# Patient Record
Sex: MALE | Race: BLACK OR AFRICAN AMERICAN | Employment: UNEMPLOYED | URBAN - METROPOLITAN AREA
[De-identification: names, ages, dates, MRNs, and addresses within clinical notes are randomized per-mention and may not be internally consistent; named-entity substitution may affect disease eponyms.]

---

## 2017-01-01 ENCOUNTER — HOSPITAL ENCOUNTER (OUTPATIENT)
Age: 67
Discharge: HOME HEALTH | End: 2017-12-23
Attending: PHYSICAL MEDICINE & REHABILITATION | Admitting: PHYSICAL MEDICINE & REHABILITATION

## 2017-01-01 ENCOUNTER — HOSPITAL ENCOUNTER (OUTPATIENT)
Dept: GENERAL RADIOLOGY | Age: 67
Discharge: HOME | End: 2017-10-23
Attending: PHYSICAL MEDICINE & REHABILITATION
Payer: MEDICARE

## 2017-01-01 ENCOUNTER — HOSPITAL ENCOUNTER (OUTPATIENT)
Dept: CT IMAGING | Age: 67
Discharge: HOME | End: 2017-10-24
Attending: PHYSICAL MEDICINE & REHABILITATION
Payer: MEDICARE

## 2017-01-01 ENCOUNTER — HOSPITAL ENCOUNTER (OUTPATIENT)
Dept: CT IMAGING | Age: 67
Discharge: HOME | End: 2017-10-16
Attending: PHYSICAL MEDICINE & REHABILITATION
Payer: MEDICARE

## 2017-01-01 ENCOUNTER — DOCUMENTATION ONLY (OUTPATIENT)
Dept: FAMILY MEDICINE CLINIC | Age: 67
End: 2017-01-01

## 2017-01-01 ENCOUNTER — HOSPITAL ENCOUNTER (OUTPATIENT)
Age: 67
Discharge: ANOTHER HOSPITAL | End: 2017-10-24
Attending: PHYSICAL MEDICINE & REHABILITATION | Admitting: PHYSICAL MEDICINE & REHABILITATION
Payer: MEDICARE

## 2017-01-01 VITALS
WEIGHT: 159 LBS | BODY MASS INDEX: 22.76 KG/M2 | SYSTOLIC BLOOD PRESSURE: 186 MMHG | HEIGHT: 70 IN | DIASTOLIC BLOOD PRESSURE: 78 MMHG | HEART RATE: 86 BPM

## 2017-01-01 VITALS
HEART RATE: 90 BPM | SYSTOLIC BLOOD PRESSURE: 145 MMHG | HEIGHT: 70 IN | BODY MASS INDEX: 25.62 KG/M2 | WEIGHT: 179 LBS | DIASTOLIC BLOOD PRESSURE: 73 MMHG

## 2017-01-01 LAB
ALBUMIN SERPL-MCNC: 1.7 G/DL (ref 3.5–5)
ALBUMIN SERPL-MCNC: 1.8 G/DL (ref 3.5–5)
ALBUMIN SERPL-MCNC: 1.9 G/DL (ref 3.5–5)
ALBUMIN SERPL-MCNC: 2 G/DL (ref 3.5–5)
ALBUMIN/GLOB SERPL: 0.4 {RATIO} (ref 1.1–2.2)
ALBUMIN/GLOB SERPL: 0.4 {RATIO} (ref 1.1–2.2)
ALP SERPL-CCNC: 104 U/L (ref 45–117)
ALP SERPL-CCNC: 114 U/L (ref 45–117)
ALT SERPL-CCNC: 14 U/L (ref 12–78)
ALT SERPL-CCNC: 16 U/L (ref 12–78)
ANION GAP SERPL CALC-SCNC: 4 MMOL/L (ref 5–15)
ANION GAP SERPL CALC-SCNC: 6 MMOL/L (ref 5–15)
ANION GAP SERPL CALC-SCNC: 7 MMOL/L (ref 5–15)
ANION GAP SERPL CALC-SCNC: 7 MMOL/L (ref 5–15)
ANION GAP SERPL CALC-SCNC: 8 MMOL/L (ref 5–15)
AST SERPL-CCNC: 12 U/L (ref 15–37)
AST SERPL-CCNC: 17 U/L (ref 15–37)
BASOPHILS # BLD: 0 K/UL
BASOPHILS # BLD: 0 K/UL (ref 0–0.1)
BASOPHILS NFR BLD: 0 %
BASOPHILS NFR BLD: 0 % (ref 0–1)
BILIRUB SERPL-MCNC: 0.2 MG/DL (ref 0.2–1)
BILIRUB SERPL-MCNC: 0.3 MG/DL (ref 0.2–1)
BLASTS NFR BLD MANUAL: 0 %
BUN SERPL-MCNC: 24 MG/DL (ref 6–20)
BUN SERPL-MCNC: 25 MG/DL (ref 6–20)
BUN SERPL-MCNC: 26 MG/DL (ref 6–20)
BUN SERPL-MCNC: 29 MG/DL (ref 6–20)
BUN SERPL-MCNC: 32 MG/DL (ref 6–20)
BUN SERPL-MCNC: 34 MG/DL (ref 6–20)
BUN SERPL-MCNC: 36 MG/DL (ref 6–20)
BUN/CREAT SERPL: 3 (ref 12–20)
BUN/CREAT SERPL: 3 (ref 12–20)
BUN/CREAT SERPL: 4 (ref 12–20)
BUN/CREAT SERPL: 5 (ref 12–20)
CALCIUM SERPL-MCNC: 8 MG/DL (ref 8.5–10.1)
CALCIUM SERPL-MCNC: 8.2 MG/DL (ref 8.5–10.1)
CALCIUM SERPL-MCNC: 8.2 MG/DL (ref 8.5–10.1)
CALCIUM SERPL-MCNC: 8.8 MG/DL (ref 8.5–10.1)
CALCIUM SERPL-MCNC: 8.9 MG/DL (ref 8.5–10.1)
CALCIUM SERPL-MCNC: 9.1 MG/DL (ref 8.5–10.1)
CALCIUM SERPL-MCNC: 9.4 MG/DL (ref 8.5–10.1)
CHLORIDE SERPL-SCNC: 100 MMOL/L (ref 97–108)
CHLORIDE SERPL-SCNC: 105 MMOL/L (ref 97–108)
CHLORIDE SERPL-SCNC: 92 MMOL/L (ref 97–108)
CHLORIDE SERPL-SCNC: 96 MMOL/L (ref 97–108)
CHLORIDE SERPL-SCNC: 97 MMOL/L (ref 97–108)
CHLORIDE SERPL-SCNC: 97 MMOL/L (ref 97–108)
CHLORIDE SERPL-SCNC: 98 MMOL/L (ref 97–108)
CHLORIDE SERPL-SCNC: 98 MMOL/L (ref 97–108)
CHLORIDE SERPL-SCNC: 99 MMOL/L (ref 97–108)
CO2 SERPL-SCNC: 27 MMOL/L (ref 21–32)
CO2 SERPL-SCNC: 28 MMOL/L (ref 21–32)
CO2 SERPL-SCNC: 28 MMOL/L (ref 21–32)
CO2 SERPL-SCNC: 29 MMOL/L (ref 21–32)
CO2 SERPL-SCNC: 30 MMOL/L (ref 21–32)
CO2 SERPL-SCNC: 31 MMOL/L (ref 21–32)
CO2 SERPL-SCNC: 32 MMOL/L (ref 21–32)
CREAT SERPL-MCNC: 5.87 MG/DL (ref 0.7–1.3)
CREAT SERPL-MCNC: 6.05 MG/DL (ref 0.7–1.3)
CREAT SERPL-MCNC: 6.45 MG/DL (ref 0.7–1.3)
CREAT SERPL-MCNC: 6.51 MG/DL (ref 0.7–1.3)
CREAT SERPL-MCNC: 7.19 MG/DL (ref 0.7–1.3)
CREAT SERPL-MCNC: 7.24 MG/DL (ref 0.7–1.3)
CREAT SERPL-MCNC: 8.04 MG/DL (ref 0.7–1.3)
CREAT SERPL-MCNC: 8.09 MG/DL (ref 0.7–1.3)
CREAT SERPL-MCNC: 8.18 MG/DL (ref 0.7–1.3)
DIFFERENTIAL METHOD BLD: ABNORMAL
EOSINOPHIL # BLD: 0.2 K/UL (ref 0–0.4)
EOSINOPHIL # BLD: 0.3 K/UL
EOSINOPHIL NFR BLD: 1 % (ref 0–7)
EOSINOPHIL NFR BLD: 2 %
ERYTHROCYTE [DISTWIDTH] IN BLOOD BY AUTOMATED COUNT: 15.4 % (ref 11.5–14.5)
ERYTHROCYTE [DISTWIDTH] IN BLOOD BY AUTOMATED COUNT: 15.5 % (ref 11.5–14.5)
ERYTHROCYTE [DISTWIDTH] IN BLOOD BY AUTOMATED COUNT: 15.6 % (ref 11.5–14.5)
ERYTHROCYTE [DISTWIDTH] IN BLOOD BY AUTOMATED COUNT: 15.8 % (ref 11.5–14.5)
ERYTHROCYTE [DISTWIDTH] IN BLOOD BY AUTOMATED COUNT: 18 % (ref 11.5–14.5)
ERYTHROCYTE [DISTWIDTH] IN BLOOD BY AUTOMATED COUNT: 19.1 % (ref 11.5–14.5)
ERYTHROCYTE [DISTWIDTH] IN BLOOD BY AUTOMATED COUNT: 19.1 % (ref 11.5–14.5)
ERYTHROCYTE [DISTWIDTH] IN BLOOD BY AUTOMATED COUNT: 19.6 % (ref 11.5–14.5)
GLOBULIN SER CALC-MCNC: 4.7 G/DL (ref 2–4)
GLOBULIN SER CALC-MCNC: 4.8 G/DL (ref 2–4)
GLUCOSE SERPL-MCNC: 116 MG/DL (ref 65–100)
GLUCOSE SERPL-MCNC: 117 MG/DL (ref 65–100)
GLUCOSE SERPL-MCNC: 131 MG/DL (ref 65–100)
GLUCOSE SERPL-MCNC: 144 MG/DL (ref 65–100)
GLUCOSE SERPL-MCNC: 147 MG/DL (ref 65–100)
GLUCOSE SERPL-MCNC: 165 MG/DL (ref 65–100)
GLUCOSE SERPL-MCNC: 233 MG/DL (ref 65–100)
GLUCOSE SERPL-MCNC: 60 MG/DL (ref 65–100)
GLUCOSE SERPL-MCNC: 88 MG/DL (ref 65–100)
HBV SURFACE AG SER QL: <0.1 INDEX
HBV SURFACE AG SER QL: NEGATIVE
HCT VFR BLD AUTO: 21.9 % (ref 36.6–50.3)
HCT VFR BLD AUTO: 22.8 % (ref 36.6–50.3)
HCT VFR BLD AUTO: 23.5 % (ref 36.6–50.3)
HCT VFR BLD AUTO: 25.3 % (ref 36.6–50.3)
HCT VFR BLD AUTO: 25.5 % (ref 36.6–50.3)
HCT VFR BLD AUTO: 27.3 % (ref 36.6–50.3)
HCT VFR BLD AUTO: 27.8 % (ref 36.6–50.3)
HCT VFR BLD AUTO: 28.3 % (ref 36.6–50.3)
HGB BLD-MCNC: 7.3 G/DL (ref 12.1–17)
HGB BLD-MCNC: 7.4 G/DL (ref 12.1–17)
HGB BLD-MCNC: 7.5 G/DL (ref 12.1–17)
HGB BLD-MCNC: 7.6 G/DL (ref 12.1–17)
HGB BLD-MCNC: 8.2 G/DL (ref 12.1–17)
HGB BLD-MCNC: 8.5 G/DL (ref 12.1–17)
HGB BLD-MCNC: 8.5 G/DL (ref 12.1–17)
HGB BLD-MCNC: 8.7 G/DL (ref 12.1–17)
LYMPHOCYTES # BLD: 1.4 K/UL
LYMPHOCYTES # BLD: 1.7 K/UL (ref 0.8–3.5)
LYMPHOCYTES NFR BLD: 10 % (ref 12–49)
LYMPHOCYTES NFR BLD: 8 %
MAGNESIUM SERPL-MCNC: 2.1 MG/DL (ref 1.6–2.4)
MAGNESIUM SERPL-MCNC: 2.1 MG/DL (ref 1.6–2.4)
MANUAL DIFFERENTIAL PERFORMED BLD QL: ABNORMAL
MCH RBC QN AUTO: 25.9 PG (ref 26–34)
MCH RBC QN AUTO: 26.1 PG (ref 26–34)
MCH RBC QN AUTO: 26.5 PG (ref 26–34)
MCH RBC QN AUTO: 27.4 PG (ref 26–34)
MCH RBC QN AUTO: 27.7 PG (ref 26–34)
MCH RBC QN AUTO: 28.4 PG (ref 26–34)
MCH RBC QN AUTO: 28.5 PG (ref 26–34)
MCH RBC QN AUTO: 28.8 PG (ref 26–34)
MCHC RBC AUTO-ENTMCNC: 29.8 G/DL (ref 30–36.5)
MCHC RBC AUTO-ENTMCNC: 30 G/DL (ref 30–36.5)
MCHC RBC AUTO-ENTMCNC: 31.1 G/DL (ref 30–36.5)
MCHC RBC AUTO-ENTMCNC: 31.3 G/DL (ref 30–36.5)
MCHC RBC AUTO-ENTMCNC: 31.5 G/DL (ref 30–36.5)
MCHC RBC AUTO-ENTMCNC: 32.4 G/DL (ref 30–36.5)
MCHC RBC AUTO-ENTMCNC: 32.9 G/DL (ref 30–36.5)
MCHC RBC AUTO-ENTMCNC: 33.3 G/DL (ref 30–36.5)
MCV RBC AUTO: 85 FL (ref 80–99)
MCV RBC AUTO: 85.2 FL (ref 80–99)
MCV RBC AUTO: 86.7 FL (ref 80–99)
MCV RBC AUTO: 86.8 FL (ref 80–99)
MCV RBC AUTO: 87 FL (ref 80–99)
MCV RBC AUTO: 87 FL (ref 80–99)
MCV RBC AUTO: 88.5 FL (ref 80–99)
MCV RBC AUTO: 88.8 FL (ref 80–99)
METAMYELOCYTES NFR BLD MANUAL: 0 %
MONOCYTES # BLD: 1 K/UL
MONOCYTES # BLD: 2 K/UL (ref 0–1)
MONOCYTES NFR BLD: 12 % (ref 5–13)
MONOCYTES NFR BLD: 6 %
MYELOCYTES NFR BLD MANUAL: 0 %
NEUTS BAND NFR BLD MANUAL: 0 %
NEUTS SEG # BLD: 12.6 K/UL (ref 1.8–8)
NEUTS SEG # BLD: 14.7 K/UL
NEUTS SEG NFR BLD: 77 % (ref 32–75)
NEUTS SEG NFR BLD: 84 %
NRBC # BLD: 0 K/UL (ref 0–0.01)
NRBC BLD-RTO: 0 PER 100 WBC
OTHER CELLS NFR BLD MANUAL: 0 %
PHOSPHATE SERPL-MCNC: 3 MG/DL (ref 2.6–4.7)
PHOSPHATE SERPL-MCNC: 3.6 MG/DL (ref 2.6–4.7)
PHOSPHATE SERPL-MCNC: 3.7 MG/DL (ref 2.6–4.7)
PHOSPHATE SERPL-MCNC: 3.8 MG/DL (ref 2.6–4.7)
PHOSPHATE SERPL-MCNC: 4 MG/DL (ref 2.6–4.7)
PHOSPHATE SERPL-MCNC: 4.1 MG/DL (ref 2.6–4.7)
PHOSPHATE SERPL-MCNC: 4.1 MG/DL (ref 2.6–4.7)
PLATELET # BLD AUTO: 159 K/UL (ref 150–400)
PLATELET # BLD AUTO: 172 K/UL (ref 150–400)
PLATELET # BLD AUTO: 203 K/UL (ref 150–400)
PLATELET # BLD AUTO: 218 K/UL (ref 150–400)
PLATELET # BLD AUTO: 283 K/UL (ref 150–400)
PLATELET # BLD AUTO: 377 K/UL (ref 150–400)
PLATELET # BLD AUTO: 385 K/UL (ref 150–400)
PLATELET # BLD AUTO: 386 K/UL (ref 150–400)
POTASSIUM SERPL-SCNC: 3.8 MMOL/L (ref 3.5–5.1)
POTASSIUM SERPL-SCNC: 4 MMOL/L (ref 3.5–5.1)
POTASSIUM SERPL-SCNC: 4.2 MMOL/L (ref 3.5–5.1)
POTASSIUM SERPL-SCNC: 4.6 MMOL/L (ref 3.5–5.1)
POTASSIUM SERPL-SCNC: 4.8 MMOL/L (ref 3.5–5.1)
POTASSIUM SERPL-SCNC: 4.9 MMOL/L (ref 3.5–5.1)
POTASSIUM SERPL-SCNC: 4.9 MMOL/L (ref 3.5–5.1)
PROMYELOCYTES NFR BLD MANUAL: 0 %
PROT SERPL-MCNC: 6.7 G/DL (ref 6.4–8.2)
PROT SERPL-MCNC: 6.7 G/DL (ref 6.4–8.2)
RBC # BLD AUTO: 2.57 M/UL (ref 4.1–5.7)
RBC # BLD AUTO: 2.63 M/UL (ref 4.1–5.7)
RBC # BLD AUTO: 2.7 M/UL (ref 4.1–5.7)
RBC # BLD AUTO: 2.85 M/UL (ref 4.1–5.7)
RBC # BLD AUTO: 2.93 M/UL (ref 4.1–5.7)
RBC # BLD AUTO: 3.14 M/UL (ref 4.1–5.7)
RBC # BLD AUTO: 3.21 M/UL (ref 4.1–5.7)
RBC # BLD AUTO: 3.26 M/UL (ref 4.1–5.7)
RBC MORPH BLD: ABNORMAL
SODIUM SERPL-SCNC: 129 MMOL/L (ref 136–145)
SODIUM SERPL-SCNC: 131 MMOL/L (ref 136–145)
SODIUM SERPL-SCNC: 132 MMOL/L (ref 136–145)
SODIUM SERPL-SCNC: 135 MMOL/L (ref 136–145)
SODIUM SERPL-SCNC: 136 MMOL/L (ref 136–145)
SODIUM SERPL-SCNC: 136 MMOL/L (ref 136–145)
SODIUM SERPL-SCNC: 140 MMOL/L (ref 136–145)
WBC # BLD AUTO: 10.5 K/UL (ref 4.1–11.1)
WBC # BLD AUTO: 12.3 K/UL (ref 4.1–11.1)
WBC # BLD AUTO: 16.5 K/UL (ref 4.1–11.1)
WBC # BLD AUTO: 17.4 K/UL (ref 4.1–11.1)
WBC # BLD AUTO: 6.2 K/UL (ref 4.1–11.1)
WBC # BLD AUTO: 6.4 K/UL (ref 4.1–11.1)
WBC # BLD AUTO: 6.6 K/UL (ref 4.1–11.1)
WBC # BLD AUTO: 7.7 K/UL (ref 4.1–11.1)

## 2017-01-01 PROCEDURE — 74011250637 HC RX REV CODE- 250/637: Performed by: PHYSICAL MEDICINE & REHABILITATION

## 2017-01-01 PROCEDURE — 83735 ASSAY OF MAGNESIUM: CPT | Performed by: PHYSICAL MEDICINE & REHABILITATION

## 2017-01-01 PROCEDURE — 74011250636 HC RX REV CODE- 250/636: Performed by: INTERNAL MEDICINE

## 2017-01-01 PROCEDURE — 74011636637 HC RX REV CODE- 636/637: Performed by: PHYSICAL MEDICINE & REHABILITATION

## 2017-01-01 PROCEDURE — 36415 COLL VENOUS BLD VENIPUNCTURE: CPT | Performed by: PHYSICAL MEDICINE & REHABILITATION

## 2017-01-01 PROCEDURE — 87340 HEPATITIS B SURFACE AG IA: CPT | Performed by: PHYSICAL MEDICINE & REHABILITATION

## 2017-01-01 PROCEDURE — 80048 BASIC METABOLIC PNL TOTAL CA: CPT | Performed by: PHYSICAL MEDICINE & REHABILITATION

## 2017-01-01 PROCEDURE — 85027 COMPLETE CBC AUTOMATED: CPT | Performed by: PHYSICAL MEDICINE & REHABILITATION

## 2017-01-01 PROCEDURE — 74011250636 HC RX REV CODE- 250/636: Performed by: PHYSICAL MEDICINE & REHABILITATION

## 2017-01-01 PROCEDURE — 85025 COMPLETE CBC W/AUTO DIFF WBC: CPT | Performed by: PHYSICAL MEDICINE & REHABILITATION

## 2017-01-01 PROCEDURE — 80069 RENAL FUNCTION PANEL: CPT | Performed by: PHYSICAL MEDICINE & REHABILITATION

## 2017-01-01 PROCEDURE — 90686 IIV4 VACC NO PRSV 0.5 ML IM: CPT | Performed by: PHYSICAL MEDICINE & REHABILITATION

## 2017-01-01 PROCEDURE — 74011000255 HC RX REV CODE- 255: Performed by: PHYSICAL MEDICINE & REHABILITATION

## 2017-01-01 PROCEDURE — 74177 CT ABD & PELVIS W/CONTRAST: CPT

## 2017-01-01 PROCEDURE — 84100 ASSAY OF PHOSPHORUS: CPT | Performed by: PHYSICAL MEDICINE & REHABILITATION

## 2017-01-01 PROCEDURE — 70450 CT HEAD/BRAIN W/O DYE: CPT

## 2017-01-01 PROCEDURE — 80053 COMPREHEN METABOLIC PANEL: CPT | Performed by: PHYSICAL MEDICINE & REHABILITATION

## 2017-01-01 PROCEDURE — 74011250637 HC RX REV CODE- 250/637

## 2017-01-01 PROCEDURE — 74011636320 HC RX REV CODE- 636/320: Performed by: PHYSICAL MEDICINE & REHABILITATION

## 2017-01-01 PROCEDURE — 71020 XR CHEST PA LAT: CPT

## 2017-01-01 PROCEDURE — 74011250636 HC RX REV CODE- 250/636

## 2017-01-01 RX ORDER — DIPHENHYDRAMINE HCL 25 MG
25 CAPSULE ORAL
Status: DISCONTINUED | OUTPATIENT
Start: 2017-01-01 | End: 2017-01-01 | Stop reason: HOSPADM

## 2017-01-01 RX ORDER — CHOLESTYRAMINE 4 G/4.8G
2 POWDER, FOR SUSPENSION ORAL 2 TIMES DAILY WITH MEALS
Status: DISCONTINUED | OUTPATIENT
Start: 2017-01-01 | End: 2017-01-01

## 2017-01-01 RX ORDER — HYDROCODONE BITARTRATE AND ACETAMINOPHEN 5; 325 MG/1; MG/1
1 TABLET ORAL
Status: DISCONTINUED | OUTPATIENT
Start: 2017-01-01 | End: 2017-01-01 | Stop reason: HOSPADM

## 2017-01-01 RX ORDER — SODIUM CHLORIDE 9 MG/ML
50 INJECTION, SOLUTION INTRAVENOUS
Status: COMPLETED | OUTPATIENT
Start: 2017-01-01 | End: 2017-01-01

## 2017-01-01 RX ORDER — ESCITALOPRAM OXALATE 10 MG/1
10 TABLET ORAL DAILY
Status: DISCONTINUED | OUTPATIENT
Start: 2017-01-01 | End: 2017-01-01 | Stop reason: HOSPADM

## 2017-01-01 RX ORDER — POLYETHYLENE GLYCOL 3350 17 G/17G
17 POWDER, FOR SOLUTION ORAL DAILY
Status: DISCONTINUED | OUTPATIENT
Start: 2017-01-01 | End: 2017-01-01

## 2017-01-01 RX ORDER — ACETAMINOPHEN 325 MG/1
650 TABLET ORAL
Status: DISCONTINUED | OUTPATIENT
Start: 2017-01-01 | End: 2017-01-01 | Stop reason: HOSPADM

## 2017-01-01 RX ORDER — CARVEDILOL 12.5 MG/1
25 TABLET ORAL 2 TIMES DAILY WITH MEALS
Status: DISCONTINUED | OUTPATIENT
Start: 2017-01-01 | End: 2017-01-01 | Stop reason: HOSPADM

## 2017-01-01 RX ORDER — CLONIDINE HYDROCHLORIDE 0.1 MG/1
0.1 TABLET ORAL 3 TIMES DAILY
Status: DISCONTINUED | OUTPATIENT
Start: 2017-01-01 | End: 2017-01-01 | Stop reason: HOSPADM

## 2017-01-01 RX ORDER — PRAVASTATIN SODIUM 10 MG/1
10 TABLET ORAL DAILY
Status: DISCONTINUED | OUTPATIENT
Start: 2017-01-01 | End: 2017-01-01 | Stop reason: HOSPADM

## 2017-01-01 RX ORDER — LISINOPRIL 20 MG/1
20 TABLET ORAL DAILY
Status: DISCONTINUED | OUTPATIENT
Start: 2017-01-01 | End: 2017-01-01

## 2017-01-01 RX ORDER — HYDRALAZINE HYDROCHLORIDE 25 MG/1
25 TABLET, FILM COATED ORAL
Status: DISCONTINUED | OUTPATIENT
Start: 2017-01-01 | End: 2017-01-01 | Stop reason: HOSPADM

## 2017-01-01 RX ORDER — HYDROCODONE BITARTRATE AND ACETAMINOPHEN 5; 325 MG/1; MG/1
2 TABLET ORAL
Status: DISCONTINUED | OUTPATIENT
Start: 2017-01-01 | End: 2017-01-01 | Stop reason: HOSPADM

## 2017-01-01 RX ORDER — ONDANSETRON 4 MG/1
4 TABLET, ORALLY DISINTEGRATING ORAL
Status: DISCONTINUED | OUTPATIENT
Start: 2017-01-01 | End: 2017-01-01 | Stop reason: HOSPADM

## 2017-01-01 RX ORDER — SODIUM CHLORIDE 0.9 % (FLUSH) 0.9 %
SYRINGE (ML) INJECTION
Status: COMPLETED
Start: 2017-01-01 | End: 2017-01-01

## 2017-01-01 RX ORDER — LISINOPRIL 20 MG/1
40 TABLET ORAL DAILY
Status: DISCONTINUED | OUTPATIENT
Start: 2017-01-01 | End: 2017-01-01 | Stop reason: HOSPADM

## 2017-01-01 RX ORDER — AMOXICILLIN 250 MG
1 CAPSULE ORAL
Status: DISCONTINUED | OUTPATIENT
Start: 2017-01-01 | End: 2017-01-01 | Stop reason: HOSPADM

## 2017-01-01 RX ORDER — INSULIN GLARGINE 100 [IU]/ML
5 INJECTION, SOLUTION SUBCUTANEOUS DAILY
Status: DISCONTINUED | OUTPATIENT
Start: 2017-01-01 | End: 2017-01-01

## 2017-01-01 RX ORDER — AMOXICILLIN 250 MG
1 CAPSULE ORAL 2 TIMES DAILY
Status: DISCONTINUED | OUTPATIENT
Start: 2017-01-01 | End: 2017-01-01

## 2017-01-01 RX ORDER — NITROGLYCERIN 0.4 MG/1
0.4 TABLET SUBLINGUAL
Status: DISCONTINUED | OUTPATIENT
Start: 2017-01-01 | End: 2017-01-01 | Stop reason: HOSPADM

## 2017-01-01 RX ORDER — DOXYLAMINE SUCCINATE 25 MG
TABLET ORAL 2 TIMES DAILY
Status: DISCONTINUED | OUTPATIENT
Start: 2017-01-01 | End: 2017-01-01 | Stop reason: HOSPADM

## 2017-01-01 RX ORDER — FACIAL-BODY WIPES
10 EACH TOPICAL DAILY PRN
Status: DISCONTINUED | OUTPATIENT
Start: 2017-01-01 | End: 2017-01-01 | Stop reason: HOSPADM

## 2017-01-01 RX ORDER — ASPIRIN 81 MG/1
81 TABLET ORAL DAILY
Status: DISCONTINUED | OUTPATIENT
Start: 2017-01-01 | End: 2017-01-01 | Stop reason: HOSPADM

## 2017-01-01 RX ORDER — MECLIZINE HCL 12.5 MG 12.5 MG/1
25 TABLET ORAL
Status: DISCONTINUED | OUTPATIENT
Start: 2017-01-01 | End: 2017-01-01 | Stop reason: HOSPADM

## 2017-01-01 RX ORDER — SODIUM CHLORIDE 0.9 % (FLUSH) 0.9 %
10 SYRINGE (ML) INJECTION
Status: COMPLETED | OUTPATIENT
Start: 2017-01-01 | End: 2017-01-01

## 2017-01-01 RX ORDER — INSULIN LISPRO 100 [IU]/ML
INJECTION, SOLUTION INTRAVENOUS; SUBCUTANEOUS
Status: DISCONTINUED | OUTPATIENT
Start: 2017-01-01 | End: 2017-01-01

## 2017-01-01 RX ORDER — DEXTROSE 50 % IN WATER (D50W) INTRAVENOUS SYRINGE
25 AS NEEDED
Status: DISCONTINUED | OUTPATIENT
Start: 2017-01-01 | End: 2017-01-01 | Stop reason: HOSPADM

## 2017-01-01 RX ORDER — CINACALCET 30 MG/1
60 TABLET, FILM COATED ORAL
Status: DISCONTINUED | OUTPATIENT
Start: 2017-01-01 | End: 2017-01-01 | Stop reason: HOSPADM

## 2017-01-01 RX ORDER — INSULIN LISPRO 100 [IU]/ML
INJECTION, SOLUTION INTRAVENOUS; SUBCUTANEOUS
Status: DISCONTINUED | OUTPATIENT
Start: 2017-01-01 | End: 2017-01-01 | Stop reason: HOSPADM

## 2017-01-01 RX ORDER — PRAVASTATIN SODIUM 10 MG/1
10 TABLET ORAL
Status: DISCONTINUED | OUTPATIENT
Start: 2017-01-01 | End: 2017-01-01 | Stop reason: HOSPADM

## 2017-01-01 RX ORDER — ALBUMIN HUMAN 50 G/1000ML
12.5 SOLUTION INTRAVENOUS AS NEEDED
Status: DISCONTINUED | OUTPATIENT
Start: 2017-01-01 | End: 2017-01-01 | Stop reason: HOSPADM

## 2017-01-01 RX ORDER — MAGNESIUM SULFATE 100 %
16 CRYSTALS MISCELLANEOUS AS NEEDED
Status: DISCONTINUED | OUTPATIENT
Start: 2017-01-01 | End: 2017-01-01 | Stop reason: HOSPADM

## 2017-01-01 RX ORDER — CARVEDILOL 12.5 MG/1
25 TABLET ORAL 2 TIMES DAILY WITH MEALS
Status: DISCONTINUED | OUTPATIENT
Start: 2017-01-01 | End: 2017-01-01

## 2017-01-01 RX ORDER — GUAIFENESIN 100 MG/5ML
81 LIQUID (ML) ORAL DAILY
Status: DISCONTINUED | OUTPATIENT
Start: 2017-01-01 | End: 2017-01-01 | Stop reason: HOSPADM

## 2017-01-01 RX ORDER — HEPARIN SODIUM 5000 [USP'U]/ML
5000 INJECTION, SOLUTION INTRAVENOUS; SUBCUTANEOUS EVERY 8 HOURS
Status: DISCONTINUED | OUTPATIENT
Start: 2017-01-01 | End: 2017-01-01 | Stop reason: HOSPADM

## 2017-01-01 RX ORDER — AMLODIPINE BESYLATE 5 MG/1
10 TABLET ORAL DAILY
Status: DISCONTINUED | OUTPATIENT
Start: 2017-01-01 | End: 2017-01-01 | Stop reason: HOSPADM

## 2017-01-01 RX ORDER — SORBITOL SOLUTION 70 %
30 SOLUTION, ORAL MISCELLANEOUS DAILY PRN
Status: DISCONTINUED | OUTPATIENT
Start: 2017-01-01 | End: 2017-01-01 | Stop reason: HOSPADM

## 2017-01-01 RX ORDER — BARIUM SULFATE 20 MG/ML
900 SUSPENSION ORAL
Status: COMPLETED | OUTPATIENT
Start: 2017-01-01 | End: 2017-01-01

## 2017-01-01 RX ORDER — CHOLESTYRAMINE 4 G/4.8G
1 POWDER, FOR SUSPENSION ORAL 2 TIMES DAILY WITH MEALS
Status: DISCONTINUED | OUTPATIENT
Start: 2017-01-01 | End: 2017-01-01

## 2017-01-01 RX ORDER — SEVELAMER HYDROCHLORIDE 800 MG/1
800 TABLET, FILM COATED ORAL
Status: DISCONTINUED | OUTPATIENT
Start: 2017-01-01 | End: 2017-01-01 | Stop reason: HOSPADM

## 2017-01-01 RX ORDER — PANTOPRAZOLE SODIUM 40 MG/1
40 TABLET, DELAYED RELEASE ORAL
Status: DISCONTINUED | OUTPATIENT
Start: 2017-01-01 | End: 2017-01-01 | Stop reason: HOSPADM

## 2017-01-01 RX ORDER — AMLODIPINE BESYLATE 5 MG/1
10 TABLET ORAL DAILY
Status: DISCONTINUED | OUTPATIENT
Start: 2017-01-01 | End: 2017-01-01

## 2017-01-01 RX ORDER — CARVEDILOL 12.5 MG/1
50 TABLET ORAL 2 TIMES DAILY WITH MEALS
Status: DISCONTINUED | OUTPATIENT
Start: 2017-01-01 | End: 2017-01-01 | Stop reason: HOSPADM

## 2017-01-01 RX ORDER — LANOLIN ALCOHOL/MO/W.PET/CERES
3 CREAM (GRAM) TOPICAL
Status: DISCONTINUED | OUTPATIENT
Start: 2017-01-01 | End: 2017-01-01 | Stop reason: HOSPADM

## 2017-01-01 RX ORDER — MAGNESIUM SULFATE 100 %
CRYSTALS MISCELLANEOUS
Status: COMPLETED
Start: 2017-01-01 | End: 2017-01-01

## 2017-01-01 RX ORDER — INSULIN GLARGINE 100 [IU]/ML
16 INJECTION, SOLUTION SUBCUTANEOUS
Status: DISCONTINUED | OUTPATIENT
Start: 2017-01-01 | End: 2017-01-01

## 2017-01-01 RX ADMIN — CARVEDILOL 25 MG: 12.5 TABLET, FILM COATED ORAL at 09:25

## 2017-01-01 RX ADMIN — HYDROCODONE BITARTRATE AND ACETAMINOPHEN 1 TABLET: 5; 325 TABLET ORAL at 05:33

## 2017-01-01 RX ADMIN — PANTOPRAZOLE SODIUM 40 MG: 40 TABLET, DELAYED RELEASE ORAL at 09:06

## 2017-01-01 RX ADMIN — INSULIN LISPRO 8 UNITS: 100 INJECTION, SOLUTION INTRAVENOUS; SUBCUTANEOUS at 17:56

## 2017-01-01 RX ADMIN — PRAVASTATIN SODIUM 10 MG: 10 TABLET ORAL at 08:42

## 2017-01-01 RX ADMIN — CINACALCET HYDROCHLORIDE 60 MG: 30 TABLET, COATED ORAL at 08:45

## 2017-01-01 RX ADMIN — CLONIDINE HYDROCHLORIDE 0.1 MG: 0.1 TABLET ORAL at 21:08

## 2017-01-01 RX ADMIN — CARVEDILOL 25 MG: 12.5 TABLET, FILM COATED ORAL at 17:47

## 2017-01-01 RX ADMIN — INSULIN LISPRO 4 UNITS: 100 INJECTION, SOLUTION INTRAVENOUS; SUBCUTANEOUS at 12:54

## 2017-01-01 RX ADMIN — CARVEDILOL 25 MG: 12.5 TABLET, FILM COATED ORAL at 19:32

## 2017-01-01 RX ADMIN — AMLODIPINE BESYLATE 10 MG: 5 TABLET ORAL at 08:45

## 2017-01-01 RX ADMIN — CLONIDINE HYDROCHLORIDE 0.1 MG: 0.1 TABLET ORAL at 05:15

## 2017-01-01 RX ADMIN — HYDROCODONE BITARTRATE AND ACETAMINOPHEN 1 TABLET: 5; 325 TABLET ORAL at 09:53

## 2017-01-01 RX ADMIN — RENAGEL 800 MG: 800 TABLET ORAL at 08:31

## 2017-01-01 RX ADMIN — RENAGEL 800 MG: 800 TABLET ORAL at 17:10

## 2017-01-01 RX ADMIN — RENAGEL 800 MG: 800 TABLET ORAL at 12:48

## 2017-01-01 RX ADMIN — NEPHROCAP 1 CAPSULE: 1 CAP ORAL at 08:49

## 2017-01-01 RX ADMIN — PRAVASTATIN SODIUM 10 MG: 10 TABLET ORAL at 21:30

## 2017-01-01 RX ADMIN — CARVEDILOL 25 MG: 12.5 TABLET, FILM COATED ORAL at 08:45

## 2017-01-01 RX ADMIN — CLONIDINE HYDROCHLORIDE 0.1 MG: 0.1 TABLET ORAL at 21:01

## 2017-01-01 RX ADMIN — AMLODIPINE BESYLATE 10 MG: 5 TABLET ORAL at 08:39

## 2017-01-01 RX ADMIN — PRAVASTATIN SODIUM 10 MG: 10 TABLET ORAL at 09:51

## 2017-01-01 RX ADMIN — HYDROCODONE BITARTRATE AND ACETAMINOPHEN 2 TABLET: 5; 325 TABLET ORAL at 12:55

## 2017-01-01 RX ADMIN — CARVEDILOL 25 MG: 12.5 TABLET, FILM COATED ORAL at 08:59

## 2017-01-01 RX ADMIN — CARVEDILOL 25 MG: 12.5 TABLET, FILM COATED ORAL at 09:11

## 2017-01-01 RX ADMIN — CLONIDINE HYDROCHLORIDE 0.1 MG: 0.1 TABLET ORAL at 13:26

## 2017-01-01 RX ADMIN — NEPHROCAP 1 CAPSULE: 1 CAP ORAL at 09:24

## 2017-01-01 RX ADMIN — CHOLESTYRAMINE 2 G: 4 POWDER, FOR SUSPENSION ORAL at 08:54

## 2017-01-01 RX ADMIN — HEPARIN SODIUM 5000 UNITS: 5000 INJECTION, SOLUTION INTRAVENOUS; SUBCUTANEOUS at 12:33

## 2017-01-01 RX ADMIN — HEPARIN SODIUM 5000 UNITS: 5000 INJECTION, SOLUTION INTRAVENOUS; SUBCUTANEOUS at 05:28

## 2017-01-01 RX ADMIN — CLONIDINE HYDROCHLORIDE 0.1 MG: 0.1 TABLET ORAL at 14:26

## 2017-01-01 RX ADMIN — LISINOPRIL 20 MG: 20 TABLET ORAL at 09:03

## 2017-01-01 RX ADMIN — CARVEDILOL 25 MG: 12.5 TABLET, FILM COATED ORAL at 16:36

## 2017-01-01 RX ADMIN — CLONIDINE HYDROCHLORIDE 0.1 MG: 0.1 TABLET ORAL at 06:48

## 2017-01-01 RX ADMIN — RENAGEL 800 MG: 800 TABLET ORAL at 13:08

## 2017-01-01 RX ADMIN — MICONAZOLE NITRATE: 20 CREAM TOPICAL at 17:26

## 2017-01-01 RX ADMIN — AMLODIPINE BESYLATE 10 MG: 5 TABLET ORAL at 09:04

## 2017-01-01 RX ADMIN — INSULIN LISPRO 4 UNITS: 100 INJECTION, SOLUTION INTRAVENOUS; SUBCUTANEOUS at 08:58

## 2017-01-01 RX ADMIN — CARVEDILOL 25 MG: 12.5 TABLET, FILM COATED ORAL at 17:33

## 2017-01-01 RX ADMIN — HYDROCODONE BITARTRATE AND ACETAMINOPHEN 2 TABLET: 5; 325 TABLET ORAL at 10:57

## 2017-01-01 RX ADMIN — ASPIRIN 81 MG: 81 TABLET, COATED ORAL at 08:39

## 2017-01-01 RX ADMIN — HEPARIN SODIUM 5000 UNITS: 5000 INJECTION, SOLUTION INTRAVENOUS; SUBCUTANEOUS at 14:05

## 2017-01-01 RX ADMIN — LISINOPRIL 20 MG: 20 TABLET ORAL at 08:12

## 2017-01-01 RX ADMIN — HYDROCODONE BITARTRATE AND ACETAMINOPHEN 1 TABLET: 5; 325 TABLET ORAL at 01:10

## 2017-01-01 RX ADMIN — CINACALCET HYDROCHLORIDE 60 MG: 30 TABLET, COATED ORAL at 09:04

## 2017-01-01 RX ADMIN — HEPARIN SODIUM 5000 UNITS: 5000 INJECTION, SOLUTION INTRAVENOUS; SUBCUTANEOUS at 05:14

## 2017-01-01 RX ADMIN — HEPARIN SODIUM 5000 UNITS: 5000 INJECTION, SOLUTION INTRAVENOUS; SUBCUTANEOUS at 13:11

## 2017-01-01 RX ADMIN — HYDROCODONE BITARTRATE AND ACETAMINOPHEN 1 TABLET: 5; 325 TABLET ORAL at 08:56

## 2017-01-01 RX ADMIN — ERYTHROPOIETIN 8000 UNITS: 4000 INJECTION, SOLUTION INTRAVENOUS; SUBCUTANEOUS at 19:01

## 2017-01-01 RX ADMIN — CARVEDILOL 25 MG: 12.5 TABLET, FILM COATED ORAL at 08:39

## 2017-01-01 RX ADMIN — PRAVASTATIN SODIUM 10 MG: 10 TABLET ORAL at 08:47

## 2017-01-01 RX ADMIN — SORBITOL SOLUTION (BULK) 30 ML: 70 SOLUTION at 12:48

## 2017-01-01 RX ADMIN — HYDROCODONE BITARTRATE AND ACETAMINOPHEN 2 TABLET: 5; 325 TABLET ORAL at 11:53

## 2017-01-01 RX ADMIN — CARVEDILOL 25 MG: 12.5 TABLET, FILM COATED ORAL at 16:44

## 2017-01-01 RX ADMIN — HYDROCODONE BITARTRATE AND ACETAMINOPHEN 2 TABLET: 5; 325 TABLET ORAL at 04:04

## 2017-01-01 RX ADMIN — PRAVASTATIN SODIUM 10 MG: 10 TABLET ORAL at 08:49

## 2017-01-01 RX ADMIN — INSULIN LISPRO 2 UNITS: 100 INJECTION, SOLUTION INTRAVENOUS; SUBCUTANEOUS at 09:04

## 2017-01-01 RX ADMIN — CLONIDINE HYDROCHLORIDE 0.1 MG: 0.1 TABLET ORAL at 15:09

## 2017-01-01 RX ADMIN — HYDROCODONE BITARTRATE AND ACETAMINOPHEN 2 TABLET: 5; 325 TABLET ORAL at 12:38

## 2017-01-01 RX ADMIN — HYDROCODONE BITARTRATE AND ACETAMINOPHEN 2 TABLET: 5; 325 TABLET ORAL at 16:45

## 2017-01-01 RX ADMIN — NEPHROCAP 1 CAPSULE: 1 CAP ORAL at 09:06

## 2017-01-01 RX ADMIN — CARVEDILOL 25 MG: 12.5 TABLET, FILM COATED ORAL at 08:47

## 2017-01-01 RX ADMIN — MICONAZOLE NITRATE: 20 CREAM TOPICAL at 05:25

## 2017-01-01 RX ADMIN — HYDROCODONE BITARTRATE AND ACETAMINOPHEN 2 TABLET: 5; 325 TABLET ORAL at 05:34

## 2017-01-01 RX ADMIN — RENAGEL 800 MG: 800 TABLET ORAL at 08:37

## 2017-01-01 RX ADMIN — NEPHROCAP 1 CAPSULE: 1 CAP ORAL at 08:42

## 2017-01-01 RX ADMIN — ACETAMINOPHEN 650 MG: 325 TABLET ORAL at 19:33

## 2017-01-01 RX ADMIN — CLONIDINE HYDROCHLORIDE 0.1 MG: 0.1 TABLET ORAL at 21:16

## 2017-01-01 RX ADMIN — NEPHROCAP 1 CAPSULE: 1 CAP ORAL at 08:47

## 2017-01-01 RX ADMIN — HYDROCODONE BITARTRATE AND ACETAMINOPHEN 1 TABLET: 5; 325 TABLET ORAL at 09:12

## 2017-01-01 RX ADMIN — CHOLESTYRAMINE 2 G: 4 POWDER, FOR SUSPENSION ORAL at 08:46

## 2017-01-01 RX ADMIN — HEPARIN SODIUM 5000 UNITS: 5000 INJECTION, SOLUTION INTRAVENOUS; SUBCUTANEOUS at 05:45

## 2017-01-01 RX ADMIN — INSULIN LISPRO 4 UNITS: 100 INJECTION, SOLUTION INTRAVENOUS; SUBCUTANEOUS at 16:45

## 2017-01-01 RX ADMIN — HEPARIN SODIUM 5000 UNITS: 5000 INJECTION, SOLUTION INTRAVENOUS; SUBCUTANEOUS at 22:36

## 2017-01-01 RX ADMIN — PRAVASTATIN SODIUM 10 MG: 10 TABLET ORAL at 22:19

## 2017-01-01 RX ADMIN — RENAGEL 800 MG: 800 TABLET ORAL at 08:45

## 2017-01-01 RX ADMIN — PANTOPRAZOLE SODIUM 40 MG: 40 TABLET, DELAYED RELEASE ORAL at 08:49

## 2017-01-01 RX ADMIN — PANTOPRAZOLE SODIUM 40 MG: 40 TABLET, DELAYED RELEASE ORAL at 08:58

## 2017-01-01 RX ADMIN — CLONIDINE HYDROCHLORIDE 0.1 MG: 0.1 TABLET ORAL at 13:01

## 2017-01-01 RX ADMIN — INFLUENZA VIRUS VACCINE 0.5 ML: 15; 15; 15; 15 SUSPENSION INTRAMUSCULAR at 21:56

## 2017-01-01 RX ADMIN — ESCITALOPRAM OXALATE 10 MG: 10 TABLET ORAL at 08:45

## 2017-01-01 RX ADMIN — CLONIDINE HYDROCHLORIDE 0.1 MG: 0.1 TABLET ORAL at 13:41

## 2017-01-01 RX ADMIN — INSULIN LISPRO 4 UNITS: 100 INJECTION, SOLUTION INTRAVENOUS; SUBCUTANEOUS at 12:02

## 2017-01-01 RX ADMIN — CLONIDINE HYDROCHLORIDE 0.1 MG: 0.1 TABLET ORAL at 06:08

## 2017-01-01 RX ADMIN — CLONIDINE HYDROCHLORIDE 0.1 MG: 0.1 TABLET ORAL at 13:59

## 2017-01-01 RX ADMIN — INSULIN LISPRO 4 UNITS: 100 INJECTION, SOLUTION INTRAVENOUS; SUBCUTANEOUS at 10:56

## 2017-01-01 RX ADMIN — CARVEDILOL 25 MG: 12.5 TABLET, FILM COATED ORAL at 19:13

## 2017-01-01 RX ADMIN — RENAGEL 800 MG: 800 TABLET ORAL at 13:02

## 2017-01-01 RX ADMIN — HEPARIN SODIUM 5000 UNITS: 5000 INJECTION, SOLUTION INTRAVENOUS; SUBCUTANEOUS at 05:34

## 2017-01-01 RX ADMIN — HYDROCODONE BITARTRATE AND ACETAMINOPHEN 1 TABLET: 5; 325 TABLET ORAL at 02:20

## 2017-01-01 RX ADMIN — CARVEDILOL 25 MG: 12.5 TABLET, FILM COATED ORAL at 17:02

## 2017-01-01 RX ADMIN — HEPARIN SODIUM 5000 UNITS: 5000 INJECTION, SOLUTION INTRAVENOUS; SUBCUTANEOUS at 05:26

## 2017-01-01 RX ADMIN — HEPARIN SODIUM 5000 UNITS: 5000 INJECTION, SOLUTION INTRAVENOUS; SUBCUTANEOUS at 22:59

## 2017-01-01 RX ADMIN — ERYTHROPOIETIN 8000 UNITS: 4000 INJECTION, SOLUTION INTRAVENOUS; SUBCUTANEOUS at 17:18

## 2017-01-01 RX ADMIN — INSULIN LISPRO 2 UNITS: 100 INJECTION, SOLUTION INTRAVENOUS; SUBCUTANEOUS at 17:16

## 2017-01-01 RX ADMIN — STANDARDIZED SENNA CONCENTRATE AND DOCUSATE SODIUM 1 TABLET: 8.6; 5 TABLET, FILM COATED ORAL at 08:49

## 2017-01-01 RX ADMIN — ERYTHROPOIETIN 8000 UNITS: 4000 INJECTION, SOLUTION INTRAVENOUS; SUBCUTANEOUS at 17:00

## 2017-01-01 RX ADMIN — POLYETHYLENE GLYCOL 3350 17 G: 17 POWDER, FOR SOLUTION ORAL at 08:49

## 2017-01-01 RX ADMIN — INSULIN LISPRO 2 UNITS: 100 INJECTION, SOLUTION INTRAVENOUS; SUBCUTANEOUS at 17:01

## 2017-01-01 RX ADMIN — CLONIDINE HYDROCHLORIDE 0.1 MG: 0.1 TABLET ORAL at 05:25

## 2017-01-01 RX ADMIN — AMLODIPINE BESYLATE 10 MG: 5 TABLET ORAL at 09:51

## 2017-01-01 RX ADMIN — HYDROCODONE BITARTRATE AND ACETAMINOPHEN 2 TABLET: 5; 325 TABLET ORAL at 14:04

## 2017-01-01 RX ADMIN — ASPIRIN 81 MG: 81 TABLET, COATED ORAL at 08:24

## 2017-01-01 RX ADMIN — CLONIDINE HYDROCHLORIDE 0.1 MG: 0.1 TABLET ORAL at 14:33

## 2017-01-01 RX ADMIN — ERYTHROPOIETIN 8000 UNITS: 4000 INJECTION, SOLUTION INTRAVENOUS; SUBCUTANEOUS at 19:18

## 2017-01-01 RX ADMIN — CLONIDINE HYDROCHLORIDE 0.1 MG: 0.1 TABLET ORAL at 05:37

## 2017-01-01 RX ADMIN — HYDROCODONE BITARTRATE AND ACETAMINOPHEN 2 TABLET: 5; 325 TABLET ORAL at 11:51

## 2017-01-01 RX ADMIN — CLONIDINE HYDROCHLORIDE 0.1 MG: 0.1 TABLET ORAL at 12:58

## 2017-01-01 RX ADMIN — CHOLESTYRAMINE 2 G: 4 POWDER, FOR SUSPENSION ORAL at 17:18

## 2017-01-01 RX ADMIN — HEPARIN SODIUM 5000 UNITS: 5000 INJECTION, SOLUTION INTRAVENOUS; SUBCUTANEOUS at 22:21

## 2017-01-01 RX ADMIN — HEPARIN SODIUM 5000 UNITS: 5000 INJECTION, SOLUTION INTRAVENOUS; SUBCUTANEOUS at 21:56

## 2017-01-01 RX ADMIN — HYDROCODONE BITARTRATE AND ACETAMINOPHEN 2 TABLET: 5; 325 TABLET ORAL at 14:15

## 2017-01-01 RX ADMIN — HEPARIN SODIUM 5000 UNITS: 5000 INJECTION, SOLUTION INTRAVENOUS; SUBCUTANEOUS at 05:41

## 2017-01-01 RX ADMIN — SODIUM CHLORIDE 50 ML/HR: 900 INJECTION, SOLUTION INTRAVENOUS at 16:07

## 2017-01-01 RX ADMIN — MICONAZOLE NITRATE: 20 CREAM TOPICAL at 18:00

## 2017-01-01 RX ADMIN — CLONIDINE HYDROCHLORIDE 0.1 MG: 0.1 TABLET ORAL at 22:05

## 2017-01-01 RX ADMIN — LISINOPRIL 20 MG: 20 TABLET ORAL at 08:02

## 2017-01-01 RX ADMIN — NEPHROCAP 1 CAPSULE: 1 CAP ORAL at 08:38

## 2017-01-01 RX ADMIN — CLONIDINE HYDROCHLORIDE 0.1 MG: 0.1 TABLET ORAL at 06:35

## 2017-01-01 RX ADMIN — ASPIRIN 81 MG 81 MG: 81 TABLET ORAL at 09:07

## 2017-01-01 RX ADMIN — CARVEDILOL 25 MG: 12.5 TABLET, FILM COATED ORAL at 17:27

## 2017-01-01 RX ADMIN — INSULIN GLARGINE 16 UNITS: 100 INJECTION, SOLUTION SUBCUTANEOUS at 21:15

## 2017-01-01 RX ADMIN — CLONIDINE HYDROCHLORIDE 0.1 MG: 0.1 TABLET ORAL at 14:09

## 2017-01-01 RX ADMIN — CINACALCET HYDROCHLORIDE 60 MG: 30 TABLET, COATED ORAL at 08:29

## 2017-01-01 RX ADMIN — CARVEDILOL 25 MG: 12.5 TABLET, FILM COATED ORAL at 08:31

## 2017-01-01 RX ADMIN — CARVEDILOL 25 MG: 12.5 TABLET, FILM COATED ORAL at 08:49

## 2017-01-01 RX ADMIN — CLONIDINE HYDROCHLORIDE 0.1 MG: 0.1 TABLET ORAL at 21:42

## 2017-01-01 RX ADMIN — LISINOPRIL 20 MG: 20 TABLET ORAL at 09:54

## 2017-01-01 RX ADMIN — CHOLESTYRAMINE 2 G: 4 POWDER, FOR SUSPENSION ORAL at 17:10

## 2017-01-01 RX ADMIN — HEPARIN SODIUM 5000 UNITS: 5000 INJECTION, SOLUTION INTRAVENOUS; SUBCUTANEOUS at 21:08

## 2017-01-01 RX ADMIN — PRAVASTATIN SODIUM 10 MG: 10 TABLET ORAL at 08:33

## 2017-01-01 RX ADMIN — HYDROCODONE BITARTRATE AND ACETAMINOPHEN 1 TABLET: 5; 325 TABLET ORAL at 08:11

## 2017-01-01 RX ADMIN — HEPARIN SODIUM 5000 UNITS: 5000 INJECTION, SOLUTION INTRAVENOUS; SUBCUTANEOUS at 06:34

## 2017-01-01 RX ADMIN — CLONIDINE HYDROCHLORIDE 0.1 MG: 0.1 TABLET ORAL at 06:16

## 2017-01-01 RX ADMIN — CARVEDILOL 25 MG: 12.5 TABLET, FILM COATED ORAL at 16:53

## 2017-01-01 RX ADMIN — HYDROCODONE BITARTRATE AND ACETAMINOPHEN 2 TABLET: 5; 325 TABLET ORAL at 09:32

## 2017-01-01 RX ADMIN — CARVEDILOL 25 MG: 12.5 TABLET, FILM COATED ORAL at 08:42

## 2017-01-01 RX ADMIN — HYDROCODONE BITARTRATE AND ACETAMINOPHEN 2 TABLET: 5; 325 TABLET ORAL at 16:52

## 2017-01-01 RX ADMIN — CLONIDINE HYDROCHLORIDE 0.1 MG: 0.1 TABLET ORAL at 22:19

## 2017-01-01 RX ADMIN — HYDROCODONE BITARTRATE AND ACETAMINOPHEN 2 TABLET: 5; 325 TABLET ORAL at 05:54

## 2017-01-01 RX ADMIN — RENAGEL 800 MG: 800 TABLET ORAL at 08:55

## 2017-01-01 RX ADMIN — INSULIN LISPRO 10 UNITS: 100 INJECTION, SOLUTION INTRAVENOUS; SUBCUTANEOUS at 16:53

## 2017-01-01 RX ADMIN — HEPARIN SODIUM 5000 UNITS: 5000 INJECTION, SOLUTION INTRAVENOUS; SUBCUTANEOUS at 20:48

## 2017-01-01 RX ADMIN — RENAGEL 800 MG: 800 TABLET ORAL at 08:11

## 2017-01-01 RX ADMIN — INSULIN LISPRO 4 UNITS: 100 INJECTION, SOLUTION INTRAVENOUS; SUBCUTANEOUS at 09:25

## 2017-01-01 RX ADMIN — CHOLESTYRAMINE 1 G: 4 POWDER, FOR SUSPENSION ORAL at 08:01

## 2017-01-01 RX ADMIN — AMLODIPINE BESYLATE 10 MG: 5 TABLET ORAL at 08:42

## 2017-01-01 RX ADMIN — STANDARDIZED SENNA CONCENTRATE AND DOCUSATE SODIUM 1 TABLET: 8.6; 5 TABLET, FILM COATED ORAL at 09:25

## 2017-01-01 RX ADMIN — GLUCAGON HYDROCHLORIDE: 1 INJECTION, POWDER, FOR SOLUTION INTRAMUSCULAR; INTRAVENOUS; SUBCUTANEOUS at 04:25

## 2017-01-01 RX ADMIN — HYDROCODONE BITARTRATE AND ACETAMINOPHEN 2 TABLET: 5; 325 TABLET ORAL at 08:49

## 2017-01-01 RX ADMIN — LISINOPRIL 20 MG: 20 TABLET ORAL at 10:50

## 2017-01-01 RX ADMIN — NEPHROCAP 1 CAPSULE: 1 CAP ORAL at 11:50

## 2017-01-01 RX ADMIN — MICONAZOLE NITRATE: 20 CREAM TOPICAL at 21:56

## 2017-01-01 RX ADMIN — NEPHROCAP 1 CAPSULE: 1 CAP ORAL at 09:07

## 2017-01-01 RX ADMIN — RENAGEL 800 MG: 800 TABLET ORAL at 20:46

## 2017-01-01 RX ADMIN — CLONIDINE HYDROCHLORIDE 0.1 MG: 0.1 TABLET ORAL at 05:44

## 2017-01-01 RX ADMIN — PRAVASTATIN SODIUM 10 MG: 10 TABLET ORAL at 22:35

## 2017-01-01 RX ADMIN — AMLODIPINE BESYLATE 10 MG: 5 TABLET ORAL at 08:11

## 2017-01-01 RX ADMIN — CLONIDINE HYDROCHLORIDE 0.1 MG: 0.1 TABLET ORAL at 05:34

## 2017-01-01 RX ADMIN — RENAGEL 800 MG: 800 TABLET ORAL at 13:34

## 2017-01-01 RX ADMIN — NEPHROCAP 1 CAPSULE: 1 CAP ORAL at 10:54

## 2017-01-01 RX ADMIN — CHOLESTYRAMINE 2 G: 4 POWDER, FOR SUSPENSION ORAL at 19:33

## 2017-01-01 RX ADMIN — Medication: at 04:15

## 2017-01-01 RX ADMIN — PANTOPRAZOLE SODIUM 40 MG: 40 TABLET, DELAYED RELEASE ORAL at 08:42

## 2017-01-01 RX ADMIN — ASPIRIN 81 MG 81 MG: 81 TABLET ORAL at 08:49

## 2017-01-01 RX ADMIN — NEPHROCAP 1 CAPSULE: 1 CAP ORAL at 08:44

## 2017-01-01 RX ADMIN — HYDROCODONE BITARTRATE AND ACETAMINOPHEN 1 TABLET: 5; 325 TABLET ORAL at 08:45

## 2017-01-01 RX ADMIN — AMLODIPINE BESYLATE 10 MG: 5 TABLET ORAL at 08:35

## 2017-01-01 RX ADMIN — HYDROCODONE BITARTRATE AND ACETAMINOPHEN 2 TABLET: 5; 325 TABLET ORAL at 13:14

## 2017-01-01 RX ADMIN — CINACALCET HYDROCHLORIDE 60 MG: 30 TABLET, COATED ORAL at 08:11

## 2017-01-01 RX ADMIN — CHOLESTYRAMINE 2 G: 4 POWDER, FOR SUSPENSION ORAL at 20:48

## 2017-01-01 RX ADMIN — CLONIDINE HYDROCHLORIDE 0.1 MG: 0.1 TABLET ORAL at 05:14

## 2017-01-01 RX ADMIN — MICONAZOLE NITRATE: 20 CREAM TOPICAL at 18:03

## 2017-01-01 RX ADMIN — RENAGEL 800 MG: 800 TABLET ORAL at 17:00

## 2017-01-01 RX ADMIN — CINACALCET HYDROCHLORIDE 60 MG: 30 TABLET, COATED ORAL at 09:03

## 2017-01-01 RX ADMIN — RENAGEL 800 MG: 800 TABLET ORAL at 12:00

## 2017-01-01 RX ADMIN — CLONIDINE HYDROCHLORIDE 0.1 MG: 0.1 TABLET ORAL at 06:54

## 2017-01-01 RX ADMIN — HYDROCODONE BITARTRATE AND ACETAMINOPHEN 1 TABLET: 5; 325 TABLET ORAL at 08:32

## 2017-01-01 RX ADMIN — CLONIDINE HYDROCHLORIDE 0.1 MG: 0.1 TABLET ORAL at 13:12

## 2017-01-01 RX ADMIN — STANDARDIZED SENNA CONCENTRATE AND DOCUSATE SODIUM 1 TABLET: 8.6; 5 TABLET, FILM COATED ORAL at 08:33

## 2017-01-01 RX ADMIN — PANTOPRAZOLE SODIUM 40 MG: 40 TABLET, DELAYED RELEASE ORAL at 09:24

## 2017-01-01 RX ADMIN — ESCITALOPRAM OXALATE 10 MG: 10 TABLET ORAL at 09:54

## 2017-01-01 RX ADMIN — HYDROCODONE BITARTRATE AND ACETAMINOPHEN 2 TABLET: 5; 325 TABLET ORAL at 15:53

## 2017-01-01 RX ADMIN — ESCITALOPRAM OXALATE 10 MG: 10 TABLET ORAL at 08:31

## 2017-01-01 RX ADMIN — CHOLESTYRAMINE 2 G: 4 POWDER, FOR SUSPENSION ORAL at 08:11

## 2017-01-01 RX ADMIN — INSULIN GLARGINE 5 UNITS: 100 INJECTION, SOLUTION SUBCUTANEOUS at 08:44

## 2017-01-01 RX ADMIN — PRAVASTATIN SODIUM 10 MG: 10 TABLET ORAL at 21:25

## 2017-01-01 RX ADMIN — ASPIRIN 81 MG: 81 TABLET, COATED ORAL at 08:35

## 2017-01-01 RX ADMIN — CHOLESTYRAMINE 2 G: 4 POWDER, FOR SUSPENSION ORAL at 17:28

## 2017-01-01 RX ADMIN — RENAGEL 800 MG: 800 TABLET ORAL at 17:21

## 2017-01-01 RX ADMIN — LISINOPRIL 20 MG: 20 TABLET ORAL at 08:24

## 2017-01-01 RX ADMIN — HYDROCODONE BITARTRATE AND ACETAMINOPHEN 2 TABLET: 5; 325 TABLET ORAL at 17:48

## 2017-01-01 RX ADMIN — CHOLESTYRAMINE 2 G: 4 POWDER, FOR SUSPENSION ORAL at 09:03

## 2017-01-01 RX ADMIN — HYDROCODONE BITARTRATE AND ACETAMINOPHEN 2 TABLET: 5; 325 TABLET ORAL at 19:13

## 2017-01-01 RX ADMIN — NEPHROCAP 1 CAPSULE: 1 CAP ORAL at 08:55

## 2017-01-01 RX ADMIN — PRAVASTATIN SODIUM 10 MG: 10 TABLET ORAL at 09:09

## 2017-01-01 RX ADMIN — HEPARIN SODIUM 5000 UNITS: 5000 INJECTION, SOLUTION INTRAVENOUS; SUBCUTANEOUS at 04:57

## 2017-01-01 RX ADMIN — CLONIDINE HYDROCHLORIDE 0.1 MG: 0.1 TABLET ORAL at 13:08

## 2017-01-01 RX ADMIN — MICONAZOLE NITRATE: 20 CREAM TOPICAL at 06:09

## 2017-01-01 RX ADMIN — MICONAZOLE NITRATE: 20 CREAM TOPICAL at 05:00

## 2017-01-01 RX ADMIN — LISINOPRIL 20 MG: 20 TABLET ORAL at 08:45

## 2017-01-01 RX ADMIN — CARVEDILOL 25 MG: 12.5 TABLET, FILM COATED ORAL at 10:27

## 2017-01-01 RX ADMIN — CLONIDINE HYDROCHLORIDE 0.1 MG: 0.1 TABLET ORAL at 21:19

## 2017-01-01 RX ADMIN — HEPARIN SODIUM 5000 UNITS: 5000 INJECTION, SOLUTION INTRAVENOUS; SUBCUTANEOUS at 06:16

## 2017-01-01 RX ADMIN — CARVEDILOL 25 MG: 12.5 TABLET, FILM COATED ORAL at 20:46

## 2017-01-01 RX ADMIN — ASPIRIN 81 MG: 81 TABLET, COATED ORAL at 08:29

## 2017-01-01 RX ADMIN — INSULIN LISPRO 4 UNITS: 100 INJECTION, SOLUTION INTRAVENOUS; SUBCUTANEOUS at 08:47

## 2017-01-01 RX ADMIN — STANDARDIZED SENNA CONCENTRATE AND DOCUSATE SODIUM 1 TABLET: 8.6; 5 TABLET, FILM COATED ORAL at 21:01

## 2017-01-01 RX ADMIN — HEPARIN SODIUM 5000 UNITS: 5000 INJECTION, SOLUTION INTRAVENOUS; SUBCUTANEOUS at 14:26

## 2017-01-01 RX ADMIN — STANDARDIZED SENNA CONCENTRATE AND DOCUSATE SODIUM 1 TABLET: 8.6; 5 TABLET, FILM COATED ORAL at 21:44

## 2017-01-01 RX ADMIN — Medication: at 17:00

## 2017-01-01 RX ADMIN — INSULIN LISPRO 4 UNITS: 100 INJECTION, SOLUTION INTRAVENOUS; SUBCUTANEOUS at 17:48

## 2017-01-01 RX ADMIN — ASPIRIN 81 MG: 81 TABLET, COATED ORAL at 09:54

## 2017-01-01 RX ADMIN — RENAGEL 800 MG: 800 TABLET ORAL at 08:42

## 2017-01-01 RX ADMIN — ASPIRIN 81 MG: 81 TABLET, COATED ORAL at 10:49

## 2017-01-01 RX ADMIN — AMLODIPINE BESYLATE 10 MG: 5 TABLET ORAL at 09:05

## 2017-01-01 RX ADMIN — HYDROCODONE BITARTRATE AND ACETAMINOPHEN 2 TABLET: 5; 325 TABLET ORAL at 21:19

## 2017-01-01 RX ADMIN — LISINOPRIL 20 MG: 20 TABLET ORAL at 08:55

## 2017-01-01 RX ADMIN — RENAGEL 800 MG: 800 TABLET ORAL at 09:12

## 2017-01-01 RX ADMIN — MICONAZOLE NITRATE: 20 CREAM TOPICAL at 09:00

## 2017-01-01 RX ADMIN — ACETAMINOPHEN 650 MG: 325 TABLET ORAL at 08:40

## 2017-01-01 RX ADMIN — ACETAMINOPHEN 650 MG: 325 TABLET ORAL at 04:34

## 2017-01-01 RX ADMIN — CINACALCET HYDROCHLORIDE 60 MG: 30 TABLET, COATED ORAL at 08:35

## 2017-01-01 RX ADMIN — INSULIN LISPRO 4 UNITS: 100 INJECTION, SOLUTION INTRAVENOUS; SUBCUTANEOUS at 12:39

## 2017-01-01 RX ADMIN — INSULIN LISPRO 4 UNITS: 100 INJECTION, SOLUTION INTRAVENOUS; SUBCUTANEOUS at 17:27

## 2017-01-01 RX ADMIN — LISINOPRIL 20 MG: 20 TABLET ORAL at 09:05

## 2017-01-01 RX ADMIN — ASPIRIN 81 MG 81 MG: 81 TABLET ORAL at 08:58

## 2017-01-01 RX ADMIN — CARVEDILOL 25 MG: 12.5 TABLET, FILM COATED ORAL at 10:55

## 2017-01-01 RX ADMIN — ASPIRIN 81 MG: 81 TABLET, COATED ORAL at 09:04

## 2017-01-01 RX ADMIN — LISINOPRIL 20 MG: 20 TABLET ORAL at 08:46

## 2017-01-01 RX ADMIN — INSULIN LISPRO 4 UNITS: 100 INJECTION, SOLUTION INTRAVENOUS; SUBCUTANEOUS at 09:11

## 2017-01-01 RX ADMIN — RENAGEL 800 MG: 800 TABLET ORAL at 13:05

## 2017-01-01 RX ADMIN — MICONAZOLE NITRATE: 20 CREAM TOPICAL at 05:16

## 2017-01-01 RX ADMIN — CINACALCET HYDROCHLORIDE 60 MG: 30 TABLET, COATED ORAL at 08:55

## 2017-01-01 RX ADMIN — MICONAZOLE NITRATE: 20 CREAM TOPICAL at 11:51

## 2017-01-01 RX ADMIN — ESCITALOPRAM OXALATE 10 MG: 10 TABLET ORAL at 08:55

## 2017-01-01 RX ADMIN — PRAVASTATIN SODIUM 10 MG: 10 TABLET ORAL at 21:01

## 2017-01-01 RX ADMIN — HEPARIN SODIUM 5000 UNITS: 5000 INJECTION, SOLUTION INTRAVENOUS; SUBCUTANEOUS at 14:06

## 2017-01-01 RX ADMIN — HYDROCODONE BITARTRATE AND ACETAMINOPHEN 1 TABLET: 5; 325 TABLET ORAL at 12:40

## 2017-01-01 RX ADMIN — HYDROCODONE BITARTRATE AND ACETAMINOPHEN 1 TABLET: 5; 325 TABLET ORAL at 09:13

## 2017-01-01 RX ADMIN — ESCITALOPRAM OXALATE 10 MG: 10 TABLET ORAL at 09:04

## 2017-01-01 RX ADMIN — CLONIDINE HYDROCHLORIDE 0.1 MG: 0.1 TABLET ORAL at 13:02

## 2017-01-01 RX ADMIN — ESCITALOPRAM OXALATE 10 MG: 10 TABLET ORAL at 09:11

## 2017-01-01 RX ADMIN — ESCITALOPRAM OXALATE 10 MG: 10 TABLET ORAL at 08:11

## 2017-01-01 RX ADMIN — CINACALCET HYDROCHLORIDE 60 MG: 30 TABLET, COATED ORAL at 08:31

## 2017-01-01 RX ADMIN — AMLODIPINE BESYLATE 10 MG: 5 TABLET ORAL at 05:39

## 2017-01-01 RX ADMIN — CLONIDINE HYDROCHLORIDE 0.1 MG: 0.1 TABLET ORAL at 06:43

## 2017-01-01 RX ADMIN — ASPIRIN 81 MG: 81 TABLET, COATED ORAL at 09:03

## 2017-01-01 RX ADMIN — HYDROCODONE BITARTRATE AND ACETAMINOPHEN 2 TABLET: 5; 325 TABLET ORAL at 16:22

## 2017-01-01 RX ADMIN — CLONIDINE HYDROCHLORIDE 0.1 MG: 0.1 TABLET ORAL at 21:44

## 2017-01-01 RX ADMIN — CLONIDINE HYDROCHLORIDE 0.1 MG: 0.1 TABLET ORAL at 21:52

## 2017-01-01 RX ADMIN — HEPARIN SODIUM 5000 UNITS: 5000 INJECTION, SOLUTION INTRAVENOUS; SUBCUTANEOUS at 23:03

## 2017-01-01 RX ADMIN — ERYTHROPOIETIN 8000 UNITS: 4000 INJECTION, SOLUTION INTRAVENOUS; SUBCUTANEOUS at 22:41

## 2017-01-01 RX ADMIN — CHOLESTYRAMINE 2 G: 4 POWDER, FOR SUSPENSION ORAL at 08:43

## 2017-01-01 RX ADMIN — ASPIRIN 81 MG: 81 TABLET, COATED ORAL at 08:32

## 2017-01-01 RX ADMIN — CLONIDINE HYDROCHLORIDE 0.1 MG: 0.1 TABLET ORAL at 05:46

## 2017-01-01 RX ADMIN — PRAVASTATIN SODIUM 10 MG: 10 TABLET ORAL at 21:42

## 2017-01-01 RX ADMIN — HEPARIN SODIUM 5000 UNITS: 5000 INJECTION, SOLUTION INTRAVENOUS; SUBCUTANEOUS at 22:19

## 2017-01-01 RX ADMIN — CARVEDILOL 25 MG: 12.5 TABLET, FILM COATED ORAL at 16:39

## 2017-01-01 RX ADMIN — CLONIDINE HYDROCHLORIDE 0.1 MG: 0.1 TABLET ORAL at 05:51

## 2017-01-01 RX ADMIN — CLONIDINE HYDROCHLORIDE 0.1 MG: 0.1 TABLET ORAL at 14:17

## 2017-01-01 RX ADMIN — RENAGEL 800 MG: 800 TABLET ORAL at 17:02

## 2017-01-01 RX ADMIN — CARVEDILOL 25 MG: 12.5 TABLET, FILM COATED ORAL at 09:06

## 2017-01-01 RX ADMIN — ASPIRIN 81 MG: 81 TABLET, COATED ORAL at 08:46

## 2017-01-01 RX ADMIN — AMLODIPINE BESYLATE 10 MG: 5 TABLET ORAL at 08:47

## 2017-01-01 RX ADMIN — CLONIDINE HYDROCHLORIDE 0.1 MG: 0.1 TABLET ORAL at 21:23

## 2017-01-01 RX ADMIN — STANDARDIZED SENNA CONCENTRATE AND DOCUSATE SODIUM 1 TABLET: 8.6; 5 TABLET, FILM COATED ORAL at 21:53

## 2017-01-01 RX ADMIN — PRAVASTATIN SODIUM 10 MG: 10 TABLET ORAL at 09:11

## 2017-01-01 RX ADMIN — ASPIRIN 81 MG 81 MG: 81 TABLET ORAL at 08:47

## 2017-01-01 RX ADMIN — NEPHROCAP 1 CAPSULE: 1 CAP ORAL at 08:11

## 2017-01-01 RX ADMIN — NEPHROCAP 1 CAPSULE: 1 CAP ORAL at 08:29

## 2017-01-01 RX ADMIN — AMLODIPINE BESYLATE 10 MG: 5 TABLET ORAL at 08:33

## 2017-01-01 RX ADMIN — RENAGEL 800 MG: 800 TABLET ORAL at 10:49

## 2017-01-01 RX ADMIN — MICONAZOLE NITRATE: 20 CREAM TOPICAL at 06:00

## 2017-01-01 RX ADMIN — PRAVASTATIN SODIUM 10 MG: 10 TABLET ORAL at 22:00

## 2017-01-01 RX ADMIN — HEPARIN SODIUM 5000 UNITS: 5000 INJECTION, SOLUTION INTRAVENOUS; SUBCUTANEOUS at 13:08

## 2017-01-01 RX ADMIN — ERYTHROPOIETIN 8000 UNITS: 2000 INJECTION, SOLUTION INTRAVENOUS; SUBCUTANEOUS at 19:20

## 2017-01-01 RX ADMIN — HYDROCODONE BITARTRATE AND ACETAMINOPHEN 2 TABLET: 5; 325 TABLET ORAL at 19:12

## 2017-01-01 RX ADMIN — ASPIRIN 81 MG 81 MG: 81 TABLET ORAL at 08:42

## 2017-01-01 RX ADMIN — CLONIDINE HYDROCHLORIDE 0.1 MG: 0.1 TABLET ORAL at 13:14

## 2017-01-01 RX ADMIN — CLONIDINE HYDROCHLORIDE 0.1 MG: 0.1 TABLET ORAL at 06:17

## 2017-01-01 RX ADMIN — HYDROCODONE BITARTRATE AND ACETAMINOPHEN 2 TABLET: 5; 325 TABLET ORAL at 16:58

## 2017-01-01 RX ADMIN — HEPARIN SODIUM 5000 UNITS: 5000 INJECTION, SOLUTION INTRAVENOUS; SUBCUTANEOUS at 13:00

## 2017-01-01 RX ADMIN — HEPARIN SODIUM 5000 UNITS: 5000 INJECTION, SOLUTION INTRAVENOUS; SUBCUTANEOUS at 13:01

## 2017-01-01 RX ADMIN — CLONIDINE HYDROCHLORIDE 0.1 MG: 0.1 TABLET ORAL at 13:15

## 2017-01-01 RX ADMIN — HEPARIN SODIUM 5000 UNITS: 5000 INJECTION, SOLUTION INTRAVENOUS; SUBCUTANEOUS at 21:52

## 2017-01-01 RX ADMIN — RENAGEL 800 MG: 800 TABLET ORAL at 16:35

## 2017-01-01 RX ADMIN — CARVEDILOL 25 MG: 12.5 TABLET, FILM COATED ORAL at 09:04

## 2017-01-01 RX ADMIN — CLONIDINE HYDROCHLORIDE 0.1 MG: 0.1 TABLET ORAL at 05:30

## 2017-01-01 RX ADMIN — RENAGEL 800 MG: 800 TABLET ORAL at 08:02

## 2017-01-01 RX ADMIN — MICONAZOLE NITRATE: 20 CREAM TOPICAL at 18:07

## 2017-01-01 RX ADMIN — CHOLESTYRAMINE 2 G: 4 POWDER, FOR SUSPENSION ORAL at 08:38

## 2017-01-01 RX ADMIN — CARVEDILOL 25 MG: 12.5 TABLET, FILM COATED ORAL at 19:01

## 2017-01-01 RX ADMIN — LISINOPRIL 40 MG: 20 TABLET ORAL at 08:36

## 2017-01-01 RX ADMIN — HYDROCODONE BITARTRATE AND ACETAMINOPHEN 1 TABLET: 5; 325 TABLET ORAL at 12:38

## 2017-01-01 RX ADMIN — NEPHROCAP 1 CAPSULE: 1 CAP ORAL at 08:58

## 2017-01-01 RX ADMIN — CLONIDINE HYDROCHLORIDE 0.1 MG: 0.1 TABLET ORAL at 22:14

## 2017-01-01 RX ADMIN — INSULIN LISPRO 6 UNITS: 100 INJECTION, SOLUTION INTRAVENOUS; SUBCUTANEOUS at 12:44

## 2017-01-01 RX ADMIN — HYDROCODONE BITARTRATE AND ACETAMINOPHEN 2 TABLET: 5; 325 TABLET ORAL at 04:13

## 2017-01-01 RX ADMIN — RENAGEL 800 MG: 800 TABLET ORAL at 18:30

## 2017-01-01 RX ADMIN — RENAGEL 800 MG: 800 TABLET ORAL at 08:38

## 2017-01-01 RX ADMIN — NEPHROCAP 1 CAPSULE: 1 CAP ORAL at 10:49

## 2017-01-01 RX ADMIN — PRAVASTATIN SODIUM 10 MG: 10 TABLET ORAL at 21:19

## 2017-01-01 RX ADMIN — HYDROCODONE BITARTRATE AND ACETAMINOPHEN 1 TABLET: 5; 325 TABLET ORAL at 09:11

## 2017-01-01 RX ADMIN — PANTOPRAZOLE SODIUM 40 MG: 40 TABLET, DELAYED RELEASE ORAL at 09:25

## 2017-01-01 RX ADMIN — AMLODIPINE BESYLATE 10 MG: 5 TABLET ORAL at 09:24

## 2017-01-01 RX ADMIN — CLONIDINE HYDROCHLORIDE 0.1 MG: 0.1 TABLET ORAL at 13:29

## 2017-01-01 RX ADMIN — CLONIDINE HYDROCHLORIDE 0.1 MG: 0.1 TABLET ORAL at 23:03

## 2017-01-01 RX ADMIN — MICONAZOLE NITRATE: 20 CREAM TOPICAL at 06:24

## 2017-01-01 RX ADMIN — CARVEDILOL 25 MG: 12.5 TABLET, FILM COATED ORAL at 09:07

## 2017-01-01 RX ADMIN — PRAVASTATIN SODIUM 10 MG: 10 TABLET ORAL at 21:35

## 2017-01-01 RX ADMIN — CLONIDINE HYDROCHLORIDE 0.1 MG: 0.1 TABLET ORAL at 04:13

## 2017-01-01 RX ADMIN — CLONIDINE HYDROCHLORIDE 0.1 MG: 0.1 TABLET ORAL at 13:11

## 2017-01-01 RX ADMIN — HEPARIN SODIUM 5000 UNITS: 5000 INJECTION, SOLUTION INTRAVENOUS; SUBCUTANEOUS at 21:19

## 2017-01-01 RX ADMIN — CHOLESTYRAMINE 2 G: 4 POWDER, FOR SUSPENSION ORAL at 17:02

## 2017-01-01 RX ADMIN — CLONIDINE HYDROCHLORIDE 0.1 MG: 0.1 TABLET ORAL at 05:45

## 2017-01-01 RX ADMIN — ERYTHROPOIETIN 8000 UNITS: 4000 INJECTION, SOLUTION INTRAVENOUS; SUBCUTANEOUS at 18:46

## 2017-01-01 RX ADMIN — ESCITALOPRAM OXALATE 10 MG: 10 TABLET ORAL at 08:01

## 2017-01-01 RX ADMIN — ESCITALOPRAM OXALATE 10 MG: 10 TABLET ORAL at 08:38

## 2017-01-01 RX ADMIN — POLYETHYLENE GLYCOL 3350 17 G: 17 POWDER, FOR SOLUTION ORAL at 08:34

## 2017-01-01 RX ADMIN — ASPIRIN 81 MG 81 MG: 81 TABLET ORAL at 09:11

## 2017-01-01 RX ADMIN — CHOLESTYRAMINE 2 G: 4 POWDER, FOR SUSPENSION ORAL at 18:30

## 2017-01-01 RX ADMIN — POLYETHYLENE GLYCOL 3350 17 G: 17 POWDER, FOR SOLUTION ORAL at 09:08

## 2017-01-01 RX ADMIN — CLONIDINE HYDROCHLORIDE 0.1 MG: 0.1 TABLET ORAL at 13:34

## 2017-01-01 RX ADMIN — MICONAZOLE NITRATE: 20 CREAM TOPICAL at 05:50

## 2017-01-01 RX ADMIN — HYDROCODONE BITARTRATE AND ACETAMINOPHEN 2 TABLET: 5; 325 TABLET ORAL at 21:13

## 2017-01-01 RX ADMIN — ACETAMINOPHEN 650 MG: 325 TABLET ORAL at 18:28

## 2017-01-01 RX ADMIN — RENAGEL 800 MG: 800 TABLET ORAL at 12:49

## 2017-01-01 RX ADMIN — HYDROCODONE BITARTRATE AND ACETAMINOPHEN 2 TABLET: 5; 325 TABLET ORAL at 05:43

## 2017-01-01 RX ADMIN — AMLODIPINE BESYLATE 10 MG: 5 TABLET ORAL at 08:36

## 2017-01-01 RX ADMIN — INSULIN LISPRO 6 UNITS: 100 INJECTION, SOLUTION INTRAVENOUS; SUBCUTANEOUS at 13:00

## 2017-01-01 RX ADMIN — ASPIRIN 81 MG: 81 TABLET, COATED ORAL at 08:45

## 2017-01-01 RX ADMIN — ASPIRIN 81 MG: 81 TABLET, COATED ORAL at 08:36

## 2017-01-01 RX ADMIN — HYDROCODONE BITARTRATE AND ACETAMINOPHEN 2 TABLET: 5; 325 TABLET ORAL at 09:06

## 2017-01-01 RX ADMIN — HEPARIN SODIUM 5000 UNITS: 5000 INJECTION, SOLUTION INTRAVENOUS; SUBCUTANEOUS at 21:42

## 2017-01-01 RX ADMIN — HEPARIN SODIUM 5000 UNITS: 5000 INJECTION, SOLUTION INTRAVENOUS; SUBCUTANEOUS at 13:15

## 2017-01-01 RX ADMIN — CLONIDINE HYDROCHLORIDE 0.1 MG: 0.1 TABLET ORAL at 21:06

## 2017-01-01 RX ADMIN — MICONAZOLE NITRATE: 20 CREAM TOPICAL at 22:13

## 2017-01-01 RX ADMIN — RENAGEL 800 MG: 800 TABLET ORAL at 09:11

## 2017-01-01 RX ADMIN — ASPIRIN 81 MG 81 MG: 81 TABLET ORAL at 09:51

## 2017-01-01 RX ADMIN — PANTOPRAZOLE SODIUM 40 MG: 40 TABLET, DELAYED RELEASE ORAL at 09:51

## 2017-01-01 RX ADMIN — HEPARIN SODIUM 5000 UNITS: 5000 INJECTION, SOLUTION INTRAVENOUS; SUBCUTANEOUS at 05:42

## 2017-01-01 RX ADMIN — NEPHROCAP 1 CAPSULE: 1 CAP ORAL at 08:37

## 2017-01-01 RX ADMIN — RENAGEL 800 MG: 800 TABLET ORAL at 17:27

## 2017-01-01 RX ADMIN — STANDARDIZED SENNA CONCENTRATE AND DOCUSATE SODIUM 1 TABLET: 8.6; 5 TABLET, FILM COATED ORAL at 09:05

## 2017-01-01 RX ADMIN — NEPHROCAP 1 CAPSULE: 1 CAP ORAL at 09:12

## 2017-01-01 RX ADMIN — RENAGEL 800 MG: 800 TABLET ORAL at 19:34

## 2017-01-01 RX ADMIN — MICONAZOLE NITRATE: 20 CREAM TOPICAL at 22:15

## 2017-01-01 RX ADMIN — CLONIDINE HYDROCHLORIDE 0.1 MG: 0.1 TABLET ORAL at 14:05

## 2017-01-01 RX ADMIN — AMLODIPINE BESYLATE 10 MG: 5 TABLET ORAL at 08:29

## 2017-01-01 RX ADMIN — RENAGEL 800 MG: 800 TABLET ORAL at 18:19

## 2017-01-01 RX ADMIN — ASPIRIN 81 MG: 81 TABLET, COATED ORAL at 10:27

## 2017-01-01 RX ADMIN — PRAVASTATIN SODIUM 10 MG: 10 TABLET ORAL at 09:24

## 2017-01-01 RX ADMIN — RENAGEL 800 MG: 800 TABLET ORAL at 09:54

## 2017-01-01 RX ADMIN — CARVEDILOL 25 MG: 12.5 TABLET, FILM COATED ORAL at 09:05

## 2017-01-01 RX ADMIN — PRAVASTATIN SODIUM 10 MG: 10 TABLET ORAL at 22:58

## 2017-01-01 RX ADMIN — CLONIDINE HYDROCHLORIDE 0.1 MG: 0.1 TABLET ORAL at 22:38

## 2017-01-01 RX ADMIN — CLONIDINE HYDROCHLORIDE 0.1 MG: 0.1 TABLET ORAL at 21:20

## 2017-01-01 RX ADMIN — HYDROCODONE BITARTRATE AND ACETAMINOPHEN 2 TABLET: 5; 325 TABLET ORAL at 17:45

## 2017-01-01 RX ADMIN — CARVEDILOL 25 MG: 12.5 TABLET, FILM COATED ORAL at 18:40

## 2017-01-01 RX ADMIN — RENAGEL 800 MG: 800 TABLET ORAL at 12:33

## 2017-01-01 RX ADMIN — CARVEDILOL 25 MG: 12.5 TABLET, FILM COATED ORAL at 08:37

## 2017-01-01 RX ADMIN — HYDROCODONE BITARTRATE AND ACETAMINOPHEN 2 TABLET: 5; 325 TABLET ORAL at 19:04

## 2017-01-01 RX ADMIN — Medication 10 ML: at 16:07

## 2017-01-01 RX ADMIN — CLONIDINE HYDROCHLORIDE 0.1 MG: 0.1 TABLET ORAL at 00:10

## 2017-01-01 RX ADMIN — RENAGEL 800 MG: 800 TABLET ORAL at 17:34

## 2017-01-01 RX ADMIN — NEPHROCAP 1 CAPSULE: 1 CAP ORAL at 08:01

## 2017-01-01 RX ADMIN — ESCITALOPRAM OXALATE 10 MG: 10 TABLET ORAL at 08:24

## 2017-01-01 RX ADMIN — INSULIN LISPRO 4 UNITS: 100 INJECTION, SOLUTION INTRAVENOUS; SUBCUTANEOUS at 09:10

## 2017-01-01 RX ADMIN — ASPIRIN 81 MG: 81 TABLET, COATED ORAL at 09:11

## 2017-01-01 RX ADMIN — NEPHROCAP 1 CAPSULE: 1 CAP ORAL at 10:32

## 2017-01-01 RX ADMIN — NEPHROCAP 1 CAPSULE: 1 CAP ORAL at 08:32

## 2017-01-01 RX ADMIN — ASPIRIN 81 MG 81 MG: 81 TABLET ORAL at 10:55

## 2017-01-01 RX ADMIN — INSULIN LISPRO 2 UNITS: 100 INJECTION, SOLUTION INTRAVENOUS; SUBCUTANEOUS at 19:46

## 2017-01-01 RX ADMIN — PANTOPRAZOLE SODIUM 40 MG: 40 TABLET, DELAYED RELEASE ORAL at 09:11

## 2017-01-01 RX ADMIN — CARVEDILOL 25 MG: 12.5 TABLET, FILM COATED ORAL at 08:36

## 2017-01-01 RX ADMIN — ESCITALOPRAM OXALATE 10 MG: 10 TABLET ORAL at 10:27

## 2017-01-01 RX ADMIN — NEPHROCAP 1 CAPSULE: 1 CAP ORAL at 09:09

## 2017-01-01 RX ADMIN — ONDANSETRON 4 MG: 4 TABLET, ORALLY DISINTEGRATING ORAL at 19:39

## 2017-01-01 RX ADMIN — HEPARIN SODIUM 5000 UNITS: 5000 INJECTION, SOLUTION INTRAVENOUS; SUBCUTANEOUS at 15:09

## 2017-01-01 RX ADMIN — HEPARIN SODIUM 5000 UNITS: 5000 INJECTION, SOLUTION INTRAVENOUS; SUBCUTANEOUS at 14:30

## 2017-01-01 RX ADMIN — MICONAZOLE NITRATE: 20 CREAM TOPICAL at 09:12

## 2017-01-01 RX ADMIN — IOPAMIDOL 100 ML: 755 INJECTION, SOLUTION INTRAVENOUS at 16:07

## 2017-01-01 RX ADMIN — CHOLESTYRAMINE 2 G: 4 POWDER, FOR SUSPENSION ORAL at 09:11

## 2017-01-01 RX ADMIN — LISINOPRIL 40 MG: 20 TABLET ORAL at 08:35

## 2017-01-01 RX ADMIN — LISINOPRIL 20 MG: 20 TABLET ORAL at 08:28

## 2017-01-01 RX ADMIN — PANTOPRAZOLE SODIUM 40 MG: 40 TABLET, DELAYED RELEASE ORAL at 09:09

## 2017-01-01 RX ADMIN — MICONAZOLE NITRATE: 20 CREAM TOPICAL at 05:37

## 2017-01-01 RX ADMIN — RENAGEL 800 MG: 800 TABLET ORAL at 09:04

## 2017-01-01 RX ADMIN — HYDROCODONE BITARTRATE AND ACETAMINOPHEN 1 TABLET: 5; 325 TABLET ORAL at 08:02

## 2017-01-01 RX ADMIN — HEPARIN SODIUM 5000 UNITS: 5000 INJECTION, SOLUTION INTRAVENOUS; SUBCUTANEOUS at 13:34

## 2017-01-01 RX ADMIN — CARVEDILOL 25 MG: 12.5 TABLET, FILM COATED ORAL at 09:51

## 2017-01-01 RX ADMIN — INSULIN LISPRO 4 UNITS: 100 INJECTION, SOLUTION INTRAVENOUS; SUBCUTANEOUS at 08:34

## 2017-01-01 RX ADMIN — LISINOPRIL 20 MG: 20 TABLET ORAL at 05:38

## 2017-01-01 RX ADMIN — BARIUM SULFATE 900 ML: 21 SUSPENSION ORAL at 16:07

## 2017-01-01 RX ADMIN — CLONIDINE HYDROCHLORIDE 0.1 MG: 0.1 TABLET ORAL at 20:47

## 2017-01-01 RX ADMIN — HEPARIN SODIUM 5000 UNITS: 5000 INJECTION, SOLUTION INTRAVENOUS; SUBCUTANEOUS at 06:55

## 2017-01-01 RX ADMIN — RENAGEL 800 MG: 800 TABLET ORAL at 12:46

## 2017-01-01 RX ADMIN — ONDANSETRON 4 MG: 4 TABLET, ORALLY DISINTEGRATING ORAL at 18:28

## 2017-01-01 RX ADMIN — PRAVASTATIN SODIUM 10 MG: 10 TABLET ORAL at 23:03

## 2017-01-01 RX ADMIN — CARVEDILOL 25 MG: 12.5 TABLET, FILM COATED ORAL at 00:10

## 2017-01-01 RX ADMIN — INSULIN LISPRO 2 UNITS: 100 INJECTION, SOLUTION INTRAVENOUS; SUBCUTANEOUS at 17:27

## 2017-01-01 RX ADMIN — CLONIDINE HYDROCHLORIDE 0.1 MG: 0.1 TABLET ORAL at 05:42

## 2017-01-01 RX ADMIN — CLONIDINE HYDROCHLORIDE 0.1 MG: 0.1 TABLET ORAL at 21:25

## 2017-01-01 RX ADMIN — HYDROCODONE BITARTRATE AND ACETAMINOPHEN 2 TABLET: 5; 325 TABLET ORAL at 09:11

## 2017-01-01 RX ADMIN — PRAVASTATIN SODIUM 10 MG: 10 TABLET ORAL at 09:25

## 2017-01-01 RX ADMIN — NEPHROCAP 1 CAPSULE: 1 CAP ORAL at 09:04

## 2017-01-01 RX ADMIN — HYDROCODONE BITARTRATE AND ACETAMINOPHEN 2 TABLET: 5; 325 TABLET ORAL at 21:44

## 2017-01-01 RX ADMIN — RENAGEL 800 MG: 800 TABLET ORAL at 17:18

## 2017-01-01 RX ADMIN — CARVEDILOL 25 MG: 12.5 TABLET, FILM COATED ORAL at 17:10

## 2017-01-01 RX ADMIN — ASPIRIN 81 MG: 81 TABLET, COATED ORAL at 08:02

## 2017-01-01 RX ADMIN — RENAGEL 800 MG: 800 TABLET ORAL at 09:03

## 2017-01-01 RX ADMIN — CLONIDINE HYDROCHLORIDE 0.1 MG: 0.1 TABLET ORAL at 05:22

## 2017-01-01 RX ADMIN — PRAVASTATIN SODIUM 10 MG: 10 TABLET ORAL at 09:07

## 2017-01-01 RX ADMIN — AMLODIPINE BESYLATE 10 MG: 5 TABLET ORAL at 09:11

## 2017-01-01 RX ADMIN — INSULIN LISPRO 6 UNITS: 100 INJECTION, SOLUTION INTRAVENOUS; SUBCUTANEOUS at 16:45

## 2017-01-01 RX ADMIN — INSULIN LISPRO 8 UNITS: 100 INJECTION, SOLUTION INTRAVENOUS; SUBCUTANEOUS at 13:25

## 2017-01-01 RX ADMIN — CLONIDINE HYDROCHLORIDE 0.1 MG: 0.1 TABLET ORAL at 21:30

## 2017-01-01 RX ADMIN — CHOLESTYRAMINE 2 G: 4 POWDER, FOR SUSPENSION ORAL at 17:27

## 2017-01-01 RX ADMIN — CARVEDILOL 25 MG: 12.5 TABLET, FILM COATED ORAL at 16:46

## 2017-01-01 RX ADMIN — HYDROCODONE BITARTRATE AND ACETAMINOPHEN 2 TABLET: 5; 325 TABLET ORAL at 13:26

## 2017-01-01 RX ADMIN — CARVEDILOL 25 MG: 12.5 TABLET, FILM COATED ORAL at 08:11

## 2017-01-01 RX ADMIN — CARVEDILOL 25 MG: 12.5 TABLET, FILM COATED ORAL at 09:20

## 2017-01-01 RX ADMIN — CHOLESTYRAMINE 2 G: 4 POWDER, FOR SUSPENSION ORAL at 17:15

## 2017-01-01 RX ADMIN — DIPHENHYDRAMINE HYDROCHLORIDE 25 MG: 25 CAPSULE ORAL at 16:04

## 2017-01-01 RX ADMIN — RENAGEL 800 MG: 800 TABLET ORAL at 11:52

## 2017-01-01 RX ADMIN — RENAGEL 800 MG: 800 TABLET ORAL at 15:08

## 2017-01-01 RX ADMIN — ACETAMINOPHEN 650 MG: 325 TABLET ORAL at 13:41

## 2017-01-01 RX ADMIN — INSULIN LISPRO 4 UNITS: 100 INJECTION, SOLUTION INTRAVENOUS; SUBCUTANEOUS at 17:47

## 2017-01-01 RX ADMIN — ASPIRIN 81 MG: 81 TABLET, COATED ORAL at 08:31

## 2017-01-01 RX ADMIN — CARVEDILOL 50 MG: 12.5 TABLET, FILM COATED ORAL at 08:35

## 2017-01-01 RX ADMIN — HYDROCODONE BITARTRATE AND ACETAMINOPHEN 2 TABLET: 5; 325 TABLET ORAL at 22:15

## 2017-01-01 RX ADMIN — HYDROCODONE BITARTRATE AND ACETAMINOPHEN 1 TABLET: 5; 325 TABLET ORAL at 08:55

## 2017-01-01 RX ADMIN — CARVEDILOL 25 MG: 12.5 TABLET, FILM COATED ORAL at 09:53

## 2017-01-01 RX ADMIN — RENAGEL 800 MG: 800 TABLET ORAL at 17:15

## 2017-01-01 RX ADMIN — HYDROCODONE BITARTRATE AND ACETAMINOPHEN 2 TABLET: 5; 325 TABLET ORAL at 17:30

## 2017-01-01 RX ADMIN — RENAGEL 800 MG: 800 TABLET ORAL at 08:28

## 2017-01-01 RX ADMIN — CARVEDILOL 25 MG: 12.5 TABLET, FILM COATED ORAL at 09:08

## 2017-01-01 RX ADMIN — HYDROCODONE BITARTRATE AND ACETAMINOPHEN 1 TABLET: 5; 325 TABLET ORAL at 13:01

## 2017-01-01 RX ADMIN — PANTOPRAZOLE SODIUM 40 MG: 40 TABLET, DELAYED RELEASE ORAL at 10:56

## 2017-01-01 RX ADMIN — HEPARIN SODIUM 5000 UNITS: 5000 INJECTION, SOLUTION INTRAVENOUS; SUBCUTANEOUS at 05:13

## 2017-01-01 RX ADMIN — ACETAMINOPHEN 650 MG: 325 TABLET ORAL at 21:56

## 2017-01-01 RX ADMIN — CINACALCET HYDROCHLORIDE 60 MG: 30 TABLET, COATED ORAL at 09:54

## 2017-01-01 RX ADMIN — PRAVASTATIN SODIUM 10 MG: 10 TABLET ORAL at 21:07

## 2017-01-01 RX ADMIN — CARVEDILOL 25 MG: 12.5 TABLET, FILM COATED ORAL at 16:22

## 2017-01-01 RX ADMIN — RENAGEL 800 MG: 800 TABLET ORAL at 08:24

## 2017-01-01 RX ADMIN — CLONIDINE HYDROCHLORIDE 0.1 MG: 0.1 TABLET ORAL at 21:35

## 2017-01-01 RX ADMIN — PRAVASTATIN SODIUM 10 MG: 10 TABLET ORAL at 21:08

## 2017-01-01 RX ADMIN — ASPIRIN 81 MG 81 MG: 81 TABLET ORAL at 09:09

## 2017-01-01 RX ADMIN — ERYTHROPOIETIN 8000 UNITS: 4000 INJECTION, SOLUTION INTRAVENOUS; SUBCUTANEOUS at 18:59

## 2017-01-01 RX ADMIN — HYDROCODONE BITARTRATE AND ACETAMINOPHEN 2 TABLET: 5; 325 TABLET ORAL at 16:41

## 2017-01-01 RX ADMIN — CLONIDINE HYDROCHLORIDE 0.1 MG: 0.1 TABLET ORAL at 21:46

## 2017-01-01 RX ADMIN — AMLODIPINE BESYLATE 10 MG: 5 TABLET ORAL at 08:49

## 2017-01-01 RX ADMIN — CARVEDILOL 25 MG: 12.5 TABLET, FILM COATED ORAL at 17:35

## 2017-01-01 RX ADMIN — INSULIN LISPRO 2 UNITS: 100 INJECTION, SOLUTION INTRAVENOUS; SUBCUTANEOUS at 17:03

## 2017-01-01 RX ADMIN — ESCITALOPRAM OXALATE 10 MG: 10 TABLET ORAL at 08:28

## 2017-01-01 RX ADMIN — CARVEDILOL 25 MG: 12.5 TABLET, FILM COATED ORAL at 08:02

## 2017-01-01 RX ADMIN — CARVEDILOL 25 MG: 12.5 TABLET, FILM COATED ORAL at 16:54

## 2017-01-01 RX ADMIN — ESCITALOPRAM OXALATE 10 MG: 10 TABLET ORAL at 08:32

## 2017-01-01 RX ADMIN — RENAGEL 800 MG: 800 TABLET ORAL at 16:54

## 2017-01-01 RX ADMIN — PANTOPRAZOLE SODIUM 40 MG: 40 TABLET, DELAYED RELEASE ORAL at 08:47

## 2017-01-01 RX ADMIN — LISINOPRIL 20 MG: 20 TABLET ORAL at 09:12

## 2017-01-01 RX ADMIN — HYDROCODONE BITARTRATE AND ACETAMINOPHEN 2 TABLET: 5; 325 TABLET ORAL at 12:42

## 2017-01-01 RX ADMIN — INSULIN LISPRO 6 UNITS: 100 INJECTION, SOLUTION INTRAVENOUS; SUBCUTANEOUS at 13:30

## 2017-01-01 RX ADMIN — MELATONIN 3 MG ORAL TABLET 3 MG: 3 TABLET ORAL at 21:00

## 2017-01-01 RX ADMIN — STANDARDIZED SENNA CONCENTRATE AND DOCUSATE SODIUM 1 TABLET: 8.6; 5 TABLET, FILM COATED ORAL at 21:20

## 2017-01-01 RX ADMIN — AMLODIPINE BESYLATE 10 MG: 5 TABLET ORAL at 08:01

## 2017-01-01 RX ADMIN — AMLODIPINE BESYLATE 10 MG: 5 TABLET ORAL at 09:06

## 2017-01-01 RX ADMIN — HYDROCODONE BITARTRATE AND ACETAMINOPHEN 2 TABLET: 5; 325 TABLET ORAL at 13:59

## 2017-01-01 RX ADMIN — ASPIRIN 81 MG: 81 TABLET, COATED ORAL at 08:11

## 2017-01-01 RX ADMIN — PRAVASTATIN SODIUM 10 MG: 10 TABLET ORAL at 21:52

## 2017-01-01 RX ADMIN — HEPARIN SODIUM 5000 UNITS: 5000 INJECTION, SOLUTION INTRAVENOUS; SUBCUTANEOUS at 21:32

## 2017-01-01 RX ADMIN — ACETAMINOPHEN 650 MG: 325 TABLET ORAL at 14:07

## 2017-01-01 RX ADMIN — MICONAZOLE NITRATE: 20 CREAM TOPICAL at 21:00

## 2017-01-01 RX ADMIN — INSULIN LISPRO 6 UNITS: 100 INJECTION, SOLUTION INTRAVENOUS; SUBCUTANEOUS at 09:07

## 2017-01-01 RX ADMIN — ERYTHROPOIETIN 8000 UNITS: 4000 INJECTION, SOLUTION INTRAVENOUS; SUBCUTANEOUS at 20:48

## 2017-01-01 RX ADMIN — LISINOPRIL 40 MG: 20 TABLET ORAL at 08:31

## 2017-01-01 RX ADMIN — CLONIDINE HYDROCHLORIDE 0.1 MG: 0.1 TABLET ORAL at 05:49

## 2017-01-01 RX ADMIN — HEPARIN SODIUM 5000 UNITS: 5000 INJECTION, SOLUTION INTRAVENOUS; SUBCUTANEOUS at 21:35

## 2017-01-01 RX ADMIN — CARVEDILOL 25 MG: 12.5 TABLET, FILM COATED ORAL at 17:15

## 2017-01-01 RX ADMIN — RENAGEL 800 MG: 800 TABLET ORAL at 19:01

## 2017-01-01 RX ADMIN — CINACALCET HYDROCHLORIDE 60 MG: 30 TABLET, COATED ORAL at 10:49

## 2017-01-01 RX ADMIN — CINACALCET HYDROCHLORIDE 60 MG: 30 TABLET, COATED ORAL at 08:44

## 2017-01-01 RX ADMIN — CINACALCET HYDROCHLORIDE 60 MG: 30 TABLET, COATED ORAL at 08:38

## 2017-01-01 RX ADMIN — CLONIDINE HYDROCHLORIDE 0.1 MG: 0.1 TABLET ORAL at 21:04

## 2017-01-01 RX ADMIN — HYDROCODONE BITARTRATE AND ACETAMINOPHEN 2 TABLET: 5; 325 TABLET ORAL at 13:29

## 2017-01-01 RX ADMIN — CARVEDILOL 12.5 MG: 12.5 TABLET, FILM COATED ORAL at 10:50

## 2017-01-01 RX ADMIN — CLONIDINE HYDROCHLORIDE 0.1 MG: 0.1 TABLET ORAL at 14:27

## 2017-01-01 RX ADMIN — PRAVASTATIN SODIUM 10 MG: 10 TABLET ORAL at 20:47

## 2017-01-01 RX ADMIN — HYDROCODONE BITARTRATE AND ACETAMINOPHEN 1 TABLET: 5; 325 TABLET ORAL at 21:00

## 2017-01-01 RX ADMIN — NEPHROCAP 1 CAPSULE: 1 CAP ORAL at 09:54

## 2017-01-01 RX ADMIN — HEPARIN SODIUM 5000 UNITS: 5000 INJECTION, SOLUTION INTRAVENOUS; SUBCUTANEOUS at 15:07

## 2017-01-01 RX ADMIN — HYDROCODONE BITARTRATE AND ACETAMINOPHEN 2 TABLET: 5; 325 TABLET ORAL at 18:59

## 2017-01-01 RX ADMIN — INSULIN GLARGINE 5 UNITS: 100 INJECTION, SOLUTION SUBCUTANEOUS at 09:00

## 2017-01-01 RX ADMIN — ONDANSETRON 4 MG: 4 TABLET, ORALLY DISINTEGRATING ORAL at 11:52

## 2017-01-01 RX ADMIN — HEPARIN SODIUM 5000 UNITS: 5000 INJECTION, SOLUTION INTRAVENOUS; SUBCUTANEOUS at 21:03

## 2017-01-01 RX ADMIN — INSULIN LISPRO 2 UNITS: 100 INJECTION, SOLUTION INTRAVENOUS; SUBCUTANEOUS at 16:30

## 2017-01-01 RX ADMIN — CARVEDILOL 25 MG: 12.5 TABLET, FILM COATED ORAL at 17:21

## 2017-01-01 RX ADMIN — INSULIN LISPRO 4 UNITS: 100 INJECTION, SOLUTION INTRAVENOUS; SUBCUTANEOUS at 08:49

## 2017-01-01 RX ADMIN — HYDRALAZINE HYDROCHLORIDE 25 MG: 25 TABLET, FILM COATED ORAL at 08:49

## 2017-01-01 RX ADMIN — ASPIRIN 81 MG 81 MG: 81 TABLET ORAL at 09:06

## 2017-01-01 RX ADMIN — AMLODIPINE BESYLATE 10 MG: 5 TABLET ORAL at 09:08

## 2017-01-01 RX ADMIN — CARVEDILOL 25 MG: 12.5 TABLET, FILM COATED ORAL at 08:33

## 2017-01-01 RX ADMIN — HYDROCODONE BITARTRATE AND ACETAMINOPHEN 1 TABLET: 5; 325 TABLET ORAL at 14:10

## 2017-01-01 RX ADMIN — CLONIDINE HYDROCHLORIDE 0.1 MG: 0.1 TABLET ORAL at 22:35

## 2017-01-01 RX ADMIN — ERYTHROPOIETIN 8000 UNITS: 4000 INJECTION, SOLUTION INTRAVENOUS; SUBCUTANEOUS at 19:49

## 2017-01-01 RX ADMIN — CARVEDILOL 25 MG: 12.5 TABLET, FILM COATED ORAL at 18:30

## 2017-01-01 RX ADMIN — HYDROCODONE BITARTRATE AND ACETAMINOPHEN 1 TABLET: 5; 325 TABLET ORAL at 13:13

## 2017-01-01 RX ADMIN — ESCITALOPRAM OXALATE 10 MG: 10 TABLET ORAL at 08:36

## 2017-01-01 RX ADMIN — CHOLESTYRAMINE 2 G: 4 POWDER, FOR SUSPENSION ORAL at 08:28

## 2017-01-01 RX ADMIN — CINACALCET HYDROCHLORIDE 60 MG: 30 TABLET, COATED ORAL at 08:02

## 2017-01-01 RX ADMIN — NEPHROCAP 1 CAPSULE: 1 CAP ORAL at 08:31

## 2017-01-01 RX ADMIN — ASPIRIN 81 MG 81 MG: 81 TABLET ORAL at 09:26

## 2017-01-01 RX ADMIN — HYDROCODONE BITARTRATE AND ACETAMINOPHEN 2 TABLET: 5; 325 TABLET ORAL at 08:33

## 2017-01-01 RX ADMIN — PANTOPRAZOLE SODIUM 40 MG: 40 TABLET, DELAYED RELEASE ORAL at 08:33

## 2017-01-01 RX ADMIN — AMLODIPINE BESYLATE 10 MG: 5 TABLET ORAL at 09:54

## 2017-01-01 RX ADMIN — NEPHROCAP 1 CAPSULE: 1 CAP ORAL at 08:35

## 2017-01-01 RX ADMIN — Medication 10 ML: at 22:21

## 2017-01-01 RX ADMIN — CHOLESTYRAMINE 2 G: 4 POWDER, FOR SUSPENSION ORAL at 09:04

## 2017-01-01 RX ADMIN — CARVEDILOL 50 MG: 12.5 TABLET, FILM COATED ORAL at 16:36

## 2017-01-01 RX ADMIN — MICONAZOLE NITRATE: 20 CREAM TOPICAL at 19:19

## 2017-01-01 RX ADMIN — CLONIDINE HYDROCHLORIDE 0.1 MG: 0.1 TABLET ORAL at 22:57

## 2017-01-01 RX ADMIN — CARVEDILOL 25 MG: 12.5 TABLET, FILM COATED ORAL at 08:57

## 2017-01-01 RX ADMIN — CLONIDINE HYDROCHLORIDE 0.1 MG: 0.1 TABLET ORAL at 14:13

## 2017-01-01 RX ADMIN — NEPHROCAP 1 CAPSULE: 1 CAP ORAL at 09:03

## 2017-01-01 RX ADMIN — HYDROCODONE BITARTRATE AND ACETAMINOPHEN 1 TABLET: 5; 325 TABLET ORAL at 06:24

## 2017-01-01 RX ADMIN — AMLODIPINE BESYLATE 10 MG: 5 TABLET ORAL at 08:55

## 2017-01-01 RX ADMIN — CINACALCET HYDROCHLORIDE 60 MG: 30 TABLET, COATED ORAL at 09:11

## 2017-01-01 RX ADMIN — HEPARIN SODIUM 5000 UNITS: 5000 INJECTION, SOLUTION INTRAVENOUS; SUBCUTANEOUS at 22:00

## 2017-01-01 RX ADMIN — CLONIDINE HYDROCHLORIDE 0.1 MG: 0.1 TABLET ORAL at 22:00

## 2017-01-01 RX ADMIN — RENAGEL 800 MG: 800 TABLET ORAL at 12:40

## 2017-01-01 RX ADMIN — RENAGEL 800 MG: 800 TABLET ORAL at 13:15

## 2017-01-01 RX ADMIN — HYDROCODONE BITARTRATE AND ACETAMINOPHEN 2 TABLET: 5; 325 TABLET ORAL at 21:53

## 2017-01-01 RX ADMIN — STANDARDIZED SENNA CONCENTRATE AND DOCUSATE SODIUM 1 TABLET: 8.6; 5 TABLET, FILM COATED ORAL at 21:48

## 2017-01-01 RX ADMIN — HYDROCODONE BITARTRATE AND ACETAMINOPHEN 2 TABLET: 5; 325 TABLET ORAL at 09:29

## 2017-01-01 RX ADMIN — CARVEDILOL 25 MG: 12.5 TABLET, FILM COATED ORAL at 08:29

## 2017-01-01 RX ADMIN — RENAGEL 800 MG: 800 TABLET ORAL at 17:19

## 2017-01-01 RX ADMIN — CHOLESTYRAMINE 2 G: 4 POWDER, FOR SUSPENSION ORAL at 08:24

## 2017-01-01 RX ADMIN — NEPHROCAP 1 CAPSULE: 1 CAP ORAL at 08:24

## 2017-01-01 RX ADMIN — INSULIN LISPRO 2 UNITS: 100 INJECTION, SOLUTION INTRAVENOUS; SUBCUTANEOUS at 17:18

## 2017-01-01 RX ADMIN — CLONIDINE HYDROCHLORIDE 0.1 MG: 0.1 TABLET ORAL at 05:28

## 2017-01-01 RX ADMIN — RENAGEL 800 MG: 800 TABLET ORAL at 17:36

## 2017-01-01 RX ADMIN — CHOLESTYRAMINE 2 G: 4 POWDER, FOR SUSPENSION ORAL at 17:19

## 2017-01-01 RX ADMIN — INSULIN LISPRO 2 UNITS: 100 INJECTION, SOLUTION INTRAVENOUS; SUBCUTANEOUS at 08:00

## 2017-01-01 RX ADMIN — LISINOPRIL 20 MG: 20 TABLET ORAL at 08:39

## 2017-01-01 RX ADMIN — ASPIRIN 81 MG 81 MG: 81 TABLET ORAL at 08:33

## 2017-01-01 RX ADMIN — MICONAZOLE NITRATE 1 DOSE: 20 CREAM TOPICAL at 14:19

## 2017-01-01 RX ADMIN — ESCITALOPRAM OXALATE 10 MG: 10 TABLET ORAL at 10:50

## 2017-01-01 RX ADMIN — ERYTHROPOIETIN 8000 UNITS: 4000 INJECTION, SOLUTION INTRAVENOUS; SUBCUTANEOUS at 21:56

## 2017-01-01 RX ADMIN — MICONAZOLE NITRATE: 20 CREAM TOPICAL at 05:26

## 2017-01-01 RX ADMIN — AMLODIPINE BESYLATE 10 MG: 5 TABLET ORAL at 08:57

## 2017-01-01 RX ADMIN — MICONAZOLE NITRATE: 20 CREAM TOPICAL at 19:20

## 2017-01-01 RX ADMIN — SORBITOL SOLUTION (BULK) 30 ML: 70 SOLUTION at 12:44

## 2017-01-01 RX ADMIN — HEPARIN SODIUM 5000 UNITS: 5000 INJECTION, SOLUTION INTRAVENOUS; SUBCUTANEOUS at 21:01

## 2017-01-01 RX ADMIN — CINACALCET HYDROCHLORIDE 60 MG: 30 TABLET, COATED ORAL at 08:37

## 2017-01-01 RX ADMIN — HEPARIN SODIUM 5000 UNITS: 5000 INJECTION, SOLUTION INTRAVENOUS; SUBCUTANEOUS at 05:35

## 2017-01-01 RX ADMIN — PRAVASTATIN SODIUM 10 MG: 10 TABLET ORAL at 10:55

## 2017-01-01 RX ADMIN — POLYETHYLENE GLYCOL 3350 17 G: 17 POWDER, FOR SOLUTION ORAL at 09:25

## 2017-01-01 RX ADMIN — HYDROCODONE BITARTRATE AND ACETAMINOPHEN 2 TABLET: 5; 325 TABLET ORAL at 14:28

## 2017-01-01 RX ADMIN — CLONIDINE HYDROCHLORIDE 0.1 MG: 0.1 TABLET ORAL at 05:35

## 2017-01-01 RX ADMIN — INSULIN LISPRO 2 UNITS: 100 INJECTION, SOLUTION INTRAVENOUS; SUBCUTANEOUS at 17:15

## 2017-01-01 RX ADMIN — HEPARIN SODIUM 5000 UNITS: 5000 INJECTION, SOLUTION INTRAVENOUS; SUBCUTANEOUS at 21:06

## 2017-01-01 RX ADMIN — CLONIDINE HYDROCHLORIDE 0.1 MG: 0.1 TABLET ORAL at 21:14

## 2017-01-01 RX ADMIN — LISINOPRIL 20 MG: 20 TABLET ORAL at 08:30

## 2017-01-01 RX ADMIN — HEPARIN SODIUM 5000 UNITS: 5000 INJECTION, SOLUTION INTRAVENOUS; SUBCUTANEOUS at 05:25

## 2017-01-01 RX ADMIN — MICONAZOLE NITRATE: 20 CREAM TOPICAL at 21:54

## 2017-01-01 RX ADMIN — CINACALCET HYDROCHLORIDE 60 MG: 30 TABLET, COATED ORAL at 08:24

## 2017-01-01 RX ADMIN — NEPHROCAP 1 CAPSULE: 1 CAP ORAL at 08:45

## 2017-01-01 RX ADMIN — STANDARDIZED SENNA CONCENTRATE AND DOCUSATE SODIUM 1 TABLET: 8.6; 5 TABLET, FILM COATED ORAL at 09:11

## 2017-01-01 RX ADMIN — AMLODIPINE BESYLATE 10 MG: 5 TABLET ORAL at 10:50

## 2017-01-01 RX ADMIN — ACETAMINOPHEN 650 MG: 325 TABLET ORAL at 10:27

## 2017-01-01 RX ADMIN — INSULIN LISPRO 2 UNITS: 100 INJECTION, SOLUTION INTRAVENOUS; SUBCUTANEOUS at 17:35

## 2017-01-01 RX ADMIN — HEPARIN SODIUM 5000 UNITS: 5000 INJECTION, SOLUTION INTRAVENOUS; SUBCUTANEOUS at 13:54

## 2017-01-01 RX ADMIN — ASPIRIN 81 MG: 81 TABLET, COATED ORAL at 08:54

## 2017-01-01 RX ADMIN — CLONIDINE HYDROCHLORIDE 0.1 MG: 0.1 TABLET ORAL at 14:07

## 2017-01-01 RX ADMIN — CLONIDINE HYDROCHLORIDE 0.1 MG: 0.1 TABLET ORAL at 13:53

## 2017-01-01 RX ADMIN — HYDROCODONE BITARTRATE AND ACETAMINOPHEN 1 TABLET: 5; 325 TABLET ORAL at 08:42

## 2017-01-01 RX ADMIN — CHOLESTYRAMINE 2 G: 4 POWDER, FOR SUSPENSION ORAL at 10:49

## 2017-01-01 RX ADMIN — CHOLESTYRAMINE 2 G: 4 POWDER, FOR SUSPENSION ORAL at 10:28

## 2017-01-01 RX ADMIN — CARVEDILOL 50 MG: 12.5 TABLET, FILM COATED ORAL at 17:20

## 2017-01-01 RX ADMIN — PRAVASTATIN SODIUM 10 MG: 10 TABLET ORAL at 08:57

## 2017-01-01 RX ADMIN — AMLODIPINE BESYLATE 10 MG: 5 TABLET ORAL at 08:31

## 2017-01-01 RX ADMIN — CHOLESTYRAMINE 1 G: 4 POWDER, FOR SUSPENSION ORAL at 16:35

## 2017-01-01 RX ADMIN — ERYTHROPOIETIN 8000 UNITS: 4000 INJECTION, SOLUTION INTRAVENOUS; SUBCUTANEOUS at 00:10

## 2017-01-01 RX ADMIN — CARVEDILOL 25 MG: 12.5 TABLET, FILM COATED ORAL at 17:19

## 2017-01-01 RX ADMIN — MICONAZOLE NITRATE: 20 CREAM TOPICAL at 00:10

## 2017-01-01 RX ADMIN — ERYTHROPOIETIN 8000 UNITS: 4000 INJECTION, SOLUTION INTRAVENOUS; SUBCUTANEOUS at 22:20

## 2017-01-01 RX ADMIN — HYDROCODONE BITARTRATE AND ACETAMINOPHEN 1 TABLET: 5; 325 TABLET ORAL at 08:36

## 2017-01-01 RX ADMIN — CHOLESTYRAMINE 2 G: 4 POWDER, FOR SUSPENSION ORAL at 17:34

## 2017-01-01 RX ADMIN — STANDARDIZED SENNA CONCENTRATE AND DOCUSATE SODIUM 1 TABLET: 8.6; 5 TABLET, FILM COATED ORAL at 22:15

## 2017-01-01 RX ADMIN — CLONIDINE HYDROCHLORIDE 0.1 MG: 0.1 TABLET ORAL at 05:12

## 2017-01-01 RX ADMIN — AMLODIPINE BESYLATE 10 MG: 5 TABLET ORAL at 08:32

## 2017-01-01 RX ADMIN — INSULIN LISPRO 6 UNITS: 100 INJECTION, SOLUTION INTRAVENOUS; SUBCUTANEOUS at 12:29

## 2017-01-01 RX ADMIN — HEPARIN SODIUM 5000 UNITS: 5000 INJECTION, SOLUTION INTRAVENOUS; SUBCUTANEOUS at 21:30

## 2017-01-01 RX ADMIN — RENAGEL 800 MG: 800 TABLET ORAL at 13:01

## 2017-01-01 RX ADMIN — PRAVASTATIN SODIUM 10 MG: 10 TABLET ORAL at 22:38

## 2017-01-01 RX ADMIN — CHOLESTYRAMINE 2 G: 4 POWDER, FOR SUSPENSION ORAL at 19:01

## 2017-01-01 RX ADMIN — PRAVASTATIN SODIUM 10 MG: 10 TABLET ORAL at 09:06

## 2017-01-01 RX ADMIN — NEPHROCAP 1 CAPSULE: 1 CAP ORAL at 09:51

## 2017-01-01 RX ADMIN — PRAVASTATIN SODIUM 10 MG: 10 TABLET ORAL at 21:04

## 2017-01-01 RX ADMIN — ASPIRIN 81 MG 81 MG: 81 TABLET ORAL at 09:24

## 2017-01-01 RX ADMIN — MECLIZINE 25 MG: 12.5 TABLET ORAL at 10:29

## 2017-01-01 RX ADMIN — HEPARIN SODIUM 5000 UNITS: 5000 INJECTION, SOLUTION INTRAVENOUS; SUBCUTANEOUS at 13:02

## 2017-01-01 RX ADMIN — ESCITALOPRAM OXALATE 10 MG: 10 TABLET ORAL at 08:35

## 2017-01-01 RX ADMIN — INSULIN LISPRO 6 UNITS: 100 INJECTION, SOLUTION INTRAVENOUS; SUBCUTANEOUS at 12:24

## 2017-01-01 RX ADMIN — CHOLESTYRAMINE 2 G: 4 POWDER, FOR SUSPENSION ORAL at 08:31

## 2017-01-01 RX ADMIN — POLYETHYLENE GLYCOL 3350 17 G: 17 POWDER, FOR SOLUTION ORAL at 09:11

## 2017-01-01 RX ADMIN — HYDROCODONE BITARTRATE AND ACETAMINOPHEN 1 TABLET: 5; 325 TABLET ORAL at 17:21

## 2017-01-01 RX ADMIN — CLONIDINE HYDROCHLORIDE 0.1 MG: 0.1 TABLET ORAL at 15:07

## 2017-01-01 RX ADMIN — CLONIDINE HYDROCHLORIDE 0.1 MG: 0.1 TABLET ORAL at 21:49

## 2017-01-01 RX ADMIN — CARVEDILOL 25 MG: 12.5 TABLET, FILM COATED ORAL at 17:18

## 2017-01-01 RX ADMIN — CARVEDILOL 25 MG: 12.5 TABLET, FILM COATED ORAL at 17:26

## 2017-01-01 RX ADMIN — MECLIZINE 25 MG: 12.5 TABLET ORAL at 08:33

## 2017-01-01 RX ADMIN — RENAGEL 800 MG: 800 TABLET ORAL at 12:38

## 2017-01-01 RX ADMIN — HEPARIN SODIUM 5000 UNITS: 5000 INJECTION, SOLUTION INTRAVENOUS; SUBCUTANEOUS at 06:08

## 2017-01-01 RX ADMIN — CLONIDINE HYDROCHLORIDE 0.1 MG: 0.1 TABLET ORAL at 13:00

## 2017-01-01 RX ADMIN — RENAGEL 800 MG: 800 TABLET ORAL at 13:41

## 2017-01-01 RX ADMIN — CARVEDILOL 25 MG: 12.5 TABLET, FILM COATED ORAL at 17:28

## 2017-01-01 RX ADMIN — CLONIDINE HYDROCHLORIDE 0.1 MG: 0.1 TABLET ORAL at 05:41

## 2017-01-01 RX ADMIN — HYDROCODONE BITARTRATE AND ACETAMINOPHEN 2 TABLET: 5; 325 TABLET ORAL at 09:50

## 2017-01-01 RX ADMIN — AMLODIPINE BESYLATE 10 MG: 5 TABLET ORAL at 10:54

## 2017-01-01 RX ADMIN — CINACALCET HYDROCHLORIDE 60 MG: 30 TABLET, COATED ORAL at 10:27

## 2017-01-01 RX ADMIN — AMLODIPINE BESYLATE 10 MG: 5 TABLET ORAL at 09:07

## 2017-01-01 RX ADMIN — HYDROCODONE BITARTRATE AND ACETAMINOPHEN 1 TABLET: 5; 325 TABLET ORAL at 17:06

## 2017-01-01 RX ADMIN — PRAVASTATIN SODIUM 10 MG: 10 TABLET ORAL at 05:34

## 2017-01-01 RX ADMIN — HYDROCODONE BITARTRATE AND ACETAMINOPHEN 2 TABLET: 5; 325 TABLET ORAL at 21:57

## 2017-03-29 ENCOUNTER — OFFICE VISIT (OUTPATIENT)
Dept: FAMILY MEDICINE CLINIC | Age: 67
End: 2017-03-29

## 2017-03-29 VITALS
RESPIRATION RATE: 18 BRPM | WEIGHT: 195.6 LBS | HEART RATE: 82 BPM | BODY MASS INDEX: 28 KG/M2 | OXYGEN SATURATION: 99 % | SYSTOLIC BLOOD PRESSURE: 145 MMHG | HEIGHT: 70 IN | DIASTOLIC BLOOD PRESSURE: 77 MMHG | TEMPERATURE: 97.3 F

## 2017-03-29 DIAGNOSIS — I73.9 PAD (PERIPHERAL ARTERY DISEASE) (HCC): Chronic | ICD-10-CM

## 2017-03-29 DIAGNOSIS — E78.2 MIXED HYPERLIPIDEMIA: ICD-10-CM

## 2017-03-29 DIAGNOSIS — I10 ESSENTIAL HYPERTENSION: Primary | ICD-10-CM

## 2017-03-29 DIAGNOSIS — N18.6 ESRD ON HEMODIALYSIS (HCC): ICD-10-CM

## 2017-03-29 DIAGNOSIS — Z95.5 S/P CORONARY ARTERY STENT PLACEMENT: ICD-10-CM

## 2017-03-29 DIAGNOSIS — Z99.2 ESRD ON HEMODIALYSIS (HCC): ICD-10-CM

## 2017-03-29 DIAGNOSIS — G35 MULTIPLE SCLEROSIS (HCC): ICD-10-CM

## 2017-03-29 DIAGNOSIS — I25.10 ASHD (ARTERIOSCLEROTIC HEART DISEASE): ICD-10-CM

## 2017-03-29 PROBLEM — Z98.890 HISTORY OF COLONOSCOPY: Status: ACTIVE | Noted: 2017-03-29

## 2017-03-29 RX ORDER — HYDROCODONE BITARTRATE AND ACETAMINOPHEN 5; 325 MG/1; MG/1
TABLET ORAL
Refills: 0 | COMMUNITY
Start: 2017-02-18 | End: 2017-04-19

## 2017-03-29 RX ORDER — CINACALCET HYDROCHLORIDE 30 MG/1
TABLET, COATED ORAL
COMMUNITY
Start: 2017-03-03 | End: 2017-04-19 | Stop reason: DRUGHIGH

## 2017-03-29 RX ORDER — CINACALCET HYDROCHLORIDE 60 MG/1
60 TABLET, COATED ORAL
Status: ON HOLD | COMMUNITY
Start: 2017-03-24 | End: 2018-01-01

## 2017-03-29 RX ORDER — INSULIN GLARGINE 100 [IU]/ML
18 INJECTION, SOLUTION SUBCUTANEOUS
COMMUNITY
Start: 2017-02-10 | End: 2018-01-01

## 2017-03-29 RX ORDER — INSULIN LISPRO 100 [IU]/ML
INJECTION, SOLUTION INTRAVENOUS; SUBCUTANEOUS
COMMUNITY
Start: 2017-02-27 | End: 2018-01-01

## 2017-03-29 NOTE — PROGRESS NOTES
HISTORY OF PRESENT ILLNESS  Jocelyn Fuentes is a 75 y.o. male here today for his 2nd visit with me. Mr. Tommy Solares has multiple medical problems but is not a very good historian. Currently he is undergoing transplant eval and just had colonoscopy and cardiac cath and a lsong list of labs and has more testing coming up. Some, but not all, of his medical problems include CAD S/P stent placement, PAD S/P amputation right foot, HD pt, MS followed by Neurology having a current flair and using rolling walker and had recent fall, diabetes uncontrolled with h/o neuropathy A1c 8.7% in Feb followed by Dr. Remy Sauceda, HTN, HLD, and anemia of chronic disease. Hypertension    The history is provided by the patient. This is a chronic problem. The current episode started more than 1 week ago. The problem has not changed since onset. Pertinent negatives include no chest pain, no malaise/fatigue, no dizziness and no shortness of breath. Diabetes   The history is provided by the patient. This is a chronic problem. The current episode started more than 1 week ago. Pertinent negatives include no chest pain, no abdominal pain and no shortness of breath. Review of Systems   Constitutional: Negative for malaise/fatigue. Respiratory: Negative for shortness of breath. Cardiovascular: Negative for chest pain. Gastrointestinal: Negative for abdominal pain. Neurological: Negative for dizziness. Physical Exam   Constitutional: He is oriented to person, place, and time. He appears well-developed and well-nourished. /77 (BP 1 Location: Right arm, BP Patient Position: Sitting)  Pulse 82  Temp 97.3 °F (36.3 °C) (Oral)   Resp 18  Ht 5' 10\" (1.778 m)  Wt 195 lb 9.6 oz (88.7 kg)  SpO2 99%  BMI 28.07 kg/m2  Walking w rolling walker   HENT:   Head: Normocephalic and atraumatic. Eyes: No scleral icterus. Neck: No thyromegaly present. Cardiovascular: Normal heart sounds.     Pulmonary/Chest: Breath sounds normal. Abdominal: Soft. There is no tenderness. Musculoskeletal: He exhibits no edema (prosthesis in place RLE). Lymphadenopathy:     He has no cervical adenopathy. Neurological: He is alert and oriented to person, place, and time. Psychiatric: He has a normal mood and affect. Nursing note and vitals reviewed.     Patient Active Problem List   Diagnosis Code    HTN (hypertension) I10    Pseudomembranous colitis A04.7    Anemia of other chronic disease D63.8    PAD (peripheral artery disease) (HCC) I73.9    S/P coronary artery stent placement Z95.5    ASHD (arteriosclerotic heart disease) I25.10    Mixed hyperlipidemia E78.2    ESRD on hemodialysis ([address] N18.6, Z99.2    Cervical stenosis of spinal canal M48.02    Ataxia R27.0    Multiple sclerosis ([address] G35    Ulnar neuropathy at elbow of left upper extremity G56.22    Type II or unspecified type diabetes mellitus with neurological manifestations, not stated as uncontrolled E11.49    Carpal tunnel syndrome on both sides G56.03    Ulnar neuropathy at elbow of right upper extremity G56.21    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, 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    Diabetic peripheral neuropathy associated with type 2 diabetes mellitus (HCC) E11.42    History of colonoscopy Z98.890     Past Medical History:   Diagnosis Date    Anemia associated with chronic renal failure     Autoimmune disease ([address]     hx ms    CAD (coronary artery disease)     Chronic kidney disease     catheter removed and no dialysis at this time    Chronic kidney disease     left arm fistula dialysis MWF    Diabetes ([address]     type II    GERD (gastroesophageal reflux disease)     Hypertension     Multiple sclerosis ([address]     diagnosed '01    Other ill-defined conditions(799.89)     anemia    Other ill-defined conditions(799.89)     elevated cholesterol    Other ill-defined conditions(799.89)     hx septic right foot    Peripheral vascular disease ([address]     Secondary hyperparathyroidism of renal origin [facility]     Thyroid disease      Social History     Social History    Marital status:      Spouse name: N/A    Number of children: N/A    Years of education: N/A     Occupational History    not working      Social History Main Topics    Smoking status: Former Smoker     Years: 1.00     Quit date: 4/12/2003    Smokeless tobacco: Never Used      Comment: quit 5 years ago    Alcohol use No      Comment: Stopped drinking 10 years ago    Drug use: No    Sexual activity: Yes     Partners: Female     Birth control/ protection: None     Other Topics Concern    None     Social History Narrative     Family History   Problem Relation Age of Onset    Diabetes Mother      Current Outpatient Prescriptions   Medication Sig    SENSIPAR 60 mg tab Take 60 mg by mouth daily.  LANTUS SOLOSTAR 100 unit/mL (3 mL) pen 16 Units by SubCUTAneous route daily (with dinner).  HUMALOG KWIKPEN 100 unit/mL kwikpen     sevelamer carbonate (RENVELA) 800 mg tab tab Take 800 mg by mouth three (3) times daily.  aspirin 81 mg chewable tablet Take 1 tablet by mouth daily.  amLODIPine (NORVASC) 10 mg tablet Take 10 mg by mouth daily.  lisinopril (PRINIVIL, ZESTRIL) 10 mg tablet Take 20 mg by mouth daily.  insulin aspart (NOVOLOG) 100 unit/mL injection by SubCUTAneous route Before breakfast, lunch, and dinner. Sliding scale    pravastatin (PRAVACHOL) 10 mg tablet Take 10 mg by mouth nightly.  HYDROcodone-acetaminophen (NORCO) 5-325 mg per tablet TK 1 T PO Q 4 TO 6 H PRN    SENSIPAR 30 mg tablet     cholestyramine light (CHOLESTYRAMINE LIGHT) 4 gram packet Take 0.5 Packets by mouth two (2) times a day. Indications: CHRONIC DIARRHEA    insulin glargine (LANTUS) 100 unit/mL injection 16 Units by SubCUTAneous route nightly. No Known Allergies    ASSESSMENT and PLAN  BP stable, pt to schedule follow w Dr. Jasper Ghotra who manages his diabetes, cont current care.  Care plan reviewed and pt understands. After visit summary printed and reviewed with patient. Des Abraham was seen today for hypertension and diabetes.     Diagnoses and all orders for this visit:    Essential hypertension    ESRD on hemodialysis [facility])    PAD (peripheral artery disease) [facility]    ASHD (arteriosclerotic heart disease)    S/P coronary artery stent placement    Mixed hyperlipidemia    Multiple sclerosis ([facility]

## 2017-03-29 NOTE — MR AVS SNAPSHOT
Visit Information Date & Time Provider Department Dept. Phone Encounter #  
 3/29/2017  4:45 PM Hortencia Parada MD [address] OFF[facility] [phone] [id number] Your Appointments 4/19/2017  4:10 PM  
Medicare Physical with Radha Lynn, [address] [facility]) Appt Note: [facility] & med review [address] [facility] [id number]  
[phone] [address] [facility] [address] Upcoming Health Maintenance Date Due  
 FOOT EXAM Q1 3/9/1959 MICROALBUMIN Q1 3/9/1959 EYE EXAM RETINAL OR DILATED Q1 3/9/1959 DTaP/Tdap/Td series (1 - Tdap) 3/9/1970 FOBT Q 1 YEAR AGE 50-75 3/9/1999 ZOSTER VACCINE AGE 60> 3/9/2009 GLAUCOMA SCREENING Q2Y 3/9/2014 MEDICARE YEARLY EXAM 3/9/2014 HEMOGLOBIN A1C Q6M 4/30/2017 LIPID PANEL Q1 2/10/2018 Allergies as of 3/29/2017  Review Complete On: 3/29/2017 By: Hailey Daniel LPN No Known Allergies Current Immunizations  Reviewed on 12/31/2014 Name Date Influenza Vaccine 10/31/2014 Influenza Vaccine Whole 10/9/2010 Pneumococcal Vaccine (Unspecified Type) 12/31/2014, 5/28/2010  9:03 PM  
  
 Not reviewed this visit Vitals BP Pulse Temp Resp Height(growth percentile) Weight(growth percentile) 145/77 (BP 1 Location: Right arm, BP Patient Position: Sitting) 82 97.3 °F (36.3 °C) (Oral) 18 5' 10\" (1.778 m) 195 lb 9.6 oz (88.7 kg) SpO2 BMI Smoking Status 99% 28.07 kg/m2 Former Smoker Vitals History BMI and BSA Data Body Mass Index Body Surface Area 28.07 kg/m 2 2.09 m 2 Preferred Pharmacy Pharmacy Name Phone [facility] [address] [phone] Your Updated Medication List  
  
   
This list is accurate as of: 3/29/17  5:08 PM.  Always use your most recent med list.  
  
  
  
  
 aspirin 81 mg chewable tablet Take 1 tablet by mouth daily. cholestyramine light 4 gram packet Commonly known as:  CHOLESTYRAMINE LIGHT Take 0.5 Packets by mouth two (2) times a day. Indications: CHRONIC DIARRHEA HumaLOG KwikPen 100 unit/mL kwikpen Generic drug:  insulin lispro HYDROcodone-acetaminophen 5-325 mg per tablet Commonly known as:  Fabian Alexis TK 1 T PO Q 4 TO 6 H PRN  
  
 * LANTUS 100 unit/mL injection Generic drug:  insulin glargine 16 Units by SubCUTAneous route nightly. * LANTUS SOLOSTAR 100 unit/mL (3 mL) pen Generic drug:  insulin glargine 16 Units by SubCUTAneous route daily (with dinner). lisinopril 10 mg tablet Commonly known as:  Ronalee Ruffing Take 20 mg by mouth daily. NORVASC 10 mg tablet Generic drug:  amLODIPine Take 10 mg by mouth daily. NovoLOG 100 unit/mL injection Generic drug:  insulin aspart  
by SubCUTAneous route Before breakfast, lunch, and dinner. Sliding scale  
  
 pravastatin 10 mg tablet Commonly known as:  PRAVACHOL Take 10 mg by mouth nightly. RENVELA 800 mg Tab tab Generic drug:  sevelamer carbonate Take 800 mg by mouth three (3) times daily. * SENSIPAR 30 mg tablet Generic drug:  cinacalcet * SENSIPAR 60 mg Tab Generic drug:  cinacalcet Take 60 mg by mouth daily. * Notice: This list has 4 medication(s) that are the same as other medications prescribed for you. Read the directions carefully, and ask your doctor or other care provider to review them with you. Introducing [facility]! Maddy Castillo introduces [employer] patient portal. Now you can access parts of your medical record, email your doctor's office, and request medication refills online. 1. In your internet browser, go to [email or web address] 2. Click on the First Time User? Click Here link in the Sign In box. You will see the New Member Sign Up page. 3. Enter your [employer] Access Code exactly as it appears below. You will not need to use this code after youve completed the sign-up process. If you do not sign up before the expiration date, you must request a new code. · [employer] Access Code: [email or web address] Expires: 6/27/2017  5:08 PM 
 
4. Enter the last four digits of your Social Security Number (xxxx) and Date of Birth (mm/dd/yyyy) as indicated and click Submit. You will be taken to the next sign-up page. 5. Create a APProtect ID. This will be your AP[employer]ct login ID and cannot be changed, so think of one that is secure and easy to remember. 6. Create a [employer] password. You can change your password at any time. 7. Enter your Password Reset Question and Answer. This can be used at a later time if you forget your password. 8. Enter your e-mail address. You will receive e-mail notification when new information is available in [address] 9. Click Sign Up. You can now view and download portions of your medical record. 10. Click the Download Summary menu link to download a portable copy of your medical information. If you have questions, please visit the Frequently Asked Questions section of the [employer] website. Remember, [employer] is NOT to be used for urgent needs. For medical emergencies, dial 911. Now available from your iPhone and Android! Please provide this summary of care documentation to your next provider. Your primary care clinician is listed as Augie Hoffmann. If you have any questions after today's visit, please call [phone].

## 2017-03-29 NOTE — PROGRESS NOTES
Chief Complaint   Patient presents with   [facility] Follow Up     Pt went to urgent care for fall. Xray was taken, Pt states nothing was broken just bruised. .     They are going to schedule eye exam.

## 2017-04-19 ENCOUNTER — OFFICE VISIT (OUTPATIENT)
Dept: FAMILY MEDICINE CLINIC | Age: 67
End: 2017-04-19

## 2017-04-19 VITALS — DIASTOLIC BLOOD PRESSURE: 68 MMHG | HEART RATE: 80 BPM | SYSTOLIC BLOOD PRESSURE: 128 MMHG

## 2017-04-19 DIAGNOSIS — Z00.00 ROUTINE GENERAL MEDICAL EXAMINATION AT A HEALTH CARE FACILITY: ICD-10-CM

## 2017-04-19 DIAGNOSIS — Z13.39 SCREENING FOR ALCOHOLISM: ICD-10-CM

## 2017-04-19 NOTE — PATIENT INSTRUCTIONS
When you feel \"funny\":  1. Check your blood sugar first  2. If it is low, correct with a quick source of sugar (candy, orange juice)  3. Wait 20-30 mins and recheck your blood sugar to ensure you have appropriately correct the low reading. 4. Eat a protein-containing meal to prevent another low blood sugar episode    Medicare Part B Preventive Services Limitations Recommendation Scheduled   Bone Mass Measurement  (age 72 & older, biennial) Requires diagnosis related to osteoporosis or estrogen deficiency. Biennial benefit unless patient has history of long-term glucocorticoid tx or baseline is needed because initial test was by other method Last:    Recommend every 2 years Next:    Recommend this screening once every 2 years   Cardiovascular Screening Blood Tests (every 5 years)  Total cholesterol, HDL, Triglycerides Order as a panel if possible Last:  Feb.2017   Every Year Next: To be completed with annual labs   Colorectal Cancer Screening  -Fecal occult blood test (annual)  -Flexible sigmoidoscopy (5y)  -Screening colonoscopy (10y)  -Barium Enema  Last: March 2016 Next:  March 2026   Counseling to Prevent Tobacco Use (up to 8 sessions per year)  - Counseling greater than 3 and up to 10 minutes  - Counseling greater than 10 minutes Patients must be asymptomatic of tobacco-related conditions to receive as preventive service Last: N/A N/A   Diabetes Screening Tests (at least every 3 years, Medicare covers annually or at 6-month intervals for prediabetic patients)    Fasting blood sugar (FBS) or glucose tolerance test (GTT) Patient must be diagnosed with one of the following:  -Hypertension, Dyslipidemia, obesity, previous impaired FBS or GTT  Or any two of the following: overweight, FH of diabetes, age ? 72, history of gestational diabetes, birth of baby weighing more than 9 pounds Last:  March 2017    Glucose: 184 mg/dL      To be assess with annual labs Next: To be evaluated with annual labs.    Diabetes Self-Management Training (DSMT) (no USPSTF recommendation) Requires referral by treating physician for patient with diabetes or renal disease. 10 hours of initial DSMT session of no less than 30 minutes each in a continuous 12-month period. 2 hours of follow-up DSMT in subsequent years. Last: N/A N/A   Glaucoma Screening (no USPSTF recommendation) Diabetes mellitus, family history, , age 48 or over,  American, age 72 or over Last:   Recommend every year Next:   Consider checking with your insurance to see if there is a provider who is within your coverage network. Recommend an eye exam every year. Human Immunodeficiency Virus (HIV) Screening (annually for increased risk patients)  HIV-1 and HIV-2 by EIA, TOMAS, rapid antibody test, or oral mucosa transudate Patient must be at increased risk for HIV infection per USPSTF guidelines or pregnant. Tests covered annually for patients at increased risk. Pregnant patients may receive up to 3 test during pregnancy. Last: N/A N/A   Medical Nutrition Therapy (MNT) (for diabetes or renal disease not recommended schedule) Requires referral by treating physician for patient with diabetes or renal disease. Can be provided in same year as diabetes self-management training (DSMT), and CMS recommends medical nutrition therapy take place after DSMT. Up to 3 hours for initial year and 2 hours in subsequent years.  N/A N/A   Prostate Cancer Screening (annually up to age 76)  - Digital rectal exam (LAURIE)  - Prostate specific antigen (PSA) Annually (age 48 or over), LAURIE not paid separately when covered E/M service is provided on same date Last:  2016  Next:  Currently being followed by urology    Seasonal Influenza Vaccination (annually)  Last:   Oct. 2016  Every Fall Please get a flu shot this fall   Shingles Vaccination A shingles vaccine is also recommended once in a lifetime after age 61  Discuss this one time vaccine with your neurologist to see if it is appropriate for you Discuss this one time vaccine with your neurologist to see if it is appropriate for you   Pneumococcal Vaccination (once after 65)  Pneumovax 23:  2010  Prenvar 13: 2014  Pneumovax 23: 2016  Completed series   Hepatitis B Vaccinations (if medium/high risk) Medium/high risk factors:  End-stage renal disease,  Hemophiliacs who received Factor VIII or IX concentrates, Clients of institutions for the mentally retarded, Persons who live in the same house as a HepB virus carrier, Homosexual men, Illicit injectable drug abusers. Recommend this 3-dose series for patients with diabetes and multiple cardiovascular comorbidities Next: Consider completing this 3 dose series    Screening Mammography (biennial age 54-69)? Annually (age 36 or over) Last: N/A N/A   Screening Pap Tests and Pelvic Examination (up to age 72 and after 72 if unknown history or abnormal study last 10 years) Every 25 months except high risk N/A N/A   Ultrasound Screening for Abdominal Aortic Aneurysm (AAA) (once) Patient must be referred through IPPE and not have had a screening for abdominal aortic aneurysm before under Medicare.   Limited to patients who meet one of the following criteria:  - Men who are 73-68 years old and have smoked more than 100 cigarettes in their lifetime.  -Anyone with a FH of AAA  -Anyone recommended for screening by USPSTF N/A N/A

## 2017-04-19 NOTE — PROGRESS NOTES
Dr. Angélica Copeland referred , Mr. Terrie Snyder, 75 y.o. male, to pharmacy for this visit. He was accompanied by his wife today. This is an Initial Medicare Annual Wellness Exam (AWV) (Performed 12 months after IPPE or effective date of Medicare Part B enrollment, Once in a lifetime)    I have reviewed the patient's medical history in detail and updated the computerized patient record. Medication Access/Adherence:  - Reports good compliance; denies any missed doses or problems affording medications   - Did not bring in home medication supply    Diabetes/CKD:  - HD (MWF)  - Reports to be doing well with his diabetes   - Lantus Solarstar insulin - dosage increase from 16 units to 18 units daily  - Prandial insulin is now Novolog due to insurance's preference - sliding scale regimen   - Test strips can be expensive at times for this patient ---> recently enrolled in a program that will assist w/ the cost of diabetic supply  - Checks SMBG twice daily - fasting and after lunch  - rely on glucometer's internal storage to record values  - Reports symptomatic hypoglycemia \"sometimes\" - none within the past month ---> states that frequency has reduced over time     Hx of Prostate Cancer:  - Optimistic about his status with potential transplant     Hx of Multiple Sclerosis:  - Denies any recent MS flares  - Currently being followed by neurology (Dr. Joel Lowe)     Mental Health:  Pt's wife expressed concern about Pt's mental health. She thinks that he is unmotivated to carry through with formal social activities/hobbies.    Pt states that he is \"fine\"     Preventative Screenings:  - Colonoscopy: March 2016 - states that it was normal and next will be due in 10 years  - Eye exam: none within the past several years - wants to find a new provider  - Pneumococcal: received another dose last year at his HD center   - Could not recall receiving: Hep B, TdaP  - No hx of DEXA scan     History     Past Medical History:   Diagnosis Date  Anemia associated with chronic renal failure     Autoimmune disease (HCC)     hx ms    CAD (coronary artery disease)     Chronic kidney disease     catheter removed and no dialysis at this time    Chronic kidney disease     left arm fistula dialysis MWF    Diabetes ([facility]     type II    GERD (gastroesophageal reflux disease)     Hypertension     Multiple sclerosis ([address]     diagnosed '01    Other ill-defined conditions(799.89)     anemia    Other ill-defined conditions(799.89)     elevated cholesterol    Other ill-defined conditions(799.89)     hx septic right foot    Peripheral vascular disease ([address]     Secondary hyperparathyroidism of renal origin ([address]     Thyroid disease       Past Surgical History:   Procedure Laterality Date    COLONOSCOPY N/A 12/6/2016    COLONOSCOPY performed by Larry Valentin [facility] at [facility] ENDOSCOPY    COLONOSCOPY,DIAGNOSTIC  12/6/2016         CORONARY STENT SINGLE W/PTCA  2/17/2011         HX APPENDECTOMY      HX CATARACT REMOVAL  10/18/10    left eye    HX CHOLECYSTECTOMY      HX GI      ileostomy due to infection    HX HEART CATHETERIZATION      HX HEENT      hx post photocoagulation eye 2009    HX ORTHOPAEDIC      HX OTHER SURGICAL      right partial foot amputation    HX OTHER SURGICAL      cardiac cath    AK COLONOSCOPY W/BIOPSY SINGLE/MULTIPLE  5/10/2010         VASCULAR SURGERY PROCEDURE UNLIST      katelyn. leg bypasses     Current Outpatient Prescriptions   Medication Sig Dispense Refill    HYDROcodone-acetaminophen (NORCO) 5-325 mg per tablet TK 1 T PO Q 4 TO 6 H PRN  0    SENSIPAR 60 mg tab Take 60 mg by mouth daily.  SENSIPAR 30 mg tablet       LANTUS SOLOSTAR 100 unit/mL (3 mL) pen 16 Units by SubCUTAneous route daily (with dinner).  HUMALOG KWIKPEN 100 unit/mL kwikpen       cholestyramine light (CHOLESTYRAMINE LIGHT) 4 gram packet Take 0.5 Packets by mouth two (2) times a day.  Indications: CHRONIC DIARRHEA 30 Packet 3    sevelamer carbonate (RENVELA) 800 mg tab tab Take 800 mg by mouth three (3) times daily.  aspirin 81 mg chewable tablet Take 1 tablet by mouth daily.  amLODIPine (NORVASC) 10 mg tablet Take 10 mg by mouth daily.  lisinopril (PRINIVIL, ZESTRIL) 10 mg tablet Take 20 mg by mouth daily.  insulin aspart (NOVOLOG) 100 unit/mL injection by SubCUTAneous route Before breakfast, lunch, and dinner. Sliding scale      pravastatin (PRAVACHOL) 10 mg tablet Take 10 mg by mouth nightly.  insulin glargine (LANTUS) 100 unit/mL injection 16 Units by SubCUTAneous route nightly.        No Known Allergies  Family History   Problem Relation Age of Onset    Diabetes Mother      Social History   Substance Use Topics    Smoking status: Former Smoker     Years: 1.00     Quit date: 4/12/2003    Smokeless tobacco: Never Used      Comment: quit 5 years ago    Alcohol use No      Comment: Stopped drinking 10 years ago     Patient Active Problem List   Diagnosis Code    HTN (hypertension) I10    Pseudomembranous colitis A04.7    Anemia of other chronic disease D63.8    PAD (peripheral artery disease) ([address] I73.9    S/P coronary artery stent placement Z95.5    ASHD (arteriosclerotic heart disease) I25.10    Mixed hyperlipidemia E78.2    ESRD on hemodialysis ([address] N18.6, Z99.2    Cervical stenosis of spinal canal M48.02    Ataxia R27.0    Multiple sclerosis ([address] G35    Ulnar neuropathy at elbow of left upper extremity G56.22    Type II or unspecified type diabetes mellitus with neurological manifestations, not stated as uncontrolled E11.49    Carpal tunnel syndrome on both sides G56.03    Ulnar neuropathy at elbow of right upper extremity G56.21    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 No    Do you average more than 14 drinks per week? No    Functional Ability and Level of Safety:     Hearing Loss   normal-to-mild    Activities of Daily Living   Partial assistance. Requires assistance with: ambulation - has a walker (s/p amputation)    ADL Assessment 4/19/2017   Feeding yourself No Help Needed   Getting from bed to chair Help Needed   Getting dressed Help Needed   Bathing or showering No Help Needed   Walk across the room (includes cane/walker) Help Needed   Using the telphone No Help Needed   Taking your medications No Help Needed   Preparing meals No Help Needed   Managing money (expenses/bills) No Help Needed   Moderately strenuous housework (laundry) Help Needed   Shopping for personal items (toiletries/medicines) Help Needed   Shopping for groceries Help Needed   Driving Help Needed   Climbing a flight of stairs Help Needed   Getting to places beyond walking distances Help Needed     Fall Risk     Fall Risk Assessment, last 12 mths 4/19/2017   Able to walk? Yes   Fall in past 12 months? Yes   Fall with injury? No   Number of falls in past 12 months 2   Fall Risk Score 2       Abuse Screen   Patient is not abused    Review of Systems   Not required    Physical Examination     No exam data present    Evaluation of Cognitive Function:  Mood/affect:  neutral  Appearance: age appropriate and within normal Limits  Family member/caregiver input: n/a    No exam performed today.     Patient Care Team:  Astrid Aranda MD as PCP - General (Internal Medicine)  Nyla Falcon MD as Neurology  Gris Guerrero MD as Gastroenterology  Gudelia Neff MD as [facility] [email or web address] [facility], MD as Urology    Advice/Referrals/Counseling   Education and counseling provided:  Influenza Vaccine  Hepatitis B Vaccine  Cardiovascular screening blood test  Bone mass measurement (DEXA)  Screening for glaucoma  Diabetes screening test      Assessment/Plan   cardiovascular risk and specific lipid/LDL goals reviewed  reviewed medications and side effects in detail. Medication reconciliation was completed today. Reviewed medication allergy. Medications Discontinued During This Encounter   Medication Reason    cholestyramine light (CHOLESTYRAMINE LIGHT) 4 gram packet Not A Current Medication    HYDROcodone-acetaminophen (NORCO) 5-325 mg per tablet Not A Current Medication    insulin aspart (NOVOLOG) 100 unit/mL injection Cost of Medication    insulin glargine (LANTUS) 100 unit/mL injection Formulary Change    SENSIPAR 30 mg tablet Dose Adjustment     Other Medication Changes:  - Lisinopril - takes 10 mg BID instead of 20 mg daily    Education:    Was provided for the following items:  - Management of hypoglycemia     Prevention:  Eye:  Recommend following up with insurance to find an in network provider  Colonoscopy: Next in 2026  Prostate/Mammogram:  Follow up with urology; last prostate exam was in 2016   DEXA /Bone Density:  Recommend  Psych:  Offer mental health resource/hot-line to patient    Immunization:  Based on the patient's immunization history:     Immunization Completed Will be due   Influenza Oct. 2016 Fall 2017   Pneumococcal (PPSV23) 2010 2016 Completed   Pneumococcal (PCV13) 2014 Completed   Tetanus Recommend Recommend   Shingles (Zostavax) Recommend discussing with neurology    Hepatitis B  Will follow up with HD's immunization records      Other:  - Request for patient to complete a release of information form for [facility]'s immunization records. Patient reports understanding to the above recommendations. AVS printed and provided to the patient at the end of this visit.      Thank you for the consult,  Keturah Leigh, PharmD

## 2017-04-19 NOTE — MR AVS SNAPSHOT
Visit Information Date & Time Provider Department Dept. Phone Encounter #  
 4/19/2017  4:10 PM Jacqualine Apley, [address] [facility] [phone] [id number] Upcoming Health Maintenance Date Due  
 FOOT EXAM Q1 3/9/1960 MICROALBUMIN Q1 3/9/1959 EYE EXAM RETINAL OR DILATED Q1 3/9/1959 DTaP/Tdap/Td series (1 - Tdap) 3/9/1970 FOBT Q 1 YEAR AGE 50-75 3/9/1999 ZOSTER VACCINE AGE 60> 3/9/2009 GLAUCOMA SCREENING Q2Y 3/9/2014 MEDICARE YEARLY EXAM 3/9/2014 HEMOGLOBIN A1C Q6M 4/30/2017 LIPID PANEL Q1 2/10/2018 Allergies as of 4/19/2017  Review Complete On: 4/19/2017 By: Juan Corbin No Known Allergies Current Immunizations  Reviewed on 12/31/2014 Name Date Influenza Vaccine 10/31/2014 Influenza Vaccine Whole 10/9/2010 Pneumococcal Vaccine (Unspecified Type) 12/31/2014, 5/28/2010  9:03 PM  
  
 Not reviewed this visit You Were Diagnosed With   
  
 Codes Comments Routine general medical examination at a health care facility     ICD-10-CM: Z00.00 ICD-9-CM: V70.0 Screening for alcoholism     ICD-10-CM: Z13.89 ICD-9-CM: V79.1 Vitals BP Pulse Smoking Status 128/68 (BP 1 Location: Right arm, BP Patient Position: Sitting) [de-identified] Former Smoker Preferred Pharmacy Pharmacy Name Phone [facility] [address] [phone] Your Updated Medication List  
  
   
This list is accurate as of: 4/19/17  5:04 PM.  Always use your most recent med list.  
  
  
  
  
 aspirin 81 mg chewable tablet Take 1 tablet by mouth daily. HumaLOG KwikPen 100 unit/mL kwikpen Generic drug:  insulin lispro LANTUS SOLOSTAR 100 unit/mL (3 mL) pen Generic drug:  insulin glargine 18 Units by SubCUTAneous route daily (with dinner). lisinopril 10 mg tablet Commonly known as:  Adne Banner Take 10 mg by mouth two (2) times a day. NORVASC 10 mg tablet Generic drug:  amLODIPine Take 10 mg by mouth daily. pravastatin 10 mg tablet Commonly known as:  PRAVACHOL Take 10 mg by mouth nightly. RENVELA 800 mg Tab tab Generic drug:  sevelamer carbonate Take 800 mg by mouth three (3) times daily. SENSIPAR 60 mg Tab Generic drug:  cinacalcet Take 60 mg by mouth daily. Patient Instructions When you feel \"funny\": 
1. Check your blood sugar first 
2. If it is low, correct with a quick source of sugar (candy, orange juice) 3. Wait 20-30 mins and recheck your blood sugar to ensure you have appropriately correct the low reading. 4. Eat a protein-containing meal to prevent another low blood sugar episode Medicare Part B Preventive Services Limitations Recommendation Scheduled Bone Mass Measurement 
(age 72 & older, biennial) Requires diagnosis related to osteoporosis or estrogen deficiency. Biennial benefit unless patient has history of long-term glucocorticoid tx or baseline is needed because initial test was by other method Last: 
 
Recommend every 2 years Next: 
 
Recommend this screening once every 2 years Cardiovascular Screening Blood Tests (every 5 years) Total cholesterol, HDL, Triglycerides Order as a panel if possible Last: 
Feb.2017 Every Year Next: To be completed with annual labs Colorectal Cancer Screening 
-Fecal occult blood test (annual) -Flexible sigmoidoscopy (5y) 
-Screening colonoscopy (10y) -Barium Enema  Last: March 2016 Next:  March 2026 Counseling to Prevent Tobacco Use (up to 8 sessions per year) - Counseling greater than 3 and up to 10 minutes - Counseling greater than 10 minutes Patients must be asymptomatic of tobacco-related conditions to receive as preventive service Last: N/A N/A Diabetes Screening Tests (at least every 3 years, Medicare covers annually or at 6-month intervals for prediabetic patients) Fasting blood sugar (FBS) or glucose tolerance test (GTT) Patient must be diagnosed with one of the following: 
-Hypertension, Dyslipidemia, obesity, previous impaired FBS or GTT 
Or any two of the following: overweight, FH of diabetes, age ? 72, history of gestational diabetes, birth of baby weighing more than 9 pounds Last:  March 2017 Glucose: 184 mg/dL To be assess with annual labs Next: To be evaluated with annual labs. Diabetes Self-Management Training (DSMT) (no USPSTF recommendation) Requires referral by treating physician for patient with diabetes or renal disease. 10 hours of initial DSMT session of no less than 30 minutes each in a continuous 12-month period. 2 hours of follow-up DSMT in subsequent years. Last: N/A N/A Glaucoma Screening (no USPSTF recommendation) Diabetes mellitus, family history, , age 48 or over,  American, age 72 or over Last:  
Recommend every year Next:  
Consider checking with your insurance to see if there is a provider who is within your coverage network. Recommend an eye exam every year. Human Immunodeficiency Virus (HIV) Screening (annually for increased risk patients) HIV-1 and HIV-2 by EIA, TOMAS, rapid antibody test, or oral mucosa transudate Patient must be at increased risk for HIV infection per USPSTF guidelines or pregnant. Tests covered annually for patients at increased risk. Pregnant patients may receive up to 3 test during pregnancy. Last: N/A N/A Medical Nutrition Therapy (MNT) (for diabetes or renal disease not recommended schedule) Requires referral by treating physician for patient with diabetes or renal disease. Can be provided in same year as diabetes self-management training (DSMT), and CMS recommends medical nutrition therapy take place after DSMT. Up to 3 hours for initial year and 2 hours in subsequent years. N/A N/A Prostate Cancer Screening (annually up to age 76) - Digital rectal exam (LAURIE) - Prostate specific antigen (PSA) Annually (age 48 or over), LAURIE not paid separately when covered E/M service is provided on same date Last: 
2016  Next:  Currently being followed by urology Seasonal Influenza Vaccination (annually)  Last:  
Oct. 2016 Every Fall Please get a flu shot this fall Shingles Vaccination A shingles vaccine is also recommended once in a lifetime after age 57  Discuss this one time vaccine with your neurologist to see if it is appropriate for you Discuss this one time vaccine with your neurologist to see if it is appropriate for you Pneumococcal Vaccination (once after 65)  Pneumovax 23:  2010 Prenvar 13: 2014 Pneumovax 23: 2016  Completed series Hepatitis B Vaccinations (if medium/high risk) Medium/high risk factors:  End-stage renal disease, Hemophiliacs who received Factor VIII or IX concentrates, Clients of institutions for the mentally retarded, Persons who live in the same house as a HepB virus carrier, Homosexual men, Illicit injectable drug abusers. Recommend this 3-dose series for patients with diabetes and multiple cardiovascular comorbidities Next: Consider completing this 3 dose series Screening Mammography (biennial age 54-69)? Annually (age 36 or over) Last: N/A N/A Screening Pap Tests and Pelvic Examination (up to age 72 and after 72 if unknown history or abnormal study last 10 years) Every 24 months except high risk N/A N/A Ultrasound Screening for Abdominal Aortic Aneurysm (AAA) (once) Patient must be referred through IPPE and not have had a screening for abdominal aortic aneurysm before under Medicare. Limited to patients who meet one of the following criteria: 
- Men who are 73-68 years old and have smoked more than 100 cigarettes in their lifetime. 
-Anyone with a FH of AAA 
-Anyone recommended for screening by USPSTF N/A N/A Introducing [facility] & HE[facility]!    
 [facility] introduces [employer] patient portal. Now you can access parts of your medical record, email your doctor's office, and request medication refills online. 1. In your internet browser, go to [email or web address] 2. Click on the First Time User? Click Here link in the Sign In box. You will see the New Member Sign Up page. 3. Enter your [employer] Access Code exactly as it appears below. You will not need to use this code after youve completed the sign-up process. If you do not sign up before the expiration date, you must request a new code. · [employer] Access Code: [email or web address] Expires: 6/27/2017  5:08 PM 
 
4. Enter the last four digits of your Social Security Number (xxxx) and Date of Birth (mm/dd/yyyy) as indicated and click Submit. You will be taken to the next sign-up page. 5. Create a [employer] ID. This will be your [employer] login ID and cannot be changed, so think of one that is secure and easy to remember. 6. Create a [employer] password. You can change your password at any time. 7. Enter your Password Reset Question and Answer. This can be used at a later time if you forget your password. 8. Enter your e-mail address. You will receive e-mail notification when new information is available in [address] 9. Click Sign Up. You can now view and download portions of your medical record. 10. Click the Download Summary menu link to download a portable copy of your medical information. If you have questions, please visit the Frequently Asked Questions section of the [employer] website. Remember, [employer] is NOT to be used for urgent needs. For medical emergencies, dial 911. Now available from your iPhone and Android! Please provide this summary of care documentation to your next provider. Your primary care clinician is listed as Jacey Bourne. If you have any questions after today's visit, please call [phone].

## 2017-04-20 NOTE — PROGRESS NOTES
Agree with documentation noted by Geraldine Spain, PharmD for Annual Wellness Visit, Initial.    [facility], [address] CDE

## 2017-12-11 NOTE — H&P
ATTENDING PHYSICIAN: Dr. Jennifer Nichols:  Ileana Rm  REFERRING PHYSICIAN:  LAINE  REFERRING LOCATION:    SPECIALISTS:    Dr. Ye Chang  Urology Dr. Tess Bailey  Vascular surgery Dr. Ross Gallagher  Neurology Dr. Malena Geralds Dr. Sherwin Kussmaul:       Multiple sclerosis with debility and sepsis from gram-negative rods    HISTORY OF PRESENT ILLNESS:   Patient is a 48-year-old male with end-stage renal disease due to diabetes and hypertension who underwent renal transplantation at the end of August.  He was readmitted for an acute bleed. He underwent a transplant nephrectomy on 928 secondary to nonfunction. He was then sent to [facility] but required readmission on the 24th of this month with fevers and a fluid collection at the cervical bed. He underwent acute medical care antibiotics and is transferred back to [facility] to complete his rehabilitation. ALLERGIES:     None    PAST MEDICAL HISTORY:  ESRD Monday Wednesday Friday dialysis  Type 2 diabetes with hypoglycemia: Hemoglobin A1c 8.7  PVD  Hypertension  Prostate cancer  MS  Anemia secondary to chronic kidney disease  CAD  Hypercholesterolemia  Septic foot  Stump callus  PVD  Hyperparathyroidism  Stage II deep tissue injury on the sacrum present on admission  Abdominal abscess      P AST SURGICAL HISTORY:  renal transplant  History of nephrectomy 928  Prostatectomy  Appendectomy  Right foot partial amputation  BKA      FAMILY HISTORY:  Diabetes     SOCIAL HISTORY:   Former tobacco, patient is  he is retired.       HOME MEDICATIONS:  Hydrocodone  Sensipar 60  Lantus 16 nightly  Cholestyramine 4 g one half pack twice daily  Renvela 800 3 times daily  Aspirin 81  Norvasc 10  Prinivil 20  Pravachol 10      MEDICATIONS AT TRANSFER:    amLODIPine (NORVASC) tablet 10 mg Given        10 mg, PO, DAILY    12/11 0905       aspirin delayed-release tablet 81 mg Given 81 mg, PO, DAILY    12/11 0904       B complex-vitaminC-folic acid (NEPHROCAP) cap Given       1 Cap, PO, DAILY    12/11 0904       carvedilol (COREG) tablet 25 mg Given       25 mg, PO, BID WITH MEALS    12/11 0905       cholestyramine-aspartame (QUESTRAN LIGHT) packet 2 g Given       2 g, PO, BID WITH MEALS    12/11 0904       cinacalcet (SENSIPAR) tablet 60 mg Given       60 mg, PO, DAILY WITH BREAKFAST    12/11 0904       cloNIDine HCl (CATAPRES) tablet 0.1 mg Given       0.1 mg, PO, TID    12/11 0512       epoetin niya (EPOGEN;PROCRIT) injection 8,000 Units Given       8,000 Units, SC, Q MON, WED & FRI 12/08 2156       escitalopram oxalate (LEXAPRO) tablet 10 mg Given       10 mg, PO, DAILY    12/11 0904       heparin (porcine) injection 5,000 Units Given       5,000 Units, SC, Q8H    12/11 0513       insulin lispro (HUMALOG) injection Given       2 Units, SC, ACB&D    12/10 1715       lisinopril (PRINIVIL, ZESTRIL) tablet 20 mg Given       20 mg, PO, DAILY    12/11 0905       pravastatin (PRAVACHOL) tablet 10 mg Given       10 mg, PO, QHS    12/10 2101       sevelamer (RENAGEL) tablet 800 mg Given       800 mg, PO, TID WITH MEALS    12/11 0904           REVIEW OF SYSTEMS:    CONSTITUTIONAL:    CARDIOVASCULAR: No chest pain no fever chills  RESPIRATORY: No shortness of breath.    MSK Patient complains of STUMP pain due to callous  GI: No nausea vomiting       PRE-MORBID FUNCTION:    Independent with prosthesis    CURRENT FUNCTION:   Mod to max assist ADLs and mobility    GENERAL MULTI-SYSTEM EXAMINATION:  VITAL SIGNS: O2 sat 100%, temperature 98, respiratory rate 18, heart rate 80, blood pressure 183/79  CONSTITUTIONAL: Thin male pleasant and cooperative  EYES: Pupils equal round reactive  EARS, NOSE, MOUTH, AND THROAT: Benign   RESPIRATORY: Clear  CARDIOVASCULAR: Regular  ABDOMEN: Positive bowel sounds  PSYCHIATRIC: Mood and affect normal  SKIN: Large callus that his right BK amputation site, the incision is well approximated   NEUROLOGIC: He has 4/5 strength in the upper and lower extremities  SENSORY: Decreased sensation in lower stocking distribution  DTRS; not tested  CN II-XII: Within normal limits  FINGER TO NOSE: No ataxia  SPEECH: Slight dysarthria     LABS AND DIAGNOSTICS:       Results for Cordell Pace (MRN [id number]) as of 12/11/2017 11:26   Ref. Range 12/6/2017 21:00   WBC Latest Ref Range: 4.1 - 11.1 K/uL 6.2   RBC Latest Ref Range: 4.10 - 5.70 M/uL 3.21 (L)   HGB Latest Ref Range: 12.1 - 17.0 g/dL 8.5 (L)   HCT Latest Ref Range: 36.6 - 50.3 % 27.3 (L)   MCV Latest Ref Range: 80.0 - 99.0 FL 85.0   MCH Latest Ref Range: 26.0 - 34.0 PG 26.5   MCHC Latest Ref Range: 30.0 - 36.5 g/dL 31.1   RDW Latest Ref Range: 11.5 - 14.5 % 18.0 (H)   PLATELET Latest Ref Range: 150 - 400 K/uL 159   Sodium Latest Ref Range: 136 - 145 mmol/L 135 (L)   Potassium Latest Ref Range: 3.5 - 5.1 mmol/L 3.8   Chloride Latest Ref Range: 97 - 108 mmol/L 97   CO2 Latest Ref Range: 21 - 32 mmol/L 32   Anion gap Latest Ref Range: 5 - 15 mmol/L 6   Glucose Latest Ref Range: 65 - 100 mg/dL 88   BUN Latest Ref Range: 6 - 20 MG/DL 25 (H)   Creatinine Latest Ref Range: 0.70 - 1.30 MG/DL 6.05 (H)   BUN/Creatinine ratio Latest Ref Range: 12 - 20   4 (L)   Calcium Latest Ref Range: 8.5 - 10.1 MG/DL 9.1   Phosphorus Latest Ref Range: 2.6 - 4.7 MG/DL 4.0   Magnesium Latest Ref Range: 1.6 - 2.4 mg/dL 2.1   GFR est non-AA Latest Ref Range: >60 ml/min/1.73m2 9 (L)   GFR est AA Latest Ref Range: >60 ml/min/1.73m2 11 (L)   Bilirubin, total Latest Ref Range: 0.2 - 1.0 MG/DL 0.3   Protein, total Latest Ref Range: 6.4 - 8.2 g/dL 6.7   Albumin Latest Ref Range: 3.5 - 5.0 g/dL 1.9 (L)   Globulin Latest Ref Range: 2.0 - 4.0 g/dL 4.8 (H)   A-G Ratio Latest Ref Range: 1.1 - 2.2   0.4 (L)   ALT (SGPT) Latest Ref Range: 12 - 78 U/L 16   AST Latest Ref Range: 15 - 37 U/L 17   Alk.  phosphatase Latest Ref Range: 45 - 117 U/L 114     ASSESSMENT AND PLAN: Impairment/Disability due to:  MS with generalized debility after prolonged hospital course  Associated Impairments:   See above  Functional Deficits Summary: See Current Function above. CO-MORBID CONDITIONS/PLAN:      Co-morbid conditions which require 24-hour rehab nursing and ongoing physician oversight and management:  Abdominal abscess patient has completed antibiotics  Type 2 diabetes with hyper and hypoglycemia: Lantus sliding scale insulin and monitor blood sugars  End-stage renal disease Monday Wednesday Friday hemodialysis will consult renal.  Hypertension 2 new clonidine Prinivil Coreg and Norvasc monitor with return to activity and exercise  MS patient is currently on no disease modifying medications  Anemia secondary to chronic kidney disease: Monitoring hemodialysis, Epogen per renal  CAD continue aspirin and statin  PUD monitor for anemia GI distress  Depression: Continue Lexapro  POST-ADMISSION PHYSICIAN EVALUATION:  The following plan is developed in consideration of current medical and rehabilitation status and review of the preadmission assessment. Compared to the preadmission screening, the patient's current function and medical condition is the same  EXPECTED LEVEL OF IMPROVEMENT:  His rehabilitation potential is [TEXT] to make improvements to overall functional goal of supervision for ADLs, Mobility, and Cognition/Speech. ESTIMATED LENGTH OF stay: 10-14  ANTICIPATED DISCHARGE destination: Discharge to home  PRECAUTIONS: None      RISK FOR CLINICAL complications: Minimal  POST DISCHARGE rehabilitation treatments: Home health  POST DISCHARGE MEDICAL FOLLOW-UP NEPHROLOGY, HEMODIALYSIS, TRANSPLANT SURGERY, INTERNAL MEDICINE, PHYSICAL MEDICINE AND REHABILITATION INDIVIDUALIZED REHABILITATION PLAN:   1. PT, 1 to 2 hours a day, 5 to 6 days a week for gait, strengthening, range of motion, balance and endurance.    2. OT, 1 to 2 hours a day, 5 to 6 days a week for basic ADLs, strengthening, endurance, coordination and adaptive equipment. 3. Nursing, 24-hour care by specialized nurse trained in rehabilitation for vital signs monitoring, medication administration and education, pain control, nutrition, bowel and bladder management  4. Case management, 1 hour a day, 3 days a week for discharge planning and team coordination. 5. Speech Language Pathology  6. Therapeutic Recreation  7. Psychology    SUMMARY STATEMENT FOR MEDICAL NECESSITY:   This patient has complex nursing, medical, and rehabilitation needs that require inpatient interdisciplinary rehabilitation. This patient requires multiple therapy disciplines, at least one of which is PT or OT, using an interdisciplinary approach. Interdisciplinary Team meetings will be held at least once a week. The patient requires intensive therapies 3 hours per day, 5 days per week, or in special instances, at least 15 hours within 7 consecutive days. This patient can actively participate in, and can be reasonably expected to benefit significantly from, the intensive rehabilitation program.  The patient requires close medical supervision by a rehabilitation physician. Signature:_________________________________ 12/5/2017 6 PM    Malika Francis M.D.

## 2018-01-01 ENCOUNTER — HOSPITAL ENCOUNTER (OUTPATIENT)
Dept: LAB | Age: 68
Discharge: HOME | End: 2018-03-01
Payer: MEDICARE

## 2018-01-01 ENCOUNTER — HOSPITAL ENCOUNTER (EMERGENCY)
Age: 68
Discharge: OTHER INSTITUTION | End: 2018-07-11
Attending: EMERGENCY MEDICINE
Payer: MEDICARE

## 2018-01-01 ENCOUNTER — APPOINTMENT (OUTPATIENT)
Dept: GENERAL RADIOLOGY | Age: 68
DRG: 853 | End: 2018-01-01
Attending: SURGERY
Payer: MEDICARE

## 2018-01-01 ENCOUNTER — ANESTHESIA (OUTPATIENT)
Dept: SURGERY | Age: 68
DRG: 853 | End: 2018-01-01
Payer: MEDICARE

## 2018-01-01 ENCOUNTER — APPOINTMENT (OUTPATIENT)
Dept: CT IMAGING | Age: 68
DRG: 853 | End: 2018-01-01
Attending: INTERNAL MEDICINE
Payer: MEDICARE

## 2018-01-01 ENCOUNTER — TELEPHONE (OUTPATIENT)
Dept: FAMILY MEDICINE CLINIC | Age: 68
End: 2018-01-01

## 2018-01-01 ENCOUNTER — PATIENT OUTREACH (OUTPATIENT)
Dept: FAMILY MEDICINE CLINIC | Age: 68
End: 2018-01-01

## 2018-01-01 ENCOUNTER — APPOINTMENT (OUTPATIENT)
Dept: GENERAL RADIOLOGY | Age: 68
DRG: 853 | End: 2018-01-01
Attending: INTERNAL MEDICINE
Payer: MEDICARE

## 2018-01-01 ENCOUNTER — ANESTHESIA EVENT (OUTPATIENT)
Dept: SURGERY | Age: 68
DRG: 853 | End: 2018-01-01
Payer: MEDICARE

## 2018-01-01 ENCOUNTER — OFFICE VISIT (OUTPATIENT)
Dept: FAMILY MEDICINE CLINIC | Age: 68
End: 2018-01-01

## 2018-01-01 ENCOUNTER — HOSPITAL ENCOUNTER (OUTPATIENT)
Age: 68
Setting detail: OUTPATIENT SURGERY
Discharge: HOME | End: 2018-06-13
Attending: INTERNAL MEDICINE | Admitting: INTERNAL MEDICINE
Payer: MEDICARE

## 2018-01-01 ENCOUNTER — HOSPITAL ENCOUNTER (OUTPATIENT)
Dept: LAB | Age: 68
Discharge: HOME | End: 2018-04-05
Payer: MEDICARE

## 2018-01-01 ENCOUNTER — HOSPITAL ENCOUNTER (OUTPATIENT)
Dept: LAB | Age: 68
Discharge: HOME | End: 2018-04-21
Payer: MEDICARE

## 2018-01-01 ENCOUNTER — HOSPITAL ENCOUNTER (INPATIENT)
Age: 68
LOS: 4 days | Discharge: NURSING, REHAB OR LONG-TERM CARE | DRG: 280 | End: 2018-02-06
Attending: EMERGENCY MEDICINE | Admitting: INTERNAL MEDICINE
Payer: MEDICARE

## 2018-01-01 ENCOUNTER — APPOINTMENT (OUTPATIENT)
Dept: CT IMAGING | Age: 68
End: 2018-01-01
Attending: EMERGENCY MEDICINE
Payer: MEDICARE

## 2018-01-01 ENCOUNTER — APPOINTMENT (OUTPATIENT)
Dept: CT IMAGING | Age: 68
DRG: 862 | End: 2018-01-01
Attending: INTERNAL MEDICINE
Payer: MEDICARE

## 2018-01-01 ENCOUNTER — ANESTHESIA EVENT (OUTPATIENT)
Dept: ENDOSCOPY | Age: 68
End: 2018-01-01
Payer: MEDICARE

## 2018-01-01 ENCOUNTER — APPOINTMENT (OUTPATIENT)
Dept: CT IMAGING | Age: 68
DRG: 853 | End: 2018-01-01
Attending: NURSE PRACTITIONER
Payer: MEDICARE

## 2018-01-01 ENCOUNTER — HOSPITAL ENCOUNTER (OUTPATIENT)
Age: 68
Discharge: HOME HEALTH | End: 2018-02-17
Attending: PHYSICAL MEDICINE & REHABILITATION | Admitting: PHYSICAL MEDICINE & REHABILITATION

## 2018-01-01 ENCOUNTER — APPOINTMENT (OUTPATIENT)
Dept: GENERAL RADIOLOGY | Age: 68
DRG: 862 | End: 2018-01-01
Attending: GENERAL ACUTE CARE HOSPITAL
Payer: MEDICARE

## 2018-01-01 ENCOUNTER — APPOINTMENT (OUTPATIENT)
Dept: CT IMAGING | Age: 68
DRG: 862 | End: 2018-01-01
Attending: SURGERY
Payer: MEDICARE

## 2018-01-01 ENCOUNTER — APPOINTMENT (OUTPATIENT)
Dept: GENERAL RADIOLOGY | Age: 68
DRG: 280 | End: 2018-01-01
Attending: EMERGENCY MEDICINE
Payer: MEDICARE

## 2018-01-01 ENCOUNTER — HOSPITAL ENCOUNTER (INPATIENT)
Age: 68
LOS: 11 days | Discharge: NURSING, REHAB OR LONG-TERM CARE | DRG: 853 | End: 2018-07-09
Attending: EMERGENCY MEDICINE | Admitting: SURGERY
Payer: MEDICARE

## 2018-01-01 ENCOUNTER — APPOINTMENT (OUTPATIENT)
Dept: GENERAL RADIOLOGY | Age: 68
DRG: 853 | End: 2018-01-01
Attending: NURSE PRACTITIONER
Payer: MEDICARE

## 2018-01-01 ENCOUNTER — APPOINTMENT (OUTPATIENT)
Dept: CT IMAGING | Age: 68
DRG: 280 | End: 2018-01-01
Attending: EMERGENCY MEDICINE
Payer: MEDICARE

## 2018-01-01 ENCOUNTER — OFFICE VISIT (OUTPATIENT)
Dept: CARDIOLOGY CLINIC | Age: 68
End: 2018-01-01

## 2018-01-01 ENCOUNTER — APPOINTMENT (OUTPATIENT)
Dept: GENERAL RADIOLOGY | Age: 68
DRG: 862 | End: 2018-01-01
Attending: INTERNAL MEDICINE
Payer: MEDICARE

## 2018-01-01 ENCOUNTER — APPOINTMENT (OUTPATIENT)
Dept: CT IMAGING | Age: 68
DRG: 853 | End: 2018-01-01
Attending: SURGERY
Payer: MEDICARE

## 2018-01-01 ENCOUNTER — APPOINTMENT (OUTPATIENT)
Dept: CT IMAGING | Age: 68
DRG: 853 | End: 2018-01-01
Attending: PSYCHIATRY & NEUROLOGY
Payer: MEDICARE

## 2018-01-01 ENCOUNTER — ANESTHESIA (OUTPATIENT)
Dept: ENDOSCOPY | Age: 68
End: 2018-01-01
Payer: MEDICARE

## 2018-01-01 ENCOUNTER — ANESTHESIA EVENT (OUTPATIENT)
Dept: ENDOSCOPY | Age: 68
DRG: 853 | End: 2018-01-01
Payer: MEDICARE

## 2018-01-01 ENCOUNTER — HOSPITAL ENCOUNTER (INPATIENT)
Age: 68
LOS: 16 days | DRG: 862 | End: 2018-08-02
Attending: EMERGENCY MEDICINE | Admitting: INTERNAL MEDICINE
Payer: MEDICARE

## 2018-01-01 ENCOUNTER — HOSPITAL ENCOUNTER (OUTPATIENT)
Dept: REHABILITATION | Age: 68
End: 2018-07-17
Attending: PHYSICAL MEDICINE & REHABILITATION | Admitting: PHYSICAL MEDICINE & REHABILITATION

## 2018-01-01 ENCOUNTER — HOSPITAL ENCOUNTER (OUTPATIENT)
Age: 68
Setting detail: OUTPATIENT SURGERY
Discharge: HOME | End: 2018-04-17
Attending: INTERNAL MEDICINE | Admitting: INTERNAL MEDICINE
Payer: MEDICARE

## 2018-01-01 ENCOUNTER — HOSPITAL ENCOUNTER (OUTPATIENT)
Dept: GENERAL RADIOLOGY | Age: 68
Discharge: HOME | End: 2018-04-05
Payer: MEDICARE

## 2018-01-01 ENCOUNTER — ANESTHESIA (OUTPATIENT)
Dept: ENDOSCOPY | Age: 68
DRG: 853 | End: 2018-01-01
Payer: MEDICARE

## 2018-01-01 ENCOUNTER — APPOINTMENT (OUTPATIENT)
Dept: GENERAL RADIOLOGY | Age: 68
DRG: 862 | End: 2018-01-01
Attending: EMERGENCY MEDICINE
Payer: MEDICARE

## 2018-01-01 VITALS
DIASTOLIC BLOOD PRESSURE: 78 MMHG | OXYGEN SATURATION: 100 % | HEIGHT: 70 IN | SYSTOLIC BLOOD PRESSURE: 130 MMHG | HEART RATE: 74 BPM | TEMPERATURE: 96.4 F | RESPIRATION RATE: 20 BRPM

## 2018-01-01 VITALS
WEIGHT: 155 LBS | BODY MASS INDEX: 22.19 KG/M2 | DIASTOLIC BLOOD PRESSURE: 66 MMHG | TEMPERATURE: 98.4 F | OXYGEN SATURATION: 100 % | SYSTOLIC BLOOD PRESSURE: 163 MMHG | HEART RATE: 80 BPM | HEIGHT: 70 IN | RESPIRATION RATE: 10 BRPM

## 2018-01-01 VITALS
TEMPERATURE: 98.4 F | OXYGEN SATURATION: 97 % | RESPIRATION RATE: 16 BRPM | SYSTOLIC BLOOD PRESSURE: 133 MMHG | BODY MASS INDEX: 23.51 KG/M2 | HEART RATE: 85 BPM | DIASTOLIC BLOOD PRESSURE: 68 MMHG | HEIGHT: 70 IN | WEIGHT: 164.24 LBS

## 2018-01-01 VITALS
SYSTOLIC BLOOD PRESSURE: 130 MMHG | BODY MASS INDEX: 22.3 KG/M2 | TEMPERATURE: 98.4 F | DIASTOLIC BLOOD PRESSURE: 57 MMHG | HEART RATE: 71 BPM | RESPIRATION RATE: 20 BRPM | HEIGHT: 70 IN | WEIGHT: 155.8 LBS | OXYGEN SATURATION: 96 %

## 2018-01-01 VITALS
WEIGHT: 153 LBS | HEART RATE: 82 BPM | RESPIRATION RATE: 20 BRPM | HEIGHT: 70 IN | SYSTOLIC BLOOD PRESSURE: 156 MMHG | BODY MASS INDEX: 21.9 KG/M2 | DIASTOLIC BLOOD PRESSURE: 70 MMHG | OXYGEN SATURATION: 98 % | TEMPERATURE: 99.2 F

## 2018-01-01 VITALS
HEART RATE: 73 BPM | DIASTOLIC BLOOD PRESSURE: 54 MMHG | RESPIRATION RATE: 16 BRPM | SYSTOLIC BLOOD PRESSURE: 136 MMHG | TEMPERATURE: 99 F | OXYGEN SATURATION: 97 %

## 2018-01-01 VITALS
DIASTOLIC BLOOD PRESSURE: 54 MMHG | HEART RATE: 70 BPM | OXYGEN SATURATION: 98 % | RESPIRATION RATE: 20 BRPM | SYSTOLIC BLOOD PRESSURE: 104 MMHG | BODY MASS INDEX: 23.77 KG/M2 | WEIGHT: 166 LBS | HEIGHT: 70 IN | TEMPERATURE: 96.3 F

## 2018-01-01 VITALS
WEIGHT: 153.5 LBS | BODY MASS INDEX: 21.98 KG/M2 | DIASTOLIC BLOOD PRESSURE: 76 MMHG | SYSTOLIC BLOOD PRESSURE: 163 MMHG | HEIGHT: 70 IN | HEART RATE: 68 BPM

## 2018-01-01 VITALS
OXYGEN SATURATION: 100 % | HEART RATE: 66 BPM | TEMPERATURE: 98.4 F | DIASTOLIC BLOOD PRESSURE: 61 MMHG | SYSTOLIC BLOOD PRESSURE: 130 MMHG | RESPIRATION RATE: 16 BRPM | WEIGHT: 166.9 LBS | BODY MASS INDEX: 23.89 KG/M2 | HEIGHT: 70 IN

## 2018-01-01 VITALS
RESPIRATION RATE: 13 BRPM | OXYGEN SATURATION: 100 % | HEIGHT: 70 IN | HEART RATE: 76 BPM | BODY MASS INDEX: 21.47 KG/M2 | WEIGHT: 150 LBS | DIASTOLIC BLOOD PRESSURE: 70 MMHG | TEMPERATURE: 98.1 F | SYSTOLIC BLOOD PRESSURE: 111 MMHG

## 2018-01-01 VITALS
HEART RATE: 70 BPM | RESPIRATION RATE: 16 BRPM | DIASTOLIC BLOOD PRESSURE: 50 MMHG | WEIGHT: 165.3 LBS | OXYGEN SATURATION: 99 % | SYSTOLIC BLOOD PRESSURE: 90 MMHG | HEIGHT: 70 IN | BODY MASS INDEX: 23.66 KG/M2

## 2018-01-01 DIAGNOSIS — K22.4 ESOPHAGEAL DYSKINESIA: ICD-10-CM

## 2018-01-01 DIAGNOSIS — I72.3 PSEUDOANEURYSM OF ILIAC ARTERY (HCC): Primary | ICD-10-CM

## 2018-01-01 DIAGNOSIS — D64.9 NORMOCYTIC ANEMIA: Primary | ICD-10-CM

## 2018-01-01 DIAGNOSIS — R65.10 SIRS (SYSTEMIC INFLAMMATORY RESPONSE SYNDROME) (HCC): Primary | ICD-10-CM

## 2018-01-01 DIAGNOSIS — Z89.511 S/P BKA (BELOW KNEE AMPUTATION) UNILATERAL, RIGHT (HCC): ICD-10-CM

## 2018-01-01 DIAGNOSIS — R53.81 DEBILITY: ICD-10-CM

## 2018-01-01 DIAGNOSIS — G35 MULTIPLE SCLEROSIS (HCC): Primary | ICD-10-CM

## 2018-01-01 DIAGNOSIS — I25.10 ASHD (ARTERIOSCLEROTIC HEART DISEASE): ICD-10-CM

## 2018-01-01 DIAGNOSIS — E55.9 HYPOVITAMINOSIS D: ICD-10-CM

## 2018-01-01 DIAGNOSIS — R63.4 WEIGHT LOSS: ICD-10-CM

## 2018-01-01 DIAGNOSIS — E11.42 DIABETIC PERIPHERAL NEUROPATHY ASSOCIATED WITH TYPE 2 DIABETES MELLITUS (HCC): Primary | ICD-10-CM

## 2018-01-01 DIAGNOSIS — I10 HYPERTENSION GOAL BP (BLOOD PRESSURE) < 130/80: ICD-10-CM

## 2018-01-01 DIAGNOSIS — G56.22 ULNAR NEUROPATHY AT ELBOW OF LEFT UPPER EXTREMITY: ICD-10-CM

## 2018-01-01 DIAGNOSIS — Z23 ENCOUNTER FOR IMMUNIZATION: ICD-10-CM

## 2018-01-01 DIAGNOSIS — Z93.1 STATUS POST INSERTION OF PERCUTANEOUS ENDOSCOPIC GASTROSTOMY (PEG) TUBE (HCC): ICD-10-CM

## 2018-01-01 DIAGNOSIS — I10 ESSENTIAL HYPERTENSION: ICD-10-CM

## 2018-01-01 DIAGNOSIS — Z79.4 CONTROLLED TYPE 2 DIABETES MELLITUS WITH DIABETIC NEPHROPATHY, WITH LONG-TERM CURRENT USE OF INSULIN (HCC): ICD-10-CM

## 2018-01-01 DIAGNOSIS — R13.12 OROPHARYNGEAL DYSPHAGIA: ICD-10-CM

## 2018-01-01 DIAGNOSIS — E78.2 MIXED HYPERLIPIDEMIA: ICD-10-CM

## 2018-01-01 DIAGNOSIS — R41.0 CONFUSION: ICD-10-CM

## 2018-01-01 DIAGNOSIS — Z99.2 ESRD ON HEMODIALYSIS (HCC): ICD-10-CM

## 2018-01-01 DIAGNOSIS — E11.21 CONTROLLED TYPE 2 DIABETES MELLITUS WITH DIABETIC NEPHROPATHY, WITH LONG-TERM CURRENT USE OF INSULIN (HCC): ICD-10-CM

## 2018-01-01 DIAGNOSIS — N18.6 END STAGE RENAL DISEASE (HCC): ICD-10-CM

## 2018-01-01 DIAGNOSIS — M25.561 PAIN AND SWELLING OF KNEE, RIGHT: ICD-10-CM

## 2018-01-01 DIAGNOSIS — D50.9 NORMOCYTIC HYPOCHROMIC ANEMIA: ICD-10-CM

## 2018-01-01 DIAGNOSIS — N18.6 ESRD ON HEMODIALYSIS (HCC): ICD-10-CM

## 2018-01-01 DIAGNOSIS — I44.1 MOBITZ TYPE I WENCKEBACH ATRIOVENTRICULAR BLOCK: ICD-10-CM

## 2018-01-01 DIAGNOSIS — M25.461 PAIN AND SWELLING OF KNEE, RIGHT: ICD-10-CM

## 2018-01-01 DIAGNOSIS — G35 MULTIPLE SCLEROSIS (HCC): ICD-10-CM

## 2018-01-01 DIAGNOSIS — E11.42 DIABETIC PERIPHERAL NEUROPATHY ASSOCIATED WITH TYPE 2 DIABETES MELLITUS (HCC): ICD-10-CM

## 2018-01-01 DIAGNOSIS — L98.422 SKIN ULCER OF SACRUM WITH FAT LAYER EXPOSED (HCC): ICD-10-CM

## 2018-01-01 DIAGNOSIS — R09.02 HYPOXIA: ICD-10-CM

## 2018-01-01 DIAGNOSIS — I21.4 NSTEMI (NON-ST ELEVATED MYOCARDIAL INFARCTION) (HCC): ICD-10-CM

## 2018-01-01 DIAGNOSIS — R06.03 RESPIRATORY DISTRESS: ICD-10-CM

## 2018-01-01 DIAGNOSIS — E11.21 TYPE 2 DIABETES WITH NEPHROPATHY (HCC): ICD-10-CM

## 2018-01-01 DIAGNOSIS — R10.31 ABDOMINAL PAIN, RIGHT LOWER QUADRANT: ICD-10-CM

## 2018-01-01 DIAGNOSIS — Z71.89 GOALS OF CARE, COUNSELING/DISCUSSION: ICD-10-CM

## 2018-01-01 DIAGNOSIS — I21.4 NSTEMI (NON-ST ELEVATED MYOCARDIAL INFARCTION) (HCC): Primary | ICD-10-CM

## 2018-01-01 DIAGNOSIS — E78.2 MIXED HYPERLIPIDEMIA: Primary | ICD-10-CM

## 2018-01-01 DIAGNOSIS — I73.9 PAD (PERIPHERAL ARTERY DISEASE) (HCC): Chronic | ICD-10-CM

## 2018-01-01 DIAGNOSIS — J69.0 ASPIRATION PNEUMONIA OF BOTH LOWER LOBES DUE TO REGURGITATED FOOD (HCC): ICD-10-CM

## 2018-01-01 DIAGNOSIS — N30.00 ACUTE CYSTITIS WITHOUT HEMATURIA: ICD-10-CM

## 2018-01-01 DIAGNOSIS — R53.1 WEAKNESS: ICD-10-CM

## 2018-01-01 DIAGNOSIS — Z13.29 SCREENING FOR THYROID DISORDER: ICD-10-CM

## 2018-01-01 DIAGNOSIS — Z00.00 HEALTHCARE MAINTENANCE: ICD-10-CM

## 2018-01-01 DIAGNOSIS — K92.0 DARK EMESIS: ICD-10-CM

## 2018-01-01 DIAGNOSIS — R53.1 WEAKNESS GENERALIZED: ICD-10-CM

## 2018-01-01 DIAGNOSIS — Z12.11 COLON CANCER SCREENING: ICD-10-CM

## 2018-01-01 DIAGNOSIS — K92.2 UPPER GI BLEEDING: ICD-10-CM

## 2018-01-01 DIAGNOSIS — G56.21 ULNAR NEUROPATHY AT ELBOW OF RIGHT UPPER EXTREMITY: ICD-10-CM

## 2018-01-01 DIAGNOSIS — Z71.89 COUNSELING REGARDING GOALS OF CARE: ICD-10-CM

## 2018-01-01 DIAGNOSIS — R19.7 DIARRHEA, UNSPECIFIED TYPE: Primary | ICD-10-CM

## 2018-01-01 LAB
25(OH)D3+25(OH)D2 SERPL-MCNC: 15.2 NG/ML (ref 30–100)
25(OH)D3+25(OH)D2 SERPL-MCNC: 16 NG/ML (ref 30–100)
ABO + RH BLD: NORMAL
ABO + RH BLD: NORMAL
ALBUMIN SERPL-MCNC: 1 G/DL (ref 3.5–5)
ALBUMIN SERPL-MCNC: 1.1 G/DL (ref 3.5–5)
ALBUMIN SERPL-MCNC: 1.2 G/DL (ref 3.5–5)
ALBUMIN SERPL-MCNC: 1.3 G/DL (ref 3.5–5)
ALBUMIN SERPL-MCNC: 1.3 G/DL (ref 3.5–5)
ALBUMIN SERPL-MCNC: 1.4 G/DL (ref 3.5–5)
ALBUMIN SERPL-MCNC: 1.5 G/DL (ref 3.5–5)
ALBUMIN SERPL-MCNC: 1.6 G/DL (ref 3.5–5)
ALBUMIN SERPL-MCNC: 1.7 G/DL (ref 3.5–5)
ALBUMIN SERPL-MCNC: 2.2 G/DL (ref 3.5–5)
ALBUMIN SERPL-MCNC: 2.3 G/DL (ref 3.5–5)
ALBUMIN SERPL-MCNC: 2.4 G/DL (ref 3.5–5)
ALBUMIN SERPL-MCNC: 2.6 G/DL (ref 3.5–5)
ALBUMIN SERPL-MCNC: 2.7 G/DL (ref 3.5–5)
ALBUMIN SERPL-MCNC: 2.7 G/DL (ref 3.6–4.8)
ALBUMIN SERPL-MCNC: 3.4 G/DL (ref 3.6–4.8)
ALBUMIN SERPL-MCNC: 3.7 G/DL (ref 3.6–4.8)
ALBUMIN/GLOB SERPL: 0.2 {RATIO} (ref 1.1–2.2)
ALBUMIN/GLOB SERPL: 0.3 {RATIO} (ref 1.1–2.2)
ALBUMIN/GLOB SERPL: 0.5 {RATIO} (ref 1.1–2.2)
ALBUMIN/GLOB SERPL: 0.5 {RATIO} (ref 1.1–2.2)
ALBUMIN/GLOB SERPL: 0.5 {RATIO} (ref 1.2–2.2)
ALBUMIN/GLOB SERPL: 0.9 {RATIO} (ref 1.2–2.2)
ALBUMIN/GLOB SERPL: 1.1 {RATIO} (ref 1.2–2.2)
ALP SERPL-CCNC: 151 U/L (ref 45–117)
ALP SERPL-CCNC: 161 IU/L (ref 39–117)
ALP SERPL-CCNC: 165 IU/L (ref 39–117)
ALP SERPL-CCNC: 177 U/L (ref 45–117)
ALP SERPL-CCNC: 185 U/L (ref 45–117)
ALP SERPL-CCNC: 201 U/L (ref 45–117)
ALP SERPL-CCNC: 213 U/L (ref 45–117)
ALP SERPL-CCNC: 215 U/L (ref 45–117)
ALP SERPL-CCNC: 217 U/L (ref 45–117)
ALP SERPL-CCNC: 219 U/L (ref 45–117)
ALP SERPL-CCNC: 220 U/L (ref 45–117)
ALP SERPL-CCNC: 220 U/L (ref 45–117)
ALP SERPL-CCNC: 225 U/L (ref 45–117)
ALP SERPL-CCNC: 267 U/L (ref 45–117)
ALP SERPL-CCNC: 274 U/L (ref 45–117)
ALP SERPL-CCNC: 275 U/L (ref 45–117)
ALP SERPL-CCNC: 323 U/L (ref 45–117)
ALP SERPL-CCNC: 374 U/L (ref 45–117)
ALP SERPL-CCNC: 419 U/L (ref 45–117)
ALP SERPL-CCNC: 95 IU/L (ref 39–117)
ALT SERPL-CCNC: 11 IU/L (ref 0–44)
ALT SERPL-CCNC: 12 U/L (ref 12–78)
ALT SERPL-CCNC: 15 U/L (ref 12–78)
ALT SERPL-CCNC: 15 U/L (ref 12–78)
ALT SERPL-CCNC: 16 U/L (ref 12–78)
ALT SERPL-CCNC: 20 IU/L (ref 0–44)
ALT SERPL-CCNC: 20 U/L (ref 12–78)
ALT SERPL-CCNC: 20 U/L (ref 12–78)
ALT SERPL-CCNC: 21 U/L (ref 12–78)
ALT SERPL-CCNC: 22 U/L (ref 12–78)
ALT SERPL-CCNC: 22 U/L (ref 12–78)
ALT SERPL-CCNC: 24 U/L (ref 12–78)
ALT SERPL-CCNC: 26 U/L (ref 12–78)
ALT SERPL-CCNC: 28 U/L (ref 12–78)
ALT SERPL-CCNC: 28 U/L (ref 12–78)
ALT SERPL-CCNC: 29 IU/L (ref 0–44)
ALT SERPL-CCNC: 35 U/L (ref 12–78)
ALT SERPL-CCNC: 46 U/L (ref 12–78)
ALT SERPL-CCNC: <6 U/L (ref 12–78)
ALT SERPL-CCNC: <6 U/L (ref 12–78)
AMMONIA PLAS-SCNC: <10 UMOL/L
AMYLASE SERPL-CCNC: 28 U/L (ref 25–115)
ANION GAP BLD CALC-SCNC: 12 MMOL/L (ref 10–20)
ANION GAP BLD CALC-SCNC: 15 MMOL/L (ref 10–20)
ANION GAP BLD CALC-SCNC: 16 MMOL/L (ref 10–20)
ANION GAP SERPL CALC-SCNC: 10 MMOL/L (ref 5–15)
ANION GAP SERPL CALC-SCNC: 11 MMOL/L (ref 5–15)
ANION GAP SERPL CALC-SCNC: 13 MMOL/L (ref 5–15)
ANION GAP SERPL CALC-SCNC: 13 MMOL/L (ref 5–15)
ANION GAP SERPL CALC-SCNC: 3 MMOL/L (ref 5–15)
ANION GAP SERPL CALC-SCNC: 4 MMOL/L (ref 5–15)
ANION GAP SERPL CALC-SCNC: 5 MMOL/L (ref 5–15)
ANION GAP SERPL CALC-SCNC: 6 MMOL/L (ref 5–15)
ANION GAP SERPL CALC-SCNC: 7 MMOL/L (ref 5–15)
ANION GAP SERPL CALC-SCNC: 8 MMOL/L (ref 5–15)
ANION GAP SERPL CALC-SCNC: 9 MMOL/L (ref 5–15)
APPEARANCE UR: ABNORMAL
ARTERIAL PATENCY WRIST A: ABNORMAL
ARTERIAL PATENCY WRIST A: YES
AST SERPL-CCNC: 13 IU/L (ref 0–40)
AST SERPL-CCNC: 16 U/L (ref 15–37)
AST SERPL-CCNC: 17 U/L (ref 15–37)
AST SERPL-CCNC: 18 IU/L (ref 0–40)
AST SERPL-CCNC: 18 U/L (ref 15–37)
AST SERPL-CCNC: 19 U/L (ref 15–37)
AST SERPL-CCNC: 24 U/L (ref 15–37)
AST SERPL-CCNC: 25 U/L (ref 15–37)
AST SERPL-CCNC: 26 U/L (ref 15–37)
AST SERPL-CCNC: 27 U/L (ref 15–37)
AST SERPL-CCNC: 28 U/L (ref 15–37)
AST SERPL-CCNC: 33 U/L (ref 15–37)
AST SERPL-CCNC: 35 IU/L (ref 0–40)
AST SERPL-CCNC: 38 U/L (ref 15–37)
AST SERPL-CCNC: 39 U/L (ref 15–37)
AST SERPL-CCNC: 40 U/L (ref 15–37)
AST SERPL-CCNC: 45 U/L (ref 15–37)
AST SERPL-CCNC: 48 U/L (ref 15–37)
ATRIAL RATE: 110 BPM
ATRIAL RATE: 59 BPM
ATRIAL RATE: 64 BPM
ATRIAL RATE: 73 BPM
ATRIAL RATE: 77 BPM
ATRIAL RATE: 88 BPM
ATRIAL RATE: 89 BPM
BACTERIA SPEC CULT: ABNORMAL
BACTERIA SPEC CULT: NORMAL
BACTERIA URNS QL MICRO: ABNORMAL /HPF
BASE DEFICIT BLDA-SCNC: 0.4 MMOL/L
BASE DEFICIT BLDA-SCNC: 1.7 MMOL/L
BASE EXCESS BLDA CALC-SCNC: 0.5 MMOL/L
BASE EXCESS BLDA CALC-SCNC: 10.6 MMOL/L
BASE EXCESS BLDA CALC-SCNC: 6.4 MMOL/L
BASE EXCESS BLDA CALC-SCNC: 8.1 MMOL/L
BASE EXCESS BLDA CALC-SCNC: 8.1 MMOL/L
BASE EXCESS BLDA CALC-SCNC: 9.7 MMOL/L
BASOPHILS # BLD AUTO: 0 X10E3/UL (ref 0–0.2)
BASOPHILS # BLD AUTO: 0 X10E3/UL (ref 0–0.2)
BASOPHILS # BLD: 0 K/UL (ref 0–0.1)
BASOPHILS # BLD: 0.1 K/UL (ref 0–0.1)
BASOPHILS # BLD: 0.2 K/UL (ref 0–0.1)
BASOPHILS NFR BLD AUTO: 0 %
BASOPHILS NFR BLD AUTO: 0 %
BASOPHILS NFR BLD: 0 % (ref 0–1)
BASOPHILS NFR BLD: 1 % (ref 0–1)
BASOPHILS NFR BLD: 1 % (ref 0–1)
BDY SITE: ABNORMAL
BILIRUB DIRECT SERPL-MCNC: 0.2 MG/DL (ref 0–0.2)
BILIRUB SERPL-MCNC: 0.2 MG/DL (ref 0–1.2)
BILIRUB SERPL-MCNC: 0.3 MG/DL (ref 0.2–1)
BILIRUB SERPL-MCNC: 0.3 MG/DL (ref 0.2–1)
BILIRUB SERPL-MCNC: 0.3 MG/DL (ref 0–1.2)
BILIRUB SERPL-MCNC: 0.4 MG/DL (ref 0.2–1)
BILIRUB SERPL-MCNC: 0.5 MG/DL (ref 0.2–1)
BILIRUB SERPL-MCNC: 0.6 MG/DL (ref 0.2–1)
BILIRUB SERPL-MCNC: 0.6 MG/DL (ref 0.2–1)
BILIRUB SERPL-MCNC: 0.7 MG/DL (ref 0.2–1)
BILIRUB SERPL-MCNC: 0.9 MG/DL (ref 0.2–1)
BILIRUB SERPL-MCNC: <0.2 MG/DL (ref 0–1.2)
BILIRUB UR QL CFM: NEGATIVE
BILIRUB UR QL: ABNORMAL
BLD PROD TYP BPU: NORMAL
BLOOD GROUP ANTIBODIES SERPL: NORMAL
BLOOD GROUP ANTIBODIES SERPL: NORMAL
BNP SERPL-MCNC: ABNORMAL PG/ML (ref 0–125)
BPU ID: NORMAL
BREATHS.SPONTANEOUS ON VENT: 12
BREATHS.SPONTANEOUS ON VENT: 21
BREATHS.SPONTANEOUS ON VENT: 33
BREATHS.SPONTANEOUS ON VENT: 39
BUN BLD-MCNC: 21 MG/DL (ref 9–20)
BUN BLD-MCNC: 25 MG/DL (ref 9–20)
BUN BLD-MCNC: 26 MG/DL (ref 9–20)
BUN SERPL-MCNC: 11 MG/DL (ref 6–20)
BUN SERPL-MCNC: 12 MG/DL (ref 6–20)
BUN SERPL-MCNC: 12 MG/DL (ref 6–20)
BUN SERPL-MCNC: 16 MG/DL (ref 6–20)
BUN SERPL-MCNC: 17 MG/DL (ref 6–20)
BUN SERPL-MCNC: 17 MG/DL (ref 8–27)
BUN SERPL-MCNC: 18 MG/DL (ref 6–20)
BUN SERPL-MCNC: 19 MG/DL (ref 6–20)
BUN SERPL-MCNC: 22 MG/DL (ref 8–27)
BUN SERPL-MCNC: 23 MG/DL (ref 6–20)
BUN SERPL-MCNC: 24 MG/DL (ref 6–20)
BUN SERPL-MCNC: 24 MG/DL (ref 6–20)
BUN SERPL-MCNC: 25 MG/DL (ref 6–20)
BUN SERPL-MCNC: 26 MG/DL (ref 6–20)
BUN SERPL-MCNC: 27 MG/DL (ref 6–20)
BUN SERPL-MCNC: 28 MG/DL (ref 6–20)
BUN SERPL-MCNC: 29 MG/DL (ref 6–20)
BUN SERPL-MCNC: 30 MG/DL (ref 6–20)
BUN SERPL-MCNC: 32 MG/DL (ref 6–20)
BUN SERPL-MCNC: 33 MG/DL (ref 6–20)
BUN SERPL-MCNC: 34 MG/DL (ref 6–20)
BUN SERPL-MCNC: 35 MG/DL (ref 6–20)
BUN SERPL-MCNC: 35 MG/DL (ref 6–20)
BUN SERPL-MCNC: 36 MG/DL (ref 6–20)
BUN SERPL-MCNC: 37 MG/DL (ref 6–20)
BUN SERPL-MCNC: 40 MG/DL (ref 6–20)
BUN SERPL-MCNC: 41 MG/DL (ref 6–20)
BUN SERPL-MCNC: 43 MG/DL (ref 6–20)
BUN SERPL-MCNC: 44 MG/DL (ref 6–20)
BUN SERPL-MCNC: 44 MG/DL (ref 6–20)
BUN SERPL-MCNC: 45 MG/DL (ref 6–20)
BUN SERPL-MCNC: 49 MG/DL (ref 8–27)
BUN/CREAT SERPL: 3 (ref 12–20)
BUN/CREAT SERPL: 4 (ref 10–24)
BUN/CREAT SERPL: 4 (ref 12–20)
BUN/CREAT SERPL: 5 (ref 10–24)
BUN/CREAT SERPL: 5 (ref 12–20)
BUN/CREAT SERPL: 6 (ref 10–24)
BUN/CREAT SERPL: 6 (ref 12–20)
BUN/CREAT SERPL: 6 (ref 12–20)
BUN/CREAT SERPL: 7 (ref 12–20)
BUN/CREAT SERPL: 8 (ref 12–20)
BUN/CREAT SERPL: 9 (ref 12–20)
CA-I BLD-MCNC: 1.12 MMOL/L (ref 1.12–1.32)
CA-I BLD-MCNC: 1.15 MMOL/L (ref 1.12–1.32)
CA-I BLD-MCNC: 1.22 MMOL/L (ref 1.12–1.32)
CALCIUM SERPL-MCNC: 10 MG/DL (ref 8.5–10.1)
CALCIUM SERPL-MCNC: 10 MG/DL (ref 8.6–10.2)
CALCIUM SERPL-MCNC: 7.3 MG/DL (ref 8.5–10.1)
CALCIUM SERPL-MCNC: 7.3 MG/DL (ref 8.5–10.1)
CALCIUM SERPL-MCNC: 7.8 MG/DL (ref 8.5–10.1)
CALCIUM SERPL-MCNC: 7.9 MG/DL (ref 8.5–10.1)
CALCIUM SERPL-MCNC: 7.9 MG/DL (ref 8.5–10.1)
CALCIUM SERPL-MCNC: 8 MG/DL (ref 8.5–10.1)
CALCIUM SERPL-MCNC: 8.1 MG/DL (ref 8.5–10.1)
CALCIUM SERPL-MCNC: 8.1 MG/DL (ref 8.5–10.1)
CALCIUM SERPL-MCNC: 8.2 MG/DL (ref 8.5–10.1)
CALCIUM SERPL-MCNC: 8.2 MG/DL (ref 8.5–10.1)
CALCIUM SERPL-MCNC: 8.4 MG/DL (ref 8.5–10.1)
CALCIUM SERPL-MCNC: 8.6 MG/DL (ref 8.5–10.1)
CALCIUM SERPL-MCNC: 8.6 MG/DL (ref 8.5–10.1)
CALCIUM SERPL-MCNC: 8.9 MG/DL (ref 8.5–10.1)
CALCIUM SERPL-MCNC: 8.9 MG/DL (ref 8.6–10.2)
CALCIUM SERPL-MCNC: 9 MG/DL (ref 8.5–10.1)
CALCIUM SERPL-MCNC: 9.2 MG/DL (ref 8.5–10.1)
CALCIUM SERPL-MCNC: 9.2 MG/DL (ref 8.5–10.1)
CALCIUM SERPL-MCNC: 9.3 MG/DL (ref 8.5–10.1)
CALCIUM SERPL-MCNC: 9.3 MG/DL (ref 8.5–10.1)
CALCIUM SERPL-MCNC: 9.3 MG/DL (ref 8.6–10.2)
CALCIUM SERPL-MCNC: 9.4 MG/DL (ref 8.5–10.1)
CALCIUM SERPL-MCNC: 9.5 MG/DL (ref 8.5–10.1)
CALCIUM SERPL-MCNC: 9.6 MG/DL (ref 8.5–10.1)
CALCIUM SERPL-MCNC: 9.7 MG/DL (ref 8.5–10.1)
CALCIUM SERPL-MCNC: 9.8 MG/DL (ref 8.5–10.1)
CALCIUM SERPL-MCNC: 9.8 MG/DL (ref 8.5–10.1)
CALCULATED P AXIS, ECG09: 67 DEGREES
CALCULATED P AXIS, ECG09: 68 DEGREES
CALCULATED P AXIS, ECG09: 68 DEGREES
CALCULATED P AXIS, ECG09: 71 DEGREES
CALCULATED P AXIS, ECG09: 72 DEGREES
CALCULATED P AXIS, ECG09: 74 DEGREES
CALCULATED P AXIS, ECG09: 77 DEGREES
CALCULATED R AXIS, ECG10: 66 DEGREES
CALCULATED R AXIS, ECG10: 68 DEGREES
CALCULATED R AXIS, ECG10: 69 DEGREES
CALCULATED R AXIS, ECG10: 71 DEGREES
CALCULATED R AXIS, ECG10: 79 DEGREES
CALCULATED T AXIS, ECG11: -144 DEGREES
CALCULATED T AXIS, ECG11: 131 DEGREES
CALCULATED T AXIS, ECG11: 134 DEGREES
CALCULATED T AXIS, ECG11: 151 DEGREES
CALCULATED T AXIS, ECG11: 172 DEGREES
CALCULATED T AXIS, ECG11: 173 DEGREES
CALCULATED T AXIS, ECG11: 174 DEGREES
CHLORIDE BLD-SCNC: 98 MMOL/L (ref 98–107)
CHLORIDE BLD-SCNC: 99 MMOL/L (ref 98–107)
CHLORIDE BLD-SCNC: 99 MMOL/L (ref 98–107)
CHLORIDE SERPL-SCNC: 100 MMOL/L (ref 96–106)
CHLORIDE SERPL-SCNC: 100 MMOL/L (ref 97–108)
CHLORIDE SERPL-SCNC: 101 MMOL/L (ref 97–108)
CHLORIDE SERPL-SCNC: 102 MMOL/L (ref 97–108)
CHLORIDE SERPL-SCNC: 90 MMOL/L (ref 97–108)
CHLORIDE SERPL-SCNC: 91 MMOL/L (ref 97–108)
CHLORIDE SERPL-SCNC: 92 MMOL/L (ref 97–108)
CHLORIDE SERPL-SCNC: 95 MMOL/L (ref 97–108)
CHLORIDE SERPL-SCNC: 95 MMOL/L (ref 97–108)
CHLORIDE SERPL-SCNC: 96 MMOL/L (ref 96–106)
CHLORIDE SERPL-SCNC: 96 MMOL/L (ref 97–108)
CHLORIDE SERPL-SCNC: 97 MMOL/L (ref 97–108)
CHLORIDE SERPL-SCNC: 98 MMOL/L (ref 96–106)
CHLORIDE SERPL-SCNC: 98 MMOL/L (ref 97–108)
CHLORIDE SERPL-SCNC: 99 MMOL/L (ref 97–108)
CHOLEST SERPL-MCNC: 112 MG/DL
CHOLEST SERPL-MCNC: 121 MG/DL (ref 100–199)
CK MB CFR SERPL CALC: 3.2 % (ref 0–2.5)
CK MB CFR SERPL CALC: 4.1 % (ref 0–2.5)
CK MB SERPL-MCNC: 2.8 NG/ML (ref 5–25)
CK MB SERPL-MCNC: 3.7 NG/ML (ref 5–25)
CK SERPL-CCNC: 88 U/L (ref 39–308)
CK SERPL-CCNC: 90 U/L (ref 39–308)
CO2 BLD-SCNC: 31 MMOL/L (ref 21–32)
CO2 BLD-SCNC: 32 MMOL/L (ref 21–32)
CO2 BLD-SCNC: 34 MMOL/L (ref 21–32)
CO2 SERPL-SCNC: 23 MMOL/L (ref 21–32)
CO2 SERPL-SCNC: 25 MMOL/L (ref 21–32)
CO2 SERPL-SCNC: 26 MMOL/L (ref 21–32)
CO2 SERPL-SCNC: 27 MMOL/L (ref 18–29)
CO2 SERPL-SCNC: 27 MMOL/L (ref 21–32)
CO2 SERPL-SCNC: 28 MMOL/L (ref 18–29)
CO2 SERPL-SCNC: 28 MMOL/L (ref 21–32)
CO2 SERPL-SCNC: 29 MMOL/L (ref 21–32)
CO2 SERPL-SCNC: 29 MMOL/L (ref 21–32)
CO2 SERPL-SCNC: 30 MMOL/L (ref 18–29)
CO2 SERPL-SCNC: 30 MMOL/L (ref 21–32)
CO2 SERPL-SCNC: 31 MMOL/L (ref 21–32)
CO2 SERPL-SCNC: 32 MMOL/L (ref 21–32)
CO2 SERPL-SCNC: 33 MMOL/L (ref 21–32)
CO2 SERPL-SCNC: 34 MMOL/L (ref 21–32)
CO2 SERPL-SCNC: 36 MMOL/L (ref 21–32)
CO2 SERPL-SCNC: 36 MMOL/L (ref 21–32)
COLOR UR: ABNORMAL
CREAT BLD-MCNC: 5.2 MG/DL (ref 0.6–1.3)
CREAT BLD-MCNC: 5.6 MG/DL (ref 0.6–1.3)
CREAT BLD-MCNC: 5.7 MG/DL (ref 0.6–1.3)
CREAT SERPL-MCNC: 2.73 MG/DL (ref 0.7–1.3)
CREAT SERPL-MCNC: 2.8 MG/DL (ref 0.7–1.3)
CREAT SERPL-MCNC: 2.92 MG/DL (ref 0.7–1.3)
CREAT SERPL-MCNC: 3.16 MG/DL (ref 0.7–1.3)
CREAT SERPL-MCNC: 3.47 MG/DL (ref 0.7–1.3)
CREAT SERPL-MCNC: 3.5 MG/DL (ref 0.7–1.3)
CREAT SERPL-MCNC: 3.64 MG/DL (ref 0.7–1.3)
CREAT SERPL-MCNC: 3.73 MG/DL (ref 0.7–1.3)
CREAT SERPL-MCNC: 3.77 MG/DL (ref 0.76–1.27)
CREAT SERPL-MCNC: 3.77 MG/DL (ref 0.7–1.3)
CREAT SERPL-MCNC: 3.84 MG/DL (ref 0.7–1.3)
CREAT SERPL-MCNC: 4.05 MG/DL (ref 0.7–1.3)
CREAT SERPL-MCNC: 4.08 MG/DL (ref 0.7–1.3)
CREAT SERPL-MCNC: 4.44 MG/DL (ref 0.7–1.3)
CREAT SERPL-MCNC: 4.54 MG/DL (ref 0.7–1.3)
CREAT SERPL-MCNC: 4.56 MG/DL (ref 0.7–1.3)
CREAT SERPL-MCNC: 4.68 MG/DL (ref 0.7–1.3)
CREAT SERPL-MCNC: 4.91 MG/DL (ref 0.7–1.3)
CREAT SERPL-MCNC: 4.93 MG/DL (ref 0.7–1.3)
CREAT SERPL-MCNC: 5.03 MG/DL (ref 0.7–1.3)
CREAT SERPL-MCNC: 5.05 MG/DL (ref 0.76–1.27)
CREAT SERPL-MCNC: 5.33 MG/DL (ref 0.7–1.3)
CREAT SERPL-MCNC: 5.47 MG/DL (ref 0.7–1.3)
CREAT SERPL-MCNC: 5.75 MG/DL (ref 0.7–1.3)
CREAT SERPL-MCNC: 5.79 MG/DL (ref 0.7–1.3)
CREAT SERPL-MCNC: 5.94 MG/DL (ref 0.7–1.3)
CREAT SERPL-MCNC: 6.36 MG/DL (ref 0.7–1.3)
CREAT SERPL-MCNC: 6.53 MG/DL (ref 0.7–1.3)
CREAT SERPL-MCNC: 6.79 MG/DL (ref 0.7–1.3)
CREAT SERPL-MCNC: 6.8 MG/DL (ref 0.7–1.3)
CREAT SERPL-MCNC: 6.84 MG/DL (ref 0.7–1.3)
CREAT SERPL-MCNC: 6.94 MG/DL (ref 0.7–1.3)
CREAT SERPL-MCNC: 7.41 MG/DL (ref 0.7–1.3)
CREAT SERPL-MCNC: 7.67 MG/DL (ref 0.7–1.3)
CREAT SERPL-MCNC: 7.82 MG/DL (ref 0.76–1.27)
CREAT SERPL-MCNC: 8.12 MG/DL (ref 0.7–1.3)
CREAT SERPL-MCNC: 8.16 MG/DL (ref 0.7–1.3)
CREAT SERPL-MCNC: 8.29 MG/DL (ref 0.7–1.3)
CREAT SERPL-MCNC: 8.68 MG/DL (ref 0.7–1.3)
CROSSMATCH RESULT,%XM: NORMAL
DATE LAST DOSE: ABNORMAL
DIAGNOSIS, 93000: NORMAL
DIFFERENTIAL METHOD BLD: ABNORMAL
EOSINOPHIL # BLD AUTO: 0.3 X10E3/UL (ref 0–0.4)
EOSINOPHIL # BLD AUTO: 0.5 X10E3/UL (ref 0–0.4)
EOSINOPHIL # BLD: 0 K/UL (ref 0–0.4)
EOSINOPHIL # BLD: 0.1 K/UL (ref 0–0.4)
EOSINOPHIL # BLD: 0.1 K/UL (ref 0–0.4)
EOSINOPHIL # BLD: 0.2 K/UL (ref 0–0.4)
EOSINOPHIL # BLD: 0.3 K/UL (ref 0–0.4)
EOSINOPHIL # BLD: 0.4 K/UL (ref 0–0.4)
EOSINOPHIL # BLD: 0.5 K/UL (ref 0–0.4)
EOSINOPHIL NFR BLD AUTO: 3 %
EOSINOPHIL NFR BLD AUTO: 8 %
EOSINOPHIL NFR BLD: 0 % (ref 0–7)
EOSINOPHIL NFR BLD: 1 % (ref 0–7)
EOSINOPHIL NFR BLD: 2 % (ref 0–7)
EOSINOPHIL NFR BLD: 3 % (ref 0–7)
EOSINOPHIL NFR BLD: 3 % (ref 0–7)
EOSINOPHIL NFR BLD: 4 % (ref 0–7)
EOSINOPHIL NFR BLD: 4 % (ref 0–7)
EPAP/CPAP/PEEP, PAPEEP: 10
EPAP/CPAP/PEEP, PAPEEP: 100
EPITH CASTS URNS QL MICRO: ABNORMAL /LPF
ERYTHROCYTE [DISTWIDTH] IN BLOOD BY AUTOMATED COUNT: 16.2 % (ref 11.5–14.5)
ERYTHROCYTE [DISTWIDTH] IN BLOOD BY AUTOMATED COUNT: 16.8 % (ref 11.5–14.5)
ERYTHROCYTE [DISTWIDTH] IN BLOOD BY AUTOMATED COUNT: 16.8 % (ref 11.5–14.5)
ERYTHROCYTE [DISTWIDTH] IN BLOOD BY AUTOMATED COUNT: 16.9 % (ref 11.5–14.5)
ERYTHROCYTE [DISTWIDTH] IN BLOOD BY AUTOMATED COUNT: 16.9 % (ref 11.5–14.5)
ERYTHROCYTE [DISTWIDTH] IN BLOOD BY AUTOMATED COUNT: 17.1 % (ref 11.5–14.5)
ERYTHROCYTE [DISTWIDTH] IN BLOOD BY AUTOMATED COUNT: 17.2 % (ref 11.5–14.5)
ERYTHROCYTE [DISTWIDTH] IN BLOOD BY AUTOMATED COUNT: 17.4 % (ref 11.5–14.5)
ERYTHROCYTE [DISTWIDTH] IN BLOOD BY AUTOMATED COUNT: 17.4 % (ref 11.5–14.5)
ERYTHROCYTE [DISTWIDTH] IN BLOOD BY AUTOMATED COUNT: 17.8 % (ref 11.5–14.5)
ERYTHROCYTE [DISTWIDTH] IN BLOOD BY AUTOMATED COUNT: 17.8 % (ref 12.3–15.4)
ERYTHROCYTE [DISTWIDTH] IN BLOOD BY AUTOMATED COUNT: 17.9 % (ref 11.5–14.5)
ERYTHROCYTE [DISTWIDTH] IN BLOOD BY AUTOMATED COUNT: 18 % (ref 11.5–14.5)
ERYTHROCYTE [DISTWIDTH] IN BLOOD BY AUTOMATED COUNT: 18.1 % (ref 11.5–14.5)
ERYTHROCYTE [DISTWIDTH] IN BLOOD BY AUTOMATED COUNT: 18.2 % (ref 11.5–14.5)
ERYTHROCYTE [DISTWIDTH] IN BLOOD BY AUTOMATED COUNT: 18.3 % (ref 11.5–14.5)
ERYTHROCYTE [DISTWIDTH] IN BLOOD BY AUTOMATED COUNT: 18.3 % (ref 11.5–14.5)
ERYTHROCYTE [DISTWIDTH] IN BLOOD BY AUTOMATED COUNT: 18.4 % (ref 11.5–14.5)
ERYTHROCYTE [DISTWIDTH] IN BLOOD BY AUTOMATED COUNT: 18.5 % (ref 11.5–14.5)
ERYTHROCYTE [DISTWIDTH] IN BLOOD BY AUTOMATED COUNT: 18.6 % (ref 11.5–14.5)
ERYTHROCYTE [DISTWIDTH] IN BLOOD BY AUTOMATED COUNT: 18.7 % (ref 11.5–14.5)
ERYTHROCYTE [DISTWIDTH] IN BLOOD BY AUTOMATED COUNT: 18.7 % (ref 11.5–14.5)
ERYTHROCYTE [DISTWIDTH] IN BLOOD BY AUTOMATED COUNT: 18.7 % (ref 12.3–15.4)
ERYTHROCYTE [DISTWIDTH] IN BLOOD BY AUTOMATED COUNT: 18.9 % (ref 11.5–14.5)
ERYTHROCYTE [DISTWIDTH] IN BLOOD BY AUTOMATED COUNT: 20.3 % (ref 11.5–14.5)
ERYTHROCYTE [DISTWIDTH] IN BLOOD BY AUTOMATED COUNT: 20.5 % (ref 11.5–14.5)
EST. AVERAGE GLUCOSE BLD GHB EST-MCNC: NORMAL MG/DL
FIO2 ON VENT: 100 %
FIO2 ON VENT: 50 %
FIO2 ON VENT: 50 %
FLUAV AG NPH QL IA: NEGATIVE
FLUBV AG NOSE QL IA: NEGATIVE
GAS FLOW.O2 O2 DELIVERY SYS: 12 L/MIN
GAS FLOW.O2 O2 DELIVERY SYS: 15 L/MIN
GAS FLOW.O2 O2 DELIVERY SYS: 4 L/MIN
GAS FLOW.O2 SETTING OXYMISER: 20 L/MIN
GAS FLOW.O2 SETTING OXYMISER: 20 L/MIN
GFR SERPLBLD CREATININE-BSD FMLA CKD-EPI: 11 ML/MIN/1.73
GFR SERPLBLD CREATININE-BSD FMLA CKD-EPI: 12 ML/MIN/1.73
GFR SERPLBLD CREATININE-BSD FMLA CKD-EPI: 15 ML/MIN/1.73
GFR SERPLBLD CREATININE-BSD FMLA CKD-EPI: 18 ML/MIN/1.73
GLOBULIN SER CALC-MCNC: 3.2 G/DL (ref 1.5–4.5)
GLOBULIN SER CALC-MCNC: 4.3 G/DL (ref 1.5–4.5)
GLOBULIN SER CALC-MCNC: 4.7 G/DL (ref 2–4)
GLOBULIN SER CALC-MCNC: 5 G/DL (ref 1.5–4.5)
GLOBULIN SER CALC-MCNC: 5.2 G/DL (ref 2–4)
GLOBULIN SER CALC-MCNC: 5.7 G/DL (ref 2–4)
GLOBULIN SER CALC-MCNC: 6.1 G/DL (ref 2–4)
GLOBULIN SER CALC-MCNC: 6.3 G/DL (ref 2–4)
GLOBULIN SER CALC-MCNC: 6.4 G/DL (ref 2–4)
GLOBULIN SER CALC-MCNC: 6.5 G/DL (ref 2–4)
GLOBULIN SER CALC-MCNC: 6.6 G/DL (ref 2–4)
GLOBULIN SER CALC-MCNC: 6.6 G/DL (ref 2–4)
GLOBULIN SER CALC-MCNC: 6.7 G/DL (ref 2–4)
GLOBULIN SER CALC-MCNC: 6.8 G/DL (ref 2–4)
GLOBULIN SER CALC-MCNC: 7 G/DL (ref 2–4)
GLOBULIN SER CALC-MCNC: 7.1 G/DL (ref 2–4)
GLOBULIN SER CALC-MCNC: 7.2 G/DL (ref 2–4)
GLOBULIN SER CALC-MCNC: 7.3 G/DL (ref 2–4)
GLOBULIN SER CALC-MCNC: 8.2 G/DL (ref 2–4)
GLOBULIN SER CALC-MCNC: 8.5 G/DL (ref 2–4)
GLUCOSE BLD STRIP.AUTO-MCNC: 100 MG/DL (ref 65–100)
GLUCOSE BLD STRIP.AUTO-MCNC: 100 MG/DL (ref 65–100)
GLUCOSE BLD STRIP.AUTO-MCNC: 101 MG/DL (ref 65–100)
GLUCOSE BLD STRIP.AUTO-MCNC: 101 MG/DL (ref 65–100)
GLUCOSE BLD STRIP.AUTO-MCNC: 102 MG/DL (ref 65–100)
GLUCOSE BLD STRIP.AUTO-MCNC: 102 MG/DL (ref 65–100)
GLUCOSE BLD STRIP.AUTO-MCNC: 103 MG/DL (ref 65–100)
GLUCOSE BLD STRIP.AUTO-MCNC: 104 MG/DL (ref 65–100)
GLUCOSE BLD STRIP.AUTO-MCNC: 104 MG/DL (ref 65–100)
GLUCOSE BLD STRIP.AUTO-MCNC: 105 MG/DL (ref 65–100)
GLUCOSE BLD STRIP.AUTO-MCNC: 106 MG/DL (ref 65–100)
GLUCOSE BLD STRIP.AUTO-MCNC: 106 MG/DL (ref 65–100)
GLUCOSE BLD STRIP.AUTO-MCNC: 107 MG/DL (ref 65–100)
GLUCOSE BLD STRIP.AUTO-MCNC: 108 MG/DL (ref 65–100)
GLUCOSE BLD STRIP.AUTO-MCNC: 108 MG/DL (ref 65–100)
GLUCOSE BLD STRIP.AUTO-MCNC: 109 MG/DL (ref 65–100)
GLUCOSE BLD STRIP.AUTO-MCNC: 110 MG/DL (ref 65–100)
GLUCOSE BLD STRIP.AUTO-MCNC: 111 MG/DL (ref 65–100)
GLUCOSE BLD STRIP.AUTO-MCNC: 112 MG/DL (ref 65–100)
GLUCOSE BLD STRIP.AUTO-MCNC: 113 MG/DL (ref 65–100)
GLUCOSE BLD STRIP.AUTO-MCNC: 113 MG/DL (ref 65–100)
GLUCOSE BLD STRIP.AUTO-MCNC: 116 MG/DL (ref 65–100)
GLUCOSE BLD STRIP.AUTO-MCNC: 117 MG/DL (ref 65–100)
GLUCOSE BLD STRIP.AUTO-MCNC: 117 MG/DL (ref 65–100)
GLUCOSE BLD STRIP.AUTO-MCNC: 120 MG/DL (ref 65–100)
GLUCOSE BLD STRIP.AUTO-MCNC: 121 MG/DL (ref 65–100)
GLUCOSE BLD STRIP.AUTO-MCNC: 122 MG/DL (ref 65–100)
GLUCOSE BLD STRIP.AUTO-MCNC: 123 MG/DL (ref 65–100)
GLUCOSE BLD STRIP.AUTO-MCNC: 124 MG/DL (ref 65–100)
GLUCOSE BLD STRIP.AUTO-MCNC: 125 MG/DL (ref 65–100)
GLUCOSE BLD STRIP.AUTO-MCNC: 126 MG/DL (ref 65–100)
GLUCOSE BLD STRIP.AUTO-MCNC: 127 MG/DL (ref 65–100)
GLUCOSE BLD STRIP.AUTO-MCNC: 128 MG/DL (ref 65–100)
GLUCOSE BLD STRIP.AUTO-MCNC: 128 MG/DL (ref 65–100)
GLUCOSE BLD STRIP.AUTO-MCNC: 129 MG/DL (ref 65–100)
GLUCOSE BLD STRIP.AUTO-MCNC: 130 MG/DL (ref 65–100)
GLUCOSE BLD STRIP.AUTO-MCNC: 131 MG/DL (ref 65–100)
GLUCOSE BLD STRIP.AUTO-MCNC: 132 MG/DL (ref 65–100)
GLUCOSE BLD STRIP.AUTO-MCNC: 133 MG/DL (ref 65–100)
GLUCOSE BLD STRIP.AUTO-MCNC: 133 MG/DL (ref 65–100)
GLUCOSE BLD STRIP.AUTO-MCNC: 134 MG/DL (ref 65–100)
GLUCOSE BLD STRIP.AUTO-MCNC: 134 MG/DL (ref 65–100)
GLUCOSE BLD STRIP.AUTO-MCNC: 135 MG/DL (ref 65–100)
GLUCOSE BLD STRIP.AUTO-MCNC: 136 MG/DL (ref 65–100)
GLUCOSE BLD STRIP.AUTO-MCNC: 137 MG/DL (ref 65–100)
GLUCOSE BLD STRIP.AUTO-MCNC: 138 MG/DL (ref 65–100)
GLUCOSE BLD STRIP.AUTO-MCNC: 141 MG/DL (ref 65–100)
GLUCOSE BLD STRIP.AUTO-MCNC: 142 MG/DL (ref 65–100)
GLUCOSE BLD STRIP.AUTO-MCNC: 145 MG/DL (ref 65–100)
GLUCOSE BLD STRIP.AUTO-MCNC: 145 MG/DL (ref 65–100)
GLUCOSE BLD STRIP.AUTO-MCNC: 146 MG/DL (ref 65–100)
GLUCOSE BLD STRIP.AUTO-MCNC: 146 MG/DL (ref 65–100)
GLUCOSE BLD STRIP.AUTO-MCNC: 149 MG/DL (ref 65–100)
GLUCOSE BLD STRIP.AUTO-MCNC: 149 MG/DL (ref 65–100)
GLUCOSE BLD STRIP.AUTO-MCNC: 153 MG/DL (ref 65–100)
GLUCOSE BLD STRIP.AUTO-MCNC: 154 MG/DL (ref 65–100)
GLUCOSE BLD STRIP.AUTO-MCNC: 154 MG/DL (ref 65–100)
GLUCOSE BLD STRIP.AUTO-MCNC: 157 MG/DL (ref 65–100)
GLUCOSE BLD STRIP.AUTO-MCNC: 159 MG/DL (ref 65–100)
GLUCOSE BLD STRIP.AUTO-MCNC: 162 MG/DL (ref 65–100)
GLUCOSE BLD STRIP.AUTO-MCNC: 162 MG/DL (ref 65–100)
GLUCOSE BLD STRIP.AUTO-MCNC: 176 MG/DL (ref 65–100)
GLUCOSE BLD STRIP.AUTO-MCNC: 186 MG/DL (ref 65–100)
GLUCOSE BLD STRIP.AUTO-MCNC: 188 MG/DL (ref 65–100)
GLUCOSE BLD STRIP.AUTO-MCNC: 188 MG/DL (ref 65–100)
GLUCOSE BLD STRIP.AUTO-MCNC: 190 MG/DL (ref 65–100)
GLUCOSE BLD STRIP.AUTO-MCNC: 199 MG/DL (ref 65–100)
GLUCOSE BLD STRIP.AUTO-MCNC: 201 MG/DL (ref 65–100)
GLUCOSE BLD STRIP.AUTO-MCNC: 209 MG/DL (ref 65–100)
GLUCOSE BLD STRIP.AUTO-MCNC: 211 MG/DL (ref 65–100)
GLUCOSE BLD STRIP.AUTO-MCNC: 226 MG/DL (ref 65–100)
GLUCOSE BLD STRIP.AUTO-MCNC: 239 MG/DL (ref 65–100)
GLUCOSE BLD STRIP.AUTO-MCNC: 260 MG/DL (ref 65–100)
GLUCOSE BLD STRIP.AUTO-MCNC: 40 MG/DL (ref 65–100)
GLUCOSE BLD STRIP.AUTO-MCNC: 41 MG/DL (ref 65–100)
GLUCOSE BLD STRIP.AUTO-MCNC: 67 MG/DL (ref 65–100)
GLUCOSE BLD STRIP.AUTO-MCNC: 68 MG/DL (ref 65–100)
GLUCOSE BLD STRIP.AUTO-MCNC: 68 MG/DL (ref 65–100)
GLUCOSE BLD STRIP.AUTO-MCNC: 69 MG/DL (ref 65–100)
GLUCOSE BLD STRIP.AUTO-MCNC: 70 MG/DL (ref 65–100)
GLUCOSE BLD STRIP.AUTO-MCNC: 71 MG/DL (ref 65–100)
GLUCOSE BLD STRIP.AUTO-MCNC: 72 MG/DL (ref 65–100)
GLUCOSE BLD STRIP.AUTO-MCNC: 74 MG/DL (ref 65–100)
GLUCOSE BLD STRIP.AUTO-MCNC: 75 MG/DL (ref 65–100)
GLUCOSE BLD STRIP.AUTO-MCNC: 78 MG/DL (ref 65–100)
GLUCOSE BLD STRIP.AUTO-MCNC: 79 MG/DL (ref 65–100)
GLUCOSE BLD STRIP.AUTO-MCNC: 79 MG/DL (ref 65–100)
GLUCOSE BLD STRIP.AUTO-MCNC: 80 MG/DL (ref 65–100)
GLUCOSE BLD STRIP.AUTO-MCNC: 80 MG/DL (ref 65–100)
GLUCOSE BLD STRIP.AUTO-MCNC: 82 MG/DL (ref 65–100)
GLUCOSE BLD STRIP.AUTO-MCNC: 83 MG/DL (ref 65–100)
GLUCOSE BLD STRIP.AUTO-MCNC: 83 MG/DL (ref 65–100)
GLUCOSE BLD STRIP.AUTO-MCNC: 84 MG/DL (ref 65–100)
GLUCOSE BLD STRIP.AUTO-MCNC: 86 MG/DL (ref 65–100)
GLUCOSE BLD STRIP.AUTO-MCNC: 86 MG/DL (ref 65–100)
GLUCOSE BLD STRIP.AUTO-MCNC: 87 MG/DL (ref 65–100)
GLUCOSE BLD STRIP.AUTO-MCNC: 89 MG/DL (ref 65–100)
GLUCOSE BLD STRIP.AUTO-MCNC: 89 MG/DL (ref 65–100)
GLUCOSE BLD STRIP.AUTO-MCNC: 90 MG/DL (ref 65–100)
GLUCOSE BLD STRIP.AUTO-MCNC: 90 MG/DL (ref 65–100)
GLUCOSE BLD STRIP.AUTO-MCNC: 91 MG/DL (ref 65–100)
GLUCOSE BLD STRIP.AUTO-MCNC: 91 MG/DL (ref 65–100)
GLUCOSE BLD STRIP.AUTO-MCNC: 93 MG/DL (ref 65–100)
GLUCOSE BLD STRIP.AUTO-MCNC: 94 MG/DL (ref 65–100)
GLUCOSE BLD STRIP.AUTO-MCNC: 94 MG/DL (ref 65–100)
GLUCOSE BLD STRIP.AUTO-MCNC: 96 MG/DL (ref 65–100)
GLUCOSE BLD STRIP.AUTO-MCNC: 96 MG/DL (ref 65–100)
GLUCOSE BLD STRIP.AUTO-MCNC: 98 MG/DL (ref 65–100)
GLUCOSE BLD STRIP.AUTO-MCNC: 99 MG/DL (ref 65–100)
GLUCOSE BLD STRIP.AUTO-MCNC: 99 MG/DL (ref 65–100)
GLUCOSE BLD-MCNC: 106 MG/DL (ref 65–100)
GLUCOSE BLD-MCNC: 80 MG/DL (ref 65–100)
GLUCOSE BLD-MCNC: 80 MG/DL (ref 65–100)
GLUCOSE POC: 124 MG/DL
GLUCOSE SERPL-MCNC: 100 MG/DL (ref 65–100)
GLUCOSE SERPL-MCNC: 104 MG/DL (ref 65–100)
GLUCOSE SERPL-MCNC: 104 MG/DL (ref 65–100)
GLUCOSE SERPL-MCNC: 116 MG/DL (ref 65–100)
GLUCOSE SERPL-MCNC: 117 MG/DL (ref 65–100)
GLUCOSE SERPL-MCNC: 117 MG/DL (ref 65–100)
GLUCOSE SERPL-MCNC: 118 MG/DL (ref 65–100)
GLUCOSE SERPL-MCNC: 119 MG/DL (ref 65–100)
GLUCOSE SERPL-MCNC: 120 MG/DL (ref 65–100)
GLUCOSE SERPL-MCNC: 127 MG/DL (ref 65–100)
GLUCOSE SERPL-MCNC: 127 MG/DL (ref 65–100)
GLUCOSE SERPL-MCNC: 129 MG/DL (ref 65–100)
GLUCOSE SERPL-MCNC: 137 MG/DL (ref 65–100)
GLUCOSE SERPL-MCNC: 138 MG/DL (ref 65–100)
GLUCOSE SERPL-MCNC: 138 MG/DL (ref 65–99)
GLUCOSE SERPL-MCNC: 157 MG/DL (ref 65–100)
GLUCOSE SERPL-MCNC: 164 MG/DL (ref 65–99)
GLUCOSE SERPL-MCNC: 177 MG/DL (ref 65–100)
GLUCOSE SERPL-MCNC: 179 MG/DL (ref 65–100)
GLUCOSE SERPL-MCNC: 179 MG/DL (ref 65–100)
GLUCOSE SERPL-MCNC: 190 MG/DL (ref 65–100)
GLUCOSE SERPL-MCNC: 190 MG/DL (ref 65–100)
GLUCOSE SERPL-MCNC: 211 MG/DL (ref 65–100)
GLUCOSE SERPL-MCNC: 216 MG/DL (ref 65–100)
GLUCOSE SERPL-MCNC: 227 MG/DL (ref 65–100)
GLUCOSE SERPL-MCNC: 54 MG/DL (ref 65–100)
GLUCOSE SERPL-MCNC: 57 MG/DL (ref 65–100)
GLUCOSE SERPL-MCNC: 74 MG/DL (ref 65–100)
GLUCOSE SERPL-MCNC: 74 MG/DL (ref 65–99)
GLUCOSE SERPL-MCNC: 75 MG/DL (ref 65–100)
GLUCOSE SERPL-MCNC: 78 MG/DL (ref 65–100)
GLUCOSE SERPL-MCNC: 80 MG/DL (ref 65–100)
GLUCOSE SERPL-MCNC: 81 MG/DL (ref 65–100)
GLUCOSE SERPL-MCNC: 85 MG/DL (ref 65–100)
GLUCOSE SERPL-MCNC: 85 MG/DL (ref 65–100)
GLUCOSE SERPL-MCNC: 89 MG/DL (ref 65–100)
GLUCOSE SERPL-MCNC: 90 MG/DL (ref 65–100)
GLUCOSE SERPL-MCNC: 93 MG/DL (ref 65–100)
GLUCOSE SERPL-MCNC: 94 MG/DL (ref 65–100)
GLUCOSE UR STRIP.AUTO-MCNC: 100 MG/DL
GRAM STN SPEC: ABNORMAL
GRAM STN SPEC: ABNORMAL
H PYLORI FROM TISSUE: NEGATIVE
HBA1C MFR BLD HPLC: 6.3 %
HBA1C MFR BLD: 4.9 % (ref 4.2–6.3)
HBV SURFACE AB SER QL: REACTIVE
HBV SURFACE AB SER-ACNC: 18.05 MIU/ML
HBV SURFACE AG SER QL: <0.1 INDEX
HBV SURFACE AG SER QL: <0.1 INDEX
HBV SURFACE AG SER QL: NEGATIVE
HBV SURFACE AG SER QL: NEGATIVE
HCO3 BLDA-SCNC: 23 MMOL/L (ref 22–26)
HCO3 BLDA-SCNC: 25 MMOL/L (ref 22–26)
HCO3 BLDA-SCNC: 26 MMOL/L (ref 22–26)
HCO3 BLDA-SCNC: 32 MMOL/L (ref 22–26)
HCO3 BLDA-SCNC: 33 MMOL/L (ref 22–26)
HCO3 BLDA-SCNC: 34 MMOL/L (ref 22–26)
HCO3 BLDA-SCNC: 35 MMOL/L (ref 22–26)
HCO3 BLDA-SCNC: 35 MMOL/L (ref 22–26)
HCT VFR BLD AUTO: 21.2 % (ref 36.6–50.3)
HCT VFR BLD AUTO: 22.2 % (ref 36.6–50.3)
HCT VFR BLD AUTO: 22.8 % (ref 36.6–50.3)
HCT VFR BLD AUTO: 23.1 % (ref 36.6–50.3)
HCT VFR BLD AUTO: 23.5 % (ref 36.6–50.3)
HCT VFR BLD AUTO: 24.1 % (ref 36.6–50.3)
HCT VFR BLD AUTO: 24.3 % (ref 36.6–50.3)
HCT VFR BLD AUTO: 24.6 % (ref 36.6–50.3)
HCT VFR BLD AUTO: 25.3 % (ref 36.6–50.3)
HCT VFR BLD AUTO: 25.5 % (ref 36.6–50.3)
HCT VFR BLD AUTO: 26 % (ref 36.6–50.3)
HCT VFR BLD AUTO: 26.3 % (ref 36.6–50.3)
HCT VFR BLD AUTO: 27 % (ref 36.6–50.3)
HCT VFR BLD AUTO: 27.1 % (ref 36.6–50.3)
HCT VFR BLD AUTO: 27.1 % (ref 36.6–50.3)
HCT VFR BLD AUTO: 27.2 % (ref 37.5–51)
HCT VFR BLD AUTO: 27.6 % (ref 36.6–50.3)
HCT VFR BLD AUTO: 27.9 % (ref 36.6–50.3)
HCT VFR BLD AUTO: 28.3 % (ref 36.6–50.3)
HCT VFR BLD AUTO: 29.4 % (ref 36.6–50.3)
HCT VFR BLD AUTO: 29.4 % (ref 36.6–50.3)
HCT VFR BLD AUTO: 29.7 % (ref 36.6–50.3)
HCT VFR BLD AUTO: 29.8 % (ref 36.6–50.3)
HCT VFR BLD AUTO: 30.4 % (ref 36.6–50.3)
HCT VFR BLD AUTO: 30.8 % (ref 36.6–50.3)
HCT VFR BLD AUTO: 31.3 % (ref 37.5–51)
HCT VFR BLD AUTO: 31.5 % (ref 36.6–50.3)
HCT VFR BLD AUTO: 31.7 % (ref 36.6–50.3)
HCT VFR BLD AUTO: 32 % (ref 36.6–50.3)
HCT VFR BLD AUTO: 32.5 % (ref 36.6–50.3)
HCT VFR BLD AUTO: 33.8 % (ref 36.6–50.3)
HCT VFR BLD AUTO: 33.8 % (ref 36.6–50.3)
HCT VFR BLD AUTO: 34.6 % (ref 36.6–50.3)
HCT VFR BLD AUTO: 37.6 % (ref 36.6–50.3)
HCT VFR BLD CALC: 34 % (ref 36.6–50.3)
HCT VFR BLD CALC: 34 % (ref 36.6–50.3)
HCT VFR BLD CALC: 36 % (ref 36.6–50.3)
HDLC SERPL-MCNC: 47 MG/DL
HDLC SERPL-MCNC: 65 MG/DL
HDLC SERPL: 2.4 {RATIO} (ref 0–5)
HEMOCCULT STL QL IA: NEGATIVE
HEMOCCULT STL QL: NEGATIVE
HGB BLD-MCNC: 10 G/DL (ref 12.1–17)
HGB BLD-MCNC: 10.1 G/DL (ref 12.1–17)
HGB BLD-MCNC: 10.2 G/DL (ref 12.1–17)
HGB BLD-MCNC: 10.9 G/DL (ref 12.1–17)
HGB BLD-MCNC: 11.4 G/DL (ref 12.1–17)
HGB BLD-MCNC: 6.4 G/DL (ref 12.1–17)
HGB BLD-MCNC: 6.7 G/DL (ref 12.1–17)
HGB BLD-MCNC: 7 G/DL (ref 12.1–17)
HGB BLD-MCNC: 7.1 G/DL (ref 12.1–17)
HGB BLD-MCNC: 7.2 G/DL (ref 12.1–17)
HGB BLD-MCNC: 7.2 G/DL (ref 12.1–17)
HGB BLD-MCNC: 7.5 G/DL (ref 12.1–17)
HGB BLD-MCNC: 7.5 G/DL (ref 12.1–17)
HGB BLD-MCNC: 7.8 G/DL (ref 12.1–17)
HGB BLD-MCNC: 7.9 G/DL (ref 12.1–17)
HGB BLD-MCNC: 8.2 G/DL (ref 12.1–17)
HGB BLD-MCNC: 8.3 G/DL (ref 12.1–17)
HGB BLD-MCNC: 8.4 G/DL (ref 12.1–17)
HGB BLD-MCNC: 8.4 G/DL (ref 13–17.7)
HGB BLD-MCNC: 8.5 G/DL (ref 12.1–17)
HGB BLD-MCNC: 8.7 G/DL (ref 12.1–17)
HGB BLD-MCNC: 8.8 G/DL (ref 12.1–17)
HGB BLD-MCNC: 9 G/DL (ref 12.1–17)
HGB BLD-MCNC: 9.1 G/DL (ref 12.1–17)
HGB BLD-MCNC: 9.2 G/DL (ref 12.1–17)
HGB BLD-MCNC: 9.2 G/DL (ref 12.1–17)
HGB BLD-MCNC: 9.3 G/DL (ref 12.1–17)
HGB BLD-MCNC: 9.4 G/DL (ref 12.1–17)
HGB BLD-MCNC: 9.5 G/DL (ref 12.1–17)
HGB BLD-MCNC: 9.6 G/DL (ref 12.1–17)
HGB BLD-MCNC: 9.7 G/DL (ref 13–17.7)
HGB BLD-MCNC: 9.9 G/DL (ref 12.1–17)
HGB UR QL STRIP: ABNORMAL
HYALINE CASTS URNS QL MICRO: ABNORMAL /LPF (ref 0–5)
IMM GRANULOCYTES # BLD: 0 K/UL
IMM GRANULOCYTES # BLD: 0 K/UL (ref 0–0.04)
IMM GRANULOCYTES # BLD: 0 X10E3/UL (ref 0–0.1)
IMM GRANULOCYTES # BLD: 0.1 K/UL (ref 0–0.04)
IMM GRANULOCYTES # BLD: 0.1 K/UL (ref 0–0.04)
IMM GRANULOCYTES # BLD: 0.1 X10E3/UL (ref 0–0.1)
IMM GRANULOCYTES # BLD: 0.2 K/UL (ref 0–0.04)
IMM GRANULOCYTES # BLD: 0.3 K/UL (ref 0–0.04)
IMM GRANULOCYTES # BLD: 0.3 K/UL (ref 0–0.04)
IMM GRANULOCYTES # BLD: 0.5 K/UL (ref 0–0.04)
IMM GRANULOCYTES # BLD: 0.5 K/UL (ref 0–0.04)
IMM GRANULOCYTES NFR BLD AUTO: 0 %
IMM GRANULOCYTES NFR BLD AUTO: 0 % (ref 0–0.5)
IMM GRANULOCYTES NFR BLD AUTO: 1 % (ref 0–0.5)
IMM GRANULOCYTES NFR BLD AUTO: 2 % (ref 0–0.5)
IMM GRANULOCYTES NFR BLD: 0 %
IMM GRANULOCYTES NFR BLD: 1 %
INR PPP: 1.2 (ref 0.9–1.1)
INTERPRETATION: NORMAL
IPAP/PIP, IPAPIP: 16
IPAP/PIP, IPAPIP: 16
KETONES UR QL STRIP.AUTO: NEGATIVE MG/DL
KIT LOT NO., HCLOLOT: NORMAL
LACTATE SERPL-SCNC: 1.3 MMOL/L (ref 0.4–2)
LACTATE SERPL-SCNC: 1.4 MMOL/L (ref 0.4–2)
LACTATE SERPL-SCNC: 1.6 MMOL/L (ref 0.4–2)
LDLC SERPL CALC-MCNC: 39 MG/DL (ref 0–100)
LDLC SERPL CALC-MCNC: 45 MG/DL (ref 0–99)
LEUKOCYTE ESTERASE UR QL STRIP.AUTO: ABNORMAL
LIPASE SERPL-CCNC: 31 U/L (ref 73–393)
LIPID PROFILE,FLP: NORMAL
LYMPHOCYTES # BLD AUTO: 1.6 X10E3/UL (ref 0.7–3.1)
LYMPHOCYTES # BLD AUTO: 2 X10E3/UL (ref 0.7–3.1)
LYMPHOCYTES # BLD: 0.5 K/UL (ref 0.8–3.5)
LYMPHOCYTES # BLD: 0.7 K/UL (ref 0.8–3.5)
LYMPHOCYTES # BLD: 0.8 K/UL (ref 0.8–3.5)
LYMPHOCYTES # BLD: 0.8 K/UL (ref 0.8–3.5)
LYMPHOCYTES # BLD: 0.9 K/UL (ref 0.8–3.5)
LYMPHOCYTES # BLD: 1 K/UL (ref 0.8–3.5)
LYMPHOCYTES # BLD: 1 K/UL (ref 0.8–3.5)
LYMPHOCYTES # BLD: 1.2 K/UL (ref 0.8–3.5)
LYMPHOCYTES # BLD: 1.3 K/UL (ref 0.8–3.5)
LYMPHOCYTES # BLD: 1.3 K/UL (ref 0.8–3.5)
LYMPHOCYTES # BLD: 1.4 K/UL (ref 0.8–3.5)
LYMPHOCYTES # BLD: 1.5 K/UL (ref 0.8–3.5)
LYMPHOCYTES # BLD: 1.5 K/UL (ref 0.8–3.5)
LYMPHOCYTES # BLD: 1.6 K/UL (ref 0.8–3.5)
LYMPHOCYTES # BLD: 1.7 K/UL (ref 0.8–3.5)
LYMPHOCYTES # BLD: 1.7 K/UL (ref 0.8–3.5)
LYMPHOCYTES NFR BLD AUTO: 17 %
LYMPHOCYTES NFR BLD AUTO: 29 %
LYMPHOCYTES NFR BLD: 10 % (ref 12–49)
LYMPHOCYTES NFR BLD: 11 % (ref 12–49)
LYMPHOCYTES NFR BLD: 11 % (ref 12–49)
LYMPHOCYTES NFR BLD: 12 % (ref 12–49)
LYMPHOCYTES NFR BLD: 12 % (ref 12–49)
LYMPHOCYTES NFR BLD: 19 % (ref 12–49)
LYMPHOCYTES NFR BLD: 3 % (ref 12–49)
LYMPHOCYTES NFR BLD: 4 % (ref 12–49)
LYMPHOCYTES NFR BLD: 6 % (ref 12–49)
LYMPHOCYTES NFR BLD: 6 % (ref 12–49)
LYMPHOCYTES NFR BLD: 7 % (ref 12–49)
LYMPHOCYTES NFR BLD: 8 % (ref 12–49)
LYMPHOCYTES NFR BLD: 8 % (ref 12–49)
LYMPHOCYTES NFR BLD: 9 % (ref 12–49)
Lab: NORMAL
MAGNESIUM SERPL-MCNC: 2.1 MG/DL (ref 1.6–2.4)
MAGNESIUM SERPL-MCNC: 2.2 MG/DL (ref 1.6–2.4)
MAGNESIUM SERPL-MCNC: 2.2 MG/DL (ref 1.6–2.4)
MAGNESIUM SERPL-MCNC: 2.3 MG/DL (ref 1.6–2.4)
MAGNESIUM SERPL-MCNC: 2.3 MG/DL (ref 1.6–2.4)
MAGNESIUM SERPL-MCNC: 2.4 MG/DL (ref 1.6–2.4)
MAGNESIUM SERPL-MCNC: 2.5 MG/DL (ref 1.6–2.4)
MCH RBC QN AUTO: 26.6 PG (ref 26.6–33)
MCH RBC QN AUTO: 26.7 PG (ref 26–34)
MCH RBC QN AUTO: 26.7 PG (ref 26–34)
MCH RBC QN AUTO: 26.8 PG (ref 26–34)
MCH RBC QN AUTO: 26.9 PG (ref 26.6–33)
MCH RBC QN AUTO: 26.9 PG (ref 26–34)
MCH RBC QN AUTO: 27 PG (ref 26–34)
MCH RBC QN AUTO: 27.1 PG (ref 26–34)
MCH RBC QN AUTO: 27.2 PG (ref 26–34)
MCH RBC QN AUTO: 27.4 PG (ref 26–34)
MCH RBC QN AUTO: 27.5 PG (ref 26–34)
MCH RBC QN AUTO: 27.6 PG (ref 26–34)
MCH RBC QN AUTO: 27.7 PG (ref 26–34)
MCH RBC QN AUTO: 27.9 PG (ref 26–34)
MCH RBC QN AUTO: 27.9 PG (ref 26–34)
MCH RBC QN AUTO: 28 PG (ref 26–34)
MCH RBC QN AUTO: 28.3 PG (ref 26–34)
MCHC RBC AUTO-ENTMCNC: 29.7 G/DL (ref 30–36.5)
MCHC RBC AUTO-ENTMCNC: 29.9 G/DL (ref 30–36.5)
MCHC RBC AUTO-ENTMCNC: 30 G/DL (ref 30–36.5)
MCHC RBC AUTO-ENTMCNC: 30 G/DL (ref 30–36.5)
MCHC RBC AUTO-ENTMCNC: 30.2 G/DL (ref 30–36.5)
MCHC RBC AUTO-ENTMCNC: 30.3 G/DL (ref 30–36.5)
MCHC RBC AUTO-ENTMCNC: 30.5 G/DL (ref 30–36.5)
MCHC RBC AUTO-ENTMCNC: 30.5 G/DL (ref 30–36.5)
MCHC RBC AUTO-ENTMCNC: 30.6 G/DL (ref 30–36.5)
MCHC RBC AUTO-ENTMCNC: 30.7 G/DL (ref 30–36.5)
MCHC RBC AUTO-ENTMCNC: 30.7 G/DL (ref 30–36.5)
MCHC RBC AUTO-ENTMCNC: 30.8 G/DL (ref 30–36.5)
MCHC RBC AUTO-ENTMCNC: 30.8 G/DL (ref 30–36.5)
MCHC RBC AUTO-ENTMCNC: 30.9 G/DL (ref 30–36.5)
MCHC RBC AUTO-ENTMCNC: 30.9 G/DL (ref 31.5–35.7)
MCHC RBC AUTO-ENTMCNC: 31 G/DL (ref 30–36.5)
MCHC RBC AUTO-ENTMCNC: 31 G/DL (ref 30–36.5)
MCHC RBC AUTO-ENTMCNC: 31 G/DL (ref 31.5–35.7)
MCHC RBC AUTO-ENTMCNC: 31.3 G/DL (ref 30–36.5)
MCHC RBC AUTO-ENTMCNC: 31.4 G/DL (ref 30–36.5)
MCHC RBC AUTO-ENTMCNC: 31.5 G/DL (ref 30–36.5)
MCHC RBC AUTO-ENTMCNC: 31.6 G/DL (ref 30–36.5)
MCHC RBC AUTO-ENTMCNC: 32.6 G/DL (ref 30–36.5)
MCV RBC AUTO: 84.3 FL (ref 80–99)
MCV RBC AUTO: 84.6 FL (ref 80–99)
MCV RBC AUTO: 85.4 FL (ref 80–99)
MCV RBC AUTO: 85.7 FL (ref 80–99)
MCV RBC AUTO: 85.9 FL (ref 80–99)
MCV RBC AUTO: 86 FL (ref 79–97)
MCV RBC AUTO: 86 FL (ref 80–99)
MCV RBC AUTO: 86.4 FL (ref 80–99)
MCV RBC AUTO: 86.6 FL (ref 80–99)
MCV RBC AUTO: 86.7 FL (ref 80–99)
MCV RBC AUTO: 87 FL (ref 79–97)
MCV RBC AUTO: 87.7 FL (ref 80–99)
MCV RBC AUTO: 87.7 FL (ref 80–99)
MCV RBC AUTO: 88 FL (ref 80–99)
MCV RBC AUTO: 88.6 FL (ref 80–99)
MCV RBC AUTO: 88.7 FL (ref 80–99)
MCV RBC AUTO: 88.8 FL (ref 80–99)
MCV RBC AUTO: 89 FL (ref 80–99)
MCV RBC AUTO: 89.2 FL (ref 80–99)
MCV RBC AUTO: 89.4 FL (ref 80–99)
MCV RBC AUTO: 89.5 FL (ref 80–99)
MCV RBC AUTO: 89.8 FL (ref 80–99)
MCV RBC AUTO: 89.9 FL (ref 80–99)
MCV RBC AUTO: 90.3 FL (ref 80–99)
MCV RBC AUTO: 90.6 FL (ref 80–99)
MCV RBC AUTO: 91.3 FL (ref 80–99)
MCV RBC AUTO: 91.4 FL (ref 80–99)
MCV RBC AUTO: 91.5 FL (ref 80–99)
MCV RBC AUTO: 91.9 FL (ref 80–99)
MCV RBC AUTO: 92.3 FL (ref 80–99)
MCV RBC AUTO: 92.8 FL (ref 80–99)
METAMYELOCYTES NFR BLD MANUAL: 1 %
METAMYELOCYTES NFR BLD MANUAL: 2 %
METAMYELOCYTES NFR BLD MANUAL: 2 %
MONOCYTES # BLD AUTO: 0.5 X10E3/UL (ref 0.1–0.9)
MONOCYTES # BLD AUTO: 0.7 X10E3/UL (ref 0.1–0.9)
MONOCYTES # BLD: 0.5 K/UL (ref 0–1)
MONOCYTES # BLD: 0.5 K/UL (ref 0–1)
MONOCYTES # BLD: 0.7 K/UL (ref 0–1)
MONOCYTES # BLD: 0.8 K/UL (ref 0–1)
MONOCYTES # BLD: 0.9 K/UL (ref 0–1)
MONOCYTES # BLD: 1.1 K/UL (ref 0–1)
MONOCYTES # BLD: 1.1 K/UL (ref 0–1)
MONOCYTES # BLD: 1.2 K/UL (ref 0–1)
MONOCYTES # BLD: 1.3 K/UL (ref 0–1)
MONOCYTES # BLD: 1.3 K/UL (ref 0–1)
MONOCYTES # BLD: 1.4 K/UL (ref 0–1)
MONOCYTES NFR BLD AUTO: 7 %
MONOCYTES NFR BLD AUTO: 8 %
MONOCYTES NFR BLD: 10 % (ref 5–13)
MONOCYTES NFR BLD: 17 % (ref 5–13)
MONOCYTES NFR BLD: 2 % (ref 5–13)
MONOCYTES NFR BLD: 3 % (ref 5–13)
MONOCYTES NFR BLD: 3 % (ref 5–13)
MONOCYTES NFR BLD: 5 % (ref 5–13)
MONOCYTES NFR BLD: 5 % (ref 5–13)
MONOCYTES NFR BLD: 6 % (ref 5–13)
MONOCYTES NFR BLD: 7 % (ref 5–13)
MONOCYTES NFR BLD: 8 % (ref 5–13)
MONOCYTES NFR BLD: 8 % (ref 5–13)
NEGATIVE CONTROL: NORMAL
NEUTROPHILS # BLD AUTO: 3.7 X10E3/UL (ref 1.4–7)
NEUTROPHILS # BLD AUTO: 6.7 X10E3/UL (ref 1.4–7)
NEUTROPHILS NFR BLD AUTO: 56 %
NEUTROPHILS NFR BLD AUTO: 71 %
NEUTS BAND NFR BLD MANUAL: 1 %
NEUTS BAND NFR BLD MANUAL: 2 %
NEUTS BAND NFR BLD MANUAL: 5 %
NEUTS SEG # BLD: 10.8 K/UL (ref 1.8–8)
NEUTS SEG # BLD: 10.8 K/UL (ref 1.8–8)
NEUTS SEG # BLD: 10.9 K/UL (ref 1.8–8)
NEUTS SEG # BLD: 11.3 K/UL (ref 1.8–8)
NEUTS SEG # BLD: 11.5 K/UL (ref 1.8–8)
NEUTS SEG # BLD: 11.6 K/UL (ref 1.8–8)
NEUTS SEG # BLD: 11.8 K/UL (ref 1.8–8)
NEUTS SEG # BLD: 12.3 K/UL (ref 1.8–8)
NEUTS SEG # BLD: 12.7 K/UL (ref 1.8–8)
NEUTS SEG # BLD: 13 K/UL (ref 1.8–8)
NEUTS SEG # BLD: 13.9 K/UL (ref 1.8–8)
NEUTS SEG # BLD: 14.3 K/UL (ref 1.8–8)
NEUTS SEG # BLD: 15 K/UL (ref 1.8–8)
NEUTS SEG # BLD: 15.3 K/UL (ref 1.8–8)
NEUTS SEG # BLD: 17 K/UL (ref 1.8–8)
NEUTS SEG # BLD: 19.1 K/UL (ref 1.8–8)
NEUTS SEG # BLD: 21.1 K/UL (ref 1.8–8)
NEUTS SEG # BLD: 24.1 K/UL (ref 1.8–8)
NEUTS SEG # BLD: 4.7 K/UL (ref 1.8–8)
NEUTS SEG # BLD: 8.7 K/UL (ref 1.8–8)
NEUTS SEG NFR BLD: 60 % (ref 32–75)
NEUTS SEG NFR BLD: 75 % (ref 32–75)
NEUTS SEG NFR BLD: 76 % (ref 32–75)
NEUTS SEG NFR BLD: 76 % (ref 32–75)
NEUTS SEG NFR BLD: 79 % (ref 32–75)
NEUTS SEG NFR BLD: 79 % (ref 32–75)
NEUTS SEG NFR BLD: 80 % (ref 32–75)
NEUTS SEG NFR BLD: 81 % (ref 32–75)
NEUTS SEG NFR BLD: 82 % (ref 32–75)
NEUTS SEG NFR BLD: 83 % (ref 32–75)
NEUTS SEG NFR BLD: 83 % (ref 32–75)
NEUTS SEG NFR BLD: 87 % (ref 32–75)
NEUTS SEG NFR BLD: 90 % (ref 32–75)
NEUTS SEG NFR BLD: 91 % (ref 32–75)
NEUTS SEG NFR BLD: 92 % (ref 32–75)
NITRITE UR QL STRIP.AUTO: NEGATIVE
NRBC # BLD: 0 K/UL (ref 0–0.01)
NRBC # BLD: 0.02 K/UL (ref 0–0.01)
NRBC # BLD: 0.03 K/UL (ref 0–0.01)
NRBC # BLD: 0.05 K/UL (ref 0–0.01)
NRBC # BLD: 0.11 K/UL (ref 0–0.01)
NRBC BLD-RTO: 0 PER 100 WBC
NRBC BLD-RTO: 0.1 PER 100 WBC
NRBC BLD-RTO: 0.2 PER 100 WBC
NRBC BLD-RTO: 0.3 PER 100 WBC
NRBC BLD-RTO: 0.9 PER 100 WBC
P-R INTERVAL, ECG05: 150 MS
P-R INTERVAL, ECG05: 150 MS
P-R INTERVAL, ECG05: 222 MS
P-R INTERVAL, ECG05: 230 MS
P-R INTERVAL, ECG05: 242 MS
P-R INTERVAL, ECG05: 280 MS
PCO2 BLDA: 40 MMHG (ref 35–45)
PCO2 BLDA: 42 MMHG (ref 35–45)
PCO2 BLDA: 44 MMHG (ref 35–45)
PCO2 BLDA: 46 MMHG (ref 35–45)
PCO2 BLDA: 46 MMHG (ref 35–45)
PCO2 BLDA: 47 MMHG (ref 35–45)
PCO2 BLDA: 48 MMHG (ref 35–45)
PCO2 BLDA: 50 MMHG (ref 35–45)
PH BLDA: 7.38 [PH] (ref 7.35–7.45)
PH BLDA: 7.39 [PH] (ref 7.35–7.45)
PH BLDA: 7.39 [PH] (ref 7.35–7.45)
PH BLDA: 7.45 [PH] (ref 7.35–7.45)
PH BLDA: 7.45 [PH] (ref 7.35–7.45)
PH BLDA: 7.48 [PH] (ref 7.35–7.45)
PH BLDA: 7.48 [PH] (ref 7.35–7.45)
PH BLDA: 7.51 [PH] (ref 7.35–7.45)
PH UR STRIP: 7 [PH] (ref 5–8)
PHOSPHATE SERPL-MCNC: 1.9 MG/DL (ref 2.6–4.7)
PHOSPHATE SERPL-MCNC: 2.4 MG/DL (ref 2.6–4.7)
PHOSPHATE SERPL-MCNC: 2.5 MG/DL (ref 2.6–4.7)
PHOSPHATE SERPL-MCNC: 3 MG/DL (ref 2.6–4.7)
PHOSPHATE SERPL-MCNC: 3 MG/DL (ref 2.6–4.7)
PHOSPHATE SERPL-MCNC: 3.4 MG/DL (ref 2.6–4.7)
PHOSPHATE SERPL-MCNC: 3.6 MG/DL (ref 2.6–4.7)
PHOSPHATE SERPL-MCNC: 3.6 MG/DL (ref 2.6–4.7)
PHOSPHATE SERPL-MCNC: 4 MG/DL (ref 2.6–4.7)
PHOSPHATE SERPL-MCNC: 4.3 MG/DL (ref 2.6–4.7)
PHOSPHATE SERPL-MCNC: 4.3 MG/DL (ref 2.6–4.7)
PHOSPHATE SERPL-MCNC: 4.5 MG/DL (ref 2.6–4.7)
PHOSPHATE SERPL-MCNC: 5.4 MG/DL (ref 2.6–4.7)
PHOSPHATE SERPL-MCNC: 5.4 MG/DL (ref 2.6–4.7)
PLATELET # BLD AUTO: 148 K/UL (ref 150–400)
PLATELET # BLD AUTO: 165 K/UL (ref 150–400)
PLATELET # BLD AUTO: 166 X10E3/UL (ref 150–379)
PLATELET # BLD AUTO: 181 K/UL (ref 150–400)
PLATELET # BLD AUTO: 199 K/UL (ref 150–400)
PLATELET # BLD AUTO: 209 K/UL (ref 150–400)
PLATELET # BLD AUTO: 216 K/UL (ref 150–400)
PLATELET # BLD AUTO: 220 K/UL (ref 150–400)
PLATELET # BLD AUTO: 227 K/UL (ref 150–400)
PLATELET # BLD AUTO: 239 K/UL (ref 150–400)
PLATELET # BLD AUTO: 239 K/UL (ref 150–400)
PLATELET # BLD AUTO: 246 K/UL (ref 150–400)
PLATELET # BLD AUTO: 250 K/UL (ref 150–400)
PLATELET # BLD AUTO: 250 K/UL (ref 150–400)
PLATELET # BLD AUTO: 255 K/UL (ref 150–400)
PLATELET # BLD AUTO: 261 K/UL (ref 150–400)
PLATELET # BLD AUTO: 270 K/UL (ref 150–400)
PLATELET # BLD AUTO: 276 K/UL (ref 150–400)
PLATELET # BLD AUTO: 280 K/UL (ref 150–400)
PLATELET # BLD AUTO: 284 X10E3/UL (ref 150–379)
PLATELET # BLD AUTO: 287 K/UL (ref 150–400)
PLATELET # BLD AUTO: 308 K/UL (ref 150–400)
PLATELET # BLD AUTO: 311 K/UL (ref 150–400)
PLATELET # BLD AUTO: 313 K/UL (ref 150–400)
PLATELET # BLD AUTO: 325 K/UL (ref 150–400)
PLATELET # BLD AUTO: 330 K/UL (ref 150–400)
PLATELET # BLD AUTO: 334 K/UL (ref 150–400)
PLATELET # BLD AUTO: 337 K/UL (ref 150–400)
PLATELET # BLD AUTO: 339 K/UL (ref 150–400)
PLATELET # BLD AUTO: 342 K/UL (ref 150–400)
PLATELET # BLD AUTO: 353 K/UL (ref 150–400)
PLATELET # BLD AUTO: 355 K/UL (ref 150–400)
PLATELET # BLD AUTO: 364 K/UL (ref 150–400)
PLATELET # BLD AUTO: 386 K/UL (ref 150–400)
PLEASE NOTE:, 188601: NORMAL
PMV BLD AUTO: 10 FL (ref 8.9–12.9)
PMV BLD AUTO: 10 FL (ref 8.9–12.9)
PMV BLD AUTO: 10.1 FL (ref 8.9–12.9)
PMV BLD AUTO: 10.1 FL (ref 8.9–12.9)
PMV BLD AUTO: 10.2 FL (ref 8.9–12.9)
PMV BLD AUTO: 10.2 FL (ref 8.9–12.9)
PMV BLD AUTO: 10.3 FL (ref 8.9–12.9)
PMV BLD AUTO: 10.4 FL (ref 8.9–12.9)
PMV BLD AUTO: 10.7 FL (ref 8.9–12.9)
PMV BLD AUTO: 10.7 FL (ref 8.9–12.9)
PMV BLD AUTO: 10.9 FL (ref 8.9–12.9)
PMV BLD AUTO: 11 FL (ref 8.9–12.9)
PMV BLD AUTO: 11.1 FL (ref 8.9–12.9)
PMV BLD AUTO: 11.2 FL (ref 8.9–12.9)
PMV BLD AUTO: 9.6 FL (ref 8.9–12.9)
PMV BLD AUTO: 9.7 FL (ref 8.9–12.9)
PMV BLD AUTO: 9.7 FL (ref 8.9–12.9)
PMV BLD AUTO: 9.8 FL (ref 8.9–12.9)
PMV BLD AUTO: 9.9 FL (ref 8.9–12.9)
PO2 BLDA: 43 MMHG (ref 80–100)
PO2 BLDA: 46 MMHG (ref 80–100)
PO2 BLDA: 54 MMHG (ref 80–100)
PO2 BLDA: 60 MMHG (ref 80–100)
PO2 BLDA: 62 MMHG (ref 80–100)
PO2 BLDA: 68 MMHG (ref 80–100)
PO2 BLDA: 87 MMHG (ref 80–100)
PO2 BLDA: 99 MMHG (ref 80–100)
POSITIVE CONTROL: NORMAL
POTASSIUM BLD-SCNC: 4 MMOL/L (ref 3.5–5.1)
POTASSIUM BLD-SCNC: 4.3 MMOL/L (ref 3.5–5.1)
POTASSIUM BLD-SCNC: 5.3 MMOL/L (ref 3.5–5.1)
POTASSIUM SERPL-SCNC: 3 MMOL/L (ref 3.5–5.1)
POTASSIUM SERPL-SCNC: 3.6 MMOL/L (ref 3.5–5.1)
POTASSIUM SERPL-SCNC: 3.6 MMOL/L (ref 3.5–5.1)
POTASSIUM SERPL-SCNC: 3.7 MMOL/L (ref 3.5–5.1)
POTASSIUM SERPL-SCNC: 3.8 MMOL/L (ref 3.5–5.1)
POTASSIUM SERPL-SCNC: 3.9 MMOL/L (ref 3.5–5.1)
POTASSIUM SERPL-SCNC: 4 MMOL/L (ref 3.5–5.1)
POTASSIUM SERPL-SCNC: 4.1 MMOL/L (ref 3.5–5.1)
POTASSIUM SERPL-SCNC: 4.2 MMOL/L (ref 3.5–5.1)
POTASSIUM SERPL-SCNC: 4.3 MMOL/L (ref 3.5–5.1)
POTASSIUM SERPL-SCNC: 4.3 MMOL/L (ref 3.5–5.2)
POTASSIUM SERPL-SCNC: 4.4 MMOL/L (ref 3.5–5.1)
POTASSIUM SERPL-SCNC: 4.4 MMOL/L (ref 3.5–5.2)
POTASSIUM SERPL-SCNC: 4.5 MMOL/L (ref 3.5–5.1)
POTASSIUM SERPL-SCNC: 4.5 MMOL/L (ref 3.5–5.1)
POTASSIUM SERPL-SCNC: 4.6 MMOL/L (ref 3.5–5.1)
POTASSIUM SERPL-SCNC: 4.7 MMOL/L (ref 3.5–5.1)
POTASSIUM SERPL-SCNC: 6.2 MMOL/L (ref 3.5–5.2)
PRESSURE SUPPORT SETTING VENT: 6 CM[H2O]
PROT SERPL-MCNC: 10 G/DL (ref 6.4–8.2)
PROT SERPL-MCNC: 6.6 G/DL (ref 6–8.5)
PROT SERPL-MCNC: 6.9 G/DL (ref 6.4–8.2)
PROT SERPL-MCNC: 7.3 G/DL (ref 6.4–8.2)
PROT SERPL-MCNC: 7.4 G/DL (ref 6.4–8.2)
PROT SERPL-MCNC: 7.6 G/DL (ref 6.4–8.2)
PROT SERPL-MCNC: 7.6 G/DL (ref 6.4–8.2)
PROT SERPL-MCNC: 7.7 G/DL (ref 6.4–8.2)
PROT SERPL-MCNC: 7.7 G/DL (ref 6–8.5)
PROT SERPL-MCNC: 7.8 G/DL (ref 6.4–8.2)
PROT SERPL-MCNC: 7.9 G/DL (ref 6.4–8.2)
PROT SERPL-MCNC: 8 G/DL (ref 6–8.5)
PROT SERPL-MCNC: 8.1 G/DL (ref 6.4–8.2)
PROT SERPL-MCNC: 8.1 G/DL (ref 6.4–8.2)
PROT SERPL-MCNC: 8.2 G/DL (ref 6.4–8.2)
PROT SERPL-MCNC: 8.3 G/DL (ref 6.4–8.2)
PROT SERPL-MCNC: 9.5 G/DL (ref 6.4–8.2)
PROT SERPL-MCNC: 9.6 G/DL (ref 6.4–8.2)
PROT UR STRIP-MCNC: 300 MG/DL
PROTHROMBIN TIME: 12.4 SEC (ref 9–11.1)
Q-T INTERVAL, ECG07: 374 MS
Q-T INTERVAL, ECG07: 378 MS
Q-T INTERVAL, ECG07: 390 MS
Q-T INTERVAL, ECG07: 396 MS
Q-T INTERVAL, ECG07: 418 MS
Q-T INTERVAL, ECG07: 464 MS
Q-T INTERVAL, ECG07: 496 MS
QRS DURATION, ECG06: 106 MS
QRS DURATION, ECG06: 84 MS
QRS DURATION, ECG06: 86 MS
QRS DURATION, ECG06: 86 MS
QRS DURATION, ECG06: 92 MS
QRS DURATION, ECG06: 94 MS
QRS DURATION, ECG06: 96 MS
QTC CALCULATION (BEZET), ECG08: 427 MS
QTC CALCULATION (BEZET), ECG08: 429 MS
QTC CALCULATION (BEZET), ECG08: 431 MS
QTC CALCULATION (BEZET), ECG08: 455 MS
QTC CALCULATION (BEZET), ECG08: 491 MS
QTC CALCULATION (BEZET), ECG08: 511 MS
QTC CALCULATION (BEZET), ECG08: 561 MS
RBC # BLD AUTO: 2.37 M/UL (ref 4.1–5.7)
RBC # BLD AUTO: 2.47 M/UL (ref 4.1–5.7)
RBC # BLD AUTO: 2.59 M/UL (ref 4.1–5.7)
RBC # BLD AUTO: 2.59 M/UL (ref 4.1–5.7)
RBC # BLD AUTO: 2.67 M/UL (ref 4.1–5.7)
RBC # BLD AUTO: 2.68 M/UL (ref 4.1–5.7)
RBC # BLD AUTO: 2.74 M/UL (ref 4.1–5.7)
RBC # BLD AUTO: 2.77 M/UL (ref 4.1–5.7)
RBC # BLD AUTO: 2.92 M/UL (ref 4.1–5.7)
RBC # BLD AUTO: 2.94 M/UL (ref 4.1–5.7)
RBC # BLD AUTO: 3.01 M/UL (ref 4.1–5.7)
RBC # BLD AUTO: 3.05 M/UL (ref 4.1–5.7)
RBC # BLD AUTO: 3.06 M/UL (ref 4.1–5.7)
RBC # BLD AUTO: 3.09 M/UL (ref 4.1–5.7)
RBC # BLD AUTO: 3.11 M/UL (ref 4.1–5.7)
RBC # BLD AUTO: 3.15 M/UL (ref 4.1–5.7)
RBC # BLD AUTO: 3.16 X10E6/UL (ref 4.14–5.8)
RBC # BLD AUTO: 3.2 M/UL (ref 4.1–5.7)
RBC # BLD AUTO: 3.21 M/UL (ref 4.1–5.7)
RBC # BLD AUTO: 3.23 M/UL (ref 4.1–5.7)
RBC # BLD AUTO: 3.25 M/UL (ref 4.1–5.7)
RBC # BLD AUTO: 3.29 M/UL (ref 4.1–5.7)
RBC # BLD AUTO: 3.31 M/UL (ref 4.1–5.7)
RBC # BLD AUTO: 3.33 M/UL (ref 4.1–5.7)
RBC # BLD AUTO: 3.43 M/UL (ref 4.1–5.7)
RBC # BLD AUTO: 3.47 M/UL (ref 4.1–5.7)
RBC # BLD AUTO: 3.5 M/UL (ref 4.1–5.7)
RBC # BLD AUTO: 3.61 X10E6/UL (ref 4.14–5.8)
RBC # BLD AUTO: 3.65 M/UL (ref 4.1–5.7)
RBC # BLD AUTO: 3.69 M/UL (ref 4.1–5.7)
RBC # BLD AUTO: 3.7 M/UL (ref 4.1–5.7)
RBC # BLD AUTO: 3.73 M/UL (ref 4.1–5.7)
RBC # BLD AUTO: 4.03 M/UL (ref 4.1–5.7)
RBC # BLD AUTO: 4.11 M/UL (ref 4.1–5.7)
RBC #/AREA URNS HPF: ABNORMAL /HPF (ref 0–5)
RBC MORPH BLD: ABNORMAL
REPORTED DOSE,DOSE: ABNORMAL UNITS
REPORTED DOSE/TIME,TMG: ABNORMAL
SAO2 % BLD: 78 % (ref 92–97)
SAO2 % BLD: 86 % (ref 92–97)
SAO2 % BLD: 90 % (ref 92–97)
SAO2 % BLD: 91 % (ref 92–97)
SAO2 % BLD: 92 % (ref 92–97)
SAO2 % BLD: 93 % (ref 92–97)
SAO2 % BLD: 97 % (ref 92–97)
SAO2 % BLD: 98 % (ref 92–97)
SAO2% DEVICE SAO2% SENSOR NAME: ABNORMAL
SERVICE CMNT-IMP: ABNORMAL
SERVICE CMNT-IMP: NORMAL
SODIUM BLD-SCNC: 140 MMOL/L (ref 136–145)
SODIUM SERPL-SCNC: 129 MMOL/L (ref 136–145)
SODIUM SERPL-SCNC: 129 MMOL/L (ref 136–145)
SODIUM SERPL-SCNC: 130 MMOL/L (ref 136–145)
SODIUM SERPL-SCNC: 132 MMOL/L (ref 136–145)
SODIUM SERPL-SCNC: 134 MMOL/L (ref 136–145)
SODIUM SERPL-SCNC: 135 MMOL/L (ref 136–145)
SODIUM SERPL-SCNC: 136 MMOL/L (ref 136–145)
SODIUM SERPL-SCNC: 137 MMOL/L (ref 134–144)
SODIUM SERPL-SCNC: 137 MMOL/L (ref 136–145)
SODIUM SERPL-SCNC: 138 MMOL/L (ref 136–145)
SODIUM SERPL-SCNC: 139 MMOL/L (ref 136–145)
SODIUM SERPL-SCNC: 142 MMOL/L (ref 134–144)
SODIUM SERPL-SCNC: 146 MMOL/L (ref 134–144)
SP GR UR REFRACTOMETRY: 1.02 (ref 1–1.03)
SPECIMEN EXP DATE BLD: NORMAL
SPECIMEN EXP DATE BLD: NORMAL
SPECIMEN SITE: ABNORMAL
STATUS OF UNIT,%ST: NORMAL
TRIGL SERPL-MCNC: 130 MG/DL (ref ?–150)
TRIGL SERPL-MCNC: 55 MG/DL (ref 0–149)
TROPONIN I SERPL-MCNC: 0.58 NG/ML
TROPONIN I SERPL-MCNC: 1.65 NG/ML
TROPONIN I SERPL-MCNC: 1.72 NG/ML
TROPONIN I SERPL-MCNC: 1.73 NG/ML
TROPONIN I SERPL-MCNC: 1.8 NG/ML
TROPONIN I SERPL-MCNC: 1.99 NG/ML
TROPONIN I SERPL-MCNC: <0.05 NG/ML
TSH SERPL DL<=0.005 MIU/L-ACNC: 0.26 UIU/ML (ref 0.45–4.5)
UNIT DIVISION, %UDIV: 0
UROBILINOGEN UR QL STRIP.AUTO: 0.2 EU/DL (ref 0.2–1)
VANCOMYCIN TROUGH SERPL-MCNC: 23.7 UG/ML (ref 5–10)
VENTILATION MODE VENT: ABNORMAL
VENTRICULAR RATE, ECG03: 110 BPM
VENTRICULAR RATE, ECG03: 59 BPM
VENTRICULAR RATE, ECG03: 64 BPM
VENTRICULAR RATE, ECG03: 70 BPM
VENTRICULAR RATE, ECG03: 73 BPM
VENTRICULAR RATE, ECG03: 88 BPM
VENTRICULAR RATE, ECG03: 89 BPM
VLDLC SERPL CALC-MCNC: 11 MG/DL (ref 5–40)
VLDLC SERPL CALC-MCNC: 26 MG/DL
VT SETTING VENT: 450 ML
VT SETTING VENT: 450 ML
WBC # BLD AUTO: 11.5 K/UL (ref 4.1–11.1)
WBC # BLD AUTO: 12 K/UL (ref 4.1–11.1)
WBC # BLD AUTO: 13.2 K/UL (ref 4.1–11.1)
WBC # BLD AUTO: 13.5 K/UL (ref 4.1–11.1)
WBC # BLD AUTO: 13.6 K/UL (ref 4.1–11.1)
WBC # BLD AUTO: 13.7 K/UL (ref 4.1–11.1)
WBC # BLD AUTO: 14.3 K/UL (ref 4.1–11.1)
WBC # BLD AUTO: 14.5 K/UL (ref 4.1–11.1)
WBC # BLD AUTO: 14.6 K/UL (ref 4.1–11.1)
WBC # BLD AUTO: 14.8 K/UL (ref 4.1–11.1)
WBC # BLD AUTO: 15.1 K/UL (ref 4.1–11.1)
WBC # BLD AUTO: 15.2 K/UL (ref 4.1–11.1)
WBC # BLD AUTO: 15.3 K/UL (ref 4.1–11.1)
WBC # BLD AUTO: 15.3 K/UL (ref 4.1–11.1)
WBC # BLD AUTO: 15.8 K/UL (ref 4.1–11.1)
WBC # BLD AUTO: 16.1 K/UL (ref 4.1–11.1)
WBC # BLD AUTO: 16.4 K/UL (ref 4.1–11.1)
WBC # BLD AUTO: 16.7 K/UL (ref 4.1–11.1)
WBC # BLD AUTO: 16.9 K/UL (ref 4.1–11.1)
WBC # BLD AUTO: 17 K/UL (ref 4.1–11.1)
WBC # BLD AUTO: 17.1 K/UL (ref 4.1–11.1)
WBC # BLD AUTO: 17.4 K/UL (ref 4.1–11.1)
WBC # BLD AUTO: 19.2 K/UL (ref 4.1–11.1)
WBC # BLD AUTO: 20.1 K/UL (ref 4.1–11.1)
WBC # BLD AUTO: 20.9 K/UL (ref 4.1–11.1)
WBC # BLD AUTO: 21.2 K/UL (ref 4.1–11.1)
WBC # BLD AUTO: 23.2 K/UL (ref 4.1–11.1)
WBC # BLD AUTO: 26.1 K/UL (ref 4.1–11.1)
WBC # BLD AUTO: 6.8 X10E3/UL (ref 3.4–10.8)
WBC # BLD AUTO: 7 K/UL (ref 4.1–11.1)
WBC # BLD AUTO: 7.4 K/UL (ref 4.1–11.1)
WBC # BLD AUTO: 7.9 K/UL (ref 4.1–11.1)
WBC # BLD AUTO: 9.2 K/UL (ref 4.1–11.1)
WBC # BLD AUTO: 9.3 X10E3/UL (ref 3.4–10.8)
WBC #/AREA STL HPF: NORMAL /HPF (ref 0–4)
WBC MORPH BLD: ABNORMAL
WBC URNS QL MICRO: >100 /HPF (ref 0–4)

## 2018-01-01 PROCEDURE — 74011000250 HC RX REV CODE- 250: Performed by: SURGERY

## 2018-01-01 PROCEDURE — 74011250636 HC RX REV CODE- 250/636: Performed by: INTERNAL MEDICINE

## 2018-01-01 PROCEDURE — 65660000000 HC RM CCU STEPDOWN

## 2018-01-01 PROCEDURE — 77030037878 HC DRSG MEPILEX >48IN BORD MOLN -B

## 2018-01-01 PROCEDURE — 74011250637 HC RX REV CODE- 250/637: Performed by: INTERNAL MEDICINE

## 2018-01-01 PROCEDURE — 82962 GLUCOSE BLOOD TEST: CPT

## 2018-01-01 PROCEDURE — 77030008771 HC TU NG SALEM SUMP -A

## 2018-01-01 PROCEDURE — 85025 COMPLETE CBC W/AUTO DIFF WBC: CPT | Performed by: INTERNAL MEDICINE

## 2018-01-01 PROCEDURE — 36415 COLL VENOUS BLD VENIPUNCTURE: CPT | Performed by: INTERNAL MEDICINE

## 2018-01-01 PROCEDURE — 82140 ASSAY OF AMMONIA: CPT | Performed by: INTERNAL MEDICINE

## 2018-01-01 PROCEDURE — 80048 BASIC METABOLIC PNL TOTAL CA: CPT | Performed by: PHYSICAL MEDICINE & REHABILITATION

## 2018-01-01 PROCEDURE — 77030019988 HC FCPS ENDOSC DISP BSC -B: Performed by: INTERNAL MEDICINE

## 2018-01-01 PROCEDURE — 82270 OCCULT BLOOD FECES: CPT

## 2018-01-01 PROCEDURE — 99218 HC RM OBSERVATION: CPT

## 2018-01-01 PROCEDURE — 77030005122 HC CATH GASTMY PEG BSC -B: Performed by: INTERNAL MEDICINE

## 2018-01-01 PROCEDURE — B2151ZZ FLUOROSCOPY OF LEFT HEART USING LOW OSMOLAR CONTRAST: ICD-10-PCS | Performed by: INTERNAL MEDICINE

## 2018-01-01 PROCEDURE — 80053 COMPREHEN METABOLIC PANEL: CPT | Performed by: INTERNAL MEDICINE

## 2018-01-01 PROCEDURE — 74011000250 HC RX REV CODE- 250: Performed by: NURSE PRACTITIONER

## 2018-01-01 PROCEDURE — 96367 TX/PROPH/DG ADDL SEQ IV INF: CPT

## 2018-01-01 PROCEDURE — 71045 X-RAY EXAM CHEST 1 VIEW: CPT

## 2018-01-01 PROCEDURE — 74011000258 HC RX REV CODE- 258: Performed by: EMERGENCY MEDICINE

## 2018-01-01 PROCEDURE — 74011000250 HC RX REV CODE- 250: Performed by: INTERNAL MEDICINE

## 2018-01-01 PROCEDURE — 97110 THERAPEUTIC EXERCISES: CPT

## 2018-01-01 PROCEDURE — 74011000258 HC RX REV CODE- 258: Performed by: INTERNAL MEDICINE

## 2018-01-01 PROCEDURE — 93306 TTE W/DOPPLER COMPLETE: CPT

## 2018-01-01 PROCEDURE — 74011636637 HC RX REV CODE- 636/637: Performed by: INTERNAL MEDICINE

## 2018-01-01 PROCEDURE — 74011250636 HC RX REV CODE- 250/636: Performed by: ANESTHESIOLOGY

## 2018-01-01 PROCEDURE — 74011250636 HC RX REV CODE- 250/636

## 2018-01-01 PROCEDURE — 84100 ASSAY OF PHOSPHORUS: CPT | Performed by: INTERNAL MEDICINE

## 2018-01-01 PROCEDURE — 74011250636 HC RX REV CODE- 250/636: Performed by: EMERGENCY MEDICINE

## 2018-01-01 PROCEDURE — 36556 INSERT NON-TUNNEL CV CATH: CPT

## 2018-01-01 PROCEDURE — 77030019607 HC DSG BURN S&N -A

## 2018-01-01 PROCEDURE — 77030008463 HC STPLR SKN PROX J&J -B: Performed by: SURGERY

## 2018-01-01 PROCEDURE — 86900 BLOOD TYPING SEROLOGIC ABO: CPT | Performed by: INTERNAL MEDICINE

## 2018-01-01 PROCEDURE — 74011636637 HC RX REV CODE- 636/637: Performed by: NURSE PRACTITIONER

## 2018-01-01 PROCEDURE — 83605 ASSAY OF LACTIC ACID: CPT | Performed by: EMERGENCY MEDICINE

## 2018-01-01 PROCEDURE — 74011636637 HC RX REV CODE- 636/637: Performed by: PHYSICAL MEDICINE & REHABILITATION

## 2018-01-01 PROCEDURE — 84484 ASSAY OF TROPONIN QUANT: CPT | Performed by: NURSE PRACTITIONER

## 2018-01-01 PROCEDURE — 74011250637 HC RX REV CODE- 250/637: Performed by: PHYSICAL MEDICINE & REHABILITATION

## 2018-01-01 PROCEDURE — 92526 ORAL FUNCTION THERAPY: CPT

## 2018-01-01 PROCEDURE — 74011636320 HC RX REV CODE- 636/320: Performed by: EMERGENCY MEDICINE

## 2018-01-01 PROCEDURE — C1894 INTRO/SHEATH, NON-LASER: HCPCS | Performed by: SURGERY

## 2018-01-01 PROCEDURE — 74011250636 HC RX REV CODE- 250/636: Performed by: PHYSICAL MEDICINE & REHABILITATION

## 2018-01-01 PROCEDURE — 77010033678 HC OXYGEN DAILY

## 2018-01-01 PROCEDURE — 74178 CT ABD&PLV WO CNTR FLWD CNTR: CPT

## 2018-01-01 PROCEDURE — 80048 BASIC METABOLIC PNL TOTAL CA: CPT | Performed by: INTERNAL MEDICINE

## 2018-01-01 PROCEDURE — 96365 THER/PROPH/DIAG IV INF INIT: CPT

## 2018-01-01 PROCEDURE — 85025 COMPLETE CBC W/AUTO DIFF WBC: CPT | Performed by: GENERAL ACUTE CARE HOSPITAL

## 2018-01-01 PROCEDURE — 90935 HEMODIALYSIS ONE EVALUATION: CPT

## 2018-01-01 PROCEDURE — 77030002933 HC SUT MCRYL J&J -A: Performed by: SURGERY

## 2018-01-01 PROCEDURE — 80048 BASIC METABOLIC PNL TOTAL CA: CPT | Performed by: GENERAL ACUTE CARE HOSPITAL

## 2018-01-01 PROCEDURE — 93005 ELECTROCARDIOGRAM TRACING: CPT

## 2018-01-01 PROCEDURE — 88305 TISSUE EXAM BY PATHOLOGIST: CPT | Performed by: INTERNAL MEDICINE

## 2018-01-01 PROCEDURE — 74011250636 HC RX REV CODE- 250/636: Performed by: SURGERY

## 2018-01-01 PROCEDURE — 83735 ASSAY OF MAGNESIUM: CPT | Performed by: INTERNAL MEDICINE

## 2018-01-01 PROCEDURE — 77030018798 HC PMP KT ENTRL FED COVD -A

## 2018-01-01 PROCEDURE — 94640 AIRWAY INHALATION TREATMENT: CPT

## 2018-01-01 PROCEDURE — 87340 HEPATITIS B SURFACE AG IA: CPT | Performed by: INTERNAL MEDICINE

## 2018-01-01 PROCEDURE — G8978 MOBILITY CURRENT STATUS: HCPCS

## 2018-01-01 PROCEDURE — 85025 COMPLETE CBC W/AUTO DIFF WBC: CPT | Performed by: NURSE PRACTITIONER

## 2018-01-01 PROCEDURE — 77030033269 HC SLV COMPR SCD KNE2 CARD -B

## 2018-01-01 PROCEDURE — P9016 RBC LEUKOCYTES REDUCED: HCPCS | Performed by: INTERNAL MEDICINE

## 2018-01-01 PROCEDURE — 94660 CPAP INITIATION&MGMT: CPT

## 2018-01-01 PROCEDURE — 80069 RENAL FUNCTION PANEL: CPT | Performed by: PHYSICAL MEDICINE & REHABILITATION

## 2018-01-01 PROCEDURE — 74011000250 HC RX REV CODE- 250: Performed by: HOSPITALIST

## 2018-01-01 PROCEDURE — 97530 THERAPEUTIC ACTIVITIES: CPT

## 2018-01-01 PROCEDURE — 36415 COLL VENOUS BLD VENIPUNCTURE: CPT | Performed by: GENERAL ACUTE CARE HOSPITAL

## 2018-01-01 PROCEDURE — 36600 WITHDRAWAL OF ARTERIAL BLOOD: CPT | Performed by: EMERGENCY MEDICINE

## 2018-01-01 PROCEDURE — C1769 GUIDE WIRE: HCPCS | Performed by: SURGERY

## 2018-01-01 PROCEDURE — C1729 CATH, DRAINAGE: HCPCS

## 2018-01-01 PROCEDURE — 76001 XR FLUOROSCOPY OVER 60 MINUTES: CPT

## 2018-01-01 PROCEDURE — 99285 EMERGENCY DEPT VISIT HI MDM: CPT

## 2018-01-01 PROCEDURE — 80047 BASIC METABLC PNL IONIZED CA: CPT

## 2018-01-01 PROCEDURE — 74011250637 HC RX REV CODE- 250/637: Performed by: UROLOGY

## 2018-01-01 PROCEDURE — 81003 URINALYSIS AUTO W/O SCOPE: CPT | Performed by: EMERGENCY MEDICINE

## 2018-01-01 PROCEDURE — 77030018846 HC SOL IRR STRL H20 ICUM -A: Performed by: SURGERY

## 2018-01-01 PROCEDURE — 5A1D70Z PERFORMANCE OF URINARY FILTRATION, INTERMITTENT, LESS THAN 6 HOURS PER DAY: ICD-10-PCS | Performed by: INTERNAL MEDICINE

## 2018-01-01 PROCEDURE — 87075 CULTR BACTERIA EXCEPT BLOOD: CPT | Performed by: INTERNAL MEDICINE

## 2018-01-01 PROCEDURE — 71046 X-RAY EXAM CHEST 2 VIEWS: CPT

## 2018-01-01 PROCEDURE — 80076 HEPATIC FUNCTION PANEL: CPT | Performed by: INTERNAL MEDICINE

## 2018-01-01 PROCEDURE — C9113 INJ PANTOPRAZOLE SODIUM, VIA: HCPCS | Performed by: INTERNAL MEDICINE

## 2018-01-01 PROCEDURE — 74011636320 HC RX REV CODE- 636/320: Performed by: INTERNAL MEDICINE

## 2018-01-01 PROCEDURE — 74230 X-RAY XM SWLNG FUNCJ C+: CPT

## 2018-01-01 PROCEDURE — 97530 THERAPEUTIC ACTIVITIES: CPT | Performed by: OCCUPATIONAL THERAPIST

## 2018-01-01 PROCEDURE — 5A09357 ASSISTANCE WITH RESPIRATORY VENTILATION, LESS THAN 24 CONSECUTIVE HOURS, CONTINUOUS POSITIVE AIRWAY PRESSURE: ICD-10-PCS | Performed by: INTERNAL MEDICINE

## 2018-01-01 PROCEDURE — 77030032490 HC SLV COMPR SCD KNE COVD -B

## 2018-01-01 PROCEDURE — 36415 COLL VENOUS BLD VENIPUNCTURE: CPT | Performed by: PHYSICAL MEDICINE & REHABILITATION

## 2018-01-01 PROCEDURE — 74011250636 HC RX REV CODE- 250/636: Performed by: NURSE PRACTITIONER

## 2018-01-01 PROCEDURE — C1769 GUIDE WIRE: HCPCS

## 2018-01-01 PROCEDURE — 74011636320 HC RX REV CODE- 636/320: Performed by: SURGERY

## 2018-01-01 PROCEDURE — 74174 CTA ABD&PLVS W/CONTRAST: CPT

## 2018-01-01 PROCEDURE — 04VH3DZ RESTRICTION OF RIGHT EXTERNAL ILIAC ARTERY WITH INTRALUMINAL DEVICE, PERCUTANEOUS APPROACH: ICD-10-PCS | Performed by: SURGERY

## 2018-01-01 PROCEDURE — 96361 HYDRATE IV INFUSION ADD-ON: CPT

## 2018-01-01 PROCEDURE — 77010033711 HC HIGH FLOW OXYGEN

## 2018-01-01 PROCEDURE — 77030029065 HC DRSG HEMO QCLOT ZMED -B

## 2018-01-01 PROCEDURE — 85025 COMPLETE CBC W/AUTO DIFF WBC: CPT

## 2018-01-01 PROCEDURE — C1751 CATH, INF, PER/CENT/MIDLINE: HCPCS

## 2018-01-01 PROCEDURE — C1874 STENT, COATED/COV W/DEL SYS: HCPCS | Performed by: SURGERY

## 2018-01-01 PROCEDURE — 36415 COLL VENOUS BLD VENIPUNCTURE: CPT | Performed by: NURSE PRACTITIONER

## 2018-01-01 PROCEDURE — 96360 HYDRATION IV INFUSION INIT: CPT

## 2018-01-01 PROCEDURE — 97530 THERAPEUTIC ACTIVITIES: CPT | Performed by: PHYSICAL THERAPIST

## 2018-01-01 PROCEDURE — 87040 BLOOD CULTURE FOR BACTERIA: CPT | Performed by: NURSE PRACTITIONER

## 2018-01-01 PROCEDURE — B2111ZZ FLUOROSCOPY OF MULTIPLE CORONARY ARTERIES USING LOW OSMOLAR CONTRAST: ICD-10-PCS | Performed by: INTERNAL MEDICINE

## 2018-01-01 PROCEDURE — 0BH17EZ INSERTION OF ENDOTRACHEAL AIRWAY INTO TRACHEA, VIA NATURAL OR ARTIFICIAL OPENING: ICD-10-PCS | Performed by: ANESTHESIOLOGY

## 2018-01-01 PROCEDURE — 74011250636 HC RX REV CODE- 250/636: Performed by: HOSPITALIST

## 2018-01-01 PROCEDURE — 74011250637 HC RX REV CODE- 250/637: Performed by: NURSE PRACTITIONER

## 2018-01-01 PROCEDURE — 85027 COMPLETE CBC AUTOMATED: CPT | Performed by: INTERNAL MEDICINE

## 2018-01-01 PROCEDURE — 87804 INFLUENZA ASSAY W/OPTIC: CPT | Performed by: INTERNAL MEDICINE

## 2018-01-01 PROCEDURE — 97163 PT EVAL HIGH COMPLEX 45 MIN: CPT

## 2018-01-01 PROCEDURE — C1894 INTRO/SHEATH, NON-LASER: HCPCS

## 2018-01-01 PROCEDURE — 82306 VITAMIN D 25 HYDROXY: CPT

## 2018-01-01 PROCEDURE — 85027 COMPLETE CBC AUTOMATED: CPT | Performed by: PHYSICAL MEDICINE & REHABILITATION

## 2018-01-01 PROCEDURE — 76040000019: Performed by: INTERNAL MEDICINE

## 2018-01-01 PROCEDURE — 77030018836 HC SOL IRR NACL ICUM -A

## 2018-01-01 PROCEDURE — 82803 BLOOD GASES ANY COMBINATION: CPT | Performed by: INTERNAL MEDICINE

## 2018-01-01 PROCEDURE — 99284 EMERGENCY DEPT VISIT MOD MDM: CPT

## 2018-01-01 PROCEDURE — 89055 LEUKOCYTE ASSESSMENT FECAL: CPT | Performed by: INTERNAL MEDICINE

## 2018-01-01 PROCEDURE — 83605 ASSAY OF LACTIC ACID: CPT | Performed by: INTERNAL MEDICINE

## 2018-01-01 PROCEDURE — C1760 CLOSURE DEV, VASC: HCPCS | Performed by: SURGERY

## 2018-01-01 PROCEDURE — 76450000000

## 2018-01-01 PROCEDURE — 73552 X-RAY EXAM OF FEMUR 2/>: CPT

## 2018-01-01 PROCEDURE — 83880 ASSAY OF NATRIURETIC PEPTIDE: CPT | Performed by: INTERNAL MEDICINE

## 2018-01-01 PROCEDURE — 80048 BASIC METABOLIC PNL TOTAL CA: CPT | Performed by: NURSE PRACTITIONER

## 2018-01-01 PROCEDURE — 77012 CT SCAN FOR NEEDLE BIOPSY: CPT

## 2018-01-01 PROCEDURE — 74011000250 HC RX REV CODE- 250: Performed by: EMERGENCY MEDICINE

## 2018-01-01 PROCEDURE — 77030027138 HC INCENT SPIROMETER -A

## 2018-01-01 PROCEDURE — 74011636320 HC RX REV CODE- 636/320

## 2018-01-01 PROCEDURE — B41G1ZZ FLUOROSCOPY OF LEFT LOWER EXTREMITY ARTERIES USING LOW OSMOLAR CONTRAST: ICD-10-PCS | Performed by: SURGERY

## 2018-01-01 PROCEDURE — 92611 MOTION FLUOROSCOPY/SWALLOW: CPT | Performed by: SPEECH-LANGUAGE PATHOLOGIST

## 2018-01-01 PROCEDURE — 97165 OT EVAL LOW COMPLEX 30 MIN: CPT | Performed by: OCCUPATIONAL THERAPIST

## 2018-01-01 PROCEDURE — 71250 CT THORAX DX C-: CPT

## 2018-01-01 PROCEDURE — 5A1935Z RESPIRATORY VENTILATION, LESS THAN 24 CONSECUTIVE HOURS: ICD-10-PCS | Performed by: ANESTHESIOLOGY

## 2018-01-01 PROCEDURE — 77030011992 HC AIRWY NASOPHGL TELE -A

## 2018-01-01 PROCEDURE — 77030013575 HC DRSG HYDROFERA HOLL -B

## 2018-01-01 PROCEDURE — 85610 PROTHROMBIN TIME: CPT | Performed by: INTERNAL MEDICINE

## 2018-01-01 PROCEDURE — 74011000250 HC RX REV CODE- 250

## 2018-01-01 PROCEDURE — G8987 SELF CARE CURRENT STATUS: HCPCS | Performed by: OCCUPATIONAL THERAPIST

## 2018-01-01 PROCEDURE — 88312 SPECIAL STAINS GROUP 1: CPT | Performed by: INTERNAL MEDICINE

## 2018-01-01 PROCEDURE — 87077 CULTURE AEROBIC IDENTIFY: CPT | Performed by: INTERNAL MEDICINE

## 2018-01-01 PROCEDURE — 80053 COMPREHEN METABOLIC PANEL: CPT

## 2018-01-01 PROCEDURE — 76010000153 HC OR TIME 1.5 TO 2 HR: Performed by: SURGERY

## 2018-01-01 PROCEDURE — 74011000258 HC RX REV CODE- 258: Performed by: NURSE PRACTITIONER

## 2018-01-01 PROCEDURE — 76060000031 HC ANESTHESIA FIRST 0.5 HR: Performed by: INTERNAL MEDICINE

## 2018-01-01 PROCEDURE — 77030010936 HC CLP LIG BSC -C: Performed by: INTERNAL MEDICINE

## 2018-01-01 PROCEDURE — 85025 COMPLETE CBC W/AUTO DIFF WBC: CPT | Performed by: EMERGENCY MEDICINE

## 2018-01-01 PROCEDURE — 87040 BLOOD CULTURE FOR BACTERIA: CPT | Performed by: INTERNAL MEDICINE

## 2018-01-01 PROCEDURE — 77030018846 HC SOL IRR STRL H20 ICUM -A

## 2018-01-01 PROCEDURE — 70450 CT HEAD/BRAIN W/O DYE: CPT

## 2018-01-01 PROCEDURE — C1887 CATHETER, GUIDING: HCPCS | Performed by: SURGERY

## 2018-01-01 PROCEDURE — 86706 HEP B SURFACE ANTIBODY: CPT | Performed by: INTERNAL MEDICINE

## 2018-01-01 PROCEDURE — 92610 EVALUATE SWALLOWING FUNCTION: CPT

## 2018-01-01 PROCEDURE — 77030029684 HC NEB SM VOL KT MONA -A

## 2018-01-01 PROCEDURE — 80053 COMPREHEN METABOLIC PANEL: CPT | Performed by: EMERGENCY MEDICINE

## 2018-01-01 PROCEDURE — 36600 WITHDRAWAL OF ARTERIAL BLOOD: CPT | Performed by: INTERNAL MEDICINE

## 2018-01-01 PROCEDURE — 71275 CT ANGIOGRAPHY CHEST: CPT

## 2018-01-01 PROCEDURE — 97535 SELF CARE MNGMENT TRAINING: CPT | Performed by: OCCUPATIONAL THERAPIST

## 2018-01-01 PROCEDURE — 83690 ASSAY OF LIPASE: CPT | Performed by: NURSE PRACTITIONER

## 2018-01-01 PROCEDURE — C1892 INTRO/SHEATH,FIXED,PEEL-AWAY: HCPCS | Performed by: SURGERY

## 2018-01-01 PROCEDURE — 51701 INSERT BLADDER CATHETER: CPT

## 2018-01-01 PROCEDURE — 77030021532 HC CATH ANGI DX IMPRS MRTM -B: Performed by: SURGERY

## 2018-01-01 PROCEDURE — 74011250637 HC RX REV CODE- 250/637: Performed by: EMERGENCY MEDICINE

## 2018-01-01 PROCEDURE — 31500 INSERT EMERGENCY AIRWAY: CPT

## 2018-01-01 PROCEDURE — 74018 RADEX ABDOMEN 1 VIEW: CPT

## 2018-01-01 PROCEDURE — 86923 COMPATIBILITY TEST ELECTRIC: CPT | Performed by: INTERNAL MEDICINE

## 2018-01-01 PROCEDURE — 65610000006 HC RM INTENSIVE CARE

## 2018-01-01 PROCEDURE — 76937 US GUIDE VASCULAR ACCESS: CPT

## 2018-01-01 PROCEDURE — 77030020186 HC BOOT HL PROTCT SAGE -B

## 2018-01-01 PROCEDURE — 77030005563 HC CATH URETH INT MMGH -A

## 2018-01-01 PROCEDURE — 92523 SPEECH SOUND LANG COMPREHEN: CPT

## 2018-01-01 PROCEDURE — 04VC3DZ RESTRICTION OF RIGHT COMMON ILIAC ARTERY WITH INTRALUMINAL DEVICE, PERCUTANEOUS APPROACH: ICD-10-PCS | Performed by: SURGERY

## 2018-01-01 PROCEDURE — 87205 SMEAR GRAM STAIN: CPT | Performed by: INTERNAL MEDICINE

## 2018-01-01 PROCEDURE — 84484 ASSAY OF TROPONIN QUANT: CPT | Performed by: EMERGENCY MEDICINE

## 2018-01-01 PROCEDURE — 84100 ASSAY OF PHOSPHORUS: CPT | Performed by: NURSE PRACTITIONER

## 2018-01-01 PROCEDURE — 82272 OCCULT BLD FECES 1-3 TESTS: CPT | Performed by: EMERGENCY MEDICINE

## 2018-01-01 PROCEDURE — 74011000258 HC RX REV CODE- 258

## 2018-01-01 PROCEDURE — C1725 CATH, TRANSLUMIN NON-LASER: HCPCS | Performed by: SURGERY

## 2018-01-01 PROCEDURE — 77030020268 HC MISC GENERAL SUPPLY: Performed by: SURGERY

## 2018-01-01 PROCEDURE — 77030020153 HC PRB DOPLR DISP MIZU -C: Performed by: SURGERY

## 2018-01-01 PROCEDURE — G8979 MOBILITY GOAL STATUS: HCPCS

## 2018-01-01 PROCEDURE — 82803 BLOOD GASES ANY COMBINATION: CPT | Performed by: SURGERY

## 2018-01-01 PROCEDURE — 87040 BLOOD CULTURE FOR BACTERIA: CPT | Performed by: EMERGENCY MEDICINE

## 2018-01-01 PROCEDURE — 77030018673: Performed by: SURGERY

## 2018-01-01 PROCEDURE — B41C1ZZ FLUOROSCOPY OF PELVIC ARTERIES USING LOW OSMOLAR CONTRAST: ICD-10-PCS | Performed by: SURGERY

## 2018-01-01 PROCEDURE — 97161 PT EVAL LOW COMPLEX 20 MIN: CPT | Performed by: PHYSICAL THERAPIST

## 2018-01-01 PROCEDURE — 82150 ASSAY OF AMYLASE: CPT | Performed by: NURSE PRACTITIONER

## 2018-01-01 PROCEDURE — 74177 CT ABD & PELVIS W/CONTRAST: CPT

## 2018-01-01 PROCEDURE — 87340 HEPATITIS B SURFACE AG IA: CPT | Performed by: SURGERY

## 2018-01-01 PROCEDURE — 76210000016 HC OR PH I REC 1 TO 1.5 HR: Performed by: SURGERY

## 2018-01-01 PROCEDURE — 0W9J3ZZ DRAINAGE OF PELVIC CAVITY, PERCUTANEOUS APPROACH: ICD-10-PCS | Performed by: RADIOLOGY

## 2018-01-01 PROCEDURE — 02HV33Z INSERTION OF INFUSION DEVICE INTO SUPERIOR VENA CAVA, PERCUTANEOUS APPROACH: ICD-10-PCS | Performed by: ANESTHESIOLOGY

## 2018-01-01 PROCEDURE — G8988 SELF CARE GOAL STATUS: HCPCS | Performed by: OCCUPATIONAL THERAPIST

## 2018-01-01 PROCEDURE — 36415 COLL VENOUS BLD VENIPUNCTURE: CPT | Performed by: EMERGENCY MEDICINE

## 2018-01-01 PROCEDURE — 77030011640 HC PAD GRND REM COVD -A: Performed by: SURGERY

## 2018-01-01 PROCEDURE — 36415 COLL VENOUS BLD VENIPUNCTURE: CPT

## 2018-01-01 PROCEDURE — C9113 INJ PANTOPRAZOLE SODIUM, VIA: HCPCS | Performed by: HOSPITALIST

## 2018-01-01 PROCEDURE — 83036 HEMOGLOBIN GLYCOSYLATED A1C: CPT | Performed by: NURSE PRACTITIONER

## 2018-01-01 PROCEDURE — 76060000034 HC ANESTHESIA 1.5 TO 2 HR: Performed by: SURGERY

## 2018-01-01 PROCEDURE — 74011250636 HC RX REV CODE- 250/636: Performed by: RADIOLOGY

## 2018-01-01 PROCEDURE — 80061 LIPID PANEL: CPT | Performed by: NURSE PRACTITIONER

## 2018-01-01 PROCEDURE — 77030019563 HC DEV ATTCH FEED HOLL -A

## 2018-01-01 PROCEDURE — 77030020256 HC SOL INJ NACL 0.9%  500ML: Performed by: SURGERY

## 2018-01-01 PROCEDURE — 77030003630 HC NDL PERC VASC COOK -B

## 2018-01-01 PROCEDURE — 30243N1 TRANSFUSION OF NONAUTOLOGOUS RED BLOOD CELLS INTO CENTRAL VEIN, PERCUTANEOUS APPROACH: ICD-10-PCS | Performed by: INTERNAL MEDICINE

## 2018-01-01 PROCEDURE — 77030018781

## 2018-01-01 PROCEDURE — 80202 ASSAY OF VANCOMYCIN: CPT | Performed by: INTERNAL MEDICINE

## 2018-01-01 PROCEDURE — 36600 WITHDRAWAL OF ARTERIAL BLOOD: CPT | Performed by: SURGERY

## 2018-01-01 PROCEDURE — 77030020269 HC MISC IMPL: Performed by: SURGERY

## 2018-01-01 PROCEDURE — 73560 X-RAY EXAM OF KNEE 1 OR 2: CPT

## 2018-01-01 PROCEDURE — 82550 ASSAY OF CK (CPK): CPT | Performed by: EMERGENCY MEDICINE

## 2018-01-01 PROCEDURE — 36430 TRANSFUSION BLD/BLD COMPNT: CPT

## 2018-01-01 PROCEDURE — 96375 TX/PRO/DX INJ NEW DRUG ADDON: CPT

## 2018-01-01 PROCEDURE — 84100 ASSAY OF PHOSPHORUS: CPT | Performed by: PHYSICAL MEDICINE & REHABILITATION

## 2018-01-01 PROCEDURE — 74220 X-RAY XM ESOPHAGUS 1CNTRST: CPT

## 2018-01-01 PROCEDURE — 4A023N7 MEASUREMENT OF CARDIAC SAMPLING AND PRESSURE, LEFT HEART, PERCUTANEOUS APPROACH: ICD-10-PCS | Performed by: INTERNAL MEDICINE

## 2018-01-01 PROCEDURE — 96374 THER/PROPH/DIAG INJ IV PUSH: CPT

## 2018-01-01 PROCEDURE — 83735 ASSAY OF MAGNESIUM: CPT | Performed by: NURSE PRACTITIONER

## 2018-01-01 PROCEDURE — 87186 SC STD MICRODIL/AGAR DIL: CPT | Performed by: INTERNAL MEDICINE

## 2018-01-01 PROCEDURE — 80053 COMPREHEN METABOLIC PANEL: CPT | Performed by: NURSE PRACTITIONER

## 2018-01-01 PROCEDURE — 77030013992 HC SNR POLYP ENDOSC BSC -B: Performed by: INTERNAL MEDICINE

## 2018-01-01 PROCEDURE — 77030011256 HC DRSG MEPILEX <16IN NO BORD MOLN -A

## 2018-01-01 PROCEDURE — 85025 COMPLETE CBC W/AUTO DIFF WBC: CPT | Performed by: PHYSICAL MEDICINE & REHABILITATION

## 2018-01-01 DEVICE — VIABAHN SX ENDO HEPARIN 35 9MMX7.5CM 9FR120CMCATH
Type: IMPLANTABLE DEVICE | Site: GROIN | Status: FUNCTIONAL
Brand: GORE VIABAHN ENDOPROSTHESIS WITH HEPARIN

## 2018-01-01 RX ORDER — FENTANYL CITRATE 50 UG/ML
25-50 INJECTION, SOLUTION INTRAMUSCULAR; INTRAVENOUS
Status: DISCONTINUED | OUTPATIENT
Start: 2018-01-01 | End: 2018-01-01

## 2018-01-01 RX ORDER — CLONIDINE HYDROCHLORIDE 0.1 MG/1
0.1 TABLET ORAL 3 TIMES DAILY
Status: DISCONTINUED | OUTPATIENT
Start: 2018-01-01 | End: 2018-01-01 | Stop reason: HOSPADM

## 2018-01-01 RX ORDER — MIDAZOLAM HYDROCHLORIDE 1 MG/ML
INJECTION, SOLUTION INTRAMUSCULAR; INTRAVENOUS
Status: COMPLETED
Start: 2018-01-01 | End: 2018-01-01

## 2018-01-01 RX ORDER — ACETAMINOPHEN 325 MG/1
650 TABLET ORAL
Status: DISCONTINUED | OUTPATIENT
Start: 2018-01-01 | End: 2018-01-01 | Stop reason: HOSPADM

## 2018-01-01 RX ORDER — SODIUM CHLORIDE 0.9 % (FLUSH) 0.9 %
SYRINGE (ML) INJECTION
Status: DISPENSED
Start: 2018-01-01 | End: 2018-01-01

## 2018-01-01 RX ORDER — ASPIRIN 325 MG
325 TABLET ORAL ONCE
Status: COMPLETED | OUTPATIENT
Start: 2018-01-01 | End: 2018-01-01

## 2018-01-01 RX ORDER — INSULIN LISPRO 100 [IU]/ML
INJECTION, SOLUTION INTRAVENOUS; SUBCUTANEOUS
Status: DISCONTINUED | OUTPATIENT
Start: 2018-01-01 | End: 2018-01-01 | Stop reason: HOSPADM

## 2018-01-01 RX ORDER — CLONIDINE HYDROCHLORIDE 0.1 MG/1
0.1 TABLET ORAL 3 TIMES DAILY
Status: ON HOLD | COMMUNITY
End: 2018-01-01

## 2018-01-01 RX ORDER — LISINOPRIL 20 MG/1
40 TABLET ORAL DAILY
Status: DISCONTINUED | OUTPATIENT
Start: 2018-01-01 | End: 2018-01-01

## 2018-01-01 RX ORDER — LIDOCAINE 50 MG/G
2 PATCH TOPICAL DAILY
COMMUNITY

## 2018-01-01 RX ORDER — SODIUM CHLORIDE 0.9 % (FLUSH) 0.9 %
5-10 SYRINGE (ML) INJECTION AS NEEDED
Status: DISCONTINUED | OUTPATIENT
Start: 2018-01-01 | End: 2018-01-01 | Stop reason: HOSPADM

## 2018-01-01 RX ORDER — SODIUM CHLORIDE 9 MG/ML
50 INJECTION, SOLUTION INTRAVENOUS
Status: COMPLETED | OUTPATIENT
Start: 2018-01-01 | End: 2018-01-01

## 2018-01-01 RX ORDER — CHOLESTYRAMINE LIGHT 4 G/5.7G
POWDER, FOR SUSPENSION ORAL
COMMUNITY
Start: 2017-01-01 | End: 2018-01-01

## 2018-01-01 RX ORDER — LEVOFLOXACIN 5 MG/ML
750 INJECTION, SOLUTION INTRAVENOUS ONCE
Status: COMPLETED | OUTPATIENT
Start: 2018-01-01 | End: 2018-01-01

## 2018-01-01 RX ORDER — FENTANYL CITRATE 50 UG/ML
50 INJECTION, SOLUTION INTRAMUSCULAR; INTRAVENOUS
Status: DISCONTINUED | OUTPATIENT
Start: 2018-01-01 | End: 2018-01-01 | Stop reason: HOSPADM

## 2018-01-01 RX ORDER — ONDANSETRON 2 MG/ML
4 INJECTION INTRAMUSCULAR; INTRAVENOUS
Status: DISCONTINUED | OUTPATIENT
Start: 2018-01-01 | End: 2018-08-03 | Stop reason: HOSPADM

## 2018-01-01 RX ORDER — IPRATROPIUM BROMIDE AND ALBUTEROL SULFATE 2.5; .5 MG/3ML; MG/3ML
3 SOLUTION RESPIRATORY (INHALATION)
Status: DISCONTINUED | OUTPATIENT
Start: 2018-01-01 | End: 2018-01-01

## 2018-01-01 RX ORDER — NYSTATIN 100000 [USP'U]/ML
500000 SUSPENSION ORAL 4 TIMES DAILY
Status: DISCONTINUED | OUTPATIENT
Start: 2018-01-01 | End: 2018-08-03 | Stop reason: HOSPADM

## 2018-01-01 RX ORDER — CARVEDILOL 25 MG/1
50 TABLET ORAL 2 TIMES DAILY WITH MEALS
Status: ON HOLD | COMMUNITY
End: 2018-01-01

## 2018-01-01 RX ORDER — ONDANSETRON 2 MG/ML
4 INJECTION INTRAMUSCULAR; INTRAVENOUS
Status: DISCONTINUED | OUTPATIENT
Start: 2018-01-01 | End: 2018-01-01 | Stop reason: HOSPADM

## 2018-01-01 RX ORDER — ONDANSETRON 4 MG/1
4 TABLET, ORALLY DISINTEGRATING ORAL
Status: DISCONTINUED | OUTPATIENT
Start: 2018-01-01 | End: 2018-01-01 | Stop reason: HOSPADM

## 2018-01-01 RX ORDER — PRAVASTATIN SODIUM 10 MG/1
10 TABLET ORAL
Status: DISCONTINUED | OUTPATIENT
Start: 2018-01-01 | End: 2018-08-03 | Stop reason: HOSPADM

## 2018-01-01 RX ORDER — CARVEDILOL 3.12 MG/1
12.5 TABLET ORAL 2 TIMES DAILY WITH MEALS
Status: DISCONTINUED | OUTPATIENT
Start: 2018-01-01 | End: 2018-08-03 | Stop reason: HOSPADM

## 2018-01-01 RX ORDER — SODIUM CHLORIDE 0.9 % (FLUSH) 0.9 %
10 SYRINGE (ML) INJECTION
Status: COMPLETED | OUTPATIENT
Start: 2018-01-01 | End: 2018-01-01

## 2018-01-01 RX ORDER — PROPOFOL 10 MG/ML
VIAL (ML) INTRAVENOUS
Status: DISCONTINUED | OUTPATIENT
Start: 2018-01-01 | End: 2018-01-01 | Stop reason: HOSPADM

## 2018-01-01 RX ORDER — ESCITALOPRAM OXALATE 10 MG/1
10 TABLET ORAL DAILY
Qty: 30 TAB | Refills: 0 | Status: SHIPPED
Start: 2018-01-01 | End: 2018-01-01

## 2018-01-01 RX ORDER — CARVEDILOL 25 MG/1
TABLET ORAL
Refills: 3 | COMMUNITY
Start: 2017-01-01 | End: 2018-01-01 | Stop reason: DRUGHIGH

## 2018-01-01 RX ORDER — CARVEDILOL 12.5 MG/1
12.5 TABLET ORAL 2 TIMES DAILY WITH MEALS
Status: DISCONTINUED | OUTPATIENT
Start: 2018-01-01 | End: 2018-01-01

## 2018-01-01 RX ORDER — SODIUM CHLORIDE 0.9 % (FLUSH) 0.9 %
5-10 SYRINGE (ML) INJECTION AS NEEDED
Status: ACTIVE | OUTPATIENT
Start: 2018-01-01 | End: 2018-01-01

## 2018-01-01 RX ORDER — IPRATROPIUM BROMIDE AND ALBUTEROL SULFATE 2.5; .5 MG/3ML; MG/3ML
3 SOLUTION RESPIRATORY (INHALATION)
Status: DISCONTINUED | OUTPATIENT
Start: 2018-01-01 | End: 2018-01-01 | Stop reason: HOSPADM

## 2018-01-01 RX ORDER — SODIUM CHLORIDE 0.9 % (FLUSH) 0.9 %
10 SYRINGE (ML) INJECTION
Status: ACTIVE | OUTPATIENT
Start: 2018-01-01 | End: 2018-01-01

## 2018-01-01 RX ORDER — MIDAZOLAM HYDROCHLORIDE 1 MG/ML
.25-5 INJECTION, SOLUTION INTRAMUSCULAR; INTRAVENOUS
Status: DISCONTINUED | OUTPATIENT
Start: 2018-01-01 | End: 2018-01-01 | Stop reason: HOSPADM

## 2018-01-01 RX ORDER — SODIUM CHLORIDE 9 MG/ML
250 INJECTION, SOLUTION INTRAVENOUS AS NEEDED
Status: DISCONTINUED | OUTPATIENT
Start: 2018-01-01 | End: 2018-01-01 | Stop reason: ALTCHOICE

## 2018-01-01 RX ORDER — ATROPINE SULFATE 0.1 MG/ML
0.5 INJECTION INTRAVENOUS
Status: DISCONTINUED | OUTPATIENT
Start: 2018-01-01 | End: 2018-01-01 | Stop reason: HOSPADM

## 2018-01-01 RX ORDER — LISINOPRIL 20 MG/1
20 TABLET ORAL DAILY
COMMUNITY

## 2018-01-01 RX ORDER — ACETYLCYSTEINE 200 MG/ML
400 SOLUTION ORAL; RESPIRATORY (INHALATION) 3 TIMES DAILY
Status: DISCONTINUED | OUTPATIENT
Start: 2018-01-01 | End: 2018-01-01

## 2018-01-01 RX ORDER — NALOXONE HYDROCHLORIDE 0.4 MG/ML
0.4 INJECTION, SOLUTION INTRAMUSCULAR; INTRAVENOUS; SUBCUTANEOUS
Status: DISCONTINUED | OUTPATIENT
Start: 2018-01-01 | End: 2018-01-01 | Stop reason: HOSPADM

## 2018-01-01 RX ORDER — ESCITALOPRAM OXALATE 10 MG/1
10 TABLET ORAL DAILY
Status: ON HOLD | COMMUNITY
End: 2018-01-01

## 2018-01-01 RX ORDER — CARVEDILOL 12.5 MG/1
25 TABLET ORAL 2 TIMES DAILY WITH MEALS
Status: DISCONTINUED | OUTPATIENT
Start: 2018-01-01 | End: 2018-01-01

## 2018-01-01 RX ORDER — MORPHINE SULFATE ORAL SOLUTION 10 MG/5ML
5 SOLUTION ORAL
Status: DISCONTINUED | OUTPATIENT
Start: 2018-01-01 | End: 2018-08-03 | Stop reason: HOSPADM

## 2018-01-01 RX ORDER — SODIUM CHLORIDE 9 MG/ML
100 INJECTION, SOLUTION INTRAVENOUS CONTINUOUS
Status: DISPENSED | OUTPATIENT
Start: 2018-01-01 | End: 2018-01-01

## 2018-01-01 RX ORDER — VANCOMYCIN 1.75 GRAM/500 ML IN 0.9 % SODIUM CHLORIDE INTRAVENOUS
1750 ONCE
Status: COMPLETED | OUTPATIENT
Start: 2018-01-01 | End: 2018-01-01

## 2018-01-01 RX ORDER — ONDANSETRON 4 MG/1
4 TABLET, ORALLY DISINTEGRATING ORAL
COMMUNITY

## 2018-01-01 RX ORDER — NITROGLYCERIN 0.4 MG/1
0.4 TABLET SUBLINGUAL
Status: DISCONTINUED | OUTPATIENT
Start: 2018-01-01 | End: 2018-01-01 | Stop reason: HOSPADM

## 2018-01-01 RX ORDER — CEFAZOLIN SODIUM/WATER 2 G/20 ML
2 SYRINGE (ML) INTRAVENOUS ONCE
Status: COMPLETED | OUTPATIENT
Start: 2018-01-01 | End: 2018-01-01

## 2018-01-01 RX ORDER — DEXTROSE 50 % IN WATER (D50W) INTRAVENOUS SYRINGE
12.5-25 AS NEEDED
Status: DISCONTINUED | OUTPATIENT
Start: 2018-01-01 | End: 2018-08-03 | Stop reason: HOSPADM

## 2018-01-01 RX ORDER — ACETAMINOPHEN AND CODEINE PHOSPHATE 300; 30 MG/1; MG/1
1 TABLET ORAL
Qty: 30 TAB | Refills: 0 | Status: SHIPPED | OUTPATIENT
Start: 2018-01-01 | End: 2018-01-01 | Stop reason: ALTCHOICE

## 2018-01-01 RX ORDER — SEVELAMER CARBONATE 800 MG/1
800 TABLET, FILM COATED ORAL
Status: DISCONTINUED | OUTPATIENT
Start: 2018-01-01 | End: 2018-01-01 | Stop reason: HOSPADM

## 2018-01-01 RX ORDER — SODIUM BICARBONATE 1 MEQ/ML
SYRINGE (ML) INTRAVENOUS
Status: COMPLETED | OUTPATIENT
Start: 2018-01-01 | End: 2018-01-01

## 2018-01-01 RX ORDER — HEPARIN SODIUM 5000 [USP'U]/ML
5000 INJECTION, SOLUTION INTRAVENOUS; SUBCUTANEOUS EVERY 8 HOURS
Status: DISCONTINUED | OUTPATIENT
Start: 2018-01-01 | End: 2018-01-01

## 2018-01-01 RX ORDER — PRAVASTATIN SODIUM 10 MG/1
10 TABLET ORAL
Status: DISCONTINUED | OUTPATIENT
Start: 2018-01-01 | End: 2018-01-01 | Stop reason: HOSPADM

## 2018-01-01 RX ORDER — LEVOFLOXACIN 5 MG/ML
500 INJECTION, SOLUTION INTRAVENOUS
Status: DISCONTINUED | OUTPATIENT
Start: 2018-01-01 | End: 2018-01-01

## 2018-01-01 RX ORDER — SODIUM CHLORIDE 0.9 % (FLUSH) 0.9 %
5-10 SYRINGE (ML) INJECTION EVERY 8 HOURS
Status: DISCONTINUED | OUTPATIENT
Start: 2018-01-01 | End: 2018-01-01 | Stop reason: HOSPADM

## 2018-01-01 RX ORDER — METRONIDAZOLE 500 MG/100ML
500 INJECTION, SOLUTION INTRAVENOUS EVERY 8 HOURS
Status: DISCONTINUED | OUTPATIENT
Start: 2018-01-01 | End: 2018-01-01

## 2018-01-01 RX ORDER — DEXTROSE 50 % IN WATER (D50W) INTRAVENOUS SYRINGE
12.5-25 AS NEEDED
Status: DISCONTINUED | OUTPATIENT
Start: 2018-01-01 | End: 2018-01-01 | Stop reason: HOSPADM

## 2018-01-01 RX ORDER — DEXTROMETHORPHAN/PSEUDOEPHED 2.5-7.5/.8
1.2 DROPS ORAL
Status: DISCONTINUED | OUTPATIENT
Start: 2018-01-01 | End: 2018-01-01 | Stop reason: HOSPADM

## 2018-01-01 RX ORDER — ESCITALOPRAM OXALATE 5 MG/5ML
10 SOLUTION ORAL DAILY
COMMUNITY

## 2018-01-01 RX ORDER — OXYCODONE AND ACETAMINOPHEN 10; 325 MG/1; MG/1
1 TABLET ORAL
Status: DISCONTINUED | OUTPATIENT
Start: 2018-01-01 | End: 2018-01-01 | Stop reason: HOSPADM

## 2018-01-01 RX ORDER — FAMOTIDINE 20 MG/1
20 TABLET, FILM COATED ORAL
Status: DISCONTINUED | OUTPATIENT
Start: 2018-01-01 | End: 2018-01-01

## 2018-01-01 RX ORDER — INSULIN GLARGINE 100 [IU]/ML
5 INJECTION, SOLUTION SUBCUTANEOUS
Status: DISCONTINUED | OUTPATIENT
Start: 2018-01-01 | End: 2018-01-01

## 2018-01-01 RX ORDER — CARVEDILOL 12.5 MG/1
25 TABLET ORAL 2 TIMES DAILY WITH MEALS
Status: DISCONTINUED | OUTPATIENT
Start: 2018-01-01 | End: 2018-01-01 | Stop reason: HOSPADM

## 2018-01-01 RX ORDER — GUAIFENESIN 100 MG/5ML
600 SOLUTION ORAL EVERY 12 HOURS
Status: DISCONTINUED | OUTPATIENT
Start: 2018-01-01 | End: 2018-08-03 | Stop reason: HOSPADM

## 2018-01-01 RX ORDER — EPINEPHRINE 0.1 MG/ML
INJECTION INTRACARDIAC; INTRAVENOUS
Status: COMPLETED | OUTPATIENT
Start: 2018-01-01 | End: 2018-01-01

## 2018-01-01 RX ORDER — GUAIFENESIN 100 MG/5ML
81 LIQUID (ML) ORAL DAILY
Status: DISCONTINUED | OUTPATIENT
Start: 2018-01-01 | End: 2018-01-01 | Stop reason: HOSPADM

## 2018-01-01 RX ORDER — CEFAZOLIN SODIUM/WATER 2 G/20 ML
2 SYRINGE (ML) INTRAVENOUS
Status: DISCONTINUED | OUTPATIENT
Start: 2018-01-01 | End: 2018-01-01

## 2018-01-01 RX ORDER — INSULIN LISPRO 100 [IU]/ML
INJECTION, SOLUTION INTRAVENOUS; SUBCUTANEOUS
Status: DISCONTINUED | OUTPATIENT
Start: 2018-01-01 | End: 2018-01-01

## 2018-01-01 RX ORDER — INSULIN GLARGINE 100 [IU]/ML
INJECTION, SOLUTION SUBCUTANEOUS
Qty: 15 PEN | Refills: 0 | Status: SHIPPED | OUTPATIENT
Start: 2018-01-01 | End: 2018-01-01

## 2018-01-01 RX ORDER — VECURONIUM BROMIDE FOR INJECTION 1 MG/ML
INJECTION, POWDER, LYOPHILIZED, FOR SOLUTION INTRAVENOUS
Status: DISCONTINUED
Start: 2018-01-01 | End: 2018-08-03 | Stop reason: HOSPADM

## 2018-01-01 RX ORDER — SODIUM CHLORIDE 0.9 % (FLUSH) 0.9 %
SYRINGE (ML) INJECTION
Status: COMPLETED
Start: 2018-01-01 | End: 2018-01-01

## 2018-01-01 RX ORDER — ESCITALOPRAM OXALATE 10 MG/1
10 TABLET ORAL DAILY
Status: DISCONTINUED | OUTPATIENT
Start: 2018-01-01 | End: 2018-01-01 | Stop reason: HOSPADM

## 2018-01-01 RX ORDER — INSULIN GLARGINE 100 [IU]/ML
INJECTION, SOLUTION SUBCUTANEOUS
Qty: 5 PEN | Refills: 3 | Status: SHIPPED | OUTPATIENT
Start: 2018-01-01 | End: 2018-01-01 | Stop reason: SDUPTHER

## 2018-01-01 RX ORDER — SODIUM CHLORIDE 9 MG/ML
INJECTION, SOLUTION INTRAVENOUS
Status: DISCONTINUED
Start: 2018-01-01 | End: 2018-01-01

## 2018-01-01 RX ORDER — LIDOCAINE HYDROCHLORIDE 20 MG/ML
INJECTION, SOLUTION EPIDURAL; INFILTRATION; INTRACAUDAL; PERINEURAL AS NEEDED
Status: DISCONTINUED | OUTPATIENT
Start: 2018-01-01 | End: 2018-01-01 | Stop reason: HOSPADM

## 2018-01-01 RX ORDER — CINACALCET 30 MG/1
60 TABLET, FILM COATED ORAL DAILY
Status: DISCONTINUED | OUTPATIENT
Start: 2018-01-01 | End: 2018-01-01 | Stop reason: HOSPADM

## 2018-01-01 RX ORDER — HEPARIN SODIUM 5000 [USP'U]/ML
5000 INJECTION, SOLUTION INTRAVENOUS; SUBCUTANEOUS EVERY 12 HOURS
Status: DISCONTINUED | OUTPATIENT
Start: 2018-01-01 | End: 2018-01-01

## 2018-01-01 RX ORDER — HEPARIN SODIUM 200 [USP'U]/100ML
500 INJECTION, SOLUTION INTRAVENOUS ONCE
Status: COMPLETED | OUTPATIENT
Start: 2018-01-01 | End: 2018-01-01

## 2018-01-01 RX ORDER — INSULIN GLARGINE 100 [IU]/ML
18 INJECTION, SOLUTION SUBCUTANEOUS
Status: DISCONTINUED | OUTPATIENT
Start: 2018-01-01 | End: 2018-01-01

## 2018-01-01 RX ORDER — INSULIN GLARGINE 100 [IU]/ML
INJECTION, SOLUTION SUBCUTANEOUS
Qty: 3 PEN | Refills: 3 | Status: SHIPPED | OUTPATIENT
Start: 2018-01-01 | End: 2018-01-01 | Stop reason: SDUPTHER

## 2018-01-01 RX ORDER — METRONIDAZOLE 500 MG/100ML
500 INJECTION, SOLUTION INTRAVENOUS EVERY 12 HOURS
Status: DISCONTINUED | OUTPATIENT
Start: 2018-01-01 | End: 2018-01-01 | Stop reason: ALTCHOICE

## 2018-01-01 RX ORDER — NEPHROCAP 1 MG
CAP ORAL
Refills: 3 | COMMUNITY
Start: 2017-01-01 | End: 2018-01-01 | Stop reason: SDUPTHER

## 2018-01-01 RX ORDER — AMOXICILLIN 250 MG
2 CAPSULE ORAL
Status: DISCONTINUED | OUTPATIENT
Start: 2018-01-01 | End: 2018-01-01 | Stop reason: HOSPADM

## 2018-01-01 RX ORDER — LANOLIN ALCOHOL/MO/W.PET/CERES
3 CREAM (GRAM) TOPICAL
Status: DISCONTINUED | OUTPATIENT
Start: 2018-01-01 | End: 2018-01-01 | Stop reason: HOSPADM

## 2018-01-01 RX ORDER — SODIUM CHLORIDE 9 MG/ML
50 INJECTION, SOLUTION INTRAVENOUS
Status: ACTIVE | OUTPATIENT
Start: 2018-01-01 | End: 2018-01-01

## 2018-01-01 RX ORDER — INSULIN LISPRO 100 [IU]/ML
INJECTION, SOLUTION INTRAVENOUS; SUBCUTANEOUS EVERY 6 HOURS
Status: DISCONTINUED | OUTPATIENT
Start: 2018-01-01 | End: 2018-08-03 | Stop reason: HOSPADM

## 2018-01-01 RX ORDER — GUAIFENESIN 100 MG/5ML
400 SOLUTION ORAL 2 TIMES DAILY
Status: DISCONTINUED | OUTPATIENT
Start: 2018-01-01 | End: 2018-01-01 | Stop reason: HOSPADM

## 2018-01-01 RX ORDER — MORPHINE SULFATE ORAL SOLUTION 10 MG/5ML
5 SOLUTION ORAL
COMMUNITY

## 2018-01-01 RX ORDER — ESCITALOPRAM OXALATE 10 MG/1
TABLET ORAL
Refills: 3 | COMMUNITY
Start: 2017-01-01 | End: 2018-01-01 | Stop reason: DRUGHIGH

## 2018-01-01 RX ORDER — SODIUM CHLORIDE 0.9 % (FLUSH) 0.9 %
10 SYRINGE (ML) INJECTION
Status: DISPENSED | OUTPATIENT
Start: 2018-01-01 | End: 2018-01-01

## 2018-01-01 RX ORDER — PHENYLEPHRINE HYDROCHLORIDE 10 MG/ML
INJECTION INTRAVENOUS
Status: DISCONTINUED
Start: 2018-01-01 | End: 2018-08-03 | Stop reason: HOSPADM

## 2018-01-01 RX ORDER — HEPARIN SODIUM 5000 [USP'U]/ML
5000 INJECTION, SOLUTION INTRAVENOUS; SUBCUTANEOUS EVERY 12 HOURS
Status: DISCONTINUED | OUTPATIENT
Start: 2018-01-01 | End: 2018-01-01 | Stop reason: HOSPADM

## 2018-01-01 RX ORDER — PROPOFOL 10 MG/ML
0-50 VIAL (ML) INTRAVENOUS
Status: DISCONTINUED | OUTPATIENT
Start: 2018-01-01 | End: 2018-08-03 | Stop reason: HOSPADM

## 2018-01-01 RX ORDER — WATER FOR INJECTION,STERILE
VIAL (ML) INJECTION
Status: DISCONTINUED
Start: 2018-01-01 | End: 2018-08-03 | Stop reason: HOSPADM

## 2018-01-01 RX ORDER — NALOXONE HYDROCHLORIDE 0.4 MG/ML
0.4 INJECTION, SOLUTION INTRAMUSCULAR; INTRAVENOUS; SUBCUTANEOUS AS NEEDED
Status: DISCONTINUED | OUTPATIENT
Start: 2018-01-01 | End: 2018-08-03 | Stop reason: HOSPADM

## 2018-01-01 RX ORDER — HEPARIN SODIUM 5000 [USP'U]/ML
5000 INJECTION, SOLUTION INTRAVENOUS; SUBCUTANEOUS EVERY 12 HOURS
Status: DISCONTINUED | OUTPATIENT
Start: 2018-01-01 | End: 2018-08-03 | Stop reason: HOSPADM

## 2018-01-01 RX ORDER — HYDROMORPHONE HYDROCHLORIDE 1 MG/ML
0.2 INJECTION, SOLUTION INTRAMUSCULAR; INTRAVENOUS; SUBCUTANEOUS
Status: DISCONTINUED | OUTPATIENT
Start: 2018-01-01 | End: 2018-01-01 | Stop reason: HOSPADM

## 2018-01-01 RX ORDER — OXYCODONE AND ACETAMINOPHEN 5; 325 MG/1; MG/1
1 TABLET ORAL
Status: DISCONTINUED | OUTPATIENT
Start: 2018-01-01 | End: 2018-08-03 | Stop reason: HOSPADM

## 2018-01-01 RX ORDER — FLUMAZENIL 0.1 MG/ML
0.2 INJECTION INTRAVENOUS
Status: DISCONTINUED | OUTPATIENT
Start: 2018-01-01 | End: 2018-01-01 | Stop reason: HOSPADM

## 2018-01-01 RX ORDER — ASPIRIN 81 MG/1
81 TABLET ORAL DAILY
Status: DISCONTINUED | OUTPATIENT
Start: 2018-01-01 | End: 2018-01-01

## 2018-01-01 RX ORDER — DEXTROSE 50 % IN WATER (D50W) INTRAVENOUS SYRINGE
25 AS NEEDED
Status: DISCONTINUED | OUTPATIENT
Start: 2018-01-01 | End: 2018-01-01 | Stop reason: HOSPADM

## 2018-01-01 RX ORDER — CINACALCET 30 MG/1
60 TABLET, FILM COATED ORAL
Status: DISCONTINUED | OUTPATIENT
Start: 2018-01-01 | End: 2018-01-01

## 2018-01-01 RX ORDER — EPINEPHRINE 0.1 MG/ML
1 INJECTION INTRACARDIAC; INTRAVENOUS
Status: DISCONTINUED | OUTPATIENT
Start: 2018-01-01 | End: 2018-01-01 | Stop reason: HOSPADM

## 2018-01-01 RX ORDER — LISINOPRIL 40 MG/1
TABLET ORAL
Refills: 3 | COMMUNITY
Start: 2018-01-01 | End: 2018-01-01 | Stop reason: SDUPTHER

## 2018-01-01 RX ORDER — GUAIFENESIN 100 MG/5ML
400 SOLUTION ORAL 2 TIMES DAILY
Qty: 1 BOTTLE | Refills: 0 | Status: SHIPPED
Start: 2018-01-01 | End: 2018-01-01

## 2018-01-01 RX ORDER — LISINOPRIL 20 MG/1
40 TABLET ORAL DAILY
Status: DISCONTINUED | OUTPATIENT
Start: 2018-01-01 | End: 2018-01-01 | Stop reason: HOSPADM

## 2018-01-01 RX ORDER — CLONIDINE HYDROCHLORIDE 0.1 MG/1
TABLET ORAL
Refills: 3 | COMMUNITY
Start: 2017-01-01 | End: 2018-01-01 | Stop reason: DRUGHIGH

## 2018-01-01 RX ORDER — CLONIDINE HYDROCHLORIDE 0.1 MG/1
0.1 TABLET ORAL 3 TIMES DAILY
Status: DISCONTINUED | OUTPATIENT
Start: 2018-01-01 | End: 2018-01-01

## 2018-01-01 RX ORDER — LIDOCAINE HYDROCHLORIDE 10 MG/ML
INJECTION INFILTRATION; PERINEURAL AS NEEDED
Status: DISCONTINUED | OUTPATIENT
Start: 2018-01-01 | End: 2018-01-01 | Stop reason: HOSPADM

## 2018-01-01 RX ORDER — MUPIROCIN 20 MG/G
OINTMENT TOPICAL
COMMUNITY

## 2018-01-01 RX ORDER — PROPOFOL 10 MG/ML
INJECTION, EMULSION INTRAVENOUS
Status: DISCONTINUED
Start: 2018-01-01 | End: 2018-08-03 | Stop reason: HOSPADM

## 2018-01-01 RX ORDER — SODIUM CHLORIDE, SODIUM LACTATE, POTASSIUM CHLORIDE, CALCIUM CHLORIDE 600; 310; 30; 20 MG/100ML; MG/100ML; MG/100ML; MG/100ML
25 INJECTION, SOLUTION INTRAVENOUS CONTINUOUS
Status: DISCONTINUED | OUTPATIENT
Start: 2018-01-01 | End: 2018-01-01 | Stop reason: HOSPADM

## 2018-01-01 RX ORDER — ONDANSETRON 4 MG/1
8 TABLET, ORALLY DISINTEGRATING ORAL
Status: COMPLETED | OUTPATIENT
Start: 2018-01-01 | End: 2018-01-01

## 2018-01-01 RX ORDER — SODIUM CHLORIDE 0.9 % (FLUSH) 0.9 %
5-10 SYRINGE (ML) INJECTION EVERY 8 HOURS
Status: DISCONTINUED | OUTPATIENT
Start: 2018-01-01 | End: 2018-08-03 | Stop reason: HOSPADM

## 2018-01-01 RX ORDER — AMLODIPINE BESYLATE 5 MG/1
10 TABLET ORAL DAILY
Status: DISCONTINUED | OUTPATIENT
Start: 2018-01-01 | End: 2018-01-01

## 2018-01-01 RX ORDER — CEPHALEXIN 500 MG/1
500 CAPSULE ORAL 2 TIMES DAILY
Qty: 10 CAP | Refills: 0 | Status: SHIPPED | OUTPATIENT
Start: 2018-01-01 | End: 2018-01-01

## 2018-01-01 RX ORDER — CARVEDILOL 25 MG/1
50 TABLET ORAL 2 TIMES DAILY WITH MEALS
Qty: 120 TAB | Refills: 0 | Status: SHIPPED
Start: 2018-01-01

## 2018-01-01 RX ORDER — DEXTROSE MONOHYDRATE AND SODIUM CHLORIDE 5; .45 G/100ML; G/100ML
50 INJECTION, SOLUTION INTRAVENOUS CONTINUOUS
Status: DISCONTINUED | OUTPATIENT
Start: 2018-01-01 | End: 2018-01-01

## 2018-01-01 RX ORDER — OSELTAMIVIR PHOSPHATE 75 MG/1
75 CAPSULE ORAL 2 TIMES DAILY
Status: DISCONTINUED | OUTPATIENT
Start: 2018-01-01 | End: 2018-01-01 | Stop reason: SDUPTHER

## 2018-01-01 RX ORDER — MIDAZOLAM HYDROCHLORIDE 1 MG/ML
1-3 INJECTION, SOLUTION INTRAMUSCULAR; INTRAVENOUS
Status: DISCONTINUED | OUTPATIENT
Start: 2018-01-01 | End: 2018-01-01 | Stop reason: HOSPADM

## 2018-01-01 RX ORDER — MAGNESIUM SULFATE 100 %
4 CRYSTALS MISCELLANEOUS AS NEEDED
Status: DISCONTINUED | OUTPATIENT
Start: 2018-01-01 | End: 2018-01-01 | Stop reason: HOSPADM

## 2018-01-01 RX ORDER — HEPARIN SODIUM 5000 [USP'U]/ML
INJECTION, SOLUTION INTRAVENOUS; SUBCUTANEOUS AS NEEDED
Status: DISCONTINUED | OUTPATIENT
Start: 2018-01-01 | End: 2018-01-01 | Stop reason: HOSPADM

## 2018-01-01 RX ORDER — IPRATROPIUM BROMIDE 0.5 MG/2.5ML
0.5 SOLUTION RESPIRATORY (INHALATION)
Status: DISCONTINUED | OUTPATIENT
Start: 2018-01-01 | End: 2018-01-01

## 2018-01-01 RX ORDER — ACETYLCYSTEINE 200 MG/ML
400 SOLUTION ORAL; RESPIRATORY (INHALATION)
Status: DISCONTINUED | OUTPATIENT
Start: 2018-01-01 | End: 2018-01-01

## 2018-01-01 RX ORDER — METOPROLOL TARTRATE 5 MG/5ML
2 INJECTION INTRAVENOUS
Status: DISCONTINUED | OUTPATIENT
Start: 2018-01-01 | End: 2018-01-01 | Stop reason: HOSPADM

## 2018-01-01 RX ORDER — LIDOCAINE HYDROCHLORIDE 10 MG/ML
INJECTION INFILTRATION; PERINEURAL
Status: COMPLETED
Start: 2018-01-01 | End: 2018-01-01

## 2018-01-01 RX ORDER — SODIUM CHLORIDE 9 MG/ML
75 INJECTION, SOLUTION INTRAVENOUS CONTINUOUS
Status: DISPENSED | OUTPATIENT
Start: 2018-01-01 | End: 2018-01-01

## 2018-01-01 RX ORDER — AMLODIPINE BESYLATE 5 MG/1
10 TABLET ORAL DAILY
Status: DISCONTINUED | OUTPATIENT
Start: 2018-01-01 | End: 2018-01-01 | Stop reason: HOSPADM

## 2018-01-01 RX ORDER — SODIUM CHLORIDE 9 MG/ML
100 INJECTION, SOLUTION INTRAVENOUS CONTINUOUS
Status: DISCONTINUED | OUTPATIENT
Start: 2018-01-01 | End: 2018-01-01 | Stop reason: HOSPADM

## 2018-01-01 RX ORDER — METOPROLOL TARTRATE 5 MG/5ML
1.25 INJECTION INTRAVENOUS EVERY 6 HOURS
Status: DISCONTINUED | OUTPATIENT
Start: 2018-01-01 | End: 2018-01-01 | Stop reason: HOSPADM

## 2018-01-01 RX ORDER — SODIUM CHLORIDE 9 MG/ML
INJECTION, SOLUTION INTRAVENOUS
Status: DISCONTINUED | OUTPATIENT
Start: 2018-01-01 | End: 2018-01-01 | Stop reason: HOSPADM

## 2018-01-01 RX ORDER — GUAIFENESIN 100 MG/5ML
81 LIQUID (ML) ORAL DAILY
Status: DISCONTINUED | OUTPATIENT
Start: 2018-01-01 | End: 2018-08-03 | Stop reason: HOSPADM

## 2018-01-01 RX ORDER — LISINOPRIL 40 MG/1
20 TABLET ORAL DAILY
Qty: 90 TAB | Refills: 1 | Status: SHIPPED
Start: 2018-01-01 | End: 2018-01-01 | Stop reason: DRUGHIGH

## 2018-01-01 RX ORDER — ACETAMINOPHEN 160 MG/5ML
650 LIQUID ORAL
COMMUNITY

## 2018-01-01 RX ORDER — LIDOCAINE HYDROCHLORIDE 10 MG/ML
1-20 INJECTION INFILTRATION; PERINEURAL
Status: DISCONTINUED | OUTPATIENT
Start: 2018-01-01 | End: 2018-01-01

## 2018-01-01 RX ORDER — FAMOTIDINE 20 MG/1
20 TABLET, FILM COATED ORAL
Status: DISCONTINUED | OUTPATIENT
Start: 2018-01-01 | End: 2018-01-01 | Stop reason: HOSPADM

## 2018-01-01 RX ORDER — GUAIFENESIN 600 MG/1
1200 TABLET, EXTENDED RELEASE ORAL EVERY 12 HOURS
Status: DISCONTINUED | OUTPATIENT
Start: 2018-01-01 | End: 2018-01-01

## 2018-01-01 RX ORDER — FENTANYL CITRATE 50 UG/ML
INJECTION, SOLUTION INTRAMUSCULAR; INTRAVENOUS
Status: COMPLETED
Start: 2018-01-01 | End: 2018-01-01

## 2018-01-01 RX ORDER — PROPOFOL 10 MG/ML
INJECTION, EMULSION INTRAVENOUS AS NEEDED
Status: DISCONTINUED | OUTPATIENT
Start: 2018-01-01 | End: 2018-01-01 | Stop reason: HOSPADM

## 2018-01-01 RX ORDER — LISINOPRIL 20 MG/1
40 TABLET ORAL 2 TIMES DAILY
Status: DISCONTINUED | OUTPATIENT
Start: 2018-01-01 | End: 2018-01-01 | Stop reason: HOSPADM

## 2018-01-01 RX ORDER — BUMETANIDE 0.25 MG/ML
2 INJECTION INTRAMUSCULAR; INTRAVENOUS ONCE
Status: COMPLETED | OUTPATIENT
Start: 2018-01-01 | End: 2018-01-01

## 2018-01-01 RX ORDER — ACETAMINOPHEN 500 MG
1000 TABLET ORAL
COMMUNITY
End: 2018-01-01

## 2018-01-01 RX ORDER — OXYCODONE HYDROCHLORIDE 5 MG/1
5 TABLET ORAL
Status: DISCONTINUED | OUTPATIENT
Start: 2018-01-01 | End: 2018-01-01 | Stop reason: HOSPADM

## 2018-01-01 RX ORDER — SEVELAMER CARBONATE FOR ORAL SUSPENSION 2400 MG/1
0.8 POWDER, FOR SUSPENSION ORAL
Status: DISCONTINUED | OUTPATIENT
Start: 2018-01-01 | End: 2018-08-03 | Stop reason: HOSPADM

## 2018-01-01 RX ORDER — HALOPERIDOL 5 MG/ML
2 INJECTION INTRAMUSCULAR ONCE
Status: COMPLETED | OUTPATIENT
Start: 2018-01-01 | End: 2018-01-01

## 2018-01-01 RX ORDER — CODEINE PHOSPHATE AND GUAIFENESIN 10; 100 MG/5ML; MG/5ML
2.5 SOLUTION ORAL
COMMUNITY

## 2018-01-01 RX ORDER — OXYCODONE AND ACETAMINOPHEN 5; 325 MG/1; MG/1
1-2 TABLET ORAL
Status: DISCONTINUED | OUTPATIENT
Start: 2018-01-01 | End: 2018-01-01

## 2018-01-01 RX ORDER — SODIUM CHLORIDE 0.9 % (FLUSH) 0.9 %
5-10 SYRINGE (ML) INJECTION AS NEEDED
Status: DISCONTINUED | OUTPATIENT
Start: 2018-01-01 | End: 2018-08-03 | Stop reason: HOSPADM

## 2018-01-01 RX ORDER — NEPHROCAP 1 MG
CAP ORAL
Qty: 30 CAP | Refills: 0 | Status: SHIPPED | OUTPATIENT
Start: 2018-01-01 | End: 2018-01-01

## 2018-01-01 RX ORDER — FENTANYL CITRATE 50 UG/ML
100 INJECTION, SOLUTION INTRAMUSCULAR; INTRAVENOUS
Status: DISCONTINUED | OUTPATIENT
Start: 2018-01-01 | End: 2018-01-01

## 2018-01-01 RX ORDER — FENTANYL CITRATE 50 UG/ML
25 INJECTION, SOLUTION INTRAMUSCULAR; INTRAVENOUS
Status: DISCONTINUED | OUTPATIENT
Start: 2018-01-01 | End: 2018-01-01 | Stop reason: HOSPADM

## 2018-01-01 RX ORDER — OSELTAMIVIR PHOSPHATE 30 MG/1
30 CAPSULE ORAL
Status: DISCONTINUED | OUTPATIENT
Start: 2018-01-01 | End: 2018-01-01 | Stop reason: HOSPADM

## 2018-01-01 RX ORDER — BIVALIRUDIN 250 MG/5ML
INJECTION, POWDER, LYOPHILIZED, FOR SOLUTION INTRAVENOUS
Status: DISCONTINUED
Start: 2018-01-01 | End: 2018-01-01

## 2018-01-01 RX ORDER — MIDAZOLAM HYDROCHLORIDE 1 MG/ML
.5-2 INJECTION, SOLUTION INTRAMUSCULAR; INTRAVENOUS
Status: DISCONTINUED | OUTPATIENT
Start: 2018-01-01 | End: 2018-01-01

## 2018-01-01 RX ORDER — CARVEDILOL 12.5 MG/1
50 TABLET ORAL 2 TIMES DAILY WITH MEALS
Status: DISCONTINUED | OUTPATIENT
Start: 2018-01-01 | End: 2018-01-01

## 2018-01-01 RX ORDER — OXYCODONE AND ACETAMINOPHEN 5; 325 MG/1; MG/1
1 TABLET ORAL
COMMUNITY

## 2018-01-01 RX ORDER — NALOXONE HYDROCHLORIDE 0.4 MG/ML
INJECTION, SOLUTION INTRAMUSCULAR; INTRAVENOUS; SUBCUTANEOUS
Status: DISPENSED
Start: 2018-01-01 | End: 2018-01-01

## 2018-01-01 RX ORDER — OSELTAMIVIR PHOSPHATE 30 MG/1
30 CAPSULE ORAL
Qty: 2 CAP | Refills: 0 | Status: SHIPPED | OUTPATIENT
Start: 2018-01-01 | End: 2018-01-01 | Stop reason: ALTCHOICE

## 2018-01-01 RX ORDER — LISINOPRIL 20 MG/1
40 TABLET ORAL 2 TIMES DAILY
Status: DISCONTINUED | OUTPATIENT
Start: 2018-01-01 | End: 2018-01-01

## 2018-01-01 RX ORDER — DEXTROSE 50 % IN WATER (D50W) INTRAVENOUS SYRINGE
Status: COMPLETED
Start: 2018-01-01 | End: 2018-01-01

## 2018-01-01 RX ORDER — ACETAMINOPHEN 10 MG/ML
INJECTION, SOLUTION INTRAVENOUS
Status: COMPLETED
Start: 2018-01-01 | End: 2018-01-01

## 2018-01-01 RX ORDER — SEVELAMER CARBONATE 800 MG/1
800 TABLET, FILM COATED ORAL 3 TIMES DAILY
Status: DISCONTINUED | OUTPATIENT
Start: 2018-01-01 | End: 2018-01-01 | Stop reason: HOSPADM

## 2018-01-01 RX ORDER — CLONIDINE HYDROCHLORIDE 0.1 MG/1
0.1 TABLET ORAL 3 TIMES DAILY
Qty: 90 TAB | Refills: 0 | Status: SHIPPED | OUTPATIENT
Start: 2018-01-01

## 2018-01-01 RX ORDER — METOPROLOL TARTRATE 5 MG/5ML
INJECTION INTRAVENOUS AS NEEDED
Status: DISCONTINUED | OUTPATIENT
Start: 2018-01-01 | End: 2018-01-01 | Stop reason: HOSPADM

## 2018-01-01 RX ORDER — FACIAL-BODY WIPES
10 EACH TOPICAL
COMMUNITY

## 2018-01-01 RX ORDER — SODIUM CHLORIDE 0.9 % (FLUSH) 0.9 %
5-10 SYRINGE (ML) INJECTION EVERY 8 HOURS
Status: ACTIVE | OUTPATIENT
Start: 2018-01-01 | End: 2018-01-01

## 2018-01-01 RX ORDER — ESCITALOPRAM OXALATE 5 MG/5ML
10 SOLUTION ORAL DAILY
Status: DISCONTINUED | OUTPATIENT
Start: 2018-01-01 | End: 2018-08-03 | Stop reason: HOSPADM

## 2018-01-01 RX ORDER — DIPHENHYDRAMINE HYDROCHLORIDE 50 MG/ML
12.5 INJECTION, SOLUTION INTRAMUSCULAR; INTRAVENOUS AS NEEDED
Status: DISCONTINUED | OUTPATIENT
Start: 2018-01-01 | End: 2018-01-01 | Stop reason: HOSPADM

## 2018-01-01 RX ORDER — POTASSIUM CHLORIDE 750 MG/1
10 TABLET, FILM COATED, EXTENDED RELEASE ORAL DAILY
Status: DISCONTINUED | OUTPATIENT
Start: 2018-01-01 | End: 2018-01-01

## 2018-01-01 RX ORDER — ONDANSETRON 2 MG/ML
4 INJECTION INTRAMUSCULAR; INTRAVENOUS ONCE
Status: COMPLETED | OUTPATIENT
Start: 2018-01-01 | End: 2018-01-01

## 2018-01-01 RX ORDER — INSULIN LISPRO 100 [IU]/ML
INJECTION, SOLUTION INTRAVENOUS; SUBCUTANEOUS EVERY 6 HOURS
Status: DISCONTINUED | OUTPATIENT
Start: 2018-01-01 | End: 2018-01-01 | Stop reason: HOSPADM

## 2018-01-01 RX ORDER — MIDAZOLAM HYDROCHLORIDE 1 MG/ML
1-2 INJECTION, SOLUTION INTRAMUSCULAR; INTRAVENOUS
Status: DISCONTINUED | OUTPATIENT
Start: 2018-01-01 | End: 2018-01-01 | Stop reason: HOSPADM

## 2018-01-01 RX ORDER — MAGNESIUM SULFATE 100 %
4 CRYSTALS MISCELLANEOUS AS NEEDED
Status: DISCONTINUED | OUTPATIENT
Start: 2018-01-01 | End: 2018-08-03 | Stop reason: HOSPADM

## 2018-01-01 RX ORDER — HYDROMORPHONE HYDROCHLORIDE 1 MG/ML
1 INJECTION, SOLUTION INTRAMUSCULAR; INTRAVENOUS; SUBCUTANEOUS
Status: COMPLETED | OUTPATIENT
Start: 2018-01-01 | End: 2018-01-01

## 2018-01-01 RX ORDER — HEPARIN SODIUM 1000 [USP'U]/ML
INJECTION, SOLUTION INTRAVENOUS; SUBCUTANEOUS
Status: DISCONTINUED
Start: 2018-01-01 | End: 2018-01-01

## 2018-01-01 RX ORDER — ALBUMIN HUMAN 250 G/1000ML
12.5 SOLUTION INTRAVENOUS
Status: DISCONTINUED | OUTPATIENT
Start: 2018-01-01 | End: 2018-01-01 | Stop reason: HOSPADM

## 2018-01-01 RX ORDER — SODIUM CHLORIDE 9 MG/ML
100 INJECTION, SOLUTION INTRAVENOUS CONTINUOUS
Status: DISCONTINUED | OUTPATIENT
Start: 2018-01-01 | End: 2018-08-03 | Stop reason: HOSPADM

## 2018-01-01 RX ORDER — MAGNESIUM SULFATE 100 %
16 CRYSTALS MISCELLANEOUS AS NEEDED
Status: DISCONTINUED | OUTPATIENT
Start: 2018-01-01 | End: 2018-01-01 | Stop reason: HOSPADM

## 2018-01-01 RX ORDER — PROTAMINE SULFATE 10 MG/ML
INJECTION, SOLUTION INTRAVENOUS AS NEEDED
Status: DISCONTINUED | OUTPATIENT
Start: 2018-01-01 | End: 2018-01-01 | Stop reason: HOSPADM

## 2018-01-01 RX ORDER — LIDOCAINE HYDROCHLORIDE 10 MG/ML
0.1 INJECTION, SOLUTION EPIDURAL; INFILTRATION; INTRACAUDAL; PERINEURAL AS NEEDED
Status: DISCONTINUED | OUTPATIENT
Start: 2018-01-01 | End: 2018-01-01 | Stop reason: HOSPADM

## 2018-01-01 RX ORDER — FENTANYL CITRATE 50 UG/ML
50 INJECTION, SOLUTION INTRAMUSCULAR; INTRAVENOUS
Status: COMPLETED | OUTPATIENT
Start: 2018-01-01 | End: 2018-01-01

## 2018-01-01 RX ORDER — AMLODIPINE BESYLATE 10 MG/1
10 TABLET ORAL DAILY
Qty: 30 TAB | Refills: 0 | Status: SHIPPED
Start: 2018-01-01

## 2018-01-01 RX ORDER — ACETAMINOPHEN 10 MG/ML
1000 INJECTION, SOLUTION INTRAVENOUS ONCE
Status: COMPLETED | OUTPATIENT
Start: 2018-01-01 | End: 2018-01-01

## 2018-01-01 RX ORDER — CARVEDILOL 3.12 MG/1
3.12 TABLET ORAL 2 TIMES DAILY
Status: DISCONTINUED | OUTPATIENT
Start: 2018-01-01 | End: 2018-01-01

## 2018-01-01 RX ORDER — LIDOCAINE HYDROCHLORIDE 10 MG/ML
10 INJECTION, SOLUTION EPIDURAL; INFILTRATION; INTRACAUDAL; PERINEURAL
Status: COMPLETED | OUTPATIENT
Start: 2018-01-01 | End: 2018-01-01

## 2018-01-01 RX ORDER — MIDAZOLAM HYDROCHLORIDE 1 MG/ML
INJECTION, SOLUTION INTRAMUSCULAR; INTRAVENOUS AS NEEDED
Status: DISCONTINUED | OUTPATIENT
Start: 2018-01-01 | End: 2018-01-01 | Stop reason: HOSPADM

## 2018-01-01 RX ORDER — CINACALCET 60 MG/1
60 TABLET, FILM COATED ORAL DAILY
COMMUNITY
End: 2018-01-01

## 2018-01-01 RX ORDER — IPRATROPIUM BROMIDE AND ALBUTEROL SULFATE 2.5; .5 MG/3ML; MG/3ML
3 SOLUTION RESPIRATORY (INHALATION)
Status: DISCONTINUED | OUTPATIENT
Start: 2018-01-01 | End: 2018-08-03 | Stop reason: HOSPADM

## 2018-01-01 RX ORDER — INSULIN HUMAN 100 [IU]/ML
INJECTION, SUSPENSION SUBCUTANEOUS
Qty: 30 ML | Refills: 3 | Status: SHIPPED | OUTPATIENT
Start: 2018-01-01 | End: 2018-01-01 | Stop reason: DRUGHIGH

## 2018-01-01 RX ORDER — INSULIN GLARGINE 100 [IU]/ML
5 INJECTION, SOLUTION SUBCUTANEOUS DAILY
Status: DISCONTINUED | OUTPATIENT
Start: 2018-01-01 | End: 2018-08-03 | Stop reason: HOSPADM

## 2018-01-01 RX ORDER — ONDANSETRON 2 MG/ML
INJECTION INTRAMUSCULAR; INTRAVENOUS AS NEEDED
Status: DISCONTINUED | OUTPATIENT
Start: 2018-01-01 | End: 2018-01-01 | Stop reason: HOSPADM

## 2018-01-01 RX ORDER — IPRATROPIUM BROMIDE AND ALBUTEROL SULFATE 2.5; .5 MG/3ML; MG/3ML
3 SOLUTION RESPIRATORY (INHALATION) 3 TIMES DAILY
Status: DISCONTINUED | OUTPATIENT
Start: 2018-01-01 | End: 2018-01-01

## 2018-01-01 RX ORDER — HEPARIN SODIUM 200 [USP'U]/100ML
INJECTION, SOLUTION INTRAVENOUS
Status: COMPLETED
Start: 2018-01-01 | End: 2018-01-01

## 2018-01-01 RX ORDER — IPRATROPIUM BROMIDE AND ALBUTEROL SULFATE 2.5; .5 MG/3ML; MG/3ML
SOLUTION RESPIRATORY (INHALATION)
Status: DISPENSED
Start: 2018-01-01 | End: 2018-01-01

## 2018-01-01 RX ORDER — OSELTAMIVIR PHOSPHATE 30 MG/1
30 CAPSULE ORAL ONCE
Status: COMPLETED | OUTPATIENT
Start: 2018-01-01 | End: 2018-01-01

## 2018-01-01 RX ORDER — NALOXONE HYDROCHLORIDE 0.4 MG/ML
0.4 INJECTION, SOLUTION INTRAMUSCULAR; INTRAVENOUS; SUBCUTANEOUS AS NEEDED
Status: DISCONTINUED | OUTPATIENT
Start: 2018-01-01 | End: 2018-01-01 | Stop reason: HOSPADM

## 2018-01-01 RX ORDER — CALCIUM CHLORIDE INJECTION 100 MG/ML
INJECTION, SOLUTION INTRAVENOUS
Status: COMPLETED | OUTPATIENT
Start: 2018-01-01 | End: 2018-01-01

## 2018-01-01 RX ORDER — SODIUM CHLORIDE 0.9 % (FLUSH) 0.9 %
5-10 SYRINGE (ML) INJECTION AS NEEDED
Status: DISCONTINUED | OUTPATIENT
Start: 2018-01-01 | End: 2018-01-01 | Stop reason: SDUPTHER

## 2018-01-01 RX ORDER — OXYCODONE AND ACETAMINOPHEN 10; 325 MG/1; MG/1
1 TABLET ORAL
Qty: 10 TAB | Refills: 0 | Status: SHIPPED
Start: 2018-01-01 | End: 2018-01-01

## 2018-01-01 RX ORDER — PRAVASTATIN SODIUM 10 MG/1
10 TABLET ORAL
Qty: 30 TAB | Refills: 0 | Status: SHIPPED
Start: 2018-01-01

## 2018-01-01 RX ORDER — GUAIFENESIN 100 MG/5ML
81 LIQUID (ML) ORAL DAILY
Qty: 30 TAB | Refills: 0 | Status: SHIPPED
Start: 2018-01-01

## 2018-01-01 RX ORDER — SEVELAMER CARBONATE FOR ORAL SUSPENSION 2400 MG/1
2.4 POWDER, FOR SUSPENSION ORAL
COMMUNITY

## 2018-01-01 RX ORDER — LISINOPRIL 40 MG/1
TABLET ORAL
Qty: 90 TAB | Refills: 1 | Status: ON HOLD | OUTPATIENT
Start: 2018-01-01 | End: 2018-01-01

## 2018-01-01 RX ORDER — SORBITOL SOLUTION 70 %
30 SOLUTION, ORAL MISCELLANEOUS DAILY PRN
Status: DISCONTINUED | OUTPATIENT
Start: 2018-01-01 | End: 2018-01-01 | Stop reason: HOSPADM

## 2018-01-01 RX ORDER — MORPHINE SULFATE 10 MG/ML
2 INJECTION, SOLUTION INTRAMUSCULAR; INTRAVENOUS
Status: DISCONTINUED | OUTPATIENT
Start: 2018-01-01 | End: 2018-01-01

## 2018-01-01 RX ORDER — SEVELAMER CARBONATE FOR ORAL SUSPENSION 2400 MG/1
2.4 POWDER, FOR SUSPENSION ORAL
Status: DISCONTINUED | OUTPATIENT
Start: 2018-01-01 | End: 2018-01-01

## 2018-01-01 RX ORDER — INSULIN LISPRO 100 [IU]/ML
2-4 INJECTION, SOLUTION INTRAVENOUS; SUBCUTANEOUS
COMMUNITY

## 2018-01-01 RX ORDER — VANCOMYCIN 2 GRAM/500 ML IN 0.9 % SODIUM CHLORIDE INTRAVENOUS
2000 ONCE
Status: COMPLETED | OUTPATIENT
Start: 2018-01-01 | End: 2018-01-01

## 2018-01-01 RX ORDER — MORPHINE SULFATE 4 MG/ML
2 INJECTION INTRAVENOUS
Status: DISCONTINUED | OUTPATIENT
Start: 2018-01-01 | End: 2018-01-01 | Stop reason: HOSPADM

## 2018-01-01 RX ADMIN — HEPARIN SODIUM 5000 UNITS: 5000 INJECTION INTRAVENOUS; SUBCUTANEOUS at 19:50

## 2018-01-01 RX ADMIN — ASPIRIN 81 MG 81 MG: 81 TABLET ORAL at 09:44

## 2018-01-01 RX ADMIN — SEVELAMER CARBONATE 800 MG: 800 TABLET, FILM COATED ORAL at 09:29

## 2018-01-01 RX ADMIN — Medication 10 ML: at 05:44

## 2018-01-01 RX ADMIN — INSULIN GLARGINE 18 UNITS: 100 INJECTION, SOLUTION SUBCUTANEOUS at 22:15

## 2018-01-01 RX ADMIN — AMLODIPINE BESYLATE 10 MG: 5 TABLET ORAL at 09:29

## 2018-01-01 RX ADMIN — NYSTATIN 500000 UNITS: 100000 SUSPENSION ORAL at 13:48

## 2018-01-01 RX ADMIN — ESCITALOPRAM OXALATE 10 MG: 10 TABLET ORAL at 08:46

## 2018-01-01 RX ADMIN — SODIUM CHLORIDE 1000 ML: 900 INJECTION, SOLUTION INTRAVENOUS at 12:00

## 2018-01-01 RX ADMIN — IPRATROPIUM BROMIDE AND ALBUTEROL SULFATE 3 ML: .5; 3 SOLUTION RESPIRATORY (INHALATION) at 01:47

## 2018-01-01 RX ADMIN — Medication 10 ML: at 23:15

## 2018-01-01 RX ADMIN — SEVELAMER CARBONATE 800 MG: 800 TABLET, FILM COATED ORAL at 08:01

## 2018-01-01 RX ADMIN — MORPHINE SULFATE 2 MG: 4 INJECTION INTRAVENOUS at 20:26

## 2018-01-01 RX ADMIN — ONDANSETRON 4 MG: 2 INJECTION INTRAMUSCULAR; INTRAVENOUS at 20:12

## 2018-01-01 RX ADMIN — HEPARIN SODIUM 5000 UNITS: 5000 INJECTION, SOLUTION INTRAVENOUS; SUBCUTANEOUS at 14:33

## 2018-01-01 RX ADMIN — DEXTROSE MONOHYDRATE 25 G: 500 INJECTION PARENTERAL at 08:08

## 2018-01-01 RX ADMIN — HEPARIN SODIUM 5000 UNITS: 5000 INJECTION, SOLUTION INTRAVENOUS; SUBCUTANEOUS at 11:55

## 2018-01-01 RX ADMIN — AMLODIPINE BESYLATE 10 MG: 5 TABLET ORAL at 09:10

## 2018-01-01 RX ADMIN — SEVELAMER CARBONATE 800 MG: 800 TABLET, FILM COATED ORAL at 09:32

## 2018-01-01 RX ADMIN — PRAVASTATIN SODIUM 10 MG: 10 TABLET ORAL at 21:36

## 2018-01-01 RX ADMIN — CLONIDINE HYDROCHLORIDE 0.1 MG: 0.1 TABLET ORAL at 14:10

## 2018-01-01 RX ADMIN — SEVELAMER CARBONATE 2400 MG: 2400 POWDER, FOR SUSPENSION ORAL at 10:23

## 2018-01-01 RX ADMIN — PROPOFOL 100 MG: 10 INJECTION, EMULSION INTRAVENOUS at 12:58

## 2018-01-01 RX ADMIN — PRAVASTATIN SODIUM 10 MG: 10 TABLET ORAL at 22:52

## 2018-01-01 RX ADMIN — CLONIDINE HYDROCHLORIDE 0.1 MG: 0.1 TABLET ORAL at 08:52

## 2018-01-01 RX ADMIN — MORPHINE SULFATE 2 MG: 4 INJECTION INTRAVENOUS at 15:42

## 2018-01-01 RX ADMIN — ASPIRIN 81 MG 81 MG: 81 TABLET ORAL at 08:51

## 2018-01-01 RX ADMIN — NYSTATIN 500000 UNITS: 100000 SUSPENSION ORAL at 13:33

## 2018-01-01 RX ADMIN — SEVELAMER CARBONATE 2400 MG: 2400 POWDER, FOR SUSPENSION ORAL at 13:01

## 2018-01-01 RX ADMIN — ASPIRIN 81 MG 81 MG: 81 TABLET ORAL at 18:53

## 2018-01-01 RX ADMIN — HEPARIN SODIUM 5000 UNITS: 5000 INJECTION, SOLUTION INTRAVENOUS; SUBCUTANEOUS at 23:17

## 2018-01-01 RX ADMIN — CLONIDINE HYDROCHLORIDE 0.1 MG: 0.1 TABLET ORAL at 21:36

## 2018-01-01 RX ADMIN — ONDANSETRON 4 MG: 2 INJECTION INTRAMUSCULAR; INTRAVENOUS at 17:38

## 2018-01-01 RX ADMIN — SEVELAMER CARBONATE 800 MG: 800 TABLET, FILM COATED ORAL at 09:44

## 2018-01-01 RX ADMIN — Medication 10 ML: at 13:36

## 2018-01-01 RX ADMIN — DEXTROSE MONOHYDRATE 25 G: 500 INJECTION PARENTERAL at 22:55

## 2018-01-01 RX ADMIN — SODIUM CHLORIDE 3.38 G: 900 INJECTION INTRAVENOUS at 19:20

## 2018-01-01 RX ADMIN — CARVEDILOL 25 MG: 12.5 TABLET, FILM COATED ORAL at 09:30

## 2018-01-01 RX ADMIN — COLLAGENASE SANTYL: 250 OINTMENT TOPICAL at 15:37

## 2018-01-01 RX ADMIN — CLONIDINE HYDROCHLORIDE 0.1 MG: 0.1 TABLET ORAL at 21:42

## 2018-01-01 RX ADMIN — IPRATROPIUM BROMIDE AND ALBUTEROL SULFATE 3 ML: .5; 3 SOLUTION RESPIRATORY (INHALATION) at 20:30

## 2018-01-01 RX ADMIN — HEPARIN SODIUM 5000 UNITS: 5000 INJECTION, SOLUTION INTRAVENOUS; SUBCUTANEOUS at 21:15

## 2018-01-01 RX ADMIN — SODIUM HYPOCHLORITE: 1.25 SOLUTION TOPICAL at 09:00

## 2018-01-01 RX ADMIN — Medication 10 ML: at 12:19

## 2018-01-01 RX ADMIN — HEPARIN SODIUM 5000 UNITS: 5000 INJECTION INTRAVENOUS; SUBCUTANEOUS at 06:20

## 2018-01-01 RX ADMIN — CARVEDILOL 25 MG: 12.5 TABLET, FILM COATED ORAL at 11:59

## 2018-01-01 RX ADMIN — CINACALCET HYDROCHLORIDE 60 MG: 30 TABLET, COATED ORAL at 08:35

## 2018-01-01 RX ADMIN — NYSTATIN 500000 UNITS: 100000 SUSPENSION ORAL at 22:27

## 2018-01-01 RX ADMIN — SODIUM CHLORIDE 3.38 G: 900 INJECTION INTRAVENOUS at 05:55

## 2018-01-01 RX ADMIN — PIPERACILLIN SODIUM,TAZOBACTAM SODIUM 3.38 G: 3; .375 INJECTION, POWDER, FOR SOLUTION INTRAVENOUS at 03:39

## 2018-01-01 RX ADMIN — CARVEDILOL 25 MG: 12.5 TABLET, FILM COATED ORAL at 16:24

## 2018-01-01 RX ADMIN — HEPARIN SODIUM 5000 UNITS: 5000 INJECTION, SOLUTION INTRAVENOUS; SUBCUTANEOUS at 11:59

## 2018-01-01 RX ADMIN — ACETAMINOPHEN 1000 MG: 10 INJECTION, SOLUTION INTRAVENOUS at 21:15

## 2018-01-01 RX ADMIN — ESCITALOPRAM 10 MG: 5 SOLUTION ORAL at 09:17

## 2018-01-01 RX ADMIN — SEVELAMER CARBONATE 800 MG: 800 TABLET, FILM COATED ORAL at 18:53

## 2018-01-01 RX ADMIN — COLLAGENASE SANTYL: 250 OINTMENT TOPICAL at 08:56

## 2018-01-01 RX ADMIN — LEVOFLOXACIN 750 MG: 5 INJECTION, SOLUTION INTRAVENOUS at 14:18

## 2018-01-01 RX ADMIN — MIDAZOLAM HYDROCHLORIDE 1 MG: 1 INJECTION, SOLUTION INTRAMUSCULAR; INTRAVENOUS at 19:04

## 2018-01-01 RX ADMIN — LISINOPRIL 40 MG: 20 TABLET ORAL at 08:50

## 2018-01-01 RX ADMIN — SODIUM CHLORIDE 40 MG: 9 INJECTION INTRAMUSCULAR; INTRAVENOUS; SUBCUTANEOUS at 09:35

## 2018-01-01 RX ADMIN — Medication 10 ML: at 05:46

## 2018-01-01 RX ADMIN — HEPARIN SODIUM 5000 UNITS: 5000 INJECTION, SOLUTION INTRAVENOUS; SUBCUTANEOUS at 00:59

## 2018-01-01 RX ADMIN — LEVOFLOXACIN 500 MG: 5 INJECTION, SOLUTION INTRAVENOUS at 18:00

## 2018-01-01 RX ADMIN — ASPIRIN 81 MG CHEWABLE TABLET 81 MG: 81 TABLET CHEWABLE at 09:33

## 2018-01-01 RX ADMIN — METRONIDAZOLE 500 MG: 500 INJECTION, SOLUTION INTRAVENOUS at 09:34

## 2018-01-01 RX ADMIN — SODIUM BICARBONATE 50 MEQ: 84 INJECTION, SOLUTION INTRAVENOUS at 20:07

## 2018-01-01 RX ADMIN — SEVELAMER CARBONATE 800 MG: 800 TABLET, FILM COATED ORAL at 12:09

## 2018-01-01 RX ADMIN — NEPHROCAP 1 CAPSULE: 1 CAP ORAL at 08:22

## 2018-01-01 RX ADMIN — INSULIN HUMAN 10 UNITS: 100 INJECTION, SUSPENSION SUBCUTANEOUS at 08:01

## 2018-01-01 RX ADMIN — SEVELAMER CARBONATE 800 MG: 800 TABLET, FILM COATED ORAL at 06:43

## 2018-01-01 RX ADMIN — ASPIRIN 325 MG ORAL TABLET 325 MG: 325 PILL ORAL at 12:30

## 2018-01-01 RX ADMIN — SEVELAMER CARBONATE 2400 MG: 2400 POWDER, FOR SUSPENSION ORAL at 15:11

## 2018-01-01 RX ADMIN — ESCITALOPRAM 10 MG: 5 SOLUTION ORAL at 09:01

## 2018-01-01 RX ADMIN — MORPHINE SULFATE 2 MG: 4 INJECTION INTRAVENOUS at 08:32

## 2018-01-01 RX ADMIN — HEPARIN SODIUM 5000 UNITS: 5000 INJECTION INTRAVENOUS; SUBCUTANEOUS at 06:55

## 2018-01-01 RX ADMIN — CARVEDILOL 25 MG: 12.5 TABLET, FILM COATED ORAL at 08:59

## 2018-01-01 RX ADMIN — PIPERACILLIN SODIUM,TAZOBACTAM SODIUM 3.38 G: 3; .375 INJECTION, POWDER, FOR SOLUTION INTRAVENOUS at 05:30

## 2018-01-01 RX ADMIN — AMLODIPINE BESYLATE 10 MG: 5 TABLET ORAL at 08:50

## 2018-01-01 RX ADMIN — CASTOR OIL AND BALSAM, PERU: 788; 87 OINTMENT TOPICAL at 07:59

## 2018-01-01 RX ADMIN — MIDAZOLAM HYDROCHLORIDE 1 MG: 1 INJECTION, SOLUTION INTRAMUSCULAR; INTRAVENOUS at 16:09

## 2018-01-01 RX ADMIN — METRONIDAZOLE 500 MG: 500 INJECTION, SOLUTION INTRAVENOUS at 22:19

## 2018-01-01 RX ADMIN — ASPIRIN 81 MG 81 MG: 81 TABLET ORAL at 08:56

## 2018-01-01 RX ADMIN — METOPROLOL TARTRATE 1.25 MG: 5 INJECTION, SOLUTION INTRAVENOUS at 15:46

## 2018-01-01 RX ADMIN — ESCITALOPRAM OXALATE 10 MG: 10 TABLET ORAL at 08:16

## 2018-01-01 RX ADMIN — HEPARIN SODIUM 5000 UNITS: 5000 INJECTION, SOLUTION INTRAVENOUS; SUBCUTANEOUS at 21:03

## 2018-01-01 RX ADMIN — SODIUM CHLORIDE 500 MG: 900 INJECTION, SOLUTION INTRAVENOUS at 05:21

## 2018-01-01 RX ADMIN — CLONIDINE HYDROCHLORIDE 0.1 MG: 0.1 TABLET ORAL at 06:43

## 2018-01-01 RX ADMIN — LIDOCAINE HYDROCHLORIDE 2 ML: 10 INJECTION, SOLUTION INFILTRATION; PERINEURAL at 15:56

## 2018-01-01 RX ADMIN — HEPARIN SODIUM 5000 UNITS: 5000 INJECTION, SOLUTION INTRAVENOUS; SUBCUTANEOUS at 16:14

## 2018-01-01 RX ADMIN — LIDOCAINE HYDROCHLORIDE 60 MG: 20 INJECTION, SOLUTION EPIDURAL; INFILTRATION; INTRACAUDAL; PERINEURAL at 12:58

## 2018-01-01 RX ADMIN — Medication 10 ML: at 12:20

## 2018-01-01 RX ADMIN — SODIUM CHLORIDE 50 ML/HR: 900 INJECTION, SOLUTION INTRAVENOUS at 01:00

## 2018-01-01 RX ADMIN — FENTANYL CITRATE 25 MCG: 50 INJECTION, SOLUTION INTRAMUSCULAR; INTRAVENOUS at 15:49

## 2018-01-01 RX ADMIN — ESCITALOPRAM 10 MG: 5 SOLUTION ORAL at 12:20

## 2018-01-01 RX ADMIN — Medication 10 ML: at 06:10

## 2018-01-01 RX ADMIN — PROPOFOL 20 MG: 10 INJECTION, EMULSION INTRAVENOUS at 13:03

## 2018-01-01 RX ADMIN — SODIUM CHLORIDE 40 MG: 9 INJECTION INTRAMUSCULAR; INTRAVENOUS; SUBCUTANEOUS at 22:03

## 2018-01-01 RX ADMIN — CASTOR OIL AND BALSAM, PERU: 788; 87 OINTMENT TOPICAL at 09:17

## 2018-01-01 RX ADMIN — NYSTATIN 500000 UNITS: 100000 SUSPENSION ORAL at 21:42

## 2018-01-01 RX ADMIN — PROPOFOL 50 MG: 10 INJECTION, EMULSION INTRAVENOUS at 13:00

## 2018-01-01 RX ADMIN — SEVELAMER CARBONATE 2400 MG: 2400 POWDER, FOR SUSPENSION ORAL at 17:49

## 2018-01-01 RX ADMIN — SODIUM CHLORIDE 1000 ML: 900 INJECTION, SOLUTION INTRAVENOUS at 12:01

## 2018-01-01 RX ADMIN — IPRATROPIUM BROMIDE AND ALBUTEROL SULFATE 3 ML: .5; 3 SOLUTION RESPIRATORY (INHALATION) at 20:28

## 2018-01-01 RX ADMIN — ASPIRIN 81 MG CHEWABLE TABLET 81 MG: 81 TABLET CHEWABLE at 09:27

## 2018-01-01 RX ADMIN — Medication 5 ML: at 16:31

## 2018-01-01 RX ADMIN — NYSTATIN 500000 UNITS: 100000 SUSPENSION ORAL at 13:37

## 2018-01-01 RX ADMIN — AMLODIPINE BESYLATE 10 MG: 5 TABLET ORAL at 08:25

## 2018-01-01 RX ADMIN — LISINOPRIL 40 MG: 20 TABLET ORAL at 08:16

## 2018-01-01 RX ADMIN — METOPROLOL TARTRATE 1 MG: 5 INJECTION INTRAVENOUS at 19:25

## 2018-01-01 RX ADMIN — SEVELAMER CARBONATE 800 MG: 800 TABLET, FILM COATED ORAL at 17:34

## 2018-01-01 RX ADMIN — COLLAGENASE SANTYL: 250 OINTMENT TOPICAL at 12:21

## 2018-01-01 RX ADMIN — Medication 10 ML: at 06:31

## 2018-01-01 RX ADMIN — PRAVASTATIN SODIUM 10 MG: 10 TABLET ORAL at 21:41

## 2018-01-01 RX ADMIN — PRAVASTATIN SODIUM 10 MG: 10 TABLET ORAL at 21:02

## 2018-01-01 RX ADMIN — NYSTATIN 500000 UNITS: 100000 SUSPENSION ORAL at 09:32

## 2018-01-01 RX ADMIN — FAMOTIDINE 20 MG: 20 TABLET, FILM COATED ORAL at 06:28

## 2018-01-01 RX ADMIN — ASPIRIN 81 MG: 81 TABLET, COATED ORAL at 14:01

## 2018-01-01 RX ADMIN — ONDANSETRON 4 MG: 2 INJECTION INTRAMUSCULAR; INTRAVENOUS at 18:22

## 2018-01-01 RX ADMIN — SODIUM CHLORIDE 40 MG: 9 INJECTION INTRAMUSCULAR; INTRAVENOUS; SUBCUTANEOUS at 09:40

## 2018-01-01 RX ADMIN — OXYCODONE HYDROCHLORIDE 5 MG: 5 TABLET ORAL at 12:05

## 2018-01-01 RX ADMIN — IPRATROPIUM BROMIDE AND ALBUTEROL SULFATE 3 ML: .5; 3 SOLUTION RESPIRATORY (INHALATION) at 17:32

## 2018-01-01 RX ADMIN — HEPARIN SODIUM 5000 UNITS: 5000 INJECTION INTRAVENOUS; SUBCUTANEOUS at 06:11

## 2018-01-01 RX ADMIN — CARVEDILOL 25 MG: 12.5 TABLET, FILM COATED ORAL at 08:35

## 2018-01-01 RX ADMIN — MORPHINE SULFATE 2 MG: 4 INJECTION INTRAVENOUS at 10:09

## 2018-01-01 RX ADMIN — METOPROLOL TARTRATE 1.25 MG: 5 INJECTION, SOLUTION INTRAVENOUS at 09:42

## 2018-01-01 RX ADMIN — COLLAGENASE SANTYL: 250 OINTMENT TOPICAL at 14:09

## 2018-01-01 RX ADMIN — ASPIRIN 81 MG CHEWABLE TABLET 81 MG: 81 TABLET CHEWABLE at 09:00

## 2018-01-01 RX ADMIN — HEPARIN SODIUM 5000 UNITS: 5000 INJECTION, SOLUTION INTRAVENOUS; SUBCUTANEOUS at 05:12

## 2018-01-01 RX ADMIN — MORPHINE SULFATE 2 MG: 4 INJECTION INTRAVENOUS at 09:31

## 2018-01-01 RX ADMIN — LISINOPRIL 40 MG: 20 TABLET ORAL at 18:45

## 2018-01-01 RX ADMIN — INSULIN HUMAN 10 UNITS: 100 INJECTION, SUSPENSION SUBCUTANEOUS at 08:16

## 2018-01-01 RX ADMIN — SODIUM HYPOCHLORITE: 1.25 SOLUTION TOPICAL at 14:28

## 2018-01-01 RX ADMIN — SEVELAMER CARBONATE 800 MG: 800 TABLET, FILM COATED ORAL at 21:49

## 2018-01-01 RX ADMIN — SODIUM CHLORIDE 75 ML/HR: 900 INJECTION, SOLUTION INTRAVENOUS at 19:32

## 2018-01-01 RX ADMIN — IPRATROPIUM BROMIDE AND ALBUTEROL SULFATE 3 ML: .5; 3 SOLUTION RESPIRATORY (INHALATION) at 19:07

## 2018-01-01 RX ADMIN — METOPROLOL TARTRATE 1 MG: 5 INJECTION INTRAVENOUS at 19:20

## 2018-01-01 RX ADMIN — CEFEPIME 1 G: 1 INJECTION, POWDER, FOR SOLUTION INTRAMUSCULAR; INTRAVENOUS at 21:18

## 2018-01-01 RX ADMIN — DEXTROSE MONOHYDRATE AND SODIUM CHLORIDE 50 ML/HR: 5; .45 INJECTION, SOLUTION INTRAVENOUS at 16:51

## 2018-01-01 RX ADMIN — SEVELAMER CARBONATE 2400 MG: 2400 POWDER, FOR SUSPENSION ORAL at 09:34

## 2018-01-01 RX ADMIN — COLLAGENASE SANTYL: 250 OINTMENT TOPICAL at 12:41

## 2018-01-01 RX ADMIN — HEPARIN SODIUM 5000 UNITS: 5000 INJECTION, SOLUTION INTRAVENOUS; SUBCUTANEOUS at 08:16

## 2018-01-01 RX ADMIN — CLONIDINE HYDROCHLORIDE 0.1 MG: 0.1 TABLET ORAL at 18:38

## 2018-01-01 RX ADMIN — METRONIDAZOLE 500 MG: 500 INJECTION, SOLUTION INTRAVENOUS at 14:00

## 2018-01-01 RX ADMIN — MORPHINE SULFATE 10 MG: 10 SOLUTION ORAL at 10:23

## 2018-01-01 RX ADMIN — SEVELAMER CARBONATE 800 MG: 800 TABLET, FILM COATED ORAL at 20:07

## 2018-01-01 RX ADMIN — PROPOFOL 30 MG: 10 INJECTION, EMULSION INTRAVENOUS at 18:51

## 2018-01-01 RX ADMIN — PIPERACILLIN SODIUM,TAZOBACTAM SODIUM 3.38 G: 3; .375 INJECTION, POWDER, FOR SOLUTION INTRAVENOUS at 05:15

## 2018-01-01 RX ADMIN — IPRATROPIUM BROMIDE AND ALBUTEROL SULFATE 3 ML: .5; 3 SOLUTION RESPIRATORY (INHALATION) at 07:51

## 2018-01-01 RX ADMIN — POTASSIUM CHLORIDE 10 MEQ: 750 TABLET, EXTENDED RELEASE ORAL at 12:54

## 2018-01-01 RX ADMIN — LISINOPRIL 40 MG: 20 TABLET ORAL at 21:15

## 2018-01-01 RX ADMIN — NYSTATIN 500000 UNITS: 100000 SUSPENSION ORAL at 09:17

## 2018-01-01 RX ADMIN — SEVELAMER CARBONATE 2400 MG: 2400 POWDER, FOR SUSPENSION ORAL at 14:02

## 2018-01-01 RX ADMIN — HEPARIN SODIUM 5000 UNITS: 5000 INJECTION, SOLUTION INTRAVENOUS; SUBCUTANEOUS at 05:22

## 2018-01-01 RX ADMIN — LISINOPRIL 40 MG: 20 TABLET ORAL at 21:05

## 2018-01-01 RX ADMIN — FAMOTIDINE 20 MG: 20 TABLET, FILM COATED ORAL at 06:24

## 2018-01-01 RX ADMIN — CASTOR OIL AND BALSAM, PERU: 788; 87 OINTMENT TOPICAL at 09:27

## 2018-01-01 RX ADMIN — IOPAMIDOL 100 ML: 755 INJECTION, SOLUTION INTRAVENOUS at 17:14

## 2018-01-01 RX ADMIN — ONDANSETRON 4 MG: 2 INJECTION INTRAMUSCULAR; INTRAVENOUS at 07:41

## 2018-01-01 RX ADMIN — MORPHINE SULFATE 10 MG: 10 SOLUTION ORAL at 16:31

## 2018-01-01 RX ADMIN — CASTOR OIL AND BALSAM, PERU: 788; 87 OINTMENT TOPICAL at 09:59

## 2018-01-01 RX ADMIN — Medication 10 ML: at 20:39

## 2018-01-01 RX ADMIN — NYSTATIN 500000 UNITS: 100000 SUSPENSION ORAL at 13:54

## 2018-01-01 RX ADMIN — POTASSIUM CHLORIDE 10 MEQ: 750 TABLET, EXTENDED RELEASE ORAL at 08:25

## 2018-01-01 RX ADMIN — PIPERACILLIN SODIUM,TAZOBACTAM SODIUM 3.38 G: 3; .375 INJECTION, POWDER, FOR SOLUTION INTRAVENOUS at 18:37

## 2018-01-01 RX ADMIN — NYSTATIN 500000 UNITS: 100000 SUSPENSION ORAL at 09:00

## 2018-01-01 RX ADMIN — PRAVASTATIN SODIUM 10 MG: 10 TABLET ORAL at 21:24

## 2018-01-01 RX ADMIN — NYSTATIN 500000 UNITS: 100000 SUSPENSION ORAL at 17:24

## 2018-01-01 RX ADMIN — VANCOMYCIN HYDROCHLORIDE 750 MG: 750 INJECTION, POWDER, LYOPHILIZED, FOR SOLUTION INTRAVENOUS at 11:51

## 2018-01-01 RX ADMIN — CLONIDINE HYDROCHLORIDE 0.1 MG: 0.1 TABLET ORAL at 06:16

## 2018-01-01 RX ADMIN — ERYTHROPOIETIN 10000 UNITS: 10000 INJECTION, SOLUTION INTRAVENOUS; SUBCUTANEOUS at 17:12

## 2018-01-01 RX ADMIN — LISINOPRIL 40 MG: 20 TABLET ORAL at 09:31

## 2018-01-01 RX ADMIN — CINACALCET HYDROCHLORIDE 60 MG: 30 TABLET, COATED ORAL at 08:51

## 2018-01-01 RX ADMIN — CLONIDINE HYDROCHLORIDE 0.1 MG: 0.1 TABLET ORAL at 06:21

## 2018-01-01 RX ADMIN — PRAVASTATIN SODIUM 10 MG: 10 TABLET ORAL at 00:13

## 2018-01-01 RX ADMIN — ONDANSETRON 4 MG: 2 INJECTION INTRAMUSCULAR; INTRAVENOUS at 18:58

## 2018-01-01 RX ADMIN — CARVEDILOL 25 MG: 12.5 TABLET, FILM COATED ORAL at 22:00

## 2018-01-01 RX ADMIN — ASPIRIN 81 MG 81 MG: 81 TABLET ORAL at 08:02

## 2018-01-01 RX ADMIN — SODIUM CHLORIDE 40 MG: 9 INJECTION INTRAMUSCULAR; INTRAVENOUS; SUBCUTANEOUS at 20:24

## 2018-01-01 RX ADMIN — LIDOCAINE HYDROCHLORIDE 2 ML: 10 INJECTION INFILTRATION; PERINEURAL at 15:56

## 2018-01-01 RX ADMIN — MIDAZOLAM HYDROCHLORIDE 1 MG: 1 INJECTION, SOLUTION INTRAMUSCULAR; INTRAVENOUS at 18:54

## 2018-01-01 RX ADMIN — MIDAZOLAM HYDROCHLORIDE 1 MG: 1 INJECTION, SOLUTION INTRAMUSCULAR; INTRAVENOUS at 15:32

## 2018-01-01 RX ADMIN — MELATONIN 3 MG ORAL TABLET 3 MG: 3 TABLET ORAL at 21:43

## 2018-01-01 RX ADMIN — CASTOR OIL AND BALSAM, PERU: 788; 87 OINTMENT TOPICAL at 22:00

## 2018-01-01 RX ADMIN — Medication 10 ML: at 06:46

## 2018-01-01 RX ADMIN — PIPERACILLIN SODIUM,TAZOBACTAM SODIUM 3.38 G: 3; .375 INJECTION, POWDER, FOR SOLUTION INTRAVENOUS at 17:45

## 2018-01-01 RX ADMIN — INSULIN LISPRO 1 UNITS: 100 INJECTION, SOLUTION INTRAVENOUS; SUBCUTANEOUS at 00:00

## 2018-01-01 RX ADMIN — HEPARIN SODIUM 5000 UNITS: 5000 INJECTION, SOLUTION INTRAVENOUS; SUBCUTANEOUS at 06:40

## 2018-01-01 RX ADMIN — INSULIN LISPRO 2 UNITS: 100 INJECTION, SOLUTION INTRAVENOUS; SUBCUTANEOUS at 06:31

## 2018-01-01 RX ADMIN — SEVELAMER CARBONATE 800 MG: 800 TABLET, FILM COATED ORAL at 09:31

## 2018-01-01 RX ADMIN — Medication 10 ML: at 22:00

## 2018-01-01 RX ADMIN — NEPHROCAP 1 CAPSULE: 1 CAP ORAL at 08:16

## 2018-01-01 RX ADMIN — SEVELAMER CARBONATE 800 MG: 800 TABLET, FILM COATED ORAL at 16:59

## 2018-01-01 RX ADMIN — GUAIFENESIN 400 MG: 200 SOLUTION ORAL at 09:40

## 2018-01-01 RX ADMIN — METOPROLOL TARTRATE 1.25 MG: 5 INJECTION, SOLUTION INTRAVENOUS at 10:38

## 2018-01-01 RX ADMIN — METRONIDAZOLE 500 MG: 500 INJECTION, SOLUTION INTRAVENOUS at 21:44

## 2018-01-01 RX ADMIN — OXYCODONE HYDROCHLORIDE AND ACETAMINOPHEN 1 TABLET: 5; 325 TABLET ORAL at 01:10

## 2018-01-01 RX ADMIN — SEVELAMER CARBONATE 800 MG: 800 TABLET, FILM COATED ORAL at 16:19

## 2018-01-01 RX ADMIN — SORBITOL SOLUTION (BULK) 30 ML: 70 SOLUTION at 17:15

## 2018-01-01 RX ADMIN — AMLODIPINE BESYLATE 10 MG: 5 TABLET ORAL at 18:53

## 2018-01-01 RX ADMIN — OXYCODONE HYDROCHLORIDE AND ACETAMINOPHEN 1 TABLET: 10; 325 TABLET ORAL at 21:52

## 2018-01-01 RX ADMIN — SODIUM CHLORIDE 40 MG: 9 INJECTION INTRAMUSCULAR; INTRAVENOUS; SUBCUTANEOUS at 20:08

## 2018-01-01 RX ADMIN — CLONIDINE HYDROCHLORIDE 0.1 MG: 0.1 TABLET ORAL at 21:15

## 2018-01-01 RX ADMIN — CLONIDINE HYDROCHLORIDE 0.1 MG: 0.1 TABLET ORAL at 21:30

## 2018-01-01 RX ADMIN — CASTOR OIL AND BALSAM, PERU: 788; 87 OINTMENT TOPICAL at 23:44

## 2018-01-01 RX ADMIN — CEFEPIME 1 G: 1 INJECTION, POWDER, FOR SOLUTION INTRAMUSCULAR; INTRAVENOUS at 12:39

## 2018-01-01 RX ADMIN — Medication 10 ML: at 07:05

## 2018-01-01 RX ADMIN — Medication 10 ML: at 06:07

## 2018-01-01 RX ADMIN — SODIUM CHLORIDE 40 MG: 9 INJECTION INTRAMUSCULAR; INTRAVENOUS; SUBCUTANEOUS at 09:34

## 2018-01-01 RX ADMIN — CLONIDINE HYDROCHLORIDE 0.1 MG: 0.1 TABLET ORAL at 00:17

## 2018-01-01 RX ADMIN — IPRATROPIUM BROMIDE AND ALBUTEROL SULFATE 3 ML: .5; 3 SOLUTION RESPIRATORY (INHALATION) at 13:40

## 2018-01-01 RX ADMIN — DEXTROSE MONOHYDRATE 12.5 G: 500 INJECTION PARENTERAL at 22:14

## 2018-01-01 RX ADMIN — Medication 10 ML: at 10:10

## 2018-01-01 RX ADMIN — Medication 1 CAPSULE: at 08:30

## 2018-01-01 RX ADMIN — BUMETANIDE 2 MG: 0.25 INJECTION INTRAMUSCULAR; INTRAVENOUS at 01:50

## 2018-01-01 RX ADMIN — ESCITALOPRAM 10 MG: 5 SOLUTION ORAL at 09:05

## 2018-01-01 RX ADMIN — CEFEPIME 1 G: 1 INJECTION, POWDER, FOR SOLUTION INTRAMUSCULAR; INTRAVENOUS at 20:57

## 2018-01-01 RX ADMIN — HEPARIN SODIUM 5000 UNITS: 5000 INJECTION, SOLUTION INTRAVENOUS; SUBCUTANEOUS at 06:44

## 2018-01-01 RX ADMIN — CASTOR OIL AND BALSAM, PERU: 788; 87 OINTMENT TOPICAL at 09:00

## 2018-01-01 RX ADMIN — OXYCODONE HYDROCHLORIDE AND ACETAMINOPHEN 1 TABLET: 5; 325 TABLET ORAL at 17:09

## 2018-01-01 RX ADMIN — HEPARIN SODIUM 5000 UNITS: 5000 INJECTION, SOLUTION INTRAVENOUS; SUBCUTANEOUS at 05:53

## 2018-01-01 RX ADMIN — CARVEDILOL 25 MG: 12.5 TABLET, FILM COATED ORAL at 09:31

## 2018-01-01 RX ADMIN — HEPARIN SODIUM 5000 UNITS: 5000 INJECTION INTRAVENOUS; SUBCUTANEOUS at 07:27

## 2018-01-01 RX ADMIN — SEVELAMER CARBONATE 800 MG: 800 TABLET, FILM COATED ORAL at 16:56

## 2018-01-01 RX ADMIN — ESCITALOPRAM OXALATE 10 MG: 10 TABLET ORAL at 09:19

## 2018-01-01 RX ADMIN — SEVELAMER CARBONATE 800 MG: 800 TABLET, FILM COATED ORAL at 12:49

## 2018-01-01 RX ADMIN — ASPIRIN 81 MG CHEWABLE TABLET 81 MG: 81 TABLET CHEWABLE at 09:18

## 2018-01-01 RX ADMIN — HEPARIN SODIUM 5000 UNITS: 5000 INJECTION INTRAVENOUS; SUBCUTANEOUS at 06:06

## 2018-01-01 RX ADMIN — Medication 10 ML: at 23:34

## 2018-01-01 RX ADMIN — SEVELAMER CARBONATE 2400 MG: 2400 POWDER, FOR SUSPENSION ORAL at 08:54

## 2018-01-01 RX ADMIN — INSULIN LISPRO 4 UNITS: 100 INJECTION, SOLUTION INTRAVENOUS; SUBCUTANEOUS at 17:32

## 2018-01-01 RX ADMIN — CEFEPIME 1 G: 1 INJECTION, POWDER, FOR SOLUTION INTRAMUSCULAR; INTRAVENOUS at 21:32

## 2018-01-01 RX ADMIN — Medication 10 ML: at 21:38

## 2018-01-01 RX ADMIN — Medication 1 CAPSULE: at 08:31

## 2018-01-01 RX ADMIN — INSULIN LISPRO 2 UNITS: 100 INJECTION, SOLUTION INTRAVENOUS; SUBCUTANEOUS at 12:50

## 2018-01-01 RX ADMIN — CARVEDILOL 25 MG: 12.5 TABLET, FILM COATED ORAL at 16:33

## 2018-01-01 RX ADMIN — PRAVASTATIN SODIUM 10 MG: 10 TABLET ORAL at 21:14

## 2018-01-01 RX ADMIN — CARVEDILOL 25 MG: 12.5 TABLET, FILM COATED ORAL at 00:14

## 2018-01-01 RX ADMIN — VANCOMYCIN HYDROCHLORIDE 1750 MG: 10 INJECTION, POWDER, LYOPHILIZED, FOR SOLUTION INTRAVENOUS at 20:39

## 2018-01-01 RX ADMIN — LISINOPRIL 40 MG: 20 TABLET ORAL at 09:44

## 2018-01-01 RX ADMIN — PIPERACILLIN SODIUM,TAZOBACTAM SODIUM 3.38 G: 3; .375 INJECTION, POWDER, FOR SOLUTION INTRAVENOUS at 05:37

## 2018-01-01 RX ADMIN — ERYTHROPOIETIN 10000 UNITS: 10000 INJECTION, SOLUTION INTRAVENOUS; SUBCUTANEOUS at 23:14

## 2018-01-01 RX ADMIN — ACETAMINOPHEN 650 MG: 325 SOLUTION ORAL at 01:43

## 2018-01-01 RX ADMIN — HEPARIN SODIUM 5000 UNITS: 5000 INJECTION, SOLUTION INTRAVENOUS; SUBCUTANEOUS at 08:50

## 2018-01-01 RX ADMIN — PIPERACILLIN SODIUM,TAZOBACTAM SODIUM 3.38 G: 3; .375 INJECTION, POWDER, FOR SOLUTION INTRAVENOUS at 06:30

## 2018-01-01 RX ADMIN — SEVELAMER CARBONATE 2400 MG: 2400 POWDER, FOR SUSPENSION ORAL at 13:34

## 2018-01-01 RX ADMIN — METOPROLOL TARTRATE 1.25 MG: 5 INJECTION, SOLUTION INTRAVENOUS at 18:13

## 2018-01-01 RX ADMIN — METRONIDAZOLE 500 MG: 500 INJECTION, SOLUTION INTRAVENOUS at 09:11

## 2018-01-01 RX ADMIN — HEPARIN SODIUM 5000 UNITS: 5000 INJECTION INTRAVENOUS; SUBCUTANEOUS at 18:01

## 2018-01-01 RX ADMIN — SEVELAMER CARBONATE 800 MG: 800 TABLET, FILM COATED ORAL at 23:05

## 2018-01-01 RX ADMIN — SEVELAMER CARBONATE 800 MG: 800 TABLET, FILM COATED ORAL at 08:37

## 2018-01-01 RX ADMIN — CASTOR OIL AND BALSAM, PERU: 788; 87 OINTMENT TOPICAL at 10:53

## 2018-01-01 RX ADMIN — INSULIN LISPRO 2 UNITS: 100 INJECTION, SOLUTION INTRAVENOUS; SUBCUTANEOUS at 17:56

## 2018-01-01 RX ADMIN — ACETAMINOPHEN 650 MG: 325 SOLUTION ORAL at 19:59

## 2018-01-01 RX ADMIN — CINACALCET HYDROCHLORIDE 60 MG: 30 TABLET, COATED ORAL at 08:16

## 2018-01-01 RX ADMIN — CINACALCET HYDROCHLORIDE 60 MG: 30 TABLET, COATED ORAL at 08:52

## 2018-01-01 RX ADMIN — CASTOR OIL AND BALSAM, PERU: 788; 87 OINTMENT TOPICAL at 18:18

## 2018-01-01 RX ADMIN — ACETYLCYSTEINE 400 MG: 200 SOLUTION ORAL; RESPIRATORY (INHALATION) at 07:55

## 2018-01-01 RX ADMIN — MORPHINE SULFATE 2 MG: 4 INJECTION INTRAVENOUS at 13:11

## 2018-01-01 RX ADMIN — CARVEDILOL 25 MG: 12.5 TABLET, FILM COATED ORAL at 17:13

## 2018-01-01 RX ADMIN — CASTOR OIL AND BALSAM, PERU: 788; 87 OINTMENT TOPICAL at 18:39

## 2018-01-01 RX ADMIN — OSELTAMIVIR PHOSPHATE 30 MG: 30 CAPSULE ORAL at 20:37

## 2018-01-01 RX ADMIN — MORPHINE SULFATE 2 MG: 4 INJECTION INTRAVENOUS at 14:01

## 2018-01-01 RX ADMIN — Medication 10 ML: at 22:10

## 2018-01-01 RX ADMIN — PIPERACILLIN SODIUM,TAZOBACTAM SODIUM 3.38 G: 3; .375 INJECTION, POWDER, FOR SOLUTION INTRAVENOUS at 17:49

## 2018-01-01 RX ADMIN — LISINOPRIL 40 MG: 20 TABLET ORAL at 08:56

## 2018-01-01 RX ADMIN — CALCIUM CHLORIDE 1 G: 100 INJECTION, SOLUTION INTRAVENOUS at 20:06

## 2018-01-01 RX ADMIN — GUAIFENESIN 1200 MG: 600 TABLET, EXTENDED RELEASE ORAL at 21:44

## 2018-01-01 RX ADMIN — INSULIN LISPRO 2 UNITS: 100 INJECTION, SOLUTION INTRAVENOUS; SUBCUTANEOUS at 18:00

## 2018-01-01 RX ADMIN — SODIUM CHLORIDE 50 ML/HR: 900 INJECTION, SOLUTION INTRAVENOUS at 10:10

## 2018-01-01 RX ADMIN — HEPARIN SODIUM 5000 UNITS: 5000 INJECTION INTRAVENOUS; SUBCUTANEOUS at 17:27

## 2018-01-01 RX ADMIN — SODIUM CHLORIDE 40 MG: 9 INJECTION INTRAMUSCULAR; INTRAVENOUS; SUBCUTANEOUS at 20:18

## 2018-01-01 RX ADMIN — HYDROMORPHONE HYDROCHLORIDE 1 MG: 1 INJECTION, SOLUTION INTRAMUSCULAR; INTRAVENOUS; SUBCUTANEOUS at 11:56

## 2018-01-01 RX ADMIN — IPRATROPIUM BROMIDE AND ALBUTEROL SULFATE 3 ML: .5; 3 SOLUTION RESPIRATORY (INHALATION) at 07:29

## 2018-01-01 RX ADMIN — Medication 10 ML: at 19:52

## 2018-01-01 RX ADMIN — METRONIDAZOLE 500 MG: 500 INJECTION, SOLUTION INTRAVENOUS at 23:30

## 2018-01-01 RX ADMIN — CARVEDILOL 25 MG: 12.5 TABLET, FILM COATED ORAL at 17:15

## 2018-01-01 RX ADMIN — METRONIDAZOLE 500 MG: 500 INJECTION, SOLUTION INTRAVENOUS at 22:04

## 2018-01-01 RX ADMIN — CASTOR OIL AND BALSAM, PERU: 788; 87 OINTMENT TOPICAL at 17:58

## 2018-01-01 RX ADMIN — IPRATROPIUM BROMIDE AND ALBUTEROL SULFATE 3 ML: .5; 3 SOLUTION RESPIRATORY (INHALATION) at 19:30

## 2018-01-01 RX ADMIN — CASTOR OIL AND BALSAM, PERU: 788; 87 OINTMENT TOPICAL at 18:49

## 2018-01-01 RX ADMIN — PRAVASTATIN SODIUM 10 MG: 10 TABLET ORAL at 23:34

## 2018-01-01 RX ADMIN — CASTOR OIL AND BALSAM, PERU: 788; 87 OINTMENT TOPICAL at 09:18

## 2018-01-01 RX ADMIN — PRAVASTATIN SODIUM 10 MG: 10 TABLET ORAL at 21:15

## 2018-01-01 RX ADMIN — MELATONIN 3 MG ORAL TABLET 3 MG: 3 TABLET ORAL at 21:24

## 2018-01-01 RX ADMIN — VANCOMYCIN HYDROCHLORIDE 750 MG: 750 INJECTION, POWDER, LYOPHILIZED, FOR SOLUTION INTRAVENOUS at 14:34

## 2018-01-01 RX ADMIN — ACETYLCYSTEINE 400 MG: 200 SOLUTION ORAL; RESPIRATORY (INHALATION) at 19:31

## 2018-01-01 RX ADMIN — ACETYLCYSTEINE 400 MG: 200 SOLUTION ORAL; RESPIRATORY (INHALATION) at 02:45

## 2018-01-01 RX ADMIN — COLLAGENASE SANTYL: 250 OINTMENT TOPICAL at 09:17

## 2018-01-01 RX ADMIN — Medication 10 ML: at 21:50

## 2018-01-01 RX ADMIN — PRAVASTATIN SODIUM 10 MG: 10 TABLET ORAL at 21:43

## 2018-01-01 RX ADMIN — LISINOPRIL 40 MG: 20 TABLET ORAL at 21:03

## 2018-01-01 RX ADMIN — ESCITALOPRAM OXALATE 10 MG: 10 TABLET ORAL at 14:01

## 2018-01-01 RX ADMIN — HEPARIN SODIUM 5000 UNITS: 5000 INJECTION INTRAVENOUS; SUBCUTANEOUS at 18:40

## 2018-01-01 RX ADMIN — ACETAMINOPHEN 650 MG: 325 TABLET, FILM COATED ORAL at 23:35

## 2018-01-01 RX ADMIN — HEPARIN SODIUM 5000 UNITS: 5000 INJECTION, SOLUTION INTRAVENOUS; SUBCUTANEOUS at 00:14

## 2018-01-01 RX ADMIN — DEXTROSE MONOHYDRATE 25 G: 500 INJECTION PARENTERAL at 16:45

## 2018-01-01 RX ADMIN — ESCITALOPRAM 10 MG: 5 SOLUTION ORAL at 08:54

## 2018-01-01 RX ADMIN — CEFEPIME 1 G: 1 INJECTION, POWDER, FOR SOLUTION INTRAMUSCULAR; INTRAVENOUS at 22:00

## 2018-01-01 RX ADMIN — ASPIRIN 81 MG CHEWABLE TABLET 81 MG: 81 TABLET CHEWABLE at 12:20

## 2018-01-01 RX ADMIN — Medication 10 ML: at 21:32

## 2018-01-01 RX ADMIN — CLONIDINE HYDROCHLORIDE 0.1 MG: 0.1 TABLET ORAL at 16:23

## 2018-01-01 RX ADMIN — SEVELAMER CARBONATE 800 MG: 800 TABLET, FILM COATED ORAL at 18:12

## 2018-01-01 RX ADMIN — CINACALCET HYDROCHLORIDE 60 MG: 30 TABLET, COATED ORAL at 09:31

## 2018-01-01 RX ADMIN — INSULIN LISPRO 4 UNITS: 100 INJECTION, SOLUTION INTRAVENOUS; SUBCUTANEOUS at 16:55

## 2018-01-01 RX ADMIN — Medication 10 ML: at 13:37

## 2018-01-01 RX ADMIN — ONDANSETRON 4 MG: 2 INJECTION INTRAMUSCULAR; INTRAVENOUS at 12:36

## 2018-01-01 RX ADMIN — Medication 2 G: at 11:34

## 2018-01-01 RX ADMIN — CASTOR OIL AND BALSAM, PERU: 788; 87 OINTMENT TOPICAL at 12:56

## 2018-01-01 RX ADMIN — PIPERACILLIN SODIUM,TAZOBACTAM SODIUM 3.38 G: 3; .375 INJECTION, POWDER, FOR SOLUTION INTRAVENOUS at 04:06

## 2018-01-01 RX ADMIN — PRAVASTATIN SODIUM 10 MG: 10 TABLET ORAL at 23:05

## 2018-01-01 RX ADMIN — IOPAMIDOL 100 ML: 755 INJECTION, SOLUTION INTRAVENOUS at 12:20

## 2018-01-01 RX ADMIN — MORPHINE SULFATE 10 MG: 10 SOLUTION ORAL at 04:30

## 2018-01-01 RX ADMIN — NYSTATIN 500000 UNITS: 100000 SUSPENSION ORAL at 21:31

## 2018-01-01 RX ADMIN — LISINOPRIL 40 MG: 20 TABLET ORAL at 18:38

## 2018-01-01 RX ADMIN — SODIUM CHLORIDE 40 MG: 9 INJECTION INTRAMUSCULAR; INTRAVENOUS; SUBCUTANEOUS at 21:44

## 2018-01-01 RX ADMIN — Medication 10 ML: at 22:27

## 2018-01-01 RX ADMIN — ACETYLCYSTEINE 400 MG: 200 SOLUTION ORAL; RESPIRATORY (INHALATION) at 09:11

## 2018-01-01 RX ADMIN — PRAVASTATIN SODIUM 10 MG: 10 TABLET ORAL at 22:15

## 2018-01-01 RX ADMIN — CLONIDINE HYDROCHLORIDE 0.1 MG: 0.1 TABLET ORAL at 21:04

## 2018-01-01 RX ADMIN — ACETYLCYSTEINE 400 MG: 200 SOLUTION ORAL; RESPIRATORY (INHALATION) at 19:29

## 2018-01-01 RX ADMIN — SEVELAMER CARBONATE 2400 MG: 2400 POWDER, FOR SUSPENSION ORAL at 17:33

## 2018-01-01 RX ADMIN — SEVELAMER CARBONATE 800 MG: 800 TABLET, FILM COATED ORAL at 22:02

## 2018-01-01 RX ADMIN — PROTAMINE SULFATE 20 MG: 10 INJECTION, SOLUTION INTRAVENOUS at 20:08

## 2018-01-01 RX ADMIN — CARVEDILOL 25 MG: 12.5 TABLET, FILM COATED ORAL at 13:37

## 2018-01-01 RX ADMIN — GUAIFENESIN 600 MG: 200 SOLUTION ORAL at 08:58

## 2018-01-01 RX ADMIN — CARVEDILOL 3.12 MG: 3.12 TABLET, FILM COATED ORAL at 09:34

## 2018-01-01 RX ADMIN — ESCITALOPRAM 10 MG: 5 SOLUTION ORAL at 14:21

## 2018-01-01 RX ADMIN — HEPARIN SODIUM 5000 UNITS: 5000 INJECTION, SOLUTION INTRAVENOUS; SUBCUTANEOUS at 14:18

## 2018-01-01 RX ADMIN — DEXTROSE MONOHYDRATE 12.5 G: 500 INJECTION PARENTERAL at 23:58

## 2018-01-01 RX ADMIN — SEVELAMER CARBONATE 800 MG: 800 TABLET, FILM COATED ORAL at 22:17

## 2018-01-01 RX ADMIN — ASPIRIN 81 MG CHEWABLE TABLET 81 MG: 81 TABLET CHEWABLE at 10:23

## 2018-01-01 RX ADMIN — METOPROLOL TARTRATE 1.25 MG: 5 INJECTION, SOLUTION INTRAVENOUS at 22:06

## 2018-01-01 RX ADMIN — METOPROLOL TARTRATE 1.25 MG: 5 INJECTION, SOLUTION INTRAVENOUS at 22:00

## 2018-01-01 RX ADMIN — ASPIRIN 81 MG CHEWABLE TABLET 81 MG: 81 TABLET CHEWABLE at 08:52

## 2018-01-01 RX ADMIN — Medication 10 ML: at 22:17

## 2018-01-01 RX ADMIN — PRAVASTATIN SODIUM 10 MG: 10 TABLET ORAL at 22:27

## 2018-01-01 RX ADMIN — HEPARIN SODIUM 5000 UNITS: 5000 INJECTION, SOLUTION INTRAVENOUS; SUBCUTANEOUS at 23:14

## 2018-01-01 RX ADMIN — SODIUM CHLORIDE 40 MG: 9 INJECTION INTRAMUSCULAR; INTRAVENOUS; SUBCUTANEOUS at 09:29

## 2018-01-01 RX ADMIN — CLONIDINE HYDROCHLORIDE 0.1 MG: 0.1 TABLET ORAL at 14:04

## 2018-01-01 RX ADMIN — IPRATROPIUM BROMIDE 1 DOSE: 0.5 SOLUTION RESPIRATORY (INHALATION) at 14:57

## 2018-01-01 RX ADMIN — Medication 1 CAPSULE: at 10:38

## 2018-01-01 RX ADMIN — CARVEDILOL 25 MG: 12.5 TABLET, FILM COATED ORAL at 16:57

## 2018-01-01 RX ADMIN — HEPARIN SODIUM 5000 UNITS: 5000 INJECTION, SOLUTION INTRAVENOUS; SUBCUTANEOUS at 06:47

## 2018-01-01 RX ADMIN — ASPIRIN 81 MG: 81 TABLET, COATED ORAL at 09:32

## 2018-01-01 RX ADMIN — CLONIDINE HYDROCHLORIDE 0.1 MG: 0.1 TABLET ORAL at 12:55

## 2018-01-01 RX ADMIN — HEPARIN SODIUM 1000 UNITS: 200 INJECTION, SOLUTION INTRAVENOUS at 15:42

## 2018-01-01 RX ADMIN — CARVEDILOL 25 MG: 12.5 TABLET, FILM COATED ORAL at 08:56

## 2018-01-01 RX ADMIN — SODIUM CHLORIDE 100 ML/HR: 900 INJECTION, SOLUTION INTRAVENOUS at 12:36

## 2018-01-01 RX ADMIN — METOPROLOL TARTRATE 1.25 MG: 5 INJECTION, SOLUTION INTRAVENOUS at 15:53

## 2018-01-01 RX ADMIN — SODIUM CHLORIDE 40 MG: 9 INJECTION INTRAMUSCULAR; INTRAVENOUS; SUBCUTANEOUS at 21:55

## 2018-01-01 RX ADMIN — PIPERACILLIN SODIUM,TAZOBACTAM SODIUM 3.38 G: 3; .375 INJECTION, POWDER, FOR SOLUTION INTRAVENOUS at 17:22

## 2018-01-01 RX ADMIN — CARVEDILOL 12.5 MG: 12.5 TABLET, FILM COATED ORAL at 09:04

## 2018-01-01 RX ADMIN — Medication 10 ML: at 04:56

## 2018-01-01 RX ADMIN — IOPAMIDOL 80 ML: 755 INJECTION, SOLUTION INTRAVENOUS at 01:22

## 2018-01-01 RX ADMIN — CLONIDINE HYDROCHLORIDE 0.1 MG: 0.1 TABLET ORAL at 06:27

## 2018-01-01 RX ADMIN — HEPARIN SODIUM 5000 UNITS: 5000 INJECTION, SOLUTION INTRAVENOUS; SUBCUTANEOUS at 08:21

## 2018-01-01 RX ADMIN — ACETAMINOPHEN 650 MG: 325 TABLET, FILM COATED ORAL at 19:54

## 2018-01-01 RX ADMIN — HEPARIN SODIUM 5000 UNITS: 5000 INJECTION INTRAVENOUS; SUBCUTANEOUS at 18:38

## 2018-01-01 RX ADMIN — Medication 10 ML: at 14:28

## 2018-01-01 RX ADMIN — HEPARIN SODIUM 5000 UNITS: 5000 INJECTION, SOLUTION INTRAVENOUS; SUBCUTANEOUS at 20:07

## 2018-01-01 RX ADMIN — PIPERACILLIN SODIUM,TAZOBACTAM SODIUM 3.38 G: 3; .375 INJECTION, POWDER, FOR SOLUTION INTRAVENOUS at 05:53

## 2018-01-01 RX ADMIN — SODIUM CHLORIDE 235 ML: 900 INJECTION, SOLUTION INTRAVENOUS at 14:18

## 2018-01-01 RX ADMIN — METOPROLOL TARTRATE 1.25 MG: 5 INJECTION, SOLUTION INTRAVENOUS at 04:09

## 2018-01-01 RX ADMIN — SODIUM CHLORIDE 40 MG: 9 INJECTION INTRAMUSCULAR; INTRAVENOUS; SUBCUTANEOUS at 10:43

## 2018-01-01 RX ADMIN — ASPIRIN 81 MG CHEWABLE TABLET 81 MG: 81 TABLET CHEWABLE at 09:05

## 2018-01-01 RX ADMIN — SEVELAMER CARBONATE 800 MG: 800 TABLET, FILM COATED ORAL at 17:13

## 2018-01-01 RX ADMIN — CASTOR OIL AND BALSAM, PERU: 788; 87 OINTMENT TOPICAL at 15:36

## 2018-01-01 RX ADMIN — STANDARDIZED SENNA CONCENTRATE AND DOCUSATE SODIUM 2 TABLET: 8.6; 5 TABLET, FILM COATED ORAL at 23:43

## 2018-01-01 RX ADMIN — PRAVASTATIN SODIUM 10 MG: 10 TABLET ORAL at 22:08

## 2018-01-01 RX ADMIN — CLONIDINE HYDROCHLORIDE 0.1 MG: 0.1 TABLET ORAL at 06:49

## 2018-01-01 RX ADMIN — ESCITALOPRAM OXALATE 10 MG: 10 TABLET ORAL at 09:32

## 2018-01-01 RX ADMIN — INSULIN LISPRO 6 UNITS: 100 INJECTION, SOLUTION INTRAVENOUS; SUBCUTANEOUS at 17:15

## 2018-01-01 RX ADMIN — METRONIDAZOLE 500 MG: 500 INJECTION, SOLUTION INTRAVENOUS at 05:44

## 2018-01-01 RX ADMIN — SODIUM CHLORIDE 50 ML/HR: 900 INJECTION, SOLUTION INTRAVENOUS at 12:20

## 2018-01-01 RX ADMIN — Medication 10 ML: at 13:49

## 2018-01-01 RX ADMIN — Medication 10 ML: at 21:23

## 2018-01-01 RX ADMIN — MORPHINE SULFATE 10 MG: 10 SOLUTION ORAL at 20:44

## 2018-01-01 RX ADMIN — Medication 10 ML: at 20:38

## 2018-01-01 RX ADMIN — CASTOR OIL AND BALSAM, PERU: 788; 87 OINTMENT TOPICAL at 18:02

## 2018-01-01 RX ADMIN — LIDOCAINE HYDROCHLORIDE 80 MG: 20 INJECTION, SOLUTION EPIDURAL; INFILTRATION; INTRACAUDAL; PERINEURAL at 08:57

## 2018-01-01 RX ADMIN — SODIUM CHLORIDE 40 MG: 9 INJECTION INTRAMUSCULAR; INTRAVENOUS; SUBCUTANEOUS at 08:15

## 2018-01-01 RX ADMIN — METOPROLOL TARTRATE 1.25 MG: 5 INJECTION, SOLUTION INTRAVENOUS at 04:25

## 2018-01-01 RX ADMIN — METOPROLOL TARTRATE 1 MG: 5 INJECTION INTRAVENOUS at 19:15

## 2018-01-01 RX ADMIN — Medication 10 ML: at 06:55

## 2018-01-01 RX ADMIN — CLONIDINE HYDROCHLORIDE 0.1 MG: 0.1 TABLET ORAL at 14:27

## 2018-01-01 RX ADMIN — Medication 10 ML: at 21:03

## 2018-01-01 RX ADMIN — HEPARIN SODIUM 5000 UNITS: 5000 INJECTION INTRAVENOUS; SUBCUTANEOUS at 06:24

## 2018-01-01 RX ADMIN — MIDAZOLAM 1 MG: 1 INJECTION INTRAMUSCULAR; INTRAVENOUS at 15:32

## 2018-01-01 RX ADMIN — INSULIN GLARGINE 5 UNITS: 100 INJECTION, SOLUTION SUBCUTANEOUS at 22:16

## 2018-01-01 RX ADMIN — COLLAGENASE SANTYL: 250 OINTMENT TOPICAL at 09:01

## 2018-01-01 RX ADMIN — SEVELAMER CARBONATE 2400 MG: 2400 POWDER, FOR SUSPENSION ORAL at 21:02

## 2018-01-01 RX ADMIN — COLLAGENASE SANTYL: 250 OINTMENT TOPICAL at 10:26

## 2018-01-01 RX ADMIN — AMLODIPINE BESYLATE 10 MG: 5 TABLET ORAL at 08:56

## 2018-01-01 RX ADMIN — ASPIRIN 81 MG 81 MG: 81 TABLET ORAL at 09:09

## 2018-01-01 RX ADMIN — CARVEDILOL 25 MG: 12.5 TABLET, FILM COATED ORAL at 21:31

## 2018-01-01 RX ADMIN — CARVEDILOL 25 MG: 12.5 TABLET, FILM COATED ORAL at 08:16

## 2018-01-01 RX ADMIN — SEVELAMER CARBONATE 800 MG: 800 TABLET, FILM COATED ORAL at 12:04

## 2018-01-01 RX ADMIN — CASTOR OIL AND BALSAM, PERU: 788; 87 OINTMENT TOPICAL at 18:12

## 2018-01-01 RX ADMIN — NYSTATIN 500000 UNITS: 100000 SUSPENSION ORAL at 21:16

## 2018-01-01 RX ADMIN — ESCITALOPRAM 10 MG: 5 SOLUTION ORAL at 09:03

## 2018-01-01 RX ADMIN — SEVELAMER CARBONATE 800 MG: 800 TABLET, FILM COATED ORAL at 08:23

## 2018-01-01 RX ADMIN — HEPARIN SODIUM 5000 UNITS: 5000 INJECTION, SOLUTION INTRAVENOUS; SUBCUTANEOUS at 17:29

## 2018-01-01 RX ADMIN — LISINOPRIL 40 MG: 20 TABLET ORAL at 09:30

## 2018-01-01 RX ADMIN — PRAVASTATIN SODIUM 10 MG: 10 TABLET ORAL at 21:20

## 2018-01-01 RX ADMIN — CLONIDINE HYDROCHLORIDE 0.1 MG: 0.1 TABLET ORAL at 09:29

## 2018-01-01 RX ADMIN — INSULIN GLARGINE 5 UNITS: 100 INJECTION, SOLUTION SUBCUTANEOUS at 09:25

## 2018-01-01 RX ADMIN — MORPHINE SULFATE 10 MG: 10 SOLUTION ORAL at 12:18

## 2018-01-01 RX ADMIN — ESCITALOPRAM OXALATE 10 MG: 10 TABLET ORAL at 09:00

## 2018-01-01 RX ADMIN — ASPIRIN 81 MG 81 MG: 81 TABLET ORAL at 08:35

## 2018-01-01 RX ADMIN — PRAVASTATIN SODIUM 10 MG: 10 TABLET ORAL at 21:38

## 2018-01-01 RX ADMIN — ESCITALOPRAM OXALATE 10 MG: 10 TABLET ORAL at 08:30

## 2018-01-01 RX ADMIN — Medication 10 ML: at 21:11

## 2018-01-01 RX ADMIN — SEVELAMER CARBONATE 800 MG: 800 TABLET, FILM COATED ORAL at 17:15

## 2018-01-01 RX ADMIN — NEPHROCAP 1 CAPSULE: 1 CAP ORAL at 08:46

## 2018-01-01 RX ADMIN — SEVELAMER CARBONATE 800 MG: 800 TABLET, FILM COATED ORAL at 17:22

## 2018-01-01 RX ADMIN — SEVELAMER CARBONATE 800 MG: 800 TABLET, FILM COATED ORAL at 16:33

## 2018-01-01 RX ADMIN — CARVEDILOL 25 MG: 12.5 TABLET, FILM COATED ORAL at 18:45

## 2018-01-01 RX ADMIN — Medication 10 ML: at 06:00

## 2018-01-01 RX ADMIN — HEPARIN SODIUM 5000 UNITS: 5000 INJECTION, SOLUTION INTRAVENOUS; SUBCUTANEOUS at 18:51

## 2018-01-01 RX ADMIN — METOPROLOL TARTRATE 1.25 MG: 5 INJECTION, SOLUTION INTRAVENOUS at 10:09

## 2018-01-01 RX ADMIN — COLLAGENASE SANTYL: 250 OINTMENT TOPICAL at 14:28

## 2018-01-01 RX ADMIN — Medication 30 MCG/KG/MIN: at 18:53

## 2018-01-01 RX ADMIN — METOPROLOL TARTRATE 1.25 MG: 5 INJECTION, SOLUTION INTRAVENOUS at 22:41

## 2018-01-01 RX ADMIN — CARVEDILOL 25 MG: 12.5 TABLET, FILM COATED ORAL at 21:41

## 2018-01-01 RX ADMIN — FENTANYL CITRATE 25 MCG: 50 INJECTION, SOLUTION INTRAMUSCULAR; INTRAVENOUS at 15:33

## 2018-01-01 RX ADMIN — CLONIDINE HYDROCHLORIDE 0.1 MG: 0.1 TABLET ORAL at 08:35

## 2018-01-01 RX ADMIN — ONDANSETRON 4 MG: 2 INJECTION INTRAMUSCULAR; INTRAVENOUS at 16:27

## 2018-01-01 RX ADMIN — SEVELAMER CARBONATE 800 MG: 800 TABLET, FILM COATED ORAL at 12:35

## 2018-01-01 RX ADMIN — SODIUM CHLORIDE 50 ML/HR: 900 INJECTION, SOLUTION INTRAVENOUS at 13:36

## 2018-01-01 RX ADMIN — CASTOR OIL AND BALSAM, PERU: 788; 87 OINTMENT TOPICAL at 18:00

## 2018-01-01 RX ADMIN — CARVEDILOL 25 MG: 12.5 TABLET, FILM COATED ORAL at 08:53

## 2018-01-01 RX ADMIN — INSULIN GLARGINE 18 UNITS: 100 INJECTION, SOLUTION SUBCUTANEOUS at 21:37

## 2018-01-01 RX ADMIN — MORPHINE SULFATE 2 MG: 4 INJECTION INTRAVENOUS at 22:37

## 2018-01-01 RX ADMIN — CASTOR OIL AND BALSAM, PERU: 788; 87 OINTMENT TOPICAL at 08:56

## 2018-01-01 RX ADMIN — FENTANYL CITRATE 50 MCG: 50 INJECTION, SOLUTION INTRAMUSCULAR; INTRAVENOUS at 14:15

## 2018-01-01 RX ADMIN — METOPROLOL TARTRATE 1.25 MG: 5 INJECTION, SOLUTION INTRAVENOUS at 18:23

## 2018-01-01 RX ADMIN — ASPIRIN 81 MG CHEWABLE TABLET 81 MG: 81 TABLET CHEWABLE at 10:25

## 2018-01-01 RX ADMIN — ONDANSETRON 4 MG: 2 INJECTION INTRAMUSCULAR; INTRAVENOUS at 11:08

## 2018-01-01 RX ADMIN — IPRATROPIUM BROMIDE AND ALBUTEROL SULFATE 3 ML: .5; 3 SOLUTION RESPIRATORY (INHALATION) at 02:45

## 2018-01-01 RX ADMIN — Medication 10 ML: at 21:15

## 2018-01-01 RX ADMIN — HEPARIN SODIUM 5000 UNITS: 5000 INJECTION, SOLUTION INTRAVENOUS; SUBCUTANEOUS at 09:43

## 2018-01-01 RX ADMIN — SODIUM CHLORIDE 1000 ML: 900 INJECTION, SOLUTION INTRAVENOUS at 12:03

## 2018-01-01 RX ADMIN — LISINOPRIL 40 MG: 20 TABLET ORAL at 08:02

## 2018-01-01 RX ADMIN — CARVEDILOL 25 MG: 12.5 TABLET, FILM COATED ORAL at 17:23

## 2018-01-01 RX ADMIN — AMLODIPINE BESYLATE 10 MG: 5 TABLET ORAL at 09:44

## 2018-01-01 RX ADMIN — CLONIDINE HYDROCHLORIDE 0.1 MG: 0.1 TABLET ORAL at 12:00

## 2018-01-01 RX ADMIN — METRONIDAZOLE 500 MG: 500 INJECTION, SOLUTION INTRAVENOUS at 21:41

## 2018-01-01 RX ADMIN — SEVELAMER CARBONATE 2400 MG: 2400 POWDER, FOR SUSPENSION ORAL at 17:27

## 2018-01-01 RX ADMIN — OXYCODONE HYDROCHLORIDE AND ACETAMINOPHEN 1 TABLET: 5; 325 TABLET ORAL at 01:12

## 2018-01-01 RX ADMIN — HEPARIN SODIUM 5000 UNITS: 5000 INJECTION, SOLUTION INTRAVENOUS; SUBCUTANEOUS at 23:35

## 2018-01-01 RX ADMIN — CARVEDILOL 25 MG: 12.5 TABLET, FILM COATED ORAL at 08:51

## 2018-01-01 RX ADMIN — PRAVASTATIN SODIUM 10 MG: 10 TABLET ORAL at 21:23

## 2018-01-01 RX ADMIN — ASPIRIN 81 MG CHEWABLE TABLET 81 MG: 81 TABLET CHEWABLE at 08:54

## 2018-01-01 RX ADMIN — Medication 1 CAPSULE: at 09:32

## 2018-01-01 RX ADMIN — IPRATROPIUM BROMIDE AND ALBUTEROL SULFATE 3 ML: .5; 3 SOLUTION RESPIRATORY (INHALATION) at 02:21

## 2018-01-01 RX ADMIN — SEVELAMER CARBONATE 2400 MG: 2400 POWDER, FOR SUSPENSION ORAL at 16:45

## 2018-01-01 RX ADMIN — HEPARIN SODIUM 5000 UNITS: 5000 INJECTION, SOLUTION INTRAVENOUS; SUBCUTANEOUS at 08:55

## 2018-01-01 RX ADMIN — DEXTROSE MONOHYDRATE AND SODIUM CHLORIDE 50 ML/HR: 5; .45 INJECTION, SOLUTION INTRAVENOUS at 16:26

## 2018-01-01 RX ADMIN — Medication 1000 UNITS: at 15:42

## 2018-01-01 RX ADMIN — Medication 10 ML: at 14:00

## 2018-01-01 RX ADMIN — HEPARIN SODIUM 5000 UNITS: 5000 INJECTION, SOLUTION INTRAVENOUS; SUBCUTANEOUS at 23:44

## 2018-01-01 RX ADMIN — ACETAMINOPHEN 650 MG: 325 SOLUTION ORAL at 20:39

## 2018-01-01 RX ADMIN — DEXTROSE MONOHYDRATE 25 G: 500 INJECTION PARENTERAL at 13:54

## 2018-01-01 RX ADMIN — ONDANSETRON 4 MG: 2 INJECTION INTRAMUSCULAR; INTRAVENOUS at 06:41

## 2018-01-01 RX ADMIN — CASTOR OIL AND BALSAM, PERU: 788; 87 OINTMENT TOPICAL at 09:35

## 2018-01-01 RX ADMIN — VANCOMYCIN HYDROCHLORIDE 750 MG: 750 INJECTION, POWDER, LYOPHILIZED, FOR SOLUTION INTRAVENOUS at 14:03

## 2018-01-01 RX ADMIN — SEVELAMER CARBONATE 800 MG: 800 TABLET, FILM COATED ORAL at 08:46

## 2018-01-01 RX ADMIN — ONDANSETRON 4 MG: 2 INJECTION INTRAMUSCULAR; INTRAVENOUS at 05:29

## 2018-01-01 RX ADMIN — HEPARIN SODIUM 5000 UNITS: 5000 INJECTION, SOLUTION INTRAVENOUS; SUBCUTANEOUS at 23:58

## 2018-01-01 RX ADMIN — CASTOR OIL AND BALSAM, PERU: 788; 87 OINTMENT TOPICAL at 17:25

## 2018-01-01 RX ADMIN — HEPARIN SODIUM 5000 UNITS: 5000 INJECTION, SOLUTION INTRAVENOUS; SUBCUTANEOUS at 05:31

## 2018-01-01 RX ADMIN — LIDOCAINE HYDROCHLORIDE 10 ML: 10 INJECTION, SOLUTION EPIDURAL; INFILTRATION; INTRACAUDAL; PERINEURAL at 13:00

## 2018-01-01 RX ADMIN — METRONIDAZOLE 500 MG: 500 INJECTION, SOLUTION INTRAVENOUS at 22:20

## 2018-01-01 RX ADMIN — CLONIDINE HYDROCHLORIDE 0.1 MG: 0.1 TABLET ORAL at 06:48

## 2018-01-01 RX ADMIN — CEFEPIME 1 G: 1 INJECTION, POWDER, FOR SOLUTION INTRAMUSCULAR; INTRAVENOUS at 00:41

## 2018-01-01 RX ADMIN — ACETYLCYSTEINE 400 MG: 200 SOLUTION ORAL; RESPIRATORY (INHALATION) at 20:19

## 2018-01-01 RX ADMIN — ASPIRIN 81 MG 81 MG: 81 TABLET ORAL at 09:30

## 2018-01-01 RX ADMIN — SEVELAMER CARBONATE 2400 MG: 2400 POWDER, FOR SUSPENSION ORAL at 12:18

## 2018-01-01 RX ADMIN — CARVEDILOL 3.12 MG: 3.12 TABLET, FILM COATED ORAL at 14:01

## 2018-01-01 RX ADMIN — SEVELAMER CARBONATE 800 MG: 800 TABLET, FILM COATED ORAL at 17:32

## 2018-01-01 RX ADMIN — OXYCODONE HYDROCHLORIDE AND ACETAMINOPHEN 1 TABLET: 10; 325 TABLET ORAL at 04:51

## 2018-01-01 RX ADMIN — LISINOPRIL 40 MG: 20 TABLET ORAL at 08:53

## 2018-01-01 RX ADMIN — SEVELAMER CARBONATE 800 MG: 800 TABLET, FILM COATED ORAL at 08:30

## 2018-01-01 RX ADMIN — CLONIDINE HYDROCHLORIDE 0.1 MG: 0.1 TABLET ORAL at 22:09

## 2018-01-01 RX ADMIN — SEVELAMER CARBONATE 2400 MG: 2400 POWDER, FOR SUSPENSION ORAL at 17:38

## 2018-01-01 RX ADMIN — Medication 10 ML: at 17:33

## 2018-01-01 RX ADMIN — SEVELAMER CARBONATE 800 MG: 800 TABLET, FILM COATED ORAL at 22:45

## 2018-01-01 RX ADMIN — Medication 10 ML: at 06:20

## 2018-01-01 RX ADMIN — FAMOTIDINE 20 MG: 20 TABLET, FILM COATED ORAL at 16:00

## 2018-01-01 RX ADMIN — ASPIRIN 81 MG 81 MG: 81 TABLET ORAL at 08:34

## 2018-01-01 RX ADMIN — PRAVASTATIN SODIUM 10 MG: 10 TABLET ORAL at 22:45

## 2018-01-01 RX ADMIN — ESCITALOPRAM OXALATE 10 MG: 10 TABLET ORAL at 08:34

## 2018-01-01 RX ADMIN — Medication 10 ML: at 21:21

## 2018-01-01 RX ADMIN — ERYTHROPOIETIN 10000 UNITS: 10000 INJECTION, SOLUTION INTRAVENOUS; SUBCUTANEOUS at 17:25

## 2018-01-01 RX ADMIN — CINACALCET HYDROCHLORIDE 60 MG: 30 TABLET, COATED ORAL at 08:22

## 2018-01-01 RX ADMIN — DEXTROSE MONOHYDRATE 12.5 G: 500 INJECTION PARENTERAL at 23:57

## 2018-01-01 RX ADMIN — CASTOR OIL AND BALSAM, PERU: 788; 87 OINTMENT TOPICAL at 08:32

## 2018-01-01 RX ADMIN — CARVEDILOL 25 MG: 12.5 TABLET, FILM COATED ORAL at 21:28

## 2018-01-01 RX ADMIN — GUAIFENESIN 1200 MG: 600 TABLET, EXTENDED RELEASE ORAL at 20:08

## 2018-01-01 RX ADMIN — PIPERACILLIN SODIUM,TAZOBACTAM SODIUM 3.38 G: 3; .375 INJECTION, POWDER, FOR SOLUTION INTRAVENOUS at 14:04

## 2018-01-01 RX ADMIN — HEPARIN SODIUM 5000 UNITS: 5000 INJECTION, SOLUTION INTRAVENOUS; SUBCUTANEOUS at 07:04

## 2018-01-01 RX ADMIN — PRAVASTATIN SODIUM 10 MG: 10 TABLET ORAL at 21:05

## 2018-01-01 RX ADMIN — NEPHROCAP 1 CAPSULE: 1 CAP ORAL at 08:35

## 2018-01-01 RX ADMIN — Medication 16 G: at 07:00

## 2018-01-01 RX ADMIN — SEVELAMER CARBONATE 800 MG: 800 TABLET, FILM COATED ORAL at 16:25

## 2018-01-01 RX ADMIN — HEPARIN SODIUM 5000 UNITS: 5000 INJECTION, SOLUTION INTRAVENOUS; SUBCUTANEOUS at 20:08

## 2018-01-01 RX ADMIN — CARVEDILOL 25 MG: 12.5 TABLET, FILM COATED ORAL at 08:48

## 2018-01-01 RX ADMIN — INSULIN GLARGINE 5 UNITS: 100 INJECTION, SOLUTION SUBCUTANEOUS at 09:39

## 2018-01-01 RX ADMIN — HEPARIN SODIUM 5000 UNITS: 5000 INJECTION, SOLUTION INTRAVENOUS; SUBCUTANEOUS at 21:02

## 2018-01-01 RX ADMIN — METOPROLOL TARTRATE 1.25 MG: 5 INJECTION, SOLUTION INTRAVENOUS at 09:34

## 2018-01-01 RX ADMIN — LISINOPRIL 40 MG: 20 TABLET ORAL at 08:35

## 2018-01-01 RX ADMIN — ESCITALOPRAM OXALATE 10 MG: 10 TABLET ORAL at 09:10

## 2018-01-01 RX ADMIN — SEVELAMER CARBONATE 800 MG: 800 TABLET, FILM COATED ORAL at 17:30

## 2018-01-01 RX ADMIN — PRAVASTATIN SODIUM 10 MG: 10 TABLET ORAL at 21:49

## 2018-01-01 RX ADMIN — METOPROLOL TARTRATE 1.25 MG: 5 INJECTION, SOLUTION INTRAVENOUS at 03:28

## 2018-01-01 RX ADMIN — SEVELAMER CARBONATE 2400 MG: 2400 POWDER, FOR SUSPENSION ORAL at 14:09

## 2018-01-01 RX ADMIN — HEPARIN SODIUM 5000 UNITS: 5000 INJECTION, SOLUTION INTRAVENOUS; SUBCUTANEOUS at 05:36

## 2018-01-01 RX ADMIN — MORPHINE SULFATE 2 MG: 4 INJECTION INTRAVENOUS at 09:40

## 2018-01-01 RX ADMIN — ESCITALOPRAM OXALATE 10 MG: 10 TABLET ORAL at 08:17

## 2018-01-01 RX ADMIN — OXYCODONE HYDROCHLORIDE AND ACETAMINOPHEN 1 TABLET: 10; 325 TABLET ORAL at 17:29

## 2018-01-01 RX ADMIN — OSELTAMIVIR PHOSPHATE 30 MG: 30 CAPSULE ORAL at 18:53

## 2018-01-01 RX ADMIN — HEPARIN SODIUM 5000 UNITS: 5000 INJECTION, SOLUTION INTRAVENOUS; SUBCUTANEOUS at 14:12

## 2018-01-01 RX ADMIN — ACETYLCYSTEINE 400 MG: 200 SOLUTION ORAL; RESPIRATORY (INHALATION) at 20:30

## 2018-01-01 RX ADMIN — SODIUM CHLORIDE 40 MG: 9 INJECTION INTRAMUSCULAR; INTRAVENOUS; SUBCUTANEOUS at 08:30

## 2018-01-01 RX ADMIN — CARVEDILOL 25 MG: 12.5 TABLET, FILM COATED ORAL at 17:00

## 2018-01-01 RX ADMIN — HEPARIN SODIUM 5000 UNITS: 5000 INJECTION, SOLUTION INTRAVENOUS; SUBCUTANEOUS at 18:07

## 2018-01-01 RX ADMIN — NEPHROCAP 1 CAPSULE: 1 CAP ORAL at 08:56

## 2018-01-01 RX ADMIN — HEPARIN SODIUM 5000 UNITS: 5000 INJECTION INTRAVENOUS; SUBCUTANEOUS at 06:01

## 2018-01-01 RX ADMIN — Medication 10 ML: at 14:27

## 2018-01-01 RX ADMIN — HEPARIN SODIUM 5000 UNITS: 5000 INJECTION INTRAVENOUS; SUBCUTANEOUS at 06:18

## 2018-01-01 RX ADMIN — METOPROLOL TARTRATE 1.25 MG: 5 INJECTION, SOLUTION INTRAVENOUS at 09:40

## 2018-01-01 RX ADMIN — INSULIN GLARGINE 5 UNITS: 100 INJECTION, SOLUTION SUBCUTANEOUS at 11:00

## 2018-01-01 RX ADMIN — AMLODIPINE BESYLATE 10 MG: 5 TABLET ORAL at 09:30

## 2018-01-01 RX ADMIN — CASTOR OIL AND BALSAM, PERU: 788; 87 OINTMENT TOPICAL at 18:52

## 2018-01-01 RX ADMIN — CASTOR OIL AND BALSAM, PERU: 788; 87 OINTMENT TOPICAL at 10:26

## 2018-01-01 RX ADMIN — HALOPERIDOL LACTATE 2 MG: 5 INJECTION INTRAMUSCULAR at 05:18

## 2018-01-01 RX ADMIN — ESCITALOPRAM 10 MG: 5 SOLUTION ORAL at 10:25

## 2018-01-01 RX ADMIN — NYSTATIN 500000 UNITS: 100000 SUSPENSION ORAL at 09:04

## 2018-01-01 RX ADMIN — INSULIN LISPRO 4 UNITS: 100 INJECTION, SOLUTION INTRAVENOUS; SUBCUTANEOUS at 17:15

## 2018-01-01 RX ADMIN — ERYTHROPOIETIN 10000 UNITS: 10000 INJECTION, SOLUTION INTRAVENOUS; SUBCUTANEOUS at 21:03

## 2018-01-01 RX ADMIN — CARVEDILOL 25 MG: 12.5 TABLET, FILM COATED ORAL at 08:22

## 2018-01-01 RX ADMIN — DEXTROSE MONOHYDRATE 25 G: 25 INJECTION, SOLUTION INTRAVENOUS at 07:55

## 2018-01-01 RX ADMIN — CASTOR OIL AND BALSAM, PERU: 788; 87 OINTMENT TOPICAL at 17:36

## 2018-01-01 RX ADMIN — HEPARIN SODIUM 5000 UNITS: 5000 INJECTION, SOLUTION INTRAVENOUS; SUBCUTANEOUS at 05:00

## 2018-01-01 RX ADMIN — GUAIFENESIN 1200 MG: 600 TABLET, EXTENDED RELEASE ORAL at 09:32

## 2018-01-01 RX ADMIN — ACETAMINOPHEN 650 MG: 325 SOLUTION ORAL at 20:38

## 2018-01-01 RX ADMIN — COLLAGENASE SANTYL: 250 OINTMENT TOPICAL at 10:55

## 2018-01-01 RX ADMIN — ESCITALOPRAM OXALATE 10 MG: 10 TABLET ORAL at 08:35

## 2018-01-01 RX ADMIN — LISINOPRIL 40 MG: 20 TABLET ORAL at 12:00

## 2018-01-01 RX ADMIN — NYSTATIN 500000 UNITS: 100000 SUSPENSION ORAL at 17:38

## 2018-01-01 RX ADMIN — MORPHINE SULFATE 2 MG: 4 INJECTION INTRAVENOUS at 12:36

## 2018-01-01 RX ADMIN — ASPIRIN 81 MG 81 MG: 81 TABLET ORAL at 08:16

## 2018-01-01 RX ADMIN — ASPIRIN 81 MG CHEWABLE TABLET 81 MG: 81 TABLET CHEWABLE at 08:51

## 2018-01-01 RX ADMIN — CASTOR OIL AND BALSAM, PERU: 788; 87 OINTMENT TOPICAL at 12:14

## 2018-01-01 RX ADMIN — CASTOR OIL AND BALSAM, PERU: 788; 87 OINTMENT TOPICAL at 18:15

## 2018-01-01 RX ADMIN — PRAVASTATIN SODIUM 10 MG: 10 TABLET ORAL at 22:26

## 2018-01-01 RX ADMIN — SEVELAMER CARBONATE 2400 MG: 2400 POWDER, FOR SUSPENSION ORAL at 09:39

## 2018-01-01 RX ADMIN — IOPAMIDOL 50 ML: 755 INJECTION, SOLUTION INTRAVENOUS at 15:00

## 2018-01-01 RX ADMIN — PROPOFOL 100 MG: 10 INJECTION, EMULSION INTRAVENOUS at 09:08

## 2018-01-01 RX ADMIN — SEVELAMER CARBONATE 2400 MG: 2400 POWDER, FOR SUSPENSION ORAL at 19:50

## 2018-01-01 RX ADMIN — OXYCODONE HYDROCHLORIDE AND ACETAMINOPHEN 1 TABLET: 5; 325 TABLET ORAL at 09:33

## 2018-01-01 RX ADMIN — PIPERACILLIN SODIUM,TAZOBACTAM SODIUM 3.38 G: 3; .375 INJECTION, POWDER, FOR SOLUTION INTRAVENOUS at 18:24

## 2018-01-01 RX ADMIN — HEPARIN SODIUM 5000 UNITS: 5000 INJECTION INTRAVENOUS; SUBCUTANEOUS at 18:11

## 2018-01-01 RX ADMIN — MORPHINE SULFATE 10 MG: 10 SOLUTION ORAL at 16:52

## 2018-01-01 RX ADMIN — ERYTHROPOIETIN 10000 UNITS: 10000 INJECTION, SOLUTION INTRAVENOUS; SUBCUTANEOUS at 21:00

## 2018-01-01 RX ADMIN — SODIUM CHLORIDE 50 ML/HR: 900 INJECTION, SOLUTION INTRAVENOUS at 09:02

## 2018-01-01 RX ADMIN — PRAVASTATIN SODIUM 10 MG: 10 TABLET ORAL at 21:28

## 2018-01-01 RX ADMIN — NEPHROCAP 1 CAPSULE: 1 CAP ORAL at 08:02

## 2018-01-01 RX ADMIN — NYSTATIN 500000 UNITS: 100000 SUSPENSION ORAL at 17:57

## 2018-01-01 RX ADMIN — CASTOR OIL AND BALSAM, PERU: 788; 87 OINTMENT TOPICAL at 10:58

## 2018-01-01 RX ADMIN — SODIUM HYPOCHLORITE: 1.25 SOLUTION TOPICAL at 12:14

## 2018-01-01 RX ADMIN — Medication 10 ML: at 23:35

## 2018-01-01 RX ADMIN — CLONIDINE HYDROCHLORIDE 0.1 MG: 0.1 TABLET ORAL at 22:01

## 2018-01-01 RX ADMIN — HEPARIN SODIUM 5000 UNITS: 5000 INJECTION, SOLUTION INTRAVENOUS; SUBCUTANEOUS at 21:44

## 2018-01-01 RX ADMIN — CLONIDINE HYDROCHLORIDE 0.1 MG: 0.1 TABLET ORAL at 21:38

## 2018-01-01 RX ADMIN — LIDOCAINE HYDROCHLORIDE 80 MG: 20 INJECTION, SOLUTION EPIDURAL; INFILTRATION; INTRACAUDAL; PERINEURAL at 18:51

## 2018-01-01 RX ADMIN — INSULIN LISPRO 2 UNITS: 100 INJECTION, SOLUTION INTRAVENOUS; SUBCUTANEOUS at 19:12

## 2018-01-01 RX ADMIN — AMLODIPINE BESYLATE 10 MG: 5 TABLET ORAL at 08:34

## 2018-01-01 RX ADMIN — Medication 10 ML: at 17:15

## 2018-01-01 RX ADMIN — CASTOR OIL AND BALSAM, PERU: 788; 87 OINTMENT TOPICAL at 18:17

## 2018-01-01 RX ADMIN — CEFEPIME 1 G: 1 INJECTION, POWDER, FOR SOLUTION INTRAMUSCULAR; INTRAVENOUS at 21:00

## 2018-01-01 RX ADMIN — IPRATROPIUM BROMIDE AND ALBUTEROL SULFATE 3 ML: .5; 3 SOLUTION RESPIRATORY (INHALATION) at 08:33

## 2018-01-01 RX ADMIN — CEFEPIME 1 G: 1 INJECTION, POWDER, FOR SOLUTION INTRAMUSCULAR; INTRAVENOUS at 21:10

## 2018-01-01 RX ADMIN — MORPHINE SULFATE 2 MG: 4 INJECTION INTRAVENOUS at 04:14

## 2018-01-01 RX ADMIN — CLONIDINE HYDROCHLORIDE 0.1 MG: 0.1 TABLET ORAL at 13:19

## 2018-01-01 RX ADMIN — PROPOFOL 50 MG: 10 INJECTION, EMULSION INTRAVENOUS at 09:24

## 2018-01-01 RX ADMIN — Medication 10 ML: at 05:31

## 2018-01-01 RX ADMIN — IPRATROPIUM BROMIDE AND ALBUTEROL SULFATE 3 ML: .5; 3 SOLUTION RESPIRATORY (INHALATION) at 13:42

## 2018-01-01 RX ADMIN — MORPHINE SULFATE 10 MG: 10 SOLUTION ORAL at 22:09

## 2018-01-01 RX ADMIN — INSULIN LISPRO 2 UNITS: 100 INJECTION, SOLUTION INTRAVENOUS; SUBCUTANEOUS at 06:20

## 2018-01-01 RX ADMIN — OXYCODONE HYDROCHLORIDE AND ACETAMINOPHEN 1 TABLET: 5; 325 TABLET ORAL at 08:54

## 2018-01-01 RX ADMIN — CEFEPIME 1 G: 1 INJECTION, POWDER, FOR SOLUTION INTRAMUSCULAR; INTRAVENOUS at 20:08

## 2018-01-01 RX ADMIN — PRAVASTATIN SODIUM 10 MG: 10 TABLET ORAL at 23:15

## 2018-01-01 RX ADMIN — MORPHINE SULFATE 2 MG: 4 INJECTION INTRAVENOUS at 18:52

## 2018-01-01 RX ADMIN — CEFEPIME 1 G: 1 INJECTION, POWDER, FOR SOLUTION INTRAMUSCULAR; INTRAVENOUS at 19:35

## 2018-01-01 RX ADMIN — CLONIDINE HYDROCHLORIDE 0.1 MG: 0.1 TABLET ORAL at 18:52

## 2018-01-01 RX ADMIN — MORPHINE SULFATE 2 MG: 4 INJECTION INTRAVENOUS at 16:46

## 2018-01-01 RX ADMIN — Medication 10 ML: at 21:16

## 2018-01-01 RX ADMIN — SEVELAMER CARBONATE 2400 MG: 2400 POWDER, FOR SUSPENSION ORAL at 09:06

## 2018-01-01 RX ADMIN — MORPHINE SULFATE 10 MG: 10 SOLUTION ORAL at 21:02

## 2018-01-01 RX ADMIN — CLONIDINE HYDROCHLORIDE 0.1 MG: 0.1 TABLET ORAL at 23:04

## 2018-01-01 RX ADMIN — CLONIDINE HYDROCHLORIDE 0.1 MG: 0.1 TABLET ORAL at 00:58

## 2018-01-01 RX ADMIN — IPRATROPIUM BROMIDE AND ALBUTEROL SULFATE 3 ML: .5; 3 SOLUTION RESPIRATORY (INHALATION) at 20:19

## 2018-01-01 RX ADMIN — SEVELAMER CARBONATE 2400 MG: 2400 POWDER, FOR SUSPENSION ORAL at 08:59

## 2018-01-01 RX ADMIN — IOPAMIDOL 100 ML: 755 INJECTION, SOLUTION INTRAVENOUS at 10:10

## 2018-01-01 RX ADMIN — ERYTHROPOIETIN 10000 UNITS: 10000 INJECTION, SOLUTION INTRAVENOUS; SUBCUTANEOUS at 19:50

## 2018-01-01 RX ADMIN — METOPROLOL TARTRATE 1.25 MG: 5 INJECTION, SOLUTION INTRAVENOUS at 21:42

## 2018-01-01 RX ADMIN — ONDANSETRON 4 MG: 2 INJECTION INTRAMUSCULAR; INTRAVENOUS at 13:11

## 2018-01-01 RX ADMIN — HEPARIN SODIUM 5000 UNITS: 5000 INJECTION, SOLUTION INTRAVENOUS; SUBCUTANEOUS at 17:45

## 2018-01-01 RX ADMIN — IPRATROPIUM BROMIDE AND ALBUTEROL SULFATE 3 ML: .5; 3 SOLUTION RESPIRATORY (INHALATION) at 07:55

## 2018-01-01 RX ADMIN — CINACALCET HYDROCHLORIDE 60 MG: 30 TABLET, COATED ORAL at 09:30

## 2018-01-01 RX ADMIN — METOPROLOL TARTRATE 1.25 MG: 5 INJECTION, SOLUTION INTRAVENOUS at 09:13

## 2018-01-01 RX ADMIN — CASTOR OIL AND BALSAM, PERU: 788; 87 OINTMENT TOPICAL at 21:46

## 2018-01-01 RX ADMIN — CLONIDINE HYDROCHLORIDE 0.1 MG: 0.1 TABLET ORAL at 05:19

## 2018-01-01 RX ADMIN — IPRATROPIUM BROMIDE AND ALBUTEROL SULFATE 3 ML: .5; 3 SOLUTION RESPIRATORY (INHALATION) at 13:53

## 2018-01-01 RX ADMIN — SEVELAMER CARBONATE 800 MG: 800 TABLET, FILM COATED ORAL at 12:01

## 2018-01-01 RX ADMIN — ESCITALOPRAM OXALATE 10 MG: 10 TABLET ORAL at 09:41

## 2018-01-01 RX ADMIN — MIDAZOLAM HYDROCHLORIDE 1 MG: 1 INJECTION, SOLUTION INTRAMUSCULAR; INTRAVENOUS at 15:41

## 2018-01-01 RX ADMIN — FAMOTIDINE 20 MG: 20 TABLET, FILM COATED ORAL at 18:52

## 2018-01-01 RX ADMIN — CARVEDILOL 25 MG: 12.5 TABLET, FILM COATED ORAL at 09:17

## 2018-01-01 RX ADMIN — SEVELAMER CARBONATE 800 MG: 800 TABLET, FILM COATED ORAL at 13:11

## 2018-01-01 RX ADMIN — Medication 10 ML: at 05:23

## 2018-01-01 RX ADMIN — METOPROLOL TARTRATE 1 MG: 5 INJECTION INTRAVENOUS at 19:05

## 2018-01-01 RX ADMIN — ONDANSETRON 4 MG: 2 INJECTION INTRAMUSCULAR; INTRAVENOUS at 03:53

## 2018-01-01 RX ADMIN — SODIUM HYPOCHLORITE: 1.25 SOLUTION TOPICAL at 10:26

## 2018-01-01 RX ADMIN — NYSTATIN 500000 UNITS: 100000 SUSPENSION ORAL at 12:18

## 2018-01-01 RX ADMIN — CINACALCET HYDROCHLORIDE 60 MG: 30 TABLET, COATED ORAL at 08:02

## 2018-01-01 RX ADMIN — SODIUM HYPOCHLORITE: 1.25 SOLUTION TOPICAL at 10:53

## 2018-01-01 RX ADMIN — PIPERACILLIN SODIUM,TAZOBACTAM SODIUM 3.38 G: 3; .375 INJECTION, POWDER, FOR SOLUTION INTRAVENOUS at 15:49

## 2018-01-01 RX ADMIN — METRONIDAZOLE 500 MG: 500 INJECTION, SOLUTION INTRAVENOUS at 01:11

## 2018-01-01 RX ADMIN — INSULIN HUMAN 10 UNITS: 100 INJECTION, SUSPENSION SUBCUTANEOUS at 08:25

## 2018-01-01 RX ADMIN — ESCITALOPRAM 10 MG: 5 SOLUTION ORAL at 11:40

## 2018-01-01 RX ADMIN — CARVEDILOL 25 MG: 12.5 TABLET, FILM COATED ORAL at 22:26

## 2018-01-01 RX ADMIN — NEPHROCAP 1 CAPSULE: 1 CAP ORAL at 08:25

## 2018-01-01 RX ADMIN — CLONIDINE HYDROCHLORIDE 0.1 MG: 0.1 TABLET ORAL at 17:31

## 2018-01-01 RX ADMIN — Medication 10 ML: at 16:25

## 2018-01-01 RX ADMIN — Medication 10 ML: at 06:26

## 2018-01-01 RX ADMIN — PIPERACILLIN SODIUM,TAZOBACTAM SODIUM 3.38 G: 3; .375 INJECTION, POWDER, FOR SOLUTION INTRAVENOUS at 05:25

## 2018-01-01 RX ADMIN — Medication 1 CAPSULE: at 09:41

## 2018-01-01 RX ADMIN — IPRATROPIUM BROMIDE AND ALBUTEROL SULFATE 3 ML: .5; 3 SOLUTION RESPIRATORY (INHALATION) at 22:42

## 2018-01-01 RX ADMIN — ACETYLCYSTEINE 400 MG: 200 SOLUTION ORAL; RESPIRATORY (INHALATION) at 13:42

## 2018-01-01 RX ADMIN — MORPHINE SULFATE 2 MG: 4 INJECTION INTRAVENOUS at 00:27

## 2018-01-01 RX ADMIN — LISINOPRIL 40 MG: 20 TABLET ORAL at 21:49

## 2018-01-01 RX ADMIN — IPRATROPIUM BROMIDE AND ALBUTEROL SULFATE 3 ML: .5; 3 SOLUTION RESPIRATORY (INHALATION) at 15:08

## 2018-01-01 RX ADMIN — SEVELAMER CARBONATE 2400 MG: 2400 POWDER, FOR SUSPENSION ORAL at 13:49

## 2018-01-01 RX ADMIN — SODIUM CHLORIDE 100 ML/HR: 900 INJECTION, SOLUTION INTRAVENOUS at 08:45

## 2018-01-01 RX ADMIN — ERYTHROPOIETIN 10000 UNITS: 10000 INJECTION, SOLUTION INTRAVENOUS; SUBCUTANEOUS at 19:06

## 2018-01-01 RX ADMIN — HEPARIN SODIUM 5000 UNITS: 5000 INJECTION, SOLUTION INTRAVENOUS; SUBCUTANEOUS at 08:34

## 2018-01-01 RX ADMIN — SEVELAMER CARBONATE 800 MG: 800 TABLET, FILM COATED ORAL at 12:15

## 2018-01-01 RX ADMIN — HEPARIN SODIUM 5000 UNITS: 5000 INJECTION, SOLUTION INTRAVENOUS; SUBCUTANEOUS at 17:49

## 2018-01-01 RX ADMIN — SEVELAMER CARBONATE 800 MG: 800 TABLET, FILM COATED ORAL at 11:45

## 2018-01-01 RX ADMIN — HUMAN INSULIN 5 UNITS: 100 INJECTION, SUSPENSION SUBCUTANEOUS at 13:10

## 2018-01-01 RX ADMIN — HEPARIN SODIUM 5000 UNITS: 5000 INJECTION, SOLUTION INTRAVENOUS; SUBCUTANEOUS at 22:41

## 2018-01-01 RX ADMIN — COLLAGENASE SANTYL: 250 OINTMENT TOPICAL at 09:00

## 2018-01-01 RX ADMIN — HEPARIN SODIUM 5000 UNITS: 5000 INJECTION INTRAVENOUS; SUBCUTANEOUS at 17:36

## 2018-01-01 RX ADMIN — LISINOPRIL 40 MG: 20 TABLET ORAL at 17:30

## 2018-01-01 RX ADMIN — PRAVASTATIN SODIUM 10 MG: 10 TABLET ORAL at 21:57

## 2018-01-01 RX ADMIN — MORPHINE SULFATE 2 MG: 4 INJECTION INTRAVENOUS at 08:16

## 2018-01-01 RX ADMIN — ASPIRIN 81 MG 81 MG: 81 TABLET ORAL at 08:46

## 2018-01-01 RX ADMIN — Medication 10 ML: at 01:22

## 2018-01-01 RX ADMIN — PIPERACILLIN SODIUM,TAZOBACTAM SODIUM 3.38 G: 3; .375 INJECTION, POWDER, FOR SOLUTION INTRAVENOUS at 18:51

## 2018-01-01 RX ADMIN — CARVEDILOL 25 MG: 12.5 TABLET, FILM COATED ORAL at 18:38

## 2018-01-01 RX ADMIN — FAMOTIDINE 20 MG: 20 TABLET, FILM COATED ORAL at 11:57

## 2018-01-01 RX ADMIN — CASTOR OIL AND BALSAM, PERU: 788; 87 OINTMENT TOPICAL at 09:14

## 2018-01-01 RX ADMIN — Medication 10 ML: at 21:42

## 2018-01-01 RX ADMIN — ASPIRIN 81 MG 81 MG: 81 TABLET ORAL at 08:25

## 2018-01-01 RX ADMIN — ESCITALOPRAM OXALATE 10 MG: 10 TABLET ORAL at 18:52

## 2018-01-01 RX ADMIN — PIPERACILLIN SODIUM,TAZOBACTAM SODIUM 3.38 G: 3; .375 INJECTION, POWDER, FOR SOLUTION INTRAVENOUS at 06:48

## 2018-01-01 RX ADMIN — SEVELAMER CARBONATE 2400 MG: 2400 POWDER, FOR SUSPENSION ORAL at 17:57

## 2018-01-01 RX ADMIN — SEVELAMER CARBONATE 800 MG: 800 TABLET, FILM COATED ORAL at 08:31

## 2018-01-01 RX ADMIN — HEPARIN SODIUM 5000 UNITS: 5000 INJECTION INTRAVENOUS; SUBCUTANEOUS at 17:49

## 2018-01-01 RX ADMIN — HEPARIN SODIUM 5000 UNITS: 5000 INJECTION, SOLUTION INTRAVENOUS; SUBCUTANEOUS at 21:22

## 2018-01-01 RX ADMIN — AMLODIPINE BESYLATE 10 MG: 5 TABLET ORAL at 08:35

## 2018-01-01 RX ADMIN — LISINOPRIL 40 MG: 20 TABLET ORAL at 20:06

## 2018-01-01 RX ADMIN — Medication 10 ML: at 04:01

## 2018-01-01 RX ADMIN — ERYTHROPOIETIN 10000 UNITS: 10000 INJECTION, SOLUTION INTRAVENOUS; SUBCUTANEOUS at 21:15

## 2018-01-01 RX ADMIN — CARVEDILOL 25 MG: 12.5 TABLET, FILM COATED ORAL at 09:44

## 2018-01-01 RX ADMIN — ACETAMINOPHEN 650 MG: 325 TABLET, FILM COATED ORAL at 19:53

## 2018-01-01 RX ADMIN — SEVELAMER CARBONATE 800 MG: 800 TABLET, FILM COATED ORAL at 19:46

## 2018-01-01 RX ADMIN — FAMOTIDINE 20 MG: 20 TABLET, FILM COATED ORAL at 06:49

## 2018-01-01 RX ADMIN — CARVEDILOL 12.5 MG: 12.5 TABLET, FILM COATED ORAL at 10:30

## 2018-01-01 RX ADMIN — MORPHINE SULFATE 10 MG: 10 SOLUTION ORAL at 14:24

## 2018-01-01 RX ADMIN — ESCITALOPRAM OXALATE 10 MG: 10 TABLET ORAL at 10:39

## 2018-01-01 RX ADMIN — ERYTHROPOIETIN 10000 UNITS: 10000 INJECTION, SOLUTION INTRAVENOUS; SUBCUTANEOUS at 20:07

## 2018-01-01 RX ADMIN — METOPROLOL TARTRATE 1.25 MG: 5 INJECTION, SOLUTION INTRAVENOUS at 16:49

## 2018-01-01 RX ADMIN — CINACALCET HYDROCHLORIDE 60 MG: 30 TABLET, COATED ORAL at 08:46

## 2018-01-01 RX ADMIN — VANCOMYCIN HYDROCHLORIDE 750 MG: 750 INJECTION, POWDER, LYOPHILIZED, FOR SOLUTION INTRAVENOUS at 16:19

## 2018-01-01 RX ADMIN — IPRATROPIUM BROMIDE AND ALBUTEROL SULFATE 3 ML: .5; 3 SOLUTION RESPIRATORY (INHALATION) at 09:29

## 2018-01-01 RX ADMIN — PIPERACILLIN SODIUM,TAZOBACTAM SODIUM 3.38 G: 3; .375 INJECTION, POWDER, FOR SOLUTION INTRAVENOUS at 18:06

## 2018-01-01 RX ADMIN — LISINOPRIL 40 MG: 20 TABLET ORAL at 00:16

## 2018-01-01 RX ADMIN — EPINEPHRINE 1 MG: 0.1 INJECTION, SOLUTION ENDOTRACHEAL; INTRACARDIAC; INTRAVENOUS at 19:40

## 2018-01-01 RX ADMIN — CARVEDILOL 3.12 MG: 3.12 TABLET, FILM COATED ORAL at 17:49

## 2018-01-01 RX ADMIN — ONDANSETRON 4 MG: 2 INJECTION INTRAMUSCULAR; INTRAVENOUS at 19:53

## 2018-01-01 RX ADMIN — PRAVASTATIN SODIUM 10 MG: 10 TABLET ORAL at 21:03

## 2018-01-01 RX ADMIN — LISINOPRIL 40 MG: 20 TABLET ORAL at 18:07

## 2018-01-01 RX ADMIN — HEPARIN SODIUM 5000 UNITS: 5000 INJECTION INTRAVENOUS; SUBCUTANEOUS at 05:37

## 2018-01-01 RX ADMIN — SODIUM CHLORIDE 40 MG: 9 INJECTION INTRAMUSCULAR; INTRAVENOUS; SUBCUTANEOUS at 22:20

## 2018-01-01 RX ADMIN — SODIUM CHLORIDE 50 ML/HR: 900 INJECTION, SOLUTION INTRAVENOUS at 17:15

## 2018-01-01 RX ADMIN — SODIUM CHLORIDE 100 ML/HR: 900 INJECTION, SOLUTION INTRAVENOUS at 09:10

## 2018-01-01 RX ADMIN — PRAVASTATIN SODIUM 10 MG: 10 TABLET ORAL at 22:00

## 2018-01-01 RX ADMIN — INSULIN LISPRO 2 UNITS: 100 INJECTION, SOLUTION INTRAVENOUS; SUBCUTANEOUS at 17:35

## 2018-01-01 RX ADMIN — METRONIDAZOLE 500 MG: 500 INJECTION, SOLUTION INTRAVENOUS at 09:45

## 2018-01-01 RX ADMIN — NEPHROCAP 1 CAPSULE: 1 CAP ORAL at 09:10

## 2018-01-01 RX ADMIN — HEPARIN SODIUM 5000 UNITS: 5000 INJECTION, SOLUTION INTRAVENOUS; SUBCUTANEOUS at 21:14

## 2018-01-01 RX ADMIN — CLONIDINE HYDROCHLORIDE 0.1 MG: 0.1 TABLET ORAL at 09:31

## 2018-01-01 RX ADMIN — Medication 10 ML: at 13:46

## 2018-01-01 RX ADMIN — HEPARIN SODIUM 5000 UNITS: 5000 INJECTION, SOLUTION INTRAVENOUS; SUBCUTANEOUS at 21:24

## 2018-01-01 RX ADMIN — COLLAGENASE SANTYL: 250 OINTMENT TOPICAL at 09:27

## 2018-01-01 RX ADMIN — SEVELAMER CARBONATE 800 MG: 800 TABLET, FILM COATED ORAL at 09:10

## 2018-01-01 RX ADMIN — MORPHINE SULFATE 2 MG: 4 INJECTION INTRAVENOUS at 03:27

## 2018-01-01 RX ADMIN — LISINOPRIL 40 MG: 20 TABLET ORAL at 21:23

## 2018-01-01 RX ADMIN — ESCITALOPRAM OXALATE 10 MG: 10 TABLET ORAL at 08:25

## 2018-01-01 RX ADMIN — SODIUM HYPOCHLORITE: 1.25 SOLUTION TOPICAL at 15:37

## 2018-01-01 RX ADMIN — HEPARIN SODIUM 5000 UNITS: 5000 INJECTION, SOLUTION INTRAVENOUS; SUBCUTANEOUS at 05:29

## 2018-01-01 RX ADMIN — CARVEDILOL 25 MG: 12.5 TABLET, FILM COATED ORAL at 08:25

## 2018-01-01 RX ADMIN — EPINEPHRINE 1 MG: 0.1 INJECTION, SOLUTION ENDOTRACHEAL; INTRACARDIAC; INTRAVENOUS at 20:06

## 2018-01-01 RX ADMIN — ASPIRIN 81 MG CHEWABLE TABLET 81 MG: 81 TABLET CHEWABLE at 10:53

## 2018-01-01 RX ADMIN — GUAIFENESIN 400 MG: 200 SOLUTION ORAL at 18:12

## 2018-01-01 RX ADMIN — CINACALCET HYDROCHLORIDE 60 MG: 30 TABLET, COATED ORAL at 09:44

## 2018-01-01 RX ADMIN — PRAVASTATIN SODIUM 10 MG: 10 TABLET ORAL at 22:04

## 2018-01-01 RX ADMIN — NITROGLYCERIN 1 INCH: 20 OINTMENT TOPICAL at 05:45

## 2018-01-01 RX ADMIN — OXYCODONE HYDROCHLORIDE AND ACETAMINOPHEN 1 TABLET: 10; 325 TABLET ORAL at 09:41

## 2018-01-01 RX ADMIN — SEVELAMER CARBONATE 800 MG: 800 TABLET, FILM COATED ORAL at 08:56

## 2018-01-01 RX ADMIN — PIPERACILLIN SODIUM,TAZOBACTAM SODIUM 3.38 G: 3; .375 INJECTION, POWDER, FOR SOLUTION INTRAVENOUS at 17:31

## 2018-01-01 RX ADMIN — NITROGLYCERIN 1 INCH: 20 OINTMENT TOPICAL at 04:51

## 2018-01-01 RX ADMIN — CLONIDINE HYDROCHLORIDE 0.1 MG: 0.1 TABLET ORAL at 21:49

## 2018-01-01 RX ADMIN — CEFEPIME 1 G: 1 INJECTION, POWDER, FOR SOLUTION INTRAMUSCULAR; INTRAVENOUS at 22:19

## 2018-01-01 RX ADMIN — SODIUM CHLORIDE: 9 INJECTION, SOLUTION INTRAVENOUS at 18:30

## 2018-01-01 RX ADMIN — MORPHINE SULFATE 2 MG: 4 INJECTION INTRAVENOUS at 10:29

## 2018-01-01 RX ADMIN — DEXTROSE MONOHYDRATE AND SODIUM CHLORIDE 50 ML/HR: 5; .45 INJECTION, SOLUTION INTRAVENOUS at 20:45

## 2018-01-01 RX ADMIN — CASTOR OIL AND BALSAM, PERU: 788; 87 OINTMENT TOPICAL at 11:40

## 2018-01-01 RX ADMIN — HUMAN INSULIN 5 UNITS: 100 INJECTION, SUSPENSION SUBCUTANEOUS at 08:38

## 2018-01-01 RX ADMIN — VANCOMYCIN HYDROCHLORIDE 2000 MG: 10 INJECTION, POWDER, LYOPHILIZED, FOR SOLUTION INTRAVENOUS at 21:33

## 2018-01-01 RX ADMIN — CARVEDILOL 25 MG: 12.5 TABLET, FILM COATED ORAL at 19:03

## 2018-01-01 RX ADMIN — SODIUM HYPOCHLORITE: 1.25 SOLUTION TOPICAL at 14:09

## 2018-01-01 RX ADMIN — EPINEPHRINE 1 MG: 0.1 INJECTION, SOLUTION ENDOTRACHEAL; INTRACARDIAC; INTRAVENOUS at 20:04

## 2018-01-01 RX ADMIN — METOPROLOL TARTRATE 1.25 MG: 5 INJECTION, SOLUTION INTRAVENOUS at 03:24

## 2018-01-01 RX ADMIN — Medication 10 ML: at 18:54

## 2018-01-01 RX ADMIN — SEVELAMER CARBONATE 2400 MG: 2400 POWDER, FOR SUSPENSION ORAL at 18:18

## 2018-01-01 RX ADMIN — SODIUM HYPOCHLORITE: 1.25 SOLUTION TOPICAL at 12:22

## 2018-01-01 RX ADMIN — CEFEPIME 1 G: 1 INJECTION, POWDER, FOR SOLUTION INTRAMUSCULAR; INTRAVENOUS at 13:47

## 2018-01-01 RX ADMIN — ESCITALOPRAM OXALATE 10 MG: 10 TABLET ORAL at 08:21

## 2018-01-01 RX ADMIN — SEVELAMER CARBONATE 800 MG: 800 TABLET, FILM COATED ORAL at 12:54

## 2018-01-01 RX ADMIN — DEXTROSE MONOHYDRATE 25 G: 500 INJECTION PARENTERAL at 23:48

## 2018-01-01 RX ADMIN — METOPROLOL TARTRATE 1.25 MG: 5 INJECTION, SOLUTION INTRAVENOUS at 18:18

## 2018-01-01 RX ADMIN — PIPERACILLIN SODIUM,TAZOBACTAM SODIUM 3.38 G: 3; .375 INJECTION, POWDER, FOR SOLUTION INTRAVENOUS at 20:08

## 2018-01-01 RX ADMIN — Medication 10 ML: at 21:28

## 2018-01-01 RX ADMIN — SEVELAMER CARBONATE 800 MG: 800 TABLET, FILM COATED ORAL at 08:16

## 2018-01-01 RX ADMIN — ONDANSETRON 4 MG: 2 INJECTION, SOLUTION INTRAMUSCULAR; INTRAVENOUS at 11:35

## 2018-01-01 RX ADMIN — SODIUM CHLORIDE 3.38 G: 900 INJECTION INTRAVENOUS at 17:51

## 2018-01-01 RX ADMIN — LISINOPRIL 40 MG: 20 TABLET ORAL at 08:49

## 2018-01-01 RX ADMIN — ESCITALOPRAM OXALATE 10 MG: 10 TABLET ORAL at 08:53

## 2018-01-01 RX ADMIN — ACETAMINOPHEN 650 MG: 325 TABLET, FILM COATED ORAL at 22:09

## 2018-01-01 RX ADMIN — NEPHROCAP 1 CAPSULE: 1 CAP ORAL at 12:35

## 2018-01-01 RX ADMIN — HEPARIN SODIUM 5000 UNITS: 5000 INJECTION, SOLUTION INTRAVENOUS; SUBCUTANEOUS at 18:47

## 2018-01-01 RX ADMIN — OSELTAMIVIR PHOSPHATE 30 MG: 30 CAPSULE ORAL at 16:49

## 2018-01-01 RX ADMIN — CARVEDILOL 25 MG: 12.5 TABLET, FILM COATED ORAL at 08:02

## 2018-01-01 RX ADMIN — HEPARIN SODIUM 5000 UNITS: 5000 INJECTION, SOLUTION INTRAVENOUS; SUBCUTANEOUS at 09:10

## 2018-01-01 RX ADMIN — Medication 10 ML: at 14:44

## 2018-01-01 RX ADMIN — CLONIDINE HYDROCHLORIDE 0.1 MG: 0.1 TABLET ORAL at 06:31

## 2018-01-01 RX ADMIN — DEXTROSE MONOHYDRATE 12.5 G: 500 INJECTION PARENTERAL at 22:10

## 2018-01-01 RX ADMIN — POTASSIUM CHLORIDE 10 MEQ: 750 TABLET, EXTENDED RELEASE ORAL at 08:16

## 2018-01-01 RX ADMIN — CARVEDILOL 25 MG: 12.5 TABLET, FILM COATED ORAL at 17:31

## 2018-01-01 RX ADMIN — ONDANSETRON 8 MG: 4 TABLET, ORALLY DISINTEGRATING ORAL at 11:56

## 2018-01-01 RX ADMIN — INSULIN HUMAN 10 UNITS: 100 INJECTION, SUSPENSION SUBCUTANEOUS at 08:17

## 2018-01-01 RX ADMIN — HEPARIN SODIUM 5000 UNITS: 5000 INJECTION, SOLUTION INTRAVENOUS; SUBCUTANEOUS at 06:21

## 2018-01-01 RX ADMIN — CINACALCET HYDROCHLORIDE 60 MG: 30 TABLET, COATED ORAL at 09:16

## 2018-01-01 RX ADMIN — ACETYLCYSTEINE 800 MG: 200 SOLUTION ORAL; RESPIRATORY (INHALATION) at 19:10

## 2018-01-01 RX ADMIN — CLONIDINE HYDROCHLORIDE 0.1 MG: 0.1 TABLET ORAL at 05:36

## 2018-01-01 RX ADMIN — SEVELAMER CARBONATE 2400 MG: 2400 POWDER, FOR SUSPENSION ORAL at 08:00

## 2018-01-01 RX ADMIN — PRAVASTATIN SODIUM 10 MG: 10 TABLET ORAL at 21:11

## 2018-01-01 RX ADMIN — SEVELAMER CARBONATE 800 MG: 800 TABLET, FILM COATED ORAL at 08:34

## 2018-01-01 RX ADMIN — FENTANYL CITRATE 50 MCG: 50 INJECTION, SOLUTION INTRAMUSCULAR; INTRAVENOUS at 14:44

## 2018-01-01 RX ADMIN — ESCITALOPRAM OXALATE 10 MG: 10 TABLET ORAL at 08:01

## 2018-01-01 RX ADMIN — ESCITALOPRAM 10 MG: 5 SOLUTION ORAL at 09:27

## 2018-01-01 RX ADMIN — HEPARIN SODIUM 5000 UNITS: 5000 INJECTION, SOLUTION INTRAVENOUS; SUBCUTANEOUS at 06:31

## 2018-01-01 RX ADMIN — INSULIN LISPRO 1 UNITS: 100 INJECTION, SOLUTION INTRAVENOUS; SUBCUTANEOUS at 06:00

## 2018-01-01 RX ADMIN — ESCITALOPRAM OXALATE 10 MG: 10 TABLET ORAL at 08:31

## 2018-01-01 RX ADMIN — LEVOFLOXACIN 500 MG: 5 INJECTION, SOLUTION INTRAVENOUS at 18:15

## 2018-01-01 RX ADMIN — MORPHINE SULFATE 2 MG: 4 INJECTION INTRAVENOUS at 04:25

## 2018-01-01 RX ADMIN — FAMOTIDINE 20 MG: 20 TABLET, FILM COATED ORAL at 17:09

## 2018-01-01 RX ADMIN — GUAIFENESIN 400 MG: 200 SOLUTION ORAL at 18:04

## 2018-01-01 RX ADMIN — OXYCODONE HYDROCHLORIDE AND ACETAMINOPHEN 1 TABLET: 10; 325 TABLET ORAL at 14:44

## 2018-01-01 RX ADMIN — CARVEDILOL 25 MG: 12.5 TABLET, FILM COATED ORAL at 21:15

## 2018-01-01 RX ADMIN — PRAVASTATIN SODIUM 10 MG: 10 TABLET ORAL at 22:09

## 2018-01-01 RX ADMIN — CARVEDILOL 3.12 MG: 3.12 TABLET, FILM COATED ORAL at 17:17

## 2018-01-01 RX ADMIN — ERYTHROPOIETIN 10000 UNITS: 10000 INJECTION, SOLUTION INTRAVENOUS; SUBCUTANEOUS at 18:05

## 2018-01-01 RX ADMIN — ESCITALOPRAM 10 MG: 5 SOLUTION ORAL at 10:23

## 2018-01-01 RX ADMIN — Medication 10 ML: at 05:20

## 2018-01-01 RX ADMIN — HEPARIN SODIUM 5000 UNITS: 5000 INJECTION, SOLUTION INTRAVENOUS; SUBCUTANEOUS at 08:49

## 2018-01-01 RX ADMIN — IOPAMIDOL 100 ML: 755 INJECTION, SOLUTION INTRAVENOUS at 13:36

## 2018-01-01 RX ADMIN — PRAVASTATIN SODIUM 10 MG: 10 TABLET ORAL at 22:02

## 2018-01-01 RX ADMIN — OXYCODONE HYDROCHLORIDE AND ACETAMINOPHEN 1 TABLET: 5; 325 TABLET ORAL at 14:09

## 2018-01-01 RX ADMIN — AMLODIPINE BESYLATE 10 MG: 5 TABLET ORAL at 08:02

## 2018-01-01 RX ADMIN — IPRATROPIUM BROMIDE AND ALBUTEROL SULFATE 3 ML: .5; 3 SOLUTION RESPIRATORY (INHALATION) at 19:31

## 2018-01-01 RX ADMIN — NYSTATIN 500000 UNITS: 100000 SUSPENSION ORAL at 21:27

## 2018-01-01 RX ADMIN — SEVELAMER CARBONATE 2400 MG: 2400 POWDER, FOR SUSPENSION ORAL at 20:40

## 2018-01-01 RX ADMIN — METOPROLOL TARTRATE 1.25 MG: 5 INJECTION, SOLUTION INTRAVENOUS at 04:00

## 2018-01-01 RX ADMIN — ESCITALOPRAM 10 MG: 5 SOLUTION ORAL at 08:51

## 2018-01-01 RX ADMIN — ESCITALOPRAM OXALATE 10 MG: 10 TABLET ORAL at 09:44

## 2018-01-01 RX ADMIN — PRAVASTATIN SODIUM 10 MG: 10 TABLET ORAL at 21:44

## 2018-01-01 RX ADMIN — CLONIDINE HYDROCHLORIDE 0.1 MG: 0.1 TABLET ORAL at 22:45

## 2018-01-01 RX ADMIN — PRAVASTATIN SODIUM 10 MG: 10 TABLET ORAL at 22:51

## 2018-01-01 RX ADMIN — OXYCODONE HYDROCHLORIDE AND ACETAMINOPHEN 1 TABLET: 10; 325 TABLET ORAL at 11:45

## 2018-01-01 RX ADMIN — ACETYLCYSTEINE 400 MG: 200 SOLUTION ORAL; RESPIRATORY (INHALATION) at 20:29

## 2018-01-01 RX ADMIN — SODIUM CHLORIDE 1000 ML: 900 INJECTION, SOLUTION INTRAVENOUS at 11:52

## 2018-01-01 RX ADMIN — Medication 10 ML: at 19:31

## 2018-01-01 RX ADMIN — CINACALCET HYDROCHLORIDE 60 MG: 30 TABLET, COATED ORAL at 08:25

## 2018-01-01 RX ADMIN — ACETAMINOPHEN 650 MG: 325 SOLUTION ORAL at 08:06

## 2018-01-01 RX ADMIN — ESCITALOPRAM OXALATE 10 MG: 10 TABLET ORAL at 09:31

## 2018-01-01 RX ADMIN — SEVELAMER CARBONATE 800 MG: 800 TABLET, FILM COATED ORAL at 12:25

## 2018-01-01 RX ADMIN — NYSTATIN 500000 UNITS: 100000 SUSPENSION ORAL at 18:37

## 2018-01-01 RX ADMIN — Medication 10 ML: at 15:11

## 2018-01-01 RX ADMIN — SEVELAMER CARBONATE 2400 MG: 2400 POWDER, FOR SUSPENSION ORAL at 18:01

## 2018-01-01 RX ADMIN — CARVEDILOL 25 MG: 12.5 TABLET, FILM COATED ORAL at 09:10

## 2018-01-01 RX ADMIN — CLONIDINE HYDROCHLORIDE 0.1 MG: 0.1 TABLET ORAL at 13:36

## 2018-01-01 RX ADMIN — Medication 10 ML: at 06:03

## 2018-01-01 RX ADMIN — ASPIRIN 81 MG CHEWABLE TABLET 81 MG: 81 TABLET CHEWABLE at 09:39

## 2018-01-01 RX ADMIN — COLLAGENASE SANTYL: 250 OINTMENT TOPICAL at 12:58

## 2018-01-01 RX ADMIN — SODIUM CHLORIDE 40 MG: 9 INJECTION, SOLUTION INTRAMUSCULAR; INTRAVENOUS; SUBCUTANEOUS at 23:53

## 2018-01-01 RX ADMIN — CINACALCET HYDROCHLORIDE 60 MG: 30 TABLET, COATED ORAL at 18:53

## 2018-01-01 RX ADMIN — CARVEDILOL 25 MG: 12.5 TABLET, FILM COATED ORAL at 17:32

## 2018-01-01 RX ADMIN — INSULIN LISPRO 3 UNITS: 100 INJECTION, SOLUTION INTRAVENOUS; SUBCUTANEOUS at 12:17

## 2018-01-01 RX ADMIN — IPRATROPIUM BROMIDE AND ALBUTEROL SULFATE 3 ML: .5; 3 SOLUTION RESPIRATORY (INHALATION) at 19:44

## 2018-01-01 RX ADMIN — AMLODIPINE BESYLATE 10 MG: 5 TABLET ORAL at 08:53

## 2018-01-01 RX ADMIN — HEPARIN SODIUM 5000 UNITS: 5000 INJECTION INTRAVENOUS; SUBCUTANEOUS at 17:55

## 2018-01-01 RX ADMIN — SEVELAMER CARBONATE 2400 MG: 2400 POWDER, FOR SUSPENSION ORAL at 09:35

## 2018-01-01 RX ADMIN — ERYTHROPOIETIN 10000 UNITS: 10000 INJECTION, SOLUTION INTRAVENOUS; SUBCUTANEOUS at 17:01

## 2018-01-01 RX ADMIN — CINACALCET HYDROCHLORIDE 60 MG: 30 TABLET, COATED ORAL at 09:10

## 2018-01-01 RX ADMIN — METOPROLOL TARTRATE 1 MG: 5 INJECTION INTRAVENOUS at 19:00

## 2018-01-01 RX ADMIN — IPRATROPIUM BROMIDE AND ALBUTEROL SULFATE 3 ML: .5; 3 SOLUTION RESPIRATORY (INHALATION) at 09:14

## 2018-01-01 RX ADMIN — Medication 2 G: at 17:14

## 2018-01-01 RX ADMIN — ASPIRIN 81 MG 81 MG: 81 TABLET ORAL at 08:21

## 2018-01-01 RX ADMIN — SEVELAMER CARBONATE 2400 MG: 2400 POWDER, FOR SUSPENSION ORAL at 13:25

## 2018-01-01 RX ADMIN — ASPIRIN 81 MG 81 MG: 81 TABLET ORAL at 08:52

## 2018-01-01 RX ADMIN — COLLAGENASE SANTYL: 250 OINTMENT TOPICAL at 12:15

## 2018-01-01 RX ADMIN — MORPHINE SULFATE 2 MG: 4 INJECTION INTRAVENOUS at 20:24

## 2018-01-01 RX ADMIN — ERYTHROPOIETIN 10000 UNITS: 10000 INJECTION, SOLUTION INTRAVENOUS; SUBCUTANEOUS at 00:59

## 2018-01-01 RX ADMIN — CLONIDINE HYDROCHLORIDE 0.1 MG: 0.1 TABLET ORAL at 14:00

## 2018-01-01 RX ADMIN — SEVELAMER CARBONATE 800 MG: 800 TABLET, FILM COATED ORAL at 08:50

## 2018-01-01 RX ADMIN — HEPARIN SODIUM 5000 UNITS: 5000 INJECTION, SOLUTION INTRAVENOUS; SUBCUTANEOUS at 08:25

## 2018-01-01 RX ADMIN — GUAIFENESIN 400 MG: 200 SOLUTION ORAL at 08:30

## 2018-01-01 RX ADMIN — HEPARIN SODIUM 5000 UNITS: 5000 INJECTION, SOLUTION INTRAVENOUS; SUBCUTANEOUS at 12:00

## 2018-01-01 RX ADMIN — MORPHINE SULFATE 2 MG: 4 INJECTION INTRAVENOUS at 10:00

## 2018-01-01 RX ADMIN — HEPARIN SODIUM 5000 UNITS: 5000 INJECTION, SOLUTION INTRAVENOUS; SUBCUTANEOUS at 05:46

## 2018-01-01 RX ADMIN — LISINOPRIL 40 MG: 20 TABLET ORAL at 08:34

## 2018-01-01 RX ADMIN — GUAIFENESIN 1200 MG: 600 TABLET, EXTENDED RELEASE ORAL at 22:04

## 2018-01-01 RX ADMIN — LISINOPRIL 40 MG: 20 TABLET ORAL at 08:21

## 2018-01-01 RX ADMIN — CASTOR OIL AND BALSAM, PERU: 788; 87 OINTMENT TOPICAL at 19:13

## 2018-01-01 RX ADMIN — SEVELAMER CARBONATE 2400 MG: 2400 POWDER, FOR SUSPENSION ORAL at 18:40

## 2018-01-01 RX ADMIN — SEVELAMER CARBONATE 800 MG: 800 TABLET, FILM COATED ORAL at 11:56

## 2018-01-01 RX ADMIN — IPRATROPIUM BROMIDE AND ALBUTEROL SULFATE 3 ML: .5; 3 SOLUTION RESPIRATORY (INHALATION) at 08:17

## 2018-01-01 RX ADMIN — HEPARIN SODIUM 5000 UNITS: 5000 INJECTION INTRAVENOUS; SUBCUTANEOUS at 05:44

## 2018-01-01 RX ADMIN — HEPARIN SODIUM 5000 UNITS: 5000 INJECTION, SOLUTION INTRAVENOUS; SUBCUTANEOUS at 21:49

## 2018-01-01 RX ADMIN — CASTOR OIL AND BALSAM, PERU: 788; 87 OINTMENT TOPICAL at 12:21

## 2018-01-01 RX ADMIN — Medication 10 ML: at 04:05

## 2018-01-01 RX ADMIN — LISINOPRIL 40 MG: 20 TABLET ORAL at 00:58

## 2018-01-01 RX ADMIN — METOPROLOL TARTRATE 1.25 MG: 5 INJECTION, SOLUTION INTRAVENOUS at 05:14

## 2018-01-01 RX ADMIN — MORPHINE SULFATE 10 MG: 10 SOLUTION ORAL at 23:13

## 2018-01-01 RX ADMIN — ASPIRIN 81 MG CHEWABLE TABLET 81 MG: 81 TABLET CHEWABLE at 14:20

## 2018-01-01 RX ADMIN — IPRATROPIUM BROMIDE AND ALBUTEROL SULFATE 3 ML: .5; 3 SOLUTION RESPIRATORY (INHALATION) at 22:14

## 2018-01-01 RX ADMIN — ESCITALOPRAM OXALATE 10 MG: 10 TABLET ORAL at 08:56

## 2018-01-01 RX ADMIN — CASTOR OIL AND BALSAM, PERU: 788; 87 OINTMENT TOPICAL at 17:55

## 2018-01-01 RX ADMIN — HEPARIN SODIUM 5000 UNITS: 5000 INJECTION, SOLUTION INTRAVENOUS; SUBCUTANEOUS at 22:51

## 2018-01-01 RX ADMIN — CLONIDINE HYDROCHLORIDE 0.1 MG: 0.1 TABLET ORAL at 22:15

## 2018-01-01 RX ADMIN — ERYTHROPOIETIN 10000 UNITS: 10000 INJECTION, SOLUTION INTRAVENOUS; SUBCUTANEOUS at 18:43

## 2018-01-01 RX ADMIN — OSELTAMIVIR PHOSPHATE 30 MG: 30 CAPSULE ORAL at 11:55

## 2018-01-01 RX ADMIN — SEVELAMER CARBONATE 2400 MG: 2400 POWDER, FOR SUSPENSION ORAL at 13:39

## 2018-01-01 RX ADMIN — MORPHINE SULFATE 2 MG: 4 INJECTION INTRAVENOUS at 13:40

## 2018-01-01 RX ADMIN — GUAIFENESIN 1200 MG: 600 TABLET, EXTENDED RELEASE ORAL at 16:30

## 2018-01-01 RX ADMIN — OXYCODONE HYDROCHLORIDE AND ACETAMINOPHEN 1 TABLET: 5; 325 TABLET ORAL at 04:06

## 2018-01-01 RX ADMIN — CINACALCET HYDROCHLORIDE 60 MG: 30 TABLET, COATED ORAL at 08:56

## 2018-01-01 RX ADMIN — CLONIDINE HYDROCHLORIDE 0.1 MG: 0.1 TABLET ORAL at 18:45

## 2018-01-01 RX ADMIN — Medication 10 ML: at 13:58

## 2018-01-01 RX ADMIN — METRONIDAZOLE 500 MG: 500 INJECTION, SOLUTION INTRAVENOUS at 10:44

## 2018-01-01 RX ADMIN — FAMOTIDINE 20 MG: 20 TABLET, FILM COATED ORAL at 12:00

## 2018-01-01 RX ADMIN — SEVELAMER CARBONATE 2400 MG: 2400 POWDER, FOR SUSPENSION ORAL at 09:17

## 2018-01-01 RX ADMIN — IPRATROPIUM BROMIDE 0.5 MG: 0.5 SOLUTION RESPIRATORY (INHALATION) at 08:55

## 2018-01-01 RX ADMIN — STANDARDIZED SENNA CONCENTRATE AND DOCUSATE SODIUM 2 TABLET: 8.6; 5 TABLET, FILM COATED ORAL at 22:07

## 2018-01-01 RX ADMIN — HEPARIN SODIUM 5000 UNITS: 5000 INJECTION, SOLUTION INTRAVENOUS; SUBCUTANEOUS at 18:04

## 2018-01-01 RX ADMIN — ERYTHROPOIETIN 10000 UNITS: 10000 INJECTION, SOLUTION INTRAVENOUS; SUBCUTANEOUS at 18:44

## 2018-01-01 RX ADMIN — HEPARIN SODIUM 5000 UNITS: 5000 INJECTION, SOLUTION INTRAVENOUS; SUBCUTANEOUS at 13:23

## 2018-01-01 RX ADMIN — CASTOR OIL AND BALSAM, PERU: 788; 87 OINTMENT TOPICAL at 17:33

## 2018-01-01 RX ADMIN — CLONIDINE HYDROCHLORIDE 0.1 MG: 0.1 TABLET ORAL at 06:36

## 2018-01-01 RX ADMIN — AMLODIPINE BESYLATE 10 MG: 5 TABLET ORAL at 08:49

## 2018-01-01 RX ADMIN — METOPROLOL TARTRATE 1.25 MG: 5 INJECTION, SOLUTION INTRAVENOUS at 03:59

## 2018-01-01 RX ADMIN — SODIUM HYPOCHLORITE: 1.25 SOLUTION TOPICAL at 13:45

## 2018-01-01 RX ADMIN — AMLODIPINE BESYLATE 10 MG: 5 TABLET ORAL at 08:16

## 2018-01-01 RX ADMIN — SODIUM HYPOCHLORITE: 1.25 SOLUTION TOPICAL at 09:27

## 2018-01-01 RX ADMIN — OXYCODONE HYDROCHLORIDE 5 MG: 5 TABLET ORAL at 16:12

## 2018-01-01 RX ADMIN — CLONIDINE HYDROCHLORIDE 0.1 MG: 0.1 TABLET ORAL at 13:09

## 2018-01-01 RX ADMIN — HEPARIN SODIUM 5000 UNITS: 5000 INJECTION, SOLUTION INTRAVENOUS; SUBCUTANEOUS at 22:01

## 2018-01-01 RX ADMIN — Medication 10 ML: at 13:02

## 2018-01-01 RX ADMIN — METRONIDAZOLE 500 MG: 500 INJECTION, SOLUTION INTRAVENOUS at 10:45

## 2018-01-01 RX ADMIN — MORPHINE SULFATE 10 MG: 10 SOLUTION ORAL at 18:28

## 2018-01-01 RX ADMIN — HEPARIN SODIUM 5000 UNITS: 5000 INJECTION INTRAVENOUS; SUBCUTANEOUS at 17:22

## 2018-01-01 RX ADMIN — ASPIRIN 81 MG 81 MG: 81 TABLET ORAL at 09:31

## 2018-01-01 RX ADMIN — PIPERACILLIN SODIUM,TAZOBACTAM SODIUM 3.38 G: 3; .375 INJECTION, POWDER, FOR SOLUTION INTRAVENOUS at 17:16

## 2018-01-01 RX ADMIN — ESCITALOPRAM 10 MG: 5 SOLUTION ORAL at 09:33

## 2018-01-01 RX ADMIN — CASTOR OIL AND BALSAM, PERU: 788; 87 OINTMENT TOPICAL at 09:01

## 2018-01-01 RX ADMIN — FAMOTIDINE 20 MG: 20 TABLET, FILM COATED ORAL at 06:36

## 2018-01-01 RX ADMIN — SEVELAMER CARBONATE 2400 MG: 2400 POWDER, FOR SUSPENSION ORAL at 14:19

## 2018-01-01 RX ADMIN — HEPARIN SODIUM 5000 UNITS: 5000 INJECTION, SOLUTION INTRAVENOUS; SUBCUTANEOUS at 06:30

## 2018-01-01 RX ADMIN — PRAVASTATIN SODIUM 10 MG: 10 TABLET ORAL at 00:59

## 2018-01-01 RX ADMIN — LISINOPRIL 40 MG: 20 TABLET ORAL at 18:52

## 2018-01-01 RX ADMIN — SODIUM CHLORIDE 40 MG: 9 INJECTION INTRAMUSCULAR; INTRAVENOUS; SUBCUTANEOUS at 22:07

## 2018-01-01 RX ADMIN — HEPARIN SODIUM 5000 UNITS: 5000 INJECTION, SOLUTION INTRAVENOUS; SUBCUTANEOUS at 18:15

## 2018-01-01 RX ADMIN — SODIUM CHLORIDE 40 MG: 9 INJECTION INTRAMUSCULAR; INTRAVENOUS; SUBCUTANEOUS at 08:05

## 2018-01-01 RX ADMIN — SEVELAMER CARBONATE 800 MG: 800 TABLET, FILM COATED ORAL at 08:25

## 2018-01-01 RX ADMIN — ACETAMINOPHEN 650 MG: 325 TABLET ORAL at 22:10

## 2018-01-01 RX ADMIN — MORPHINE SULFATE 2 MG: 4 INJECTION INTRAVENOUS at 20:18

## 2018-01-01 RX ADMIN — CLONIDINE HYDROCHLORIDE 0.1 MG: 0.1 TABLET ORAL at 21:24

## 2018-01-01 RX ADMIN — HEPARIN SODIUM 5000 UNITS: 5000 INJECTION, SOLUTION INTRAVENOUS; SUBCUTANEOUS at 16:19

## 2018-01-01 RX ADMIN — HEPARIN SODIUM 5000 UNITS: 5000 INJECTION, SOLUTION INTRAVENOUS; SUBCUTANEOUS at 22:27

## 2018-01-01 RX ADMIN — FAMOTIDINE 20 MG: 20 TABLET, FILM COATED ORAL at 05:34

## 2018-01-01 RX ADMIN — SEVELAMER CARBONATE 800 MG: 800 TABLET, FILM COATED ORAL at 16:49

## 2018-01-01 RX ADMIN — SEVELAMER CARBONATE 800 MG: 800 TABLET, FILM COATED ORAL at 08:53

## 2018-01-01 RX ADMIN — SEVELAMER CARBONATE 800 MG: 800 TABLET, FILM COATED ORAL at 17:21

## 2018-01-01 RX ADMIN — DEXTROSE MONOHYDRATE 12.5 G: 500 INJECTION PARENTERAL at 21:11

## 2018-01-01 RX ADMIN — CASTOR OIL AND BALSAM, PERU: 788; 87 OINTMENT TOPICAL at 05:22

## 2018-01-01 RX ADMIN — CLONIDINE HYDROCHLORIDE 0.1 MG: 0.1 TABLET ORAL at 06:17

## 2018-01-01 RX ADMIN — Medication 10 ML: at 05:22

## 2018-01-01 RX ADMIN — Medication 10 ML: at 05:02

## 2018-01-01 RX ADMIN — LISINOPRIL 40 MG: 20 TABLET ORAL at 22:00

## 2018-01-01 RX ADMIN — AMLODIPINE BESYLATE 10 MG: 5 TABLET ORAL at 08:21

## 2018-01-01 RX ADMIN — PRAVASTATIN SODIUM 10 MG: 10 TABLET ORAL at 21:29

## 2018-01-01 RX ADMIN — LISINOPRIL 40 MG: 20 TABLET ORAL at 09:09

## 2018-01-01 RX ADMIN — GUAIFENESIN 400 MG: 200 SOLUTION ORAL at 17:29

## 2018-01-01 RX ADMIN — HEPARIN SODIUM 5000 UNITS: 5000 INJECTION, SOLUTION INTRAVENOUS; SUBCUTANEOUS at 19:29

## 2018-01-01 RX ADMIN — Medication 5 ML: at 14:00

## 2018-01-01 RX ADMIN — SEVELAMER CARBONATE 2400 MG: 2400 POWDER, FOR SUSPENSION ORAL at 17:01

## 2018-01-01 RX ADMIN — SODIUM CHLORIDE 40 MG: 9 INJECTION INTRAMUSCULAR; INTRAVENOUS; SUBCUTANEOUS at 22:04

## 2018-01-01 RX ADMIN — ESCITALOPRAM OXALATE 10 MG: 10 TABLET ORAL at 08:51

## 2018-01-01 RX ADMIN — LIDOCAINE HYDROCHLORIDE 100 MG: 20 INJECTION, SOLUTION EPIDURAL; INFILTRATION; INTRACAUDAL; PERINEURAL at 09:24

## 2018-01-01 RX ADMIN — PRAVASTATIN SODIUM 10 MG: 10 TABLET ORAL at 21:31

## 2018-01-01 RX ADMIN — SEVELAMER CARBONATE 800 MG: 800 TABLET, FILM COATED ORAL at 22:09

## 2018-01-01 RX ADMIN — METRONIDAZOLE 500 MG: 500 INJECTION, SOLUTION INTRAVENOUS at 13:33

## 2018-01-01 RX ADMIN — HEPARIN SODIUM 5000 UNITS: 5000 INJECTION INTRAVENOUS; SUBCUTANEOUS at 17:25

## 2018-01-01 RX ADMIN — Medication 1 CAPSULE: at 14:12

## 2018-01-01 RX ADMIN — INSULIN LISPRO 4 UNITS: 100 INJECTION, SOLUTION INTRAVENOUS; SUBCUTANEOUS at 17:00

## 2018-01-01 RX ADMIN — CARVEDILOL 25 MG: 12.5 TABLET, FILM COATED ORAL at 18:53

## 2018-01-01 RX ADMIN — IOPAMIDOL 100 ML: 755 INJECTION, SOLUTION INTRAVENOUS at 09:02

## 2018-01-02 NOTE — TELEPHONE ENCOUNTER
----- Message from Chery Box sent at 1/2/2018  1:34 PM EST -----  Regarding: Dr. Coco Gallardo , PT with [facility] is asking for a verbal consent to have PT extended for the pt twice a week for 6 weeks pt was d/c from the hospital on 12/23/17. Best Contact [phone].

## 2018-01-02 NOTE — TELEPHONE ENCOUNTER
Marlene with [facility] notified that patient has never seen Dr. Cassie Pérez and has an appointment on 1/4/18 for establish pcp. Will address order for physical therapy at that time. Andria Munoz will call back.

## 2018-01-03 NOTE — TELEPHONE ENCOUNTER
Message left on machine notifying Ms. Trevino that patient has never been seen by Dr. Sindy Feldman and has an upcoming appointment.

## 2018-01-03 NOTE — TELEPHONE ENCOUNTER
----- Message from Fanta Lee sent at 1/3/2018  9:45 AM EST -----  Regarding: / Telephone  Nicholas Trevino,OT with [facility] [place] is in need of a verbal order to assist the pt 2 times a week for 4 weeks. Best Contact [phone].

## 2018-01-04 PROBLEM — E11.21 TYPE 2 DIABETES MELLITUS WITH NEPHROPATHY (HCC): Status: RESOLVED | Noted: 2018-01-01 | Resolved: 2018-01-01

## 2018-01-04 PROBLEM — E11.21 TYPE 2 DIABETES MELLITUS WITH NEPHROPATHY (HCC): Status: ACTIVE | Noted: 2018-01-01

## 2018-01-04 NOTE — PATIENT INSTRUCTIONS
Tdap (Tetanus, Diphtheria, Pertussis) Vaccine: What You Need to Know  Why get vaccinated? Tetanus, diphtheria, and pertussis are very serious diseases. Tdap vaccine can protect us from these diseases. And Tdap vaccine given to pregnant women can protect  babies against pertussis. Tetanus (lockjaw) is rare in the [facility] today. It causes painful muscle tightening and stiffness, usually all over the body. · It can lead to tightening of muscles in the head and neck so you can't open your mouth, swallow, or sometimes even breathe. Tetanus kills about 1 out of 10 people who are infected even after receiving the best medical care. Diphtheria is also rare in the [place] today. It can cause a thick coating to form in the back of the throat. · It can lead to breathing problems, heart failure, paralysis, and death. Pertussis (whooping cough) causes severe coughing spells, which can cause difficulty breathing, vomiting, and disturbed sleep. · It can also lead to weight loss, incontinence, and rib fractures. Up to 2 in 100 adolescents and 5 in 100 adults with pertussis are hospitalized or have complications, which could include pneumonia or death. These diseases are caused by bacteria. Diphtheria and pertussis are spread from person to person through secretions from coughing or sneezing. Tetanus enters the body through cuts, scratches, or wounds. Before vaccines, as many as 200,000 cases of diphtheria, 200,000 cases of pertussis, and hundreds of cases of tetanus were reported in the [place] each year. Since vaccination began, reports of cases for tetanus and diphtheria have dropped by about 99% and for pertussis by about 80%. Tdap vaccine  The Tdap vaccine can protect adolescents and adults from tetanus, diphtheria, and pertussis. One dose of Tdap is routinely given at age 6 or 15. People who did not get Tdap at that age should get it as soon as possible.   Tdap is especially important for health care professionals and anyone having close contact with a baby younger than 12 months. Pregnant women should get a dose of Tdap during every pregnancy, to protect the  from pertussis. Infants are most at risk for severe, life-threatening complications from pertussis. Another vaccine, called Td, protects against tetanus and diphtheria, but not pertussis. A Td booster should be given every 10 years. Tdap may be given as one of these boosters if you have never gotten Tdap before. Tdap may also be given after a severe cut or burn to prevent tetanus infection. Your doctor or the person giving you the vaccine can give you more information. Tdap may safely be given at the same time as other vaccines. Some people should not get this vaccine  · A person who has ever had a life-threatening allergic reaction after a previous dose of any diphtheria-, tetanus-, or pertussis-containing vaccine, OR has a severe allergy to any part of this vaccine, should not get Tdap vaccine. Tell the person giving the vaccine about any severe allergies. · Anyone who had coma or long repeated seizures within 7 days after a childhood dose of DTP or DTaP, or a previous dose of Tdap, should not get Tdap, unless a cause other than the vaccine was found. They can still get Td. · Talk to your doctor if you:  ¨ Have seizures or another nervous system problem. ¨ Had severe pain or swelling after any vaccine containing diphtheria, tetanus, or pertussis. ¨ Ever had a condition called Guillain-Barré Syndrome (GBS). ¨ Aren't feeling well on the day the shot is scheduled. Risks  With any medicine, including vaccines, there is a chance of side effects. These are usually mild and go away on their own. Serious reactions are also possible but are rare. Most people who get Tdap vaccine do not have any problems with it.   Mild problems following Tdap  (Did not interfere with activities)  · Pain where the shot was given (about 3 in 4 adolescents or 2 in 3 adults)  · Redness or swelling where the shot was given (about 1 person in 5)  · Mild fever of at least 100.4°F (up to about 1 in 25 adolescents or 1 in 100 adults)  · Headache (about 3 or 4 people in 10)  · Tiredness (about 1 person in 3 or 4)  · Nausea, vomiting, diarrhea, stomachache (up to 1 in 4 adolescents or 1 in 10 adults)  · Chills, sore joints (about 1 person in 10)  · Body aches (about 1 person in 3 or 4)  · Rash, swollen glands (uncommon)  Moderate problems following Tdap  (Interfered with activities, but did not require medical attention)  · Pain where the shot was given (up to 1 in 5 or 6)  · Redness or swelling where the shot was given (up to about 1 in 16 adolescents or 1 in 12 adults)  · Fever over 102°F (about 1 in 100 adolescents or 1 in 250 adults)  · Headache (about 1 in 7 adolescents or 1 in 10 adults)  · Nausea, vomiting, diarrhea, stomachache (up to 1 to 3 people in 100)  · Swelling of the entire arm where the shot was given (up to about 1 in 500)  Severe problems following Tdap  (Unable to perform usual activities; required medical attention)  · Swelling, severe pain, bleeding and redness in the arm where the shot was given (rare)  Problems that could happen after any vaccine:  · People sometimes faint after a medical procedure, including vaccination. Sitting or lying down for about 15 minutes can help prevent fainting, and injuries caused by a fall. Tell your doctor if you feel dizzy or have vision changes or ringing in the ears. · Some people get severe pain in the shoulder and have difficulty moving the arm where a shot was given. This happens very rarely. · Any medication can cause a severe allergic reaction. Such reactions from a vaccine are very rare, estimated at fewer than 1 in a million doses, and would happen within a few minutes to a few hours after the vaccination.   As with any medicine, there is a very remote chance of a vaccine causing a serious injury or death. The safety of vaccines is always being monitored. For more information, visit: www.cdc.gov/vaccinesafety. What if there is a serious problem? What should I look for? · Look for anything that concerns you, such as signs of a severe allergic reaction, very high fever, or unusual behavior. Signs of a severe allergic reaction can include hives, swelling of the face and throat, difficulty breathing, a fast heartbeat, dizziness, and weakness. These would usually start a few minutes to a few hours after the vaccination. What should I do? · If you think it is a severe allergic reaction or other emergency that can't wait, call 9-1-1 or get the person to the nearest hospital. Otherwise, call your doctor. · Afterward, the reaction should be reported to the Vaccine Adverse Event Reporting System (VAERS). Your doctor might file this report, or you can do it yourself through the VAERS web site at www.vaers. [facility].gov, or by calling 0-228.103.5932. VAERS does not give medical advice. The National Vaccine Injury Compensation Program  The National Vaccine Injury Compensation Program (VICP) is a federal program that was created to compensate people who may have been injured by certain vaccines. Persons who believe they may have been injured by a vaccine can learn about the program and about filing a claim by calling 1-578.189.4545 or visiting the [employer] website at www.[facility].gov/vaccinecompensation. There is a time limit to file a claim for compensation. How can I learn more? · Ask your doctor. He or she can give you the vaccine package insert or suggest other sources of information. · Call your local or state health department. · Contact the Centers for Disease Control and Prevention (CDC):  ¨ Call 5-311.435.9007 (1-800-CDC-INFO) or  ¨ Visit CDC's website at www.cdc.gov/vaccines  Vaccine Information Statement (Interim)  Tdap Vaccine  (2/24/15)  42 SONIA [address] [place] 497WD-14  [facility] for Disease Control and Prevention  Many Vaccine Information Statements are available in [place] and other languages. See www.immunize.org/vis. Muchas hojas de información sobre vacunas están disponibles en español y en otros idiomas. Visite www.immunize.org/vis. Care instructions adapted under license by [employer] (which disclaims liability or warranty for this information). If you have questions about a medical condition or this instruction, always ask your healthcare professional. [address] any warranty or liability for your use of this information.

## 2018-01-04 NOTE — PROGRESS NOTES
Chief Complaint   Patient presents with    [facility]     HPI:  Bao Grigsby is a 75 y.o. male former pt of Dr. Kendra Solis presents to establish care.   Significant medical history include ESRD, s/p kidney transplant 8/28/17 followed by rejection X 2, back on dialysis(MWF)   Hypertension, managed on Amlodipne, lisinopril, clonidine, carvedilog  DM, will controlled on Lantus 18 units, humalog on sliding scale, follows endocrinologist,  S/P right BKA, asking for order for a new prosthesis  Depression and anxiety managed on Lexapro  Hypercholesterolemia, on pravastatin 10 mg  Today, he has no new complaints    Review of Systems  As per hpi    Past Medical History:   Diagnosis Date    Anemia associated with chronic renal failure     Autoimmune disease ([address]     hx ms    CAD (coronary artery disease)     Chronic kidney disease     catheter removed and no dialysis at this time    Chronic kidney disease     left arm fistula dialysis MWF    Diabetes ([address]     type II    GERD (gastroesophageal reflux disease)     Hypertension     Multiple sclerosis ([address]     diagnosed '01    Other ill-defined conditions(799.89)     anemia    Other ill-defined conditions(799.89)     elevated cholesterol    Other ill-defined conditions(799.89)     hx septic right foot    Peripheral vascular disease ([address]     Secondary hyperparathyroidism of renal origin ([address]     Thyroid disease      Past Surgical History:   Procedure Laterality Date    COLONOSCOPY N/A 12/6/2016    COLON[facility] Sosa [facility]ero [facility] ENDOSCOPY    COLONOSCOPY,DIAGNOSTIC  12/6/2016         CORONARY STENT SINGLE W/PTCA  2/17/2011         HX APPENDECTOMY      HX CATARACT REMOVAL  10/18/10    left eye    HX CHOLECYSTECTOMY      HX GI      ileostomy due to infection    HX HEART CATHETERIZATION      HX HEENT      hx post photocoagulation eye 2009    HX ORTHOPAEDIC      HX OTHER SURGICAL      right partial foot amputation    HX OTHER SURGICAL      cardiac cath    IN COLONOSCOPY W/BIOPSY SINGLE/MULTIPLE  5/10/2010         VASCULAR SURGERY PROCEDURE UNLIST      katelyn. leg bypasses     Social History     Social History    Marital status:      Spouse name: N/A    Number of children: N/A    Years of education: N/A     Occupational History    not working      Social History Main Topics    Smoking status: Former Smoker     Years: 1.00     Quit date: 4/12/2003    Smokeless tobacco: Never Used      Comment: quit 5 years ago    Alcohol use No      Comment: Stopped drinking 10 years ago    Drug use: No    Sexual activity: Yes     Partners: Female     Birth control/ protection: None     Other Topics Concern    None     Social History Narrative     Family History   Problem Relation Age of Onset    Diabetes Mother      Current Outpatient Prescriptions   Medication Sig Dispense Refill    VIRT-CAPS 1 mg capsule TK ONE C PO  DAILY  3    carvedilol (COREG) 25 mg tablet TAKE 2 TABLETS PO BID  3    CHOLESTYRAMINE LIGHT 4 gram packet       cloNIDine HCl (CATAPRES) 0.1 mg tablet TK 1 T PO  TID  3    escitalopram oxalate (LEXAPRO) 10 mg tablet TK 1 T PO QD  3    SENSIPAR 60 mg tab Take 60 mg by mouth daily.  LANTUS SOLOSTAR 100 unit/mL (3 mL) pen 18 Units by SubCUTAneous route daily (with dinner).  HUMALOG KWIKPEN 100 unit/mL kwikpen       sevelamer carbonate (RENVELA) 800 mg tab tab Take 800 mg by mouth three (3) times daily.  aspirin 81 mg chewable tablet Take 1 tablet by mouth daily.  amLODIPine (NORVASC) 10 mg tablet Take 10 mg by mouth daily.  lisinopril (PRINIVIL, ZESTRIL) 10 mg tablet Take 40 mg by mouth two (2) times a day.  pravastatin (PRAVACHOL) 10 mg tablet Take 10 mg by mouth nightly.        No Known Allergies    Objective:  Visit Vitals    /78    Pulse 74    Temp 96.4 °F (35.8 °C) (Oral)    Resp 20    Ht 5' 10\" (1.778 m)    SpO2 100%     Physical Exam:   General appearance - alert, oriented X 3,well appearing in no distress  EYE-PERRL, EOMI  Neck - supple, no significant adenopathy   Chest - clear to auscultation, no wheezes, rales or rhonchi  Heart - normal rate, regular rhythm, normal blood pressure  Abdomen - soft, nontender, nondistended, no organomegaly  Ext-peripheral pulses normal, no pedal edema  Neuro -alert, oriented, normal speech, no focal findings  Back-full range of motion, no tenderness, palpable spasm or pain on motion   Knee exam:  Right knee: Right AKA with prosthesis  Left knee: No erythema, edema, or bruising. Results for orders placed or performed during the hospital encounter of 12/05/17   CBC W/O DIFF   Result Value Ref Range    WBC 6.2 4.1 - 11.1 K/uL    RBC 3.21 (L) 4.10 - 5.70 M/uL    HGB 8.5 (L) 12.1 - 17.0 g/dL    HCT 27.3 (L) 36.6 - 50.3 %    MCV 85.0 80.0 - 99.0 FL    MCH 26.5 26.0 - 34.0 PG    MCHC 31.1 30.0 - 36.5 g/dL    RDW 18.0 (H) 11.5 - 14.5 %    PLATELET 115 214 - 060 K/uL   MAGNESIUM   Result Value Ref Range    Magnesium 2.1 1.6 - 2.4 mg/dL   METABOLIC PANEL, COMPREHENSIVE   Result Value Ref Range    Sodium 135 (L) 136 - 145 mmol/L    Potassium 3.8 3.5 - 5.1 mmol/L    Chloride 97 97 - 108 mmol/L    CO2 32 21 - 32 mmol/L    Anion gap 6 5 - 15 mmol/L    Glucose 88 65 - 100 mg/dL    BUN 25 (H) 6 - 20 MG/DL    Creatinine 6.05 (H) 0.70 - 1.30 MG/DL    BUN/Creatinine ratio 4 (L) 12 - 20      GFR est AA 11 (L) >60 ml/min/1.73m2    GFR est non-AA 9 (L) >60 ml/min/1.73m2    Calcium 9.1 8.5 - 10.1 MG/DL    Bilirubin, total 0.3 0.2 - 1.0 MG/DL    ALT (SGPT) 16 12 - 78 U/L    AST (SGOT) 17 15 - 37 U/L    Alk.  phosphatase 114 45 - 117 U/L    Protein, total 6.7 6.4 - 8.2 g/dL    Albumin 1.9 (L) 3.5 - 5.0 g/dL    Globulin 4.8 (H) 2.0 - 4.0 g/dL    A-G Ratio 0.4 (L) 1.1 - 2.2     PHOSPHORUS   Result Value Ref Range    Phosphorus 4.0 2.6 - 4.7 MG/DL   METABOLIC PANEL, COMPREHENSIVE   Result Value Ref Range    Sodium 136 136 - 145 mmol/L    Potassium 4.9 3.5 - 5.1 mmol/L    Chloride 100 97 - 108 mmol/L    CO2 28 21 - 32 mmol/L    Anion gap 8 5 - 15 mmol/L    Glucose 116 (H) 65 - 100 mg/dL    BUN 26 (H) 6 - 20 MG/DL    Creatinine 6.45 (H) 0.70 - 1.30 MG/DL    BUN/Creatinine ratio 4 (L) 12 - 20      GFR est AA 10 (L) >60 ml/min/1.73m2    GFR est non-AA 9 (L) >60 ml/min/1.73m2    Calcium 8.2 (L) 8.5 - 10.1 MG/DL    Bilirubin, total 0.2 0.2 - 1.0 MG/DL    ALT (SGPT) 14 12 - 78 U/L    AST (SGOT) 12 (L) 15 - 37 U/L    Alk. phosphatase 104 45 - 117 U/L    Protein, total 6.7 6.4 - 8.2 g/dL    Albumin 2.0 (L) 3.5 - 5.0 g/dL    Globulin 4.7 (H) 2.0 - 4.0 g/dL    A-G Ratio 0.4 (L) 1.1 - 2.2     CBC W/O DIFF   Result Value Ref Range    WBC 7.7 4.1 - 11.1 K/uL    RBC 2.93 (L) 4.10 - 5.70 M/uL    HGB 7.6 (L) 12.1 - 17.0 g/dL    HCT 25.5 (L) 36.6 - 50.3 %    MCV 87.0 80.0 - 99.0 FL    MCH 25.9 (L) 26.0 - 34.0 PG    MCHC 29.8 (L) 30.0 - 36.5 g/dL    RDW 19.1 (H) 11.5 - 14.5 %    PLATELET 723 673 - 803 K/uL   MAGNESIUM   Result Value Ref Range    Magnesium 2.1 1.6 - 2.4 mg/dL   PHOSPHORUS   Result Value Ref Range    Phosphorus 3.0 2.6 - 4.7 MG/DL   HEP B SURFACE AG   Result Value Ref Range    Hepatitis B surface Ag <0.10 Index    Hep B surface Ag Interp.  NEGATIVE  NEG     CBC W/O DIFF   Result Value Ref Range    WBC 6.4 4.1 - 11.1 K/uL    RBC 3.26 (L) 4.10 - 5.70 M/uL    HGB 8.5 (L) 12.1 - 17.0 g/dL    HCT 28.3 (L) 36.6 - 50.3 %    MCV 86.8 80.0 - 99.0 FL    MCH 26.1 26.0 - 34.0 PG    MCHC 30.0 30.0 - 36.5 g/dL    RDW 19.1 (H) 11.5 - 14.5 %    PLATELET 162 070 - 478 K/uL   METABOLIC PANEL, BASIC   Result Value Ref Range    Sodium 135 (L) 136 - 145 mmol/L    Potassium 4.9 3.5 - 5.1 mmol/L    Chloride 98 97 - 108 mmol/L    CO2 29 21 - 32 mmol/L    Anion gap 8 5 - 15 mmol/L    Glucose 117 (H) 65 - 100 mg/dL    BUN 25 (H) 6 - 20 MG/DL    Creatinine 5.87 (H) 0.70 - 1.30 MG/DL    BUN/Creatinine ratio 4 (L) 12 - 20      GFR est AA 12 (L) >60 ml/min/1.73m2    GFR est non-AA 10 (L) >60 ml/min/1.73m2 Calcium 8.0 (L) 8.5 - 10.1 MG/DL   CBC W/O DIFF   Result Value Ref Range    WBC 6.6 4.1 - 11.1 K/uL    RBC 3.14 (L) 4.10 - 5.70 M/uL    HGB 8.7 (L) 12.1 - 17.0 g/dL    HCT 27.8 (L) 36.6 - 50.3 %    MCV 88.5 80.0 - 99.0 FL    MCH 27.7 26.0 - 34.0 PG    MCHC 31.3 30.0 - 36.5 g/dL    RDW 19.6 (H) 11.5 - 14.5 %    PLATELET 608 561 - 347 K/uL   METABOLIC PANEL, BASIC   Result Value Ref Range    Sodium 140 136 - 145 mmol/L    Potassium 4.0 3.5 - 5.1 mmol/L    Chloride 105 97 - 108 mmol/L    CO2 28 21 - 32 mmol/L    Anion gap 7 5 - 15 mmol/L    Glucose 131 (H) 65 - 100 mg/dL    BUN 34 (H) 6 - 20 MG/DL    Creatinine 6.51 (H) 0.70 - 1.30 MG/DL    BUN/Creatinine ratio 5 (L) 12 - 20      GFR est AA 10 (L) >60 ml/min/1.73m2    GFR est non-AA 9 (L) >60 ml/min/1.73m2    Calcium 8.2 (L) 8.5 - 10.1 MG/DL     Assessment/Plan:    ICD-10-CM ICD-9-CM    1. Multiple sclerosis ([facility]) C20 176 METABOLIC PANEL, COMPREHENSIVE   2. ESRD on hemodialysis ([facility]) P85.0 588.5 METABOLIC PANEL, COMPREHENSIVE    Z99.2 V45.11    3. Diabetic peripheral neuropathy associated with type 2 diabetes mellitus ([facility]) C17.32 184.74 METABOLIC PANEL, COMPREHENSIVE     357.2    4. Mixed hyperlipidemia E78.2 272.2 LIPID PANEL   5. S/P BKA (below knee amputation) unilateral, right ([facility]) Z89.511 V49.75 REFERRAL TO PHYSICAL THERAPY      AMB SUPPLY ORDER   6. Encounter for immunization Z23 V03.89 TETANUS, DIPHTHERIA TOXOIDS AND ACELLULAR PERTUSSIS VACCINE (TDAP), IN INDIVIDS. >=7, IM      [place] IMMUNIZ ADMIN,1 SINGLE/COMB VAC/TOXOID   7. Hypovitaminosis D E55.9 268.9 VITAMIN D, 25 HYDROXY   8. Screening for thyroid disorder Z13.29 V77.0 TSH 3RD GENERATION   9. Normocytic hypochromic anemia D50.9 280.9 CBC WITH AUTOMATED DIFF     Patient Instructions        Tdap (Tetanus, Diphtheria, Pertussis) Vaccine: What You Need to Know  Why get vaccinated? Tetanus, diphtheria, and pertussis are very serious diseases. Tdap vaccine can protect us from these diseases.  And Tdap vaccine given to pregnant women can protect  babies against pertussis. Tetanus (lockjaw) is rare in the [facility] today. It causes painful muscle tightening and stiffness, usually all over the body. · It can lead to tightening of muscles in the head and neck so you can't open your mouth, swallow, or sometimes even breathe. Tetanus kills about 1 out of 10 people who are infected even after receiving the best medical care. Diphtheria is also rare in the [place] today. It can cause a thick coating to form in the back of the throat. · It can lead to breathing problems, heart failure, paralysis, and death. Pertussis (whooping cough) causes severe coughing spells, which can cause difficulty breathing, vomiting, and disturbed sleep. · It can also lead to weight loss, incontinence, and rib fractures. Up to 2 in 100 adolescents and 5 in 100 adults with pertussis are hospitalized or have complications, which could include pneumonia or death. These diseases are caused by bacteria. Diphtheria and pertussis are spread from person to person through secretions from coughing or sneezing. Tetanus enters the body through cuts, scratches, or wounds. Before vaccines, as many as 200,000 cases of diphtheria, 200,000 cases of pertussis, and hundreds of cases of tetanus were reported in the [place] each year. Since vaccination began, reports of cases for tetanus and diphtheria have dropped by about 99% and for pertussis by about 80%. Tdap vaccine  The Tdap vaccine can protect adolescents and adults from tetanus, diphtheria, and pertussis. One dose of Tdap is routinely given at age 6 or 15. People who did not get Tdap at that age should get it as soon as possible. Tdap is especially important for health care professionals and anyone having close contact with a baby younger than 12 months. Pregnant women should get a dose of Tdap during every pregnancy, to protect the  from pertussis.  Infants are most at risk for severe, life-threatening complications from pertussis. Another vaccine, called Td, protects against tetanus and diphtheria, but not pertussis. A Td booster should be given every 10 years. Tdap may be given as one of these boosters if you have never gotten Tdap before. Tdap may also be given after a severe cut or burn to prevent tetanus infection. Your doctor or the person giving you the vaccine can give you more information. Tdap may safely be given at the same time as other vaccines. Some people should not get this vaccine  · A person who has ever had a life-threatening allergic reaction after a previous dose of any diphtheria-, tetanus-, or pertussis-containing vaccine, OR has a severe allergy to any part of this vaccine, should not get Tdap vaccine. Tell the person giving the vaccine about any severe allergies. · Anyone who had coma or long repeated seizures within 7 days after a childhood dose of DTP or DTaP, or a previous dose of Tdap, should not get Tdap, unless a cause other than the vaccine was found. They can still get Td. · Talk to your doctor if you:  ¨ Have seizures or another nervous system problem. ¨ Had severe pain or swelling after any vaccine containing diphtheria, tetanus, or pertussis. ¨ Ever had a condition called Guillain-Barré Syndrome (GBS). ¨ Aren't feeling well on the day the shot is scheduled. Risks  With any medicine, including vaccines, there is a chance of side effects. These are usually mild and go away on their own. Serious reactions are also possible but are rare. Most people who get Tdap vaccine do not have any problems with it.   Mild problems following Tdap  (Did not interfere with activities)  · Pain where the shot was given (about 3 in 4 adolescents or 2 in 3 adults)  · Redness or swelling where the shot was given (about 1 person in 5)  · Mild fever of at least 100.4°F (up to about 1 in 25 adolescents or 1 in 100 adults)  · Headache (about 3 or 4 people in 10)  · Tiredness (about 1 person in 3 or 4)  · Nausea, vomiting, diarrhea, stomachache (up to 1 in 4 adolescents or 1 in 10 adults)  · Chills, sore joints (about 1 person in 10)  · Body aches (about 1 person in 3 or 4)  · Rash, swollen glands (uncommon)  Moderate problems following Tdap  (Interfered with activities, but did not require medical attention)  · Pain where the shot was given (up to 1 in 5 or 6)  · Redness or swelling where the shot was given (up to about 1 in 16 adolescents or 1 in 12 adults)  · Fever over 102°F (about 1 in 100 adolescents or 1 in 250 adults)  · Headache (about 1 in 7 adolescents or 1 in 10 adults)  · Nausea, vomiting, diarrhea, stomachache (up to 1 to 3 people in 100)  · Swelling of the entire arm where the shot was given (up to about 1 in 500)  Severe problems following Tdap  (Unable to perform usual activities; required medical attention)  · Swelling, severe pain, bleeding and redness in the arm where the shot was given (rare)  Problems that could happen after any vaccine:  · People sometimes faint after a medical procedure, including vaccination. Sitting or lying down for about 15 minutes can help prevent fainting, and injuries caused by a fall. Tell your doctor if you feel dizzy or have vision changes or ringing in the ears. · Some people get severe pain in the shoulder and have difficulty moving the arm where a shot was given. This happens very rarely. · Any medication can cause a severe allergic reaction. Such reactions from a vaccine are very rare, estimated at fewer than 1 in a million doses, and would happen within a few minutes to a few hours after the vaccination. As with any medicine, there is a very remote chance of a vaccine causing a serious injury or death. The safety of vaccines is always being monitored. For more information, visit: www.cdc.gov/vaccinesafety. What if there is a serious problem? What should I look for?   · Look for anything that concerns you, such as signs of a severe allergic reaction, very high fever, or unusual behavior. Signs of a severe allergic reaction can include hives, swelling of the face and throat, difficulty breathing, a fast heartbeat, dizziness, and weakness. These would usually start a few minutes to a few hours after the vaccination. What should I do? · If you think it is a severe allergic reaction or other emergency that can't wait, call 9-1-1 or get the person to the nearest hospital. Otherwise, call your doctor. · Afterward, the reaction should be reported to the Vaccine Adverse Event Reporting System (VAERS). Your doctor might file this report, or you can do it yourself through the VAERS web site at www.vaers. [facility].gov, or by calling 0-608.985.3219. VAERS does not give medical advice. The National Vaccine Injury Compensation Program  The National Vaccine Injury Compensation Program (VICP) is a federal program that was created to compensate people who may have been injured by certain vaccines. Persons who believe they may have been injured by a vaccine can learn about the program and about filing a claim by calling 6-277.584.5170 or visiting the [facility] website at www.[facility].gov/vaccinecompensation. There is a time limit to file a claim for compensation. How can I learn more? · Ask your doctor. He or she can give you the vaccine package insert or suggest other sources of information. · Call your local or state health department. · Contact the Centers for Disease Control and Prevention (CDC):  ¨ Call 3-282.719.6044 (1-800-CDC-INFO) or  ¨ Visit CDC's website at www.cdc.gov/vaccines  Vaccine Information Statement (Interim)  Tdap Vaccine  (2/24/15)  42 [address] 712PL-80  Department of Health and Human Services  Centers for Disease Control and Prevention  Many Vaccine Information Statements are available in [place] and other languages. See www.immunize.org/vis.   Muchas hojas de información sobre vacunas están disponibles en español y en otros idiomas. Visite www.immunize.org/vis. Care instructions adapted under license by [employer] (which disclaims liability or warranty for this information). If you have questions about a medical condition or this instruction, always ask your healthcare professional. [address] any warranty or liability for your use of this information. Follow-up Disposition:  Return in about 3 months (around 4/4/2018), or if symptoms worsen or fail to improve, for routine follow up.

## 2018-01-04 NOTE — PROGRESS NOTES
Chief Complaint   Patient presents with   Milton Abernathy Establish Care     Patient here today to establish care. Was in [facility] 08/28/2017 and stay for 2 weeks under the care of Anusha Perdomo. Patient had a kidney transplant. Went to [facility] stayed for 2 weeks. Return to [facility] EMERGENCY [facility] ER for emergency Surgery due to artery bust x 2.

## 2018-01-04 NOTE — MR AVS SNAPSHOT
Visit Information Date & Time Provider Department Dept. Phone Encounter #  
 1/4/2018 11:15 AM Collette Hanson MD [facility] at [address] [id number] Follow-up Instructions Return in about 3 months (around 4/4/2018), or if symptoms worsen or fail to improve, for routine follow up. Upcoming Health Maintenance Date Due  
 FOOT EXAM Q1 3/9/1959 EYE EXAM RETINAL OR DILATED Q1 3/9/1959 DTaP/Tdap/Td series (1 - Tdap) 3/9/1970 FOBT Q 1 YEAR AGE 50-75 3/9/1999 ZOSTER VACCINE AGE 60> 1/9/2009 GLAUCOMA SCREENING Q2Y 3/9/2014 HEMOGLOBIN A1C Q6M 2/10/2018 LIPID PANEL Q1 2/10/2018 MEDICARE YEARLY EXAM 4/20/2018 MICROALBUMIN Q1 1/4/2019 Allergies as of 1/4/2018  Review Complete On: 1/4/2018 By: Collette Hanson MD  
 No Known Allergies Current Immunizations  Reviewed on 5/10/2017 Name Date Hep B Vaccine 1/13/2017, 12/19/2016 Influenza Vaccine 10/19/2016, 10/31/2014 Influenza Vaccine (Quad) PF 10/10/2017  9:56 PM  
 Influenza Vaccine Whole 10/9/2010 Pneumococcal Polysaccharide (PPSV-23) 9/28/2016 Pneumococcal Vaccine (Unspecified Type) 12/31/2014 ZZZ-RETIRED (DO NOT USE) Pneumococcal Vaccine (Unspecified Type) 5/28/2010  9:03 PM  
  
 Not reviewed this visit You Were Diagnosed With   
  
 Codes Comments Multiple sclerosis ([facility]    -  Primary ICD-10-CM: G35 
ICD-9-CM: 639 ESRD on hemodialysis ([facility]     ICD-10-CM: N18.6, Z99.2 ICD-9-CM: 585.6, V45.11 Diabetic peripheral neuropathy associated with type 2 diabetes mellitus (HCC)     ICD-10-CM: E11.42 
ICD-9-CM: 250.60, 357.2 Mixed hyperlipidemia     ICD-10-CM: E78.2 ICD-9-CM: 272.2 S/P BKA (below knee amputation) unilateral, right ([facility]     ICD-10-CM: Y36.000 ICD-9-CM: V49.75 Encounter for immunization     ICD-10-CM: Q68 ICD-9-CM: V03.89 Hypovitaminosis D     ICD-10-CM: E55.9 ICD-9-CM: 268.9  Screening for thyroid disorder     ICD-10-CM: Z13.29 
 ICD-9-CM: V77.0 Normocytic hypochromic anemia     ICD-10-CM: D50.9 ICD-9-CM: 280.9 Vitals BP Pulse Temp Resp Height(growth percentile) SpO2  
 130/78 74 96.4 °F (35.8 °C) (Oral) 20 5' 10\" (1.778 m) 100% Smoking Status Former Smoker Vitals History Preferred Pharmacy Pharmacy Name Phone [facility] [address] [place] [address] Dr Ledesma, [address] [phone] Your Updated Medication List  
  
   
This list is accurate as of: 1/4/18 12:16 PM.  Always use your most recent med list.  
  
  
  
  
 aspirin 81 mg chewable tablet Take 1 tablet by mouth daily. carvedilol 25 mg tablet Commonly known as:  COREG  
TAKE 2 TABLETS PO BID  
  
 CHOLESTYRAMINE LIGHT 4 gram packet Generic drug:  cholestyramine-aspartame  
  
 cloNIDine HCl 0.1 mg tablet Commonly known as:  CATAPRES TK 1 T PO  TID  
  
 escitalopram oxalate 10 mg tablet Commonly known as:  Clevester Clap TK 1 T PO QD HumaLOG KwikPen 100 unit/mL kwikpen Generic drug:  insulin lispro LANTUS SOLOSTAR 100 unit/mL (3 mL) Inpn Generic drug:  insulin glargine 18 Units by SubCUTAneous route daily (with dinner). lisinopril 10 mg tablet Commonly known as:  Reyes Armin Take 40 mg by mouth two (2) times a day. NORVASC 10 mg tablet Generic drug:  amLODIPine Take 10 mg by mouth daily. pravastatin 10 mg tablet Commonly known as:  PRAVACHOL Take 10 mg by mouth nightly. RENVELA 800 mg Tab tab Generic drug:  sevelamer carbonate Take 800 mg by mouth three (3) times daily. SENSIPAR 60 mg Tab Generic drug:  cinacalcet Take 60 mg by mouth daily. VIRT-CAPS 1 mg capsule Generic drug:  b complex-vitamin c-folic acid TK ONE C PO  DAILY We Performed the Following AMB SUPPLY ORDER [[id number] Custom] Comments:  
 Prosthetic leg CBC WITH AUTOMATED DIFF [48915 CPT(R)] LIPID PANEL [13844 CPT(R)] METABOLIC PANEL, COMPREHENSIVE [65412 CPT(R)] REFERRAL TO PHYSICAL THERAPY [DUH24 Custom] Comments:  
 Please evaluate for PT,OT, home health. with [facility] 3RD GENERATION [69401 CPT(R)] VITAMIN D, 25 HYDROXY [id number] CPT(R)] Follow-up Instructions Return in about 3 months (around 2018), or if symptoms worsen or fail to improve, for routine follow up. Referral Information Referral ID Referred By Referred To  
  
 [id number] [facility].Dear D Not Available Visits Status Start Date End Date 1 New Request 18 If your referral has a status of pending review or denied, additional information will be sent to support the outcome of this decision. Patient Instructions Tdap (Tetanus, Diphtheria, Pertussis) Vaccine: What You Need to Know Why get vaccinated? Tetanus, diphtheria, and pertussis are very serious diseases. Tdap vaccine can protect us from these diseases. And Tdap vaccine given to pregnant women can protect  babies against pertussis. Tetanus (lockjaw) is rare in the [facility] today. It causes painful muscle tightening and stiffness, usually all over the body. · It can lead to tightening of muscles in the head and neck so you can't open your mouth, swallow, or sometimes even breathe. Tetanus kills about 1 out of 10 people who are infected even after receiving the best medical care. Diphtheria is also rare in the [place] today. It can cause a thick coating to form in the back of the throat. · It can lead to breathing problems, heart failure, paralysis, and death. Pertussis (whooping cough) causes severe coughing spells, which can cause difficulty breathing, vomiting, and disturbed sleep. · It can also lead to weight loss, incontinence, and rib fractures. Up to 2 in 100 adolescents and 5 in 100 adults with pertussis are hospitalized or have complications, which could include pneumonia or death. These diseases are caused by bacteria. Diphtheria and pertussis are spread from person to person through secretions from coughing or sneezing. Tetanus enters the body through cuts, scratches, or wounds. Before vaccines, as many as 200,000 cases of diphtheria, 200,000 cases of pertussis, and hundreds of cases of tetanus were reported in the [place] each year. Since vaccination began, reports of cases for tetanus and diphtheria have dropped by about 99% and for pertussis by about 80%. Tdap vaccine The Tdap vaccine can protect adolescents and adults from tetanus, diphtheria, and pertussis. One dose of Tdap is routinely given at age 6 or 15. People who did not get Tdap at that age should get it as soon as possible. Tdap is especially important for health care professionals and anyone having close contact with a baby younger than 12 months. Pregnant women should get a dose of Tdap during every pregnancy, to protect the  from pertussis. Infants are most at risk for severe, life-threatening complications from pertussis. Another vaccine, called Td, protects against tetanus and diphtheria, but not pertussis. A Td booster should be given every 10 years. Tdap may be given as one of these boosters if you have never gotten Tdap before. Tdap may also be given after a severe cut or burn to prevent tetanus infection. Your doctor or the person giving you the vaccine can give you more information. Tdap may safely be given at the same time as other vaccines. Some people should not get this vaccine · A person who has ever had a life-threatening allergic reaction after a previous dose of any diphtheria-, tetanus-, or pertussis-containing vaccine, OR has a severe allergy to any part of this vaccine, should not get Tdap vaccine. Tell the person giving the vaccine about any severe allergies.  
· Anyone who had coma or long repeated seizures within 7 days after a childhood dose of DTP or DTaP, or a previous dose of Tdap, should not get Tdap, unless a cause other than the vaccine was found. They can still get Td. · Talk to your doctor if you: 
¨ Have seizures or another nervous system problem. ¨ Had severe pain or swelling after any vaccine containing diphtheria, tetanus, or pertussis. ¨ Ever had a condition called Guillain-Barré Syndrome (GBS). ¨ Aren't feeling well on the day the shot is scheduled. Risks With any medicine, including vaccines, there is a chance of side effects. These are usually mild and go away on their own. Serious reactions are also possible but are rare. Most people who get Tdap vaccine do not have any problems with it. Mild problems following Tdap 
(Did not interfere with activities) · Pain where the shot was given (about 3 in 4 adolescents or 2 in 3 adults) · Redness or swelling where the shot was given (about 1 person in 5) · Mild fever of at least 100.4°F (up to about 1 in 25 adolescents or 1 in 100 adults) · Headache (about 3 or 4 people in 10) · Tiredness (about 1 person in 3 or 4) · Nausea, vomiting, diarrhea, stomachache (up to 1 in 4 adolescents or 1 in 10 adults) · Chills, sore joints (about 1 person in 10) · Body aches (about 1 person in 3 or 4) · Rash, swollen glands (uncommon) Moderate problems following Tdap (Interfered with activities, but did not require medical attention) · Pain where the shot was given (up to 1 in 5 or 6) · Redness or swelling where the shot was given (up to about 1 in 16 adolescents or 1 in 12 adults) · Fever over 102°F (about 1 in 100 adolescents or 1 in 250 adults) · Headache (about 1 in 7 adolescents or 1 in 10 adults) · Nausea, vomiting, diarrhea, stomachache (up to 1 to 3 people in 100) · Swelling of the entire arm where the shot was given (up to about 1 in 500) Severe problems following Tdap 
(Unable to perform usual activities; required medical attention) · Swelling, severe pain, bleeding and redness in the arm where the shot was given (rare) Problems that could happen after any vaccine: · People sometimes faint after a medical procedure, including vaccination. Sitting or lying down for about 15 minutes can help prevent fainting, and injuries caused by a fall. Tell your doctor if you feel dizzy or have vision changes or ringing in the ears. · Some people get severe pain in the shoulder and have difficulty moving the arm where a shot was given. This happens very rarely. · Any medication can cause a severe allergic reaction. Such reactions from a vaccine are very rare, estimated at fewer than 1 in a million doses, and would happen within a few minutes to a few hours after the vaccination. As with any medicine, there is a very remote chance of a vaccine causing a serious injury or death. The safety of vaccines is always being monitored. For more information, visit: www.cdc.gov/vaccinesafety. What if there is a serious problem? What should I look for? · Look for anything that concerns you, such as signs of a severe allergic reaction, very high fever, or unusual behavior. Signs of a severe allergic reaction can include hives, swelling of the face and throat, difficulty breathing, a fast heartbeat, dizziness, and weakness. These would usually start a few minutes to a few hours after the vaccination. What should I do? · If you think it is a severe allergic reaction or other emergency that can't wait, call 9-1-1 or get the person to the nearest hospital. Otherwise, call your doctor. · Afterward, the reaction should be reported to the Vaccine Adverse Event Reporting System (VAERS). Your doctor might file this report, or you can do it yourself through the VAERS web site at www.vaers. hhs.gov, or by calling 5-347.117.1971. VAERS does not give medical advice.  
The [employer] Vaccine [employer] [place] [place] 
 The Sociall Injury Compensation Program (VICP) is a federal program that was created to compensate people who may have been injured by certain vaccines. Persons who believe they may have been injured by a vaccine can learn about the program and about filing a claim by calling 7-890.134.3124 or visiting the [employer] website at www.[facility].gov/vaccinecompensation. There is a time limit to file a claim for compensation. How can I learn more? · Ask your doctor. He or she can give you the vaccine package insert or suggest other sources of information. · Call your local or state health department. · Contact the Centers for Disease Control and Prevention (CDC): 
¨ Call 9-972.257.2507 (1-800-CDC-INFO) or ¨ Visit CDC's website at www.cdc.gov/vaccines Vaccine Information Statement (Interim) Tdap Vaccine 
(2/24/15) 42 SONIA Alonso 993LJ-22 Department of Health and [employer] 24 Centers for Disease Control and Prevention Many Vaccine Information Statements are available in [place] and other languages. See www.immunize.org/vis. Muchas hojas de información sobre vacunas están disponibles en español y en otros idiomas. Visite www.immunize.org/vis. Care instructions adapted under license by [employer] (which disclaims liability or warranty for this information). If you have questions about a medical condition or this instruction, always ask your healthcare professional. [place] [address] any warranty or liability for your use of this information. Introducing [facility] & HE[facility]! [facility] introduces iovation patient portal. Now you can access parts of your medical record, email your doctor's office, and request medication refills online. 1. In your internet browser, go to [email or web address] 2. Click on the First Time User? Click Here link in the Sign In box. You will see the New Member Sign Up page. 3. Enter your [employer] Access Code exactly as it appears below. You will not need to use this code after youve completed the sign-up process. If you do not sign up before the expiration date, you must request a new code. · [employer] Access Code: [email or web address] Expires: 1/8/2018  6:56 AM 
 
4. Enter the last four digits of your Social Security Number (xxxx) and Date of Birth (mm/dd/yyyy) as indicated and click Submit. You will be taken to the next sign-up page. 5. Create a [email or web address] ID. This will be your [employer] login ID and cannot be changed, so think of one that is secure and easy to remember. 6. Create a [employer] password. You can change your password at any time. 7. Enter your Password Reset Question and Answer. This can be used at a later time if you forget your password. 8. Enter your e-mail address. You will receive e-mail notification when new information is available in [address] 9. Click Sign Up. You can now view and download portions of your medical record. 10. Click the Download Summary menu link to download a portable copy of your medical information. If you have questions, please visit the Frequently Asked Questions section of the [employer] website. Remember, [employer] is NOT to be used for urgent needs. For medical emergencies, dial 911. Now available from your iPhone and Android! Please provide this summary of care documentation to your next provider. Your primary care clinician is listed as Columba Guillermo. If you have any questions after today's visit, please call [phone].

## 2018-02-01 PROBLEM — I21.4 NSTEMI (NON-ST ELEVATED MYOCARDIAL INFARCTION) (HCC): Status: ACTIVE | Noted: 2018-01-01

## 2018-02-01 NOTE — ED PROVIDER NOTES
EMERGENCY DEPARTMENT HISTORY AND PHYSICAL EXAM      Date: 2/1/2018  Patient Name: Rosita Briones    History of Presenting Illness     Chief Complaint   Patient presents with    Abdominal Pain     Via w/c w/ daughter w/ c/o dizziness, abdominal pain, nausea, back pain x2. Pt fell 2 days ago & head related to RLE prostetic not fitting well.  Nausea    Back Pain    Dizziness       History Provided By: Patient    HPI: Rosita Briones, 75 y.o. male with PMHx significant for HTN, MS, PVD, CAD, T2 DM, and CKD (on dialysis M/W/F), presents ambulatory to the ED with cc of constant nausea with intermittent vomiting which began after experiencing a GLF resulting in a head injury 2 days ago. Per daughter, pt recently received a prosthetic of the RLE several months ago, however, daughter states it's too big for which he was recently fitted for another. However, she states that he attempted to ambulate 2 days ago despite being instructed not to do so without assistance, at which point he experienced the fall. Pt states that he only hit his head and back and denies any LOC. He reports additional symptoms of mild right lower back pain and SOB. He denies any current use of anticoagulants. He denies any significant cardiac or pulmonary hx. He is on dialysis (M/W/F) and attended his session yesterday. He denies any cough, CP, fever, diarrhea, or further symptoms and complaints. PCP: Amari Quiles MD    There are no other complaints, changes, or physical findings at this time.     Current Facility-Administered Medications   Medication Dose Route Frequency Provider Last Rate Last Dose    midazolam (VERSED) 1 mg/mL injection             bivalirudin (ANGIOMAX) 250 mg injection             0.9% sodium chloride infusion             fentaNYL citrate (PF) 50 mcg/mL injection             heparin (porcine) 1,000 unit/mL injection             lidocaine (XYLOCAINE) 10 mg/mL (1 %) injection             Dennys Abernathy iopamidol (ISOVUE-370) 76 % injection             iopamidol (ISOVUE-370) 76 % injection             nitroglycerin 1 mg/10mL injection             heparinized saline 2 units/mL 1,000 unit/500 mL infusion                Past History     Past Medical History:  Past Medical History:   Diagnosis Date    Anemia associated with chronic renal failure     Autoimmune disease (HCC)     hx ms    CAD (coronary artery disease)     Chronic kidney disease     catheter removed and no dialysis at this time    Chronic kidney disease     left arm fistula dialysis MWF    Diabetes ([address]     type II    GERD (gastroesophageal reflux disease)     Hypertension     Multiple sclerosis ([address]     diagnosed '01    Other ill-defined conditions(799.89)     anemia    Other ill-defined conditions(799.89)     elevated cholesterol    Other ill-defined conditions(799.89)     hx septic right foot    Peripheral vascular disease ([address]     Secondary hyperparathyroidism of renal origin [facility]     Thyroid disease        Past Surgical History:  Past Surgical History:   Procedure Laterality Date    COLONOSCOPY N/A 12/6/2016    COLONOSCOPY performed by [facility]ie Jordan [facility] at [facility] ENDOSCOPY    COLONOSCOPY,DIAGNOSTIC  12/6/2016         CORONARY STENT SINGLE W/PTCA  2/17/2011         HX APPENDECTOMY      HX CATARACT REMOVAL  10/18/10    left eye    HX CHOLECYSTECTOMY      HX GI      ileostomy due to infection    HX HEART CATHETERIZATION      HX HEENT      hx post photocoagulation eye 2009    HX ORTHOPAEDIC      HX OTHER SURGICAL      right partial foot amputation    HX OTHER SURGICAL      cardiac cath    [place] COLONOSCOPY W/BIOPSY SINGLE/MULTIPLE  5/10/2010         VASCULAR SURGERY PROCEDURE UNLIST      katelyn. leg bypasses       Family History:  Family History   Problem Relation Age of Onset    Diabetes Mother        Social History:  Social History   Substance Use Topics    Smoking status: Former Smoker     Years: 1.00 Quit date: 4/12/2003    Smokeless tobacco: Never Used      Comment: quit 5 years ago    Alcohol use No      Comment: Stopped drinking 10 years ago       Allergies:  No Known Allergies      Review of Systems   Review of Systems   Constitutional: Negative for chills, fatigue and fever. HENT: Negative for congestion and rhinorrhea. Eyes: Negative for visual disturbance. Respiratory: Positive for shortness of breath. Negative for cough and wheezing. Cardiovascular: Negative for chest pain and palpitations. Gastrointestinal: Positive for nausea and vomiting. Negative for abdominal distention, abdominal pain, constipation and diarrhea. Endocrine: Negative. Genitourinary: Negative for difficulty urinating and dysuria. Musculoskeletal: Positive for back pain (R lower). Skin: Negative for rash. Neurological: Negative for dizziness, weakness and light-headedness. Psychiatric/Behavioral: Negative for suicidal ideas. Physical Exam   Physical Exam   Constitutional: He is oriented to person, place, and time. He appears well-developed and well-nourished. No distress. HENT:   Head: Normocephalic and atraumatic. Mouth/Throat: Oropharynx is clear and moist.   Eyes: Conjunctivae and EOM are normal.   Neck: Normal range of motion. Neck supple. No JVD present. No tracheal deviation present. No C-spine TTP. No step-offs. Cardiovascular: Normal rate and intact distal pulses. An irregular rhythm present. Exam reveals no gallop and no friction rub. No murmur heard. Fistula to LUE with palpable thrill. Pulmonary/Chest: Effort normal and breath sounds normal. No stridor. No respiratory distress. He has no wheezes. Abdominal: Soft. Bowel sounds are normal. He exhibits no distension and no mass. There is no tenderness. There is no guarding. Musculoskeletal: Normal range of motion. He exhibits no edema or tenderness. No deformity; No T or L spine TTP. No step-offs. No paraspinal TTP.  Normal ROM of the back. Pt ambulatory with prosthetic to the RLE. Neurological: He is alert and oriented to person, place, and time. He has normal strength. No focal deficits   Skin: Skin is warm, dry and intact. No rash noted. Psychiatric: He has a normal mood and affect. His behavior is normal. Judgment and thought content normal.   Nursing note and vitals reviewed. Diagnostic Study Results     Labs -  Recent Results (from the past 12 hour(s))   METABOLIC PANEL, COMPREHENSIVE    Collection Time: 02/01/18 10:51 AM   Result Value Ref Range    Sodium 137 136 - 145 mmol/L    Potassium 4.6 3.5 - 5.1 mmol/L    Chloride 100 97 - 108 mmol/L    CO2 28 21 - 32 mmol/L    Anion gap 9 5 - 15 mmol/L    Glucose 85 65 - 100 mg/dL    BUN 28 (H) 6 - 20 MG/DL    Creatinine 6.80 (H) 0.70 - 1.30 MG/DL    BUN/Creatinine ratio 4 (L) 12 - 20      GFR est AA 10 (L) >60 ml/min/1.73m2    GFR est non-AA 8 (L) >60 ml/min/1.73m2    Calcium 9.6 8.5 - 10.1 MG/DL    Bilirubin, total 0.4 0.2 - 1.0 MG/DL    ALT (SGPT) 46 12 - 78 U/L    AST (SGOT) 33 15 - 37 U/L    Alk.  phosphatase 267 (H) 45 - 117 U/L    Protein, total 7.8 6.4 - 8.2 g/dL    Albumin 2.6 (L) 3.5 - 5.0 g/dL    Globulin 5.2 (H) 2.0 - 4.0 g/dL    A-G Ratio 0.5 (L) 1.1 - 2.2     CK W/ CKMB & INDEX    Collection Time: 02/01/18 10:51 AM   Result Value Ref Range    CK 88 39 - 308 U/L    CK - MB 2.8 <3.6 NG/ML    CK-MB Index 3.2 (H) 0 - 2.5     CBC WITH AUTOMATED DIFF    Collection Time: 02/01/18 10:51 AM   Result Value Ref Range    WBC 13.7 (H) 4.1 - 11.1 K/uL    RBC 3.69 (L) 4.10 - 5.70 M/uL    HGB 9.9 (L) 12.1 - 17.0 g/dL    HCT 31.5 (L) 36.6 - 50.3 %    MCV 85.4 80.0 - 99.0 FL    MCH 26.8 26.0 - 34.0 PG    MCHC 31.4 30.0 - 36.5 g/dL    RDW 16.8 (H) 11.5 - 14.5 %    PLATELET 171 (L) 250 - 400 K/uL    MPV 10.4 8.9 - 12.9 FL    NRBC 0.0 0  WBC    ABSOLUTE NRBC 0.00 0.00 - 0.01 K/uL    NEUTROPHILS 82 (H) 32 - 75 %    LYMPHOCYTES 9 (L) 12 - 49 %    MONOCYTES 7 5 - 13 % EOSINOPHILS 1 0 - 7 %    BASOPHILS 0 0 - 1 %    IMMATURE GRANULOCYTES 1 (H) 0.0 - 0.5 %    ABS. NEUTROPHILS 11.3 (H) 1.8 - 8.0 K/UL    ABS. LYMPHOCYTES 1.2 0.8 - 3.5 K/UL    ABS. MONOCYTES 0.9 0.0 - 1.0 K/UL    ABS. EOSINOPHILS 0.2 0.0 - 0.4 K/UL    ABS. BASOPHILS 0.0 0.0 - 0.1 K/UL    ABS. IMM. GRANS. 0.1 (H) 0.00 - 0.04 K/UL    DF AUTOMATED     TROPONIN I    Collection Time: 02/01/18 10:51 AM   Result Value Ref Range    Troponin-I, Qt. 0.58 (H) <0.05 ng/mL   EKG, 12 LEAD, INITIAL    Collection Time: 02/01/18  1:47 PM   Result Value Ref Range    Ventricular Rate 89 BPM    Atrial Rate 89 BPM    P-R Interval 280 ms    QRS Duration 86 ms    Q-T Interval 374 ms    QTC Calculation (Bezet) 455 ms    Calculated P Axis 71 degrees    Calculated R Axis 71 degrees    Calculated T Axis 151 degrees    Diagnosis       Sinus rhythm with 1st degree AV block  Left ventricular hypertrophy with repolarization abnormality  When compared with ECG of 21-MAR-2016 12:32,  ST now depressed in Lateral leads     CK W/ CKMB & INDEX    Collection Time: 02/01/18  1:50 PM   Result Value Ref Range    CK 90 39 - 308 U/L    CK - MB 3.7 (H) <3.6 NG/ML    CK-MB Index 4.1 (H) 0 - 2.5     TROPONIN I    Collection Time: 02/01/18  1:50 PM   Result Value Ref Range    Troponin-I, Qt. 1.72 (H) <0.05 ng/mL       Radiologic Studies -  CT Results  (Last 48 hours)               02/01/18 1101  CT HEAD WO CONT Final result    Impression:  IMPRESSION: No acute process or change compared to the prior exam.               Narrative:  EXAM:  CT HEAD WO CONT       INDICATION:   Dizziness for 2 days following fall       COMPARISON: 10/16/2017. CONTRAST:  None. TECHNIQUE: Unenhanced CT of the head was performed using 5 mm images. Brain and   bone windows were generated. CT dose reduction was achieved through use of a   standardized protocol tailored for this examination and automatic exposure   control for dose modulation.          FINDINGS:   Generalized atrophy is again noted. Small vessel ischemic changes are stable   compared to the prior exam. There is no intracranial hemorrhage, extra-axial   collection, mass, mass effect or midline shift. The basilar cisterns are open. No acute infarct is identified. The bone windows demonstrate no abnormalities. The visualized portions of the paranasal sinuses and mastoid air cells are   clear. Extensive vascular calcification is noted. CXR Results  (Last 48 hours)               02/01/18 1125  XR CHEST PORT Final result    Impression:  IMPRESSION: No acute abnormality. Narrative:  EXAM:  TEMPORARY. INDICATION: Chest pain. COMPARISON: 10/23/2017. FINDINGS:    A portable AP radiograph of the chest was obtained at 1123 hours. The   positioning is lordotic. Lines and tubes: The patient is on a cardiac monitor. Lungs: The lungs are clear. Pleura: There is no pneumothorax or pleural effusion. Mediastinum: The cardiac and mediastinal contours and pulmonary vascularity are   normal.   Bones and soft tissues: The bones and soft tissues are grossly within normal   limits. There is a stent inferior to the left clavicle. Medical Decision Making   I am the first provider for this patient. I reviewed the vital signs, available nursing notes, past medical history, past surgical history, family history and social history. Vital Signs-Reviewed the patient's vital signs. Patient Vitals for the past 12 hrs:   Temp Pulse Resp BP SpO2   02/01/18 1215 - 82 18 148/71 95 %   02/01/18 1029 (!) 100.8 °F (38.2 °C) 89 22 148/74 97 %       Records Reviewed: Nursing Notes and Old Medical Records    Provider Notes (Medical Decision Making):   Pt presenting with dizziness, n/v, headache s/p fall 2 days ago. Pt also having some SOB, but denies any CP. DDx includes ICH, fracture, post-concussive syndrome, dehydration, orthostatic hypotension, acs, pneumonia, pulmonary edema.  Will check labs, cardiac enzymes, CXR, head CT. Pt does not make a lot of urine, will check for UTI if patient makes any here. ED Course:   Initial assessment performed. The patients presenting problems have been discussed, and they are in agreement with the care plan formulated and outlined with them. I have encouraged them to ask questions as they arise throughout their visit. 12:51 PM  I have just reevaluated the patient. I have reviewed his vital signs and determined there is currently no worsening in their condition or physical exam. Results have been reviewed with them and their questions have been answered. We will continue to review further results as they come available. CONSULT NOTE:  1:31 PM  Kodak Monterroso DO spoke with Renetta Jenkins NP,  Specialty: Cardiology  Discussed pt's hx, disposition, and available diagnostic and imaging results. Reviewed care plans. Consultant will see and evaluate pt. CONSULT NOTE:  1:47 PM  Kodak Monterroso DO spoke with Dr. Snehal Dasilva,  Specialty: Cardiology  Discussed pt's hx, disposition, and available diagnostic and imaging results. Reviewed care plans. Consultant recommends repeat cardiac enzymes and EKG. He asks we call him back with the results. 2:49 PM  Dr. Snehal Dasilva in the ED. He has been updated on the change in pt's troponin from 0.58 to 1.72. He will admit the pt to the cath lab. CRITICAL CARE NOTE:  IMPENDING DETERIORATION -Cardiovascular  ASSOCIATED RISK FACTORS - Hypotension and Dysrhythmia  MANAGEMENT- Bedside Assessment and Supervision of Care  INTERPRETATION -  Xrays, CT Scan, ECG and Blood Pressure  INTERVENTIONS - hemodynamic mngmt and vascular control  CASE REVIEW - Medical Sub-Specialist, Nursing and Family  TREATMENT RESPONSE -Unchanged   PERFORMED BY - Self  NOTES:  I have spent 45 minutes of critical care time involved in lab review, consultations with specialist, family decision- making, bedside attention and documentation.  During this entire length of time I was immediately available to the patient. Amy Davis DO. Disposition:  Admit Note:  2:49 PM  Patient is being admitted to the hospital by Dr. Pepper Medina. The results of their tests and reasons for their admission have been discussed with them and/or available family. They convey agreement and understanding for the need to be admitted and for their admission diagnosis. Consultation has been made with the inpatient physician specialist for hospitalization. PLAN:  1. Admit to Hospitalist    Diagnosis     Clinical Impression:   1. NSTEMI (non-ST elevated myocardial infarction) [facility])        Attestations: This note is prepared by Prince Barney, acting as Scribe for Amy Davis DO. Amy Davis DO: The scribe's documentation has been prepared under my direction and personally reviewed by me in its entirety. I confirm that the note above accurately reflects all work, treatment, procedures, and medical decision making performed by me.

## 2018-02-01 NOTE — H&P
[address] [place] [address] [facility]  [phone]          CARDIOLOGY HISTORY AND PHYSICAL    Date of  Admission: 2/1/2018 10:31 AM     Admission type:Emergency     Subjective:      Blaine Liang is a 75 y.o. male admitted for NSTEMI (non-ST elevated myocardial infarction) ([facility] Admitted with c/o constant nausea with intermittent vomiting which began after experiencing a GLF resulting in a head injury 2 days ago. No c/o CP, SOB. Reason for fall is he has new RLE prosthesis and is unsteady. CT negative for bleed or acute process. ECG with subtle ST depressions ant/laterally, troponin 0.58-1.72. Significant hx of PCI/CRISTI to RCA 2011, ESRD (MWF HD), PVD, HTN and MS. Previous treatment/evaluation includes Percutaneous Coronary Intervention, echocardiogram and stress thallium . Cardiac risk factors: family history, sedentary life style, male gender, hypertension.     Patient Active Problem List    Diagnosis Date Noted    NSTEMI (non-ST elevated myocardial infarction) ([facility] [facility] 02/01/2018    History of colonoscopy 03/29/2017    Diabetic oculomotor palsy ([address] 03/25/2016    Diabetic peripheral neuropathy associated with type 2 diabetes mellitus ([address] 03/25/2016    Carpal tunnel syndrome on both sides 07/30/2015    Ulnar neuropathy at elbow of right upper extremity 07/30/2015    Ulnar neuropathy at elbow of left upper extremity 04/21/2015    Cervical stenosis of spinal canal 01/04/2015    Ataxia 01/04/2015    Multiple sclerosis ([facility] 01/04/2015    ESRD on hemodialysis ([address] 12/30/2014    ASHD (arteriosclerotic heart disease) 04/11/2013    Mixed hyperlipidemia 04/11/2013    S/P coronary artery stent placement 02/18/2011    PAD (peripheral artery disease) ([address] 12/09/2010      Karla Randall MD  Past Medical History:   Diagnosis Date    Anemia associated with chronic renal failure     Autoimmune disease ([address]     hx ms    CAD (coronary artery disease)     Chronic kidney disease catheter removed and no dialysis at this time    Chronic kidney disease     left arm fistula dialysis MWF    Diabetes ([facility]     type II    GERD (gastroesophageal reflux disease)     Hypertension     Multiple sclerosis ([facility] [facility]     diagnosed '01    Other ill-defined conditions(799.89)     anemia    Other ill-defined conditions(799.89)     elevated cholesterol    Other ill-defined conditions(799.89)     hx septic right foot    Peripheral vascular disease (HCC)     Secondary hyperparathyroidism of renal origin ([facility] [address] [facility]     Thyroid disease       Social History     Social History    Marital status:      Spouse name: N/A    Number of children: N/A    Years of education: N/A     Occupational History    not working      Social History Main Topics    Smoking status: Former Smoker     Years: 1.00     Quit date: 4/12/2003    Smokeless tobacco: Never Used      Comment: quit 5 years ago    Alcohol use No      Comment: Stopped drinking 10 years ago    Drug use: No    Sexual activity: Yes     Partners: Female     Birth control/ protection: None     Other Topics Concern    Not on file     Social History Narrative     No Known Allergies   Family History   Problem Relation Age of Onset    Diabetes Mother       Current Facility-Administered Medications   Medication Dose Route Frequency    fentaNYL citrate (PF) injection 25-50 mcg  25-50 mcg IntraVENous Multiple    iopamidol (ISOVUE-370) 76 % injection 10-50 mL  10-50 mL IntraarTERial Multiple    lidocaine (XYLOCAINE) 10 mg/mL (1 %) injection 1-20 mL  1-20 mL IntraDERMal Multiple    iopamidol (ISOVUE-370) 76 % injection 0-150 mL  0-150 mL IntraarTERial Multiple    midazolam (VERSED) injection 0.5-2 mg  0.5-2 mg IntraVENous Multiple    bivalirudin (ANGIOMAX) 250 mg injection        0.9% sodium chloride infusion        heparin (porcine) 1,000 unit/mL injection        ADDaptor        iopamidol (ISOVUE-370) 76 % injection        iopamidol (ISOVUE-370) 76 % injection        nitroglycerin 1 mg/10mL injection             Review of Symptoms:  Constitutional: negative  Eyes: negative  Ears, nose, mouth, throat, and face: negative  Respiratory: negative  Cardiovascular: negative  Gastrointestinal: n/v since fall  Genitourinary:negative  Musculoskeletal:negative  Neurological: dizziness since fall, bump on head  Behvioral/Psych: negative  Endocrine: negative     Objective:   Physical Exam:  General Appearance:  WNWD AAM in no acute distress  Chest:   Clear  Cardiovascular:  Regular rate and rhythm, no murmur.   Abdomen:   Soft, non-tender, bowel sounds are active.   Extremities: no LLE  Skin:  Warm and dry.     Visit Vitals    /71 (BP 1 Location: Right arm, BP Patient Position: At rest)    Pulse 82    Temp (!) 100.8 °F (38.2 °C)    Resp 18    Ht 5' 10\" (1.778 m)    Wt 77.1 kg (170 lb)    SpO2 95%    BMI 24.39 kg/m2       Cardiographics    Telemetry: SR  ECG: SR with slight ST depression and/lat   Echocardiogram: 2010 normal EF      Labs:  Recent Labs      02/01/18   1051   WBC  13.7*   HGB  9.9*   HCT  31.5*   PLT  148*     Recent Labs      02/01/18   1051   NA  137   K  4.6   CL  100   CO2  28   GLU  85   BUN  28*   CREA  6.80*   CA  9.6   ALB  2.6*   TBILI  0.4   SGOT  33   ALT  46       Recent Labs      02/01/18   1350  02/01/18   1051   TROIQ  1.72*  0.58*   CPK  90  88   CKMB  3.7*  2.8          Assessment:       Active Problems:    NSTEMI (non-ST elevated myocardial infarction) ([facility] (2/1/2018)         Plan:     NSTEMI:  Cardiac cath today with +troponin and ECG changes  Dr. Velia Marcum discussed the risks/benefits/alternatives of cardiac catheterization +/- PCI with the patient. Risks include (but are not limited to) bleeding, infection, cva/mi/tamponade/death. The patient understands and wishes to proceed.   Continue on ASA, BB, statin    Nausea/vomiting:  Resolved, but will keep zofran if needed

## 2018-02-01 NOTE — ED NOTES
Pt arrives from triage with c/o dizziness, abdominal pain, nausea, and back pain for about 2 days. Pt fell 2 days ago & head related to RLE prostetic not fitting well.

## 2018-02-01 NOTE — PROGRESS NOTES
TRANSFER - OUT REPORT:    Verbal report given to ERNESTO Browning(name) on Mihai Flynn  being transferred to IVCU(unit) for routine post - op       Report consisted of patients Situation, Background, Assessment and   Recommendations(SBAR). Information from the following report(s) SBAR, Procedure Summary, MAR and Recent Results was reviewed with the receiving nurse. Lines:   Peripheral IV 02/01/18 Right Hand (Active)   Site Assessment Clean, dry, & intact 2/1/2018 11:15 AM   Phlebitis Assessment 0 2/1/2018 11:15 AM   Infiltration Assessment 0 2/1/2018 11:15 AM   Dressing Status Clean, dry, & intact 2/1/2018 11:15 AM   Dressing Type Transparent;Tape 2/1/2018 11:15 AM        Opportunity for questions and clarification was provided.       Patient transported with:   Registered Nurse  Tech

## 2018-02-01 NOTE — IP AVS SNAPSHOT
[address] [facility] 
[phone] Patient: Florence New MRN: [id number] XRR:[id number]/1/[id number] About your hospitalization You were admitted on:  February 1, 2018 You last received care in the:  [facility] 2 INTRVNTNL CARDIO You were discharged on:  February 6, 2018 Why you were hospitalized Your primary diagnosis was:  Not on File Your diagnoses also included:  Nstemi (Non-St Elevated Myocardial Infarction) (Hcc), Esrd On Hemodialysis (Hcc), Mixed Hyperlipidemia, Hypertension, Influenza Follow-up Information Follow up With Details Comments Contact Info Tayla Garrison MD Schedule an appointment as soon as possible for a visit in 1 month  \[facility] [place] [facility]\"" [address] [facility] 
[phone] Discharge Orders None A check albert indicates which time of day the medication should be taken. My Medications START taking these medications Instructions Each Dose to Equal  
 Morning Noon Evening Bedtime  
 oseltamivir 30 mg capsule Commonly known as:  TAMIFLU Start taking on:  2/10/2018 Your last dose was: Your next dose is: Take 1 Cap by mouth DIALYSIS PRN (Give after HD session only  ( 4 doses since 2/2). 30 mg CONTINUE taking these medications Instructions Each Dose to Equal  
 Morning Noon Evening Bedtime  
 aspirin 81 mg chewable tablet Your last dose was: Your next dose is: Take 1 tablet by mouth daily. 81 mg  
    
   
   
   
  
 carvedilol 25 mg tablet Commonly known as:  Renée Whiting Your last dose was: Your next dose is: TAKE 2 TABLETS PO BID  
     
   
   
   
  
 CHOLESTYRAMINE LIGHT 4 gram packet Generic drug:  cholestyramine-aspartame Your last dose was: Your next dose is:    
   
   
      
   
   
   
  
 cloNIDine HCl 0.1 mg tablet Commonly known as:  CATAPRES Your last dose was: Your next dose is:    
   
   
 TK 1 T PO  TID  
     
   
   
   
  
 escitalopram oxalate 10 mg tablet Commonly known as:  Ale Moon Your last dose was: Your next dose is:    
   
   
 TK 1 T PO QD  
     
   
   
   
  
 lisinopril 10 mg tablet Commonly known as:  Jie Rigomusa Your last dose was: Your next dose is: Take 40 mg by mouth two (2) times a day. 40 mg  
    
   
   
   
  
 NORVASC 10 mg tablet Generic drug:  amLODIPine Your last dose was: Your next dose is: Take 10 mg by mouth daily. 10 mg  
    
   
   
   
  
 pravastatin 10 mg tablet Commonly known as:  PRAVACHOL Your last dose was: Your next dose is: Take 10 mg by mouth nightly. 10 mg  
    
   
   
   
  
 RENVELA 800 mg Tab tab Generic drug:  sevelamer carbonate Your last dose was: Your next dose is: Take 800 mg by mouth three (3) times daily. 800 mg SENSIPAR 60 mg Tab Generic drug:  cinacalcet Your last dose was: Your next dose is: Take 60 mg by mouth daily. 60 mg VIRT-CAPS 1 mg capsule Generic drug:  b complex-vitamin c-folic acid Your last dose was: Your next dose is:    
   
   
 TK ONE C PO  DAILY  
     
   
   
   
  
  
STOP taking these medications HumaLOG KwikPen 100 unit/mL kwikpen Generic drug:  insulin lispro LANTUS SOLOSTAR 100 unit/mL (3 mL) Inpn Generic drug:  insulin glargine Where to Get Your Medications These medications were sent to [address] [place] [address] Phone:  [phone]  
  oseltamivir 30 mg capsule Discharge Instructions HOSPITALIST DISCHARGE INSTRUCTIONS 
 
NAME: Keegan Kang :  1950 MRN:  [id number] Date/Time:  2018 1:25 PM 
 
ADMIT DATE: 2018 DISCHARGE DATE: 2018 Attending Physician: Sandeep Sandra MD 
 
DISCHARGE DIAGNOSIS: 
*** Medications: Per above medication reconciliation. Pain Management: per above medications Recommended diet: Cardiac Diet Recommended activity: PT/OT Eval and Treat Wound care: None Indwelling devices:  None Supplemental Oxygen: None Required Lab work: Per SNF routine Glucose management:  None Code status: Full Outside physician follow up: Follow-up Information Follow up With Details Comments Contact Info Magdalene Dempsey MD Schedule an appointment as soon as possible for a visit in 1 month  [address] [facility] [address] [place] [address] 83. 
[phone] Skilled nursing facility/ SNF MD responsible for above on discharge. Information obtained by : 
I understand that if any problems occur once I am at home I am to contact my physician. I understand and acknowledge receipt of the instructions indicated above. Physician's or R.N.'s Signature                                                                  Date/Time Patient or Repres ACO Transitions of Care Introducing [employer] [address] offers a voluntary care coordination program to provide high quality service and care to [facility] fee-for-service beneficiaries. Clint Lopez was designed to help you enhance your health and well-being through the following services: ? Transitions of Care  support for individuals who are transitioning from one care setting to another (example: Hospital to home). ? Chronic and Complex Care Coordination  support for individuals and caregivers of those with serious or chronic illnesses or with more than one chronic (ongoing) condition and those who take a number of different medications. If you meet specific medical criteria, a Counts include 234 beds at the [facility] [address] may call you directly to coordinate your care with your primary care physician and your other care providers. For questions about the [facility] programs, please, contact your physicians office. For general questions or additional information about Accountable Care Organizations: 
Please visit www.medicare.gov/acos. html or call 1-800-MEDICARE (6-434.123.5091) TTY users should call 2-578.646.5394. Introducing [facility]! [address] [facility] introduces [employer] patient portal. Now you can access parts of your medical record, email your doctor's office, and request medication refills online. 1. In your internet browser, go to [email or web address]. [email or web address] [place] 2. Click on the First Time User? Click Here link in the Sign In box. You will see the New Member Sign Up page. 3. Enter your Arts[employer] Access Code exactly as it appears below. You will not need to use this code after youve completed the sign-up process. If you do not sign up before the expiration date, you must request a new code. · [employer] Access Code: [email or web address] Expires: 4/8/2018 11:12 AM 
 
4. Enter the last four digits of your Social Security Number (xxxx) and Date of Birth (mm/dd/yyyy) as indicated and click Submit. You will be taken to the next sign-up page. 5. Create a Artsicle ID. This will be your Arts[employer] login ID and cannot be changed, so think of one that is secure and easy to remember. 6. Create a [employer] password. You can change your password at any time. 7. Enter your Password Reset Question and Answer.  This can be used at a later time if you forget your password. 8. Enter your e-mail address. You will receive e-mail notification when new information is available in [address] 9. Click Sign Up. You can now view and download portions of your medical record. 10. Click the Download Summary menu link to download a portable copy of your medical information. If you have questions, please visit the Frequently Asked Questions section of the [employer] website. Remember, [employer] is NOT to be used for urgent needs. For medical emergencies, dial 911. Now available from your iPhone and Android! Unresulted Labs-Please follow up with your PCP about these lab tests Order Current Status CULTURE, BLOOD, PERIPHERAL Preliminary result Providers Seen During Your Hospitalization Provider Specialty Primary office phone Maryann Parsons DO Emergency Medicine [phone] Benjamin Osei MD Cardiology [phone] Sanam Farias MD Internal Medicine [phone] Nora Simpson MD Internal Medicine [phone] Your Primary Care Physician (PCP) Primary Care Physician Office Phone Office Fax [facility], [address] [place] [address] [phone] You are allergic to the following No active allergies Recent Documentation Height Weight BMI Smoking Status 1.778 m 70.7 kg 22.35 kg/m2 Former Smoker Emergency Contacts Name Discharge Info Relation Home Work Mobile BennieNiecy DISCHARGE CAREGIVER [3] Spouse [3] [phone] Delphine Whittington DISCHARGE CAREGIVER [3] Neighbor [4] [phone] Patient Belongings The following personal items are in your possession at time of discharge: 
  Dental Appliances: None  Visual Aid: Glasses      Home Medications: None   Jewelry: None  Clothing: At bedside    Other Valuables: None Please provide this summary of care documentation to your next provider. Signatures-by signing, you are acknowledging that this After Visit Summary has been reviewed with you and you have received a copy. Patient Signature:  ____________________________________________________________ Date:  ____________________________________________________________  
  
Danilo Sekou Provider Signature:  ____________________________________________________________ Date:  ____________________________________________________________

## 2018-02-01 NOTE — IP AVS SNAPSHOT
[address] [facility] 
[phone] Patient: Bao Grigsby MRN: [id number] DHQ:[id number] A check albert indicates which time of day the medication should be taken. My Medications START taking these medications Instructions Each Dose to Equal  
 Morning Noon Evening Bedtime  
 oseltamivir 30 mg capsule Commonly known as:  TAMIFLU Start taking on:  2/10/2018 Your last dose was: Your next dose is: Take 1 Cap by mouth DIALYSIS PRN (Give after HD session only  ( 4 doses since 2/2). 30 mg CONTINUE taking these medications Instructions Each Dose to Equal  
 Morning Noon Evening Bedtime  
 aspirin 81 mg chewable tablet Your last dose was: Your next dose is: Take 1 tablet by mouth daily. 81 mg  
    
   
   
   
  
 carvedilol 25 mg tablet Commonly known as:  Patrick Sloop Your last dose was: Your next dose is: TAKE 2 TABLETS PO BID  
     
   
   
   
  
 CHOLESTYRAMINE LIGHT 4 gram packet Generic drug:  cholestyramine-aspartame Your last dose was: Your next dose is:    
   
   
      
   
   
   
  
 cloNIDine HCl 0.1 mg tablet Commonly known as:  CATAPRES Your last dose was: Your next dose is:    
   
   
 TK 1 T PO  TID  
     
   
   
   
  
 escitalopram oxalate 10 mg tablet Commonly known as:  Diego Sabianist Your last dose was: Your next dose is:    
   
   
 TK 1 T PO QD  
     
   
   
   
  
 lisinopril 10 mg tablet Commonly known as:  Rodriguez Weiss Your last dose was: Your next dose is: Take 40 mg by mouth two (2) times a day. 40 mg  
    
   
   
   
  
 NORVASC 10 mg tablet Generic drug:  amLODIPine Your last dose was: Your next dose is: Take 10 mg by mouth daily.   
 10 mg  
    
   
   
   
  
 pravastatin 10 mg tablet Commonly known as:  PRAVACHOL Your last dose was: Your next dose is: Take 10 mg by mouth nightly. 10 mg  
    
   
   
   
  
 RENVELA 800 mg Tab tab Generic drug:  sevelamer carbonate Your last dose was: Your next dose is: Take 800 mg by mouth three (3) times daily. 800 mg SENSIPAR 60 mg Tab Generic drug:  cinacalcet Your last dose was: Your next dose is: Take 60 mg by mouth daily. 60 mg VIRT-CAPS 1 mg capsule Generic drug:  b complex-vitamin c-folic acid Your last dose was: Your next dose is:    
   
   
 TK ONE C PO  DAILY  
     
   
   
   
  
  
STOP taking these medications HumaLOG KwikPen 100 unit/mL kwikpen Generic drug:  insulin lispro LANTUS SOLOSTAR 100 unit/mL (3 mL) Inpn Generic drug:  insulin glargine Where to Get Your Medications These medications were sent to [address] [place] [address] Phone:  [phone]  
  oseltamivir 30 mg capsule

## 2018-02-02 PROBLEM — J11.1 INFLUENZA: Status: ACTIVE | Noted: 2018-01-01

## 2018-02-02 NOTE — CONSULTS
Hospitalist Consultation Note    NAME:  Maddie Barney   :   1950   MRN:   [id number]     ATTENDING: Chantel Mccartney MD  PCP:  Evonnie Koyanagi, MD    Date/Time:  2018 10:02 AM      Recommendations/Plan:       Viral like syndrome (fever, myalgias, nausea, dizziness and weakness)  -CXR clear. Influenza negative, s/p cholecystectomy. LFTs wnl.    -his wife recently had flu like symptoms and his symptoms are consistent. The rapid flu screen is not 100% sensitive so I recommend to treat with tamiflu since his symptoms just started 2-3 days ago and he relatively immunocompromised. Weakness, s/p fall  Hx MS  -CT head negative  -monitor of MS exacerbation  -PT OT eval and treat. May need HH PT or rehab     ESRD  -HD per renal    PVD, hx leg bypass    DM  -continue lantus with SSI prn    CAD, hx CRISTI to RCA   NSTEMI  -cath results reviewed. Minor luminal irregularities seen  -continue cardiac meds    Dr Fidelina Chandra will be following     Code Status:  Full  DVT Prophylaxis:  Will start Heparin SQ          Subjective:   REQUESTING PHYSICIAN:  Dr Haris Degroot:  Assist with evaluation of fever  Renukandell  is a 75 y.o.  male with a history of CAD, HTN, PVD, MS DM and ESRD who has not been feeling well x 2 days. He describes weakness, some myalgias, nausea, decrease appetite and dizziness. He fell 2 days ago and presented to the ER yesterday. His troponin trended up to 1.72 and he was taken to the cath lab for NSTEMI. Today he developed fevers 100.8 but CXR negative and influenza test negative. Wife recently has flu like symptoms also.      Past Medical History:   Diagnosis Date    Anemia associated with chronic renal failure     Autoimmune disease (HCC)     hx ms    CAD (coronary artery disease)     Chronic kidney disease     catheter removed and no dialysis at this time    Chronic kidney disease     left arm fistula dialysis MWF    Diabetes ([facility] [address]     type II    GERD (gastroesophageal reflux disease)     Hypertension     Multiple sclerosis ([facility] [address]     diagnosed '01    Other ill-defined conditions(799.89)     anemia    Other ill-defined conditions(799.89)     elevated cholesterol    Other ill-defined conditions(799.89)     hx septic right foot    Peripheral vascular disease (HCC)     Secondary hyperparathyroidism of renal origin ([facility] [address]     Thyroid disease       Past Surgical History:   Procedure Laterality Date    COLONOSCOPY N/A 12/6/2016    COLONOSCOPY performed by Reyna Bentley [facility] at [facility]    COLONOSCOPY,DIAGNOSTIC  12/6/2016         CORONARY STENT SINGLE W/PTCA  2/17/2011         HX APPENDECTOMY      HX CATARACT REMOVAL  10/18/10    left eye    HX CHOLECYSTECTOMY      HX GI      ileostomy due to infection    HX HEART CATHETERIZATION      HX HEENT      hx post photocoagulation eye 2009    HX ORTHOPAEDIC      HX OTHER SURGICAL      right partial foot amputation    HX OTHER SURGICAL      cardiac cath    [place] COLONOSCOPY W/BIOPSY SINGLE/MULTIPLE  5/10/2010         VASCULAR SURGERY PROCEDURE UNLIST      katelyn. leg bypasses     Social History   Substance Use Topics    Smoking status: Former Smoker     Years: 1.00     Quit date: 4/12/2003    Smokeless tobacco: Never Used      Comment: quit 5 years ago    Alcohol use No      Comment: Stopped drinking 10 years ago      Family History   Problem Relation Age of Onset    Diabetes Mother       No Known Allergies   Prior to Admission medications    Medication Sig Start Date End Date Taking?  Authorizing Provider   VIRT-CAPS 1 mg capsule TK ONE C PO  DAILY 12/26/17   Historical Provider   carvedilol (COREG) 25 mg tablet TAKE 2 TABLETS PO BID 12/25/17   Historical Provider   CHOLESTYRAMINE LIGHT 4 gram packet  12/1/17   Historical Provider   cloNIDine HCl (CATAPRES) 0.1 mg tablet TK 1 T PO  TID 12/23/17   Historical Provider   escitalopram oxalate (LEXAPRO) 10 mg tablet TK 1 T PO QD 12/26/17 Historical Provider   SENSIPAR 60 mg tab Take 60 mg by mouth daily. 3/24/17   Historical Provider   LANTUS SOLOSTAR 100 unit/mL (3 mL) pen 18 Units by SubCUTAneous route daily (with dinner). 2/10/17   Historical Provider   HUMALOG KWIKPEN 100 unit/mL kwikpen  2/27/17   Historical Provider   sevelamer carbonate (RENVELA) 800 mg tab tab Take 800 mg by mouth three (3) times daily. Historical Provider   aspirin 81 mg chewable tablet Take 1 tablet by mouth daily. 1/5/15   Julisa Monet MD   amLODIPine (NORVASC) 10 mg tablet Take 10 mg by mouth daily. Historical Provider   lisinopril (PRINIVIL, ZESTRIL) 10 mg tablet Take 40 mg by mouth two (2) times a day. Historical Provider   pravastatin (PRAVACHOL) 10 mg tablet Take 10 mg by mouth nightly.     Historical Provider       REVIEW OF SYSTEMS:     Total of 12 systems reviewed as follows:        POSITIVE= underlined text  Negative = text not underlined  General:  fever, chills, sweats, generalized weakness, weight loss/gain,      loss of appetite   Eyes:    blurred vision, eye pain, loss of vision, double vision  ENT:    rhinorrhea, pharyngitis   Respiratory:   cough, sputum production, SOB, wheezing, BANKS, pleuritic pain   Cardiology:   chest pain, palpitations, orthopnea, PND, edema, syncope   Gastrointestinal:  abdominal pain , N/V, dysphagia, diarrhea, constipation, bleeding   Genitourinary:  frequency, urgency, dysuria, hematuria, incontinence   Muskuloskeletal :  arthralgia, myalgia   Hematology:  easy bruising, nose or gum bleeding, lymphadenopathy   Dermatological: rash, ulceration, pruritis   Endocrine:   hot flashes or polydipsia   Neurological:  headache, dizziness, confusion, focal weakness, paresthesia,     Speech difficulties, memory loss, gait disturbance  Psychological: Feelings of anxiety, depression, agitation    Objective:   VITALS:    Visit Vitals    /53    Pulse 68    Temp 98.7 °F (37.1 °C)    Resp 16    Ht 5' 10\" (1.778 m)    Wt 77.1 kg (170 lb)    SpO2 97%    BMI 24.39 kg/m2     Temp (24hrs), Av.4 °F (37.4 °C), Min:98.3 °F (36.8 °C), Max:100.8 °F (38.2 °C)      PHYSICAL EXAM:   General:    Alert, cooperative, no distress, appears stated age. HEENT: Atraumatic, anicteric sclerae, pink conjunctivae     No oral ulcers, mucosa moist, throat clear  Neck:  Supple, symmetrical,  thyroid: non tender  Lungs:   Clear to auscultation bilaterally. No Wheezing or Rhonchi. No rales. Chest wall:  No tenderness  No Accessory muscle use. Heart:   Regular  rhythm,  No edema  Abdomen:   Soft, mild RUQ tenderness . Not distended. Bowel sounds normal  Extremities: No cyanosis. No clubbing   Skin:     Not pale. Not Jaundiced  No rashes   (+) RLE syme type amputation   Psych:  Good insight. Not depressed. Not anxious or agitated. Neurologic: EOMs intact. No facial asymmetry. No aphasia or slurred speech. Weak but equal strength, Alert and oriented X 4.     _______________________________________________________________________  Care Plan discussed with:    Comments   Patient x    Family      RN     Care Manager                    Consultant:  burak Sellers NP   ____________________________________________________________________  TOTAL TIME: 45   mins    Comments    x Reviewed previous records   >50% of visit spent in counseling and coordination of care  Discussion with patient and/or family and questions answered       Critical Care Provided     Minutes non procedure based  ________________________________________________________________________  Signed: Keri Burnett, MD      Procedures: see electronic medical records for all procedures/Xrays and details which were not copied into this note but were reviewed prior to creation of Plan.     LAB DATA REVIEWED:    Recent Results (from the past 24 hour(s))   METABOLIC PANEL, COMPREHENSIVE    Collection Time: 18 10:51 AM   Result Value Ref Range    Sodium 137 136 - 145 mmol/L    Potassium 4.6 3.5 - 5.1 mmol/L    Chloride 100 97 - 108 mmol/L    CO2 28 21 - 32 mmol/L    Anion gap 9 5 - 15 mmol/L    Glucose 85 65 - 100 mg/dL    BUN 28 (H) 6 - 20 MG/DL    Creatinine 6.80 (H) 0.70 - 1.30 MG/DL    BUN/Creatinine ratio 4 (L) 12 - 20      GFR est AA 10 (L) >60 ml/min/1.73m2    GFR est non-AA 8 (L) >60 ml/min/1.73m2    Calcium 9.6 8.5 - 10.1 MG/DL    Bilirubin, total 0.4 0.2 - 1.0 MG/DL    ALT (SGPT) 46 12 - 78 U/L    AST (SGOT) 33 15 - 37 U/L    Alk. phosphatase 267 (H) 45 - 117 U/L    Protein, total 7.8 6.4 - 8.2 g/dL    Albumin 2.6 (L) 3.5 - 5.0 g/dL    Globulin 5.2 (H) 2.0 - 4.0 g/dL    A-G Ratio 0.5 (L) 1.1 - 2.2     CK W/ CKMB & INDEX    Collection Time: 02/01/18 10:51 AM   Result Value Ref Range    CK 88 39 - 308 U/L    CK - MB 2.8 <3.6 NG/ML    CK-MB Index 3.2 (H) 0 - 2.5     CBC WITH AUTOMATED DIFF    Collection Time: 02/01/18 10:51 AM   Result Value Ref Range    WBC 13.7 (H) 4.1 - 11.1 K/uL    RBC 3.69 (L) 4.10 - 5.70 M/uL    HGB 9.9 (L) 12.1 - 17.0 g/dL    HCT 31.5 (L) 36.6 - 50.3 %    MCV 85.4 80.0 - 99.0 FL    MCH 26.8 26.0 - 34.0 PG    MCHC 31.4 30.0 - 36.5 g/dL    RDW 16.8 (H) 11.5 - 14.5 %    PLATELET 350 (L) 137 - 400 K/uL    MPV 10.4 8.9 - 12.9 FL    NRBC 0.0 0  WBC    ABSOLUTE NRBC 0.00 0.00 - 0.01 K/uL    NEUTROPHILS 82 (H) 32 - 75 %    LYMPHOCYTES 9 (L) 12 - 49 %    MONOCYTES 7 5 - 13 %    EOSINOPHILS 1 0 - 7 %    BASOPHILS 0 0 - 1 %    IMMATURE GRANULOCYTES 1 (H) 0.0 - 0.5 %    ABS. NEUTROPHILS 11.3 (H) 1.8 - 8.0 K/UL    ABS. LYMPHOCYTES 1.2 0.8 - 3.5 K/UL    ABS. MONOCYTES 0.9 0.0 - 1.0 K/UL    ABS. EOSINOPHILS 0.2 0.0 - 0.4 K/UL    ABS. BASOPHILS 0.0 0.0 - 0.1 K/UL    ABS. IMM.  GRANS. 0.1 (H) 0.00 - 0.04 K/UL    DF AUTOMATED     TROPONIN I    Collection Time: 02/01/18 10:51 AM   Result Value Ref Range    Troponin-I, Qt. 0.58 (H) <0.05 ng/mL   EKG, 12 LEAD, INITIAL    Collection Time: 02/01/18  1:47 PM   Result Value Ref Range    Ventricular Rate 89 BPM    Atrial Rate 89 BPM    P-R Interval 280 ms    QRS Duration 86 ms    Q-T Interval 374 ms    QTC Calculation (Bezet) 455 ms    Calculated P Axis 71 degrees    Calculated R Axis 71 degrees    Calculated T Axis 151 degrees    Diagnosis       Sinus rhythm with 1st degree AV block  Left ventricular hypertrophy with repolarization abnormality  When compared with ECG of 21-MAR-2016 12:32,  ST now depressed in Lateral leads  Confirmed by Vasyl Briceño ([id number]) on 2/1/2018 6:34:56 PM     CK W/ CKMB & INDEX    Collection Time: 02/01/18  1:50 PM   Result Value Ref Range    CK 90 39 - 308 U/L    CK - MB 3.7 (H) <3.6 NG/ML    CK-MB Index 4.1 (H) 0 - 2.5     TROPONIN I    Collection Time: 02/01/18  1:50 PM   Result Value Ref Range    Troponin-I, Qt. 1.72 (H) <0.05 ng/mL   GLUCOSE, POC    Collection Time: 02/01/18  4:33 PM   Result Value Ref Range    Glucose (POC) 117 (H) 65 - 100 mg/dL    Performed by ANTOINE LARA    GLUCOSE, POC    Collection Time: 02/01/18  5:14 PM   Result Value Ref Range    Glucose (POC) 109 (H) 65 - 100 mg/dL    Performed by Fred Kendrick (BSV)*    INFLUENZA A & B AG (RAPID TEST)    Collection Time: 02/01/18  6:25 PM   Result Value Ref Range    Influenza A Antigen NEGATIVE  NEG      Influenza B Antigen NEGATIVE  NEG     GLUCOSE, POC    Collection Time: 02/01/18 10:14 PM   Result Value Ref Range    Glucose (POC) 100 65 - 100 mg/dL    Performed by Theron Lion, BASIC    Collection Time: 02/02/18  5:32 AM   Result Value Ref Range    Sodium 135 (L) 136 - 145 mmol/L    Potassium 4.5 3.5 - 5.1 mmol/L    Chloride 99 97 - 108 mmol/L    CO2 30 21 - 32 mmol/L    Anion gap 6 5 - 15 mmol/L    Glucose 57 (L) 65 - 100 mg/dL    BUN 34 (H) 6 - 20 MG/DL    Creatinine 8.16 (H) 0.70 - 1.30 MG/DL    BUN/Creatinine ratio 4 (L) 12 - 20      GFR est AA 8 (L) >60 ml/min/1.73m2    GFR est non-AA 7 (L) >60 ml/min/1.73m2    Calcium 9.6 8.5 - 10.1 MG/DL   LIPID PANEL    Collection Time: 02/02/18  5:32 AM   Result Value Ref Range    LIPID PROFILE Cholesterol, total 112 <200 MG/DL    Triglyceride 130 <150 MG/DL    HDL Cholesterol 47 MG/DL    LDL, calculated 39 0 - 100 MG/DL    VLDL, calculated 26 MG/DL    CHOL/HDL Ratio 2.4 0 - 5.0     EKG, 12 LEAD, INITIAL    Collection Time: 02/02/18  6:32 AM   Result Value Ref Range    Ventricular Rate 64 BPM    Atrial Rate 64 BPM    P-R Interval 230 ms    QRS Duration 96 ms    Q-T Interval 418 ms    QTC Calculation (Bezet) 431 ms    Calculated P Axis 72 degrees    Calculated R Axis 66 degrees    Calculated T Axis 131 degrees    Diagnosis       Sinus rhythm with 1st degree AV block  Possible Left atrial enlargement  Left ventricular hypertrophy  Nonspecific T wave abnormality  Confirmed by Maren Jackman ([id number]) on 2/2/2018 8:29:03 AM         _____________________________  Hospitalist: Cirilo Hyde MD

## 2018-02-02 NOTE — CARDIO/PULMONARY
Cardiopulmonary Rehab Nursing Entry:    Chart reviewed and pt visited. Pt 76 yo admitted with NSTEMI, PMHx of PCI/CRISTI, ESRD, does HDx3/week, MS, HTN, right foot amputation, non-smoker. EF 50% per cath report. Cardiac Rehab: CAD education folder to bedside of Alexandru Sands. Educated using teach back method. Reviewed purpose of cath and potential CAD diagnosis for understanding. Discussed risk factors for CAD to include: family history,  hypercholesterolemia, hypertension, diabetes, stress, and smoking. Smoking Cessation Program information added to AVS. Encouraged consideration of lifestyle changes, regardless of cath outcome. Offered Cardiac Rehab Program for support in making lifestyle changes, if indicated. Alexandru Sands verbalized understanding with questions answered. Pt reports that after his most recent exacerbation he has relapsed and is non-ambulatory without assistive devices and is receiving HH PT x2 days/week in addition to receiving HD x3days/week, therefore pt is not a good candidate for the out-patient program.     Pt verbalized understanding.

## 2018-02-02 NOTE — PROGRESS NOTES
Occupational Therapy Goals  Initiated 2/2/2018  1. Patient will perform grooming standing at sink with supervision/set-up within 7 day(s). 2.  Patient will perform upper body dressing with supervision/set-up within 7 day(s). 3.  Patient will perform lower body dressing with supervision/set-up within 7 day(s). 4.  Patient will perform toilet transfers with supervision/set-up within 7 day(s). 5.  Patient will perform all aspects of toileting with supervision/set-up within 7 day(s). 6.  Patient will perform sponge bathing  with supervision/set-up within 7 day(s). Occupational Therapy EVALUATION  Patient: Umair Wade (74 y.o. male)  Date: 2/2/2018  Primary Diagnosis: NSTEMI (non-ST elevated myocardial infarction) [facility])  NSTEMI (non-ST elevated myocardial infarction) [facility])        Precautions: falls       ASSESSMENT :  Based on the objective data described below, the patient presents with post concussion symptoms of a HA, dizziness, nausea, and blurred vision, after having sustained a fall 2 days ago. He also presents with GW, decreased activity tolerance, decreased safety awareness, an il fitting RLE prosthesis s/p symes amputation,  and decreased balance which is impairing his functional independence. He reports being able to ambulate with a RW and wearing his RLE prosthesis, and is able to perform all his ADLs using bathroom DME. He further reported that he was receiving HHOT and PT services prior to admit. Patient is now functioning below his mod I baseline, now performing ADLs at an independent to mod A level, and is supervision to min A for functional mobility. Patient will benefit from skilled intervention to address the above impairments, and he will need inpatient rehab after discharge.      Patients rehabilitation potential is considered to be Good  Factors which may influence rehabilitation potential include:   []             None noted  []             Mental ability/status  [x] Medical condition  [x]             Home/family situation and support systems  []             Safety awareness  []             Pain tolerance/management  []             Other:      PLAN :  Recommendations and Planned Interventions:  [x]               Self Care Training                  []        Therapeutic Activities  [x]               Functional Mobility Training    []        Cognitive Retraining  []               Therapeutic Exercises           [x]        Endurance Activities  [x]               Balance Training                   []        Neuromuscular Re-Education  []               Visual/Perceptual Training     [x]   Home Safety Training  [x]               Patient Education                 [x]        Family Training/Education  []               Other (comment):    Frequency/Duration: Patient will be followed by occupational therapy 4 times a week to address goals.   Discharge Recommendations: Inpatient Rehab  Further Equipment Recommendations for Discharge: TBD       OBJECTIVE DATA SUMMARY:     Past Medical History:   Diagnosis Date    Anemia associated with chronic renal failure     Autoimmune disease ([facility]     hx ms    CAD (coronary artery disease)     Chronic kidney disease     catheter removed and no dialysis at this time    Chronic kidney disease     left arm fistula dialysis MWF    Diabetes ([facility]     type II    GERD (gastroesophageal reflux disease)     Hypertension     Multiple sclerosis ([facility]     diagnosed '01    Other ill-defined conditions(799.89)     anemia    Other ill-defined conditions(799.89)     elevated cholesterol    Other ill-defined conditions(799.89)     hx septic right foot    Peripheral vascular disease ([address]     Secondary hyperparathyroidism of renal origin ([address]     Thyroid disease      Past Surgical History:   Procedure Laterality Date    COLONOSCOPY N/A 12/6/2016    COL[facility] 12/6/2016         CORONARY STENT SINGLE W/PTCA  2/17/2011         HX APPENDECTOMY      HX CATARACT REMOVAL  10/18/10    left eye    HX CHOLECYSTECTOMY      HX GI      ileostomy due to infection    HX HEART CATHETERIZATION      HX HEENT      hx post photocoagulation eye 2009    HX ORTHOPAEDIC      HX OTHER SURGICAL      right partial foot amputation    HX OTHER SURGICAL      cardiac cath    [place] COLONOSCOPY W/BIOPSY SINGLE/MULTIPLE  5/10/2010         VASCULAR SURGERY PROCEDURE UNLIST      katelyn. leg bypasses     Prior Level of Function/Home Situation: Patient reports he had recently been inpatient rehab after a long hospital stay at [facility] for kidney issues. Upon completion of Inpatient rehab patient returned home and has been receiving [address] [place] [address] and PT services. Patient describes his baseline prior to his hospitalization at [facility] as being grossly mod I for ADLs and functional mobility, using bathroom DME and ambulating with RW and wearing RLE prosthesis. Prosthesis fitting loosely due to 40 pound weight loss during his previous hospital stay. Expanded or extensive additional review of patient history:     Home Situation  Home Environment: Private residence  One/Two Story Residence: One story  Living Alone: No  Support Systems: Spouse/Significant Other/Partner  Patient Expects to be Discharged to[de-identified] Private residence  Current DME Used/Available at Home: Commode, bedside, Grab bars, Shower chair, Walker, rolling, Wheelchair, Wheelchair, power  Tub or Shower Type: Tub/Shower combination  [x]  Right hand dominant   []  Left hand dominant  Cognitive/Behavioral Status:  Neurologic State: Alert  Orientation Level: Oriented X4  Cognition: Appropriate for age attention/concentration; Follows commands        Safety/Judgement: Decreased awareness of need for safety;Decreased insight into deficits    Vision/Perceptual:    Acuity: Within Defined Limits       Range of Motion:  AROM: Generally decreased, functional     Strength:  Strength: Generally decreased, functional  Coordination:  Coordination: Generally decreased, functional          Tone & Sensation:  Tone: Normal  Balance:  Sitting: Intact  Standing: Impaired  Standing - Static: Good  Standing - Dynamic : Fair  Functional Mobility and Transfers for ADLs:  Bed Mobility:        Sit to Supine: Supervision  Scooting: Supervision  Transfers:  Sit to Stand: Minimum assistance                               ADL Assessment:  Feeding: Independent    Oral Facial Hygiene/Grooming: Contact guard assistance    Bathing: Moderate assistance    Upper Body Dressing: Minimum assistance    Lower Body Dressing: Moderate assistance    Toileting: Moderate assistance                Functional Measure:  Barthel Index:    Bathin  Bladder: 10  Bowels: 10  Groomin  Dressin  Feeding: 10  Mobility: 0  Stairs: 0  Toilet Use: 0  Transfer (Bed to Chair and Back): 10  Total: 40       Barthel and G-code impairment scale:  Percentage of impairment CH  0% CI  1-19% CJ  20-39% CK  40-59% CL  60-79% CM  80-99% CN  100%   Barthel Score 0-100 100 99-80 79-60 59-40 20-39 1-19   0   Barthel Score 0-20 20 17-19 13-16 9-12 5-8 1-4 0      The Barthel ADL Index: Guidelines  1. The index should be used as a record of what a patient does, not as a record of what a patient could do. 2. The main aim is to establish degree of independence from any help, physical or verbal, however minor and for whatever reason. 3. The need for supervision renders the patient not independent. 4. A patient's performance should be established using the best available evidence. Asking the patient, friends/relatives and nurses are the usual sources, but direct observation and common sense are also important. However direct testing is not needed. 5. Usually the patient's performance over the preceding 24-48 hours is important, but occasionally longer periods will be relevant.   6. Middle categories imply that the patient supplies over 50 per cent of the effort. 7. Use of aids to be independent is allowed. Nathaly Cadet., Barthel, DTARAH. (2859). Functional evaluation: the Barthel Index. [facility] (14)2. RYLAN Boyce, Raquel Ovalle., Shruthi Jeansusanna.[facility], [facility] (1999). Measuring the change indisability after inpatient rehabilitation; comparison of the responsiveness of the Barthel Index and Functional Perkins Measure. Journal of Neurology, Neurosurgery, and Psychiatry, 66(4), 895-406. DEWAYNE Hatfield, JARRELL Lord, & Leonardo Lassiter M.A. (2004.) Assessment of post-stroke quality of life in cost-effectiveness studies: The usefulness of the Barthel Index and the EuroQoL-5D. Quality of Life Research, 13, 427-43       In compliance with CMSs Claims Based Outcome Reporting, the following G-code set was chosen for this patient based on their primary functional limitation being treated: The outcome measure chosen to determine the severity of the functional limitation was the Barthel Index with a score of 40/100 which was correlated with the impairment scale. ? Self Care:     - CURRENT STATUS: CK - 40%-59% impaired, limited or restricted    - GOAL STATUS:  CJ - 20%-39% impaired, limited or restricted    - D/C STATUS:  ---------------To be determined---------------         ADL Intervention and task modifications:  Initiated LB dressing, RLE prosthesis management, bed mobility and transfer retraining. Pain:  Pain Scale 1: Visual  Pain Intensity 1: 0              Activity Tolerance:   VSS, but patient with c/o head ache and dizziness.  Nursing is aware    After treatment:   [] Patient left in no apparent distress sitting up in chair  [x] Patient left in no apparent distress in bed  [x] Call bell left within reach  [x] Nursing notified  [] Caregiver present  [] Bed alarm activated    COMMUNICATION/EDUCATION:   The patients plan of care was discussed with: Physical Therapist, Registered Nurse and Cardiology NP. [x] Home safety education was provided and the patient/caregiver indicated understanding. [x] Patient/family have participated as able in goal setting and plan of care. [x] Patient/family agree to work toward stated goals and plan of care. [] Patient understands intent and goals of therapy, but is neutral about his/her participation. [] Patient is unable to participate in goal setting and plan of care. This patients plan of care is appropriate for delegation to [facility].     Thank you for this referral.  Adolph Corral, OTR/L  Time Calculation: 25 mins

## 2018-02-02 NOTE — PROGRESS NOTES
Bedside report received from  Ray Leonard RN                Assessment, Background, Procedure summary, Intake/Output, MAR, and recent results discussed. Care assumed. 2325 Verbal report given to oncoming nurse Julianna Vergara RN    Report consisted of patients Situation, Background, Assessment, and   Recommendations    Information was reviewed with the receiving nurse. Opportunity for questions and clarification was provided.       Julia Grimes RN

## 2018-02-02 NOTE — CONSULTS
Consultation Note    NAME: Rosita Briones   :  1950   MRN:  [id number]     Date/Time:  2018 11:03 AM    I have been asked to see this patient by Dr. Argelia Cueto  for advice/opinion re: esrd. Assessment :    Plan:  ESRD  NSTEMI  N/V  GLF  HTN  SHPT  Anemia  DM MWF HD at [facility]; HD today (orders placed and Jocelyne Lund is aware)    Gets Aranesp 10 mcg iv on ; hgb 9.9    No calcitriol; on Sensipar 60 and renvela    Will see again on Monday, but please call if questions or changes in the meantime. Subjective:   CHIEF COMPLAINT:  esrd    HISTORY OF PRESENT ILLNESS:     Laurel Pitts is a 75 y.o.   male who has a history of esrd. Mr. Ramonita Romero fell yesterday (his prosthesis twisted). Hit his head. He has had poor appetite. Not feeling well. He is s/p LHC. No leo. No sob. The patient was seen on dialysis at 3:17 PM .  BP is stable. Access is working well.    Past Medical History:   Diagnosis Date    Anemia associated with chronic renal failure     Autoimmune disease (HCC)     hx ms    CAD (coronary artery disease)     Chronic kidney disease     catheter removed and no dialysis at this time    Chronic kidney disease     left arm fistula dialysis MWF    Diabetes ([address]     type II    GERD (gastroesophageal reflux disease)     Hypertension     Multiple sclerosis ([address]     diagnosed '01    Other ill-defined conditions(799.89)     anemia    Other ill-defined conditions(799.89)     elevated cholesterol    Other ill-defined conditions(799.89)     hx septic right foot    Peripheral vascular disease ([address]     Secondary hyperparathyroidism of renal origin ([address]     Thyroid disease       Past Surgical History:   Procedure Laterality Date    COLONOSCOPY N/A 2016    COLONOSCOPY performed by Kylee Valero MD at [facility] ENDOSCOPY    COLONOSCOPY,DIAGNOSTIC  2016         CORONARY STENT SINGLE W/PTCA  2011         HX APPENDECTOMY      HX CATARACT REMOVAL  10/18/10 left eye    HX CHOLECYSTECTOMY      HX GI      ileostomy due to infection    HX HEART CATHETERIZATION      HX HEENT      hx post photocoagulation eye 2009    HX ORTHOPAEDIC      HX OTHER SURGICAL      right partial foot amputation    HX OTHER SURGICAL      cardiac cath    [place] COLONOSCOPY W/BIOPSY SINGLE/MULTIPLE  5/10/2010         VASCULAR SURGERY PROCEDURE UNLIST      katelyn. leg bypasses     Social History   Substance Use Topics    Smoking status: Former Smoker     Years: 1.00     Quit date: 4/12/2003    Smokeless tobacco: Never Used      Comment: quit 5 years ago    Alcohol use No      Comment: Stopped drinking 10 years ago      Family History   Problem Relation Age of Onset    Diabetes Mother       No Known Allergies   Prior to Admission medications    Medication Sig Start Date End Date Taking? Authorizing Provider   VIRT-CAPS 1 mg capsule TK ONE C PO  DAILY 12/26/17   Historical Provider   carvedilol (COREG) 25 mg tablet TAKE 2 TABLETS PO BID 12/25/17   Historical Provider   CHOLESTYRAMINE LIGHT 4 gram packet  12/1/17   Historical Provider   cloNIDine HCl (CATAPRES) 0.1 mg tablet TK 1 T PO  TID 12/23/17   Historical Provider   escitalopram oxalate (LEXAPRO) 10 mg tablet TK 1 T PO QD 12/26/17   Historical Provider   SENSIPAR 60 mg tab Take 60 mg by mouth daily. 3/24/17   Historical Provider   LANTUS SOLOSTAR 100 unit/mL (3 mL) pen 18 Units by SubCUTAneous route daily (with dinner). 2/10/17   Historical Provider   HUMALOG KWIKPEN 100 unit/mL kwikpen  2/27/17   Historical Provider   sevelamer carbonate (RENVELA) 800 mg tab tab Take 800 mg by mouth three (3) times daily. Historical Provider   aspirin 81 mg chewable tablet Take 1 tablet by mouth daily. 1/5/15   Zach Solorzano MD   amLODIPine (NORVASC) 10 mg tablet Take 10 mg by mouth daily. Historical Provider   lisinopril (PRINIVIL, ZESTRIL) 10 mg tablet Take 40 mg by mouth two (2) times a day.     Historical Provider   pravastatin (PRAVACHOL) 10 mg tablet Take 10 mg by mouth nightly.     Historical Provider     REVIEW OF SYSTEMS:     []  Unable to obtain reliable ROS due to  [] mental status  [] sedated   [] intubated   [x] Total of 12 systems reviewed as follows:  Constitutional: negative fever, negative chills, negative weight loss  Eyes:   negative visual changes  ENT:   negative sore throat, tongue or lip swelling  Respiratory:  negative cough, negative dyspnea  Cards:  negative for chest pain, palpitations, lower extremity edema  GI:   negative for nausea, vomiting, diarrhea, and abdominal pain  :  negative for frequency, dysuria  Integument:  negative for rash and pruritus  Heme:  negative for easy bruising and gum/nose bleeding  Musculoskel: negative for myalgias,  back pain and muscle weakness  Neuro:  negative for headaches, dizziness, vertigo  Psych:  negative for feelings of anxiety, depression   Travel?: none    Objective:   VITALS:    Visit Vitals    /53    Pulse 68    Temp 98.7 °F (37.1 °C)    Resp 16    Ht 5' 10\" (1.778 m)    Wt 77.1 kg (170 lb)    SpO2 97%    BMI 24.39 kg/m2     PHYSICAL EXAM:  Gen:  [x]  WD [x]  WN  [] cachectic []  thin []  obese []  disheveled             []  ill apearing  []   Critical  []   Chronic    [x]  No acute distress    HEENT:   [x] NC/AT/PERRLA/EOMI    [] pink conjunctivae      [] pale conjunctivae                  PERRL  [] yes  [] no      [] moist mucosa    [] dry mucosa    hearing intact to voice [x] yes  [] No                 NECK:   supple [] yes  [x] no        masses [] yes  [] No               thyroid  []  non tender  []  tender    RESP:   [x] CTA bilaterally/no wheezing/rhonchi/rales/crackles    [] rhonchi bilaterally - no dullness  [] wheezing   [] rhonchi   [] crackles     use of accessory muscles [] yes [x] no    CARD:   [x]  regular rate and rhythm/No murmurs/rubs/gallops    murmur  [] yes ()  [] no      Rubs  [] yes  [] no       Gallops [] yes  [] no    Rate []  regular  []  irregular carotid bruits  [] Right  []  Left                 LE edema [] yes  [x] no           JVP  []  yes   []  No    AV access LUE - positive thrill and bruit, no sign of inflammation    ABD:    [x] soft/non distended/non tender/+bowel sounds/no HSM    []  Rigid    tenderness [] yes [] no   Liver enlargement  []  yes []  no                Spleen enlargement  []  yes []  no     distended []  yes [] no     bowel sound  [] hypoactive   [] hyperactive    LYMPH:    Neck []  yes [x]  no       Axillae []  yes []  no    SKIN:   Rashes []  yes   [x]  no    Ulcers []  yes   []  no               [] tight to palpitation    skin turgor []  good  [] poor  [] decreased               Cyanosis/clubbing []  yes []  no    NEUR:   [x] cranial nerves II-XII grossly intact       [] Cranial nerves deficit                 []  facial droop    []  slurred speech   [] aphasic     [] Strength normal     []  weakness  []  LUE  []   RUE/ []  LLE  []   RLE    follows commands  [x]  yes []  no           PSYCH:   insight [] poor [x] good   Alert and Oriented to  [x] person  [x] place  [x]  time                    [] depressed [] anxious [] agitated  [] lethargic [] stuporous  [] sedated     LAB DATA REVIEWED:    Recent Labs      02/01/18   1051   WBC  13.7*   HGB  9.9*   HCT  31.5*   PLT  148*     Recent Labs      02/02/18   0532  02/01/18   1051   NA  135*  137   K  4.5  4.6   CL  99  100   CO2  30  28   BUN  34*  28*   CREA  8.16*  6.80*   GLU  57*  85   CA  9.6  9.6     Recent Labs      02/01/18   1051   SGOT  33   ALT  46   AP  267*   TBILI  0.4   ALB  2.6*   GLOB  5.2*     No results for input(s): INR, PTP, APTT in the last 72 hours. No lab exists for component: INREXT   No results for input(s): FE, TIBC, PSAT, FERR in the last 72 hours. No results for input(s): PH, PCO2, PO2 in the last 72 hours.   Recent Labs      02/01/18   1350  02/01/18   1051   CPK  90  88   CKMB  3.7*  2.8     Lab Results   Component Value Date/Time    Glucose (POC) 100 02/01/2018 10:14 PM    Glucose (POC) 109 02/01/2018 05:14 PM    Glucose (POC) 117 02/01/2018 04:33 PM    Glucose (POC) 220 12/06/2016 03:36 PM    Glucose (POC) 348 12/06/2016 02:48 PM    Glucose (POC) 107 03/21/2016 09:11 PM       Procedures: see electronic medical records for all procedures/Xrays and details which were not copied into this note but were reviewed prior to creation of Plan.    ________________________________________________________________________       ___________________________________________________  Consulting Physician:  Jose R Garcia MD

## 2018-02-02 NOTE — PROGRESS NOTES
[address] [address]  [phone]      Cardiology Progress Note      2/2/2018 9:00AM    Admit Date: 2/1/2018    Admit Diagnosis:   NSTEMI (non-ST elevated myocardial infarction) [facility])  NSTEMI (non-ST elevated myocardial infarction) ([facility]    Subjective:     Trish Meigs had negative cardiac cath yesterday. Overnight developed fever, aches, diaphoretic. Influenza A-B negative, but states his wife has the flu. Continues with slight fever, and feels \"bad\".   Had planned to discharge today, but due to fever and possible flu, consulting hospitalist    Visit Vitals    /53    Pulse 68    Temp 98.7 °F (37.1 °C)    Resp 16    Ht 5' 10\" (1.778 m)    Wt 77.1 kg (170 lb)    SpO2 97%    BMI 24.39 kg/m2       Current Facility-Administered Medications   Medication Dose Route Frequency    oseltamivir (TAMIFLU) capsule 75 mg  75 mg Oral BID    famotidine (PEPCID) tablet 20 mg  20 mg Oral BID    heparin (porcine) injection 5,000 Units  5,000 Units SubCUTAneous Q12H    amLODIPine (NORVASC) tablet 10 mg  10 mg Oral DAILY    aspirin chewable tablet 81 mg  81 mg Oral DAILY    carvedilol (COREG) tablet 25 mg  25 mg Oral BID WITH MEALS    cloNIDine HCl (CATAPRES) tablet 0.1 mg  0.1 mg Oral TID    escitalopram oxalate (LEXAPRO) tablet 10 mg  10 mg Oral DAILY    lisinopril (PRINIVIL, ZESTRIL) tablet 40 mg  40 mg Oral BID    pravastatin (PRAVACHOL) tablet 10 mg  10 mg Oral QHS    cinacalcet (SENSIPAR) tablet 60 mg  60 mg Oral DAILY    sevelamer carbonate (RENVELA) tab 800 mg  800 mg Oral TID    sodium chloride (NS) flush 5-10 mL  5-10 mL IntraVENous Q8H    sodium chloride (NS) flush 5-10 mL  5-10 mL IntraVENous PRN    acetaminophen (TYLENOL) tablet 650 mg  650 mg Oral Q4H PRN    naloxone (NARCAN) injection 0.4 mg  0.4 mg IntraVENous PRN    ondansetron (ZOFRAN) injection 4 mg  4 mg IntraVENous Q6H PRN    insulin glargine (LANTUS) injection 18 Units  18 Units SubCUTAneous QHS       Objective:      Physical Exam:  General Appearance:  AAM in no acute distress  Chest:   Clear  Cardiovascular:  Regular rate and rhythm, no murmur.   Abdomen:   Soft, non-tender, bowel sounds are active.   Extremities: right BKA  Skin:  Warm and dry.     Data Review:   Recent Labs      02/01/18   1051   WBC  13.7*   HGB  9.9*   HCT  31.5*   PLT  148*     Recent Labs      02/02/18   0532  02/01/18   1051   NA  135*  137   K  4.5  4.6   CL  99  100   CO2  30  28   GLU  57*  85   BUN  34*  28*   CREA  8.16*  6.80*   CA  9.6  9.6   ALB   --   2.6*   TBILI   --   0.4   SGOT   --   33   ALT   --   46       Recent Labs      02/01/18   1350  02/01/18   1051   TROIQ  1.72*  0.58*   CPK  90  88   CKMB  3.7*  2.8         Intake/Output Summary (Last 24 hours) at 02/02/18 1103  Last data filed at 02/01/18 1930   Gross per 24 hour   Intake              130 ml   Output                0 ml   Net              130 ml        Telemetry: SR    Assessment:     Active Problems:    Hypertension (4/12/2010)      Mixed hyperlipidemia (4/11/2013)      ESRD on hemodialysis ([facility] [address] (12/30/2014)      NSTEMI (non-ST elevated myocardial infarction) ([facility] (2/1/2018)        Plan:     NSTEMI:  Negative cardiac cath  Continue on ASA, BB, statin    HTN:  Stable, continue with BB, ACEI, Norvasc, clonidine    Possible Flu:  Hospitalist consulted, appreciate assistance.   Agree with starting on Tamiflu with hx   No discharge today, possibly tomorrow    ESRD:  Consulted Nephrology as patient is due for dialysis today        Candace Hayden ACNP  Cardiology

## 2018-02-02 NOTE — PROGRESS NOTES
Renal Dosing/Monitoring  Medication: Famotidine   Current regimen:  20 mg every 12 hr  Recent Labs      02/02/18   0532  02/01/18   1051   CREA  8.16*  6.80*   BUN  34*  28*     Estimated CrCl:  HD  Plan: Change to famotidine 20 mg every 48 hours for HD dosing per renal dosing protocol.          Thank you,  Nima Severino, PHARMD

## 2018-02-02 NOTE — DIALYSIS
[facility] Ac[facility]  [phone]    Vitals   Pre   Post   Assessment   Pre   Post     Temp  Temp: 98.1 °F (36.7 °C) (02/02/18 1445)  98.2 LOC  AXOx3 AXOx3   HR   Pulse (Heart Rate): 70 (02/02/18 1445) 78 Lungs   CTA CTA   B/P   1232/57 144/51 Cardiac   Monitored  Monitored   Resp   Resp Rate: 18 (02/02/18 1445) 18 Skin   Warm and dry  WDI   Pain level  Pain Intensity 1: 0 (02/02/18 1108) 0 Edema  Generalized   Generalized   Orders:    Duration:   Start:    4690 End:    1800 Total:   3.15 hrs   Dialyzer:   Dialyzer/Set Up Inspection: Revaclear (02/02/18 1445)   K Bath:   Dialysate K (mEq/L): 2 (02/02/18 1445)   Ca Bath:   Dialysate CA (mEq/L): 2.5 (02/02/18 1445)   Na/Bicarb:   Dialysate NA (mEq/L): 140 (02/02/18 1445)   Target Fluid Removal:   Goal/Amount of Fluid to Remove (mL): 1000 mL (02/02/18 1445)   Access     Type & Location:   (L) AVF +_ Thrill and bruit. No s/s of infection observed to site. Cannulated with 15 G needles x 2 sites and secured with paper tape. Labs     Obtained/Reviewed   Critical Results Called   Date when labs were drawn-  Hgb-    HGB   Date Value Ref Range Status   02/01/2018 9.9 (L) 12.1 - 17.0 g/dL Final     K-    Potassium   Date Value Ref Range Status   02/02/2018 4.5 3.5 - 5.1 mmol/L Final     Ca-   Calcium   Date Value Ref Range Status   02/02/2018 9.6 8.5 - 10.1 MG/DL Final     Bun-   BUN   Date Value Ref Range Status   02/02/2018 34 (H) 6 - 20 MG/DL Final     Creat-   Creatinine   Date Value Ref Range Status   02/02/2018 8.16 (H) 0.70 - 1.30 MG/DL Final        Medications/ Blood Products Given     Name   Dose   Route and Time     N/A                Blood Volume Processed (BVP):    71.6 Net Fluid   Removed:  1500 mL ( Prime and rinse)   Comments   Time Out Done: Yes  Primary Nurse Rpt Pre:  Elvira Haq RN  Primary Nurse Rpt Post: Elvira Haq RN  Pt Education: Procedural  Care Plan: Continue HD as presscribed.   Tx Summary: Tolerated treatment well for 3.15 hrs. Bp remained stable throughout treatment. All possible blood returned back without any problems. 15 G needles pulled x 2 sites. Hemostasis achieved through handheld pressure x 5 minutes per site. Left patient in bed in stable condition and reported off to primary nurse. Maria Luisa Hicks lot#- [id number]  Tubing lot#- [id number]  Admiting Diagnosis:  Pt's previous clinic-[facility]  Consent signed - Informed Consent Verified: Yes (02/02/18 1445)  Avril Consent - Yes, Obtained prior to start of treatment. Hepatitis Status- Negative-  Machine #- Machine Number: [id number] (02/02/18 1445)  Telemetry status-Monitored  Pre-dialysis wt. - Pre-Dialysis Weight: 77.1 kg (169 lb 15.6 oz) (02/02/18 1445)

## 2018-02-02 NOTE — PROGRESS NOTES
Problem: Mobility Impaired (Adult and Pediatric)  Goal: *Acute Goals and Plan of Care (Insert Text)  Physical Therapy Goals  Initiated 2/2/2018  1. Patient will move from supine to sit and sit to supine  in bed with modified independence within 7 day(s). 2.  Patient will transfer from bed to chair and chair to bed with supervision/set-up using the least restrictive device within 7 day(s). 3.  Patient will perform sit to stand with supervision/set-up within 7 day(s). 4.  Patient will ambulate with minimal assistance/contact guard assist for 40 feet with the least restrictive device within 7 day(s). physical Therapy EVALUATION  Patient: Jose Urbano (38 y.o. male)  Date: 2/2/2018  Primary Diagnosis: NSTEMI (non-ST elevated myocardial infarction) [facility])  NSTEMI (non-ST elevated myocardial infarction) ([facility] [address]  NSTEMI (non-ST elevated myocardial infarction) ([facility] [address]  Influenza        Precautions: fall       ASSESSMENT :  Based on the objective data described below, the patient presents with generalized weakness, impaired balance, decreased activity tolerance, all contributing to decreased functional mobility skills. Patient complaining of dizziness, occasional blurred vision throughout treatment. BP stable. Patient sitting EOB upon arrival.  Attempted to shakira prosthesis but strap twisted. Attempted untwist but strap came off pin. OT assisted with prosthesis. Sit to stand with min assist x 1. Patient unsteady upon standing and complains of weakness, dizziness and requested to return to bed. Assisted back to bed with min assist.  Difficult to determine exactly what patient was able to do prior to admission. Patient was receiving HHPT and was ambulating with walker. Patient lives with wife who is also sick. Patient will likely need to return to inpatient rehab as patient currently unsafe to go home and patient has limited support.     Patient will benefit from skilled intervention to address the above impairments. Patients rehabilitation potential is considered to be Good  Factors which may influence rehabilitation potential include:   []         None noted  []         Mental ability/status  [x]         Medical condition  [x]         Home/family situation and support systems  []         Safety awareness  []         Pain tolerance/management  []         Other:      PLAN :  Recommendations and Planned Interventions:  [x]           Bed Mobility Training             []    Neuromuscular Re-Education  [x]           Transfer Training                   []    Orthotic/Prosthetic Training  [x]           Gait Training                         []    Modalities  [x]           Therapeutic Exercises           []    Edema Management/Control  [x]           Therapeutic Activities            []    Patient and Family Training/Education  []           Other (comment):    Frequency/Duration: Patient will be followed by physical therapy  4 times a week to address goals. Discharge Recommendations: Inpatient Rehab  Further Equipment Recommendations for Discharge: none     SUBJECTIVE:   Patient stated I just feel dizzy.     OBJECTIVE DATA SUMMARY:   HISTORY:    Past Medical History:   Diagnosis Date    Anemia associated with chronic renal failure     Autoimmune disease (HCC)     hx ms    CAD (coronary artery disease)     Chronic kidney disease     catheter removed and no dialysis at this time    Chronic kidney disease     left arm fistula dialysis MWF    Diabetes ([address]     type II    GERD (gastroesophageal reflux disease)     Hypertension     Multiple sclerosis ([address]     diagnosed '01    Other ill-defined conditions(799.89)     anemia    Other ill-defined conditions(799.89)     elevated cholesterol    Other ill-defined conditions(799.89)     hx septic right foot    Peripheral vascular disease ([address]     Secondary hyperparathyroidism of renal origin ([address]     Thyroid disease      Past Surgical History:   Procedure Laterality Date    COLONOSCOPY N/A 12/6/2016    COLONOSCOPY performed by Shawnee [facility]uca [facility] at [facility] ENDOSCOPY    COLONOSCOPY,DIAGNOSTIC  12/6/2016         CORONARY STENT SINGLE W/PTCA  2/17/2011         HX APPENDECTOMY      HX CATARACT REMOVAL  10/18/10    left eye    HX CHOLECYSTECTOMY      HX GI      ileostomy due to infection    HX HEART CATHETERIZATION      HX HEENT      hx post photocoagulation eye 2009    HX ORTHOPAEDIC      HX OTHER SURGICAL      right partial foot amputation    HX OTHER SURGICAL      cardiac cath    MI COLONOSCOPY W/BIOPSY SINGLE/MULTIPLE  5/10/2010         VASCULAR SURGERY PROCEDURE UNLIST      katelyn. leg bypasses     Prior Level of Function/Home Situation: Patient lives at home with wife, who is ill. Patient was receiving HHPT. Ramp into home; one story home. Personal factors and/or comorbidities impacting plan of care: Dizziness, blurred vision, fatigue    Home Situation  Home Environment: Private residence  Wheelchair Ramp: Yes  One/Two Story Residence: One story  Living Alone: No  Support Systems: Spouse/Significant Other/Partner  Patient Expects to be Discharged to[de-identified] Private residence  Current DME Used/Available at Home: Commode, bedside, Grab bars, Shower chair, Walker, rolling, Wheelchair, Wheelchair, power  Tub or Shower Type: Tub/Shower combination    EXAMINATION/PRESENTATION/DECISION MAKING:   Critical Behavior:  Neurologic State: Alert  Orientation Level: Oriented X4  Cognition: Appropriate for age attention/concentration, Follows commands  Safety/Judgement: Decreased awareness of need for safety, Decreased insight into deficits  Hearing:   Auditory  Auditory Impairment: None  Skin:  intact  Edema: none noted  Range Of Motion:  AROM: Generally decreased, functional                       Strength:    Strength: Generally decreased, functional                    Tone & Sensation:   Tone: Normal              Sensation: Intact Coordination:  Coordination: Generally decreased, functional  Vision:   Acuity: Within Defined Limits  Functional Mobility:  Bed Mobility:        Sit to Supine: Supervision  Scooting: Supervision  Transfers:  Sit to Stand: Minimum assistance  Stand to Sit: Minimum assistance                       Balance:   Sitting: Intact  Standing: Impaired  Standing - Static: Good  Standing - Dynamic : Fair  Ambulation/Gait Training:                                                                    Functional Measure:  Barthel Index:    Bathin  Bladder: 10  Bowels: 10  Groomin  Dressin  Feeding: 10  Mobility: 0  Stairs: 0  Toilet Use: 0  Transfer (Bed to Chair and Back): 10  Total: 40       Barthel and G-code impairment scale:  Percentage of impairment CH  0% CI  1-19% CJ  20-39% CK  40-59% CL  60-79% CM  80-99% CN  100%   Barthel Score 0-100 100 99-80 79-60 59-40 20-39 1-19   0   Barthel Score 0-20 20 17-19 13-16 9-12 5-8 1-4 0      The Barthel ADL Index: Guidelines  1. The index should be used as a record of what a patient does, not as a record of what a patient could do. 2. The main aim is to establish degree of independence from any help, physical or verbal, however minor and for whatever reason. 3. The need for supervision renders the patient not independent. 4. A patient's performance should be established using the best available evidence. Asking the patient, friends/relatives and nurses are the usual sources, but direct observation and common sense are also important. However direct testing is not needed. 5. Usually the patient's performance over the preceding 24-48 hours is important, but occasionally longer periods will be relevant. 6. Middle categories imply that the patient supplies over 50 per cent of the effort. 7. Use of aids to be independent is allowed. Aida Robins., Barthel, D.W. (7142). Functional evaluation: the Barthel Index. [address] [facility] (14)2.   RYLAN Saldana, Arin, CORA Davalos (1999). Measuring the change indisability after inpatient rehabilitation; comparison of the responsiveness of the Barthel Index and Functional Beltrami Measure. Journal of Neurology, Neurosurgery, and Psychiatry, 66(4), 474-296. Egyptian DEWAYNE Kumar, JARRELL Lord, & Matthew Harman M.A. (2004.) Assessment of post-stroke quality of life in cost-effectiveness studies: The usefulness of the Barthel Index and the EuroQoL-5D. Quality of Life Research, 13, 201-59         G codes: In compliance with CMSs Claims Based Outcome Reporting, the following G-code set was chosen for this patient based on their primary functional limitation being treated: The outcome measure chosen to determine the severity of the functional limitation was the Barthel Index with a score of 40/100 which was correlated with the impairment scale. ? Mobility - Walking and Moving Around:     - CURRENT STATUS: CK - 40%-59% impaired, limited or restricted    - GOAL STATUS: CJ - 20%-39% impaired, limited or restricted    - D/C STATUS:  ---------------To be determined---------------      Physical Therapy Evaluation Charge Determination   History Examination Presentation Decision-Making   MEDIUM  Complexity : 1-2 comorbidities / personal factors will impact the outcome/ POC  LOW Complexity : 1-2 Standardized tests and measures addressing body structure, function, activity limitation and / or participation in recreation  MEDIUM Complexity : Evolving with changing characteristics  LOW Complexity : FOTO score of       Based on the above components, the patient evaluation is determined to be of the following complexity level: LOW     Pain:  Pain Scale 1: Visual  Pain Intensity 1: 0              Activity Tolerance:   poor  Please refer to the flowsheet for vital signs taken during this treatment.   After treatment:   []         Patient left in no apparent distress sitting up in chair  [x] Patient left in no apparent distress in bed  [x]         Call bell left within reach  [x]         Nursing notified  []         Caregiver present  []         Bed alarm activated    COMMUNICATION/EDUCATION:   The patients plan of care was discussed with: Occupational Therapist and Registered Nurse. [x]         Fall prevention education was provided and the patient/caregiver indicated understanding. []         Patient/family have participated as able in goal setting and plan of care. [x]         Patient/family agree to work toward stated goals and plan of care. []         Patient understands intent and goals of therapy, but is neutral about his/her participation. []         Patient is unable to participate in goal setting and plan of care.     Thank you for this referral.  Ashwin Corona, PT   Time Calculation: 29 mins

## 2018-02-02 NOTE — PROGRESS NOTES
Pharmacy Automatic Renal Dosing Protocol - Antimicrobials    Indication for Antimicrobials: Possible influenza in immunocompromised patient     Current Regimen of Each Antimicrobial:  Oseltamivir 75 mg PO twice daily (Start Date ; Day # )    Previous Antimicrobial Therapy:  None    Significant Cultures:    Influenza: negative- final    Paralysis, amputations, malnutrition: None documented    Labs:  Recent Labs      18   0532  18   1051   CREA  8.16*  6.80*   BUN  34*  28*   WBC   --   13.7*     Temp (24hrs), Av °F (37.2 °C), Min:98 °F (36.7 °C), Max:100.6 °F (38.1 °C)      Creatinine Clearance (mL/min) or Dialysis: HD dosing  No results found for: PCT    Impression/Plan: Will change oseltamivir to 30 mg now then 30 mg after HD sessions for HD dosing per renal dosing protocol. Pharmacy will follow daily and adjust medications as appropriate for renal function and/or serum levels. Thank you,  Ollie Verma, PHARMD          Recommended duration of therapy  http://[facility]/[place]/[facility]/Pharmacy/Clinical%20Companion/Duration%20of%20ABX%20therapy. docx    Renal Dosing  http://[facility]/[place]/[facility]/Pharmacy/Clinical%20Companion/Renal%[email or web address]. pdf

## 2018-02-03 NOTE — PROGRESS NOTES
Hospitalist Progress Note    NAME: Rica Costa   :  1950   MRN:  [id number]       Assessment / Plan:  Viral like syndrome   -cont' to have fever Tm 101.3  -CXR clear. Influenza negative, s/p cholecystectomy. LFTs wnl.    -his wife recently had flu like symptoms and his symptoms are consistent.    -on empiric tamiflu   -will repeat CBC in the AM.  Discussed with nursing staff to obtain bcx and ucx for fever >100.4 given recent procedure. Symptomatic hypoglycemia with BG 40  -due to poor appetite with lantus 18 units given last night  -will decrease lantus to 5 units qhs as he is tolerating more po intake  -start sliding scale, close monitor glucose     Weakness, s/p fall  Hx MS  -CT head negative  -monitor of MS exacerbation  -PT OT eval and treat. May need HH PT or rehab      ESRD  -HD per renal     PVD, hx leg bypass      CAD, hx CRISTI to RCA   NSTEMI  -cath results reviewed. Minor luminal irregularities seen  -continue cardiac meds    Pt was transferred to medicine service as per request by Dr Toñito Pacheco on 18     Code Status:  Full  DVT Prophylaxis:  Will start Heparin SQ     Subjective:     Chief Complaint / Reason for Physician Visit  Pt seen and examined at bedside. Remains weak, but eating more this morning. He was hypoglycemic early this morning with BG 40. Pt reportedly was lethargic per nursing staffs. He was given dextrose with improvement of BG. Discussed with RN events overnight. Review of Systems:  Symptom Y/N Comments  Symptom Y/N Comments   Fever/Chills y   Chest Pain n    Poor Appetite y   Edema     Cough    Abdominal Pain     Sputum    Joint Pain     SOB/BANKS n   Pruritis/Rash     Nausea/vomit    Tolerating PT/OT     Diarrhea    Tolerating Diet y    Constipation    Other       Could NOT obtain due to:      Objective:     VITALS:   Last 24hrs VS reviewed since prior progress note.  Most recent are:  Patient Vitals for the past 24 hrs:   Temp Pulse Resp BP SpO2 02/03/18 0748 98.7 °F (37.1 °C) 62 17 125/54 97 %   02/03/18 0404 99 °F (37.2 °C) 75 16 120/49 96 %   02/02/18 2319 (!) 101.3 °F (38.5 °C) 80 18 140/53 100 %   02/02/18 2008 99.7 °F (37.6 °C) 80 16 138/49 100 %   02/02/18 1800 - 77 - 141/47 -   02/02/18 1745 - 70 - 138/48 -   02/02/18 1715 - 70 - 133/47 -   02/02/18 1645 - 70 - 142/47 -   02/02/18 1615 - 70 - 140/51 -   02/02/18 1545 - 70 - 141/51 -   02/02/18 1515 - 70 - 137/54 -   02/02/18 1445 98.1 °F (36.7 °C) 70 18 135/59 -   02/02/18 1108 98 °F (36.7 °C) 71 17 130/60 98 %       Intake/Output Summary (Last 24 hours) at 02/03/18 1001  Last data filed at 02/03/18 0404   Gross per 24 hour   Intake              250 ml   Output             1000 ml   Net             -750 ml        PHYSICAL EXAM:  General: WD, WN. Alert, cooperative, no acute distress    EENT:  EOMI. Anicteric sclerae. MMM  Resp:  CTA bilaterally, no wheezing or rales. No accessory muscle use  CV:  Regular  rhythm,  No edema  GI:  Soft, Non distended, Non tender.  +BS  Neurologic:  Alert and oriented X 3, normal speech  Psych:   Good insight. Not anxious nor agitated  Skin:  No rashes. No jaundice    Reviewed most current lab test results and cultures  YES  Reviewed most current radiology test results   YES  Review and summation of old records today    NO  Reviewed patient's current orders and MAR    YES  PMH/SH reviewed - no change compared to H&P  ________________________________________________________________________  Care Plan discussed with:    Comments   Patient x    Family      RN x    Care Manager     Consultant                        Multidiciplinary team rounds were held today with , nursing, pharmacist and clinical coordinator. Patient's plan of care was discussed; medications were reviewed and discharge planning was addressed.      ________________________________________________________________________  Total NON critical care TIME:  35   Minutes    Total CRITICAL CARE TIME Spent:   Minutes non procedure based      Comments   >50% of visit spent in counseling and coordination of care     ________________________________________________________________________  Celestino Castillo MD     Procedures: see electronic medical records for all procedures/Xrays and details which were not copied into this note but were reviewed prior to creation of Plan. LABS:  I reviewed today's most current labs and imaging studies.   Pertinent labs include:  Recent Labs      02/01/18   1051   WBC  13.7*   HGB  9.9*   HCT  31.5*   PLT  148*     Recent Labs      02/03/18   0405  02/02/18   0532  02/01/18   1051   NA  138  135*  137   K  3.6  4.5  4.6   CL  100  99  100   CO2  32  30  28   GLU  54*  57*  85   BUN  16  34*  28*   CREA  4.93*  8.16*  6.80*   CA  8.9  9.6  9.6   ALB  2.2*   --   2.6*   TBILI  0.4   --   0.4   SGOT  28   --   33   ALT  35   --   46       Signed: Celestino Castillo MD

## 2018-02-03 NOTE — PROGRESS NOTES
2/3/2018 8:38 AM    Admit Date: 2/1/2018    Admit Diagnosis: NSTEMI (non-ST elevated myocardial infarction) (HCC);NSTEMI*    Subjective:     Day Canas   denies chest pain, chest pressure/discomfort, dyspnea, palpitations, irregular heart beats, near-syncope, syncope, fatigue. Fever spike overnight. Now afebrile. Visit Vitals    /49 (BP 1 Location: Right arm, BP Patient Position: At rest)    Pulse 75    Temp 99 °F (37.2 °C)    Resp 16    Ht 5' 10\" (1.778 m)    Wt 156 lb 14.4 oz (71.2 kg)    SpO2 96%    BMI 22.51 kg/m2     Current Facility-Administered Medications   Medication Dose Route Frequency    famotidine (PEPCID) tablet 20 mg  20 mg Oral Q48H    heparin (porcine) injection 5,000 Units  5,000 Units SubCUTAneous Q12H    oseltamivir (TAMIFLU) capsule 30 mg  30 mg Oral DIALYSIS PRN    Tamiflu HD Reminder- Please give dose after HD session  1 Each Other DAILY    amLODIPine (NORVASC) tablet 10 mg  10 mg Oral DAILY    aspirin chewable tablet 81 mg  81 mg Oral DAILY    carvedilol (COREG) tablet 25 mg  25 mg Oral BID WITH MEALS    cloNIDine HCl (CATAPRES) tablet 0.1 mg  0.1 mg Oral TID    escitalopram oxalate (LEXAPRO) tablet 10 mg  10 mg Oral DAILY    lisinopril (PRINIVIL, ZESTRIL) tablet 40 mg  40 mg Oral BID    pravastatin (PRAVACHOL) tablet 10 mg  10 mg Oral QHS    cinacalcet (SENSIPAR) tablet 60 mg  60 mg Oral DAILY    sevelamer carbonate (RENVELA) tab 800 mg  800 mg Oral TID    sodium chloride (NS) flush 5-10 mL  5-10 mL IntraVENous Q8H    sodium chloride (NS) flush 5-10 mL  5-10 mL IntraVENous PRN    acetaminophen (TYLENOL) tablet 650 mg  650 mg Oral Q4H PRN    naloxone (NARCAN) injection 0.4 mg  0.4 mg IntraVENous PRN    ondansetron (ZOFRAN) injection 4 mg  4 mg IntraVENous Q6H PRN    insulin glargine (LANTUS) injection 18 Units  18 Units SubCUTAneous QHS         Objective:      Visit Vitals    /49 (BP 1 Location: Right arm, BP Patient Position:  At rest)    Pulse 75    Temp 99 °F (37.2 °C)    Resp 16    Ht 5' 10\" (1.778 m)    Wt 156 lb 14.4 oz (71.2 kg)    SpO2 96%    BMI 22.51 kg/m2       Physical Exam:  Abdomen: soft, non-tender. Bowel sounds normal.   Extremities: no cyanosis or edema  Heart: regular rate and rhythm, S1, S2 normal, no murmur, click, rub or gallop  Lungs: clear to auscultation bilaterally  Neurologic: Grossly normal    Data Review:   Labs:    Recent Results (from the past 24 hour(s))   GLUCOSE, POC    Collection Time: 02/02/18  9:37 PM   Result Value Ref Range    Glucose (POC) 87 65 - 100 mg/dL    Performed by Lynda Pouch    METABOLIC PANEL, COMPREHENSIVE    Collection Time: 02/03/18  4:05 AM   Result Value Ref Range    Sodium 138 136 - 145 mmol/L    Potassium 3.6 3.5 - 5.1 mmol/L    Chloride 100 97 - 108 mmol/L    CO2 32 21 - 32 mmol/L    Anion gap 6 5 - 15 mmol/L    Glucose 54 (L) 65 - 100 mg/dL    BUN 16 6 - 20 MG/DL    Creatinine 4.93 (H) 0.70 - 1.30 MG/DL    BUN/Creatinine ratio 3 (L) 12 - 20      GFR est AA 14 (L) >60 ml/min/1.73m2    GFR est non-AA 12 (L) >60 ml/min/1.73m2    Calcium 8.9 8.5 - 10.1 MG/DL    Bilirubin, total 0.4 0.2 - 1.0 MG/DL    ALT (SGPT) 35 12 - 78 U/L    AST (SGOT) 28 15 - 37 U/L    Alk.  phosphatase 217 (H) 45 - 117 U/L    Protein, total 6.9 6.4 - 8.2 g/dL    Albumin 2.2 (L) 3.5 - 5.0 g/dL    Globulin 4.7 (H) 2.0 - 4.0 g/dL    A-G Ratio 0.5 (L) 1.1 - 2.2     EKG, 12 LEAD, INITIAL    Collection Time: 02/03/18  5:20 AM   Result Value Ref Range    Ventricular Rate 73 BPM    Atrial Rate 73 BPM    P-R Interval 242 ms    QRS Duration 92 ms    Q-T Interval 390 ms    QTC Calculation (Bezet) 429 ms    Calculated P Axis 77 degrees    Calculated R Axis 69 degrees    Calculated T Axis 174 degrees    Diagnosis       Sinus rhythm with 1st degree AV block  Possible Left atrial enlargement  Left ventricular hypertrophy with repolarization abnormality  When compared with ECG of 02-FEB-2018 06:32,  T wave inversion now evident in Lateral leads  Confirmed by Mark Francois ([id number]) on 2/3/2018 7:39:42 AM     GLUCOSE, POC    Collection Time: 02/03/18  7:49 AM   Result Value Ref Range    Glucose (POC) 41 (LL) 65 - 100 mg/dL    Performed by Cristobal Michel    GLUCOSE, POC    Collection Time: 02/03/18  7:52 AM   Result Value Ref Range    Glucose (POC) 40 (LL) 65 - 100 mg/dL    Performed by Kelly Thomas, POC    Collection Time: 02/03/18  8:01 AM   Result Value Ref Range    Glucose (POC) 137 (H) 65 - 100 mg/dL    Performed by Cristobal Michel        Telemetry: normal sinus rhythm      Assessment:     Active Problems:    Hypertension (4/12/2010)      Mixed hyperlipidemia (4/11/2013)      ESRD on hemodialysis ([facility] [address] (12/30/2014)      NSTEMI (non-ST elevated myocardial infarction) ([facility] [address] (2/1/2018)      Influenza (2/2/2018)        Plan:     Stable from cardiac standpoint. Being treated for flu. Dc if ok with other service lines.

## 2018-02-03 NOTE — PROGRESS NOTES
Bedside report received from Hayes Chandler RN                   Assessment, Background, Procedure summary, Intake/Output, MAR, and recent results discussed. Care assumed. 2330 Patient's temperature increased to 101.3. Patient feel hot to touch and appears drowsy. 650 mg PO acetaminophen administered. Will continue to monitor.

## 2018-02-04 NOTE — PROGRESS NOTES
2/4/2018 7:57 AM    Admit Date: 2/1/2018    Admit Diagnosis: NSTEMI (non-ST elevated myocardial infarction) (HCC);NSTEMI*    Subjective:     Emerita Manner denies chest pain, chest pressure/discomfort, dyspnea, palpitations, irregular heart beats, near-syncope, syncope. No further fever. Cultures -ve.      Visit Vitals    /61    Pulse 64    Temp 98.3 °F (36.8 °C)    Resp 14    Ht 5' 10\" (1.778 m)    Wt 159 lb 1.6 oz (72.2 kg)    SpO2 97%    BMI 22.83 kg/m2     Current Facility-Administered Medications   Medication Dose Route Frequency    insulin lispro (HUMALOG) injection   SubCUTAneous AC&HS    glucose chewable tablet 16 g  4 Tab Oral PRN    dextrose (D50W) injection syrg 12.5-25 g  12.5-25 g IntraVENous PRN    glucagon (GLUCAGEN) injection 1 mg  1 mg IntraMUSCular PRN    insulin glargine (LANTUS) injection 5 Units  5 Units SubCUTAneous QHS    famotidine (PEPCID) tablet 20 mg  20 mg Oral Q48H    heparin (porcine) injection 5,000 Units  5,000 Units SubCUTAneous Q12H    oseltamivir (TAMIFLU) capsule 30 mg  30 mg Oral DIALYSIS PRN    Tamiflu HD Reminder- Please give dose after HD session  1 Each Other DAILY    amLODIPine (NORVASC) tablet 10 mg  10 mg Oral DAILY    aspirin chewable tablet 81 mg  81 mg Oral DAILY    carvedilol (COREG) tablet 25 mg  25 mg Oral BID WITH MEALS    cloNIDine HCl (CATAPRES) tablet 0.1 mg  0.1 mg Oral TID    escitalopram oxalate (LEXAPRO) tablet 10 mg  10 mg Oral DAILY    lisinopril (PRINIVIL, ZESTRIL) tablet 40 mg  40 mg Oral BID    pravastatin (PRAVACHOL) tablet 10 mg  10 mg Oral QHS    cinacalcet (SENSIPAR) tablet 60 mg  60 mg Oral DAILY    sevelamer carbonate (RENVELA) tab 800 mg  800 mg Oral TID    sodium chloride (NS) flush 5-10 mL  5-10 mL IntraVENous Q8H    sodium chloride (NS) flush 5-10 mL  5-10 mL IntraVENous PRN    acetaminophen (TYLENOL) tablet 650 mg  650 mg Oral Q4H PRN    naloxone (NARCAN) injection 0.4 mg  0.4 mg IntraVENous PRN    ondansetron (ZOFRAN) injection 4 mg  4 mg IntraVENous Q6H PRN         Objective:      Visit Vitals    /61    Pulse 64    Temp 98.3 °F (36.8 °C)    Resp 14    Ht 5' 10\" (1.778 m)    Wt 159 lb 1.6 oz (72.2 kg)    SpO2 97%    BMI 22.83 kg/m2       Physical Exam:  Abdomen: soft, non-tender. Bowel sounds normal.   Extremities: no cyanosis, rt BKA  Heart: regular rate and rhythm, S1, S2 normal, no murmur, click, rub or gallop  Lungs: clear to auscultation bilaterally  Neurologic: Grossly normal    Data Review:   Labs:    Recent Results (from the past 24 hour(s))   GLUCOSE, POC    Collection Time: 02/03/18  8:01 AM   Result Value Ref Range    Glucose (POC) 137 (H) 65 - 100 mg/dL    Performed by [employer] [address] [employer] [address], POC    Collection Time: 02/03/18 11:25 AM   Result Value Ref Range    Glucose (POC) 162 (H) 65 - 100 mg/dL    Performed by Refugio Gang    GLUCOSE, POC    Collection Time: 02/03/18  4:20 PM   Result Value Ref Range    Glucose (POC) 123 (H) 65 - 100 mg/dL    Performed by Refugio Gang    GLUCOSE, POC    Collection Time: 02/03/18 10:16 PM   Result Value Ref Range    Glucose (POC) 111 (H) 65 - 100 mg/dL    Performed by Paul Badillo    CBC WITH AUTOMATED DIFF    Collection Time: 02/04/18  2:16 AM   Result Value Ref Range    WBC 7.9 4.1 - 11.1 K/uL    RBC 3.21 (L) 4.10 - 5.70 M/uL    HGB 8.7 (L) 12.1 - 17.0 g/dL    HCT 27.6 (L) 36.6 - 50.3 %    MCV 86.0 80.0 - 99.0 FL    MCH 27.1 26.0 - 34.0 PG    MCHC 31.5 30.0 - 36.5 g/dL    RDW 16.8 (H) 11.5 - 14.5 %    PLATELET 211 248 - 411 K/uL    MPV 10.4 8.9 - 12.9 FL    NRBC 0.0 0  WBC    ABSOLUTE NRBC 0.00 0.00 - 0.01 K/uL    NEUTROPHILS 60 32 - 75 %    LYMPHOCYTES 19 12 - 49 %    MONOCYTES 17 (H) 5 - 13 %    EOSINOPHILS 4 0 - 7 %    BASOPHILS 0 0 - 1 %    IMMATURE GRANULOCYTES 1 (H) 0.0 - 0.5 %    ABS. NEUTROPHILS 4.7 1.8 - 8.0 K/UL    ABS. LYMPHOCYTES 1.5 0.8 - 3.5 K/UL    ABS. MONOCYTES 1.3 (H) 0.0 - 1.0 K/UL    ABS.  EOSINOPHILS 0.3 0.0 - 0.4 K/UL    ABS. BASOPHILS 0.0 0.0 - 0.1 K/UL    ABS. IMM.  GRANS. 0.1 (H) 0.00 - 0.04 K/UL    DF AUTOMATED     METABOLIC PANEL, BASIC    Collection Time: 02/04/18  2:16 AM   Result Value Ref Range    Sodium 138 136 - 145 mmol/L    Potassium 4.6 3.5 - 5.1 mmol/L    Chloride 101 97 - 108 mmol/L    CO2 32 21 - 32 mmol/L    Anion gap 5 5 - 15 mmol/L    Glucose 75 65 - 100 mg/dL    BUN 24 (H) 6 - 20 MG/DL    Creatinine 6.79 (H) 0.70 - 1.30 MG/DL    BUN/Creatinine ratio 4 (L) 12 - 20      GFR est AA 10 (L) >60 ml/min/1.73m2    GFR est non-AA 8 (L) >60 ml/min/1.73m2    Calcium 8.2 (L) 8.5 - 10.1 MG/DL   EKG, 12 LEAD, INITIAL    Collection Time: 02/04/18  3:08 AM   Result Value Ref Range    Ventricular Rate 59 BPM    Atrial Rate 59 BPM    P-R Interval 222 ms    QRS Duration 106 ms    Q-T Interval 496 ms    QTC Calculation (Bezet) 491 ms    Calculated P Axis 68 degrees    Calculated R Axis 68 degrees    Calculated T Axis 172 degrees    Diagnosis       ** Poor data quality, interpretation may be adversely affected  Sinus bradycardia with 1st degree AV block  Voltage criteria for left ventricular hypertrophy  Nonspecific T wave abnormality  Prolonged QT  When compared with ECG of 03-FEB-2018 05:20,  Nonspecific T wave abnormality has replaced inverted T waves in Lateral leads  QT has lengthened     GLUCOSE, POC    Collection Time: 02/04/18  7:00 AM   Result Value Ref Range    Glucose (POC) 67 65 - 100 mg/dL    Performed by Juarez Echeverria    GLUCOSE, POC    Collection Time: 02/04/18  7:18 AM   Result Value Ref Range    Glucose (POC) 69 65 - 100 mg/dL    Performed by [address], POC    Collection Time: 02/04/18  7:34 AM   Result Value Ref Range    Glucose (POC) 90 65 - 100 mg/dL    Performed by Rabia Azevedo        Telemetry: normal sinus rhythm      Assessment:     Active Problems:    Hypertension (4/12/2010)      Mixed hyperlipidemia (4/11/2013)      ESRD on hemodialysis ([facility] [address] (12/30/2014)      NSTEMI (non-ST elevated myocardial infarction) ([facility] [address] (2/1/2018)      Influenza (2/2/2018)        Plan:     Stable from cardiac standpoint. Being treated for flu. Dc if ok with other service lines.

## 2018-02-04 NOTE — PROGRESS NOTES
Hospitalist Progress Note    NAME: Umair Wade   :  1950   MRN:  [id number]       Assessment / Plan:  Viral like syndrome , improving  -afebrile for 24 hrs  -CXR clear. Influenza negative, s/p cholecystectomy. LFTs wnl.    -his wife recently had flu like symptoms and his symptoms are consistent.    -on empiric tamiflu   -plan for discharge , awaiting bed from [facility]    Symptomatic hypoglycemia with BG 40  -due to poor po intake. Fasting BG remains ~70-90  -stop lantus for now, resume once pt can take PO  -SSI    Weakness, s/p fall  Hx MS  -CT head negative  -monitor of MS exacerbation  -[facility], waiting for bed availability      ESRD  -HD per renal     PVD, hx leg bypass      CAD, hx CRISTI to RCA   NSTEMI  -cath results reviewed. Minor luminal irregularities seen  -continue cardiac meds     Pt was transferred to medicine service as per request by Dr Molina Bustillo on 18     Code Status:  Full  DVT Prophylaxis:  Will start Heparin SQ     Subjective:     Chief Complaint / Reason for Physician Visit  Pt seen and examined at bedside. No acute complaints. Appetite remains poor. Discussed with RN events overnight. Review of Systems:  Symptom Y/N Comments  Symptom Y/N Comments   Fever/Chills n   Chest Pain n    Poor Appetite y   Edema     Cough    Abdominal Pain     Sputum    Joint Pain     SOB/BANKS n   Pruritis/Rash     Nausea/vomit    Tolerating PT/OT     Diarrhea    Tolerating Diet y    Constipation    Other       Could NOT obtain due to:      Objective:     VITALS:   Last 24hrs VS reviewed since prior progress note.  Most recent are:  Patient Vitals for the past 24 hrs:   Temp Pulse Resp BP SpO2   18 0655 98.3 °F (36.8 °C) 64 14 121/61 97 %   18 0215 97.8 °F (36.6 °C) (!) 59 18 122/56 97 %   18 2302 98.2 °F (36.8 °C) 68 16 123/58 94 %   18 -  18 - 96 %   18 1808 98.3 °F (36.8 °C) 69 17 120/56 97 %   18 1600 98.4 °F (36.9 °C) 67 17 115/54 98 %   18 1128 97.4 °F (36.3 °C) 63 16 119/55 98 %       Intake/Output Summary (Last 24 hours) at 02/04/18 1002  Last data filed at 02/03/18 2302   Gross per 24 hour   Intake              600 ml   Output                0 ml   Net              600 ml        PHYSICAL EXAM:  General: WD, WN. Alert, cooperative, no acute distress    EENT:  EOMI. Anicteric sclerae. MMM  Resp:  CTA bilaterally, no wheezing or rales. No accessory muscle use  CV:  Regular  rhythm,  No edema  GI:  Soft, Non distended, Non tender.  +BS  Neurologic:  Alert and oriented X 3, normal speech  Psych:   Good insight. Not anxious nor agitated  Skin:  No rashes. No jaundice    Reviewed most current lab test results and cultures  YES  Reviewed most current radiology test results   YES  Review and summation of old records today    NO  Reviewed patient's current orders and MAR    YES  PMH/ reviewed - no change compared to H&P  ________________________________________________________________________  Care Plan discussed with:    Comments   Patient x    Family      RN x    Care Manager     Consultant                        Multidiciplinary team rounds were held today with , nursing, pharmacist and clinical coordinator. Patient's plan of care was discussed; medications were reviewed and discharge planning was addressed. ________________________________________________________________________  Total NON critical care TIME:  35   Minutes    Total CRITICAL CARE TIME Spent:   Minutes non procedure based      Comments   >50% of visit spent in counseling and coordination of care     ________________________________________________________________________  Gladys Leigh MD     Procedures: see electronic medical records for all procedures/Xrays and details which were not copied into this note but were reviewed prior to creation of Plan. LABS:  I reviewed today's most current labs and imaging studies.   Pertinent labs include:  Recent Labs      02/04/18   0216 02/01/18   1051   WBC  7.9  13.7*   HGB  8.7*  9.9*   HCT  27.6*  31.5*   PLT  165  148*     Recent Labs      02/04/18   0216  02/03/18   0405  02/02/18   0532  02/01/18   1051   NA  138  138  135*  137   K  4.6  3.6  4.5  4.6   CL  101  100  99  100   CO2  32  32  30  28   GLU  75  54*  57*  85   BUN  24*  16  34*  28*   CREA  6.79*  4.93*  8.16*  6.80*   CA  8.2*  8.9  9.6  9.6   ALB   --   2.2*   --   2.6*   TBILI   --   0.4   --   0.4   SGOT   --   28   --   33   ALT   --   35   --   46       Signed: Tootie Castellanos MD

## 2018-02-04 NOTE — ROUTINE PROCESS
HAYLIE received phone call from Wilian at [facility] ([phone]) to discuss discharge plans. Wilian expressed that patient was at [facility] for rehab after an amputation and she wanted to ensure that freedom of choice was given to patient as continuity of care is important. HAYLIE spoke to pt and discussed rehab options. FOC given and patient accepted [facility] as his first choice and second choice is [facility]. HAYLIE spoke with Wilian and informed of pt's choice. She has agreed to work on authorization. Reminder set to [address] for follow up. Received phone call from Wilian expressing that she has spoken to pt's family and pt actually was at [facility] 8 years ago and dc with [facility] HH as that time. Wilian will review pt information with [facility] physician and has agreed to notify CM about whether pt can be accepted.      Ardine Landau, Weekend HAYLIE  [phone]

## 2018-02-05 NOTE — CARDIO/PULMONARY
Cardiopulmonary Rehab Nursing Entry:     Chart reviewed and pt visited. Pt 74 yo admitted with NSTEMI, PMHx of PCI/CRISTI, ESRD, does HDx3/week, MS, HTN, right foot amputation, non-smoker. EF 50% per cath report.      Patient having HD, to be discharged to [facility]. CP Rehab will continue to follow.

## 2018-02-05 NOTE — PROGRESS NOTES
Attempted to see patient for OT treatment, but he is presently receiving HD. Will defer OT and follow back tomorrow.

## 2018-02-05 NOTE — TELEPHONE ENCOUNTER
----- Message from [facility] sent at 2/3/2018 10:23 AM EST -----  Regarding: Dr. Graf Masters  Pt stated Jimenez Mcduffie [phone] needs a list of pt's medication before they will send it through the mail. Pt would like a call back to discuss this with the nurse Pt best contact [phone].

## 2018-02-05 NOTE — PROGRESS NOTES
Hospitalist Progress Note    NAME: Emerita Melo   :  1950   MRN:  [id number]       Assessment / Plan:  Viral like syndrome , improving  -afebrile for>48 hrs  -CXR clear. Influenza negative, s/p cholecystectomy. LFTs wnl.    -his wife recently had flu like symptoms and his symptoms are consistent.    -on empiric tamiflu  -stable for discharge, CM's note reviewed, pending [facility]'s eval    Symptomatic hypoglycemia with BG 40  -due to poor po intake  -lantus on hold  -cont' SSI    Weakness, s/p fall  Hx MS  -CT head negative  -monitor of MS exacerbation  -pending [facility]'s eval.      ESRD  -HD per renal     PVD, hx leg bypass    CAD, hx CRISTI to RCA   NSTEMI  -cath results reviewed. Minor luminal irregularities seen  -continue cardiac meds     Pt was transferred to medicine service as per request of Dr Vesta Byers on 18     Code Status:  Full  DVT Prophylaxis:  Heparin SQ     Subjective:     Chief Complaint / Reason for Physician Visit  Pt seen and examined at bedside. More awake today. No acute complaints. Eating better today. Discussed with RN events overnight. Review of Systems:  Symptom Y/N Comments  Symptom Y/N Comments   Fever/Chills n   Chest Pain n    Poor Appetite n   Edema     Cough    Abdominal Pain     Sputum    Joint Pain     SOB/BANKS n   Pruritis/Rash     Nausea/vomit    Tolerating PT/OT     Diarrhea    Tolerating Diet y    Constipation    Other       Could NOT obtain due to:      Objective:     VITALS:   Last 24hrs VS reviewed since prior progress note.  Most recent are:  Patient Vitals for the past 24 hrs:   Temp Pulse Resp BP SpO2   18 0610 97.9 °F (36.6 °C) 69 18 148/68 96 %   18 0405 97.5 °F (36.4 °C) 68 16 130/61 94 %   18 2343 98.4 °F (36.9 °C) 69 18 127/58 96 %   18 1948 98.8 °F (37.1 °C) 70 18 121/56 97 %   18 1528 98.2 °F (36.8 °C) 69 17 122/56 96 %   18 1134 98.2 °F (36.8 °C) 67 16 127/59 98 %       Intake/Output Summary (Last 24 hours) at 02/05/18 1130  Last data filed at 02/04/18 1948   Gross per 24 hour   Intake              480 ml   Output              102 ml   Net              378 ml        PHYSICAL EXAM:  General: WD, WN. Alert, cooperative, no acute distress    EENT:  EOMI. Anicteric sclerae. MMM  Resp:  CTA bilaterally, no wheezing or rales. No accessory muscle use  CV:  Regular  rhythm,  No edema  GI:  Soft, Non distended, Non tender.  +BS  Neurologic:  Alert and oriented X 3, normal speech  Psych:   Good insight. Not anxious nor agitated  Skin:  No rashes. No jaundice    Reviewed most current lab test results and cultures  YES  Reviewed most current radiology test results   YES  Review and summation of old records today    NO  Reviewed patient's current orders and MAR    YES  PMH/SH reviewed - no change compared to H&P  ________________________________________________________________________  Care Plan discussed with:    Comments   Patient x    Family      RN x    Care Manager     Consultant  x [place]                     Multidiciplinary team rounds were held today with , nursing, pharmacist and clinical coordinator. Patient's plan of care was discussed; medications were reviewed and discharge planning was addressed. ________________________________________________________________________  Total NON critical care TIME:  35   Minutes    Total CRITICAL CARE TIME Spent:   Minutes non procedure based      Comments   >50% of visit spent in counseling and coordination of care     ________________________________________________________________________  Tootie Castellanos MD     Procedures: see electronic medical records for all procedures/Xrays and details which were not copied into this note but were reviewed prior to creation of Plan. LABS:  I reviewed today's most current labs and imaging studies.   Pertinent labs include:  Recent Labs      02/05/18   0421  02/04/18   0216   WBC  7.0  7.9   HGB  8.5*  8.7*   HCT  27.0*  27.6*   PLT 181  165     Recent Labs      02/05/18   0421  02/04/18   0216  02/03/18   0405   NA  136  138  138   K  4.4  4.6  3.6   CL  99  101  100   CO2  31  32  32   GLU  117*  75  54*   BUN  33*  24*  16   CREA  8.29*  6.79*  4.93*   CA  7.8*  8.2*  8.9   MG  2.5*   --    --    PHOS  3.0   --    --    ALB   --    --   2.2*   TBILI   --    --   0.4   SGOT   --    --   28   ALT   --    --   35       Signed: Parvin Maharaj MD

## 2018-02-05 NOTE — PROGRESS NOTES
[facility] has accepted patient but is now waiting on a bed.  Have placed consult to [facility] rehab.    [phone]

## 2018-02-05 NOTE — DIALYSIS
[facility]  [phone]    Vitals   Pre   Post   Assessment   Pre   Post     Temp  98 97.9 LOC  A/OX3 A/OX3   HR   66 68 Lungs   Diminished   diminished    B/P   128/66 143/69 Cardiac   SR  SR   Resp   18 18 Skin   Warm and dry  warm and dry   Pain level  0/10 0/10 Edema  None      None    Orders:    Duration:   Start:    1220 End:    1535 Total:   3.15 hrs   Dialyzer:   Revaclea   K Bath:   2   Ca Bath:   2.5   Na/Bicarb:   140/35   Target Fluid Removal:   1000 ml   Access     Type & Location:   DENI AVF, + B/T, cannulated with 15g needles   Labs     Obtained/Reviewed   Critical Results Called   Date when labs were drawn-  Hgb-    HGB   Date Value Ref Range Status   02/05/2018 8.5 (L) 12.1 - 17.0 g/dL Final     K-    Potassium   Date Value Ref Range Status   02/05/2018 4.4 3.5 - 5.1 mmol/L Final     Ca-   Calcium   Date Value Ref Range Status   02/05/2018 7.8 (L) 8.5 - 10.1 MG/DL Final     Bun-   BUN   Date Value Ref Range Status   02/05/2018 33 (H) 6 - 20 MG/DL Final     Creat-   Creatinine   Date Value Ref Range Status   02/05/2018 8.29 (H) 0.70 - 1.30 MG/DL Final        Medications/ Blood Products Given     Name   Dose   Route and Time     None                 Blood Volume Processed (BVP):    72.3 L Net Fluid   Removed:  1000 ml   Comments   Time Out Done: 7001  Primary Nurse Rpt Pre:Alejo Moctezuma RN  Primary Nurse Rpt Post: Ankit Salcedo RN  Pt Education:access care   Care Plan:continue HD tx per ordered  Tx Summary:tolerated tx well, VS stable, no distress, no SOB, all possible blood returned.   Admiting Diagnosis:  Pt's previous clinic-  Consent signed - verified   [facility] Consent - yes  Hepatitis Status- neg 2/1/8  Machine #- [id number]/ [id number]  Telemetry status-active   Pre-dialysis wt.- 72.3kg

## 2018-02-05 NOTE — PROGRESS NOTES
Physical therapy note:   Patient currently receiving dialysis. Will continue to follow. Catalina Jose, PT, DPT

## 2018-02-05 NOTE — PROGRESS NOTES
Renal-Seen on HD, bp stable. FOR [facility] tomorrow. WILl alert Manpreet Lopez.   Zachary Mahoney MD

## 2018-02-06 NOTE — PROGRESS NOTES
Spiritual Care Assessment/Progress Notes    Elsy Che [id number]  [id number]    1950  75 y.o.  male    Patient Telephone Number: [phone] (home)   Yarsanism Affiliation: [facility]   Language: English   Extended Emergency Contact Information  Primary Emergency Contact: Denson Scheuermann, Ash Simona Castellanos Phone: [phone]  Relation: Spouse  Secondary Emergency Contact: Nathan Girard Phone: [phone]  Relation: Neighbor   Patient Active Problem List    Diagnosis Date Noted    Influenza 02/02/2018    NSTEMI (non-ST elevated myocardial infarction) ([facility] 02/01/2018    History of colonoscopy 03/29/2017    Diabetic oculomotor palsy ([facility] 03/25/2016    Diabetic peripheral neuropathy associated with type 2 diabetes mellitus ([facility] 03/25/2016    Carpal tunnel syndrome on both sides 07/30/2015    Ulnar neuropathy at elbow of right upper extremity 07/30/2015    Ulnar neuropathy at elbow of left upper extremity 04/21/2015    Cervical stenosis of spinal canal 01/04/2015    Ataxia 01/04/2015    Multiple sclerosis ([facility] 01/04/2015    ESRD on hemodialysis ([facility] 12/30/2014    ASHD (arteriosclerotic heart disease) 04/11/2013    Mixed hyperlipidemia 04/11/2013    S/P coronary artery stent placement 02/18/2011    PAD (peripheral artery disease) ([facility] 12/09/2010    Hypertension 04/12/2010        Date: 2/6/2018       Level of Yarsanism/Spiritual Activity:  [x]         Involved in lovely tradition/spiritual practice    []         Not involved in lovely tradition/spiritual practice  [x]         Spiritually oriented    []         Claims no spiritual orientation    []         seeking spiritual identity  []         Feels alienated from Latter-day practice/tradition  []         Feels angry about Latter-day practice/tradition  [x]         Spirituality/Latter-day tradition is a resource for coping at this time.   []         Not able to assess due to medical condition    Services Provided Today:  []         crisis intervention    []         reading Scriptures  [x]         spiritual assessment    []         prayer  [x]         empathic listening/emotional support  []         rites and rituals (cite in comments)  []         life review     []         Uatsdin support  []         theological development   []         advocacy  []         ethical dialog     []         blessing  []         bereavement support    []         support to family  []         anticipatory grief support   []         help with AMD  []         spiritual guidance    []         meditation      Spiritual Care Needs  []         Emotional Support  []         Spiritual/Catholic Care  []         Loss/Adjustment  []         Advocacy/Referral                /Ethics  [x]         No needs expressed at               this time  []         Other: (note in               comments)  Spiritual Care Plan  []         Follow up visits with               pt/family  []         Provide materials  []         Schedule sacraments  []         Contact Community               Clergy  [x]         Follow up as needed  []         Other: (note in               comments)     Comments:  initiated visit due to pt's length of stay.  provided supportive listening and presence as pt shared about his recently previous hospital stay that lasted 4 months. He shared about the emotions and pains he had to endure and that those 4 months had been the lowest point of his life. However, he also expressed the joys of getting through it with the help of his family and friends. He mentioned that his family had been with him through it all and they were his support towards healing. He talked about being a member of [address] [place] [address] in [place], [place] and how the  as well as the [employer] members had been supportive through this time. He added that he feels like God is with him through this journey.  He shared that this time around is nothing like the past and that he finds himself positive and optimistic for the near future. He added that he will be leaving for rehab sometime soon and was looking forward to getting stronger. Pt is in a good place and in good spirits at the moment.  acknowledged pt's emotions and affirmed his supportive and coping resources. Advised of availability and assured him of continued support as needed/ desired. Sonal Andre M.S.   Spiritual Care Department  If needs rise please call [phone])

## 2018-02-06 NOTE — ROUTINE PROCESS
Spoke with pharmacist and was informed that patient gets Tamiflu after each dialysis session, and does not need it on non dialysis days. Will continue to monitor patient. 1255: Spoke with CM who said they called [facility] and that patient has a bed. We do not have a specific bed at this time. MD notified. 1342: Per Dr. Rivero admitting orders need to be signed before patient leaves. MD paged.

## 2018-02-06 NOTE — PROGRESS NOTES
Noted discharge orders on patient's chart, checked in with nursing to see if patient would need PT services before heading to [facility]. Nursing states that patient is due to leave \"any time now\", will defer PT treatment here today, as PT Evaluation is likely today at [facility].

## 2018-02-06 NOTE — DISCHARGE INSTRUCTIONS
HOSPITALIST DISCHARGE INSTRUCTIONS    NAME: Arlin Sims   :  1950   MRN:  [id number]     Date/Time:  2018 1:25 PM    ADMIT DATE: 2018   DISCHARGE DATE: 2018     Attending Physician: Nataly Quinteros MD    DISCHARGE DIAGNOSIS:  ***      Medications: Per above medication reconciliation. Pain Management: per above medications    Recommended diet: Cardiac Diet    Recommended activity: PT/OT Eval and Treat    Wound care: None    Indwelling devices:  None    Supplemental Oxygen: None    Required Lab work: Per SNF routine    Glucose management:  None    Code status: Full        Outside physician follow up: Follow-up Information     Follow up With Details Comments Contact Info    Kenney Washburn MD Schedule an appointment as soon as possible for a visit in 1 month  [address] [place] [phone]                 Skilled nursing facility/ SNF MD responsible for above on discharge. Information obtained by :  I understand that if any problems occur once I am at home I am to contact my physician. I understand and acknowledge receipt of the instructions indicated above.                                                                                                                                            Physician's or R.N.'s Signature                                                                  Date/Time                                                                                                                                              Patient or Repres

## 2018-02-06 NOTE — ROUTINE PROCESS
Called report to Carmina Baumgarten RN at [address] [place] [address], Telephone number given in case nurse had any questions. Patient going to room 112.

## 2018-02-06 NOTE — H&P
ATTENDING PHYSICIAN: Dr. Marjan William:  [facility]:  IM  REFERRING LOCATION:  [facility]  SPECIALISTS:  NE      REASON FOR INPATIENT REHABILITATION AND PRIMARY DIAGNOSES:     MS exacerbation      HISTORY OF PRESENT ILLNESS:   75 y.o.  male with a history of CAD, HTN, PVD, MS DM and ESRD who has not been feeling well x 2 days. He describes weakness, some myalgias, nausea, decrease appetite and dizziness. He fell 2 days ago and presented to the ER yesterday. His troponin trended up to 1.72 and he was taken to the cath lab for NSTEMI. Today he developed fevers 100.8 but CXR negative and influenza test negative. Wife recently has flu like symptoms also.            Past Medical History:   Diagnosis Date    Anemia associated with chronic renal failure      Autoimmune disease (HCC)       hx ms    CAD (coronary artery disease)      Chronic kidney disease       catheter removed and no dialysis at this time    Chronic kidney disease       left arm fistula dialysis MWF    Diabetes ([address]       type II    GERD (gastroesophageal reflux disease)      Hypertension      Multiple sclerosis ([address]       diagnosed '01    Other ill-defined conditions(799.89)       anemia    Other ill-defined conditions(799.89)       elevated cholesterol    Other ill-defined conditions(799.89)       hx septic right foot    Peripheral vascular disease ([address]      Secondary hyperparathyroidism of renal origin ([address]      Thyroid disease              Past Surgical History:   Procedure Laterality Date    COLONOSCOPY N/A 12/6/2016     COLONOSCOPY performed by Claudia Alejandro MD at [facility] ENDOSCOPY    COLONOSCOPY,DIAGNOSTIC   12/6/2016           CORONARY STENT SINGLE W/PTCA   2/17/2011           HX APPENDECTOMY        HX CATARACT REMOVAL   10/18/10     left eye    HX CHOLECYSTECTOMY        HX GI         ileostomy due to infection    HX HEART CATHETERIZATION        HX HEENT hx post photocoagulation eye 2009    HX ORTHOPAEDIC        HX OTHER SURGICAL         right partial foot amputation    HX OTHER SURGICAL         cardiac cath    MI COLONOSCOPY W/BIOPSY SINGLE/MULTIPLE   5/10/2010           VASCULAR SURGERY PROCEDURE UNLIST         katelyn. leg bypasses             Social History   Substance Use Topics    Smoking status: Former Smoker       Years: 1.00       Quit date: 4/12/2003    Smokeless tobacco: Never Used         Comment: quit 5 years ago    Alcohol use No         Comment: Stopped drinking 10 years ago            Family History   Problem Relation Age of Onset    Diabetes Mother        No Known Allergies           Prior to Admission medications    Medication Sig Start Date End Date Taking? Authorizing Provider   VIRT-CAPS 1 mg capsule TK ONE C PO  DAILY 12/26/17     Historical Provider   carvedilol (COREG) 25 mg tablet TAKE 2 TABLETS PO BID 12/25/17     Historical Provider   CHOLESTYRAMINE LIGHT 4 gram packet   12/1/17     Historical Provider   cloNIDine HCl (CATAPRES) 0.1 mg tablet TK 1 T PO  TID 12/23/17     Historical Provider   escitalopram oxalate (LEXAPRO) 10 mg tablet TK 1 T PO QD 12/26/17     Historical Provider   SENSIPAR 60 mg tab Take 60 mg by mouth daily. 3/24/17     Historical Provider   LANTUS SOLOSTAR 100 unit/mL (3 mL) pen 18 Units by SubCUTAneous route daily (with dinner). 2/10/17     Historical Provider   HUMALOG KWIKPEN 100 unit/mL kwikpen   2/27/17     Historical Provider   sevelamer carbonate (RENVELA) 800 mg tab tab Take 800 mg by mouth three (3) times daily. Historical Provider   aspirin 81 mg chewable tablet Take 1 tablet by mouth daily. 1/5/15     Darian Giraldo MD   amLODIPine (NORVASC) 10 mg tablet Take 10 mg by mouth daily. Historical Provider   lisinopril (PRINIVIL, ZESTRIL) 10 mg tablet Take 40 mg by mouth two (2) times a day. Historical Provider   pravastatin (PRAVACHOL) 10 mg tablet Take 10 mg by mouth nightly. Historical Provider         REVIEW OF SYSTEMS    PHYSICAL EXAM:   Vital signs  General:                    Alert, cooperative, no distress, appears stated age. HEENT:                     Atraumatic, anicteric sclerae, pink conjunctivae                                              No oral ulcers, mucosa moist, throat clear  Neck:                                   Supple, symmetrical,  thyroid: non tender  Lungs:                       Clear to auscultation bilaterally. No Wheezing or Rhonchi. No rales. Chest wall:                No tenderness  No Accessory muscle use. Heart:                                  Regular  rhythm,  No edema  Abdomen:                  Soft, mild RUQ tenderness . Not distended. Bowel sounds normal  Extremities:               No cyanosis. No clubbing   Skin:                                    Not pale. Not Jaundiced  No rashes   (+) RLE syme type amputation   Psych:                                 Good insight. Not depressed. Not anxious or agitated. Neurologic:                EOMs intact. No facial asymmetry. No aphasia or slurred speech. Weak but equal strength, Alert and oriented X 4.            MEDICATIONS AT TRANSFER:    amLODIPine (NORVASC) tablet 10 mg Given        10 mg, PO, DAILY    02/06 0835       aspirin chewable tablet 81 mg Given       81 mg, PO, DAILY    02/06 0835       carvedilol (COREG) tablet 25 mg Given       25 mg, PO, BID WITH MEALS    02/06 0835       cinacalcet (SENSIPAR) tablet 60 mg Given       60 mg, PO, DAILY    02/06 0835       cloNIDine HCl (CATAPRES) tablet 0.1 mg Given       0.1 mg, PO, TID    02/06 0835       escitalopram oxalate (LEXAPRO) tablet 10 mg Given       10 mg, PO, DAILY    02/06 0835       famotidine (PEPCID) tablet 20 mg Given       20 mg, PO, Q48H    02/04 1200       heparin (porcine) injection 5,000 Units Given       5,000 Units, SC, Q12H    02/05 2358       insulin lispro (HUMALOG) injection Ordered       No Dose/Rate, SC, AC&HS lisinopril (PRINIVIL, ZESTRIL) tablet 40 mg Given       40 mg, PO, BID    02/06 0835       pravastatin (PRAVACHOL) tablet 10 mg Given       10 mg, PO, QHS    02/05 2305       sevelamer carbonate (RENVELA) tab 800 mg Given       800 mg, PO, TID    02/06 0932       sodium chloride (NS) flush 5-10 mL Given       10 mL, IV, Q8H    02/06 0401       Tamiflu HD Reminder- Please give dose after HD session Given       1 Each, OTHER, DAILY    02/04 0900                    PRE-MORBID FUNCTION:    Independent    CURRENT FUNCTION:     Balance:  Sitting: Intact  Standing: Impaired  Standing - Static: Good  Standing - Dynamic : Fair  Functional Mobility and Transfers for ADLs:  Bed Mobility:  Sit to Supine: Supervision  Scooting: Supervision  Transfers:  Sit to Stand: Minimum assistance                                                                                                                                    ADL Assessment:  Feeding: Independent     Oral Facial Hygiene/Grooming: Contact guard assistance     Bathing: Moderate assistance     Upper Body Dressing: Minimum assistance     Lower Body Dressing: Moderate assistance     Toileting: Moderate assistance     GENERAL MULTI-SYSTEM EXAMINATION:  VITAL SIGNS:   CONSTITUTIONAL:    EYES:   EARS, NOSE, MOUTH, AND THROAT:   NECK:   RESPIRATORY:   CARDIOVASCULAR:   ABDOMEN:   PSYCHIATRIC:   SKIN:   LYMPHATICS:   MUSCULOSKELETAL:   INSPECTION:   ROM:   STABILITY:   TONE:    MOTOR:   NEUROLOGIC:   SENSORY:   DTRS;   CN II-XII:   FINGER TO NOSE:   SPEECH:      LABS AND DIAGNOSTICS:       Results for Jewell Trejo (MRN [id number]) as of 2/6/2018 11:05   Ref.  Range 2/5/2018 04:21   WBC Latest Ref Range: 4.1 - 11.1 K/uL 7.0   NRBC Latest Ref Range: 0  WBC 0.0   RBC Latest Ref Range: 4.10 - 5.70 M/uL 3.15 (L)   HGB Latest Ref Range: 12.1 - 17.0 g/dL 8.5 (L)   HCT Latest Ref Range: 36.6 - 50.3 % 27.0 (L)   MCV Latest Ref Range: 80.0 - 99.0 FL 85.7   MCH Latest Ref Range: 26.0 - 34.0 PG 27.0   MCHC Latest Ref Range: 30.0 - 36.5 g/dL 31.5   RDW Latest Ref Range: 11.5 - 14.5 % 16.9 (H)   PLATELET Latest Ref Range: 150 - 400 K/uL 181   MPV Latest Ref Range: 8.9 - 12.9 FL 11.0   ABSOLUTE NRBC Latest Ref Range: 0.00 - 0.01 K/uL 0.00   Sodium Latest Ref Range: 136 - 145 mmol/L 136   Potassium Latest Ref Range: 3.5 - 5.1 mmol/L 4.4   Chloride Latest Ref Range: 97 - 108 mmol/L 99   CO2 Latest Ref Range: 21 - 32 mmol/L 31   Anion gap Latest Ref Range: 5 - 15 mmol/L 6   Glucose Latest Ref Range: 65 - 100 mg/dL 117 (H)   BUN Latest Ref Range: 6 - 20 MG/DL 33 (H)   Creatinine Latest Ref Range: 0.70 - 1.30 MG/DL 8.29 (H)   BUN/Creatinine ratio Latest Ref Range: 12 - 20   4 (L)   Calcium Latest Ref Range: 8.5 - 10.1 MG/DL 7.8 (L)   Phosphorus Latest Ref Range: 2.6 - 4.7 MG/DL 3.0   Magnesium Latest Ref Range: 1.6 - 2.4 mg/dL 2.5 (H)   GFR est non-AA Latest Ref Range: >60 ml/min/1.73m2 6 (L)   GFR est AA Latest Ref Range: >60 ml/min/1.73m2 8 (L)     ASSESSMENT AND PLAN: Impairment/Disability due to:  MS exacerbation  Associated Impairments: See above    Functional Deficits Summary: See Current Function above. CO-MORBID CONDITIONS/PLAN:      Co-morbid conditions which require 24-hour rehab nursing and ongoing physician oversight and management:  Viral like syndrome , improving  -afebrile for>48 hrs  -CXR clear. Influenza negative, s/p cholecystectomy. LFTs wnl.    -his wife recently had flu like symptoms and his symptoms are consistent.    -on empiric tamiflu  -stable for discharge, CM's note reviewed, pending [facility]'s eval     Symptomatic hypoglycemia with BG 40  -due to poor po intake  -lantus on hold  -cont' SSI     Weakness, s/p fall  Hx MS  -CT head negative  -monitor of MS exacerbation  -pending [facility]'s eval.      ESRD  -HD per renal      PVD, hx leg bypass    CAD, hx CRISTI to RCA 2011  NSTEMI  -cath results reviewed.   Minor luminal irregularities seen  -continue cardiac meds    POST-ADMISSION PHYSICIAN EVALUATION:  The following plan is developed in consideration of current medical and rehabilitation status and review of the preadmission assessment. Compared to the preadmission screening, the patient's current function and medical condition is the same  EXPECTED LEVEL OF IMPROVEMENT:  His rehabilitation potential is good to make improvements to overall functional goal of supervision to contact-guard assist ADLs, Mobility, and Cognition/Speech. ESTIMATED LENGTH OF stay: 10 days  ANTICIPATED discharge destination: Discharge to home  PRECAUTIONS: [address]: Moderate  POST DISCHARGE REHABILITATION treatments: Home health physical therapy occupational therapy  POST DISCHARGE MEDICAL FOLLOW-UP: NEPHROLOGY, CARDIOLOGY, PHYSICAL MEDICINE REHABILITATION INDIVIDUALIZED REHABILITATION PLAN:   1. PT, 1 to 2 hours a day, 5 to 6 days a week for gait, strengthening, range of motion, balance and endurance. 2. OT, 1 to 2 hours a day, 5 to 6 days a week for basic ADLs, strengthening, endurance, coordination and adaptive equipment. 3. Nursing, 24-hour care by specialized nurse trained in rehabilitation for vital signs monitoring, medication administration and education, pain control, nutrition, bowel and bladder management   4. Case management, 1 hour a day, 3 days a week for discharge planning and team coordination. SUMMARY STATEMENT FOR MEDICAL NECESSITY:   This patient has complex nursing, medical, and rehabilitation needs that require inpatient interdisciplinary rehabilitation. This patient requires multiple therapy disciplines, at least one of which is PT or OT, using an interdisciplinary approach. Interdisciplinary Team meetings will be held at least once a week. The patient requires intensive therapies 3 hours per day, 5 days per week, or in special instances, at least 15 hours within 7 consecutive days.   This patient can actively participate in, and can be reasonably expected to benefit significantly from, the intensive rehabilitation program.  The patient requires close medical supervision by a rehabilitation physician. Signature:_________________________________ 2/6/2018 11 AM    Malika Powers M.D.

## 2018-02-06 NOTE — ROUTINE PROCESS
/Discharge instructions reviewed withPatient. Patient verbalized understanding of instructions. Opportunities for questions were provided and explained. Patient discharged Rehab. Discharge medications reviewed with patient and appropriate educational materials and side effects teaching were provided.

## 2018-02-07 NOTE — PROGRESS NOTES
Patient listed on discharge  report on 18.  Patient discharged from [facility]  for NSTEMI.  [address] @ [phone]  to perform post hospital discharge assessment. Verified name and  with Francoise Newotn, , as identifiers. Provided introduction to self, and explanation of the NN role. Kavita was unable to provide an estimated length of stay . Unable to perform  medication reconciliation with patient or medical staff. This writer contact information provided for updates on patient. Will attempt to contact patient after discharge from skilled nursing facility.

## 2018-02-09 NOTE — TELEPHONE ENCOUNTER
----- Message from Alice Ornelas sent at 2/9/2018 12:30 PM EST -----  Regarding: Action Required: [facility] RX Error  Good afternoon,   Patient Maryse Gomes had a script sent to [facility] on 2/7/18. [employer] has sent us a message stating the patient does not use that pharmacy and it needs to be updated. The RX from 2/7/18 needs to be discontinued and a new script to the patient's correct pharmacy needs to be entered. Please let me know if additional assistance is needed. The message from [employer] is listed below. [facility] (Retail in [place] [address]) (Retail in [place] [address]) (NCPDP ID: [id number]) has reported receipt of a [address] from healthcare professional Chelly Patino ([address] [place] [address],[address]: [phone]) for a patient that does not belong to the pharmacy. This pharmacy is located in [place] [address], while the patient and provider are in [place]. Please reach out the provider to confirm where the provider intended to send the [address] message. Please note this pharmacy does not accept scripts from out of state - it is a retail pharmacy located in [facility] and should not be mistaken for [facility] ([facility] [id number]).      Thanks Teodora Swanson

## 2018-02-09 NOTE — DISCHARGE SUMMARY
Hospitalist Discharge Summary     Patient ID:  Aki Ledesma  [id number]  75 y.o.  1950    PCP on record: Vin Kaur MD    Admit date: 2/1/2018  Discharge date and time: 2/8/2018      DISCHARGE DIAGNOSIS:    Viral like syndrome , improved  Symptomatic hypoglycemia with BG 40  Generalized Weakness, s/p fall, PT recommends Rehab  Hx MS  ESRD  PVD, hx leg bypass    CAD, hx CRISTI to RCA 2011  NSTEMI  -cath results reviewed.  Minor luminal irregularities seen      CONSULTATIONS:  IP CONSULT TO HOSPITALIST  IP CONSULT TO NEPHROLOGY    Excerpted HPI from H&P of Benjamin Osei MD:    Aki Ledesma is a 75 y.o. male admitted for NSTEMI (non-ST elevated myocardial infarction) ([facility] [address] Admitted with c/o constant nausea with intermittent vomiting which began after experiencing a GLF resulting in a head injury 2 days ago. No c/o CP, SOB. Reason for fall is he has new RLE prosthesis and is unsteady. CT negative for bleed or acute process. ECG with subtle ST depressions ant/laterally, troponin 0.58-1. 72.       Significant hx of PCI/CRISTI to RCA 2011, ESRD (MWF HD), PVD, HTN and MS.       Previous treatment/evaluation includes Percutaneous Coronary Intervention, echocardiogram and stress thallium . Cardiac risk factors: family history, sedentary life style, male gender, hypertension. ______________________________________________________________________  DISCHARGE SUMMARY/HOSPITAL COURSE:  for full details see H&P, daily progress notes, labs, consult notes. Viral like syndrome , improving  -afebrile for>48 hrs  -CXR clear.  Influenza negative, s/p cholecystectomy.  LFTs wnl.    -his wife recently had flu like symptoms and his symptoms are consistent.    -on empiric tamiflu  -stable for discharge, CM's note reviewed,  Patient was admitted to the Cardiology Service for Cath which ended up Negative.  No interventions needed  Due to Viral Syndrome, Cardiology team requested Hospitalist to take over the care of the patient     Symptomatic hypoglycemia with BG 40  -due to poor po intake  -lantus on hold  -cont' SSI     Generalized Weakness, s/p fall   Multiple Sclerosis  -CT head negative  -monitor of MS exacerbation  PT is recommending SA[facility]      ESRD  -HD per renal      PVD, hx leg bypass    CAD, hx CRISTI to RCA 2011  -cath results reviewed.  Minor luminal irregularities seen, no intervetion DOne  -continue cardiac meds      Pt was transferred to medicine service as per request of Dr Pepper Medina on 2/2/18      Code Status:  Full  DVT Prophylaxis:  Heparin SQ        _______________________________________________________________________  Patient seen and examined by me on discharge day. Pertinent Findings:  Gen:    Not in distress  Chest: Clear lungs  CVS:   Regular rhythm. No edema  Abd:  Soft, not distended, not tender  Neuro:  Alert, Ox3  _______________________________________________________________________  DISCHARGE MEDICATIONS:   Discharge Medication List as of 2/6/2018  3:07 PM      START taking these medications    Details   oseltamivir (TAMIFLU) 30 mg capsule Take 1 Cap by mouth DIALYSIS PRN (Give after HD session only  ( 4 doses since 2/2). , Normal, Disp-2 Cap, R-0         CONTINUE these medications which have NOT CHANGED    Details   VIRT-CAPS 1 mg capsule TK ONE C PO  DAILY, Historical Med, R-3, TAYLER      carvedilol (COREG) 25 mg tablet TAKE 2 TABLETS PO BID, Historical Med, R-3      CHOLESTYRAMINE LIGHT 4 gram packet Historical Med, TAYLER      cloNIDine HCl (CATAPRES) 0.1 mg tablet TK 1 T PO  TID, Historical Med, R-3      escitalopram oxalate (LEXAPRO) 10 mg tablet TK 1 T PO QD, Historical Med, R-3      SENSIPAR 60 mg tab Take 60 mg by mouth daily. , Historical Med, TAYLER      sevelamer carbonate (RENVELA) 800 mg tab tab Take 800 mg by mouth three (3) times daily. , Historical Med      aspirin 81 mg chewable tablet Take 1 tablet by mouth daily. , OTC      amLODIPine (NORVASC) 10 mg tablet Take 10 mg by mouth daily. , Historical Med      lisinopril (PRINIVIL, ZESTRIL) 10 mg tablet Take 40 mg by mouth two (2) times a day., Historical Med      pravastatin (PRAVACHOL) 10 mg tablet Take 10 mg by mouth nightly., Historical Med         STOP taking these medications       LANTUS SOLOSTAR 100 unit/mL (3 mL) pen Comments:   Reason for Stopping:         HUMALOG KWIKPEN 100 unit/mL kwikpen Comments:   Reason for Stopping:               My Recommended Diet, Activity, Wound Care, and follow-up labs are listed in the patient's Discharge Insturctions which I have personally completed and reviewed.     ______________________________________________________________________    Risk of deterioration: Low    Condition at Discharge:  Stable  ______________________________________________________________________    Disposition  IP Rehab  ______________________________________________________________________    Care Plan discussed with:   Patient, Consultant    ______________________________________________________________________    Code Status: Full Code  ______________________________________________________________________      Follow up with:   PCP : Hipolito Fuentes MD  Follow-up Information     Follow up With Details Comments Contact Info    Hipolito Fuentes MD Schedule an appointment as soon as possible for a visit in 1 month  [address] [place] [address] [phone]                Total time in minutes spent coordinating this discharge (includes going over instructions, follow-up, prescriptions, and preparing report for sign off to her PCP) :  30 minutes    Signed:  Ousmane Reece MD

## 2018-02-27 NOTE — PROGRESS NOTES
Spoke with  Zoie Quinteros after verifying name, HIPPA, and . Zoie Quinteros reports patient is scheduled to see Dr. Jacob Huff on 18 and Dr. Daniel Connelly on 3/1/18. Zoie Quinteros questions if provider has signed orders and faxed back for prosthesis      Spoke with Vishnu Saunders LPN and she has faxed prosthetic information to company as requested. Spoke with Zoie Quinteros and advised ordered has been signed and faxed back to company on 18.

## 2018-02-28 NOTE — PROGRESS NOTES
1. Have you been to the ER, urgent care clinic since your last visit? Hospitalized since your last visit? Yes Where: 2/18 [facility]    2. Have you seen or consulted any other health care providers outside of the [address] [place] [address] since your last visit? Include any pap smears or colon screening. Yes When: 2/18 [facility]     Patient C/O dizziness .

## 2018-02-28 NOTE — PROGRESS NOTES
[address] [address]  [phone]     Subjective:      Vitaliy Saavedra is a 70 y.o. male with hx HTN, ESRD, HLD, CAD hx PCI/CRISTI to RCA 2011 is here for f/u after recent hospitalization 2/1/18 where he was admitted after GLF / Flu + / NSTEMI. Cardiac catheterization was performed and showed minor luminal irregularities which did not require intervention. He has no cardiac complaints, and specifically denies chest pain/ shortness of breath, orthopnea, PND, LE edema, palpitations, syncope, or presyncope. Reports gas pain / hemorrhoid. Utilizes R leg prosthesis, walker to aid with ambulation.     Patient Active Problem List    Diagnosis Date Noted    NSTEMI (non-ST elevated myocardial infarction) ([address] 02/01/2018     Priority: 1 - One    Type 2 diabetes with nephropathy ([address] 03/01/2018    History of colonoscopy 03/29/2017    Diabetic oculomotor palsy ([address] 03/25/2016    Diabetic peripheral neuropathy associated with type 2 diabetes mellitus ([address] 03/25/2016    Carpal tunnel syndrome on both sides 07/30/2015    Ulnar neuropathy at elbow of right upper extremity 07/30/2015    Ulnar neuropathy at elbow of left upper extremity 04/21/2015    Cervical stenosis of spinal canal 01/04/2015    Ataxia 01/04/2015    Multiple sclerosis ([address] 01/04/2015    ESRD on hemodialysis ([address] 12/30/2014    ASHD (arteriosclerotic heart disease) 04/11/2013    Mixed hyperlipidemia 04/11/2013    S/P coronary artery stent placement 02/18/2011    PAD (peripheral artery disease) ([address] 12/09/2010    Hypertension 04/12/2010      Jayashree Francisco MD  Past Medical History:   Diagnosis Date    Anemia associated with chronic renal failure     Autoimmune disease ([address]     hx ms    CAD (coronary artery disease)     Chronic kidney disease     catheter removed and no dialysis at this time    Chronic kidney disease     left arm fistula dialysis MWF    Diabetes ([address]     type II    GERD (gastroesophageal reflux disease)     Hypertension     Ill-defined condition     prostate cancer    Multiple sclerosis (HCC)     diagnosed '01    Other ill-defined conditions(799.89)     anemia    Other ill-defined conditions(799.89)     elevated cholesterol    Other ill-defined conditions(799.89)     hx septic right foot    Peripheral vascular disease (HCC)     Secondary hyperparathyroidism of renal origin ([facility] [address]     Thyroid disease       Past Surgical History:   Procedure Laterality Date    COLONOSCOPY N/A 12/6/2016    COLONOSCOPY performed [facility]ot[facility] at [facility]OSCOPY    COLONOSCOPY,DIAGNOSTIC  12/6/2016         CORONARY STENT SINGLE W/PTCA  2/17/2011         HX APPENDECTOMY      HX CATARACT REMOVAL  10/18/10    left eye    HX CHOLECYSTECTOMY      HX GI      ileostomy due to infection    HX HEART CATHETERIZATION      HX HEENT      hx post photocoagulation eye 2009    HX ORTHOPAEDIC      HX OTHER SURGICAL      right partial foot amputation    HX OTHER SURGICAL      cardiac cath    HX OTHER SURGICAL      prostate removed    RI COLONOSCOPY W/BIOPSY SINGLE/MULTIPLE  5/10/2010         UPPER GI ENDOSCOPY,BIOPSY  4/17/2018         VASCULAR SURGERY PROCEDURE UNLIST      katelyn. leg bypasses     No Known Allergies   Family History   Problem Relation Age of Onset    Diabetes Mother       Social History     Social History    Marital status:      Spouse name: N/A    Number of children: N/A    Years of education: N/A     Occupational History    not working      Social History Main Topics    Smoking status: Former Smoker     Years: 1.00     Quit date: 4/12/2003    Smokeless tobacco: Never Used      Comment: quit 5 years ago    Alcohol use No      Comment: Stopped drinking 10 years ago    Drug use: No    Sexual activity: Yes     Partners: Female     Birth control/ protection: None     Other Topics Concern    Not on file     Social History Narrative      Current Outpatient Prescriptions   Medication Sig    glucose blood VI test strips (TRUE METRIX GLUCOSE TEST STRIP) strip Patient test blood sugar 3 times daily.  VIRT-CAPS 1 mg capsule TK ONE C PO  DAILY    carvedilol (COREG) 25 mg tablet TAKE 2 TABLETS PO BID    cloNIDine HCl (CATAPRES) 0.1 mg tablet TK 1 T PO  TID    escitalopram oxalate (LEXAPRO) 10 mg tablet TK 1 T PO QD    sevelamer carbonate (RENVELA) 800 mg tab tab Take 800 mg by mouth three (3) times daily.  aspirin 81 mg chewable tablet Take 1 tablet by mouth daily.  amLODIPine (NORVASC) 10 mg tablet Take 10 mg by mouth daily.  pravastatin (PRAVACHOL) 10 mg tablet Take 10 mg by mouth nightly.  insulin NPH (HUMULIN N NPH INSULIN KWIKPEN) 100 unit/mL (3 mL) inpn 18 Units by SubCUTAneous route daily.  insulin NPH (NOVOLIN N, HUMULIN N) 100 unit/mL injection 18 units every AM    lisinopril (PRINIVIL, ZESTRIL) 40 mg tablet TAKE 1 T PO  DAILY    acetaminophen-codeine (TYLENOL #3) 300-30 mg per tablet Take 1 Tab by mouth every four (4) hours as needed for Pain. Max Daily Amount: 6 Tabs.  insulin glargine (LANTUS,BASAGLAR) 100 unit/mL (3 mL) inpn 10 units at bedtime    SENSIPAR 60 mg tab Take 60 mg by mouth daily. No current facility-administered medications for this visit. Review of Symptoms:  11 systems reviewed, negative other than as stated in the HPI    Physical ExamPhysical Exam:    Vitals:    02/28/18 1135   BP: 90/50   Pulse: 70   Resp: 16   SpO2: 99%   Weight: 165 lb 4.8 oz (75 kg)   Height: 5' 10\" (1.778 m)     Body mass index is 23.72 kg/(m^2). General PE   Gen:  NAD  Mental Status - Alert. General Appearance - Not in acute distress. Chest and Lung Exam   Inspection: Accessory muscles - No use of accessory muscles in breathing.    Auscultation:   Breath sounds: - Normal.   Cardiovascular   Inspection: Jugular vein - Bilateral - Inspection Normal.   Palpation/Percussion:   Apical Impulse: - Normal.   Auscultation: Rhythm - Regular. Heart Sounds - S1 WNL and S2 WNL. No S3 or S4. Murmurs & Other Heart Sounds: Auscultation of the heart reveals - No Murmurs. Peripheral Vascular   Upper Extremity: Inspection - Bilateral - No Cyanotic nailbeds or Digital clubbing. Lower Extremity:   Palpation: Edema - Bilateral - trace  Abdomen:   Soft, non-tender, bowel sounds are active. Neuro: A&O times 3, CN and motor grossly WNL    Labs:   Lab Results   Component Value Date/Time    Cholesterol, total 112 02/02/2018 05:32 AM    Cholesterol, total 121 01/04/2018 12:29 PM    Cholesterol, total 153 12/10/2010 05:00 AM    HDL Cholesterol 47 02/02/2018 05:32 AM    HDL Cholesterol 65 01/04/2018 12:29 PM    HDL Cholesterol 62 12/10/2010 05:00 AM    LDL, calculated 39 02/02/2018 05:32 AM    LDL, calculated 45 01/04/2018 12:29 PM    LDL, calculated 73.8 12/10/2010 05:00 AM    Triglyceride 130 02/02/2018 05:32 AM    Triglyceride 55 01/04/2018 12:29 PM    Triglyceride 86 12/10/2010 05:00 AM    CHOL/HDL Ratio 2.4 02/02/2018 05:32 AM    CHOL/HDL Ratio 2.5 12/10/2010 05:00 AM     Lab Results   Component Value Date/Time    CK 90 02/01/2018 01:50 PM     Lab Results   Component Value Date/Time    Sodium 137 04/05/2018 04:15 PM    Potassium 4.4 04/05/2018 04:15 PM    Chloride 96 04/05/2018 04:15 PM    CO2 30 (H) 04/05/2018 04:15 PM    Anion gap 7 02/14/2018 01:36 PM    Glucose 74 04/05/2018 04:15 PM    BUN 22 04/05/2018 04:15 PM    Creatinine 5.05 (H) 04/05/2018 04:15 PM    BUN/Creatinine ratio 4 (L) 04/05/2018 04:15 PM    GFR est AA 12 (L) 04/05/2018 04:15 PM    GFR est non-AA 11 (L) 04/05/2018 04:15 PM    Calcium 8.9 04/05/2018 04:15 PM    Bilirubin, total 0.3 04/05/2018 04:15 PM    AST (SGOT) 18 04/05/2018 04:15 PM    Alk.  phosphatase 165 (H) 04/05/2018 04:15 PM    Protein, total 7.7 04/05/2018 04:15 PM    Albumin 2.7 (L) 04/05/2018 04:15 PM    Globulin 4.7 (H) 02/03/2018 04:05 AM    A-G Ratio 0.5 (L) 04/05/2018 04:15 PM    ALT (SGPT) 20 04/05/2018 04:15 PM       EKG:  SR with 1 AVB, ventricular rate 69     Assessment:     Assessment:      1. Mixed hyperlipidemia    2. ESRD on hemodialysis ([facility] [address]    3. Essential hypertension    4. NSTEMI (non-ST elevated myocardial infarction) ([facility] [address]    5. ASHD (arteriosclerotic heart disease)        Orders Placed This Encounter    AMB POC GLUCOSE BLOOD, BY GLUCOSE MONITORING DEVICE    AMB POC EKG ROUTINE W/ 12 LEADS, INTER & REP     Order Specific Question:   Reason for Exam:     Answer:   routine    DISCONTD: lisinopril (PRINIVIL, ZESTRIL) 40 mg tablet     Sig: TK 1 T PO  DAILY     Refill:  3        Plan:     NSTEMI  CAD, PCI/CRISTI to RCA 2011  HLD  Negative cardiac cath 02/18  Continue on ASA, BB, statin     HTN in the setting of ESRD  Stable. Continue with BB, ACEI, Norvasc, clonidine  Dialysis pt. Follows with Dr Chas Hooker    Doing well with no cardiac symptoms. Lipids and labs followed by PCP. Continue current care and f/u in 6 months.     Tu Almendarez MD

## 2018-03-01 PROBLEM — E11.21 TYPE 2 DIABETES WITH NEPHROPATHY (HCC): Status: ACTIVE | Noted: 2018-01-01

## 2018-03-01 PROBLEM — J11.1 INFLUENZA: Status: RESOLVED | Noted: 2018-01-01 | Resolved: 2018-01-01

## 2018-03-01 NOTE — PROGRESS NOTES
Chief Complaint   Patient presents with   [facility] Follow Up     HPI:  Heide Hernandez is a 75 y.o. AA male with ESRD, s/p kidney transplant 8/28/17 followed by rejection X 2, back on dialysis(MWF),Hypertension managed on Amlodipne, lisinopril, clonidine, carvedilog  DM, will controlled on Lantus 18 units, humalog on sliding scale, follows endocrinologist, S/P right BKA, Depression and anxiety, hypercholesterolemia, on pravastatin 10 mg. Patient presents to transition care. Patient was admitted 2/01/18-2/08/18Today, he has no new complaints Viral with l like syndrome, Symptomatic hypoglycemia, Generalized Weakness  Patient was discharged to rehab.  He was just discharged from rehab and is doing good.      Review of Systems  As per hpi  Past Medical History:   Diagnosis Date    Anemia associated with chronic renal failure     Autoimmune disease (HCC)     hx ms    CAD (coronary artery disease)     Chronic kidney disease     catheter removed and no dialysis at this time    Chronic kidney disease     left arm fistula dialysis MWF    Diabetes ([address]     type II    GERD (gastroesophageal reflux disease)     Hypertension     Multiple sclerosis ([address]     diagnosed '01    Other ill-defined conditions(799.89)     anemia    Other ill-defined conditions(799.89)     elevated cholesterol    Other ill-defined conditions(799.89)     hx septic right foot    Peripheral vascular disease ([address]     Secondary hyperparathyroidism of renal origin ([address]     Thyroid disease      Past Surgical History:   Procedure Laterality Date    COLONOSCOPY N/A 12/6/2016    COLONOSCOPY performed by Beth Jacobson., [facility] at [facility] ENDOSCOPY    COLONOSCOPY,DIAGNOSTIC  12/6/2016         CORONARY STENT SINGLE W/PTCA  2/17/2011         HX APPENDECTOMY      HX CATARACT REMOVAL  10/18/10    left eye    HX CHOLECYSTECTOMY      HX GI      ileostomy due to infection    HX HEART CATHETERIZATION      HX HEENT      hx post photocoagulation eye 2009    HX ORTHOPAEDIC      HX OTHER SURGICAL      right partial foot amputation    HX OTHER SURGICAL      cardiac cath    SD COLONOSCOPY W/BIOPSY SINGLE/MULTIPLE  5/10/2010         VASCULAR SURGERY PROCEDURE UNLIST      katelyn. leg bypasses     Social History     Social History    Marital status:      Spouse name: N/A    Number of children: N/A    Years of education: N/A     Occupational History    not working      Social History Main Topics    Smoking status: Former Smoker     Years: 1.00     Quit date: 4/12/2003    Smokeless tobacco: Never Used      Comment: quit 5 years ago    Alcohol use No      Comment: Stopped drinking 10 years ago    Drug use: No    Sexual activity: Yes     Partners: Female     Birth control/ protection: None     Other Topics Concern    Not on file     Social History Narrative     Family History   Problem Relation Age of Onset    Diabetes Mother      Current Outpatient Prescriptions   Medication Sig Dispense Refill    insulin NPH (NOVOLIN N NPH U-100 INSULIN) 100 unit/mL injection by SubCUTAneous route once.  lisinopril (PRINIVIL, ZESTRIL) 40 mg tablet TK 1 T PO  DAILY  3    glucose blood VI test strips (TRUE METRIX GLUCOSE TEST STRIP) strip Patient test blood sugar 3 times daily. 100 Strip 3    VIRT-CAPS 1 mg capsule TK ONE C PO  DAILY  3    carvedilol (COREG) 25 mg tablet TAKE 2 TABLETS PO BID  3    CHOLESTYRAMINE LIGHT 4 gram packet       cloNIDine HCl (CATAPRES) 0.1 mg tablet TK 1 T PO  TID  3    escitalopram oxalate (LEXAPRO) 10 mg tablet TK 1 T PO QD  3    SENSIPAR 60 mg tab Take 60 mg by mouth daily.  sevelamer carbonate (RENVELA) 800 mg tab tab Take 800 mg by mouth three (3) times daily.  aspirin 81 mg chewable tablet Take 1 tablet by mouth daily.  amLODIPine (NORVASC) 10 mg tablet Take 10 mg by mouth daily.  pravastatin (PRAVACHOL) 10 mg tablet Take 10 mg by mouth nightly.        No Known Allergies    Objective:  Visit Vitals    /70    Pulse 82    Temp 99.2 °F (37.3 °C) (Oral)    Resp 20    Ht 5' 10\" (1.778 m)    Wt 153 lb (69.4 kg)    SpO2 98%    BMI 21.95 kg/m2     Physical Exam:   General appearance - alert, well appearing in no distress  Mental status - alert, oriented to person, place, and time  EYE-PERRL, EOMI  ENT-ENT exam normal, no neck nodes or sinus tenderness  Nose - normal and patent, no erythema, discharge or polyps  Mouth - mucous membranes moist, pharynx normal without lesions  Neck - supple, no significant adenopathy   Chest - clear to auscultation, no wheezes, rales or rhonchi  Heart - normal rate, regular rhythm, normal S1, S2, no murmurs  Abdomen - soft, nontender, nondistended, no organomegaly  Ext-peripheral pulses normal, right BKA  Neuro -alert, oriented, normal speech, no focal findings   Back-full range of motion, no tenderness, palpable spasm or pain on motion     Assessment/Plan:  Diagnoses and all orders for this visit:    Diabetic peripheral neuropathy associated with type 2 diabetes mellitus (HCC)  -     METABOLIC PANEL, COMPREHENSIVE    ESRD on hemodialysis ([facility]  -     METABOLIC PANEL, COMPREHENSIVE    Healthcare maintenance  -     varicella zoster vaccine live (VARICELLA-ZOSTER VACINE LIVE) 19,400 unit/0.65 mL susr injection; 1 Vial by SubCUTAneous route once for 1 dose., Print, Disp-0.65 mL, R-0    Controlled type 2 diabetes mellitus with diabetic nephropathy, with long-term current use of insulin ([facility])  -     AMB POC HEMOGLOBIN J1B  -     METABOLIC PANEL, COMPREHENSIVE  -     insulin glargine (LANTUS,BASAGLAR) 100 unit/mL (3 mL) inpn; 10 units at bedtime, Normal, Disp-3 Pen, R-3  -     insulin NPH (NOVOLIN N, HUMULIN N) 100 unit/mL injection; 18 units in the am, Normal, Disp-10 Units, R-3    Mixed hyperlipidemia    Colon cancer screening  -     OCCULT BLOOD, IMMUNOASSAY (FIT)    Hypovitaminosis D  -     VITAMIN D, 25 HYDROXY    Hypertension goal BP (blood pressure) < 130/80    Other orders  -     CKD REPORT  -     DIABETES PATIENT EDUCATION      Patient Instructions        Low Sodium Diet (2,000 Milligram): Care Instructions  Your Care Instructions    Too much sodium causes your body to hold on to extra water. This can raise your blood pressure and force your heart and kidneys to work harder. In very serious cases, this could cause you to be put in the hospital. It might even be life-threatening. By limiting sodium, you will feel better and lower your risk of serious problems. The most common source of sodium is salt. People get most of the salt in their diet from canned, prepared, and packaged foods. Fast food and restaurant meals also are very high in sodium. Your doctor will probably limit your sodium to less than 2,000 milligrams (mg) a day. This limit counts all the sodium in prepared and packaged foods and any salt you add to your food. Follow-up care is a key part of your treatment and safety. Be sure to make and go to all appointments, and call your doctor if you are having problems. It's also a good idea to know your test results and keep a list of the medicines you take. How can you care for yourself at home? Read food labels  · Read labels on cans and food packages. The labels tell you how much sodium is in each serving. Make sure that you look at the serving size. If you eat more than the serving size, you have eaten more sodium. · Food labels also tell you the Percent Daily Value for sodium. Choose products with low Percent Daily Values for sodium. · Be aware that sodium can come in forms other than salt, including monosodium glutamate (MSG), sodium citrate, and sodium bicarbonate (baking soda). MSG is often added to Asian food. When you eat out, you can sometimes ask for food without MSG or added salt.   Buy low-sodium foods  · Buy foods that are labeled \"unsalted\" (no salt added), \"sodium-free\" (less than 5 mg of sodium per serving), or \"low-sodium\" (less than 140 mg of sodium per serving). Foods labeled \"reduced-sodium\" and \"light sodium\" may still have too much sodium. Be sure to read the label to see how much sodium you are getting. · Buy fresh vegetables, or frozen vegetables without added sauces. Buy low-sodium versions of canned vegetables, soups, and other canned goods. Prepare low-sodium meals  · Cut back on the amount of salt you use in cooking. This will help you adjust to the taste. Do not add salt after cooking. One teaspoon of salt has about 2,300 mg of sodium. · Take the salt shaker off the table. · Flavor your food with garlic, lemon juice, onion, vinegar, herbs, and spices. Do not use soy sauce, lite soy sauce, steak sauce, onion salt, garlic salt, celery salt, mustard, or ketchup on your food. · Use low-sodium salad dressings, sauces, and ketchup. Or make your own salad dressings and sauces without adding salt. · Use less salt (or none) when recipes call for it. You can often use half the salt a recipe calls for without losing flavor. Other foods such as rice, pasta, and grains do not need added salt. · Rinse canned vegetables, and cook them in fresh water. This removes some-but not all-of the salt. · Avoid water that is naturally high in sodium or that has been treated with water softeners, which add sodium. Call your local water company to find out the sodium content of your water supply. If you buy bottled water, read the label and choose a sodium-free brand. Avoid high-sodium foods  · Avoid eating:  ¨ Smoked, cured, salted, and canned meat, fish, and poultry. ¨ Ham, prince, hot dogs, and luncheon meats. ¨ Regular, hard, and processed cheese and regular peanut butter. ¨ Crackers with salted tops, and other salted snack foods such as pretzels, chips, and salted popcorn. ¨ Frozen prepared meals, unless labeled low-sodium.   ¨ Canned and dried soups, broths, and bouillon, unless labeled sodium-free or low-sodium. ¨ Canned vegetables, unless labeled sodium-free or low-sodium. ¨ Western Conchita fries, pizza, tacos, and other fast foods. ¨ Pickles, olives, ketchup, and other condiments, especially soy sauce, unless labeled sodium-free or low-sodium. Where can you learn more? Go to http://lesly-delfino.info/. Enter E511 in the search box to learn more about \"Low Sodium Diet (2,000 Milligram): Care Instructions. \"  Current as of: May 12, 2017  Content Version: 11.4  © 1483-4788 [employer]. Care instructions adapted under license by "[employer] (which disclaims liability or warranty for this information). If you have questions about a medical condition or this instruction, always ask your healthcare professional. [address] any warranty or liability for your use of this information. Low Sodium Diet (2,000 Milligram): Care Instructions  Your Care Instructions    Too much sodium causes your body to hold on to extra water. This can raise your blood pressure and force your heart and kidneys to work harder. In very serious cases, this could cause you to be put in the hospital. It might even be life-threatening. By limiting sodium, you will feel better and lower your risk of serious problems. The most common source of sodium is salt. People get most of the salt in their diet from canned, prepared, and packaged foods. Fast food and restaurant meals also are very high in sodium. Your doctor will probably limit your sodium to less than 2,000 milligrams (mg) a day. This limit counts all the sodium in prepared and packaged foods and any salt you add to your food. Follow-up care is a key part of your treatment and safety. Be sure to make and go to all appointments, and call your doctor if you are having problems. It's also a good idea to know your test results and keep a list of the medicines you take. How can you care for yourself at home?   Read food labels  · Read labels on cans and food packages. The labels tell you how much sodium is in each serving. Make sure that you look at the serving size. If you eat more than the serving size, you have eaten more sodium. · Food labels also tell you the Percent Daily Value for sodium. Choose products with low Percent Daily Values for sodium. · Be aware that sodium can come in forms other than salt, including monosodium glutamate (MSG), sodium citrate, and sodium bicarbonate (baking soda). MSG is often added to Asian food. When you eat out, you can sometimes ask for food without MSG or added salt. Buy low-sodium foods  · Buy foods that are labeled \"unsalted\" (no salt added), \"sodium-free\" (less than 5 mg of sodium per serving), or \"low-sodium\" (less than 140 mg of sodium per serving). Foods labeled \"reduced-sodium\" and \"light sodium\" may still have too much sodium. Be sure to read the label to see how much sodium you are getting. · Buy fresh vegetables, or frozen vegetables without added sauces. Buy low-sodium versions of canned vegetables, soups, and other canned goods. Prepare low-sodium meals  · Cut back on the amount of salt you use in cooking. This will help you adjust to the taste. Do not add salt after cooking. One teaspoon of salt has about 2,300 mg of sodium. · Take the salt shaker off the table. · Flavor your food with garlic, lemon juice, onion, vinegar, herbs, and spices. Do not use soy sauce, lite soy sauce, steak sauce, onion salt, garlic salt, celery salt, mustard, or ketchup on your food. · Use low-sodium salad dressings, sauces, and ketchup. Or make your own salad dressings and sauces without adding salt. · Use less salt (or none) when recipes call for it. You can often use half the salt a recipe calls for without losing flavor. Other foods such as rice, pasta, and grains do not need added salt. · Rinse canned vegetables, and cook them in fresh water.  This removes some-but not all-of the salt.  · Avoid water that is naturally high in sodium or that has been treated with water softeners, which add sodium. Call your local water company to find out the sodium content of your water supply. If you buy bottled water, read the label and choose a sodium-free brand. Avoid high-sodium foods  · Avoid eating:  ¨ Smoked, cured, salted, and canned meat, fish, and poultry. ¨ Ham, prince, hot dogs, and luncheon meats. ¨ Regular, hard, and processed cheese and regular peanut butter. ¨ Crackers with salted tops, and other salted snack foods such as pretzels, chips, and salted popcorn. ¨ Frozen prepared meals, unless labeled low-sodium. ¨ Canned and dried soups, broths, and bouillon, unless labeled sodium-free or low-sodium. ¨ Canned vegetables, unless labeled sodium-free or low-sodium. ¨ Western Conchita fries, pizza, tacos, and other fast foods. ¨ Pickles, olives, ketchup, and other condiments, especially soy sauce, unless labeled sodium-free or low-sodium. Where can you learn more? Go to http://lesly-delfino.info/. Enter M384 in the search box to learn more about \"Low Sodium Diet (2,000 Milligram): Care Instructions. \"  Current as of: May 12, 2017  Content Version: 11.4  © 3578-9568 "[employer]. Care instructions adapted under license by [employer] (which disclaims liability or warranty for this information). If you have questions about a medical condition or this instruction, always ask your healthcare professional. [place] [address] any warranty or liability for your use of this information. Follow-up Disposition:  Return in about 4 months (around 7/1/2018), or if symptoms worsen or fail to improve, for routine follow up.

## 2018-03-01 NOTE — PATIENT INSTRUCTIONS
Low Sodium Diet (2,000 Milligram): Care Instructions  Your Care Instructions    Too much sodium causes your body to hold on to extra water. This can raise your blood pressure and force your heart and kidneys to work harder. In very serious cases, this could cause you to be put in the hospital. It might even be life-threatening. By limiting sodium, you will feel better and lower your risk of serious problems. The most common source of sodium is salt. People get most of the salt in their diet from canned, prepared, and packaged foods. Fast food and restaurant meals also are very high in sodium. Your doctor will probably limit your sodium to less than 2,000 milligrams (mg) a day. This limit counts all the sodium in prepared and packaged foods and any salt you add to your food. Follow-up care is a key part of your treatment and safety. Be sure to make and go to all appointments, and call your doctor if you are having problems. It's also a good idea to know your test results and keep a list of the medicines you take. How can you care for yourself at home? Read food labels  · Read labels on cans and food packages. The labels tell you how much sodium is in each serving. Make sure that you look at the serving size. If you eat more than the serving size, you have eaten more sodium. · Food labels also tell you the Percent Daily Value for sodium. Choose products with low Percent Daily Values for sodium. · Be aware that sodium can come in forms other than salt, including monosodium glutamate (MSG), sodium citrate, and sodium bicarbonate (baking soda). MSG is often added to Asian food. When you eat out, you can sometimes ask for food without MSG or added salt. Buy low-sodium foods  · Buy foods that are labeled \"unsalted\" (no salt added), \"sodium-free\" (less than 5 mg of sodium per serving), or \"low-sodium\" (less than 140 mg of sodium per serving).  Foods labeled \"reduced-sodium\" and \"light sodium\" may still have too much sodium. Be sure to read the label to see how much sodium you are getting. · Buy fresh vegetables, or frozen vegetables without added sauces. Buy low-sodium versions of canned vegetables, soups, and other canned goods. Prepare low-sodium meals  · Cut back on the amount of salt you use in cooking. This will help you adjust to the taste. Do not add salt after cooking. One teaspoon of salt has about 2,300 mg of sodium. · Take the salt shaker off the table. · Flavor your food with garlic, lemon juice, onion, vinegar, herbs, and spices. Do not use soy sauce, lite soy sauce, steak sauce, onion salt, garlic salt, celery salt, mustard, or ketchup on your food. · Use low-sodium salad dressings, sauces, and ketchup. Or make your own salad dressings and sauces without adding salt. · Use less salt (or none) when recipes call for it. You can often use half the salt a recipe calls for without losing flavor. Other foods such as rice, pasta, and grains do not need added salt. · Rinse canned vegetables, and cook them in fresh water. This removes some-but not all-of the salt. · Avoid water that is naturally high in sodium or that has been treated with water softeners, which add sodium. Call your local water company to find out the sodium content of your water supply. If you buy bottled water, read the label and choose a sodium-free brand. Avoid high-sodium foods  · Avoid eating:  ¨ Smoked, cured, salted, and canned meat, fish, and poultry. ¨ Ham, prince, hot dogs, and luncheon meats. ¨ Regular, hard, and processed cheese and regular peanut butter. ¨ Crackers with salted tops, and other salted snack foods such as pretzels, chips, and salted popcorn. ¨ Frozen prepared meals, unless labeled low-sodium. ¨ Canned and dried soups, broths, and bouillon, unless labeled sodium-free or low-sodium. ¨ Canned vegetables, unless labeled sodium-free or low-sodium. ¨ [employer] fries, pizza, tacos, and other fast foods.   Marlene Chung olives, ketchup, and other condiments, especially soy sauce, unless labeled sodium-free or low-sodium. Where can you learn more? Go to http://lesly-delfino.info/. Enter D506 in the search box to learn more about \"Low Sodium Diet (2,000 Milligram): Care Instructions. \"  Current as of: May 12, 2017  Content Version: 11.4  © 6172-8873 [employer]. Care instructions adapted under license by [employer] (which disclaims liability or warranty for this information). If you have questions about a medical condition or this instruction, always ask your healthcare professional. [address] any warranty or liability for your use of this information. Low Sodium Diet (2,000 Milligram): Care Instructions  Your Care Instructions    Too much sodium causes your body to hold on to extra water. This can raise your blood pressure and force your heart and kidneys to work harder. In very serious cases, this could cause you to be put in the hospital. It might even be life-threatening. By limiting sodium, you will feel better and lower your risk of serious problems. The most common source of sodium is salt. People get most of the salt in their diet from canned, prepared, and packaged foods. Fast food and restaurant meals also are very high in sodium. Your doctor will probably limit your sodium to less than 2,000 milligrams (mg) a day. This limit counts all the sodium in prepared and packaged foods and any salt you add to your food. Follow-up care is a key part of your treatment and safety. Be sure to make and go to all appointments, and call your doctor if you are having problems. It's also a good idea to know your test results and keep a list of the medicines you take. How can you care for yourself at home? Read food labels  · Read labels on cans and food packages. The labels tell you how much sodium is in each serving. Make sure that you look at the serving size. If you eat more than the serving size, you have eaten more sodium. · Food labels also tell you the Percent Daily Value for sodium. Choose products with low Percent Daily Values for sodium. · Be aware that sodium can come in forms other than salt, including monosodium glutamate (MSG), sodium citrate, and sodium bicarbonate (baking soda). MSG is often added to Asian food. When you eat out, you can sometimes ask for food without MSG or added salt. Buy low-sodium foods  · Buy foods that are labeled \"unsalted\" (no salt added), \"sodium-free\" (less than 5 mg of sodium per serving), or \"low-sodium\" (less than 140 mg of sodium per serving). Foods labeled \"reduced-sodium\" and \"light sodium\" may still have too much sodium. Be sure to read the label to see how much sodium you are getting. · Buy fresh vegetables, or frozen vegetables without added sauces. Buy low-sodium versions of canned vegetables, soups, and other canned goods. Prepare low-sodium meals  · Cut back on the amount of salt you use in cooking. This will help you adjust to the taste. Do not add salt after cooking. One teaspoon of salt has about 2,300 mg of sodium. · Take the salt shaker off the table. · Flavor your food with garlic, lemon juice, onion, vinegar, herbs, and spices. Do not use soy sauce, lite soy sauce, steak sauce, onion salt, garlic salt, celery salt, mustard, or ketchup on your food. · Use low-sodium salad dressings, sauces, and ketchup. Or make your own salad dressings and sauces without adding salt. · Use less salt (or none) when recipes call for it. You can often use half the salt a recipe calls for without losing flavor. Other foods such as rice, pasta, and grains do not need added salt. · Rinse canned vegetables, and cook them in fresh water. This removes some-but not all-of the salt. · Avoid water that is naturally high in sodium or that has been treated with water softeners, which add sodium.  Call your local water company to find out the sodium content of your water supply. If you buy bottled water, read the label and choose a sodium-free brand. Avoid high-sodium foods  · Avoid eating:  ¨ Smoked, cured, salted, and canned meat, fish, and poultry. ¨ Ham, prince, hot dogs, and luncheon meats. ¨ Regular, hard, and processed cheese and regular peanut butter. ¨ Crackers with salted tops, and other salted snack foods such as pretzels, chips, and salted popcorn. ¨ Frozen prepared meals, unless labeled low-sodium. ¨ Canned and dried soups, broths, and bouillon, unless labeled sodium-free or low-sodium. ¨ Canned vegetables, unless labeled sodium-free or low-sodium. ¨ [employer] fries, pizza, tacos, and other fast foods. ¨ Pickles, olives, ketchup, and other condiments, especially soy sauce, unless labeled sodium-free or low-sodium. Where can you learn more? Go to http://lesly-delfino.info/. Enter N832 in the search box to learn more about \"Low Sodium Diet (2,000 Milligram): Care Instructions. \"  Current as of: May 12, 2017  Content Version: 11.4  © 2067-0531 [employer]. Care instructions adapted under license by [employer] (which disclaims liability or warranty for this information). If you have questions about a medical condition or this instruction, always ask your healthcare professional. [place] [address] any warranty or liability for your use of this information.

## 2018-03-01 NOTE — MR AVS SNAPSHOT
[address] [facility] [address] 
[phone] Patient: Ai Mueller MRN: [id number] OOL:[birth year] Visit Information Date & Time Provider Department Dept. Phone Encounter #  
 3/1/2018  2:15 PM Sunny Taveras MD [facility] at [address] [place] [address] Road [id number] Follow-up Instructions Return in about 4 months (around 7/1/2018), or if symptoms worsen or fail to improve, for routine follow up. Upcoming Health Maintenance Date Due  
 FOOT EXAM Q1 3/9/1960 FOBT Q 1 YEAR AGE 50-75 3/9/1999 ZOSTER VACCINE AGE 60> 1/9/2009 HEMOGLOBIN A1C Q6M 2/10/2018 GLAUCOMA SCREENING Q2Y 5/28/2018* EYE EXAM RETINAL OR DILATED Q1 5/28/2018* MEDICARE YEARLY EXAM 4/20/2018 MICROALBUMIN Q1 1/4/2019 LIPID PANEL Q1 2/2/2019 DTaP/Tdap/Td series (2 - Td) 1/4/2028 *Topic was postponed. The date shown is not the original due date. Allergies as of 3/1/2018  Review Complete On: 3/1/2018 By: Sunny Taveras MD  
 No Known Allergies Current Immunizations  Reviewed on 5/10/2017 Name Date Hep B Vaccine 1/13/2017, 12/19/2016 Influenza Vaccine 10/19/2016, 10/31/2014 Influenza Vaccine (Quad) PF 10/10/2017  9:56 PM  
 Influenza Vaccine Whole 10/9/2010 Pneumococcal Polysaccharide (PPSV-23) 9/28/2016 Pneumococcal Vaccine (Unspecified Type) 12/31/2014 Tdap 1/4/2018 11:30 AM  
 ZZZ-RETIRED (DO NOT USE) Pneumococcal Vaccine (Unspecified Type) 5/28/2010  9:03 PM  
  
 Not reviewed this visit You Were Diagnosed With   
  
 Codes Comments Diabetic peripheral neuropathy associated with type 2 diabetes mellitus ([facility]    -  Primary ICD-10-CM: E11.42 
ICD-9-CM: 250.60, 357.2 ESRD on hemodialysis ([facility]     ICD-10-CM: N18.6, Z99.2 ICD-9-CM: 585.6, V45.11 Healthcare maintenance     ICD-10-CM: Z00.00 ICD-9-CM: V70.0  Controlled type 2 diabetes mellitus with diabetic nephropathy, with long-term current use of insulin (HCC)     ICD-10-CM: E11.21, Z79.4 ICD-9-CM: 250.40, 583.81, V58.67 Mixed hyperlipidemia     ICD-10-CM: E78.2 ICD-9-CM: 272.2 Colon cancer screening     ICD-10-CM: Z12.11 ICD-9-CM: V76.51 Hypovitaminosis D     ICD-10-CM: E55.9 ICD-9-CM: 268.9 Hypertension goal BP (blood pressure) < 130/80     ICD-10-CM: I10 
ICD-9-CM: 401.9 Vitals BP Pulse Temp Resp Height(growth percentile) Weight(growth percentile) 156/70 82 99.2 °F (37.3 °C) (Oral) 20 5' 10\" (1.778 m) 153 lb (69.4 kg) SpO2 BMI Smoking Status 98% 21.95 kg/m2 Former Smoker BMI and BSA Data Body Mass Index Body Surface Area  
 21.95 kg/m 2 1.85 m 2 Preferred Pharmacy Pharmacy Name Phone 305 [facility], [address] [place] [address] [address] Nikolay Liu [facility] Your Updated Medication List  
  
   
This list is accurate as of 3/1/18  3:05 PM.  Always use your most recent med list.  
  
  
  
  
 aspirin 81 mg chewable tablet Take 1 tablet by mouth daily. carvedilol 25 mg tablet Commonly known as:  COREG  
TAKE 2 TABLETS PO BID  
  
 CHOLESTYRAMINE LIGHT 4 gram packet Generic drug:  cholestyramine-aspartame  
  
 cloNIDine HCl 0.1 mg tablet Commonly known as:  CATAPRES TK 1 T PO  TID  
  
 escitalopram oxalate 10 mg tablet Commonly known as:  Ping Friendly TK 1 T PO QD  
  
 glucose blood VI test strips strip Commonly known as:  TRUE METRIX GLUCOSE TEST STRIP Patient test blood sugar 3 times daily. lisinopril 40 mg tablet Commonly known as:  Alana Thompsons TK 1 T PO  DAILY  
  
 NORVASC 10 mg tablet Generic drug:  amLODIPine Take 10 mg by mouth daily. NovoLIN N NPH U-100 Insulin 100 unit/mL injection Generic drug:  insulin NPH  
by SubCUTAneous route once. pravastatin 10 mg tablet Commonly known as:  PRAVACHOL Take 10 mg by mouth nightly. RENVELA 800 mg Tab tab Generic drug:  sevelamer carbonate Take 800 mg by mouth three (3) times daily. SENSIPAR 60 mg Tab Generic drug:  cinacalcet Take 60 mg by mouth daily. varicella zoster vaccine live 19,400 unit/0.65 mL Susr injection Commonly known as:  varicella-zoster vacine live 1 Vial by SubCUTAneous route once for 1 dose. VIRT-CAPS 1 mg capsule Generic drug:  b complex-vitamin c-folic acid TK ONE C PO  DAILY Prescriptions Printed Refills  
 varicella zoster vaccine live (VARICELLA-ZOSTER VACINE LIVE) 19,400 unit/0.65 mL susr injection 0 Si Vial by SubCUTAneous route once for 1 dose. Class: Print Route: SubCUTAneous We Performed the Following AMB POC HEMOGLOBIN A1C [14222 CPT(R)] METABOLIC PANEL, COMPREHENSIVE [64533 CPT(R)] OCCULT BLOOD, IMMUNOASSAY (FIT) [id number] CPT(R)] VITAMIN D, 25 HYDROXY [id number] CPT(R)] Follow-up Instructions Return in about 4 months (around 2018), or if symptoms worsen or fail to improve, for routine follow up. Patient Instructions Low Sodium Diet (2,000 Milligram): Care Instructions Your Care Instructions Too much sodium causes your body to hold on to extra water. This can raise your blood pressure and force your heart and kidneys to work harder. In very serious cases, this could cause you to be put in the hospital. It might even be life-threatening. By limiting sodium, you will feel better and lower your risk of serious problems. The most common source of sodium is salt. People get most of the salt in their diet from canned, prepared, and packaged foods. Fast food and restaurant meals also are very high in sodium. Your doctor will probably limit your sodium to less than 2,000 milligrams (mg) a day. This limit counts all the sodium in prepared and packaged foods and any salt you add to your food. Follow-up care is a key part of your treatment and safety.  Be sure to make and go to all appointments, and call your doctor if you are having problems. It's also a good idea to know your test results and keep a list of the medicines you take. How can you care for yourself at home? Read food labels · Read labels on cans and food packages. The labels tell you how much sodium is in each serving. Make sure that you look at the serving size. If you eat more than the serving size, you have eaten more sodium. · Food labels also tell you the Percent Daily Value for sodium. Choose products with low Percent Daily Values for sodium. · Be aware that sodium can come in forms other than salt, including monosodium glutamate (MSG), sodium citrate, and sodium bicarbonate (baking soda). MSG is often added to Asian food. When you eat out, you can sometimes ask for food without MSG or added salt. Buy low-sodium foods · Buy foods that are labeled \"unsalted\" (no salt added), \"sodium-free\" (less than 5 mg of sodium per serving), or \"low-sodium\" (less than 140 mg of sodium per serving). Foods labeled \"reduced-sodium\" and \"light sodium\" may still have too much sodium. Be sure to read the label to see how much sodium you are getting. · Buy fresh vegetables, or frozen vegetables without added sauces. Buy low-sodium versions of canned vegetables, soups, and other canned goods. Prepare low-sodium meals · Cut back on the amount of salt you use in cooking. This will help you adjust to the taste. Do not add salt after cooking. One teaspoon of salt has about 2,300 mg of sodium. · Take the salt shaker off the table. · Flavor your food with garlic, lemon juice, onion, vinegar, herbs, and spices. Do not use soy sauce, lite soy sauce, steak sauce, onion salt, garlic salt, celery salt, mustard, or ketchup on your food. · Use low-sodium salad dressings, sauces, and ketchup. Or make your own salad dressings and sauces without adding salt. · Use less salt (or none) when recipes call for it.  You can often use half the salt a recipe calls for without losing flavor. Other foods such as rice, pasta, and grains do not need added salt. · Rinse canned vegetables, and cook them in fresh water. This removes some-but not all-of the salt. · Avoid water that is naturally high in sodium or that has been treated with water softeners, which add sodium. Call your local water company to find out the sodium content of your water supply. If you buy bottled water, read the label and choose a sodium-free brand. Avoid high-sodium foods · Avoid eating: ¨ Smoked, cured, salted, and canned meat, fish, and poultry. ¨ Ham, prince, hot dogs, and luncheon meats. ¨ Regular, hard, and processed cheese and regular peanut butter. ¨ Crackers with salted tops, and other salted snack foods such as pretzels, chips, and salted popcorn. ¨ Frozen prepared meals, unless labeled low-sodium. ¨ Canned and dried soups, broths, and bouillon, unless labeled sodium-free or low-sodium. ¨ Canned vegetables, unless labeled sodium-free or low-sodium. ¨ Western Conchita fries, pizza, tacos, and other fast foods. ¨ Pickles, olives, ketchup, and other condiments, especially soy sauce, unless labeled sodium-free or low-sodium. Where can you learn more? Go to http://lesly-delfino.info/. Enter Z472 in the search box to learn more about \"Low Sodium Diet (2,000 Milligram): Care Instructions. \" Current as of: May 12, 2017 Content Version: 11.4 © 0804-6041 [employer]. Care instructions adapted under license by [employer] (which disclaims liability or warranty for this information). If you have questions about a medical condition or this instruction, always ask your healthcare professional. [place] [address] any warranty or liability for your use of this information. Low Sodium Diet (2,000 Milligram): Care Instructions Your Care Instructions Too much sodium causes your body to hold on to extra water. This can raise your blood pressure and force your heart and kidneys to work harder. In very serious cases, this could cause you to be put in the hospital. It might even be life-threatening. By limiting sodium, you will feel better and lower your risk of serious problems. The most common source of sodium is salt. People get most of the salt in their diet from canned, prepared, and packaged foods. Fast food and restaurant meals also are very high in sodium. Your doctor will probably limit your sodium to less than 2,000 milligrams (mg) a day. This limit counts all the sodium in prepared and packaged foods and any salt you add to your food. Follow-up care is a key part of your treatment and safety. Be sure to make and go to all appointments, and call your doctor if you are having problems. It's also a good idea to know your test results and keep a list of the medicines you take. How can you care for yourself at home? Read food labels · Read labels on cans and food packages. The labels tell you how much sodium is in each serving. Make sure that you look at the serving size. If you eat more than the serving size, you have eaten more sodium. · Food labels also tell you the Percent Daily Value for sodium. Choose products with low Percent Daily Values for sodium. · Be aware that sodium can come in forms other than salt, including monosodium glutamate (MSG), sodium citrate, and sodium bicarbonate (baking soda). MSG is often added to Asian food. When you eat out, you can sometimes ask for food without MSG or added salt. Buy low-sodium foods · Buy foods that are labeled \"unsalted\" (no salt added), \"sodium-free\" (less than 5 mg of sodium per serving), or \"low-sodium\" (less than 140 mg of sodium per serving). Foods labeled \"reduced-sodium\" and \"light sodium\" may still have too much sodium. Be sure to read the label to see how much sodium you are getting. · Buy fresh vegetables, or frozen vegetables without added sauces. Buy low-sodium versions of canned vegetables, soups, and other canned goods. Prepare low-sodium meals · Cut back on the amount of salt you use in cooking. This will help you adjust to the taste. Do not add salt after cooking. One teaspoon of salt has about 2,300 mg of sodium. · Take the salt shaker off the table. · Flavor your food with garlic, lemon juice, onion, vinegar, herbs, and spices. Do not use soy sauce, lite soy sauce, steak sauce, onion salt, garlic salt, celery salt, mustard, or ketchup on your food. · Use low-sodium salad dressings, sauces, and ketchup. Or make your own salad dressings and sauces without adding salt. · Use less salt (or none) when recipes call for it. You can often use half the salt a recipe calls for without losing flavor. Other foods such as rice, pasta, and grains do not need added salt. · Rinse canned vegetables, and cook them in fresh water. This removes some-but not all-of the salt. · Avoid water that is naturally high in sodium or that has been treated with water softeners, which add sodium. Call your local water company to find out the sodium content of your water supply. If you buy bottled water, read the label and choose a sodium-free brand. Avoid high-sodium foods · Avoid eating: ¨ Smoked, cured, salted, and canned meat, fish, and poultry. ¨ Ham, prince, hot dogs, and luncheon meats. ¨ Regular, hard, and processed cheese and regular peanut butter. ¨ Crackers with salted tops, and other salted snack foods such as pretzels, chips, and salted popcorn. ¨ Frozen prepared meals, unless labeled low-sodium. ¨ Canned and dried soups, broths, and bouillon, unless labeled sodium-free or low-sodium. ¨ Canned vegetables, unless labeled sodium-free or low-sodium. ¨ Western Conchita fries, pizza, tacos, and other fast foods.  
¨ Pickles, olives, ketchup, and other condiments, especially soy sauce, unless labeled sodium-free or low-sodium. Where can you learn more? Go to http://lesly-delfino.info/. Enter A191 in the search box to learn more about \"Low Sodium Diet (2,000 Milligram): Care Instructions. \" Current as of: May 12, 2017 Content Version: 11.4 © 4368-7474 [employer]. Care instructions adapted under license by [employer] (which disclaims liability or warranty for this information). If you have questions about a medical condition or this instruction, always ask your healthcare professional. [address] any warranty or liability for your use of this information. Introducing hospitals & HEALTH SERVICES! Hugo Russell introduces [facility] patient portal. Now you can access parts of your medical record, email your doctor's office, and request medication refills online. 1. In your internet browser, go to [email or web address] 2. Click on the First Time User? Click Here link in the Sign In box. You will see the New Member Sign Up page. 3. Enter your [facility] Access Code exactly as it appears below. You will not need to use this code after youve completed the sign-up process. If you do not sign up before the expiration date, you must request a new code. · [facility] Access Code: [email or web address] Expires: 4/8/2018 11:12 AM 
 
4. Enter the last four digits of your Social Security Number (xxxx) and Date of Birth (mm/dd/yyyy) as indicated and click Submit. You will be taken to the next sign-up page. 5. Create a [employer] ID. This will be your [employer] [facility] login ID and cannot be changed, so think of one that is secure and easy to remember. 6. Create a [facility] password. You can change your password at any time. 7. Enter your Password Reset Question and Answer. This can be used at a later time if you forget your password. 8. Enter your e-mail address.  You will receive e-mail notification when new information is available in [facility] [employer] [facility]. 9. Click Sign Up. You can now view and download portions of your medical record. 10. Click the Download Summary menu link to download a portable copy of your medical information. If you have questions, please visit the Frequently Asked Questions section of the [employer] website. Remember, [employer] [facility] is NOT to be used for urgent needs. For medical emergencies, dial 911. Now available from your iPhone and Android! Please provide this summary of care documentation to your next provider. Your primary care clinician is listed as Audrey Gaitan. If you have any questions after today's visit, please call [phone].

## 2018-03-06 NOTE — TELEPHONE ENCOUNTER
----- Message from Ben Gastelum sent at 3/6/2018  1:21 PM EST -----  Regarding: Dr. Jojo Krishnamurthy  Pt request to have nurse call him in reference to his Humalog medication, best contact number [phone].

## 2018-03-07 NOTE — PROGRESS NOTES
Spoke with patient wife, Molly Stout,  after verifying name and . Wife reports patient received prostatic however his stump is hurting him. Patient scheduled to provider tomorrow to discuss. Wife informed this writer patient will be out of Humalog need to be sent o [facility] on [address]. Will inform PCP and nurse. Spoke with Dr. Danis Guerrero and nurse, Froilan Bentley to inform patient will be out of medication and to send medication to [facility] on [address].

## 2018-04-05 NOTE — MR AVS SNAPSHOT
[address] [facility] 
[phone] Patient: Marino Nation MRN: [id number] Visit Information Date & Time Provider Department Dept. Phone Encounter #  
 4/5/2018  3:00 PM Yoselin Ha MD [facility] at [address] [id number] Follow-up Instructions Return 1-2 weeks, for f/u treatment. Upcoming Health Maintenance Date Due  
 FOOT EXAM Q1 3/9/1960 FOBT Q 1 YEAR AGE 50-75 3/9/1999 ZOSTER VACCINE AGE 60> 1/9/2009 GLAUCOMA SCREENING Q2Y 5/28/2018* EYE EXAM RETINAL OR DILATED Q1 5/28/2018* MEDICARE YEARLY EXAM 4/20/2018 HEMOGLOBIN A1C Q6M 9/1/2018 MICROALBUMIN Q1 1/4/2019 LIPID PANEL Q1 2/2/2019 DTaP/Tdap/Td series (2 - Td) 1/4/2028 *Topic was postponed. The date shown is not the original due date. Allergies as of 4/5/2018  Review Complete On: 4/5/2018 By: Yoselin Ha MD  
 No Known Allergies Current Immunizations  Reviewed on 5/10/2017 Name Date Hep B Vaccine 1/13/2017, 12/19/2016 Influenza Vaccine 10/19/2016, 10/31/2014 Influenza Vaccine (Quad) PF 10/10/2017  9:56 PM  
 Influenza Vaccine Whole 10/9/2010 Pneumococcal Polysaccharide (PPSV-23) 9/28/2016 Pneumococcal Vaccine (Unspecified Type) 12/31/2014 Tdap 1/4/2018 11:30 AM  
 ZZZ-RETIRED (DO NOT USE) Pneumococcal Vaccine (Unspecified Type) 5/28/2010  9:03 PM  
  
 Not reviewed this visit You Were Diagnosed With   
  
 Codes Comments Diabetic peripheral neuropathy associated with type 2 diabetes mellitus ([facility]    -  Primary ICD-10-CM: E11.42 
ICD-9-CM: 250.60, 357.2 Type 2 diabetes with nephropathy (HCC)     ICD-10-CM: E11.21 
ICD-9-CM: 250.40, 583.81 Pain and swelling of knee, right     ICD-10-CM: M25.561, M25.461 ICD-9-CM: 719.46 Hypertension goal BP (blood pressure) < 130/80     ICD-10-CM: I10 
ICD-9-CM: 401.9 Vitals BP Pulse Temp Resp Height(growth percentile) Weight(growth percentile) 130/61 (BP 1 Location: Right arm, BP Patient Position: Sitting) 66 98.4 °F (36.9 °C) (Oral) 16 5' 10\" (1.778 m) 166 lb 14.4 oz (75.7 kg) SpO2 BMI Smoking Status 100% 23.95 kg/m2 Former Smoker Vitals History BMI and BSA Data Body Mass Index Body Surface Area  
 23.95 kg/m 2 1.93 m 2 Preferred Pharmacy Pharmacy Name Phone 305 [facility], [address] [place] [address] [address] Nikolay Ivy Your Updated Medication List  
  
   
This list is accurate as of 4/5/18  4:03 PM.  Always use your most recent med list.  
  
  
  
  
 acetaminophen-codeine 300-30 mg per tablet Commonly known as:  TYLENOL #3 Take 1 Tab by mouth every four (4) hours as needed for Pain. Max Daily Amount: 6 Tabs. aspirin 81 mg chewable tablet Take 1 tablet by mouth daily. carvedilol 25 mg tablet Commonly known as:  COREG  
TAKE 2 TABLETS PO BID  
  
 cephALEXin 500 mg capsule Commonly known as:  Birdie Hacker Take 1 Cap by mouth two (2) times a day for 5 days. cloNIDine HCl 0.1 mg tablet Commonly known as:  CATAPRES TK 1 T PO  TID  
  
 escitalopram oxalate 10 mg tablet Commonly known as:  Niall Orts TK 1 T PO QD  
  
 glucose blood VI test strips strip Commonly known as:  TRUE METRIX GLUCOSE TEST STRIP Patient test blood sugar 3 times daily. insulin glargine 100 unit/mL (3 mL) Inpn Commonly known as:  LANTUSBASAGLAR  
10 units at bedtime  
  
 insulin  unit/mL injection Commonly known as:  NOVOLIN N, HUMULIN N  
18 units every AM  
  
 lisinopril 40 mg tablet Commonly known as:  PRINIVIL, ZESTRIL  
TAKE 1 T PO  DAILY  
  
 NORVASC 10 mg tablet Generic drug:  amLODIPine Take 10 mg by mouth daily. pravastatin 10 mg tablet Commonly known as:  PRAVACHOL Take 10 mg by mouth nightly. RENVELA 800 mg Tab tab Generic drug:  sevelamer carbonate Take 800 mg by mouth three (3) times daily. SENSIPAR 60 mg Tab Generic drug:  cinacalcet Take 60 mg by mouth daily. VIRT-CAPS 1 mg capsule Generic drug:  b complex-vitamin c-folic acid TK ONE C PO  DAILY Prescriptions Printed Refills  
 acetaminophen-codeine (TYLENOL #3) 300-30 mg per tablet 0 Sig: Take 1 Tab by mouth every four (4) hours as needed for Pain. Max Daily Amount: 6 Tabs. Class: Print Route: Oral  
  
Prescriptions Sent to Pharmacy Refills  
 insulin NPH (NOVOLIN N, HUMULIN N) 100 unit/mL injection 0 Si units every AM  
 Class: Normal  
 Pharmacy: [facility], . [address] Ph #: [phone]  
 lisinopril (PRINIVIL, ZESTRIL) 40 mg tablet 1 Sig: TAKE 1 T PO  DAILY Class: Normal  
 Pharmacy: [facility], . [address] Ph #: [phone]  
 cephALEXin (KEFLEX) 500 mg capsule 0 Sig: Take 1 Cap by mouth two (2) times a day for 5 days. Class: Normal  
 Pharmacy: [facility], . [address] Ph #: [phone] Route: Oral  
  
We Performed the Following CBC WITH AUTOMATED DIFF [05215 CPT(R)] METABOLIC PANEL, COMPREHENSIVE [78386 CPT(R)] Follow-up Instructions Return 1-2 weeks, for f/u treatment. To-Do List   
 2018 Imaging:  XR KNEE RT MAX 2 VWS Patient Instructions Knee Pain or Injury: Care Instructions Your Care Instructions Injuries are a common cause of knee problems. Sudden (acute) injuries may be caused by a direct blow to the knee. They can also be caused by abnormal twisting, bending, or falling on the knee. Pain, bruising, or swelling may be severe, and may start within minutes of the injury. Overuse is another cause of knee pain. Other causes are climbing stairs, kneeling, and other activities that use the knee.  Everyday wear and tear, especially as you get older, also can cause knee pain. Rest, along with home treatment, often relieves pain and allows your knee to heal. If you have a serious knee injury, you may need tests and treatment. Follow-up care is a key part of your treatment and safety. Be sure to make and go to all appointments, and call your doctor if you are having problems. It's also a good idea to know your test results and keep a list of the medicines you take. How can you care for yourself at home? · Be safe with medicines. Read and follow all instructions on the label. ¨ If the doctor gave you a prescription medicine for pain, take it as prescribed. ¨ If you are not taking a prescription pain medicine, ask your doctor if you can take an over-the-counter medicine. · Rest and protect your knee. Take a break from any activity that may cause pain. · Put ice or a cold pack on your knee for 10 to 20 minutes at a time. Put a thin cloth between the ice and your skin. · Prop up a sore knee on a pillow when you ice it or anytime you sit or lie down for the next 3 days. Try to keep it above the level of your heart. This will help reduce swelling. · If your knee is not swollen, you can put moist heat, a heating pad, or a warm cloth on your knee. · If your doctor recommends an elastic bandage, sleeve, or other type of support for your knee, wear it as directed. · Follow your doctor's instructions about how much weight you can put on your leg. Use a cane, crutches, or a walker as instructed. · Follow your doctor's instructions about activity during your healing process. If you can do mild exercise, slowly increase your activity. · Reach and stay at a healthy weight. Extra weight can strain the joints, especially the knees and hips, and make the pain worse. Losing even a few pounds may help. When should you call for help? Call 911 anytime you think you may need emergency care. For example, call if: ? · You have symptoms of a blood clot in your lung (called a pulmonary embolism). These may include: 
¨ Sudden chest pain. ¨ Trouble breathing. ¨ Coughing up blood. ?Call your doctor now or seek immediate medical care if: 
? · You have severe or increasing pain. ? · Your leg or foot turns cold or changes color. ? · You cannot stand or put weight on your knee. ? · Your knee looks twisted or bent out of shape. ? · You cannot move your knee. ? · You have signs of infection, such as: 
¨ Increased pain, swelling, warmth, or redness. ¨ Red streaks leading from the knee. ¨ Pus draining from a place on your knee. ¨ A fever. ? · You have signs of a blood clot in your leg (called a deep vein thrombosis), such as: 
¨ Pain in your calf, back of the knee, thigh, or groin. ¨ Redness and swelling in your leg or groin. ? Watch closely for changes in your health, and be sure to contact your doctor if: 
? · You have tingling, weakness, or numbness in your knee. ? · You have any new symptoms, such as swelling. ? · You have bruises from a knee injury that last longer than 2 weeks. ? · You do not get better as expected. Where can you learn more? Go to http://lesly-delfino.info/. Enter K195 in the search box to learn more about \"Knee Pain or Injury: Care Instructions. \" Current as of: March 20, 2017 Content Version: 11.4 © 6420-8487 [employer]. Care instructions adapted under license by [employer] (which disclaims liability or warranty for this information). If you have questions about a medical condition or this instruction, always ask your healthcare professional. [address] any warranty or liability for your use of this information. Introducing [facility]! Ree Harrell introduces Quikly patient portal. Now you can access parts of your medical record, email your doctor's office, and request medication refills online. 1. In your internet browser, go to [email or web address] 2. Click on the First Time User? Click Here link in the Sign In box. You will see the New Member Sign Up page. 3. Enter your Health [facility] Connect Access Code exactly as it appears below. You will not need to use this code after youve completed the sign-up process. If you do not sign up before the expiration date, you must request a new code. · [facility] Access Code: [email or web address] Expires: 4/8/2018 12:12 PM 
 
4. Enter the last four digits of your Social Security Number (xxxx) and Date of Birth (mm/dd/yyyy) as indicated and click Submit. You will be taken to the next sign-up page. 5. Create a [employer] ID. This will be your Health [employer] Connect login ID and cannot be changed, so think of one that is secure and easy to remember. 6. Create a [facility] Connect password. You can change your password at any time. 7. Enter your Password Reset Question and Answer. This can be used at a later time if you forget your password. 8. Enter your e-mail address. You will receive e-mail notification when new information is available in [address] 9. Click Sign Up. You can now view and download portions of your medical record. 10. Click the Download Summary menu link to download a portable copy of your medical information. If you have questions, please visit the Frequently Asked Questions section of the [facility] Connect website. Remember, [facility] Connect is NOT to be used for urgent needs. For medical emergencies, dial 911. Now available from your iPhone and Android! Please provide this summary of care documentation to your next provider. Your primary care clinician is listed as Kathie Bonilla. If you have any questions after today's visit, please call [phone].

## 2018-04-05 NOTE — PROGRESS NOTES
Chief Complaint   Patient presents with    Memory Loss     x2-3 wks of Short Term Memory Loss and decreased Energy     HPI:  Marino Nation is a 70 y.o. AA male presents with c/o memory Loss for 2-3 weeks associated with decreased energy, confusion and miss statements  Significant medical history include ESRD on dialysis. He is s/p kidney transplant 8/28/17 followed by  rejection on 2 occasions, hypertension, DM, follows endocrinologist, S/P right BKA, Depression and anxiety, hypercholesterolemia. Patient does not make urine at this time, denies fever/chills, abdominal pain. Also, has depression and anxiety for which he takes Lexapro 10mg. Patient is complaining of being weak/fatigue. Also, he has additional complaints of right knee pain with occasional swelling. Denies fall/injury. However, he uses prosthetic foot on right that could apply pressure to knee.     Review of Systems  As per hpi    Past Medical History:   Diagnosis Date    Anemia associated with chronic renal failure     Autoimmune disease (HCC)     hx ms    CAD (coronary artery disease)     Chronic kidney disease     catheter removed and no dialysis at this time    Chronic kidney disease     left arm fistula dialysis MWF    Diabetes ([address]     type II    GERD (gastroesophageal reflux disease)     Hypertension     Multiple sclerosis ([address]     diagnosed '01    Other ill-defined conditions(799.89)     anemia    Other ill-defined conditions(799.89)     elevated cholesterol    Other ill-defined conditions(799.89)     hx septic right foot    Peripheral vascular disease ([address]     Secondary hyperparathyroidism of renal origin ([address]     Thyroid disease      Past Surgical History:   Procedure Laterality Date    COLONOSCOPY N/A 12/6/2016    COL[facility]OS[facility] ENDOSCOPY    COLONOSCOPY,DIAGNOSTIC  12/6/2016         CORONARY STENT SINGLE W/PTCA  2/17/2011         HX APPENDECTOMY      HX CATARACT REMOVAL  10/18/10    left eye    HX CHOLECYSTECTOMY      HX GI      ileostomy due to infection    HX HEART CATHETERIZATION      HX HEENT      hx post photocoagulation eye 2009    HX ORTHOPAEDIC      HX OTHER SURGICAL      right partial foot amputation    HX OTHER SURGICAL      cardiac cath    NE COLONOSCOPY W/BIOPSY SINGLE/MULTIPLE  5/10/2010         VASCULAR SURGERY PROCEDURE UNLIST      katelyn. leg bypasses     Social History     Social History    Marital status:      Spouse name: N/A    Number of children: N/A    Years of education: N/A     Occupational History    not working      Social History Main Topics    Smoking status: Former Smoker     Years: 1.00     Quit date: 4/12/2003    Smokeless tobacco: Never Used      Comment: quit 5 years ago    Alcohol use No      Comment: Stopped drinking 10 years ago    Drug use: No    Sexual activity: Yes     Partners: Female     Birth control/ protection: None     Other Topics Concern    None     Social History Narrative     Family History   Problem Relation Age of Onset    Diabetes Mother      Current Outpatient Prescriptions   Medication Sig Dispense Refill    insulin glargine (LANTUS,BASAGLAR) 100 unit/mL (3 mL) inpn 10 units at bedtime 15 Pen 0    insulin NPH (NOVOLIN N, HUMULIN N) 100 unit/mL injection 18 units every AM 1 Vial 1    lisinopril (PRINIVIL, ZESTRIL) 40 mg tablet TK 1 T PO  DAILY  3    glucose blood VI test strips (TRUE METRIX GLUCOSE TEST STRIP) strip Patient test blood sugar 3 times daily. 100 Strip 3    VIRT-CAPS 1 mg capsule TK ONE C PO  DAILY  3    carvedilol (COREG) 25 mg tablet TAKE 2 TABLETS PO BID  3    cloNIDine HCl (CATAPRES) 0.1 mg tablet TK 1 T PO  TID  3    escitalopram oxalate (LEXAPRO) 10 mg tablet TK 1 T PO QD  3    SENSIPAR 60 mg tab Take 60 mg by mouth daily.  sevelamer carbonate (RENVELA) 800 mg tab tab Take 800 mg by mouth three (3) times daily.       aspirin 81 mg chewable tablet Take 1 tablet by mouth daily.  amLODIPine (NORVASC) 10 mg tablet Take 10 mg by mouth daily.  pravastatin (PRAVACHOL) 10 mg tablet Take 10 mg by mouth nightly. No Known Allergies    Objective:  Visit Vitals    /61 (BP 1 Location: Right arm, BP Patient Position: Sitting)    Pulse 66    Temp 98.4 °F (36.9 °C) (Oral)    Resp 16    Ht 5' 10\" (1.778 m)    Wt 166 lb 14.4 oz (75.7 kg)  Comment: (169.2 - Prosthesis = 166.8lb)    SpO2 100%    BMI 23.95 kg/m2     Physical Exam:   General appearance - alert,oriented x 3, tired appearing in no distress  EYE-PERRL, EOMI  Neck - supple, no significant adenopathy   Chest - clear to auscultation, no wheezes, rales or rhonchi   Heart - normal rate, regular rhythm, normal blood pressure  Abdomen - soft, nontender, nondistended, no organomegaly  Ext-peripheral pulses normal, no pedal edema, RBKA noted  Neuro -alert, oriented, normal speech, no focal findings    Results for orders placed or performed in visit on 03/01/18   VITAMIN D, 25 HYDROXY   Result Value Ref Range    VITAMIN D, 25-HYDROXY 16.0 (L) 30.0 - 078.2 ng/mL   METABOLIC PANEL, COMPREHENSIVE   Result Value Ref Range    Glucose 138 (H) 65 - 99 mg/dL    BUN 17 8 - 27 mg/dL    Creatinine 3.77 (H) 0.76 - 1.27 mg/dL    GFR est non-AA 15 (L) >59 mL/min/1.73    GFR est AA 18 (L) >59 mL/min/1.73    BUN/Creatinine ratio 5 (L) 10 - 24    Sodium 146 (H) 134 - 144 mmol/L    Potassium 4.3 3.5 - 5.2 mmol/L    Chloride 98 96 - 106 mmol/L    CO2 28 18 - 29 mmol/L    Calcium 9.3 8.6 - 10.2 mg/dL    Protein, total 8.0 6.0 - 8.5 g/dL    Albumin 3.7 3.6 - 4.8 g/dL    GLOBULIN, TOTAL 4.3 1.5 - 4.5 g/dL    A-G Ratio 0.9 (L) 1.2 - 2.2    Bilirubin, total 0.2 0.0 - 1.2 mg/dL    Alk.  phosphatase 161 (H) 39 - 117 IU/L    AST (SGOT) 35 0 - 40 IU/L    ALT (SGPT) 29 0 - 44 IU/L   CKD REPORT   Result Value Ref Range    Interpretation Note    DIABETES PATIENT EDUCATION   Result Value Ref Range    PDF Image Not applicable    AMB POC HEMOGLOBIN A1C   Result Value Ref Range    Hemoglobin A1c (POC) 6.3 %     Assessment/Plan:  Diagnoses and all orders for this visit:    Diabetic peripheral neuropathy associated with type 2 diabetes mellitus (HCC)  -     insulin NPH (NOVOLIN N, HUMULIN N) 100 unit/mL injection; 18 units every AM, Normal, Disp-1 Vial, R-0  -     METABOLIC PANEL, COMPREHENSIVE    Type 2 diabetes with nephropathy (HCC)  -     insulin NPH (NOVOLIN N, HUMULIN N) 100 unit/mL injection; 18 units every AM, Normal, Disp-1 Vial, R-0    Pain and swelling of knee, right  -     XR KNEE RT MAX 2 VWS; Future  -     acetaminophen-codeine (TYLENOL #3) 300-30 mg per tablet; Take 1 Tab by mouth every four (4) hours as needed for Pain. Max Daily Amount: 6 Tabs., Print, Disp-30 Tab, R-0  -     CBC WITH AUTOMATED DIFF  -     cephALEXin (KEFLEX) 500 mg capsule; Take 1 Cap by mouth two (2) times a day for 5 days. , Normal, Disp-10 Cap, R-0    Hypertension goal BP (blood pressure) < 130/80  -     lisinopril (PRINIVIL, ZESTRIL) 40 mg tablet; TAKE 1 T PO  DAILY, Normal, Disp-90 Tab, R-1    Other orders  -     CKD REPORT  -     DIABETES PATIENT EDUCATION      Patient Instructions          Knee Pain or Injury: Care Instructions  Your Care Instructions    Injuries are a common cause of knee problems. Sudden (acute) injuries may be caused by a direct blow to the knee. They can also be caused by abnormal twisting, bending, or falling on the knee. Pain, bruising, or swelling may be severe, and may start within minutes of the injury. Overuse is another cause of knee pain. Other causes are climbing stairs, kneeling, and other activities that use the knee. Everyday wear and tear, especially as you get older, also can cause knee pain. Rest, along with home treatment, often relieves pain and allows your knee to heal. If you have a serious knee injury, you may need tests and treatment. Follow-up care is a key part of your treatment and safety.  Be sure to make and go to all appointments, and call your doctor if you are having problems. It's also a good idea to know your test results and keep a list of the medicines you take. How can you care for yourself at home? · Be safe with medicines. Read and follow all instructions on the label. ¨ If the doctor gave you a prescription medicine for pain, take it as prescribed. ¨ If you are not taking a prescription pain medicine, ask your doctor if you can take an over-the-counter medicine. · Rest and protect your knee. Take a break from any activity that may cause pain. · Put ice or a cold pack on your knee for 10 to 20 minutes at a time. Put a thin cloth between the ice and your skin. · Prop up a sore knee on a pillow when you ice it or anytime you sit or lie down for the next 3 days. Try to keep it above the level of your heart. This will help reduce swelling. · If your knee is not swollen, you can put moist heat, a heating pad, or a warm cloth on your knee. · If your doctor recommends an elastic bandage, sleeve, or other type of support for your knee, wear it as directed. · Follow your doctor's instructions about how much weight you can put on your leg. Use a cane, crutches, or a walker as instructed. · Follow your doctor's instructions about activity during your healing process. If you can do mild exercise, slowly increase your activity. · Reach and stay at a healthy weight. Extra weight can strain the joints, especially the knees and hips, and make the pain worse. Losing even a few pounds may help. When should you call for help? Call 911 anytime you think you may need emergency care. For example, call if:  ? · You have symptoms of a blood clot in your lung (called a pulmonary embolism). These may include:  ¨ Sudden chest pain. ¨ Trouble breathing. ¨ Coughing up blood. ?Call your doctor now or seek immediate medical care if:  ? · You have severe or increasing pain. ? · Your leg or foot turns cold or changes color. ? · You cannot stand or put weight on your knee. ? · Your knee looks twisted or bent out of shape. ? · You cannot move your knee. ? · You have signs of infection, such as:  ¨ Increased pain, swelling, warmth, or redness. ¨ Red streaks leading from the knee. ¨ Pus draining from a place on your knee. ¨ A fever. ? · You have signs of a blood clot in your leg (called a deep vein thrombosis), such as:  ¨ Pain in your calf, back of the knee, thigh, or groin. ¨ Redness and swelling in your leg or groin. ? Watch closely for changes in your health, and be sure to contact your doctor if:  ? · You have tingling, weakness, or numbness in your knee. ? · You have any new symptoms, such as swelling. ? · You have bruises from a knee injury that last longer than 2 weeks. ? · You do not get better as expected. Where can you learn more? Go to http://lesly-delfino.info/. Enter K195 in the search box to learn more about \"Knee Pain or Injury: Care Instructions. \"  Current as of: March 20, 2017  Content Version: 11.4  © 2001-1610 [employer]. Care instructions adapted under license by [employer] (which disclaims liability or warranty for this information). If you have questions about a medical condition or this instruction, always ask your healthcare professional. [place] [address] any warranty or liability for your use of this information. Follow-up Disposition:  Return 1-2 weeks, for f/u treatment.

## 2018-04-05 NOTE — PROGRESS NOTES
Chief Complaint   Patient presents with    Memory Loss     x2-3 wks of Short Term Memory Loss and decreased Energy   1. Have you been to the ER, urgent care clinic since your last visit? Hospitalized since your last visit? No    2. Have you seen or consulted any other health care providers outside of the [address] [place] [address] since your last visit? Include any pap smears or colon screening.  No  Patient would like right painful knee checked

## 2018-04-05 NOTE — PATIENT INSTRUCTIONS
Knee Pain or Injury: Care Instructions  Your Care Instructions    Injuries are a common cause of knee problems. Sudden (acute) injuries may be caused by a direct blow to the knee. They can also be caused by abnormal twisting, bending, or falling on the knee. Pain, bruising, or swelling may be severe, and may start within minutes of the injury. Overuse is another cause of knee pain. Other causes are climbing stairs, kneeling, and other activities that use the knee. Everyday wear and tear, especially as you get older, also can cause knee pain. Rest, along with home treatment, often relieves pain and allows your knee to heal. If you have a serious knee injury, you may need tests and treatment. Follow-up care is a key part of your treatment and safety. Be sure to make and go to all appointments, and call your doctor if you are having problems. It's also a good idea to know your test results and keep a list of the medicines you take. How can you care for yourself at home? · Be safe with medicines. Read and follow all instructions on the label. ¨ If the doctor gave you a prescription medicine for pain, take it as prescribed. ¨ If you are not taking a prescription pain medicine, ask your doctor if you can take an over-the-counter medicine. · Rest and protect your knee. Take a break from any activity that may cause pain. · Put ice or a cold pack on your knee for 10 to 20 minutes at a time. Put a thin cloth between the ice and your skin. · Prop up a sore knee on a pillow when you ice it or anytime you sit or lie down for the next 3 days. Try to keep it above the level of your heart. This will help reduce swelling. · If your knee is not swollen, you can put moist heat, a heating pad, or a warm cloth on your knee. · If your doctor recommends an elastic bandage, sleeve, or other type of support for your knee, wear it as directed.   · Follow your doctor's instructions about how much weight you can put on your leg. Use a cane, crutches, or a walker as instructed. · Follow your doctor's instructions about activity during your healing process. If you can do mild exercise, slowly increase your activity. · Reach and stay at a healthy weight. Extra weight can strain the joints, especially the knees and hips, and make the pain worse. Losing even a few pounds may help. When should you call for help? Call 911 anytime you think you may need emergency care. For example, call if:  ? · You have symptoms of a blood clot in your lung (called a pulmonary embolism). These may include:  ¨ Sudden chest pain. ¨ Trouble breathing. ¨ Coughing up blood. ?Call your doctor now or seek immediate medical care if:  ? · You have severe or increasing pain. ? · Your leg or foot turns cold or changes color. ? · You cannot stand or put weight on your knee. ? · Your knee looks twisted or bent out of shape. ? · You cannot move your knee. ? · You have signs of infection, such as:  ¨ Increased pain, swelling, warmth, or redness. ¨ Red streaks leading from the knee. ¨ Pus draining from a place on your knee. ¨ A fever. ? · You have signs of a blood clot in your leg (called a deep vein thrombosis), such as:  ¨ Pain in your calf, back of the knee, thigh, or groin. ¨ Redness and swelling in your leg or groin. ? Watch closely for changes in your health, and be sure to contact your doctor if:  ? · You have tingling, weakness, or numbness in your knee. ? · You have any new symptoms, such as swelling. ? · You have bruises from a knee injury that last longer than 2 weeks. ? · You do not get better as expected. Where can you learn more? Go to http://lesly-delfino.info/. Enter K195 in the search box to learn more about \"Knee Pain or Injury: Care Instructions. \"  Current as of: March 20, 2017  Content Version: 11.4  © 1530-7385 [employer].  Care instructions adapted under license by [employer] (which disclaims liability or warranty for this information). If you have questions about a medical condition or this instruction, always ask your healthcare professional. [address] any warranty or liability for your use of this information.

## 2018-04-12 NOTE — MR AVS SNAPSHOT
[address] [facility] [address] [facility] 
[phone] Patient: Braulio Simpson MRN: [id number] ASU:[birth year] Visit Information Date & Time Provider Department Dept. Phone Encounter #  
 4/12/2018 11:30 AM Nu Escalona MD [facility] at [address] [id number] Follow-up Instructions Return in about 3 months (around 7/12/2018), or if symptoms worsen or fail to improve, for routine follow up. Upcoming Health Maintenance Date Due  
 FOOT EXAM Q1 3/9/1960 FOBT Q 1 YEAR AGE 50-75 3/9/1999 ZOSTER VACCINE AGE 60> 1/9/2009 GLAUCOMA SCREENING Q2Y 5/28/2018* EYE EXAM RETINAL OR DILATED Q1 5/28/2018* MEDICARE YEARLY EXAM 4/20/2018 HEMOGLOBIN A1C Q6M 9/1/2018 MICROALBUMIN Q1 1/4/2019 LIPID PANEL Q1 2/2/2019 DTaP/Tdap/Td series (2 - Td) 1/4/2028 *Topic was postponed. The date shown is not the original due date. Allergies as of 4/12/2018  Review Complete On: 4/12/2018 By: Nu Escalona MD  
 No Known Allergies Current Immunizations  Reviewed on 5/10/2017 Name Date Hep B Vaccine 1/13/2017, 12/19/2016 Influenza Vaccine 10/19/2016, 10/31/2014 Influenza Vaccine (Quad) PF 10/10/2017  9:56 PM  
 Influenza Vaccine Whole 10/9/2010 Pneumococcal Polysaccharide (PPSV-23) 9/28/2016 Pneumococcal Vaccine (Unspecified Type) 12/31/2014 Tdap 1/4/2018 11:30 AM  
 ZZZ-RETIRED (DO NOT USE) Pneumococcal Vaccine (Unspecified Type) 5/28/2010  9:03 PM  
  
 Not reviewed this visit You Were Diagnosed With   
  
 Codes Comments Normocytic anemia    -  Primary ICD-10-CM: D64.9 ICD-9-CM: 285.9 Dark emesis     ICD-10-CM: K92.0 ICD-9-CM: 578.0 ESRD on hemodialysis ([facility] [address]     ICD-10-CM: N18.6, Z99.2 ICD-9-CM: 585.6, V45.11 Upper GI bleeding     ICD-10-CM: K92.2 ICD-9-CM: 578.9 Vitals BP Pulse Temp Resp Height(growth percentile) Weight(growth percentile) 104/54 70 96.3 °F (35.7 °C) (Oral) 20 5' 10\" (1.778 m) 166 lb (75.3 kg) SpO2 BMI Smoking Status 98% 23.82 kg/m2 Former Smoker Vitals History BMI and BSA Data Body Mass Index Body Surface Area  
 23.82 kg/m 2 1.93 m 2 Preferred Pharmacy Pharmacy Name Phone 305 [facility], [address] Nikolay Ivy Your Updated Medication List  
  
   
This list is accurate as of 4/12/18 12:19 PM.  Always use your most recent med list.  
  
  
  
  
 acetaminophen-codeine 300-30 mg per tablet Commonly known as:  TYLENOL #3 Take 1 Tab by mouth every four (4) hours as needed for Pain. Max Daily Amount: 6 Tabs. aspirin 81 mg chewable tablet Take 1 tablet by mouth daily. carvedilol 25 mg tablet Commonly known as:  COREG  
TAKE 2 TABLETS PO BID  
  
 cloNIDine HCl 0.1 mg tablet Commonly known as:  CATAPRES TK 1 T PO  TID  
  
 escitalopram oxalate 10 mg tablet Commonly known as:  Jose Angel Carolus TK 1 T PO QD  
  
 glucose blood VI test strips strip Commonly known as:  TRUE METRIX GLUCOSE TEST STRIP Patient test blood sugar 3 times daily. insulin glargine 100 unit/mL (3 mL) Inpn Commonly known as:  LANTUSBASAGLAR  
10 units at bedtime  
  
 insulin  unit/mL injection Commonly known as:  NOVOLIN N, HUMULIN N  
18 units every AM  
  
 lisinopril 40 mg tablet Commonly known as:  PRINIVIL, ZESTRIL  
TAKE 1 T PO  DAILY  
  
 NORVASC 10 mg tablet Generic drug:  amLODIPine Take 10 mg by mouth daily. pravastatin 10 mg tablet Commonly known as:  PRAVACHOL Take 10 mg by mouth nightly. RENVELA 800 mg Tab tab Generic drug:  sevelamer carbonate Take 800 mg by mouth three (3) times daily. SENSIPAR 60 mg Tab Generic drug:  cinacalcet Take 60 mg by mouth daily. VIRT-CAPS 1 mg capsule Generic drug:  b complex-vitamin c-folic acid TK ONE C PO  DAILY We Performed the Following OCCULT BLOOD, IMMUNOASSAY (FIT) [id number] CPT(R)] REFERRAL TO GASTROENTEROLOGY [JHM46 Custom] Comments:  
 Please evaluate for normocytic anemia, with dropping hgb, c/o fatigue, admits vomiting dark emesis Follow-up Instructions Return in about 3 months (around 7/12/2018), or if symptoms worsen or fail to improve, for routine follow up. Referral Information Referral ID Referred By Referred To  
  
 [id number] [facility] [address] [place], [address] [facility] Visits Status Start Date End Date 1 New Request 4/12/18 4/12/19 If your referral has a status of pending review or denied, additional information will be sent to support the outcome of this decision. Patient Instructions Anemia From Heavy Bleeding: Care Instructions Your Care Instructions Anemia means that your body does not have enough red blood cells. Red blood cells carry oxygen around the body. When you have anemia, you may feel dizzy, tired, and weak. You may also feel your heart pounding. For some people, it's hard to focus and think clearly. One common cause of anemia is bleeding. Bleeding from ulcers, hemorrhoids, cancer, or other problems can cause anemia. It may also be caused by heavy menstrual periods. Your treatment may include iron pills. Iron helps your body make hemoglobin. Hemoglobin is the part of the red blood cell that carries oxygen. If you have severe anemia, you may need a blood transfusion to give you red blood cells as quickly as possible. Sometimes it takes several months to get iron levels back to normal. 
Follow-up care is a key part of your treatment and safety. Be sure to make and go to all appointments, and call your doctor if you are having problems. It's also a good idea to know your test results and keep a list of the medicines you take. How can you care for yourself at home? · Be safe with medicines. Take your medicines exactly as prescribed. Call your doctor if you think you are having a problem with your medicine. · Follow your doctor's advice about eating foods that have a lot of iron in them. These include red meat, shellfish, poultry, and eggs. They also include beans, raisins, whole-grain bread, and leafy green vegetables. · Steam your vegetables. This is the best way to prepare them if you want to get as much iron as possible. · Iron pills can cause constipation. If you take them, there are things you can do to avoid constipation. Drink plenty of fluids, eat foods with a lot of fiber, and exercise every day. When should you call for help? Call 911 anytime you think you may need emergency care. For example, call if: 
? · You passed out (lost consciousness). ? · Your stools are maroon or very bloody. ?Call your doctor now or seek immediate medical care if: 
? · You are short of breath. ? · You have new or worse bleeding. ? · You are dizzy or light-headed, or you feel like you may faint. ? Watch closely for changes in your health, and be sure to contact your doctor if: 
? · You feel weaker or more tired than usual.  
? · You do not get better as expected. Where can you learn more? Go to http://lesly-delfino.info/. Enter E973 in the search box to learn more about \"Anemia From Heavy Bleeding: Care Instructions. \" Current as of: October 13, 2016 Content Version: 11.4 © 9417-4811 [employer]. Care instructions adapted under license by [employer] (which disclaims liability or warranty for this information). If you have questions about a medical condition or this instruction, always ask your healthcare professional. [place] [address] any warranty or liability for your use of this information. Anemia From Chronic Disease: Care Instructions Your Care Instructions Anemia is a low level of red blood cells. Red blood cells carry oxygen from your lungs to the rest of your body. Sometimes when you have a long-term (chronic) disease, such as kidney disease, arthritis, diabetes, cancer, or an infection, your body does not make enough red blood cells. Follow-up care is a key part of your treatment and safety. Be sure to make and go to all appointments, and call your doctor if you are having problems. It's also a good idea to know your test results and keep a list of the medicines you take. How can you care for yourself at home? · Follow your doctor's instructions to treat the chronic condition that is causing the anemia. · Be safe with medicines. Take your medicine to treat your chronic condition exactly as prescribed. Call your doctor if you think you are having a problem with your medicine. · Take your medicine for anemia exactly as prescribed. Call your doctor if you think you are having a problem with your medicine. Medicines to increase the number of red blood cells (such as epoetin or darbepoetin) may be given as an injection. ¨ If you miss a dose, take it as soon as you can, unless it is almost time for your next dose. In that case, get back on your regular schedule and take only one dose. ¨ Do not freeze this medicine. Store it in the refrigerator. Do not shake the bottle before you prepare the shot. · Keep all your appointments for blood tests to check on your hemoglobin levels. When should you call for help? Call 911 anytime you think you may need emergency care. For example, call if: 
? · You passed out (lost consciousness). ?Call your doctor now or seek immediate medical care if: 
? · You are short of breath. ? · You are dizzy or light-headed, or you feel like you may faint. ? · You have new or worse bleeding. ? Watch closely for changes in your health, and be sure to contact your doctor if: 
? · You feel weaker or more tired than usual.  
 ? · You do not get better as expected. Where can you learn more? Go to http://lesly-delfino.info/. Enter E502 in the search box to learn more about \"Anemia From Chronic Disease: Care Instructions. \" Current as of: October 13, 2016 Content Version: 11.4 © 8720-7938 [employer]. Care instructions adapted under license by [employer] (which disclaims liability or warranty for this information). If you have questions about a medical condition or this instruction, always ask your healthcare professional. [address] any warranty or liability for your use of this information. Introducing [facility]! [employer] introduces [employer] patient portal. Now you can access parts of your medical record, email your doctor's office, and request medication refills online. 1. In your internet browser, go to [email or web address] 2. Click on the First Time User? Click Here link in the Sign In box. You will see the New Member Sign Up page. 3. Enter your [employer] Access Code exactly as it appears below. You will not need to use this code after youve completed the sign-up process. If you do not sign up before the expiration date, you must request a new code. · [employer] Access Code: [email or web address] Expires: 7/11/2018 11:53 AM 
 
4. Enter the last four digits of your Social Security Number (xxxx) and Date of Birth (mm/dd/yyyy) as indicated and click Submit. You will be taken to the next sign-up page. 5. Create a [employer] ID. This will be your [employer] login ID and cannot be changed, so think of one that is secure and easy to remember. 6. Create a [employer] password. You can change your password at any time. 7. Enter your Password Reset Question and Answer. This can be used at a later time if you forget your password. 8. Enter your e-mail address. You will receive e-mail notification when new information is available in [address] 9. Click Sign Up. You can now view and download portions of your medical record. 10. Click the Download Summary menu link to download a portable copy of your medical information. If you have questions, please visit the Frequently Asked Questions section of the [employer] website. Remember, [employer] is NOT to be used for urgent needs. For medical emergencies, dial 911. Now available from your iPhone and Android! Please provide this summary of care documentation to your next provider. Your primary care clinician is listed as Lloyd Llanos. If you have any questions after today's visit, please call [phone].

## 2018-04-12 NOTE — PROGRESS NOTES
Chief Complaint   Patient presents with    Labs    Hiccups     HPI:  April Berta is a 70 y.o. AA male with ESRD, s/p kidney transplant 8/28/17 followed by rejection X 2, back on dialysis(MWF), hypertension,DM, S/P right BKA, Depression and anxiety, hypercholesterolemia presents with 3 week complaints of Memory Loss and decreased Energy and for lab review. Most recent blood work shows hgb drop from 9.8 to 8. Also he report blood in stool. Last colonoscopy was 2016. Patient also has persistent Hiccups.     Review of Systems  As per hpi    Past Medical History:   Diagnosis Date    Anemia associated with chronic renal failure     Autoimmune disease (HCC)     hx ms    CAD (coronary artery disease)     Chronic kidney disease     catheter removed and no dialysis at this time    Chronic kidney disease     left arm fistula dialysis MWF    Diabetes ([address]     type II    GERD (gastroesophageal reflux disease)     Hypertension     Multiple sclerosis ([address]     diagnosed '01    Other ill-defined conditions(799.89)     anemia    Other ill-defined conditions(799.89)     elevated cholesterol    Other ill-defined conditions(799.89)     hx septic right foot    Peripheral vascular disease ([address]     Secondary hyperparathyroidism of renal origin ([address]     Thyroid disease      Past Surgical History:   Procedure Laterality Date    COLONOSCOPY N/A 12/6/2016    COLONOSCOPY performed by Tru [facility] at [facility]COPY    COLONOSCOPY,DIAGNOSTIC  12/6/2016         CORONARY STENT SINGLE W/PTCA  2/17/2011         HX APPENDECTOMY      HX CATARACT REMOVAL  10/18/10    left eye    HX CHOLECYSTECTOMY      HX GI      ileostomy due to infection    HX HEART CATHETERIZATION      HX HEENT      hx post photocoagulation eye 2009    HX ORTHOPAEDIC      HX OTHER SURGICAL      right partial foot amputation    HX OTHER SURGICAL      cardiac cath    DE COLONOSCOPY W/BIOPSY SINGLE/MULTIPLE  5/10/2010         VASCULAR SURGERY PROCEDURE UNLIST      katelyn. leg bypasses     Social History     Social History    Marital status:      Spouse name: N/A    Number of children: N/A    Years of education: N/A     Occupational History    not working      Social History Main Topics    Smoking status: Former Smoker     Years: 1.00     Quit date: 4/12/2003    Smokeless tobacco: Never Used      Comment: quit 5 years ago    Alcohol use No      Comment: Stopped drinking 10 years ago    Drug use: No    Sexual activity: Yes     Partners: Female     Birth control/ protection: None     Other Topics Concern    None     Social History Narrative     Family History   Problem Relation Age of Onset    Diabetes Mother      Current Outpatient Prescriptions   Medication Sig Dispense Refill    insulin NPH (NOVOLIN N, HUMULIN N) 100 unit/mL injection 18 units every AM 1 Vial 0    lisinopril (PRINIVIL, ZESTRIL) 40 mg tablet TAKE 1 T PO  DAILY 90 Tab 1    acetaminophen-codeine (TYLENOL #3) 300-30 mg per tablet Take 1 Tab by mouth every four (4) hours as needed for Pain. Max Daily Amount: 6 Tabs. 30 Tab 0    insulin glargine (LANTUS,BASAGLAR) 100 unit/mL (3 mL) inpn 10 units at bedtime 15 Pen 0    glucose blood VI test strips (TRUE METRIX GLUCOSE TEST STRIP) strip Patient test blood sugar 3 times daily. 100 Strip 3    VIRT-CAPS 1 mg capsule TK ONE C PO  DAILY  3    carvedilol (COREG) 25 mg tablet TAKE 2 TABLETS PO BID  3    cloNIDine HCl (CATAPRES) 0.1 mg tablet TK 1 T PO  TID  3    escitalopram oxalate (LEXAPRO) 10 mg tablet TK 1 T PO QD  3    SENSIPAR 60 mg tab Take 60 mg by mouth daily.  sevelamer carbonate (RENVELA) 800 mg tab tab Take 800 mg by mouth three (3) times daily.  aspirin 81 mg chewable tablet Take 1 tablet by mouth daily.  amLODIPine (NORVASC) 10 mg tablet Take 10 mg by mouth daily.  pravastatin (PRAVACHOL) 10 mg tablet Take 10 mg by mouth nightly.        No Known Allergies    Objective:  Visit Vitals    /54    Pulse 70    Temp 96.3 °F (35.7 °C) (Oral)    Resp 20    Ht 5' 10\" (1.778 m)    Wt 166 lb (75.3 kg)    SpO2 98%    BMI 23.82 kg/m2     Physical Exam:   General appearance - alert, well appearing in no distress  EYE-PERRL, EOMI  Neck - supple, no significant adenopathy   Chest - clear to auscultation, no wheezes, rales or rhonchi  Heart - normal rate, regular rhythm, normal blood pressure   Abdomen - soft, nontender, nondistended, no organomegaly  Ext-peripheral pulses normal, Right BKA presents  Neuro -alert, oriented, normal speech, no focal findings   Back-full range of motion, no tenderness, palpable spasm or pain on motion     Results for orders placed or performed in visit on 04/05/18   CBC WITH AUTOMATED DIFF   Result Value Ref Range    WBC 9.3 3.4 - 10.8 x10E3/uL    RBC 3.16 (L) 4.14 - 5.80 x10E6/uL    HGB 8.4 (L) 13.0 - 17.7 g/dL    HCT 27.2 (L) 37.5 - 51.0 %    MCV 86 79 - 97 fL    MCH 26.6 26.6 - 33.0 pg    MCHC 30.9 (L) 31.5 - 35.7 g/dL    RDW 18.7 (H) 12.3 - 15.4 %    PLATELET 012 194 - 404 x10E3/uL    NEUTROPHILS 71 Not Estab. %    Lymphocytes 17 Not Estab. %    MONOCYTES 8 Not Estab. %    EOSINOPHILS 3 Not Estab. %    BASOPHILS 0 Not Estab. %    ABS. NEUTROPHILS 6.7 1.4 - 7.0 x10E3/uL    Abs Lymphocytes 1.6 0.7 - 3.1 x10E3/uL    ABS. MONOCYTES 0.7 0.1 - 0.9 x10E3/uL    ABS. EOSINOPHILS 0.3 0.0 - 0.4 x10E3/uL    ABS. BASOPHILS 0.0 0.0 - 0.2 x10E3/uL    IMMATURE GRANULOCYTES 1 Not Estab. %    ABS. IMM.  GRANS. 0.1 0.0 - 0.1 [id number]   METABOLIC PANEL, COMPREHENSIVE   Result Value Ref Range    Glucose 74 65 - 99 mg/dL    BUN 22 8 - 27 mg/dL    Creatinine 5.05 (H) 0.76 - 1.27 mg/dL    GFR est non-AA 11 (L) >59 mL/min/1.73    GFR est AA 12 (L) >59 mL/min/1.73    BUN/Creatinine ratio 4 (L) 10 - 24    Sodium 137 134 - 144 mmol/L    Potassium 4.4 3.5 - 5.2 mmol/L    Chloride 96 96 - 106 mmol/L    CO2 30 (H) 18 - 29 mmol/L    Calcium 8.9 8.6 - 10.2 mg/dL    Protein, total 7.7 6.0 - 8.5 g/dL    Albumin 2.7 (L) 3.6 - 4.8 g/dL    GLOBULIN, TOTAL 5.0 (H) 1.5 - 4.5 g/dL    A-G Ratio 0.5 (L) 1.2 - 2.2    Bilirubin, total 0.3 0.0 - 1.2 mg/dL    Alk. phosphatase 165 (H) 39 - 117 IU/L    AST (SGOT) 18 0 - 40 IU/L    ALT (SGPT) 20 0 - 44 IU/L   CKD REPORT   Result Value Ref Range    Interpretation Note    DIABETES PATIENT EDUCATION   Result Value Ref Range    PDF Image Not applicable      Assessment/Plan:  Diagnoses and all orders for this visit:    Normocytic anemia  -     Cancel: REFERRAL TO NEPHROLOGY  -     OCCULT BLOOD, IMMUNOASSAY (FIT)    Dark emesis    ESRD on hemodialysis ([facility]    Upper GI bleeding  -     [facility]      Patient Instructions          Anemia From Heavy Bleeding: Care Instructions  Your Care Instructions    Anemia means that your body does not have enough red blood cells. Red blood cells carry oxygen around the body. When you have anemia, you may feel dizzy, tired, and weak. You may also feel your heart pounding. For some people, it's hard to focus and think clearly. One common cause of anemia is bleeding. Bleeding from ulcers, hemorrhoids, cancer, or other problems can cause anemia. It may also be caused by heavy menstrual periods. Your treatment may include iron pills. Iron helps your body make hemoglobin. Hemoglobin is the part of the red blood cell that carries oxygen. If you have severe anemia, you may need a blood transfusion to give you red blood cells as quickly as possible. Sometimes it takes several months to get iron levels back to normal.  Follow-up care is a key part of your treatment and safety. Be sure to make and go to all appointments, and call your doctor if you are having problems. It's also a good idea to know your test results and keep a list of the medicines you take. How can you care for yourself at home? · Be safe with medicines. Take your medicines exactly as prescribed.  Call your doctor if you think you are having a problem with your medicine. · Follow your doctor's advice about eating foods that have a lot of iron in them. These include red meat, shellfish, poultry, and eggs. They also include beans, raisins, whole-grain bread, and leafy green vegetables. · Steam your vegetables. This is the best way to prepare them if you want to get as much iron as possible. · Iron pills can cause constipation. If you take them, there are things you can do to avoid constipation. Drink plenty of fluids, eat foods with a lot of fiber, and exercise every day. When should you call for help? Call 911 anytime you think you may need emergency care. For example, call if:  ? · You passed out (lost consciousness). ? · Your stools are maroon or very bloody. ?Call your doctor now or seek immediate medical care if:  ? · You are short of breath. ? · You have new or worse bleeding. ? · You are dizzy or light-headed, or you feel like you may faint. ? Watch closely for changes in your health, and be sure to contact your doctor if:  ? · You feel weaker or more tired than usual.   ? · You do not get better as expected. Where can you learn more? Go to http://lesly-delfino.info/. Enter Z963 in the search box to learn more about \"Anemia From Heavy Bleeding: Care Instructions. \"  Current as of: October 13, 2016  Content Version: 11.4  © [id number] [employer]. Care instructions adapted under license by [employer] (which disclaims liability or warranty for this information). If you have questions about a medical condition or this instruction, always ask your healthcare professional. [place] [address] any warranty or liability for your use of this information. Anemia From Chronic Disease: Care Instructions  Your Care Instructions    Anemia is a low level of red blood cells. Red blood cells carry oxygen from your lungs to the rest of your body.  Sometimes when you have a long-term (chronic) disease, such as kidney disease, arthritis, diabetes, cancer, or an infection, your body does not make enough red blood cells. Follow-up care is a key part of your treatment and safety. Be sure to make and go to all appointments, and call your doctor if you are having problems. It's also a good idea to know your test results and keep a list of the medicines you take. How can you care for yourself at home? · Follow your doctor's instructions to treat the chronic condition that is causing the anemia. · Be safe with medicines. Take your medicine to treat your chronic condition exactly as prescribed. Call your doctor if you think you are having a problem with your medicine. · Take your medicine for anemia exactly as prescribed. Call your doctor if you think you are having a problem with your medicine. Medicines to increase the number of red blood cells (such as epoetin or darbepoetin) may be given as an injection. ¨ If you miss a dose, take it as soon as you can, unless it is almost time for your next dose. In that case, get back on your regular schedule and take only one dose. ¨ Do not freeze this medicine. Store it in the refrigerator. Do not shake the bottle before you prepare the shot. · Keep all your appointments for blood tests to check on your hemoglobin levels. When should you call for help? Call 911 anytime you think you may need emergency care. For example, call if:  ? · You passed out (lost consciousness). ?Call your doctor now or seek immediate medical care if:  ? · You are short of breath. ? · You are dizzy or light-headed, or you feel like you may faint. ? · You have new or worse bleeding. ? Watch closely for changes in your health, and be sure to contact your doctor if:  ? · You feel weaker or more tired than usual.   ? · You do not get better as expected. Where can you learn more? Go to http://lesly-delfino.info/.   Enter E502 in the search box to learn more about \"Anemia From Chronic Disease: Care Instructions. \"  Current as of: October 13, 2016  Content Version: 11.4  © 6217-6826 [employer]. Care instructions adapted under license by [employer] (which disclaims liability or warranty for this information). If you have questions about a medical condition or this instruction, always ask your healthcare professional. [place] [address] any warranty or liability for your use of this information. Follow-up Disposition:  Return in about 3 months (around 7/12/2018), or if symptoms worsen or fail to improve, for routine follow up.

## 2018-04-12 NOTE — LETTER
4/12/2018 11:41 AM 
 
Mr. Michael Alexis [address] [id number] Dear Michael Alexis: 
 
Please find your most recent results below. Resulted Orders CBC WITH AUTOMATED DIFF Result Value Ref Range WBC 9.3 3.4 - 10.8 x10E3/uL  
 RBC 3.16 (L) 4.14 - 5.80 x10E6/uL HGB 8.4 (L) 13.0 - 17.7 g/dL HCT 27.2 (L) 37.5 - 51.0 % MCV 86 79 - 97 fL  
 MCH 26.6 26.6 - 33.0 pg  
 MCHC 30.9 (L) 31.5 - 35.7 g/dL  
 RDW 18.7 (H) 12.3 - 15.4 % PLATELET 441 269 - 215 x10E3/uL NEUTROPHILS 71 Not Estab. % Lymphocytes 17 Not Estab. % MONOCYTES 8 Not Estab. % EOSINOPHILS 3 Not Estab. % BASOPHILS 0 Not Estab. %  
 ABS. NEUTROPHILS 6.7 1.4 - 7.0 x10E3/uL Abs Lymphocytes 1.6 0.7 - 3.1 x10E3/uL  
 ABS. MONOCYTES 0.7 0.1 - 0.9 x10E3/uL  
 ABS. EOSINOPHILS 0.3 0.0 - 0.4 x10E3/uL  
 ABS. BASOPHILS 0.0 0.0 - 0.2 x10E3/uL IMMATURE GRANULOCYTES 1 Not Estab. %  
 ABS. IMM. GRANS. 0.1 0.0 - 0.1 x10E3/uL Narrative Performed at:  [address]  [id number] : Bianca Callaway MD, Phone:  [phone] METABOLIC PANEL, COMPREHENSIVE Result Value Ref Range Glucose 74 65 - 99 mg/dL BUN 22 8 - 27 mg/dL Creatinine 5.05 (H) 0.76 - 1.27 mg/dL GFR est non-AA 11 (L) >59 mL/min/1.73 GFR est AA 12 (L) >59 mL/min/1.73  
 BUN/Creatinine ratio 4 (L) 10 - 24 Sodium 137 134 - 144 mmol/L Potassium 4.4 3.5 - 5.2 mmol/L Chloride 96 96 - 106 mmol/L  
 CO2 30 (H) 18 - 29 mmol/L Calcium 8.9 8.6 - 10.2 mg/dL Protein, total 7.7 6.0 - 8.5 g/dL Albumin 2.7 (L) 3.6 - 4.8 g/dL GLOBULIN, TOTAL 5.0 (H) 1.5 - 4.5 g/dL A-G Ratio 0.5 (L) 1.2 - 2.2 Bilirubin, total 0.3 0.0 - 1.2 mg/dL Alk. phosphatase 165 (H) 39 - 117 IU/L  
 AST (SGOT) 18 0 - 40 IU/L  
 ALT (SGPT) 20 0 - 44 IU/L Narrative Performed at:  [address]  [id number] : Cas Gaspar MD, Phone:  [phone] XR KNEE RT MAX 2 VWS Narrative EXAM:  XR KNEE RT MAX 2 VWS 
 
INDICATION:   right knee painon mediail aspect. COMPARISON: None. FINDINGS: Two views of the right knee demonstrate no fracture or other acute 
osseous or articular abnormality. There is joint effusion. Views osteopenia, 
moderate DJD patella and medial joint space. Prior vascular surgery, diffuse 
vascular calcification. Impression IMPRESSION:  No acute abnormality. CKD REPORT Result Value Ref Range Interpretation Note Comment:  
   Supplemental report is available. Narrative Performed at:  [facility] [place] [address]  [id number] : Daina Aggarwal PhD, Phone:  [phone] DIABETES PATIENT EDUCATION Result Value Ref Range PDF Image Not applicable Narrative Performed at:  [address] [place] [address]  [id number] : Daina Aggarwal PhD, Phone:  [phone] RECOMMENDATIONS: 
Continue with current  diet and medications. Please call me if you have any questions: [phone] Sincerely, Katrina Guzman MD

## 2018-04-12 NOTE — LETTER
4/12/2018 11:30 AM 
 
Mr. Tanya Pereyra [address] [facility] [address] [phone] Dear Tanya Pereyra: 
 
Please find your most recent results below. Blood work shows worsening normocytic anemia Kidney function is compatible with end stage renal disease Resulted Orders CBC WITH AUTOMATED DIFF Result Value Ref Range WBC 9.3 3.4 - 10.8 x10E3/uL  
 RBC 3.16 (L) 4.14 - 5.80 x10E6/uL HGB 8.4 (L) 13.0 - 17.7 g/dL HCT 27.2 (L) 37.5 - 51.0 % MCV 86 79 - 97 fL  
 MCH 26.6 26.6 - 33.0 pg  
 MCHC 30.9 (L) 31.5 - 35.7 g/dL  
 RDW 18.7 (H) 12.3 - 15.4 % PLATELET 774 859 - 498 x10E3/uL NEUTROPHILS 71 Not Estab. % Lymphocytes 17 Not Estab. % MONOCYTES 8 Not Estab. % EOSINOPHILS 3 Not Estab. % BASOPHILS 0 Not Estab. %  
 ABS. NEUTROPHILS 6.7 1.4 - 7.0 x10E3/uL Abs Lymphocytes 1.6 0.7 - 3.1 x10E3/uL  
 ABS. MONOCYTES 0.7 0.1 - 0.9 x10E3/uL  
 ABS. EOSINOPHILS 0.3 0.0 - 0.4 x10E3/uL  
 ABS. BASOPHILS 0.0 0.0 - 0.2 x10E3/uL IMMATURE GRANULOCYTES 1 Not Estab. %  
 ABS. IMM. GRANS. 0.1 0.0 - 0.1 x10E3/uL Narrative Performed at:  [address]  [id number] : Mary Melendez MD, Phone:  [phone] METABOLIC PANEL, COMPREHENSIVE Result Value Ref Range Glucose 74 65 - 99 mg/dL BUN 22 8 - 27 mg/dL Creatinine 5.05 (H) 0.76 - 1.27 mg/dL GFR est non-AA 11 (L) >59 mL/min/1.73 GFR est AA 12 (L) >59 mL/min/1.73  
 BUN/Creatinine ratio 4 (L) 10 - 24 Sodium 137 134 - 144 mmol/L Potassium 4.4 3.5 - 5.2 mmol/L Chloride 96 96 - 106 mmol/L  
 CO2 30 (H) 18 - 29 mmol/L Calcium 8.9 8.6 - 10.2 mg/dL Protein, total 7.7 6.0 - 8.5 g/dL Albumin 2.7 (L) 3.6 - 4.8 g/dL GLOBULIN, TOTAL 5.0 (H) 1.5 - 4.5 g/dL A-G Ratio 0.5 (L) 1.2 - 2.2 Bilirubin, total 0.3 0.0 - 1.2 mg/dL Alk. phosphatase 165 (H) 39 - 117 IU/L  
 AST (SGOT) 18 0 - 40 IU/L  
 ALT (SGPT) 20 0 - 44 IU/L Narrative Performed at:  [address]  [id number] : Sohail Lopez MD, Phone:  [phone] CKD REPORT Result Value Ref Range Interpretation Note Comment:  
   Supplemental report is available. Narrative Performed at:  [facility] [place] [address]  [id number] : Princess Mixon PhD, Phone:  [phone] DIABETES PATIENT EDUCATION Result Value Ref Range PDF Image Not applicable Narrative Performed at:  [facility] [place] [address] [place] [address]  [id number] : Princess Mixon PhD, Phone:  [phone] RECOMMENDATIONS: 
Continue with current  diet and medications. Please call me if you have any questions: [phone] Sincerely, Ines Edwards MD

## 2018-04-12 NOTE — PATIENT INSTRUCTIONS
Anemia From Heavy Bleeding: Care Instructions  Your Care Instructions    Anemia means that your body does not have enough red blood cells. Red blood cells carry oxygen around the body. When you have anemia, you may feel dizzy, tired, and weak. You may also feel your heart pounding. For some people, it's hard to focus and think clearly. One common cause of anemia is bleeding. Bleeding from ulcers, hemorrhoids, cancer, or other problems can cause anemia. It may also be caused by heavy menstrual periods. Your treatment may include iron pills. Iron helps your body make hemoglobin. Hemoglobin is the part of the red blood cell that carries oxygen. If you have severe anemia, you may need a blood transfusion to give you red blood cells as quickly as possible. Sometimes it takes several months to get iron levels back to normal.  Follow-up care is a key part of your treatment and safety. Be sure to make and go to all appointments, and call your doctor if you are having problems. It's also a good idea to know your test results and keep a list of the medicines you take. How can you care for yourself at home? · Be safe with medicines. Take your medicines exactly as prescribed. Call your doctor if you think you are having a problem with your medicine. · Follow your doctor's advice about eating foods that have a lot of iron in them. These include red meat, shellfish, poultry, and eggs. They also include beans, raisins, whole-grain bread, and leafy green vegetables. · Steam your vegetables. This is the best way to prepare them if you want to get as much iron as possible. · Iron pills can cause constipation. If you take them, there are things you can do to avoid constipation. Drink plenty of fluids, eat foods with a lot of fiber, and exercise every day. When should you call for help? Call 911 anytime you think you may need emergency care. For example, call if:  ? · You passed out (lost consciousness).    ? · Your stools are maroon or very bloody. ?Call your doctor now or seek immediate medical care if:  ? · You are short of breath. ? · You have new or worse bleeding. ? · You are dizzy or light-headed, or you feel like you may faint. ? Watch closely for changes in your health, and be sure to contact your doctor if:  ? · You feel weaker or more tired than usual.   ? · You do not get better as expected. Where can you learn more? Go to http://lesly-delfino.info/. Enter O875 in the search box to learn more about \"Anemia From Heavy Bleeding: Care Instructions. \"  Current as of: October 13, 2016  Content Version: 11.4  © 9301-8884 [employer]. Care instructions adapted under license by [employer] (which disclaims liability or warranty for this information). If you have questions about a medical condition or this instruction, always ask your healthcare professional. [place] [address] any warranty or liability for your use of this information. Anemia From Chronic Disease: Care Instructions  Your Care Instructions    Anemia is a low level of red blood cells. Red blood cells carry oxygen from your lungs to the rest of your body. Sometimes when you have a long-term (chronic) disease, such as kidney disease, arthritis, diabetes, cancer, or an infection, your body does not make enough red blood cells. Follow-up care is a key part of your treatment and safety. Be sure to make and go to all appointments, and call your doctor if you are having problems. It's also a good idea to know your test results and keep a list of the medicines you take. How can you care for yourself at home? · Follow your doctor's instructions to treat the chronic condition that is causing the anemia. · Be safe with medicines. Take your medicine to treat your chronic condition exactly as prescribed. Call your doctor if you think you are having a problem with your medicine.   · Take your medicine for anemia exactly as prescribed. Call your doctor if you think you are having a problem with your medicine. Medicines to increase the number of red blood cells (such as epoetin or darbepoetin) may be given as an injection. ¨ If you miss a dose, take it as soon as you can, unless it is almost time for your next dose. In that case, get back on your regular schedule and take only one dose. ¨ Do not freeze this medicine. Store it in the refrigerator. Do not shake the bottle before you prepare the shot. · Keep all your appointments for blood tests to check on your hemoglobin levels. When should you call for help? Call 911 anytime you think you may need emergency care. For example, call if:  ? · You passed out (lost consciousness). ?Call your doctor now or seek immediate medical care if:  ? · You are short of breath. ? · You are dizzy or light-headed, or you feel like you may faint. ? · You have new or worse bleeding. ? Watch closely for changes in your health, and be sure to contact your doctor if:  ? · You feel weaker or more tired than usual.   ? · You do not get better as expected. Where can you learn more? Go to http://lesly-delfino.info/. Enter E502 in the search box to learn more about \"Anemia From Chronic Disease: Care Instructions. \"  Current as of: October 13, 2016  Content Version: 11.4  © 8707-4681 [employer]. Care instructions adapted under license by [employer] (which disclaims liability or warranty for this information). If you have questions about a medical condition or this instruction, always ask your healthcare professional. [address] any warranty or liability for your use of this information.

## 2018-04-17 NOTE — ANESTHESIA PREPROCEDURE EVALUATION
Anesthetic History   No history of anesthetic complications            Review of Systems / Medical History  Patient summary reviewed, nursing notes reviewed and pertinent labs reviewed    Pulmonary                Comments: Former smoker - Quit 2003   Neuro/Psych             Comments: Multiple Sclerosis  Diabetic Peripheral Neuropathy  Cervical Stenosis  Ataxia Cardiovascular    Hypertension          CAD, PAD, cardiac stents and hyperlipidemia    Exercise tolerance: >4 METS  Comments: S/P Bilateral Lower Extremity bypasses   GI/Hepatic/Renal     GERD    Renal disease: ESRD and dialysis      Comments: Hematemesis  Intractable Hiccups Endo/Other    Diabetes: type 2, using insulin    Anemia  Pertinent negatives: No hypothyroidism   Other Findings            Physical Exam    Airway  Mallampati: II  TM Distance: > 6 cm  Neck ROM: normal range of motion   Mouth opening: Normal     Cardiovascular  Regular rate and rhythm,  S1 and S2 normal,  no murmur, click, rub, or gallop             Dental    Dentition: Poor dentition  Comments: Multiple damaged, multiple missing, significant decay   Pulmonary  Breath sounds clear to auscultation               Abdominal  GI exam deferred       Other Findings            Anesthetic Plan    ASA: 4  Anesthesia type: general and total IV anesthesia          Induction: Intravenous  Anesthetic plan and risks discussed with: Patient

## 2018-04-17 NOTE — H&P
Gust Sandhoff, MD  [facility], [address]  [address]  [phone]  [email or web address]      See office H and P. No interval change. Date of Surgery Update:  Sidney Castillo was seen and examined. History and physical has been reviewed. The patient has been examined.  There have been no significant clinical changes since the completion of the originally dated History and Physical.    Signed By: Min Blount MD     April 17, 2018 12:31 PM

## 2018-04-17 NOTE — ANESTHESIA POSTPROCEDURE EVALUATION
Post-Anesthesia Evaluation and Assessment    Patient: Anali Romero MRN: [id number]  SSN: [id number]    YOB: 1950  Age: 70 y.o. Sex: male       Cardiovascular Function/Vital Signs  Visit Vitals    /47    Pulse 72    Temp 36.8 °C (98.3 °F)    Resp 11    Ht 5' 10\" (1.778 m)    Wt 70.3 kg (155 lb)    SpO2 100%    BMI 22.24 kg/m2       Patient is status post general, total IV anesthesia anesthesia for Procedure(s):  ESOPHAGOGASTRODUODENOSCOPY (EGD)  ESOPHAGOGASTRODUODENAL (EGD) BIOPSY. Nausea/Vomiting: None    Postoperative hydration reviewed and adequate. Pain:  Pain Scale 1: Numeric (0 - 10) (04/17/18 1228)  Pain Intensity 1: 0 (04/17/18 1228)   Managed    Neurological Status: At baseline    Mental Status and Level of Consciousness: Arousable    Pulmonary Status:   O2 Device: Nasal cannula (04/17/18 1309)   Adequate oxygenation and airway patent    Complications related to anesthesia: None    Post-anesthesia assessment completed.  No concerns    Signed By: Chuck Tyler MD     April 17, 2018

## 2018-04-17 NOTE — DISCHARGE INSTRUCTIONS
Diego Das MD  [facility], [address] [place] [address] [address] [facility]  [phone]  [email or web address]    Walter Sierra  [id number]  1950    EGD DISCHARGE INSTRUCTIONS    Discomfort:  Sore throat- throat lozenges or warm salt water gargle  redness at IV site- apply warm compress to area; if redness or soreness persist- contact your physician  Gaseous discomfort- walking, belching will help relieve any discomfort  You may not operate a vehicle for 12 hours  You may not engage in an occupation involving machinery or appliances for rest of today  You may not drink alcoholic beverages for at least 12 hours  Avoid making any critical decisions for at least 24 hour  DIET  You may have anything by mouth. You may eat and drink immediately. You may resume your regular diet - however -  remember your colon is empty and a heavy meal will produce gas. Avoid these foods:  vegetables, fried / greasy foods, carbonated drinks      ACTIVITY  You may resume your normal daily activities   Spend the remainder of the day resting -  avoid any strenuous activity. CALL M.D. ANY SIGN OF   Increasing pain, nausea, vomiting  Abdominal distension (swelling)  New increased bleeding (oral or rectal)  Fever (chills)  Pain in chest area  Bloody discharge from nose or mouth  Shortness of breath    Follow-up Instructions:   Call Dr. Diego Das for any questions or problems. Telephone # [phone]  Dr. Yovani Yun office will notify you of the biopsy results available  Within 7 to 10 days. We will call you or send a letter   Continue same medications. Dr. Yovani Yun office will schedule you for a CT scan of the chest and abdomen. ENDOSCOPY FINDINGS:   Your endoscopy showed erosive esophagitis and a nodule in the duodenum that was biopsied.       DISCHARGE SUMMARY from Nurse    The following personal items collected during your admission are returned to you: Dental Appliance: Dental Appliances: None  Vision: Visual Aid: None  Hearing Aid:    Jewelry:    Clothing:    Other Valuables:    Valuables sent to safe:

## 2018-04-17 NOTE — ROUTINE PROCESS
Eric Pop  1950  [id number]    Situation:  Verbal report received from: Jayashree  Procedure: Procedure(s):  ESOPHAGOGASTRODUODENOSCOPY (EGD)  ESOPHAGOGASTRODUODENAL (EGD) BIOPSY    Background:    Preoperative diagnosis: hematemesis  Postoperative diagnosis: Erosive esophagitis, duodenal nodule    :  Dr. Gabriella Diego  Assistant(s): Endoscopy Technician-1: Yamilet Pitts  Endoscopy RN-1: Lucianne Goltz, RN    Specimens:   ID Type Source Tests Collected by Time Destination   1 : Duodenal nodule biopsy Preservative   Chucky Osullivan MD 4/17/2018 1309 Pathology   2 : Distal esophagus biopsy Ericaative   Chucky Osullivan MD 4/17/2018 1314 Pathology   3 : Mid esophagus biopsy Ericaative   Chucky Osullivan MD 4/17/2018 1315 Pathology     H. Pylori  yes    Assessment:  Intra-procedure medications     Anesthesia gave intra-procedure sedation and medications, see anesthesia flow sheet yes    Intravenous fluids: NS@ KVO     Vital signs stable     Abdominal assessment: round and soft     Recommendation:  Discharge patient per MD order.

## 2018-04-17 NOTE — PROCEDURES
[facility]                   Endoscopic Gastroduodenoscopy Procedure Note      4/17/2018  Rodolfo Hoovers  1950  [id number]    Procedure: Endoscopic Gastroduodenoscopy with biopsy    Indication: hematemesis, hiccups, weight loss, acute blood loss anemia     Pre-operative Diagnosis: see indication above    Post-operative Diagnosis: see findings below    : MIR Churchill MD    Referring Provider:  Guru Menezes MD      Anesthesia/Sedation:  MAC anesthesia Propofol    Airway assessment: No airway problems anticipated    Pre-Procedural Exam:      Airway: clear, no airway problems anticipated  Heart: RRR, without gallops or rubs  Lungs: clear bilaterally without wheezes, crackles, or rhonchi  Abdomen: soft, nontender, nondistended, bowel sounds present  Mental Status: awake, alert and oriented to person, place and time       Procedure Details     After infomed consent was obtained for the procedure, with all risks and benefits of procedure explained the patient was taken to the endoscopy suite and placed in the left lateral decubitus position. Following sequential administration of sedation as per above, the endoscope was inserted into the mouth and advanced under direct vision to second portion of the duodenum. A careful inspection was made as the gastroscope was withdrawn, including a retroflexed view of the proximal stomach; findings and interventions are described below. Findings:   Esophagus:  Erosive esophagitis from mid to distal esophagus. Biopsied. Irregular z-line suggestive of Bailey's--biopsied  Stomach: normal , biopsied for pyloritek  Duodenum: Small nodule, 5 mm, biopsied    Therapies:  biopsy of esophagus  biopsy of duodenal second portion    Specimens: 1. Biopsies of duodenal nodule  2. Biopsies of distal esophagus  3. Biopsies of mid esophagus           Complications:   None; patient tolerated the procedure well. EBL:  None. Impression:      -Erosive esophagitis  -normal stomach  -duodenal nodule    Recommendations:  -Continue acid suppression. , -Await pathology. , -Await pyloritek test result and treat for Helicobacter pylori if positive. , -Schedule CT of chest, abdomen    Ashu Ramos MD4/17/2018

## 2018-04-17 NOTE — IP AVS SNAPSHOT
[address] [facility] 
[phone] Patient: April [facility] MRN: [id number] MMI:[id number] About your hospitalization You were admitted on:  April 17, 2018 You last received care in the:  [facility] ENDOSCOPY You were discharged on:  April 17, 2018 Why you were hospitalized Your primary diagnosis was:  Not on File Follow-up Information Follow up With Details Comments Contact Info Jeremiah Parra MD   [address] [facility] [address] [facility] 
[phone] Discharge Orders None A check albert indicates which time of day the medication should be taken. My Medications CONTINUE taking these medications Instructions Each Dose to Equal  
 Morning Noon Evening Bedtime  
 acetaminophen-codeine 300-30 mg per tablet Commonly known as:  TYLENOL #3 Your last dose was: Your next dose is: Take 1 Tab by mouth every four (4) hours as needed for Pain. Max Daily Amount: 6 Tabs. 1 Tab  
    
   
   
   
  
 aspirin 81 mg chewable tablet Your last dose was: Your next dose is: Take 1 tablet by mouth daily. 81 mg  
    
   
   
   
  
 carvedilol 25 mg tablet Commonly known as:  Ca Ocasio Your last dose was: Your next dose is: TAKE 2 TABLETS PO BID  
     
   
   
   
  
 cloNIDine HCl 0.1 mg tablet Commonly known as:  CATAPRES Your last dose was: Your next dose is:    
   
   
 TK 1 T PO  TID  
     
   
   
   
  
 escitalopram oxalate 10 mg tablet Commonly known as:  Leroy Whalen Your last dose was: Your next dose is:    
   
   
 TK 1 T PO QD  
     
   
   
   
  
 glucose blood VI test strips strip Commonly known as:  TRUE METRIX GLUCOSE TEST STRIP Your last dose was: Your next dose is:    
   
   
 Patient test blood sugar 3 times daily. insulin glargine 100 unit/mL (3 mL) Inpn Commonly known as:  Tiffanie Pacheco Your last dose was: Your next dose is:    
   
   
 10 units at bedtime  
     
   
   
   
  
 insulin  unit/mL injection Commonly known as:  Billylyric Hoovers Your last dose was: Your next dose is:    
   
   
 18 units every AM  
     
   
   
   
  
 lisinopril 40 mg tablet Commonly known as:  Usman Ramos Your last dose was: Your next dose is: TAKE 1 T PO  DAILY  
     
   
   
   
  
 NORVASC 10 mg tablet Generic drug:  amLODIPine Your last dose was: Your next dose is: Take 10 mg by mouth daily. 10 mg  
    
   
   
   
  
 pravastatin 10 mg tablet Commonly known as:  PRAVACHOL Your last dose was: Your next dose is: Take 10 mg by mouth nightly. 10 mg  
    
   
   
   
  
 RENVELA 800 mg Tab tab Generic drug:  sevelamer carbonate Your last dose was: Your next dose is: Take 800 mg by mouth three (3) times daily. 800 mg SENSIPAR 60 mg Tab Generic drug:  cinacalcet Your last dose was: Your next dose is: Take 60 mg by mouth daily. 60 mg VIRT-CAPS 1 mg capsule Generic drug:  b complex-vitamin c-folic acid Your last dose was: Your next dose is:    
   
   
 TK ONE C PO  DAILY Opioid Education Prescription Opioids: What You Need to Know: 
 
Prescription opioids can be used to help relieve moderate-to-severe pain and are often prescribed following a surgery or injury, or for certain health conditions. These medications can be an important part of treatment but also come with serious risks. Opioids are strong pain medicines.  Examples include hydrocodone, oxycodone, fentanyl, and morphine. Heroin is an example of an illegal opioid. It is important to work with your health care provider to make sure you are getting the safest, most effective care. WHAT ARE THE RISKS AND SIDE EFFECTS OF OPIOID USE? Prescription opioids carry serious risks of addiction and overdose, especially with prolonged use. An opioid overdose, often marked by slow breathing, can cause sudden death. The use of prescription opioids can have a number of side effects as well, even when taken as directed. · Tolerance-meaning you might need to take more of a medication for the same pain relief · Physical dependence-meaning you have symptoms of withdrawal when the medication is stopped. Withdrawal symptoms can include nausea, sweating, chills, diarrhea, stomach cramps, and muscle aches. Withdrawal can last up to several weeks, depending on which drug you took and how long you took it. · Increased sensitivity to pain · Constipation · Nausea, vomiting, and dry mouth · Sleepiness and dizziness · Confusion · Depression · Low levels of testosterone that can result in lower sex drive, energy, and strength · Itching and sweating RISKS ARE GREATER WITH:      
· History of drug misuse, substance use disorder, or overdose · Mental health conditions (such as depression or anxiety) · Sleep apnea · Older age (72 years or older) · Pregnancy Avoid alcohol while taking prescription opioids. Also, unless specifically advised by your health care provider, medications to avoid include: · Benzodiazepines (such as Xanax or Valium) · Muscle relaxants (such as Soma or Flexeril) · Hypnotics (such as Ambien or Lunesta) · Other prescription opioids KNOW YOUR OPTIONS Talk to your health care provider about ways to manage your pain that don't involve prescription opioids. Some of these options may actually work better and have fewer risks and side effects. Options may include: · Pain relievers such as acetaminophen, ibuprofen, and naproxen · Some medications that are also used for depression or seizures · Physical therapy and exercise · Counseling to help patients learn how to cope better with triggers of pain and stress. · Application of heat or cold compress · Massage therapy · Relaxation techniques Be Informed Make sure you know the name of your medication, how much and how often to take it, and its potential risks & side effects. IF YOU ARE PRESCRIBED OPIOIDS FOR PAIN: 
· Never take opioids in greater amounts or more often than prescribed. Remember the goal is not to be pain-free but to manage your pain at a tolerable level. · Follow up with your primary care provider to: · Work together to create a plan on how to manage your pain. · Talk about ways to help manage your pain that don't involve prescription opioids. · Talk about any and all concerns and side effects. · Help prevent misuse and abuse. · Never sell or share prescription opioids · Help prevent misuse and abuse. · Store prescription opioids in a secure place and out of reach of others (this may include visitors, children, friends, and family). · Safely dispose of unused/unwanted prescription opioids: Find your community drug take-back program or your pharmacy mail-back program, or flush them down the toilet, following guidance from the Food and Drug Administration (www.fda.gov/Drugs/ResourcesForYou). · Visit www.cdc.gov/drugoverdose to learn about the risks of opioid abuse and overdose. · If you believe you may be struggling with addiction, tell your health care provider and ask for guidance or call [address] [place] [address] at 3-047-965-HXBS. Discharge Instructions Pauly Garcia MD 
[facility], [address] [place] [address], [address] 
[phone] [email or web address] Anthony Kam [id number] 
1950 EGD DISCHARGE INSTRUCTIONS Discomfort: 
Sore throat- throat lozenges or warm salt water gargle 
redness at IV site- apply warm compress to area; if redness or soreness persist- contact your physician Gaseous discomfort- walking, belching will help relieve any discomfort You may not operate a vehicle for 12 hours You may not engage in an occupation involving machinery or appliances for rest of today You may not drink alcoholic beverages for at least 12 hours Avoid making any critical decisions for at least 24 hour DIET You may have anything by mouth. You may eat and drink immediately. You may resume your regular diet  however -  remember your colon is empty and a heavy meal will produce gas. Avoid these foods:  vegetables, fried / greasy foods, carbonated drinks ACTIVITY You may resume your normal daily activities Spend the remainder of the day resting -  avoid any strenuous activity. CALL M.D. ANY SIGN OF Increasing pain, nausea, vomiting Abdominal distension (swelling) New increased bleeding (oral or rectal) Fever (chills) Pain in chest area Bloody discharge from nose or mouth Shortness of breath Follow-up Instructions: 
 Call Dr. Tulio Henry for any questions or problems. Telephone # [phone] Dr. Twilla Gowers office will notify you of the biopsy results available  Within 7 to 10 days. We will call you or send a letter Continue same medications. Dr. Twilla Gowers office will schedule you for a CT scan of the chest and abdomen. ENDOSCOPY FINDINGS: 
 Your endoscopy showed erosive esophagitis and a nodule in the duodenum that was biopsied. DISCHARGE SUMMARY from Nurse The following personal items collected during your admission are returned to you:  
Dental Appliance: Dental Appliances: None Vision: Visual Aid: None Hearing Aid:   
Jewelry:   
Clothing:   
Other Valuables: Valuables sent to safe:   
 
ACO Transitions of Care Introducing [employer] [address] Clar[address] Alford offers a voluntary care coordination program to provide high quality service and care to [facility] fee-for-service beneficiaries. Candido Murrell was designed to help you enhance your health and well-being through the following services: ? Transitions of Care  support for individuals who are transitioning from one care setting to another (example: Hospital to home). ? Chronic and Complex Care Coordination  support for individuals and caregivers of those with serious or chronic illnesses or with more than one chronic (ongoing) condition and those who take a number of different medications. If you meet specific medical criteria, a [address] [place] [address] Rd may call you directly to coordinate your care with your primary care physician and your other care providers. For questions about the [facility] programs, please, contact your physicians office. For general questions or additional information about Accountable Care Organizations: 
Please visit www.medicare.gov/acos. html or call 1-800-MEDICARE (2-901.348.8903) TTY users should call 2-361.926.4665. Introducing [facility]! Ree Harrell introduces Jaypore patient portal. Now you can access parts of your medical record, email your doctor's office, and request medication refills online. 1. In your internet browser, go to [email or web address] Hero 2. Click on the First Time User? Click Here link in the Sign In box. You will see the New Member Sign Up page. 3. Enter your Jaypore Access Code exactly as it appears below. You will not need to use this code after youve completed the sign-up process. If you do not sign up before the expiration date, you must request a new code. · Jaypore Access Code: [email or web address] Expires: 7/11/2018 11:53 AM 
 
 4. Enter the last four digits of your Social Security Number (xxxx) and Date of Birth (mm/dd/yyyy) as indicated and click Submit. You will be taken to the next sign-up page. 5. Create a Austral 3D ID. This will be your Austral [employer] login ID and cannot be changed, so think of one that is secure and easy to remember. 6. Create a [employer] password. You can change your password at any time. 7. Enter your Password Reset Question and Answer. This can be used at a later time if you forget your password. 8. Enter your e-mail address. You will receive e-mail notification when new information is available in [address] 9. Click Sign Up. You can now view and download portions of your medical record. 10. Click the Download Summary menu link to download a portable copy of your medical information. If you have questions, please visit the Frequently Asked Questions section of the [employer] website. Remember, [employer]D is NOT to be used for urgent needs. For medical emergencies, dial 911. Now available from your iPhone and Android! Introducing Robert Jordan As a [facility] patient, I wanted to make you aware of our electronic visit tool called Robert Jordan. [facility] 24/7 allows you to connect within minutes with a medical provider 24 hours a day, seven days a week via a mobile device or tablet or logging into a secure website from your computer. You can access Robert Marco Antonioaprilfin from anywhere in the [place]. A virtual visit might be right for you when you have a simple condition and feel like you just dont want to get out of bed, or cant get away from work for an appointment, when your regular [facility] provider is not available (evenings, weekends or holidays), or when youre out of town and need minor care.   Electronic visits cost only $49 and if the Robert Marco Antoniotonny provider determines a prescription is needed to treat your condition, one can be electronically transmitted to a nearby pharmacy*. Please take a moment to enroll today if you have not already done so. The enrollment process is free and takes just a few minutes. To enroll, please download the [employer] 24/7 griffin to your tablet or phone, or visit [email or web address] to enroll on your computer. And, as an [address] [place] [address] patient with a Voice2Insight account, the results of your visits will be scanned into your electronic medical record and your primary care provider will be able to view the scanned results. We urge you to continue to see your regular Transins[employer] provider for your ongoing medical care. And while your primary care provider may not be the one available when you seek a [facility] virtual visit, the peace of mind you get from getting a real diagnosis real time can be priceless. For more information on [facility], view our Frequently Asked Questions (FAQs) at [email or web address]. Sincerely, 
 
Tiffani Franklin MD 
Chief Medical Officer [employer] *:  certain medications cannot be prescribed via [facility] Providers Seen During Your Hospitalization Provider Specialty Primary office phone Ene Slaughter MD Gastroenterology [phone] Your Primary Care Physician (PCP) Primary Care Physician Office Phone Office Fax [address] [phone] You are allergic to the following No active allergies Recent Documentation Height Weight BMI Smoking Status 1.778 m 70.3 kg 22.24 kg/m2 Former Smoker Emergency Contacts Name Discharge Info Relation Home Work Mobile Delphine Whittington DISCHARGE CAREGIVER [3] Neighbor [4] [phone] Niecy Avery DISCHARGE CAREGIVER [3] Spouse [3] [phone] Patient Belongings The following personal items are in your possession at time of discharge: Dental Appliances: None  Visual Aid: None Please provide this summary of care documentation to your next provider. Signatures-by signing, you are acknowledging that this After Visit Summary has been reviewed with you and you have received a copy. Patient Signature:  ____________________________________________________________ Date:  ____________________________________________________________  
  
Jerral [place] Provider Signature:  ____________________________________________________________ Date:  ____________________________________________________________

## 2018-06-13 NOTE — DISCHARGE INSTRUCTIONS
Denzel Oconnell MD  [facility], [address]  [place], [address] [facility]  [phone]  [email or web address]    Gene Del Real  [id number]  1950    EGD DISCHARGE INSTRUCTIONS    Discomfort:  Sore throat- throat lozenges or warm salt water gargle  redness at IV site- apply warm compress to area; if redness or soreness persist- contact your physician  Gaseous discomfort- walking, belching will help relieve any discomfort  You may not operate a vehicle for 12 hours  You may not engage in an occupation involving machinery or appliances for rest of today  You may not drink alcoholic beverages for at least 12 hours  Avoid making any critical decisions for at least 24 hour  DIET  You may have anything by mouth. You may eat and drink immediately. You may resume your regular diet - however -  remember your colon is empty and a heavy meal will produce gas. Avoid these foods:  vegetables, fried / greasy foods, carbonated drinks      ACTIVITY  You may resume your normal daily activities   Spend the remainder of the day resting -  avoid any strenuous activity. CALL M.D. ANY SIGN OF   Increasing pain, nausea, vomiting  Abdominal distension (swelling)  New increased bleeding (oral or rectal)  Fever (chills)  Pain in chest area  Bloody discharge from nose or mouth  Shortness of breath    Follow-up Instructions:   Call Dr. Denzel Oconnell for any questions or problems. Telephone # [phone]  Dr. Daksha Rodriguez office will notify you of the biopsy results available  Within 7 to 10 days. We will call you or send a letter   Continue same medications. Should repeat endoscopy in 6 months. ENDOSCOPY FINDINGS:   Your endoscopy showed the duodenal polyp which we removed.  Esophagus and stomach looked normal.      DISCHARGE SUMMARY from Nurse    The following personal items collected during your admission are returned to you:   Dental Appliance: Dental Appliances: None  Vision: Visual Aid: Glasses  Hearing Aid:    Jewelry:    Clothing:    Other Valuables:    Valuables sent to safe:

## 2018-06-13 NOTE — IP AVS SNAPSHOT
[address] [facility] 
[phone] Patient: Braulio Simpson MRN: [id number] MBX:[id number] About your hospitalization You were admitted on:  June 13, 2018 You last received care in the:  [facility] You were discharged on:  June 13, 2018 Why you were hospitalized Your primary diagnosis was:  Not on File Follow-up Information Follow up With Details Comments Contact Info Nu Escalona MD   [facility] [address] [facility] 
[phone] Your Scheduled Appointments Tuesday June 19, 2018 ESOPHAGOGASTRODUODENAL (EGD) BIOPSY with Randa Reza MD  
[facility] (RI OR PRE ASSESSMENT) [address] [facility]  
[phone] Thursday July 12, 2018 12:00 PM EDT ROUTINE CARE with Nu Escalona MD  
[facility] [address] [facility]) [facility] [address] [facility]  
[phone] Discharge Orders None A check albert indicates which time of day the medication should be taken. My Medications CONTINUE taking these medications Instructions Each Dose to Equal  
 Morning Noon Evening Bedtime  
 acetaminophen-codeine 300-30 mg per tablet Commonly known as:  TYLENOL #3 Your last dose was: Your next dose is: Take 1 Tab by mouth every four (4) hours as needed for Pain. Max Daily Amount: 6 Tabs. 1 Tab  
    
   
   
   
  
 aspirin 81 mg chewable tablet Your last dose was: Your next dose is: Take 1 tablet by mouth daily. 81 mg  
    
   
   
   
  
 carvedilol 25 mg tablet Commonly known as:  Niall Lacrosse Your last dose was: Your next dose is: TAKE 2 TABLETS PO BID  
     
   
   
   
  
 cloNIDine HCl 0.1 mg tablet Commonly known as:  CATAPRES Your last dose was: Your next dose is:    
   
   
 TK 1 T PO  TID  
     
   
   
   
  
 escitalopram oxalate 10 mg tablet Commonly known as:  Minor Muscat Your last dose was: Your next dose is:    
   
   
 TK 1 T PO QD  
     
   
   
   
  
 glucose blood VI test strips strip Commonly known as:  TRUE METRIX GLUCOSE TEST STRIP Your last dose was: Your next dose is:    
   
   
 Patient test blood sugar 3 times daily. insulin glargine 100 unit/mL (3 mL) Inpn Commonly known as:  Lianne Scott Your last dose was: Your next dose is:    
   
   
 10 units at bedtime * insulin  unit/mL injection Commonly known as:  Ender Dunn Your last dose was: Your next dose is:    
   
   
 18 units every AM  
     
   
   
   
  
 * HumuLIN N NPH Insulin KwikPen 100 unit/mL (3 mL) Inpn Generic drug:  insulin NPH Your last dose was: Your next dose is: INJECT SUBCUTANEOUSLY 18  UNITS DAILY  
     
   
   
   
  
 lisinopril 40 mg tablet Commonly known as:  Analy Abilio Your last dose was: Your next dose is: TAKE 1 T PO  DAILY  
     
   
   
   
  
 NORVASC 10 mg tablet Generic drug:  amLODIPine Your last dose was: Your next dose is: Take 10 mg by mouth daily. 10 mg  
    
   
   
   
  
 pravastatin 10 mg tablet Commonly known as:  PRAVACHOL Your last dose was: Your next dose is: Take 10 mg by mouth nightly. 10 mg  
    
   
   
   
  
 RENVELA 800 mg Tab tab Generic drug:  sevelamer carbonate Your last dose was: Your next dose is: Take 800 mg by mouth three (3) times daily. 800 mg SENSIPAR 60 mg Tab Generic drug:  cinacalcet Your last dose was: Your next dose is: Take 60 mg by mouth daily.   
 60 mg  
    
   
   
   
  
 VIRT-CAPS 1 mg capsule Generic drug:  b complex-vitamin c-folic acid Your last dose was: Your next dose is: TAKE ONE CAPSULE BY MOUTH DAILY * Notice: This list has 2 medication(s) that are the same as other medications prescribed for you. Read the directions carefully, and ask your doctor or other care provider to review them with you. Opioid Education Prescription Opioids: What You Need to Know: 
 
Prescription opioids can be used to help relieve moderate-to-severe pain and are often prescribed following a surgery or injury, or for certain health conditions. These medications can be an important part of treatment but also come with serious risks. Opioids are strong pain medicines. Examples include hydrocodone, oxycodone, fentanyl, and morphine. Heroin is an example of an illegal opioid. It is important to work with your health care provider to make sure you are getting the safest, most effective care. WHAT ARE THE RISKS AND SIDE EFFECTS OF OPIOID USE? Prescription opioids carry serious risks of addiction and overdose, especially with prolonged use. An opioid overdose, often marked by slow breathing, can cause sudden death. The use of prescription opioids can have a number of side effects as well, even when taken as directed. · Tolerance-meaning you might need to take more of a medication for the same pain relief · Physical dependence-meaning you have symptoms of withdrawal when the medication is stopped. Withdrawal symptoms can include nausea, sweating, chills, diarrhea, stomach cramps, and muscle aches. Withdrawal can last up to several weeks, depending on which drug you took and how long you took it. · Increased sensitivity to pain · Constipation · Nausea, vomiting, and dry mouth · Sleepiness and dizziness · Confusion · Depression · Low levels of testosterone that can result in lower sex drive, energy, and strength · Itching and sweating RISKS ARE GREATER WITH:      
· History of drug misuse, substance use disorder, or overdose · Mental health conditions (such as depression or anxiety) · Sleep apnea · Older age (72 years or older) · Pregnancy Avoid alcohol while taking prescription opioids. Also, unless specifically advised by your health care provider, medications to avoid include: · Benzodiazepines (such as Xanax or Valium) · Muscle relaxants (such as Soma or Flexeril) · Hypnotics (such as Ambien or Lunesta) · Other prescription opioids KNOW YOUR OPTIONS Talk to your health care provider about ways to manage your pain that don't involve prescription opioids. Some of these options may actually work better and have fewer risks and side effects. Options may include: 
· Pain relievers such as acetaminophen, ibuprofen, and naproxen · Some medications that are also used for depression or seizures · Physical therapy and exercise · Counseling to help patients learn how to cope better with triggers of pain and stress. · Application of heat or cold compress · Massage therapy · Relaxation techniques Be Informed Make sure you know the name of your medication, how much and how often to take it, and its potential risks & side effects. IF YOU ARE PRESCRIBED OPIOIDS FOR PAIN: 
· Never take opioids in greater amounts or more often than prescribed. Remember the goal is not to be pain-free but to manage your pain at a tolerable level. · Follow up with your primary care provider to: · Work together to create a plan on how to manage your pain. · Talk about ways to help manage your pain that don't involve prescription opioids. · Talk about any and all concerns and side effects. · Help prevent misuse and abuse. · Never sell or share prescription opioids · Help prevent misuse and abuse.  
· Store prescription opioids in a secure place and out of reach of others (this may include visitors, children, friends, and family). · Safely dispose of unused/unwanted prescription opioids: Find your community drug take-back program or your pharmacy mail-back program, or flush them down the toilet, following guidance from the Food and Drug Administration (www.fda.gov/Drugs/ResourcesForYou). · Visit www.cdc.gov/drugoverdose to learn about the risks of opioid abuse and overdose. · If you believe you may be struggling with addiction, tell your health care provider and ask for guidance or call [address] [place] [address] at 8-761-173-HELP. Discharge Instructions Trina Claudio MD 
[facility], [address] [place] [address], [address] [place] [address] 
[phone] 
[email or web address] Severs [id number] 
1950 EGD DISCHARGE INSTRUCTIONS Discomfort: 
Sore throat- throat lozenges or warm salt water gargle 
redness at IV site- apply warm compress to area; if redness or soreness persist- contact your physician Gaseous discomfort- walking, belching will help relieve any discomfort You may not operate a vehicle for 12 hours You may not engage in an occupation involving machinery or appliances for rest of today You may not drink alcoholic beverages for at least 12 hours Avoid making any critical decisions for at least 24 hour DIET You may have anything by mouth. You may eat and drink immediately. You may resume your regular diet  however -  remember your colon is empty and a heavy meal will produce gas. Avoid these foods:  vegetables, fried / greasy foods, carbonated drinks ACTIVITY You may resume your normal daily activities Spend the remainder of the day resting -  avoid any strenuous activity. CALL M.D. ANY SIGN OF Increasing pain, nausea, vomiting Abdominal distension (swelling) New increased bleeding (oral or rectal) Fever (chills) Pain in chest area Bloody discharge from nose or mouth Shortness of breath Follow-up Instructions: 
 Call Dr. Michelle South for any questions or problems. Telephone # [phone] Dr. Ramirez Cancer office will notify you of the biopsy results available  Within 7 to 10 days. We will call you or send a letter Continue same medications. Should repeat endoscopy in 6 months. ENDOSCOPY FINDINGS: 
 Your endoscopy showed the duodenal polyp which we removed. Esophagus and stomach looked normal. 
 
 
DISCHARGE SUMMARY from Nurse The following personal items collected during your admission are returned to you:  
Dental Appliance: Dental Appliances: None Vision: Visual Aid: Glasses Hearing Aid:   
Jewelry:   
Clothing:   
Other Valuables:   
Valuables sent to safe:   
 
O Transitions of Care Introducing [employer] [address] Clarita Prashanth offers a voluntary care coordination program to provide high quality service and care to [facility] fee-for-service beneficiaries. Uzair Isaias was designed to help you enhance your health and well-being through the following services: ? Transitions of Care  support for individuals who are transitioning from one care setting to another (example: Hospital to home). ? Chronic and Complex Care Coordination  support for individuals and caregivers of those with serious or chronic illnesses or with more than one chronic (ongoing) condition and those who take a number of different medications. If you meet specific medical criteria, a [facility] [address] may call you directly to coordinate your care with your primary care physician and your other care providers. For questions about the [facility] programs, please, contact your physicians office. For general questions or additional information about Accountable Care Organizations: 
Please visit www.medicare.gov/acos. html or call 1-800-MEDICARE (6-407.107.7285) TTY users should call 5-236.870.7426. Introducing [facility] [place] [facility]! [employer] introduces [employer] patient portal. Now you can access parts of your medical record, email your doctor's office, and request medication refills online. 1. In your internet browser, go to [email or web address] Navigationt 2. Click on the First Time User? Click Here link in the Sign In box. You will see the New Member Sign Up page. 3. Enter your iRise Access Code exactly as it appears below. You will not need to use this code after youve completed the sign-up process. If you do not sign up before the expiration date, you must request a new code. · iRise Access Code: [email or web address] Expires: 7/11/2018 11:53 AM 
 
4. Enter the last four digits of your Social Security Number (xxxx) and Date of Birth (mm/dd/yyyy) as indicated and click Submit. You will be taken to the next sign-up page. 5. Create a iRise ID. This will be your iRise login ID and cannot be changed, so think of one that is secure and easy to remember. 6. Create a iRise password. You can change your password at any time. 7. Enter your Password Reset Question and Answer. This can be used at a later time if you forget your password. 8. Enter your e-mail address. You will receive e-mail notification when new information is available in [address]. 9. Click Sign Up. You can now view and download portions of your medical record. 10. Click the Download Summary menu link to download a portable copy of your medical information. If you have questions, please visit the Frequently Asked Questions section of the iRise website. Remember, iRise is NOT to be used for urgent needs. For medical emergencies, dial 911. Now available from your iPhone and Android! Introducing Robert Jordan As a [employer] patient, I wanted to make you aware of our electronic visit tool called Robert Jordan. [employer] 24/7 allows you to connect within minutes with a medical provider 24 hours a day, seven days a week via a mobile device or tablet or logging into a secure website from your computer. You can access [employer] [email or web address] from anywhere in the [place]. A virtual visit might be right for you when you have a simple condition and feel like you just dont want to get out of bed, or cant get away from work for an appointment, when your regular [employer] provider is not available (evenings, weekends or holidays), or when youre out of town and need minor care. Electronic visits cost only $49 and if the [employer] 24/7 provider determines a prescription is needed to treat your condition, one can be electronically transmitted to a nearby pharmacy*. Please take a moment to enroll today if you have not already done so. The enrollment process is free and takes just a few minutes. To enroll, please download the [employer] 24/7 griffin to your tablet or phone, or visit [email or web address] to enroll on your computer. And, as an [address] [place] [address] patient with a [employer] account, the results of your visits will be scanned into your electronic medical record and your primary care provider will be able to view the scanned results. We urge you to continue to see your regular [employer] provider for your ongoing medical care. And while your primary care provider may not be the one available when you seek a [employer] virtual visit, the peace of mind you get from getting a real diagnosis real time can be priceless. For more information on [employer], view our Frequently Asked Questions (FAQs) at [email or web address]. Sincerely, 
 
Paulie Monzon MD 
Chief Medical Officer Surya Alford *:  certain medications cannot be prescribed via [employer] [email or web address] Providers Seen During Your Hospitalization Provider Specialty Primary office phone Min Blount MD Gastroenterology [phone] Your Primary Care Physician (PCP) Primary Care Physician Office Phone Office Fax [address] [phone] You are allergic to the following No active allergies Recent Documentation Height Weight BMI Smoking Status 1.778 m 68 kg 21.52 kg/m2 Former Smoker Emergency Contacts Name Discharge Info Relation Home Work Mobile Delphine Whittington DISCHARGE CAREGIVER [3] Neighbor [4] [phone] Niecy Avery DISCHARGE CAREGIVER [3] Spouse [3] [phone] Patient Belongings The following personal items are in your possession at time of discharge: 
  Dental Appliances: None  Visual Aid: Glasses Please provide this summary of care documentation to your next provider. Signatures-by signing, you are acknowledging that this After Visit Summary has been reviewed with you and you have received a copy. Patient Signature:  ____________________________________________________________ Date:  ____________________________________________________________  
  
[facility] Provider Signature:  ____________________________________________________________ Date:  ____________________________________________________________

## 2018-06-13 NOTE — H&P
Pepper Sorto MD  [facility], [address] [place] [address]  [phone]  [email or web address]      Gastroenterology Outpatient History and Physical    Patient: Janet Ross    Physician: Augustina Liz MD    Vital Signs: Blood pressure 150/69, pulse 83, temperature 98.2 °F (36.8 °C), resp. rate 14, height 5' 10\" (1.778 m), weight 68 kg (150 lb), SpO2 100 %. Allergies: No Known Allergies    Chief Complaint: duodenal polyp    History of Present Illness: here for EGD to remove adenomatous duodenal polyp which was found on egd April 2018.     History:  Past Medical History:   Diagnosis Date    Anemia associated with chronic renal failure     Autoimmune disease (HCC)     hx ms    CAD (coronary artery disease)     Chronic kidney disease     catheter removed and no dialysis at this time    Chronic kidney disease     left arm fistula dialysis MWF    Diabetes ([address]     type II    GERD (gastroesophageal reflux disease)     Hypertension     Ill-defined condition     prostate cancer    Multiple sclerosis ([address]     diagnosed '01    Other ill-defined conditions(799.89)     anemia    Other ill-defined conditions(799.89)     elevated cholesterol    Other ill-defined conditions(799.89)     hx septic right foot    Peripheral vascular disease ([address]     Secondary hyperparathyroidism of renal origin ([address]     Thyroid disease       Past Surgical History:   Procedure Laterality Date    COLONOSCOPY N/A 12/6/2016    COLONOSCOPY performed by [facility]ina Liz [facility] at [facility] END[facility]COPY    COLONOSCOPY,DIAGNOSTIC  12/6/2016         CORONARY STENT SINGLE W/PTCA  2/17/2011         HX APPENDECTOMY      HX CATARACT REMOVAL  10/18/10    left eye    HX CHOLECYSTECTOMY      HX GI      ileostomy due to infection    HX HEART CATHETERIZATION      HX HEENT      hx post photocoagulation eye 2009    HX ORTHOPAEDIC      HX OTHER SURGICAL      right partial foot amputation    HX OTHER SURGICAL      cardiac cath    HX OTHER SURGICAL      prostate removed    CO COLONOSCOPY W/BIOPSY SINGLE/MULTIPLE  5/10/2010         UPPER GI ENDOSCOPY,BIOPSY  4/17/2018         VASCULAR SURGERY PROCEDURE UNLIST      katelyn. leg bypasses      Social History     Social History    Marital status:      Spouse name: N/A    Number of children: N/A    Years of education: N/A     Occupational History    not working      Social History Main Topics    Smoking status: Former Smoker     Years: 1.00     Quit date: 4/12/2003    Smokeless tobacco: Never Used      Comment: quit 5 years ago    Alcohol use No      Comment: Stopped drinking 10 years ago    Drug use: No    Sexual activity: Yes     Partners: Female     Birth control/ protection: None     Other Topics Concern    None     Social History Narrative      Family History   Problem Relation Age of Onset    Diabetes Mother       Patient Active Problem List   Diagnosis Code    Hypertension I10    PAD (peripheral artery disease) ([facility]) I73.9    S/P coronary artery stent placement Z95.5    ASHD (arteriosclerotic heart disease) I25.10    Mixed hyperlipidemia E78.2    ESRD on hemodialysis ([facility] N18.6, Z99.2    Cervical stenosis of spinal canal M48.02    Ataxia R27.0    Multiple sclerosis ([facility] G35    Ulnar neuropathy at elbow of left upper extremity G56.22    Carpal tunnel syndrome on both sides G56.03    Ulnar neuropathy at elbow of right upper extremity G56.21    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  Authorizing Provider   VIRT-CAPS 1 mg capsule TAKE ONE CAPSULE BY MOUTH DAILY 6/12/18   Vesta Lindsey MD   HUMULIN N NPH INSULIN KWIKPEN 100 unit/mL (3 mL) inpn INJECT SUBCUTANEOUSLY 18  UNITS DAILY 6/12/18   Vesta Lindsey MD   insulin NPH (NOVOLIN N, HUMULIN N) 100 unit/mL injection 18 units every AM 4/5/18   Vesta Lindsey MD   lisinopril (PRINIVIL, ZESTRIL) 40 mg tablet TAKE 1 T PO  DAILY 4/5/18   Vesta Lindsey MD   acetaminophen-codeine (TYLENOL #3) 300-30 mg per tablet Take 1 Tab by mouth every four (4) hours as needed for Pain. Max Daily Amount: 6 Tabs. 4/5/18   Vesta Lindsey MD   insulin glargine (LANTUS,BASAGLAR) 100 unit/mL (3 mL) inpn 10 units at bedtime 3/12/18   Vesta Lindsey MD   glucose blood VI test strips (TRUE METRIX GLUCOSE TEST STRIP) strip Patient test blood sugar 3 times daily. 2/9/18   Vesta Lindsey MD   carvedilol (COREG) 25 mg tablet TAKE 2 TABLETS PO BID 12/25/17   Historical Provider   cloNIDine HCl (CATAPRES) 0.1 mg tablet TK 1 T PO  TID 12/23/17   Historical Provider   escitalopram oxalate (LEXAPRO) 10 mg tablet TK 1 T PO QD 12/26/17   Historical Provider   SENSIPAR 60 mg tab Take 60 mg by mouth daily. 3/24/17   Historical Provider   sevelamer carbonate (RENVELA) 800 mg tab tab Take 800 mg by mouth three (3) times daily. Historical Provider   aspirin 81 mg chewable tablet Take 1 tablet by mouth daily. 1/5/15   Chelly Smart MD   amLODIPine (NORVASC) 10 mg tablet Take 10 mg by mouth daily. Historical Provider   pravastatin (PRAVACHOL) 10 mg tablet Take 10 mg by mouth nightly.     Historical Provider       Physical Exam:     General: well developed, well nourished   HEENT: unremarkable   Heart: regular rhythm no mumur    Lungs: clear   Abdominal:  benign   Neurological: unremarkable   Extremities: no edema     Findings/Diagnosis: Adenomatous duodenal polyp    Plan of Care/Planned Procedure: EGD with polypectomy with  monitored anesthesia care sedation       Signed:  Jil Frey MD 6/13/2018

## 2018-06-13 NOTE — ANESTHESIA PREPROCEDURE EVALUATION
Anesthetic History   No history of anesthetic complications            Review of Systems / Medical History  Patient summary reviewed, nursing notes reviewed and pertinent labs reviewed    Pulmonary                Comments: Former smoker - Quit 2003   Neuro/Psych             Comments: Multiple Sclerosis--stable  Diabetic Peripheral Neuropathy  Cervical Stenosis  Ataxia Cardiovascular    Hypertension          Past MI (NSTEMI), CAD, PAD, cardiac stents and hyperlipidemia    Exercise tolerance: <4 METS  Comments: S/P Bilateral Lower Extremity bypasses     Uses wheelchair to ambulate    2-2018 EKG:  SR with first degree AV Blk     50% EF 2-2018    Pt reports he saw his cardiologist and was cleared for this procedure.    GI/Hepatic/Renal     GERD    Renal disease (MWF): ESRD and dialysis      Comments: Hematemesis  Intractable Hiccups  Ileostomy due to infection Endo/Other    Diabetes: type 2, using insulin    Cancer (Prostate cancer with surgery) and anemia  Pertinent negatives: No hypothyroidism   Other Findings   Comments: Secondary hyperparathyroidism of renal origin         Physical Exam    Airway  Mallampati: II  TM Distance: > 6 cm  Neck ROM: normal range of motion   Mouth opening: Normal     Cardiovascular  Regular rate and rhythm,  S1 and S2 normal,  no murmur, click, rub, or gallop             Dental    Dentition: Poor dentition  Comments: Multiple damaged, multiple missing, significant decay   Pulmonary  Breath sounds clear to auscultation               Abdominal  GI exam deferred       Other Findings            Anesthetic Plan    ASA: 4  Anesthesia type: total IV anesthesia          Induction: Intravenous  Anesthetic plan and risks discussed with: Patient      Took beta blker yesterday am  /69  HR 83    K4.3

## 2018-06-13 NOTE — PROCEDURES
[facility]                   Endoscopic Gastroduodenoscopy Procedure Note      6/13/2018  Casey Martinez  1950  [id number]    Procedure: Endoscopic Gastroduodenoscopy with polypectomy    Indication: adenomatous duodenal polyp     Pre-operative Diagnosis: see indication above    Post-operative Diagnosis: see findings below    : MIR Hess MD    Referring Provider:  Nolvia Pollack MD      Anesthesia/Sedation:  MAC anesthesia Propofol    Airway assessment: No airway problems anticipated    Pre-Procedural Exam:      Airway: clear, no airway problems anticipated  Heart: RRR, without gallops or rubs  Lungs: clear bilaterally without wheezes, crackles, or rhonchi  Abdomen: soft, nontender, nondistended, bowel sounds present  Mental Status: awake, alert and oriented to person, place and time       Procedure Details     After infomed consent was obtained for the procedure, with all risks and benefits of procedure explained the patient was taken to the endoscopy suite and placed in the left lateral decubitus position. Following sequential administration of sedation as per above, the endoscope was inserted into the mouth and advanced under direct vision to second portion of the duodenum. A careful inspection was made as the gastroscope was withdrawn, including a retroflexed view of the proximal stomach; findings and interventions are described below. Findings:   Esophagus:normal  Stomach: normal   Duodenum: 7 mm polyp on lateral wall removed with hot snare. Complete hemostasis after snared, but still placed one endoscopic clip on site. Therapies:  polypectomy    Specimens: duodenal polyp           Complications:   None; patient tolerated the procedure well. EBL:  None. Impression:      -duodenal polypectomy    Recommendations:  -Await pathology. , -Repeat egd in 6 months    Dani Benitez MD6/13/2018

## 2018-06-13 NOTE — ANESTHESIA POSTPROCEDURE EVALUATION
Post-Anesthesia Evaluation and Assessment    Patient: April [facility] MRN: [id number]  SSN: [id number]    YOB: 1950  Age: 70 y.o. Sex: male       Cardiovascular Function/Vital Signs  Visit Vitals    /70    Pulse 76    Temp 36.7 °C (98.1 °F)    Resp 13    Ht 5' 10\" (1.778 m)    Wt 68 kg (150 lb)    SpO2 100%    BMI 21.52 kg/m2       Patient is status post total IV anesthesia anesthesia for Procedure(s):  ESOPHAGOGASTRODUODENOSCOPY (EGD)  ENDOSCOPIC POLYPECTOMY  ESOPHAGOGASTRODUODENAL (EGD) BIOPSY  RESOLUTION CLIP. Nausea/Vomiting: None    Postoperative hydration reviewed and adequate. Pain:  Pain Scale 1: Numeric (0 - 10) (06/13/18 0940)  Pain Intensity 1: 0 (06/13/18 0940)   Managed    Neurological Status: At baseline    Mental Status and Level of Consciousness: Arousable    Pulmonary Status:   O2 Device: Room air (06/13/18 0940)   Adequate oxygenation and airway patent    Complications related to anesthesia: None    Post-anesthesia assessment completed.  No concerns    Signed By: Dariela Alfaro DO     June 13, 2018

## 2018-06-28 PROBLEM — I72.3 PSEUDOANEURYSM OF ILIAC ARTERY (HCC): Status: ACTIVE | Noted: 2018-01-01

## 2018-06-28 NOTE — ED PROVIDER NOTES
EMERGENCY DEPARTMENT HISTORY AND PHYSICAL EXAM      Date: 6/28/2018  Patient Name: Shanika Conklin    History of Presenting Illness     Chief Complaint   Patient presents with    Flank Pain     pt arrives by EMS from dialysis with reports of right flank and abdominal pain beginning Monday, was not dialyzed until today, vomited once today, is out of pain meds for chronic pain    Abdominal Pain       HPI: Shanika Conklin, 70 y.o. male with PMHx significant for CKD on ESRD (LUE fistula), HTN, GERD, CAD, PVD, DMII, MS presents via EMS to the ED with cc of nausea, vomiting, and abdominal pain. Pt reports persistent R flank pain that radiates into RUQ/ RLQ of abdomen since Monday. He states that he had HD PTA w/o difficulty. He missed Mond/ Wed HD 2/2 his pain level being too high and not feeling well. He reports nausea w/ vomiting today. Vomited just PTA, \"looked like bile\" denies any bloody emesis. Pt denies any diarrhea, fevers, chills, or body aches. He has not taken any medication PTA for s/sx. There are no other complaints, changes, or physical findings at this time. PCP: Jesús Saavedra MD    Current Outpatient Prescriptions   Medication Sig Dispense Refill    VIRT-CAPS 1 mg capsule TAKE ONE CAPSULE BY MOUTH DAILY 30 Cap 0    HUMULIN N NPH INSULIN KWIKPEN 100 unit/mL (3 mL) inpn INJECT SUBCUTANEOUSLY 18  UNITS DAILY 30 mL 3    insulin NPH (NOVOLIN N, HUMULIN N) 100 unit/mL injection 18 units every AM 1 Vial 0    lisinopril (PRINIVIL, ZESTRIL) 40 mg tablet TAKE 1 T PO  DAILY 90 Tab 1    acetaminophen-codeine (TYLENOL #3) 300-30 mg per tablet Take 1 Tab by mouth every four (4) hours as needed for Pain. Max Daily Amount: 6 Tabs. 30 Tab 0    insulin glargine (LANTUS,BASAGLAR) 100 unit/mL (3 mL) inpn 10 units at bedtime 15 Pen 0    glucose blood VI test strips (TRUE METRIX GLUCOSE TEST STRIP) strip Patient test blood sugar 3 times daily.  100 Strip 3    carvedilol (COREG) 25 mg tablet TAKE 2 TABLETS PO BID  3  cloNIDine HCl (CATAPRES) 0.1 mg tablet TK 1 T PO  TID  3    escitalopram oxalate (LEXAPRO) 10 mg tablet TK 1 T PO QD  3    SENSIPAR 60 mg tab Take 60 mg by mouth daily.  sevelamer carbonate (RENVELA) 800 mg tab tab Take 800 mg by mouth three (3) times daily.  aspirin 81 mg chewable tablet Take 1 tablet by mouth daily.  amLODIPine (NORVASC) 10 mg tablet Take 10 mg by mouth daily.  pravastatin (PRAVACHOL) 10 mg tablet Take 10 mg by mouth nightly.          Past History     Past Medical History:  Past Medical History:   Diagnosis Date    Anemia associated with chronic renal failure     Autoimmune disease (HCC)     hx ms    CAD (coronary artery disease)     Chronic kidney disease     catheter removed and no dialysis at this time    Chronic kidney disease     left arm fistula dialysis MWF    Diabetes ([address]     type II    GERD (gastroesophageal reflux disease)     Hypertension     Ill-defined condition     prostate cancer    Multiple sclerosis ([address]     diagnosed '01    Other ill-defined conditions(799.89)     anemia    Other ill-defined conditions(799.89)     elevated cholesterol    Other ill-defined conditions(799.89)     hx septic right foot    Peripheral vascular disease ([address]     Secondary hyperparathyroidism of renal origin ([address]     Thyroid disease        Past Surgical History:  Past Surgical History:   Procedure Laterality Date    COLONOSCOPY N/A 12/6/2016    COLONOSCOPY performed by Ron Due[facility]as MD at [facility] ENDOSCOPY    COLONOSCOPY,DIAGNOSTIC  12/6/2016         CORONARY STENT SINGLE W/PTCA  2/17/2011         HX APPENDECTOMY      HX CATARACT REMOVAL  10/18/10    left eye    HX CHOLECYSTECTOMY      HX GI      ileostomy due to infection    HX HEART CATHETERIZATION      HX HEENT      hx post photocoagulation eye 2009    HX ORTHOPAEDIC      HX OTHER SURGICAL      right partial foot amputation    HX OTHER SURGICAL      cardiac cath    HX OTHER SURGICAL prostate removed    MT COLONOSCOPY W/BIOPSY SINGLE/MULTIPLE  5/10/2010         UPPER GI ENDOSCOPY,BIOPSY  4/17/2018         UPPER GI ENDOSCOPY,REMV TUMOR,SNARE  6/13/2018         VASCULAR SURGERY PROCEDURE UNLIST      katelyn. leg bypasses       Family History:  Family History   Problem Relation Age of Onset    Diabetes Mother        Social History:  Social History   Substance Use Topics    Smoking status: Former Smoker     Years: 1.00     Quit date: 4/12/2003    Smokeless tobacco: Never Used      Comment: quit 5 years ago    Alcohol use No      Comment: Stopped drinking 10 years ago       Allergies:  No Known Allergies    Review of Systems   Review of Systems   Constitutional: Negative for activity change, appetite change, chills and fever. HENT: Negative for congestion, rhinorrhea, sinus pressure, sneezing and sore throat. Eyes: Negative for pain, discharge and visual disturbance. Respiratory: Negative for cough and shortness of breath. Cardiovascular: Negative for chest pain. Gastrointestinal: Positive for abdominal pain, nausea and vomiting. Negative for diarrhea. Genitourinary: Positive for flank pain. Negative for dysuria, frequency and urgency. Musculoskeletal: Negative for arthralgias, back pain, gait problem, joint swelling, myalgias and neck pain. Skin: Negative for color change and rash. Neurological: Negative for dizziness, speech difficulty, weakness, light-headedness, numbness and headaches. Psychiatric/Behavioral: Negative for agitation, behavioral problems and confusion. All other systems reviewed and are negative. Physical Exam   Physical Exam   Constitutional: He is oriented to person, place, and time. He appears well-developed and well-nourished. No distress. Chronically ill appearing      HENT:   Head: Normocephalic and atraumatic.    Right Ear: External ear normal.   Left Ear: External ear normal.   Nose: Nose normal.   Mouth/Throat: Oropharynx is clear and moist. No oropharyngeal exudate. Eyes: Conjunctivae and EOM are normal. Pupils are equal, round, and reactive to light. Neck: Normal range of motion. Neck supple. Cardiovascular: Regular rhythm, normal heart sounds and intact distal pulses. Tachycardia present. Pulmonary/Chest: Effort normal and breath sounds normal.   Abdominal: Soft. He exhibits no distension. There is tenderness (RUQ/ RLQ ). There is guarding. There is no rebound. Musculoskeletal: Normal range of motion. LE surgically absent w/ healed surgical incisions    Neurological: He is alert and oriented to person, place, and time. Skin: Skin is warm and dry. Psychiatric: He has a normal mood and affect. His behavior is normal. Judgment and thought content normal.   Nursing note and vitals reviewed. Diagnostic Study Results   Labs -     Recent Results (from the past 12 hour(s))   GLUCOSE, POC    Collection Time: 06/28/18 11:07 AM   Result Value Ref Range    Glucose (POC) 125 (H) 65 - 100 mg/dL    Performed by Dora Record    EKG, 12 LEAD, INITIAL    Collection Time: 06/28/18 11:10 AM   Result Value Ref Range    Ventricular Rate 88 BPM    Atrial Rate 88 BPM    P-R Interval 150 ms    QRS Duration 86 ms    Q-T Interval 464 ms    QTC Calculation (Bezet) 561 ms    Calculated P Axis 68 degrees    Calculated R Axis 66 degrees    Calculated T Axis 173 degrees    Diagnosis       Normal sinus rhythm  Possible Left atrial enlargement  Left ventricular hypertrophy with repolarization abnormality  Prolonged QT  When compared with ECG of 04-FEB-2018 03:08,  ND interval has decreased  Vent.  rate has increased BY  29 BPM  ST now depressed in Lateral leads  T wave inversion more evident in Anterior leads  QT has lengthened     CBC WITH AUTOMATED DIFF    Collection Time: 06/28/18 11:45 AM   Result Value Ref Range    WBC 26.1 (H) 4.1 - 11.1 K/uL    RBC 4.03 (L) 4.10 - 5.70 M/uL    HGB 10.9 (L) 12.1 - 17.0 g/dL    HCT 34.6 (L) 36.6 - 50.3 %    MCV 85.9 80.0 - 99.0 FL    MCH 27.0 26.0 - 34.0 PG    MCHC 31.5 30.0 - 36.5 g/dL    RDW 17.1 (H) 11.5 - 14.5 %    PLATELET 008 985 - 020 K/uL    MPV 9.7 8.9 - 12.9 FL    NRBC 0.0 0  WBC    ABSOLUTE NRBC 0.00 0.00 - 0.01 K/uL    NEUTROPHILS 92 (H) 32 - 75 %    LYMPHOCYTES 4 (L) 12 - 49 %    MONOCYTES 2 (L) 5 - 13 %    EOSINOPHILS 0 0 - 7 %    BASOPHILS 0 0 - 1 %    IMMATURE GRANULOCYTES 2 (H) 0.0 - 0.5 %    ABS. NEUTROPHILS 24.1 (H) 1.8 - 8.0 K/UL    ABS. LYMPHOCYTES 1.0 0.8 - 3.5 K/UL    ABS. MONOCYTES 0.5 0.0 - 1.0 K/UL    ABS. EOSINOPHILS 0.0 0.0 - 0.4 K/UL    ABS. BASOPHILS 0.0 0.0 - 0.1 K/UL    ABS. IMM. GRANS. 0.5 (H) 0.00 - 0.04 K/UL    DF SMEAR SCANNED      RBC COMMENTS ANISOCYTOSIS  1+       METABOLIC PANEL, COMPREHENSIVE    Collection Time: 06/28/18 11:45 AM   Result Value Ref Range    Sodium 135 (L) 136 - 145 mmol/L    Potassium 4.0 3.5 - 5.1 mmol/L    Chloride 97 97 - 108 mmol/L    CO2 27 21 - 32 mmol/L    Anion gap 11 5 - 15 mmol/L    Glucose 127 (H) 65 - 100 mg/dL    BUN 29 (H) 6 - 20 MG/DL    Creatinine 6.84 (H) 0.70 - 1.30 MG/DL    BUN/Creatinine ratio 4 (L) 12 - 20      GFR est AA 10 (L) >60 ml/min/1.73m2    GFR est non-AA 8 (L) >60 ml/min/1.73m2    Calcium 9.3 8.5 - 10.1 MG/DL    Bilirubin, total 0.7 0.2 - 1.0 MG/DL    ALT (SGPT) 20 12 - 78 U/L    AST (SGOT) 25 15 - 37 U/L    Alk.  phosphatase 177 (H) 45 - 117 U/L    Protein, total 9.6 (H) 6.4 - 8.2 g/dL    Albumin 2.3 (L) 3.5 - 5.0 g/dL    Globulin 7.3 (H) 2.0 - 4.0 g/dL    A-G Ratio 0.3 (L) 1.1 - 2.2     AMYLASE    Collection Time: 06/28/18 11:45 AM   Result Value Ref Range    Amylase 28 25 - 115 U/L   LIPASE    Collection Time: 06/28/18 11:45 AM   Result Value Ref Range    Lipase 31 (L) 73 - 393 U/L   TROPONIN I    Collection Time: 06/28/18 11:45 AM   Result Value Ref Range    Troponin-I, Qt. 1.99 (H) <0.05 ng/mL       Radiologic Studies -   CT ABD PELV W CONT   Final Result      XR CHEST PA LAT   Final Result        CT Results  (Last 48 hours) 06/28/18 1336  CT ABD PELV W CONT Final result    Impression:  IMPRESSION: There has been interval development of large pseudoaneurysm at right   external iliac artery likely secondary to inflammatory or infectious process   into to the right psoas muscle. There is been associated interval worsening of   severe right hydronephrosis and right hydroureter to the level of the   inflammatory change. Additional incidental findings as above including small   moderate right pleural effusion with associated atelectasis. .       Narrative:  EXAM:  CT ABD PELV W CONT       INDICATION: Right flank pain and abdominal pain onset 3 days ago. COMPARISON: CT 10/24/2017. CONTRAST:  100 mL of Isovue-370. TECHNIQUE:    Following the uneventful intravenous administration of contrast, thin axial   images were obtained through the abdomen and pelvis. Coronal and sagittal   reconstructions were generated. Oral contrast was not administered. CT dose   reduction was achieved through use of a standardized protocol tailored for this   examination and automatic exposure control for dose modulation. FINDINGS:    LUNG BASES: Small moderate right pleural effusion with overlying atelectasis. Dependent atelectasis left base. Aerated lungs are clear. INCIDENTALLY IMAGED HEART AND MEDIASTINUM: Heart is normal in size without   pericardial effusion. Coronary artery calcifications are noted. LIVER: No mass or biliary dilatation. GALLBLADDER: Surgically absent. SPLEEN: No mass. PANCREAS: No mass or ductal dilatation. ADRENALS: Unremarkable. KIDNEYS: Severe right hydronephrosis and proximal right hydroureter to level of   previously seen inflammatory process anterior to the right psoas muscle is   redemonstrated and appears worsened compared to prior examination. Both kidneys   have numerous 2 small to fully characterize small hypodensities. No definite   enhancing lesions are seen.  No left hydronephrosis or stone. No left ureteral   dilation to   STOMACH: Unremarkable. SMALL BOWEL: No dilatation or wall thickening. COLON: No dilatation or wall thickening. APPENDIX: Unremarkable. PERITONEUM: No ascites or pneumoperitoneum. RETROPERITONEUM: No lymphadenopathy or aortic aneurysm. Inflammatory change   anterior to the right psoas in the pelvis abuts the right external iliac artery   where there is a large pseudoaneurysm with central hyperdense contrast measuring   2.6 x 5.2 x 5.6 cm and the overall dimensions measuring 4.6 x 6.8 x 9.6 cm. REPRODUCTIVE ORGANS: The prostate and seminal vesicles appear grossly   unremarkable. URINARY BLADDER: No mass or calculus. BONES: Degenerative spine change. No acute fracture or aggressive lesion. ADDITIONAL COMMENTS: There is broad soft tissue edema most prominent overlying   the right upper thigh region. CXR Results  (Last 48 hours)               06/28/18 1401  XR CHEST PA LAT Final result    Impression:  IMPRESSION:   NORMAL CHEST. Narrative:  INDICATION:  evaluation for sepsis       Frontal and lateral views of the chest show clear lungs. The heart, mediastinum   and pulmonary vasculature are normal.  The bony thorax is unremarkable. No   significant change February 1, 2018               Medical Decision Making   I am the first provider for this patient. I reviewed the vital signs, available nursing notes, past medical history, past surgical history, family history and social history. Vital Signs-Reviewed the patient's vital signs.   Patient Vitals for the past 12 hrs:   Temp Pulse Resp BP SpO2   06/28/18 1446 - - - 133/66 -   06/28/18 1430 - - - 131/67 -   06/28/18 1427 - - - 125/66 -   06/28/18 1300 - - - 136/74 90 %   06/28/18 1245 - - - 144/67 94 %   06/28/18 1230 - - - 127/83 98 %   06/28/18 1215 - - - 122/63 94 %   06/28/18 1200 - - - 139/79 96 %   06/28/18 1145 - - - 123/61 100 %   06/28/18 1130 - - - 121/62 98 %   06/28/18 1115 - - - 120/60 97 %   06/28/18 1112 97.5 °F (36.4 °C) - - - 92 %   06/28/18 1110 - - - 124/64 -   06/28/18 1100 97.7 °F (36.5 °C) 88 16 148/76 100 %       Records Reviewed: Nursing Notes, Old Medical Records, Previous electrocardiograms, Previous Radiology Studies and Previous Laboratory Studies    Provider Notes (Medical Decision Making):   Ddx: AAA, aneurysm, abscess, sepsis, SBO, diverticulitis, ileus     ED Course:   Initial assessment performed. The patients presenting problems have been discussed, and they are in agreement with the care plan formulated and outlined with them. I have encouraged them to ask questions as they arise throughout their visit. Consult Note:  2:57 PM  Ivis Fonseca NP spoke with Jonathon Kimble NP,  Specialty: Vascular Surgery  Discussed pt's hx, disposition, and available diagnostic and imaging results. Reviewed care plans. Consultant agrees with plans as outlined. Jonathon Kimble NP reports that she will have an attending physician review the images and call back with a plan. Likely infectious  vs inflammatory iliac  Pseudoaneurysm. Given size of aneurysm was taken to OR by vascular surgery for repair. Dr. Jessica Jimenez at bedside for evaluation of pt and agrees w/ plan of care and disposition planning. Pt VSS during ED stay and pain controlled. Disposition:  Admitted to OR under care of vascular surgery. Ivis Fonseca NP    Diagnosis     Clinical Impression:   1. Pseudoaneurysm of iliac artery (HCC)    2. ESRD on hemodialysis ([facility] [address]    3. Abdominal pain, right lower quadrant        This note was written by Ivis Fonseca NP. This note will not be viewable in [address]

## 2018-06-28 NOTE — IP AVS SNAPSHOT
[address] [facility] 
[phone] Patient: Clarke Found MRN: [id number] AMY:1[birth year] A check albert indicates which time of day the medication should be taken. My Medications START taking these medications Instructions Each Dose to Equal  
 Morning Noon Evening Bedtime  
 guaiFENesin 100 mg/5 mL liquid Commonly known as:  ROBITUSSIN Your last dose was: Your next dose is:    
   
   
 20 mL by Per G Tube route two (2) times a day. 400 mg  
    
   
   
   
  
 oxyCODONE-acetaminophen  mg per tablet Commonly known as:  PERCOCET 10 Your last dose was: Your next dose is:    
   
   
 1 Tab by Per G Tube route every four (4) hours as needed. Max Daily Amount: 6 Tabs. 1 Tab CHANGE how you take these medications Instructions Each Dose to Equal  
 Morning Noon Evening Bedtime  
 amLODIPine 10 mg tablet Commonly known as:  Edy De Paz What changed:  how to take this Your last dose was: Your next dose is:    
   
   
 1 Tab by Per G Tube route daily. 10 mg  
    
   
   
   
  
 aspirin 81 mg chewable tablet What changed:  how to take this Your last dose was: Your next dose is:    
   
   
 1 Tab by Per G Tube route daily. 81 mg  
    
   
   
   
  
 carvedilol 25 mg tablet Commonly known as:  Jestine Pellet What changed:   
- how to take this - Another medication with the same name was removed. Continue taking this medication, and follow the directions you see here. Your last dose was: Your next dose is:    
   
   
 2 Tabs by Per G Tube route two (2) times daily (with meals). 50 mg  
    
   
   
   
  
 cloNIDine HCl 0.1 mg tablet Commonly known as:  CATAPRES What changed:   
- how to take this - Another medication with the same name was removed.  Continue taking this medication, and follow the directions you see here. Your last dose was: Your next dose is:    
   
   
 1 Tab by Per G Tube route three (3) times daily. 0.1 mg  
    
   
   
   
  
 escitalopram oxalate 10 mg tablet Commonly known as:  Fly Graham What changed:   
- how to take this - Another medication with the same name was removed. Continue taking this medication, and follow the directions you see here. Your last dose was: Your next dose is:    
   
   
 1 Tab by Per G Tube route daily. 10 mg  
    
   
   
   
  
 insulin  unit/mL injection Commonly known as:  HumuLIN N NPH U-100 Insulin What changed:   
- how much to take - Another medication with the same name was removed. Continue taking this medication, and follow the directions you see here. Your last dose was: Your next dose is:    
   
   
 5 Units by SubCUTAneous route daily. 5 Units  
    
   
   
   
  
 lisinopril 40 mg tablet Commonly known as:  Bill Matamoros What changed:   
- how much to take 
- how to take this - when to take this 
- additional instructions Your last dose was: Your next dose is: Take 0.5 Tabs by mouth daily. 20 mg  
    
   
   
   
  
 pravastatin 10 mg tablet Commonly known as:  PRAVACHOL What changed:  how to take this Your last dose was: Your next dose is:    
   
   
 1 Tab by Per G Tube route nightly. 10 mg CONTINUE taking these medications Instructions Each Dose to Equal  
 Morning Noon Evening Bedtime RENVELA 800 mg Tab tab Generic drug:  sevelamer carbonate Your last dose was: Your next dose is: Take 800 mg by mouth three (3) times daily. 800 mg VIRT-CAPS 1 mg capsule Generic drug:  b complex-vitamin c-folic acid Your last dose was: Your next dose is: TAKE ONE CAPSULE BY MOUTH DAILY  
     
   
   
   
  
  
STOP taking these medications   
 acetaminophen 500 mg tablet Commonly known as:  TYLENOL  
   
  
 acetaminophen-codeine 300-30 mg per tablet Commonly known as:  TYLENOL #3  
   
  
 insulin glargine 100 unit/mL (3 mL) Inpn Commonly known as:  LANALVINBASAGLAR  
   
  
 SENSIPAR 60 mg Tab Generic drug:  cinacalcet Where to Get Your Medications These medications were sent to [address] [place] [address], [place] [address] Phone:  [phone]  
  cloNIDine HCl 0.1 mg tablet Information on where to get these meds will be given to you by the nurse or doctor. ! Ask your nurse or doctor about these medications  
  amLODIPine 10 mg tablet  
 aspirin 81 mg chewable tablet  
 carvedilol 25 mg tablet  
 escitalopram oxalate 10 mg tablet  
 guaiFENesin 100 mg/5 mL liquid  
 insulin  unit/mL injection  
 lisinopril 40 mg tablet  
 oxyCODONE-acetaminophen  mg per tablet  
 pravastatin 10 mg tablet

## 2018-06-28 NOTE — IP AVS SNAPSHOT
[address] [facility] 
[phone] Patient: Penelope Whyte MRN: [id number] AIY:7/0/[birth year]94 About your hospitalization You were admitted on:  June 28, 2018 You last received care in the:  [facility] GENERAL SURGERY You were discharged on:  July 9, 2018 Why you were hospitalized Your primary diagnosis was:  Pseudoaneurysm Of Iliac Artery (Hcc) Your diagnoses also included:  Pad (Peripheral Artery Disease) ([facility]), Diabetic Peripheral Neuropathy Associated With Type 2 Diabetes Mellitus (Hcc), Esrd On Hemodialysis (Hcc), Hypertension, Mixed Hyperlipidemia, Multiple Sclerosis (Hcc), Elevated Troponin, Ashd (Arteriosclerotic Heart Disease), Ataxia, Diabetic Oculomotor Palsy ([facility]), S/P Coronary Artery Stent Placement, Type 2 Diabetes With Nephropathy ([facility]), Esophageal Dyskinesia, Weight Loss, Counseling Regarding Goals Of Care, Weakness Generalized Follow-up Information Follow up With Details Comments Contact Info Ian Castillo MD Go on 7/12/2018 For scheduled appointment at 12:00PM   [address] [place] [address] [address] [facility] 
[phone] Discharge Orders None A check albert indicates which time of day the medication should be taken. My Medications START taking these medications Instructions Each Dose to Equal  
 Morning Noon Evening Bedtime  
 guaiFENesin 100 mg/5 mL liquid Commonly known as:  ROBITUSSIN Your last dose was: Your next dose is:    
   
   
 20 mL by Per G Tube route two (2) times a day. 400 mg  
    
   
   
   
  
 oxyCODONE-acetaminophen  mg per tablet Commonly known as:  PERCOCET 10 Your last dose was: Your next dose is:    
   
   
 1 Tab by Per G Tube route every four (4) hours as needed. Max Daily Amount: 6 Tabs. 1 Tab CHANGE how you take these medications Instructions Each Dose to Equal  
 Morning Noon Evening Bedtime  
 amLODIPine 10 mg tablet Commonly known as:  Marci Jc What changed:  how to take this Your last dose was: Your next dose is:    
   
   
 1 Tab by Per G Tube route daily. 10 mg  
    
   
   
   
  
 aspirin 81 mg chewable tablet What changed:  how to take this Your last dose was: Your next dose is:    
   
   
 1 Tab by Per G Tube route daily. 81 mg  
    
   
   
   
  
 carvedilol 25 mg tablet Commonly known as:  Joe Moreno What changed:   
- how to take this - Another medication with the same name was removed. Continue taking this medication, and follow the directions you see here. Your last dose was: Your next dose is:    
   
   
 2 Tabs by Per G Tube route two (2) times daily (with meals). 50 mg  
    
   
   
   
  
 cloNIDine HCl 0.1 mg tablet Commonly known as:  CATAPRES What changed:   
- how to take this - Another medication with the same name was removed. Continue taking this medication, and follow the directions you see here. Your last dose was: Your next dose is:    
   
   
 1 Tab by Per G Tube route three (3) times daily. 0.1 mg  
    
   
   
   
  
 escitalopram oxalate 10 mg tablet Commonly known as:  Arash Montes What changed:   
- how to take this - Another medication with the same name was removed. Continue taking this medication, and follow the directions you see here. Your last dose was: Your next dose is:    
   
   
 1 Tab by Per G Tube route daily. 10 mg  
    
   
   
   
  
 insulin  unit/mL injection Commonly known as:  HumuLIN N NPH U-100 Insulin What changed:   
- how much to take - Another medication with the same name was removed. Continue taking this medication, and follow the directions you see here. Your last dose was: Your next dose is:    
   
   
 5 Units by SubCUTAneous route daily. 5 Units  
    
   
   
   
  
 lisinopril 40 mg tablet Commonly known as:  Fatou Issa What changed:   
- how much to take 
- how to take this - when to take this 
- additional instructions Your last dose was: Your next dose is: Take 0.5 Tabs by mouth daily. 20 mg  
    
   
   
   
  
 pravastatin 10 mg tablet Commonly known as:  PRAVACHOL What changed:  how to take this Your last dose was: Your next dose is:    
   
   
 1 Tab by Per G Tube route nightly. 10 mg CONTINUE taking these medications Instructions Each Dose to Equal  
 Morning Noon Evening Bedtime RENVELA 800 mg Tab tab Generic drug:  sevelamer carbonate Your last dose was: Your next dose is: Take 800 mg by mouth three (3) times daily. 800 mg VIRT-CAPS 1 mg capsule Generic drug:  b complex-vitamin c-folic acid Your last dose was: Your next dose is: TAKE ONE CAPSULE BY MOUTH DAILY  
     
   
   
   
  
  
STOP taking these medications   
 acetaminophen 500 mg tablet Commonly known as:  TYLENOL  
   
  
 acetaminophen-codeine 300-30 mg per tablet Commonly known as:  TYLENOL #3  
   
  
 insulin glargine 100 unit/mL (3 mL) Inpn Commonly known as:  LANTUS,BASAGLAR  
   
  
 SENSIPAR 60 mg Tab Generic drug:  cinacalcet Where to Get Your Medications These medications were sent to [facility] [address] Phone:  [phone]  
  cloNIDine HCl 0.1 mg tablet Information on where to get these meds will be given to you by the nurse or doctor. ! Ask your nurse or doctor about these medications  
  amLODIPine 10 mg tablet  
 aspirin 81 mg chewable tablet  
 carvedilol 25 mg tablet  
 escitalopram oxalate 10 mg tablet  
 guaiFENesin 100 mg/5 mL liquid insulin  unit/mL injection  
 lisinopril 40 mg tablet  
 oxyCODONE-acetaminophen  mg per tablet  
 pravastatin 10 mg tablet Opioid Education Prescription Opioids: What You Need to Know: 
 
 
NAME: Rabia Flores :  1950 MRN:  [id number] Date/Time:  2018 10:03 AM 
 
ADMIT DATE: 2018 DISCHARGE DATE: 2018 DISCHARGE DIAGNOSIS: 
 
Sepsis (leukocytosis and hypotension earlier during admission)- resolved Due to Aspiration PNA- s/p IV Abx therapy- completed Diabetes mellitus 2 on Insulin at home- FS better on tube feedings now, cont NPH+ SSI lispro Dysphagia due to esophageal dysmotility on MBstudy- s/p PEG tube with NPO status- cont Nutrition + Pills via PEG Multiple sclerosis -? 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 
  Pseudoaneurysm of iliac artery ([facility] (2018) Active Problems: Hypertension (2010) PAD (peripheral artery disease) ([address] (2010) S/P coronary artery stent placement (2011) Overview: 11 PCI/CRISTI to RCA 
 
  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 
As per medication reconciliation  list 
· It is important that you take the medication exactly as they are prescribed. · Keep your medication in the bottles provided by the pharmacist and keep a list of the medication names, dosages, and times to be taken in your wallet. · Do not take other medications without consulting your doctor. Pain Management: per above medications What to do at [facility] Recommended diet:  NPO, PEG tube feeding + meds via PEG only Recommended activity: Activity as tolerated If you have questions regarding the hospital related prescriptions or hospital related issues please call Michelle Flanagan at . If you experience any of the following symptoms then please call your primary care physician or return to the emergency room if you cannot get hold of your doctor: 
Fever, chills, nausea, vomiting, diarrhea, change in mentation, falling, bleeding, shortness of breath, Follow Up: 
Dr. Brenda Mcmullen MD  you are to call and set up an appointment to see them in 7-10 days. Dr Darryle Hides ([place] [facility]) in office in 2-3 weeks if dysphagia continues & doesn't improve-- to get MRI of brain for MS therapy Information obtained by : 
I understand that if any problems occur once I am at home I am to contact my physician. I understand and acknowledge receipt of the instructions indicated above.   
 
                                                                                                                                     
Physician's or R.N.'s Signature Date/Time Patient or Representative Signature                                                          Date/Time 
 
 
ACO Transitions of Care [facility] offers a voluntary care coordination program to provide high quality service and care to [facility] fee-for-service beneficiaries. Noemy Ordonez was designed to help you enhance your health and well-being through the following services: ? Transitions of Care  support for individuals who are transitioning from one care setting to another (example: Hospital to home). ? Chronic and Complex Care Coordination  support for individuals and caregivers of those with serious or chronic illnesses or with more than one chronic (ongoing) condition and those who take a number of different medications. If you meet specific medical criteria, a [address] [place] [address] Rd may call you directly to coordinate your care with your primary care physician and your other care providers. For questions about the [facility] programs, please, contact your physicians office. For general questions or additional information about Accountable Care Organizations: 
Please visit www.medicare.gov/acos. html or call 1-800-MEDICARE (7-312.692.8978) TTY users should call 2-798.436.6769. [employer] Announcement We are excited to announce that we are making your provider's discharge notes available to you in [employer]. You will see these notes when they are completed and signed by the physician that discharged you from your recent hospital stay.   If you have any questions or concerns about any information you see in [employer], please call the InfoBasis Department where you were seen or reach out to your Primary Care Provider for more information about your plan of care. Introducing [facility] HE[facility]! [employer] introduces [employer] patient portal. Now you can access parts of your medical record, email your doctor's office, and request medication refills online. 1. In your internet browser, go to [email or web address] 2. Click on the First Time User? Click Here link in the Sign In box. You will see the New Member Sign Up page. 3. Enter your [employer] Access Code exactly as it appears below. You will not need to use this code after youve completed the sign-up process. If you do not sign up before the expiration date, you must request a new code. · [employer] Access Code: [email or web address] Expires: 7/11/2018 11:53 AM 
 
4. Enter the last four digits of your Social Security Number (xxxx) and Date of Birth (mm/dd/yyyy) as indicated and click Submit. You will be taken to the next sign-up page. 5. Create a [employer] ID. This will be your [employer] login ID and cannot be changed, so think of one that is secure and easy to remember. 6. Create a [employer] password. You can change your password at any time. 7. Enter your Password Reset Question and Answer. This can be used at a later time if you forget your password. 8. Enter your e-mail address. You will receive e-mail notification when new information is available in [address] 9. Click Sign Up. You can now view and download portions of your medical record. 10. Click the Download Summary menu link to download a portable copy of your medical information. If you have questions, please visit the Frequently Asked Questions section of the [employer] website. Remember, [employer] is NOT to be used for urgent needs. For medical emergencies, dial 911. Now available from your iPhone and Android! Introducing Robert Jordan As a Clement Olivoerly patient, I wanted to make you aware of our electronic visit tool called [employer] [facility] [employer]. Clement Suresh 24/OneTok allows you to connect within minutes with a medical provider 24 hours a day, seven days a week via a mobile device or tablet or logging into a secure website from your computer. You can access Robert Petersonfin from anywhere in the [place]. A virtual visit might be right for you when you have a simple condition and feel like you just dont want to get out of bed, or cant get away from work for an appointment, when your regular [facility] provider is not available (evenings, weekends or holidays), or when youre out of town and need minor care. Electronic visits cost only $49 and if the Clement Suresh 24/7 provider determines a prescription is needed to treat your condition, one can be electronically transmitted to a nearby pharmacy*. Please take a moment to enroll today if you have not already done so. The enrollment process is free and takes just a few minutes. To enroll, please download the [employer]/OneTok griffin to your tablet or phone, or visit [email or web address] to enroll on your computer. And, as an [address] [place] [address] patient with a [employer] account, the results of your visits will be scanned into your electronic medical record and your primary care provider will be able to view the scanned results. We urge you to continue to see your regular Clement Suresh provider for your ongoing medical care. And while your primary care provider may not be the one available when you seek a Robert Jordan virtual visit, the peace of mind you get from getting a real diagnosis real time can be priceless. For more information on Robert Bernardaprilfin, view our Frequently Asked Questions (FAQs) at [email or web address]. Sincerely, 
 
Lorenzo Lee MD 
Chief Medical Officer Ashish8 Clarita Alford *:  certain medications cannot be prescribed via Robert Jordan Providers Seen During Your Hospitalization Provider Specialty Primary office phone Ramona Mancera MD Emergency Medicine [phone] Alice Dubose MD Vascular Surgery [phone] Jazz Canales MD Internal Medicine [phone] Your Primary Care Physician (PCP) Primary Care Physician Office Phone Office Fax [facility], [address] [place] [address] [phone] You are allergic to the following Allergen Reactions Sensipar (Cinacalcet) Other (comments) Cinacalcet (Sensipar) has been added as an \"allergy\" / intolerance with a comment to assure providers at discharge and post discharge are aware of Dr. Carley Brittle recommendation to avoid Sensipar. Patient takes Eloina Villa as an outpatient; Thus, Curly Canard has been discontinued per Nephrology (cannot give with recent Parsabiv b/c risk of low Calcium). Recent Documentation Height Weight BMI Smoking Status 1.778 m 74.5 kg 23.57 kg/m2 Former Smoker Emergency Contacts Name Discharge Info Relation Home Work Mobile Delphine Whittington DISCHARGE CAREGIVER [3] Neighbor [4] [phone] Niecy Avery DISCHARGE CAREGIVER [3] Spouse [3] [phone] Patient Belongings The following personal items are in your possession at time of discharge: 
  Dental Appliances: None  Visual Aid: Glasses, At home   Hearing Aids/Status: Does not own  Home Medications: None   Jewelry: None  Clothing: None    Other Valuables: None  Personal Items Sent to Safe: Per Alexis Juan RN, ring given to wife Please provide this summary of care documentation to your next provider. Signatures-by signing, you are acknowledging that this After Visit Summary has been reviewed with you and you have received a copy. Patient Signature:  ____________________________________________________________ Date:  ____________________________________________________________  
  
Gwenyth Miles Provider Signature:  ____________________________________________________________ Date:  ____________________________________________________________

## 2018-06-28 NOTE — CONSULTS
Vascular Surgery History and Physical  Note  6/28/2018    Subjective:     Moise Burton is a 70 y.o. male with a pmhx significant for multiple sclerosis, ESRD on HD, anemia of chronic disease, secondary hyperparathyroidisim,  ASHD, HTN, HLD DM II, GERD, and PVD. He is an smoker that stopped smoking 5 years ago. 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 He has a recent hx of an EGD with snare polypectomy on 06/13/2018. He presented to the hospital with complaint of right flank and abd pain that has been ongoing since Monday. He missed 2 session of HD due to the pain. When he presented to his dialysis unit today he was referred to the hospital for evaluation post a brief session. He has had associated N/V/D. He denies fever and chills. Overnight his RLE began to swell. He is known to our service for multiple vascular issues including issues with HD access, PAD, and intestinal vascular insufficiency. He is s/p Syme amputation. He reports that last September he was in the process of receiving a renal cell transplant at [facility] when the \"artery burst\"  It was repair and 1 weak later it \"burst again\" and he had a second repair. On arrival he was normal tensive with a nl HR and RR. He was afebrile. He had a significant leukocytosis of 26.1. His PLT count was 270. His troponin was elevated at 1.99. His amylase was 28. His lipase 31. CT of the abd and pelvis was positive for a large right EIA pseudoaneurysm with central hyperdense contrast measuring  2.6 x 5.2 x 5.6 cm and the overall dimensions measuring 4.6 x 6.8 x 9.6 cm. We have been asked to evaluate.       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 Past Procedural History in addition to above   Cholecystectomy  Toe amputation.  Right. Transmetatarsal amputation of right toe  Appendectomy  Removal of non-tunneled dialysis catheter with replacement of contralateral temporary non-tunneled dialysis catheter. Right revision of fem-tib bypass with balloon angioplasty of distal anastomosis and interruption of multiple AV fistulas   Right foot debridement (multiple). Ileostomy secondary to bowel perforation   Leg Circulation Artery Surgery.  bilateral  Colonoscopy -last 2016  Photocoagulation of eye 2009  BL cataract removal     Family History   Problem Relation Age of Onset    Diabetes Mother       Social History   Substance Use Topics    Smoking status: Former Smoker     Years: 1.00     Quit date: 4/12/2003    Smokeless tobacco: Never Used      Comment: quit 5 years ago    Alcohol use No      Comment: Stopped drinking 10 years ago       Prior to Admission medications    Medication Sig Start Date End Date Taking? Authorizing Provider   VIRT-CAPS 1 mg capsule TAKE ONE CAPSULE BY MOUTH DAILY 6/12/18   Rabia Avila MD   HUMULIN N NPH INSULIN KWIKPEN 100 unit/mL (3 mL) inpn INJECT SUBCUTANEOUSLY 18  UNITS DAILY 6/12/18   Rabia Avila MD   insulin NPH (NOVOLIN N, HUMULIN N) 100 unit/mL injection 18 units every AM 4/5/18   Rabia Avila MD   lisinopril (PRINIVIL, ZESTRIL) 40 mg tablet TAKE 1 T PO  DAILY 4/5/18   Rabia Avila MD   acetaminophen-codeine (TYLENOL #3) 300-30 mg per tablet Take 1 Tab by mouth every four (4) hours as needed for Pain. Max Daily Amount: 6 Tabs.  4/5/18   Rabia Avila MD   insulin glargine (LANTUS,BASAGLAR) 100 unit/mL (3 mL) inpn 10 units at bedtime 3/12/18   Rabia Avila MD   glucose blood VI test strips (TRUE METRIX GLUCOSE TEST STRIP) strip Patient test blood sugar 3 times daily. 2/9/18   Lenard Benton MD   carvedilol (COREG) 25 mg tablet TAKE 2 TABLETS PO BID 12/25/17   Historical Provider   cloNIDine HCl (CATAPRES) 0.1 mg tablet TK 1 T PO  TID 12/23/17   Historical Provider   escitalopram oxalate (LEXAPRO) 10 mg tablet TK 1 T PO QD 12/26/17   Historical Provider   SENSIPAR 60 mg tab Take 60 mg by mouth daily. 3/24/17   Historical Provider   sevelamer carbonate (RENVELA) 800 mg tab tab Take 800 mg by mouth three (3) times daily. Historical Provider   aspirin 81 mg chewable tablet Take 1 tablet by mouth daily. 1/5/15   Meche Sin MD   amLODIPine (NORVASC) 10 mg tablet Take 10 mg by mouth daily. Historical Provider   pravastatin (PRAVACHOL) 10 mg tablet Take 10 mg by mouth nightly. Historical Provider     No Known Allergies     Review of Systems   Constitutional: Positive for activity change. Negative for chills, fatigue and fever. HENT: Negative. Eyes: Negative. Respiratory: Negative for cough, chest tightness and shortness of breath. Cardiovascular: Negative for chest pain. Gastrointestinal: Positive for abdominal pain, diarrhea, nausea and vomiting. Negative for abdominal distention. Endocrine: Negative for polydipsia and polyuria. Genitourinary: Negative. Musculoskeletal: Positive for gait problem. Skin: Negative. Allergic/Immunologic: Negative. Neurological: Positive for weakness. Hematological: Negative. Psychiatric/Behavioral: Negative.         Objective:     Patient Vitals for the past 24 hrs:   BP Temp Pulse Resp SpO2 Height Weight   06/28/18 1446 133/66 - - - - - -   06/28/18 1430 131/67 - - - - - -   06/28/18 1427 125/66 - - - - - -   06/28/18 1300 136/74 - - - 90 % - -   06/28/18 1245 144/67 - - - 94 % - -   06/28/18 1230 127/83 - - - 98 % - -   06/28/18 1215 122/63 - - - 94 % - -   06/28/18 1200 139/79 - - - 96 % - -   06/28/18 1145 123/61 - - - 100 % - -   06/28/18 1130 121/62 - - - 98 % - - 06/28/18 1115 120/60 - - - 97 % - -   06/28/18 1112 - 97.5 °F (36.4 °C) - - 92 % - 62.5 kg (137 lb 12.6 oz)   06/28/18 1110 124/64 - - - - - -   06/28/18 1100 148/76 97.7 °F (36.5 °C) 88 16 100 % 5' 10\" (1.778 m) 70.3 kg (155 lb)     Physical Exam   Constitutional: He is oriented to person, place, and time. Thin chronically ill appearing AAM.     HENT:   Head: Normocephalic. Eyes: Pupils are equal, round, and reactive to light. Neck: Normal range of motion. Cardiovascular: Normal rate and regular rhythm. RLE swollen. No pulses below the femoral level BL. RLE femoral pulse is weak. Left is 2+. Pulmonary/Chest: Effort normal. No respiratory distress. Abdominal: Soft. Right lateral abd very tender to palpitation. No masses. Musculoskeletal: Normal range of motion. Neurological: He is alert and oriented to person, place, and time. Psychiatric: Judgment and thought content normal.   Flat affect. Pertinent Test Results:   Recent Results (from the past 24 hour(s))   GLUCOSE, POC    Collection Time: 06/28/18 11:07 AM   Result Value Ref Range    Glucose (POC) 125 (H) 65 - 100 mg/dL    Performed by Tiffanie Marc    EKG, 12 LEAD, INITIAL    Collection Time: 06/28/18 11:10 AM   Result Value Ref Range    Ventricular Rate 88 BPM    Atrial Rate 88 BPM    P-R Interval 150 ms    QRS Duration 86 ms    Q-T Interval 464 ms    QTC Calculation (Bezet) 561 ms    Calculated P Axis 68 degrees    Calculated R Axis 66 degrees    Calculated T Axis 173 degrees    Diagnosis       Normal sinus rhythm  Possible Left atrial enlargement  Left ventricular hypertrophy with repolarization abnormality  Prolonged QT  When compared with ECG of 04-FEB-2018 03:08,  CO interval has decreased  Vent.  rate has increased BY  29 BPM  ST now depressed in Lateral leads  T wave inversion more evident in Anterior leads  QT has lengthened     CBC WITH AUTOMATED DIFF    Collection Time: 06/28/18 11:45 AM   Result Value Ref Range    WBC 26.1 (H) 4.1 - 11.1 K/uL    RBC 4.03 (L) 4.10 - 5.70 M/uL    HGB 10.9 (L) 12.1 - 17.0 g/dL    HCT 34.6 (L) 36.6 - 50.3 %    MCV 85.9 80.0 - 99.0 FL    MCH 27.0 26.0 - 34.0 PG    MCHC 31.5 30.0 - 36.5 g/dL    RDW 17.1 (H) 11.5 - 14.5 %    PLATELET 425 854 - 326 K/uL    MPV 9.7 8.9 - 12.9 FL    NRBC 0.0 0  WBC    ABSOLUTE NRBC 0.00 0.00 - 0.01 K/uL    NEUTROPHILS 92 (H) 32 - 75 %    LYMPHOCYTES 4 (L) 12 - 49 %    MONOCYTES 2 (L) 5 - 13 %    EOSINOPHILS 0 0 - 7 %    BASOPHILS 0 0 - 1 %    IMMATURE GRANULOCYTES 2 (H) 0.0 - 0.5 %    ABS. NEUTROPHILS 24.1 (H) 1.8 - 8.0 K/UL    ABS. LYMPHOCYTES 1.0 0.8 - 3.5 K/UL    ABS. MONOCYTES 0.5 0.0 - 1.0 K/UL    ABS. EOSINOPHILS 0.0 0.0 - 0.4 K/UL    ABS. BASOPHILS 0.0 0.0 - 0.1 K/UL    ABS. IMM. GRANS. 0.5 (H) 0.00 - 0.04 K/UL    DF SMEAR SCANNED      RBC COMMENTS ANISOCYTOSIS  1+       METABOLIC PANEL, COMPREHENSIVE    Collection Time: 06/28/18 11:45 AM   Result Value Ref Range    Sodium 135 (L) 136 - 145 mmol/L    Potassium 4.0 3.5 - 5.1 mmol/L    Chloride 97 97 - 108 mmol/L    CO2 27 21 - 32 mmol/L    Anion gap 11 5 - 15 mmol/L    Glucose 127 (H) 65 - 100 mg/dL    BUN 29 (H) 6 - 20 MG/DL    Creatinine 6.84 (H) 0.70 - 1.30 MG/DL    BUN/Creatinine ratio 4 (L) 12 - 20      GFR est AA 10 (L) >60 ml/min/1.73m2    GFR est non-AA 8 (L) >60 ml/min/1.73m2    Calcium 9.3 8.5 - 10.1 MG/DL    Bilirubin, total 0.7 0.2 - 1.0 MG/DL    ALT (SGPT) 20 12 - 78 U/L    AST (SGOT) 25 15 - 37 U/L    Alk.  phosphatase 177 (H) 45 - 117 U/L    Protein, total 9.6 (H) 6.4 - 8.2 g/dL    Albumin 2.3 (L) 3.5 - 5.0 g/dL    Globulin 7.3 (H) 2.0 - 4.0 g/dL    A-G Ratio 0.3 (L) 1.1 - 2.2     AMYLASE    Collection Time: 06/28/18 11:45 AM   Result Value Ref Range    Amylase 28 25 - 115 U/L   LIPASE    Collection Time: 06/28/18 11:45 AM   Result Value Ref Range    Lipase 31 (L) 73 - 393 U/L   TROPONIN I    Collection Time: 06/28/18 11:45 AM   Result Value Ref Range    Troponin-I, Qt. 1.99 (H) <0.05 ng/mL         Results from Hospital Encounter encounter on 06/28/18   XR CHEST PA LAT   Narrative INDICATION:  evaluation for sepsis    Frontal and lateral views of the chest show clear lungs. The heart, mediastinum  and pulmonary vasculature are normal.  The bony thorax is unremarkable. No  significant change February 1, 2018         Impression IMPRESSION:  NORMAL CHEST. Assessmen/Plan:     Consult problems:  Large right EIA pseudoaneurysm -possibly infectious. Hx of abd artery rupture -will need to obtain records from [facility]. PAD s/p BLE bypasses and Syme amputation  Intestinal vascular insufficiency    Dr. Rosita Myrick will plan for urgent surgical repair early this evening. Please keep patient NPO. We will initiate broad spectrum IV antibx,. Patient will be admitted to the vascular service. 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       VTE prophylaxis:  Held for possible procedure    Disposition:  TBD    Signed By: Nancy Cruz NP     June 28, 2018

## 2018-06-28 NOTE — ED TRIAGE NOTES
Pt arrived via EMS from dialysis to room 34 with cc abd and right flank pain. Per pt he has been having right flank pain since Monday, pt states he missed two days of dialysis because of the pain. Pt also reports being out of his opiate pain medications for over 2 weeks for his chronic knee pian. Pt reports having N/V/D, denies fever, chills. Pt in no acute distress, VSS.

## 2018-06-28 NOTE — ED NOTES
TRANSFER - OUT REPORT:    Verbal report given to Marly Tse RN(name) on London Mora  being transferred to PCU(unit) for routine progression of care       Report consisted of patients Situation, Background, Assessment and   Recommendations(SBAR). Information from the following report(s) SBAR, ED Summary and Recent Results was reviewed with the receiving nurse. Lines:   Peripheral IV 06/28/18 Right Forearm (Active)   Site Assessment Clean, dry, & intact 6/28/2018 12:28 PM   Phlebitis Assessment 0 6/28/2018 12:28 PM   Infiltration Assessment 0 6/28/2018 12:28 PM   Dressing Status Clean, dry, & intact 6/28/2018 12:28 PM   Hub Color/Line Status Blue 6/28/2018 12:28 PM       Peripheral IV 06/28/18 Right Antecubital (Active)   Site Assessment Clean, dry, & intact 6/28/2018  4:42 PM   Phlebitis Assessment 0 6/28/2018  4:42 PM   Infiltration Assessment 0 6/28/2018  4:42 PM   Dressing Status Clean, dry, & intact 6/28/2018  4:42 PM   Hub Color/Line Status Pink 6/28/2018  4:42 PM        Opportunity for questions and clarification was provided.       Patient transported with:   Monitor  Registered Nurse

## 2018-06-28 NOTE — ANESTHESIA PREPROCEDURE EVALUATION
Anesthetic History   No history of anesthetic complications            Review of Systems / Medical History  Patient summary reviewed, nursing notes reviewed and pertinent labs reviewed    Pulmonary                Comments: Former smoker - Quit 2003   Neuro/Psych             Comments: Multiple Sclerosis--stable  Diabetic Peripheral Neuropathy  Cervical Stenosis  Ataxia Cardiovascular    Hypertension          Past MI (NSTEMI), CAD, PAD, cardiac stents and hyperlipidemia    Exercise tolerance: <4 METS  Comments: S/P Bilateral Lower Extremity bypasses     Uses wheelchair to ambulate    2-2018 EKG:  SR with first degree AV Blk     50% EF 2-2018    Pt reports he saw his cardiologist and was cleared for this procedure.    GI/Hepatic/Renal     GERD    Renal disease (MWF): ESRD and dialysis      Comments: Hematemesis  Intractable Hiccups  Ileostomy due to infection Endo/Other    Diabetes: type 2, using insulin    Cancer (Prostate cancer with surgery) and anemia  Pertinent negatives: No hypothyroidism   Other Findings   Comments: Iliac pseudoaneurysm     Right moderate pleural effusion with atelectasis     Secondary hyperparathyroidism of renal origin    MS         Physical Exam    Airway  Mallampati: II  TM Distance: > 6 cm  Neck ROM: normal range of motion   Mouth opening: Normal     Cardiovascular  Regular rate and rhythm,  S1 and S2 normal,  no murmur, click, rub, or gallop             Dental    Dentition: Poor dentition  Comments: Multiple damaged, multiple missing, significant decay   Pulmonary  Breath sounds clear to auscultation               Abdominal  GI exam deferred       Other Findings            Anesthetic Plan    ASA: 4  Anesthesia type: general and total IV anesthesia          Induction: Intravenous  Anesthetic plan and risks discussed with: Patient

## 2018-06-28 NOTE — PROGRESS NOTES
Pharmacy Automatic Renal Dosing Protocol - Antimicrobials    Indication for Antimicrobials: Intra abdominal    Current Regimen of Each Antimicrobial:  Piperacillin tazobactam 3.375 Q8H (Start Date ; Day # 1)    Previous Antimicrobial Therapy:    Vancomycin Goal Level: 15-20    Measured / Extrapolated Vancomycin Level:     Significant Cultures:     Radiology / Imaging results: (X-ray, CT scan or MRI):       Labs:  Recent Labs      18   1145   CREA  6.84*   BUN  29*   WBC  26.1*     Temp (24hrs), Av.6 °F (36.4 °C), Min:97.5 °F (36.4 °C), Max:97.7 °F (36.5 °C)        Paralysis, amputations, malnutrition:   Creatinine Clearance (mL/min) or Dialysis: HD    Impression/Plan:   Piperacillin tazobactam adjusted to 3.375 Q12H per renal dosing protocol  Vancomcyin 2000 mg x 1 then 750 mg prn dialysis with anticipated trough 15-20 mcg/ml       Pharmacy will follow daily and adjust medications as appropriate for renal function and/or serum levels. Thank you,  Romain Fatima, [facility]      Recommended duration of therapy  [email or web address]/[place]/[facility]/Pharmacy/Clinical%20Companion/Duration%20of%20ABX%20therapy. docx    Renal Dosing  [email or web address]/[place]/[facility]/Pharmacy/Clinical%20Companion/Renal%[id number]. pdf

## 2018-06-28 NOTE — PERIOP NOTES
TRANSFER - IN REPORT:    Verbal report received from [address] RN(name) on Yamilex Carrizales  being received from ED Room CD34(unit) for ordered procedure      Report consisted of patients Situation, Background, Assessment and   Recommendations(SBAR). Information from the following report(s) SBAR, Kardex, Intake/Output, MAR, Recent Results and Med Rec Status was reviewed with the receiving nurse. Opportunity for questions and clarification was provided. Assessment completed upon patients arrival to unit and care assumed.

## 2018-06-28 NOTE — PROGRESS NOTES
Pharmacy Clarification of Prior to Admission Medication Regimen     The patient was interviewed regarding clarification of the prior to admission medication regimen. Daughter were present in room and obtained permission from patient to discuss drug regimen with visitor(s) present. Patient was questioned regarding use of any other inhalers, topical products, over the counter medications, herbal medications, vitamin products or ophthalmic/nasal/otic medication use. Patient stated he did not know his medications, that his wife would. Patient called his [facility], who [facility] and would be able to talk once she arrived. MHT reinterviewed the patient and patient's wife, once the patient's [facility]. Information Obtained From: Patient's wife, dialysis med list, RX Query    Pertinent Pharmacy Findings:   Updated patients preferred outpatient pharmacy to: [address] [place] [address], [facility] Hemanth Araya    Patient receives dialysis, Monday, Wednesday and Friday. Patient was last dialyzed today, 6/28/18.  Patient sttsed he has not taken his 'pills' in 'about a week' due to being 'sick'     PTA medication list was corrected to the following:     Prior to Admission Medications   Prescriptions Last Dose Informant Patient Reported? Taking? SENSIPAR 60 mg tab 6/28/2018 at Unknown time Significant Other Yes Yes   Sig: Take 60 mg by mouth three (3) days a week. Monday, Wednesday and Friday at dialysis   VIRT-CAPS 1 mg capsule 6/21/2018 at Unknown time Significant Other No Yes   Sig: TAKE ONE CAPSULE BY MOUTH DAILY   acetaminophen (TYLENOL) 500 mg tablet 6/28/2018 at 0630 Significant Other Yes Yes   Sig: Take 1,000 mg by mouth every six (6) hours as needed for Pain. amLODIPine (NORVASC) 10 mg tablet 6/21/2018 at Unknown time Significant Other Yes Yes   Sig: Take 10 mg by mouth daily.    aspirin 81 mg chewable tablet 6/21/2018 at Unknown time Significant Other Yes Yes Sig: Take 1 tablet by mouth daily. carvedilol (COREG) 25 mg tablet 6/21/2018 at Unknown time Significant Other Yes Yes   Sig: Take 50 mg by mouth two (2) times daily (with meals). cloNIDine HCl (CATAPRES) 0.1 mg tablet 6/21/2018 at Unknown time Significant Other Yes Yes   Sig: Take 0.1 mg by mouth three (3) times daily. escitalopram oxalate (LEXAPRO) 10 mg tablet 6/21/2018 at Unknown time Significant Other Yes Yes   Sig: Take 10 mg by mouth daily. insulin NPH (HUMULIN N NPH U-100 INSULIN) 100 unit/mL injection 6/28/2018 at Unknown time Significant Other Yes Yes   Sig: 10 Units by SubCUTAneous route daily. insulin glargine (LANTUS,BASAGLAR) 100 unit/mL (3 mL) inpn 6/27/2018 at Unknown time Significant Other No Yes   Sig: 10 units at bedtime   lisinopril (PRINIVIL, ZESTRIL) 40 mg tablet 6/21/2018 at Unknown time Significant Other No Yes   Sig: TAKE 1 T PO  DAILY   pravastatin (PRAVACHOL) 10 mg tablet 6/21/2018 at Unknown time Significant Other Yes Yes   Sig: Take 10 mg by mouth nightly. sevelamer carbonate (RENVELA) 800 mg tab tab 6/21/2018 at Unknown time Significant Other Yes Yes   Sig: Take 800 mg by mouth three (3) times daily.       Facility-Administered Medications: None          Thank you,  Marisol Farfan CPhT  Medication History Pharmacy Technician

## 2018-06-29 PROBLEM — R77.8 ELEVATED TROPONIN: Status: ACTIVE | Noted: 2018-01-01

## 2018-06-29 NOTE — PROGRESS NOTES
Pharmacy Automatic Renal Dosing Protocol - Antimicrobials    Indication for Antimicrobials: Intra abdominal, Large right EIA pseudoaneurysm and being ruled out for infection etiology    Current Regimen of Each Antimicrobial:  Piperacillin tazobactam 3.375 Q8H (Start Date ; Day # 2  Vancomycin 2000 mg x 1 then 750 mg prn HD (Start Date , Day 2  Metronidazole 500 mg q 8 hours (; day #2    Previous Antimicrobial Therapy:  None    Goal Level: VANCOMYCIN TROUGH GOAL RANGE    Vancomycin Trough: 15 - 20 mcg/mL    Date Dose & Interval Measured (mcg/mL) Extrapolated (mcg/mL)                         Significant Cultures: none    Radiology / Imaging results: (X-ray, CT scan or MRI):   : CT scan  There has been interval development of large pseudoaneurysm at right external iliac artery likely secondary to inflammatory or infectious process into to the right psoas muscle. There is been associated interval worsening of severe right hydronephrosis and right hydroureter to the level of the inflammatory change. Additional incidental findings as above including small moderate right pleural effusion with associated atelectasis. .    Labs:  Recent Labs      18   0247  18   1145   CREA  7.67*  6.84*   BUN  36*  29*   WBC  23.2*  26.1*     Temp (24hrs), Av.8 °F (37.1 °C), Min:97 °F (36.1 °C), Max:100.6 °F (38.1 °C)      Paralysis, amputations, malnutrition: none  Creatinine Clearance (mL/min) or Dialysis: HD    Impression/Plan:   Continue with the current dose of zosyn and vancomycin per renal dosing protocol  Vancomcyin 2000 mg x 1 then 750 mg prn dialysis with anticipated trough 15-20 mcg/ml  Vancomycin trough next week  Zosyn 3.375 g IV a 12 hours  Will adjust the Metronidazole to 500 mg IV q 12 hours, per protocol  WBC elevated and it's trending down  Duration of abx: pending     Pharmacy will follow daily and adjust medications as appropriate for renal function and/or serum levels.     Thank Alexus meyer, PHARMD      Recommended duration of therapy  http://[facility]/Pharmacy/Clinical%20Companion/Duration%20of%20ABX%20therapy. docx    Renal Dosing  http://[facility]/Pharmacy/Clinical%20Companion/Renal%[email or web address]. pdf

## 2018-06-29 NOTE — CONSULTS
Consultation Note    NAME: Lafrances Goltz   :  1950   MRN:  [id number]     Date/Time:  2018 10:06 AM    I have been asked to see this patient by Dr. Pinky Mack  for advice/opinion re: ESRD. Assessment :    Plan:  ESRD  Hypotension/sepsis  Left AVF  Anemia of CKD  SHPT  Usually HTN  DM   Right iliac pseudoaneuysm Usually MWF at [facility] (missed Monday and Wednesday and got 3 hours of HD yesterday). No HD today. HD tomorrow. He is on Parsabiv at outpatient unit; d/c Sensipar (cannot give with recent Parsabiv b/c risk of low Calcium); on Renvela    Gets Aranesp 80 mcg weekly (hasn't gotten this week). Will give epo 10 k sc tiw. Prn prbc's. Stop all BP meds for now - SBP in 80's at times       Subjective:   CHIEF COMPLAINT:  esrd    HISTORY OF PRESENT ILLNESS:     Ab Goode is a 70 y.o.   male who has a history of the below health issues. Currently hungry, weak. + pain. No n/v/d. Denies sob. Not talkative. Definitely looks like he is feeling poorly. Low BP's. I spoke with his outpatient unit. He missed HD Monday and Wednesday b/c pain/feeling poorly. He got 3 hours of HD yesterday before coming to the ER here.     Past Medical History:   Diagnosis Date    Anemia associated with chronic renal failure     Autoimmune disease (HCC)     hx ms    CAD (coronary artery disease)     Chronic kidney disease     catheter removed and no dialysis at this time    Chronic kidney disease     left arm fistula dialysis MWF    Diabetes ([address]     type II    GERD (gastroesophageal reflux disease)     Hypertension     Ill-defined condition     prostate cancer    Multiple sclerosis ([address]     diagnosed '01    Other ill-defined conditions(799.89)     anemia    Other ill-defined conditions(799.89)     elevated cholesterol    Other ill-defined conditions(799.89)     hx septic right foot    Peripheral vascular disease ([address]     Secondary hyperparathyroidism of renal origin ([address]     Thyroid disease       Past Surgical History:   Procedure Laterality Date    COLONOSCOPY N/A 12/6/2016    COLONOSCOPY performed by Rosana Chun MD at [facility] END[facility]COPY    COLONOSCOPY,DIAGNOSTIC  12/6/2016         CORONARY STENT SINGLE W/PTCA  2/17/2011         HX APPENDECTOMY      HX CATARACT REMOVAL  10/18/10    bilateral    HX CHOLECYSTECTOMY      HX GI      ileostomy due to infection    HX HEART CATHETERIZATION      HX HEENT      hx post photocoagulation eye 2009    HX OTHER SURGICAL      right partial foot amputation    HX OTHER SURGICAL      cardiac cath    HX OTHER SURGICAL      prostate removed    VA COLONOSCOPY W/BIOPSY SINGLE/MULTIPLE  5/10/2010         UPPER GI ENDOSCOPY,BIOPSY  4/17/2018         UPPER GI ENDOSCOPY,REMV TUMOR,SNARE  6/13/2018         VASCULAR SURGERY PROCEDURE UNLIST      katelyn. leg bypasses     Social History   Substance Use Topics    Smoking status: Former Smoker     Years: 1.00     Quit date: 4/12/2003    Smokeless tobacco: Never Used      Comment: quit 5 years ago    Alcohol use No      Comment: Stopped drinking 10 years ago      Family History   Problem Relation Age of Onset    Diabetes Mother       No Known Allergies   Prior to Admission medications    Medication Sig Start Date End Date Taking? Authorizing Provider   carvedilol (COREG) 25 mg tablet Take 50 mg by mouth two (2) times daily (with meals). Yes Gulshan Moreno MD   cloNIDine HCl (CATAPRES) 0.1 mg tablet Take 0.1 mg by mouth three (3) times daily. Yes Gulshan Moreno MD   escitalopram oxalate (LEXAPRO) 10 mg tablet Take 10 mg by mouth daily. Yes Gulshan Moreno MD   insulin NPH (HUMULIN N NPH U-100 INSULIN) 100 unit/mL injection 10 Units by SubCUTAneous route daily. Yes Gulshan Moreno MD   acetaminophen (TYLENOL) 500 mg tablet Take 1,000 mg by mouth every six (6) hours as needed for Pain.    Yes Gulshan Moreno MD   VIRT-CAPS 1 mg capsule TAKE ONE CAPSULE BY MOUTH DAILY 6/12/18  Yes Yo Camacho MD   lisinopril (Jose M Shutters) 40 mg tablet TAKE 1 T PO  DAILY 4/5/18  Yes Myrna Landa MD   insulin glargine (LANTUS,BASAGLAR) 100 unit/mL (3 mL) inpn 10 units at bedtime 3/12/18  Yes Myrna Landa MD   SENSIPAR 60 mg tab Take 60 mg by mouth three (3) days a week. Monday, Wednesday and Friday at dialysis 3/24/17  Yes Historical Provider   sevelamer carbonate (RENVELA) 800 mg tab tab Take 800 mg by mouth three (3) times daily. Yes Historical Provider   aspirin 81 mg chewable tablet Take 1 tablet by mouth daily. 1/5/15  Yes Orlin Hardin MD   amLODIPine (NORVASC) 10 mg tablet Take 10 mg by mouth daily. Yes Historical Provider   pravastatin (PRAVACHOL) 10 mg tablet Take 10 mg by mouth nightly.    Yes Historical Provider     REVIEW OF SYSTEMS:     []  Unable to obtain reliable ROS due to  [] mental status  [] sedated   [] intubated   [x] Total of 12 systems reviewed as follows:  Constitutional: negative fever, negative chills, negative weight loss  Eyes:   negative visual changes  ENT:   negative sore throat, tongue or lip swelling  Respiratory:  negative cough, negative dyspnea  Cards:  negative for chest pain, palpitations, lower extremity edema  GI:   negative for nausea, vomiting, diarrhea, and abdominal pain  :  negative for frequency, dysuria  Integument:  negative for rash and pruritus  Heme:  negative for easy bruising and gum/nose bleeding  Musculoskel: negative for myalgias,  back pain and muscle weakness  Neuro:  negative for headaches, dizziness, vertigo  Psych:  negative for feelings of anxiety, depression   Travel?: none    Objective:   VITALS:    Visit Vitals    /55    Pulse 87    Temp 97 °F (36.1 °C)    Resp 16    Ht 5' 10\" (1.778 m)    Wt 69 kg (152 lb 3.2 oz)    SpO2 100%    BMI 21.84 kg/m2     PHYSICAL EXAM:  Gen:  [x]  WD [x]  WN  [] cachectic []  thin []  obese []  disheveled             [x]  ill apearing  []   Critical  []   Chronic    []  No acute distress    HEENT:   [x] NC/AT/PERRLA/EOMI    [] pink conjunctivae      [] pale conjunctivae                  PERRL  [] yes  [] no      [] moist mucosa    [] dry mucosa    hearing intact to voice [x] yes  [] No                 NECK:   supple [x] yes  [] no        masses [] yes  [] No               thyroid  []  non tender  []  tender    RESP:   [x] CTA bilaterally/no wheezing/rhonchi/rales/crackles    [] rhonchi bilaterally - no dullness  [] wheezing   [] rhonchi   [] crackles     use of accessory muscles [] yes [x] no    CARD:   [x]  regular rate and rhythm/No murmurs/rubs/gallops    murmur  [] yes ()  [] no      Rubs  [] yes  [] no       Gallops [] yes  [] no    Rate []  regular  []  irregular        carotid bruits  [] Right  []  Left                 LE edema [] yes  [x] no           JVP  []  yes   [x]  no  AV access LUE - positive thrill and bruit, no sign of inflammation    ABD:    [x] soft/non distended/non tender/+bowel sounds/no HSM    []  Rigid    tenderness [] yes [] no   Liver enlargement  []  yes []  no                Spleen enlargement  []  yes []  no     distended []  yes [] no     bowel sound  [] hypoactive   [] hyperactive    LYMPH:    Neck []  yes []  no       Axillae []  yes []  no    SKIN:   Rashes []  yes   [x]  no    Ulcers []  yes   []  no               [] tight to palpitation    skin turgor []  good  [] poor  [] decreased               Cyanosis/clubbing []  yes []  no    NEUR:   [x] cranial nerves II-XII grossly intact       [] Cranial nerves deficit                 []  facial droop    []  slurred speech   [] aphasic     [] Strength normal     []  weakness  []  LUE  []   RUE/ []  LLE  []   RLE    follows commands  [x]  yes []  no           PSYCH:   insight [] poor [] good   Alert and Oriented to  [x] person  [x] place  [x]  time                    [] depressed [] anxious [] agitated  [] lethargic [] stuporous  [] sedated     LAB DATA REVIEWED:    Recent Labs      06/29/18   0247  06/28/18   1145   WBC  23.2*  26.1*   HGB  8.2*  10.9*   HCT  26.0* 34.6*   PLT  216  270     Recent Labs      06/29/18   0247  06/28/18   1145   NA  137  135*   K  4.2  4.0   CL  100  97   CO2  27  27   BUN  36*  29*   CREA  7.67*  6.84*   GLU  119*  127*   CA  8.2*  9.3   MG  2.1   --    PHOS  4.3   --      Recent Labs      06/28/18   1145   SGOT  25   ALT  20   AP  177*   TBILI  0.7   ALB  2.3*   GLOB  7.3*   AML  28   LPSE  31*     No results for input(s): INR, PTP, APTT in the last 72 hours. No lab exists for component: INREXT   No results for input(s): FE, TIBC, PSAT, FERR in the last 72 hours. No results for input(s): PH, PCO2, PO2 in the last 72 hours. No results for input(s): CPK, CKMB in the last 72 hours. No lab exists for component: TROPONINI  Lab Results   Component Value Date/Time    Glucose (POC) 124 (H) 06/29/2018 09:06 AM    Glucose (POC) 145 (H) 06/28/2018 10:50 PM    Glucose (POC) 121 (H) 06/28/2018 08:37 PM    Glucose (POC) 127 (H) 06/28/2018 07:20 PM    Glucose (POC) 125 (H) 06/28/2018 11:07 AM       Procedures: see electronic medical records for all procedures/Xrays and details which were not copied into this note but were reviewed prior to creation of Plan.    ________________________________________________________________________       ___________________________________________________  Consulting Physician:  Pepper Ricketts MD

## 2018-06-29 NOTE — OP NOTES
[facility]  OPERATIVE REPORT    Narayan Torres  MR#: [id number]  : 1950  ACCOUNT #: [de-identified]   DATE OF SERVICE: 2018    PREOPERATIVE DIAGNOSIS:  Symptomatic right iliac artery pseudoaneurysm. POSTOPERATIVE DIAGNOSIS:  Symptomatic right iliac artery pseudoaneurysm. PROCEDURE PERFORMED:    1. Aortogram with right lower extremity angiogram.  2.  Left ultrasound-guided common femoral artery access. 3.  Right common iliac and external iliac artery stenting with a Viabahn for exclusion of iliac artery pseudoaneurysm. 4.  Percutaneous closure of 12-Yakut sheath in the left common femoral artery. SURGEON:  Vaishali Roland MD     ASSISTANT:  None. ANESTHESIA:  MAC with local.    ESTIMATED BLOOD LOSS:  100 mL. COMPLICATIONS:  None apparent. SPECIMENS REMOVED:  None. IMPLANTS:  Viabahn stent x 2. OPERATIVE INDICATIONS:  The patient is a 69-year-old gentleman with end-stage renal disease who has had a complex recent transplant history. Approximately 8 months ago, he underwent a right-sided transplant going through his external iliac artery that was complicated by postoperative bleeding requiring take back and surgical repair. This then bled again and he underwent placement of a covered stent graft over the external iliac artery. This apparently stopped the bleeding, however, killed his donor transplant. The transplant has since been removed and reportedly was doing okay; however, 1 week ago, he started having right flank pain and presented to the emergency room for evaluation in severe pain. He was hemodynamically stable and a CT scan showed a large 5 x 7 cm iliac artery pseudoaneurysm. There was no avni rupture, but there was a large amount of inflammation surrounding it suggestive of a possible rupture. Because of this, it was recommended to proceed with urgent coverage of the pseudoaneurysm.   Prior to the procedure, the nature of the procedure as well the risks and benefits were explained to the patient in detail and his wife. He elected to proceed. OPERATIVE DETAILS:  The patient was identified in the preop holding area. Consents were signed. Patient was brought back to the operating room where bilateral groins and abdomen were prepped and draped in usual standard fashion. MAC sedation was induced. A timeout was then performed. Utilizing ultrasound-guided access, the left common femoral artery was accessed, given the large sheath size, this was a calcified artery but of normal caliber. Utilizing micropuncture to gain access in the common femoral artery, it was a very calcified artery, so I had to use multiple dilators to get my sheath size up to speed. Ultimately, once I had a 6-Pu[place]ican sheath in, I used a preclose technique with 2 ProGlides in the standard fashion. Once this was done, I dilated with a 9-[place]ican sheath and then ultimately a 12-[place]ican sheath was inserted. This sheath size was necessary to deliver the large Viabahns. Through this, I then brought up a rim catheter into the abdominal aorta. From this level I shot an aortogram which showed a proximal external iliac artery stent that was widely patent that filled a large pseudoaneurysm in the external iliac artery. After some swelling it ultimately filled the distal external iliac artery and the common femoral artery. I then brought a Glidewire on the field and was able to cannulate through the aneurysm into the native common femoral artery. I used the rim catheter and shot an angiogram from the right superficial femoral artery to confirm entry into the true lumen of the right common iliac. Through this, I then brought an Amplatz wire up and over and buried into the common femoral artery. Once this was done, I brought a marker pig and placed this up and over to albert the location of my epigastrics as well as the edge of the stent.   I then inserted through the 12-[place]ican sheath my rim catheter again and shot an angiogram with my stiff wire already up and over. This was used to then albert where my epigastric and the edges of my stent. Next, I brought onto the field a 9 mm x 7.5 cm Viabahn stent. This was brought up and over the Amplatz wire with little difficulty. This was then deployed at my marked location of the epigastrics. This did make it just to the edge of the previous stent. Given the concern for a possible type 3, I elected to extend my Viabahn stent proximally, so then I brought onto the field a 10 mm Viabahn x 5 cm Viabahn and extended this from my previous Viabahn well into the stent graft. I then brought into the field a 9 mm x 40 balloon and performed a balloon angioplasty of the covered stent graft. A completion angiogram from the right common iliac showed excellent flow throughout the vessel with no filling of the pseudoaneurysm any longer. Satisfied with this, I then pulled all my sheaths and catheters to the contralateral side. I then used the standard preclose technique with my ProGlides. This was hemostatic and the wire was removed. Manual pressure was held and protamine was given. The patient was then awoken from anesthesia in stable condition and brought to the recovery area. All needle, sponge, instrument counts were correct x 2 at the completion of the procedure.       MD BILL Sen / NORMA  D: 06/28/2018 20:26     T: 06/29/2018 00:17  JOB #: [id number]

## 2018-06-29 NOTE — PROGRESS NOTES
Nephrologist consulted pharmacy to make sure the patient is NOT discharged home with sensipar.    Not sure as to how we can do this, but I will leave this I-vent and make sure we follow and check the AVS upon discharge; granted we are available at the time of discharge     I will also remove it from the PTA list as MD tends to discharge patient using the PTA list.    Thanks  Neel Farfan, PHARMD

## 2018-06-29 NOTE — CONSULTS
[address] [place] [address], [address] [facility]     Date of  Admission: 6/28/2018 11:01 AM     Admission type:Emergency    Consult for: elevated troponin  Consult by: Vascular Surgery     Subjective:     Rabia Flores is a 70 y.o. male admitted for ILIAC ANEURYSM, Pseudoaneurysm of iliac artery ([address] Significant hx of neg cardiac cath 2/2018, PCI/CRISTI to RCA 2011, ESRD (MWF HD), PVD, HTN and MS. Admitted after HD yesterday with c/o n/v and right flank pain. CT showed right pseudoaneurysm requiring urgent vascular surgery for repair. This morning patient very sleepy due to pain medication, denies CP, SOB. ECG on admission LVH with deepened known ST depressions resolving with repeat ECG. Troponin mild elevation 1.99-1.8. EF 50% by cardiac cath 2/2018    Previous treatment/evaluation includes Percutaneous Coronary Intervention, echocardiogram and stress thallium .  Cardiac risk factors: family history, sedentary life style, male gender, hypertension.       Patient Active Problem List    Diagnosis Date Noted    Elevated troponin 06/29/2018    Pseudoaneurysm of iliac artery ([address] 06/28/2018    Type 2 diabetes with nephropathy ([address] 03/01/2018    NSTEMI (non-ST elevated myocardial infarction) ([address] 02/01/2018    History of colonoscopy 03/29/2017    Diabetic oculomotor palsy ([address] 03/25/2016    Diabetic peripheral neuropathy associated with type 2 diabetes mellitus ([address] 03/25/2016    Carpal tunnel syndrome on both sides 07/30/2015    Ulnar neuropathy at elbow of right upper extremity 07/30/2015    Ulnar neuropathy at elbow of left upper extremity 04/21/2015    Cervical stenosis of spinal canal 01/04/2015    Ataxia 01/04/2015    Multiple sclerosis ([address] 01/04/2015    ESRD on hemodialysis ([address] 12/30/2014    ASHD (arteriosclerotic heart disease) 04/11/2013    Mixed hyperlipidemia 04/11/2013    S/P coronary artery stent placement 02/18/2011    PAD (peripheral artery disease) ([facility] 12/09/2010    Hypertension 04/12/2010      Radha Bhandari MD  Past Medical History:   Diagnosis Date    Anemia associated with chronic renal failure     Autoimmune disease ([facility]     hx ms    CAD (coronary artery disease)     Chronic kidney disease     catheter removed and no dialysis at this time    Chronic kidney disease     left arm fistula dialysis MWF    Diabetes ([facility]     type II    Elevated troponin 6/29/2018    GERD (gastroesophageal reflux disease)     Hypertension     Ill-defined condition     prostate cancer    Multiple sclerosis ([facility]     diagnosed '01    Other ill-defined conditions(799.89)     anemia    Other ill-defined conditions(799.89)     elevated cholesterol    Other ill-defined conditions(799.89)     hx septic right foot    Peripheral vascular disease ([facility]     Secondary hyperpar[facility]roidism of renal origin ([facility]     Thyroid disease       Social History     Social History    Marital status:      Spouse name: N/A    Number of children: N/A    Years of education: N/A     Occupational History    not working      Social History Main Topics    Smoking status: Former Smoker     Years: 1.00     Quit date: 4/12/2003    Smokeless tobacco: Never Used      Comment: quit 5 years ago    Alcohol use No      Comment: Stopped drinking 10 years ago    Drug use: No    Sexual activity: Yes     Partners: Female     Birth control/ protection: None     Other Topics Concern    None     Social History Narrative     No Known Allergies   Family History   Problem Relation Age of Onset    Diabetes Mother       Current Facility-Administered Medications   Medication Dose Route Frequency    senna-docusate (PERICOLACE) 8.6-50 mg per tablet 2 Tab  2 Tab Oral QHS    epoetin niya (EPOGEN;PROCRIT) injection 10,000 Units  10,000 Units SubCUTAneous Q MON, WED & FRI    famotidine (PEPCID) tablet 20 mg  20 mg Oral ACD    metroNIDAZOLE (FLAGYL) IVPB premix 500 mg  500 mg IntraVENous Q12H    [START ON 6/30/2018] aspirin delayed-release tablet 81 mg  81 mg Oral DAILY    heparin (porcine) injection 5,000 Units  5,000 Units SubCUTAneous Q8H    piperacillin-tazobactam (ZOSYN) 3.375 g in 0.9% sodium chloride (MBP/ADV) 100 mL MBP  3.375 g IntraVENous Q12H    dextrose 5 % - 0.45% NaCl infusion  50 mL/hr IntraVENous CONTINUOUS    insulin lispro (HUMALOG) injection   SubCUTAneous AC&HS    glucose chewable tablet 16 g  4 Tab Oral PRN    dextrose (D50W) injection syrg 12.5-25 g  12.5-25 g IntraVENous PRN    glucagon (GLUCAGEN) injection 1 mg  1 mg IntraMUSCular PRN    escitalopram oxalate (LEXAPRO) tablet 10 mg  10 mg Oral DAILY    sevelamer carbonate (RENVELA) tab 800 mg  800 mg Oral TID WITH MEALS    pravastatin (PRAVACHOL) tablet 10 mg  10 mg Oral QHS    morphine injection 2 mg  2 mg IntraVENous Q3H PRN    oxyCODONE-acetaminophen (PERCOCET) 5-325 mg per tablet 1-2 Tab  1-2 Tab Oral Q4H PRN    vancomycin (VANCOCIN) 750 mg in 0.9% sodium chloride (MBP/ADV) 250 mL  750 mg IntraVENous DIALYSIS PRN    Vancomycin 750 mg: give at end of dialysis on dialysis days   Other DAILY        Review of Symptoms: difficult to obtain due to sleepiness  Constitutional: negative  Eyes: negative   Ears, nose, mouth, throat, and face: negative  Respiratory: negative   Cardiovascular: negative   Gastrointestinal: negative  Genitourinary:negative   Musculoskeletal:negative   Neurological: negative   Endocrine: negative          Objective:      Visit Vitals    /46    Pulse 83    Temp 98 °F (36.7 °C)    Resp 16    Ht 5' 10\" (1.778 m)    Wt 69 kg (152 lb 3.2 oz)    SpO2 95%    BMI 21.84 kg/m2       Physical:   General: thin, older appearing than stated age, AAM in no acute distress  Heart: RRR, no m/S3/JVD, no carotid bruits   Lungs: diminished  Abdomen: Soft, +BS, NTND   Extremities: RLE amputation BKA; LLE no edema   Neurologic: Grossly normal    Data Review:   Recent Labs      06/29/18   0247  06/28/18   1145   WBC  23.2*  26.1*   HGB  8.2*  10.9*   HCT  26.0*  34.6*   PLT  216  270     Recent Labs      06/29/18   0247  06/28/18   1145   NA  137  135*   K  4.2  4.0   CL  100  97   CO2  27  27   GLU  119*  127*   BUN  36*  29*   CREA  7.67*  6.84*   CA  8.2*  9.3   MG  2.1   --    PHOS  4.3   --    ALB   --   2.3*   TBILI   --   0.7   SGOT   --   25   ALT   --   20       Recent Labs      06/29/18   1149  06/29/18   0247  06/28/18   1145   TROIQ  1.73*  1.80*  1.99*         Intake/Output Summary (Last 24 hours) at 06/29/18 1438  Last data filed at 06/29/18 0300   Gross per 24 hour   Intake          1028.33 ml   Output              110 ml   Net           918.33 ml        Cardiographics    Telemetry: SR  ECG: SR with LVH    CXRAY: no acute process       Assessment:       Principal Problem:    Pseudoaneurysm of iliac artery ([facility] (6/28/2018)    Active Problems:    Hypertension (4/12/2010)      PAD (peripheral artery disease) ([facility] (12/9/2010)      Mixed hyperlipidemia (4/11/2013)      ESRD on hemodialysis ([facility] (12/30/2014)      Multiple sclerosis ([facility] (1/4/2015)      Diabetic peripheral neuropathy associated with type 2 diabetes mellitus ([facility] (3/25/2016)      Elevated troponin (6/29/2018)         Plan:     Elevated troponin:  Initial ECG concerning with ST depressions,  +troponin trending downward and admitted with c/o n/v which can be anginal equivalent  Troponin elevation r/t ACS or infection and/or ESRD? ?   Negative cardiac cath 4 months ago   Echo today for evaluation of EF, valves and WMA  Regardless, with s/p vascular procedure would not pursue further ischemic evaluation at this time with the exception he were to become unstable  Continue on ASA, statin, low dose BB for CAD, tolerating a lower BP  Holding Norvasc, ACEI, and clonidine    Thank you for consulting [facility]    6/29/2018         Patient seen, examined by me personally. Plan discussed as detailed. Agree with note as outlined by  NP. I confirm findings in history and physical exam. No additional findings noted. Agree with plan as outlined above. Had normal cath 4 months ago. Will follow clinically. ASA, beta blocker.      Karen Issa MD

## 2018-06-29 NOTE — ANESTHESIA POSTPROCEDURE EVALUATION
Post-Anesthesia Evaluation and Assessment    Patient: Verena Abreu MRN: [id number]  SSN: [id number]    YOB: 1950  Age: 70 y.o. Sex: male       Cardiovascular Function/Vital Signs  Visit Vitals    /66    Pulse (!) 111    Temp (!) 38.1 °C (100.6 °F)    Resp 13    Ht 5' 10\" (1.778 m)    Wt 62.5 kg (137 lb 12.6 oz)    SpO2 98%    BMI 19.77 kg/m2       Patient is status post general anesthesia for Procedure(s):  RIGHT LEG ANGIOGRAM AND ILIAC ANEURYSM REPAIR. Nausea/Vomiting: None    Postoperative hydration reviewed and adequate. Pain:  Pain Scale 1: Numeric (0 - 10) (06/28/18 2100)  Pain Intensity 1: 0 (06/28/18 2100)   Managed    Neurological Status:   Neuro (WDL): Within Defined Limits (06/28/18 2033)   At baseline    Mental Status and Level of Consciousness: Arousable    Pulmonary Status:   O2 Device: Nasal cannula (06/28/18 2100)   Adequate oxygenation and airway patent    Complications related to anesthesia: None    Post-anesthesia assessment completed.  No concerns    Signed By: Constantine Arndt MD     June 28, 2018

## 2018-06-29 NOTE — PROGRESS NOTES
Dr Jodie Ramsey called re pt's Rhonchi now audible in Left lung. Sat on 2 L remain wnl. Pt has ineffective cough. Pt is anuric, pt bringing up scant white pheglm with younkers suction. .     Also paged surgeon on call Dr Tiffanie Choi due to pt having R Flank pain at number 9 and sharp, that lung sounds clear however these are marcie symptoms that brought pt in. He refused to eat all day until his family came this pm has had a few bites. Will also   try Incentive spirometer. Pt has pulses. Awaiting call back from surgeon. Deric Patton

## 2018-06-29 NOTE — BRIEF OP NOTE
BRIEF OPERATIVE NOTE    Date of Procedure: 6/28/2018   Preoperative Diagnosis: ILIAC ANEURYSM  Postoperative Diagnosis: ILIAC ANEURYSM    Procedure(s):  RIGHT LEG ANGIOGRAM AND ILIAC ANEURYSM REPAIR  Surgeon(s) and Role:     * Cory Ayala MD - Primary  Surgical Staff:  Circ-1: Khoa Hussein RN  Circ-2: Dank South RN  Radiology Technician: Lexi Moe  Scrub Tech-1: Nallely Avery  Scrub Tech-Relief: Klaudia Parks  Surg Asst-1: Eleni Tarango  Surg Asst-Relief: Kieran Azevedo  Event Time In   Incision Start 1911   Incision Close      Anesthesia: MAC   Estimated Blood Loss: 100  Specimens: * No specimens in log *   Findings: Large contained iliac aneurysm, sealed well with viabahn  Complications: None  Implants:   Implant Name Type Inv.  Item Serial No.  Lot No. LRB No. Used Action   VIABAHN ENDOPROSTHESIS   [de-identified] GORE N/A Right 1 Implanted   GRAFT SYS [id number] VIA  -- VIABAHN - [de-identified]   GRAFT SYS [id number] VIA  -- VIABAHN [de-identified] [employer] N/A Right 1 Implanted

## 2018-06-29 NOTE — PERIOP NOTES
Handoff Report from Operating Room to PACU    Report received from Slick Fleming Dr.  and Bronwyn Albarran CRNA  regarding Batool Reyes. Surgeon(s):  Maame Jay MD  And Procedure(s) (LRB):  RIGHT LEG ANGIOGRAM AND ILIAC ANEURYSM REPAIR (Right)  confirmed   with dressings discussed. Anesthesia type, drugs, patient history, complications, estimated blood loss, vital signs, intake and output, and last pain medication were reviewed.

## 2018-06-29 NOTE — PERIOP NOTES
TRANSFER - OUT REPORT:    Verbal report given to [facility] RN (name) on Maren Jimenez  being transferred to [facility](unit) for routine post - op       Report consisted of patients Situation, Background, Assessment and   Recommendations(SBAR). Information from the following report(s) SBAR, OR Summary, Intake/Output, MAR, Recent Results and Cardiac Rhythm ST was reviewed with the receiving nurse. Opportunity for questions and clarification was provided.       Patient transported with:   Monitor  O2 @ 2 liters  Registered Nurse

## 2018-06-29 NOTE — PROGRESS NOTES
TRANSFER - IN REPORT:    Verbal report received from Tabby Hernandez RN(name) on Radha Eaton  being received from [facility]) for routine post - op      Report consisted of patients Situation, Background, Assessment and   Recommendations(SBAR). Information from the following report(s) SBAR, Kardex, OR Summary, Procedure Summary, Intake/Output, MAR, Recent Results, Med Rec Status and Cardiac Rhythm NSR was reviewed with the receiving nurse. Opportunity for questions and clarification was provided. Assessment completed upon patients arrival to unit and care assumed.

## 2018-06-29 NOTE — PROGRESS NOTES
Vascular Surgery Progress Note  Rowena Guajardo ACNP-BC  6/29/2018       Subjective:     Lane Anthony is a 70 y.o. male with a pmhx significant for multiple sclerosis, ESRD on HD, anemia of chronic disease, secondary hyperparathyroidisim,  ASHD, HTN, HLD DM II, GERD, and PVD. He is an smoker that stopped smoking 5 years ago. 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 He has a recent hx of an EGD with snare polypectomy on 06/13/2018. He presented to the hospital with complaint of right flank and abd pain that has been ongoing since Monday. He missed 2 session of HD due to the pain. When he presented to his dialysis unit today he was referred to the hospital for evaluation post a brief session. He has had associated N/V/D. He denies fever and chills. On the night prior to admission his RLE began to swell.         He is known to our service for multiple vascular issues including issues with HD access, PAD, and intestinal vascular insufficiency. He is s/p Syme amputation. He reports that last September he was in the process of receiving a renal cell transplant at [facility] when the \"artery burst\"  It was repair and 1 weak later it \"burst again\" and he had a second repair.         On arrival he was normal tensive with a nl HR and RR. He was afebrile. He had a significant leukocytosis of 26.1. His PLT count was 270. His troponin was elevated at 1.99. His amylase was 28. His lipase 31. CT of the abd and pelvis was positive for a large right EIA pseudoaneurysm with central hyperdense contrast measuring 2.6 x 5.2 x 5.6 cm and the overall dimensions measuring 4.6 x 6.8 x 9.6 cm. He is now s/p right common iliac and external iliac artery stenting with a Viabahn for exclusion of iliac artery aneurysm. This am his pain is improved. He is a bit lethargic. He is hypotensive.      Nursing Data:     Patient Vitals for the past 24 hrs:   BP Temp Pulse Resp SpO2 Height Weight   06/29/18 0927 114/55 - 87 - - - -   06/29/18 4189 (!) 88/48 - 82 - - - -   06/29/18 0900 98/48 - 81 - - - -   06/29/18 0500 98/49 - 81 - - - -   06/29/18 0401 101/48 - 84 - - - -   06/29/18 0300 104/49 97 °F (36.1 °C) 83 16 100 % - 69 kg (152 lb 3.2 oz)   06/29/18 0200 103/50 - 86 - - - -   06/29/18 0100 107/51 - 86 - - - -   06/29/18 0000 101/49 98.3 °F (36.8 °C) 85 12 100 % - -   06/28/18 2350 101/49 - 86 - - - -   06/28/18 2308 (!) 86/51 - 91 - - - -   06/28/18 2210 138/61 100 °F (37.8 °C) (!) 103 12 100 % - -   06/28/18 2145 149/64 98.3 °F (36.8 °C) (!) 106 9 99 % - -   06/28/18 2130 139/66 - (!) 111 13 98 % - -   06/28/18 2115 147/68 - (!) 112 12 98 % - -   06/28/18 2100 144/72 (!) 100.6 °F (38.1 °C) (!) 110 13 98 % - -   06/28/18 2050 147/69 - (!) 108 14 97 % - -   06/28/18 2045 146/68 - (!) 105 11 100 % - -   06/28/18 2040 146/66 - (!) 105 13 100 % - -   06/28/18 2037 142/69 98.7 °F (37.1 °C) (!) 101 12 100 % - -   06/28/18 2035 142/69 - (!) 104 11 93 % - -   06/28/18 2033 142/67 - - - - - -   06/28/18 1645 134/63 - (!) 111 19 93 % - -   06/28/18 1630 133/64 - (!) 113 18 94 % - -   06/28/18 1615 126/72 - (!) 106 12 95 % - -   06/28/18 1600 135/67 - (!) 103 13 92 % - -   06/28/18 1530 127/63 - (!) 102 8 91 % - -   06/28/18 1515 125/70 - 98 9 92 % - -   06/28/18 1500 123/71 - 98 9 90 % - -   06/28/18 1446 133/66 - - - - - -   06/28/18 1430 131/67 - - - - - -   06/28/18 1427 125/66 - - - - - -   06/28/18 1300 136/74 - - - 90 % - -   06/28/18 1245 144/67 - - - 94 % - -   06/28/18 1230 127/83 - - - 98 % - -   06/28/18 1215 122/63 - - - 94 % - -   06/28/18 1200 139/79 - - - 96 % - -   06/28/18 1145 123/61 - - - 100 % - -   06/28/18 1130 121/62 - - - 98 % - -   06/28/18 1115 120/60 - - - 97 % - -   06/28/18 1112 - 97.5 °F (36.4 °C) - - 92 % - 62.5 kg (137 lb 12.6 oz)   06/28/18 1110 124/64 - - - - - -   06/28/18 1100 148/76 97.7 °F (36.5 °C) 88 16 100 % 5' 10\" (1.778 m) 70.3 kg (155 lb) ---------------------------------------------------------------------------------------------------------    Intake/Output Summary (Last 24 hours) at 06/29/18 1033  Last data filed at 06/29/18 0300   Gross per 24 hour   Intake          1028.33 ml   Output              110 ml   Net           918.33 ml       Exam:     Physical Exam  Constitutional: He is oriented to person, place, and time. Thin chronically ill appearing AAM.     HENT:   Head: Normocephalic. Eyes: Pupils are equal, round, and reactive to light. Neck: Normal range of motion. Cardiovascular: Normal rate and regular rhythm. RLE swollen. No pulses below the femoral level BL. RLE femoral pulse 2+. Left is 2+. Pulmonary/Chest: Effort normal. No respiratory distress. Abdominal: Soft. Tenderness to abd has resolved. Musculoskeletal: Normal range of motion. Neurological: He is alert and oriented to person, place, and time. Psychiatric: Judgment and thought content normal.   Flat affect. Lab Review:     .   Recent Results (from the past 24 hour(s))   GLUCOSE, POC    Collection Time: 06/28/18 11:07 AM   Result Value Ref Range    Glucose (POC) 125 (H) 65 - 100 mg/dL    Performed by Ivy Garcia    EKG, 12 LEAD, INITIAL    Collection Time: 06/28/18 11:10 AM   Result Value Ref Range    Ventricular Rate 88 BPM    Atrial Rate 88 BPM    P-R Interval 150 ms    QRS Duration 86 ms    Q-T Interval 464 ms    QTC Calculation (Bezet) 561 ms    Calculated P Axis 68 degrees    Calculated R Axis 66 degrees    Calculated T Axis 173 degrees    Diagnosis       Normal sinus rhythm  Possible Left atrial enlargement  Left ventricular hypertrophy with repolarization abnormality  Prolonged QT  When compared with ECG of 04-FEB-2018 03:08,  ST now depressed in Lateral leads  T wave inversion more evident in Anterior leads  QT has lengthened  Confirmed by Patel Salazar, P.VAde ([id number]) on 6/29/2018 6:29:53 AM     CBC WITH AUTOMATED DIFF    Collection Time: 06/28/18 11:45 AM   Result Value Ref Range    WBC 26.1 (H) 4.1 - 11.1 K/uL    RBC 4.03 (L) 4.10 - 5.70 M/uL    HGB 10.9 (L) 12.1 - 17.0 g/dL    HCT 34.6 (L) 36.6 - 50.3 %    MCV 85.9 80.0 - 99.0 FL    MCH 27.0 26.0 - 34.0 PG    MCHC 31.5 30.0 - 36.5 g/dL    RDW 17.1 (H) 11.5 - 14.5 %    PLATELET 423 660 - 592 K/uL    MPV 9.7 8.9 - 12.9 FL    NRBC 0.0 0  WBC    ABSOLUTE NRBC 0.00 0.00 - 0.01 K/uL    NEUTROPHILS 92 (H) 32 - 75 %    LYMPHOCYTES 4 (L) 12 - 49 %    MONOCYTES 2 (L) 5 - 13 %    EOSINOPHILS 0 0 - 7 %    BASOPHILS 0 0 - 1 %    IMMATURE GRANULOCYTES 2 (H) 0.0 - 0.5 %    ABS. NEUTROPHILS 24.1 (H) 1.8 - 8.0 K/UL    ABS. LYMPHOCYTES 1.0 0.8 - 3.5 K/UL    ABS. MONOCYTES 0.5 0.0 - 1.0 K/UL    ABS. EOSINOPHILS 0.0 0.0 - 0.4 K/UL    ABS. BASOPHILS 0.0 0.0 - 0.1 K/UL    ABS. IMM. GRANS. 0.5 (H) 0.00 - 0.04 K/UL    DF SMEAR SCANNED      RBC COMMENTS ANISOCYTOSIS  1+       METABOLIC PANEL, COMPREHENSIVE    Collection Time: 06/28/18 11:45 AM   Result Value Ref Range    Sodium 135 (L) 136 - 145 mmol/L    Potassium 4.0 3.5 - 5.1 mmol/L    Chloride 97 97 - 108 mmol/L    CO2 27 21 - 32 mmol/L    Anion gap 11 5 - 15 mmol/L    Glucose 127 (H) 65 - 100 mg/dL    BUN 29 (H) 6 - 20 MG/DL    Creatinine 6.84 (H) 0.70 - 1.30 MG/DL    BUN/Creatinine ratio 4 (L) 12 - 20      GFR est AA 10 (L) >60 ml/min/1.73m2    GFR est non-AA 8 (L) >60 ml/min/1.73m2    Calcium 9.3 8.5 - 10.1 MG/DL    Bilirubin, total 0.7 0.2 - 1.0 MG/DL    ALT (SGPT) 20 12 - 78 U/L    AST (SGOT) 25 15 - 37 U/L    Alk.  phosphatase 177 (H) 45 - 117 U/L    Protein, total 9.6 (H) 6.4 - 8.2 g/dL    Albumin 2.3 (L) 3.5 - 5.0 g/dL    Globulin 7.3 (H) 2.0 - 4.0 g/dL    A-G Ratio 0.3 (L) 1.1 - 2.2     AMYLASE    Collection Time: 06/28/18 11:45 AM   Result Value Ref Range    Amylase 28 25 - 115 U/L   LIPASE    Collection Time: 06/28/18 11:45 AM   Result Value Ref Range    Lipase 31 (L) 73 - 393 U/L   TROPONIN I    Collection Time: 06/28/18 11:45 AM   Result Value Ref Range    Troponin-I, Qt. 1.99 (H) <0.05 ng/mL   HEMOGLOBIN A1C WITH EAG    Collection Time: 06/28/18 11:45 AM   Result Value Ref Range    Hemoglobin A1c 4.9 4.2 - 6.3 %    Est. average glucose Cannot be calculated mg/dL   EKG, 12 LEAD, INITIAL    Collection Time: 06/28/18  4:30 PM   Result Value Ref Range    Ventricular Rate 110 BPM    Atrial Rate 110 BPM    P-R Interval 150 ms    QRS Duration 84 ms    Q-T Interval 378 ms    QTC Calculation (Bezet) 511 ms    Calculated P Axis 74 degrees    Calculated R Axis 79 degrees    Calculated T Axis -144 degrees    Diagnosis       Sinus tachycardia  Possible Left atrial enlargement  Left ventricular hypertrophy with repolarization abnormality    Confirmed by Joshua Evans P.VAde ([id number]) on 6/29/2018 8:17:27 AM     GLUCOSE, POC    Collection Time: 06/28/18  7:20 PM   Result Value Ref Range    Glucose (POC) 127 (H) 65 - 100 mg/dL    Performed by Ahmet MorseCRNA)    GLUCOSE, POC    Collection Time: 06/28/18  8:37 PM   Result Value Ref Range    Glucose (POC) 121 (H) 65 - 100 mg/dL    Performed by Redge Alpers    GLUCOSE, POC    Collection Time: 06/28/18 10:50 PM   Result Value Ref Range    Glucose (POC) 145 (H) 65 - 100 mg/dL    Performed by Jose M Schwartz    CBC WITH AUTOMATED DIFF    Collection Time: 06/29/18  2:47 AM   Result Value Ref Range    WBC 23.2 (H) 4.1 - 11.1 K/uL    RBC 3.01 (L) 4.10 - 5.70 M/uL    HGB 8.2 (L) 12.1 - 17.0 g/dL    HCT 26.0 (L) 36.6 - 50.3 %    MCV 86.4 80.0 - 99.0 FL    MCH 27.2 26.0 - 34.0 PG    MCHC 31.5 30.0 - 36.5 g/dL    RDW 17.2 (H) 11.5 - 14.5 %    PLATELET 098 780 - 222 K/uL    MPV 10.4 8.9 - 12.9 FL    NRBC 0.0 0  WBC    ABSOLUTE NRBC 0.00 0.00 - 0.01 K/uL    NEUTROPHILS 91 (H) 32 - 75 %    LYMPHOCYTES 4 (L) 12 - 49 %    MONOCYTES 3 (L) 5 - 13 %    EOSINOPHILS 0 0 - 7 %    BASOPHILS 0 0 - 1 %    IMMATURE GRANULOCYTES 2 (H) 0.0 - 0.5 %    ABS. NEUTROPHILS 21.1 (H) 1.8 - 8.0 K/UL    ABS. LYMPHOCYTES 0.9 0.8 - 3.5 K/UL    ABS.  MONOCYTES 0.7 0.0 - 1.0 K/UL    ABS. EOSINOPHILS 0.0 0.0 - 0.4 K/UL    ABS. BASOPHILS 0.0 0.0 - 0.1 K/UL    ABS. IMM. GRANS. 0.5 (H) 0.00 - 0.04 K/UL    DF SMEAR SCANNED      RBC COMMENTS BASOPHILIC STIPPLING  PRESENT        RBC COMMENTS POLYCHROMASIA  1+       METABOLIC PANEL, BASIC    Collection Time: 06/29/18  2:47 AM   Result Value Ref Range    Sodium 137 136 - 145 mmol/L    Potassium 4.2 3.5 - 5.1 mmol/L    Chloride 100 97 - 108 mmol/L    CO2 27 21 - 32 mmol/L    Anion gap 10 5 - 15 mmol/L    Glucose 119 (H) 65 - 100 mg/dL    BUN 36 (H) 6 - 20 MG/DL    Creatinine 7.67 (H) 0.70 - 1.30 MG/DL    BUN/Creatinine ratio 5 (L) 12 - 20      GFR est AA 9 (L) >60 ml/min/1.73m2    GFR est non-AA 7 (L) >60 ml/min/1.73m2    Calcium 8.2 (L) 8.5 - 10.1 MG/DL   MAGNESIUM    Collection Time: 06/29/18  2:47 AM   Result Value Ref Range    Magnesium 2.1 1.6 - 2.4 mg/dL   PHOSPHORUS    Collection Time: 06/29/18  2:47 AM   Result Value Ref Range    Phosphorus 4.3 2.6 - 4.7 MG/DL   TROPONIN I    Collection Time: 06/29/18  2:47 AM   Result Value Ref Range    Troponin-I, Qt. 1.80 (H) <0.05 ng/mL   GLUCOSE, POC    Collection Time: 06/29/18  9:06 AM   Result Value Ref Range    Glucose (POC) 124 (H) 65 - 100 mg/dL    Performed by Kiah Torres        XR Results (most recent):    Results from Hospital Encounter encounter on 06/28/18   XR FEMUR RT 2 VS   Narrative INDICATION:   Right iliac pseudoaneurysm repair     EXAMINATION:  FLUORO FOR ORDERING PHYSICIAN    TECHNIQUE:   Portable fluoroscopy was provided for the ordering physician. Intraoperative arteriogram demonstrating stent graft repair of right iliac  artery aneurysm         Impression IMPRESSION:  Portable fluoroscopy provided.     INTERPRETATION PROVIDED FOR COMPLIANCE ONLY AT NO CHARGE             Assessment/Plan:      Consult problems:  Large right EIA pseudoaneurysm  -rule out infectious etiology  -blood cultures pending   -leukocytosis improved  PAD s/p BLE bypasses and Syme amputation  Intestinal vascular insufficiency     Pain improved post procedure. Normalize today. Encourage OOB. Advance diet. Continue to monitor. Continue statin. Resume ASA. Add BM regimen.       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 May need to dc IVF and resume once taking po    -continue SSI and POCT  Renal disease, end stage on HD  Secondary hyperparathyroidism    -nephrology consulted. Anemia of chronic renal disease  -epogen per nephrology   -stable post procedure   Recent hx of Tumor s/p snare removal via EGD 6/2018  Remote hx of intestinal perforation  Hospitalist consulted for management of comorbid conditions.       VTE prophylaxis:  [facility]  SCDs contraindicated with hx of PAD.       Disposition:  Likely home. [facility]

## 2018-06-30 PROBLEM — I21.4 NSTEMI (NON-ST ELEVATED MYOCARDIAL INFARCTION) (HCC): Status: RESOLVED | Noted: 2018-01-01 | Resolved: 2018-01-01

## 2018-06-30 NOTE — DIALYSIS
[facility]  [phone]    Vitals   Pre   Post   Assessment   Pre   Post     Temp  Temp: 98.2 °F (36.8 °C) (06/30/18 0950)  97.8F LOC  A&O x3, following commands  &O x3, following commands    HR   Pulse (Heart Rate): 74 (06/30/18 1005) 77   Lungs   Coarse with rhonchi upper lobes     Coarse with rhonchi upper lobes       B/P   BP: 126/60 (06/30/18 1005) 133/68 Cardiac   Rate and rhythm regular, pulses palpable in left leg  Rate and rhythm regular, pulses palpable in left leg   Resp   Resp Rate: 20 (06/30/18 1005) 18 Skin   Right AKA  Right AKA   Pain level  Pain Intensity 1: 0 (06/30/18 0700) 3  Edema  Generalized      Generalized    Orders:    Duration:   Start:    4293 End:    1320 Total:   3.5 hours   Dialyzer:   Dialyzer/Set Up Inspection: Revaclear (06/30/18 0950)   Tana Lambert Bath:   Dialysate K (mEq/L): 3 (06/30/18 0950)   Ca Bath:   Dialysate CA (mEq/L): 2.5 (06/30/18 0950)   Na/Bicarb:   Dialysate NA (mEq/L): 140 (06/30/18 0950)   Target Fluid Removal:   Goal/Amount of Fluid to Remove (mL): 2000 mL (06/30/18 0950)   Access     Type & Location:   DENI fistula, no s/s of infection    Labs     Obtained/Reviewed   Critical Results Called   Date when labs were drawn-  Hgb-    HGB   Date Value Ref Range Status   06/29/2018 7.5 (L) 12.1 - 17.0 g/dL Final     K-    Potassium   Date Value Ref Range Status   06/29/2018 4.5 3.5 - 5.1 mmol/L Final     Ca-   Calcium   Date Value Ref Range Status   06/29/2018 8.1 (L) 8.5 - 10.1 MG/DL Final     Bun-   BUN   Date Value Ref Range Status   06/29/2018 45 (H) 6 - 20 MG/DL Final     Creat-   Creatinine   Date Value Ref Range Status   06/29/2018 8.68 (H) 0.70 - 1.30 MG/DL Final        Medications/ Blood Products Given     Name   Dose   Route and Time     Vancomycin  750mg   IV  1152             Blood Volume Processed (BVP):    78.5 Net Fluid   Removed:  2000 ml   Comments   Time Out Done: 6852  Primary Nurse Rpt Pre: Geno Blank RN  Primary Nurse Rpt Post: Chino Noble RN  Pt Education: access care   Care Plan: continue with dialysis per physician's orders. Tx Summary:Pt tolerated treatment well. Left upper arm fistula prepped according to protocol. Cannulated with 15 gauge needles x 2, flashed and flushed with NS with no problem. Blood pressure stable. No incident. Post treatment: All possible blood returned from circuit. Needles removed, pressure held. No excessive bleeding. Admiting Diagnosis:  Pt's previous clinic-  Consent signed - Informed Consent Verified: Yes (06/30/18 0950)  [facility] Consent - yes  Hepatitis Status- neg 6/29/18  Machine #- Machine Number: [id number] (06/30/18 0950)  Telemetry status-bedside  Pre-dialysis wt. -

## 2018-06-30 NOTE — PROGRESS NOTES
Bedside shift change report given to MIR Salguero RN (oncoming nurse) by Vj Rahman RN (offgoing nurse). Report included the following information SBAR, Kardex, Procedure Summary, Intake/Output, MAR, Recent Results, Med Rec Status and Cardiac Rhythm SR with ST depression. Pt assessed, found to be more lethargy today than yesterday, but able to answer questions correctly and follow commands. Pt unable to cough effectively, concern of effectively clearing his airway, will continue to monitor, skin remains intact.  See assessment flowsheet    2235-pt had a large loose greenish black stool, sacrum has a large non-blancable purple butterfly shape area, this is new from 1950 assessment, pt turned off of buttocks   2240- o2 sats dropping into the 70-80's on 5Ln/c, attempts made to have pt cough and clear secretions, unable to effectively cough   2245-Dr. Trian Groves called and informed of pts status and ineffective work of breathing- suggested a RAT be called  2250- RRT called- see doucmentation   Dr. Freddy Benoit responded to RAT called and ordered a stat  Chest/Abd CT scan   0606- Dr. Cory Veras paged waiting call back

## 2018-06-30 NOTE — PROGRESS NOTES
6/30/2018 12:54 PM    Admit Date: 6/28/2018    Admit Diagnosis:   ILIAC ANEURYSM;Pseudoaneurysm of iliac artery (HCC);VASCULA*    Subjective:     Clarke Found denies chest pain or shortness of breath. Current Facility-Administered Medications   Medication Dose Route Frequency    ipratropium (ATROVENT) 0.02 % nebulizer solution 0.5 mg  0.5 mg Nebulization Q6H RT    albumin human 25% (BUMINATE) solution 12.5 g  12.5 g IntraVENous DIALYSIS PRN    morphine injection 2 mg  2 mg IntraVENous Q4H PRN    lactobac ac& pc-s.therm-b.anim (DAVIN Q/RISAQUAD)  1 Cap Oral DAILY    senna-docusate (PERICOLACE) 8.6-50 mg per tablet 2 Tab  2 Tab Oral QHS    epoetin niya (EPOGEN;PROCRIT) injection 10,000 Units  10,000 Units SubCUTAneous Q MON, WED & FRI    famotidine (PEPCID) tablet 20 mg  20 mg Oral ACD    metroNIDAZOLE (FLAGYL) IVPB premix 500 mg  500 mg IntraVENous Q12H    aspirin delayed-release tablet 81 mg  81 mg Oral DAILY    heparin (porcine) injection 5,000 Units  5,000 Units SubCUTAneous Q8H    carvedilol (COREG) tablet 3.125 mg  3.125 mg Oral BID    piperacillin-tazobactam (ZOSYN) 3.375 g in 0.9% sodium chloride (MBP/ADV) 100 mL  3.375 g IntraVENous Q12H    insulin lispro (HUMALOG) injection   SubCUTAneous AC&HS    glucose chewable tablet 16 g  4 Tab Oral PRN    dextrose (D50W) injection syrg 12.5-25 g  12.5-25 g IntraVENous PRN    glucagon (GLUCAGEN) injection 1 mg  1 mg IntraMUSCular PRN    escitalopram oxalate (LEXAPRO) tablet 10 mg  10 mg Oral DAILY    sevelamer carbonate (RENVELA) tab 800 mg  800 mg Oral TID WITH MEALS    pravastatin (PRAVACHOL) tablet 10 mg  10 mg Oral QHS    vancomycin (VANCOCIN) 750 mg in 0.9% sodium chloride (MBP/ADV) 250 mL  750 mg IntraVENous DIALYSIS PRN    Vancomycin 750 mg: give at end of dialysis on dialysis days   Other DAILY         Objective:      Physical Exam:    Visit Vitals    /62    Pulse 77  Comment: pt has eyes closed, vitals stable.      Temp 97.6 °F (36.4 °C)    Resp 20    Ht 5' 10\" (1.778 m)    Wt 161 lb 3.2 oz (73.1 kg)    SpO2 100%    BMI 23.13 kg/m2     Gen:  NAD  Mental Status - Alert. General Appearance - Not in acute distress. Chest and Lung Exam   Inspection: Accessory muscles - No use of accessory muscles in breathing. Auscultation:   Breath sounds: - Normal.   Cardiovascular   Inspection: Jugular vein - Bilateral - Inspection Normal.   Palpation/Percussion:   Apical Impulse: - Normal.   Auscultation: Rhythm - Regular. Heart Sounds - S1 WNL and S2 WNL. No S3 or S4. Murmurs & Other Heart Sounds: Auscultation of the heart reveals - No Murmurs. Peripheral Vascular   Upper Extremity: Inspection - Bilateral - No Cyanotic nailbeds or Digital clubbing. Lower Extremity:   Palpation: Edema - Bilateral - No edema. Abdomen:   Soft, non-tender, bowel sounds are active. Neuro: A&O times 3, CN and motor grossly WNL    Data Review:   Recent Labs      06/29/18   2340  06/29/18   0247  06/28/18   1145   WBC  21.2*  23.2*  26.1*   HGB  7.5*  8.2*  10.9*   HCT  24.6*  26.0*  34.6*   PLT  227  216  270     Recent Labs      06/29/18   2340  06/29/18   0247  06/28/18   1145   NA  135*  137  135*   K  4.5  4.2  4.0   CL  99  100  97   CO2  23  27  27   GLU  127*  119*  127*   BUN  45*  36*  29*   CREA  8.68*  7.67*  6.84*   CA  8.1*  8.2*  9.3   MG   --   2.1   --    PHOS   --   4.3   --    ALB  1.7*   --   2.3*   TBILI  0.5   --   0.7   SGOT  48*   --   25   ALT  22   --   20       Recent Labs      06/29/18   2340  06/29/18   1149  06/29/18   0247  06/28/18   1145   TROIQ  1.65*  1.73*  1.80*  1.99*         Intake/Output Summary (Last 24 hours) at 06/30/18 1254  Last data filed at 06/29/18 2310   Gross per 24 hour   Intake             1050 ml   Output                0 ml   Net             1050 ml        Telemetry: normal sinus rhythm  Echo  SUMMARY:  Left ventricle: Size was at the upper limits of normal. Systolic function  was moderately reduced. Ejection fraction was estimated in the range of 35  % to 40 %. No obvious wall motion abnormalities identified in the views  obtained. There was moderate concentric hypertrophy. Right ventricle: The ventricle was mildly dilated. Left atrium: The atrium was dilated. Mitral valve: There was mild annular calcification. There was mild  regurgitation. Aortic valve: Leaflets exhibited mildly increased thickness and normal  cuspal separation. Aorta, systemic arteries: There was mild dilatation of the ascending  aorta. Assessment:     Principal Problem:    Pseudoaneurysm of iliac artery (HCC) (6/28/2018)    Active Problems:    Hypertension (4/12/2010)      PAD (peripheral artery disease) ([address] (12/9/2010)      S/P coronary artery stent placement (2/18/2011)      Overview: 2/17/11 PCI/CRISTI to RCA      ASHD (arteriosclerotic heart disease) (4/11/2013)      Mixed hyperlipidemia (4/11/2013)      ESRD on hemodialysis ([address] (12/30/2014)      Ataxia (1/4/2015)      Multiple sclerosis ([address] (1/4/2015)      Diabetic oculomotor palsy ([address] (3/25/2016)      Diabetic peripheral neuropathy associated with type 2 diabetes mellitus ([address] (3/25/2016)      Type 2 diabetes with nephropathy ([address] (3/1/2018)      Elevated troponin (6/29/2018)        Plan:     Elevated troponin/ nonischemic cardiomyopathy LVEF 35-40%:  Troponin elevation and EKG similar to when the patient had negative cardiac cath 4 months ago   Echo June 2018 with moderate concentric LVH, LVEF 35-40%  No indication for repeat ischemic evaluation at this time  Continue on ASA, statin, low dose BB for CAD, tolerating a lower BP  Holding Norvasc, ACEI, and clonidine-due to intermittent hypotension     Dr. Pepper Goode will follow-up again on Monday unless there are acute cardiac concerns over the weekend.

## 2018-06-30 NOTE — CONSULTS
Pt Name  Karthik Henderson   Date of Birth 1950   Medical Record Number  [id number]      Age  70 y.o. PCP Ruthann Morales MD   Admit date:  6/28/2018    Room Number  65/18  @ [facility]   Date of Service  6/30/2018    Chart reviewed   Pt seen and examined       Consult requested by  Dr. Hoa Granda MD    Reason for Consult:  Diabetes, other      Assessment and plan:  Present on Admission:   [facility]per[facility]   Diabetic peripheral neuropathy associated with type 2 diabetes mellitus ([facility]   ESRD on hemodialysis ([facility]   Hypertension   Mixed hyperlipidemia   Multiple sclerosis ([facility]   Elevated troponin   ASHD (arteriosclerotic heart disease)   Ataxia   Diabetic oculomotor palsy (HCC)   S/P coronary artery stent placement   Type 2 diabetes with nephropathy [facility]      Diabetes mellitus 2 on Insulin -reasonable control while in house   · No change in current Rx. SIRS with possible aspiration PNA Left lung (noted on CTA 06/30/18) one episode of reported diarrhea  Hypoxia now requiring 4-5 L//min Oxygen supplementation -  CTA chest shows signs of debris - worrisome for possible aspiration, especially in light of previous esophageal dilation. pt is on Three abx and WBC trending down   I am concerned for possible MS exacerbation due to recent events. · Recommend de-escalation of abx soon, once culture results are back. As mentioned earlier Zosyn has adequate anaerobic coverage, unless there is suspicion for C Diff infection. · Agree with atrovent nebs   · We will order stool for WBC. 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 · Will ask Neurologist to see him. Hypotension, - agree with Dr. Kenzie Rao recommendation to withhold/minimize hypotension. · I have added hold parameters to Coreg.        External and common iliac artery pseudoaneurysm s/p stenting -per primary attending and his team.    CAD hx, now presenting with no chest pain and nearly flat trend of troponin and marginally abnormal EKG with ST -T changes -agree with cardiologist   · Conservative approach is ideal under the current circumstances. ESRD / HD ~ 8 years   PAD   S/p remote partial (Syme) amputation of RIGHT foot. Body mass index is 23.13 kg/(m^2). Functional Status  Pt is  and lives with his wife in [place]. He uses a power wheelchair. CODE STATUS  Full   Surrogate decision maker: Pt's wife    Contact Payor: [place] MEDICARE / Plan: [place] MEDICARE PART A & B / Product Type: Medicare /    Social issues  Date Comment    06/30/18   9:58 AM  I called the pt's wife's number and there was no answer. I didn't leave any voicemail       Prognosis  Fair         History of Present Illness : The pt was hospitalized for back pain and Iliac artery pseudo-aneurysm which is now s/p stenting/repair. Pt has developed episodes of hypotension, shortness of breath with documented hypoxia now requiring high dose oxygen supplementation. Rapid response was called last night and pt went through CTA which shows airspace disease and signs of possible aspiration. Hospitalist team were called in to help. I met with the patient and briefly discussed this pt's care with Dr. Pranav Rush who happened to be in the unit. Case discussed with Ms. Annette Carmona RN at length.         Past Medical History:   Diagnosis Date    Anemia associated with chronic renal failure     Autoimmune disease (HCC)     hx ms    CAD (coronary artery disease)     Chronic kidney disease     catheter removed and no dialysis at this time    Chronic kidney disease     left arm fistula dialysis MWF    Diabetes ([facility]     type II    Elevated troponin 6/29/2018    GERD (gastroesophageal reflux disease)     Hypertension     Ill-defined condition     prostate cancer    Multiple sclerosis [facility])     diagnosed '01    Other ill-defined conditions(799.89)     anemia    Other ill-defined conditions(799.89)     elevated cholesterol    Other ill-defined conditions(799.89)     hx septic right foot    Peripheral vascular disease ([facility]     Secondary hyperparathyroidism of renal origin ([facility]     Thyroid disease        Past Surgical History:   Procedure Laterality Date    COLONOSCOPY N/A 12/6/2016    COLONOSCOPY performed by Reggie Mcgrath MD [facility]    COLONOSCOPY,DIAGNOSTIC  12/6/2016         CORONARY STENT SINGLE W/PTCA  2/17/2011         HX APPENDECTOMY      HX CATARACT REMOVAL  10/18/10    bilateral    HX CHOLECYSTECTOMY      HX GI      ileostomy due to infection    HX HEART CATHETERIZATION      HX HEENT      hx post photocoagulation eye 2009    HX OTHER SURGICAL      right partial foot amputation    HX OTHER SURGICAL      cardiac cath    HX OTHER SURGICAL      prostate removed    NE COLONOSCOPY W/BIOPSY SINGLE/MULTIPLE  5/10/2010         UPPER GI ENDOSCOPY,BIOPSY  4/17/2018         UPPER GI ENDOSCOPY,REMV TUMOR,SNARE  6/13/2018         VASCULAR SURGERY PROCEDURE UNLIST      katelyn. leg bypasses       Social History   Substance Use Topics    Smoking status: Former Smoker     Years: 1.00     Quit date: 4/12/2003    Smokeless tobacco: Never Used      Comment: quit 5 years ago    Alcohol use No      Comment: Stopped drinking 10 years ago       Family History   Problem Relation Age of Onset    Diabetes Mother        Review of Systems - History obtained from Nurse  and the patient  General ROS: positive for  - chills and fatigue  negative for - fever  Psychological ROS: negative  Ophthalmic ROS: negative  Respiratory ROS: positive for - cough, shortness of breath and wheezing  negative for - hemoptysis  Cardiovascular ROS: negative for - chest pain, loss of consciousness or palpitations  Gastrointestinal ROS: positive for - one episode of diarrhea negative for - abdominal pain or blood in stools  Genito-Urinary ROS: no dysuria, trouble voiding, or hematuria  Musculoskeletal ROS: Back pain is controlled with current pain medications. Neurological ROS: positive for - weakness     Physical Exam:             Vitals  Visit Vitals    /55 (BP 1 Location: Right arm)    Pulse 73    Temp 98 °F (36.7 °C)    Resp 22    Ht 5' 10\" (1.778 m)    Wt 73.1 kg (161 lb 3.2 oz)    SpO2 99%    BMI 23.13 kg/m2      General:  Alert, cooperative,   well noursished,   well developed,   appears stated age    Ears/Eyes:  Hearing intact/ Sclera anicteric. Pupils equal   Mouth/Throat:  Mucous membranes normal pink and moist , oral pharynx clear    Neck:     Lungs:  Trachea midline  Chest excursion symmetrical   Auscultation B/L Symmetrical with vesicular breath sounds    Percussion note resonant on mid Clavicular line; no sign of pneumothorax        CVS:  Regular rate and rhythm,  no  murmur,   No click, rub or gallop  S1 and S2 normal   Pedal pulses bounding on the left ankle    Abdomen: Old midline scar noted on ant abdominal wall   Soft, non-tender  Bowel sounds normal  No distension   Percussion note tympanitic   Extremities:  No cyanosis, jaundice  No edema  No sign of DVT/cord like lesion on palpation  No sign of acute trauma   Age appropriate OA changes noted. Skin:  Skin color, texture, turgor normal. no acute rash or lesions    Lymph nodes:     Musculoskeletal Muscle bulk B/L symmetrical   Neuro Hypophonia ? Weakness   Cranial nerves are intact,   motor movement b/l symmetrical,   Sensory evaluation b/l symmetrical    Psych:  Alert and oriented,             Home Meds     Prior to Admission medications    Medication Sig Start Date End Date Taking? Authorizing Provider   carvedilol (COREG) 25 mg tablet Take 50 mg by mouth two (2) times daily (with meals). Yes Phys Other, MD   cloNIDine HCl (CATAPRES) 0.1 mg tablet Take 0.1 mg by mouth three (3) times daily. Yes Gulshan Moreno MD   escitalopram oxalate (LEXAPRO) 10 mg tablet Take 10 mg by mouth daily. Yes Gulshna Moreno MD   insulin NPH (HUMULIN N NPH U-100 INSULIN) 100 unit/mL injection 10 Units by SubCUTAneous route daily. Yes Gulshan Moreno MD   acetaminophen (TYLENOL) 500 mg tablet Take 1,000 mg by mouth every six (6) hours as needed for Pain. Yes Gulshan Moreno MD   VIRT-CAPS 1 mg capsule TAKE ONE CAPSULE BY MOUTH DAILY 6/12/18  Yes Jesús Saavedra MD   lisinopril (PRINIVIL, ZESTRIL) 40 mg tablet TAKE 1 T PO  DAILY 4/5/18  Yes Jesús Saavedra MD   insulin glargine (LANTUS,BASAGLAR) 100 unit/mL (3 mL) inpn 10 units at bedtime 3/12/18  Yes Jesús Saavedra MD   sevelamer carbonate (RENVELA) 800 mg tab tab Take 800 mg by mouth three (3) times daily. Yes Historical Provider   aspirin 81 mg chewable tablet Take 1 tablet by mouth daily. 1/5/15  Yes Dania Corcoran MD   amLODIPine (NORVASC) 10 mg tablet Take 10 mg by mouth daily. Yes Historical Provider   pravastatin (PRAVACHOL) 10 mg tablet Take 10 mg by mouth nightly. Yes Historical Provider       Relevant other informations: Other medical conditions listed in this following active hospital problem list section; all of these and other pertinent data were taken into consideration when treatment plan is developed and customized to this patient's unique overall circumstances and needs.    Patient Active Problem List    Diagnosis Date Noted    Elevated troponin 06/29/2018    Pseudoaneurysm of iliac artery ([facility] 06/28/2018    Type 2 diabetes with nephropathy ([address] 03/01/2018    History of colonoscopy 03/29/2017    Diabetic oculomotor palsy ([address] 03/25/2016    Diabetic peripheral neuropathy associated with type 2 diabetes mellitus ([facility] 03/25/2016    Carpal tunnel syndrome on both sides 07/30/2015    Ulnar neuropathy at elbow of right upper extremity 07/30/2015    Ulnar neuropathy at elbow of left upper extremity 04/21/2015    Cervical stenosis of spinal canal 01/04/2015    Ataxia 01/04/2015    Multiple sclerosis ([facility] 01/04/2015    ESRD on hemodialysis ([facility] 12/30/2014    ASHD (arteriosclerotic heart disease) 04/11/2013    Mixed hyperlipidemia 04/11/2013    S/P coronary artery stent placement 02/18/2011    PAD (peripheral artery disease) ([facility] 12/09/2010    Hypertension 04/12/2010        Data Review:   Recent Days:  Recent Labs      06/29/18 2340 06/29/18   0247 06/28/18   1145   WBC  21.2*  23.2*  26.1*   HGB  7.5*  8.2*  10.9*   HCT  24.6*  26.0*  34.6*   PLT  227  216  270     Recent Labs      06/29/18 2340 06/29/18 0247 06/28/18   1145   NA  135*  137  135*   K  4.5  4.2  4.0   CL  99  100  97   CO2  23  27  27   GLU  127*  119*  127*   BUN  45*  36*  29*   CREA  8.68*  7.67*  6.84*   CA  8.1*  8.2*  9.3   MG   --   2.1   --    PHOS   --   4.3   --    ALB  1.7*   --   2.3*   TBILI  0.5   --   0.7   SGOT  48*   --   25   ALT  22   --   20     Lab Results   Component Value Date/Time    TSH 0.260 (L) 01/04/2018 12:29 PM     ______________________________________________________________________________________________________    Medications reviewed     Current Facility-Administered Medications   Medication Dose Route Frequency    ipratropium (ATROVENT) 0.02 % nebulizer solution 0.5 mg  0.5 mg Nebulization Q6H RT    senna-docusate (PERICOLACE) 8.6-50 mg per tablet 2 Tab  2 Tab Oral QHS    epoetin niya (EPOGEN;PROCRIT) injection 10,000 Units  10,000 Units SubCUTAneous Q MON, WED & FRI    famotidine (PEPCID) tablet 20 mg  20 mg Oral ACD    metroNIDAZOLE (FLAGYL) IVPB premix 500 mg  500 mg IntraVENous Q12H    aspirin delayed-release tablet 81 mg  81 mg Oral DAILY    heparin (porcine) injection 5,000 Units  5,000 Units SubCUTAneous Q8H    carvedilol (COREG) tablet 3.125 mg  3.125 mg Oral BID    piperacillin-tazobactam (ZOSYN) 3.375 g in 0.9% sodium chloride (MBP/ADV) 100 mL  3.375 g IntraVENous Q12H    naloxone (NARCAN) 0.4 mg/mL injection        insulin lispro (HUMALOG) injection   SubCUTAneous AC&HS    glucose chewable tablet 16 g  4 Tab Oral PRN    dextrose (D50W) injection syrg 12.5-25 g  12.5-25 g IntraVENous PRN    glucagon (GLUCAGEN) injection 1 mg  1 mg IntraMUSCular PRN    escitalopram oxalate (LEXAPRO) tablet 10 mg  10 mg Oral DAILY    sevelamer carbonate (RENVELA) tab 800 mg  800 mg Oral TID WITH MEALS    pravastatin (PRAVACHOL) tablet 10 mg  10 mg Oral QHS    morphine injection 2 mg  2 mg IntraVENous Q3H PRN    vancomycin (VANCOCIN) 750 mg in 0.9% sodium chloride (MBP/ADV) 250 mL  750 mg IntraVENous DIALYSIS PRN    Vancomycin 750 mg: give at end of dialysis on dialysis days   Other DAILY       _________________________________________________________________________________  Care Plan discussed with: Patient/Family, Nurse and Consultant Emery Lehman   ______________________________________________________________________________________________________    High complexity decision making was performed for this patient who is at high risk for decompensation with multiple organ involvement. Today total floor/unit time was 65  minutes while caring for this patient and greater than 50% of that time was spent with patient (and/or family) discussing patients clinical issues.     ________________________________________________________________________  Melissa Vilchis MD, MPH  FACP                               6/30/2018 9:17 AM   ________________________________________________________________________

## 2018-06-30 NOTE — PROGRESS NOTES
NAME: Corinne Obrien        :  1950        MRN:  [id number]        Assessment :    Plan:  --ESRD  Hypotension/sepsis  Left AVF  Anemia of CKD  SHPT  Usually HTN  DM   Right iliac pseudoaneuysm  Hypoxia/sob -Usually MWF at [facility] [place], but is off schedule this week (last HD on Thursday, ). HD today. UF as able, but I don't think his dyspnea/hypoxia is coming from pulmonary edema (none on cxr or on CTA - needs bronch? (debris in trachea)      He is on Parsabiv at outpatient unit; d/c'd Sensipar (cannot give with recent Parsabiv b/c risk of low Calcium); on Renvela     epo 10 k sc tiw. Prn prbc's.      Off all BP meds for now - SBP in 80's at times; albumin prn to facilitate UF       Subjective:     Chief Complaint:  Sob. Off and on flank pain. Feels a bit better now, but has a rough night (rapid response called). The patient was seen on dialysis at 10:27 AM .  BP is stable. Access is working well. Just starting HD. Review of Systems:    Symptom Y/N Comments  Symptom Y/N Comments   Fever/Chills    Chest Pain n    Poor Appetite y   Edema n    Cough    Abdominal Pain y    Sputum    Joint Pain     SOB/BANKS y   Pruritis/Rash     Nausea/vomit n   Tolerating PT/OT     Diarrhea    Tolerating Diet     Constipation    Other       Could not obtain due to:      Objective:     VITALS:   Last 24hrs VS reviewed since prior progress note. Most recent are:  Visit Vitals    /59    Pulse 71    Temp 97.8 °F (36.6 °C)    Resp 24    Ht 5' 10\" (1.778 m)    Wt 73.1 kg (161 lb 3.2 oz)    SpO2 92%    BMI 23.13 kg/m2       Intake/Output Summary (Last 24 hours) at 18 4702  Last data filed at 18 2310   Gross per 24 hour   Intake             1150 ml   Output                0 ml   Net             1150 ml      Telemetry Reviewed:     PHYSICAL EXAM:  General:  Alert, cooperative, no acute distress  Resp:  Rhonchi. No access.  muscle use  CV:  Regular  rhythm,  No murmur (), No Rubs, No Gallops. No edema  GI:  Soft, Non distended, Non tender.  +Bowel sounds, no HSM      Lab Data Reviewed: (see below)    Medications Reviewed: (see below)    PMH/SH reviewed - no change compared to H&P  ________________________________________________________________________  Care Plan discussed with:  Patient y    Family      RN y    Care Manager                    Consultant:          Comments   >50% of visit spent in counseling and coordination of care       ________________________________________________________________________  Ofelia Stanley MD     Procedures: see electronic medical records for all procedures/Xrays and details which  were not copied into this note but were reviewed prior to creation of Plan. LABS:  Recent Labs      06/29/18 2340 06/29/18   0247   WBC  21.2*  23.2*   HGB  7.5*  8.2*   HCT  24.6*  26.0*   PLT  227  216     Recent Labs      06/29/18 2340 06/29/18 0247  06/28/18   1145   NA  135*  137  135*   K  4.5  4.2  4.0   CL  99  100  97   CO2  23  27  27   BUN  45*  36*  29*   CREA  8.68*  7.67*  6.84*   GLU  127*  119*  127*   CA  8.1*  8.2*  9.3   MG   --   2.1   --    PHOS   --   4.3   --      Recent Labs      06/29/18 2340 06/28/18   1145   SGOT  48*  25   AP  151*  177*   TP  7.4  9.6*   ALB  1.7*  2.3*   GLOB  5.7*  7.3*   AML   --   28   LPSE   --   31*     No results for input(s): INR, PTP, APTT in the last 72 hours. No lab exists for component: INREXT   No results for input(s): FE, TIBC, PSAT, FERR in the last 72 hours. No results found for: FOL, RBCF   Recent Labs      06/29/18   2302   PH  7.38   PCO2  40   PO2  68*     No results for input(s): CPK, CKMB in the last 72 hours.     No lab exists for component: TROPONINI  No components found for: [place]  Lab Results   Component Value Date/Time    Color YELLOW/STRAW 12/30/2014 06:40 PM    Appearance CLEAR 12/30/2014 06:40 PM    Specific gravity 1.015 12/30/2014 06:40 PM    pH (UA) 7.5 12/30/2014 06:40 PM    Protein 300 (A) 12/30/2014 06:40 PM    Glucose >1000 (A) 12/30/2014 06:40 PM    Ketone NEGATIVE  12/30/2014 06:40 PM    Bilirubin NEGATIVE  12/30/2014 06:40 PM    Urobilinogen 0.2 12/30/2014 06:40 PM    Nitrites NEGATIVE  12/30/2014 06:40 PM    Leukocyte Esterase NEGATIVE  12/30/2014 06:40 PM    Epithelial cells FEW 12/30/2014 06:40 PM    Bacteria NEGATIVE  12/30/2014 06:40 PM    WBC 0-4 12/30/2014 06:40 PM    RBC 5-10 12/30/2014 06:40 PM       MEDICATIONS:  Current Facility-Administered Medications   Medication Dose Route Frequency    senna-docusate (PERICOLACE) 8.6-50 mg per tablet 2 Tab  2 Tab Oral QHS    epoetin niya (EPOGEN;PROCRIT) injection 10,000 Units  10,000 Units SubCUTAneous Q MON, WED & FRI    famotidine (PEPCID) tablet 20 mg  20 mg Oral ACD    metroNIDAZOLE (FLAGYL) IVPB premix 500 mg  500 mg IntraVENous Q12H    aspirin delayed-release tablet 81 mg  81 mg Oral DAILY    heparin (porcine) injection 5,000 Units  5,000 Units SubCUTAneous Q8H    carvedilol (COREG) tablet 3.125 mg  3.125 mg Oral BID    piperacillin-tazobactam (ZOSYN) 3.375 g in 0.9% sodium chloride (MBP/ADV) 100 mL  3.375 g IntraVENous Q12H    naloxone (NARCAN) 0.4 mg/mL injection        insulin lispro (HUMALOG) injection   SubCUTAneous AC&HS    glucose chewable tablet 16 g  4 Tab Oral PRN    dextrose (D50W) injection syrg 12.5-25 g  12.5-25 g IntraVENous PRN    glucagon (GLUCAGEN) injection 1 mg  1 mg IntraMUSCular PRN    escitalopram oxalate (LEXAPRO) tablet 10 mg  10 mg Oral DAILY    sevelamer carbonate (RENVELA) tab 800 mg  800 mg Oral TID WITH MEALS    pravastatin (PRAVACHOL) tablet 10 mg  10 mg Oral QHS    morphine injection 2 mg  2 mg IntraVENous Q3H PRN    vancomycin (VANCOCIN) 750 mg in 0.9% sodium chloride (MBP/ADV) 250 mL  750 mg IntraVENous DIALYSIS PRN    Vancomycin 750 mg: give at end of dialysis on dialysis days   Other DAILY

## 2018-06-30 NOTE — CONSULTS
GI Consultation Note Kierandeanneakin Brooks for Caridad)    NAME: Jose M Vargas : 1950 MRN: [id number]   PRIMARY GI: Dieudonne Wolf MD PCP: Estee Mcnamara MD  Date/Time:  2018 4:50 PM  Subjective:   REASON FOR CONSULT:    Aspiration/? Esophageal issue    Ector Cho is a 70 y.o. male who I was asked to see for above. Pt has been hospitalized for repair of an iliac artery pseudoaneurysm and a rapid response was called overnight on  because of hypoxia. CT with concerned that patient aspirated. Note that pt just underwent an EGD by Dr. Mclean on  that showed a completely normal esophagus. Prior EGD on  did show erosive esophagitis. No note of strictures requiring dilation on either EGD.        Past Medical History:   Diagnosis Date    Anemia associated with chronic renal failure     Autoimmune disease (HCC)     hx ms    CAD (coronary artery disease)     Chronic kidney disease     catheter removed and no dialysis at this time    Chronic kidney disease     left arm fistula dialysis MWF    Diabetes ([address]     type II    Elevated troponin 2018    GERD (gastroesophageal reflux disease)     Hypertension     Ill-defined condition     prostate cancer    Multiple sclerosis ([address]     diagnosed '01    Other ill-defined conditions(799.89)     anemia    Other ill-defined conditions(799.89)     elevated cholesterol    Other ill-defined conditions(799.89)     hx septic right foot    Peripheral vascular disease ([address]     Secondary hyperparathyroidism of renal origin ([address]     Thyroid disease       Past Surgical History:   Procedure Laterality Date    COLONOSCOPY N/A 2016    COLONOSCOPY [facility] END[facility]COPY    COLONOSCOPY,DIAGNOSTIC  2016         CORONARY STENT SINGLE W/PTCA  2011         HX APPENDECTOMY      HX CATARACT REMOVAL  10/18/10    bilateral    HX CHOLECYSTECTOMY      HX GI      ileostomy due to infection    HX HEART CATHETERIZATION  HX HEENT      hx post photocoagulation eye 2009    HX OTHER SURGICAL      right partial foot amputation    HX OTHER SURGICAL      cardiac cath    HX OTHER SURGICAL      prostate removed    NC COLONOSCOPY W/BIOPSY SINGLE/MULTIPLE  5/10/2010         UPPER GI ENDOSCOPY,BIOPSY  4/17/2018         UPPER GI ENDOSCOPY,REMV TUMOR,SNARE  6/13/2018         VASCULAR SURGERY PROCEDURE UNLIST      katelyn. leg bypasses     Social History   Substance Use Topics    Smoking status: Former Smoker     Years: 1.00     Quit date: 4/12/2003    Smokeless tobacco: Never Used      Comment: quit 5 years ago    Alcohol use No      Comment: Stopped drinking 10 years ago      Family History   Problem Relation Age of Onset    Diabetes Mother       Allergies   Allergen Reactions    Sensipar [Cinacalcet] Other (comments)     Cinacalcet (Sensipar) has been added as an \"allergy\" / intolerance with a comment to assure providers at discharge and post discharge are aware of Dr. Lock Roles recommendation to avoid Sensipar. Patient takes Olimpia Mikala as an outpatient; Thus, Henery Daunt has been discontinued per Nephrology (cannot give with recent Parsabiv b/c risk of low Calcium). Home Medications:  Prior to Admission Medications   Prescriptions Last Dose Informant Patient Reported? Taking? VIRT-CAPS 1 mg capsule 6/21/2018 at Unknown time Significant Other No Yes   Sig: TAKE ONE CAPSULE BY MOUTH DAILY   acetaminophen (TYLENOL) 500 mg tablet 6/28/2018 at 0630 Significant Other Yes Yes   Sig: Take 1,000 mg by mouth every six (6) hours as needed for Pain. amLODIPine (NORVASC) 10 mg tablet 6/21/2018 at Unknown time Significant Other Yes Yes   Sig: Take 10 mg by mouth daily. aspirin 81 mg chewable tablet 6/21/2018 at Unknown time Significant Other Yes Yes   Sig: Take 1 tablet by mouth daily. carvedilol (COREG) 25 mg tablet 6/21/2018 at Unknown time Significant Other Yes Yes   Sig: Take 50 mg by mouth two (2) times daily (with meals). cinacalcet (SENSIPAR) 60 mg tab   Yes Yes   Sig: Take 60 mg by mouth daily. Indications: CAUTION:  Patient is NOT to continue this medicaiton while Parsabiv drug therapy continues as an outpatient; Thus Cinacalcet (Sensipar) has been added as an INTOLERANCE ALLERGY   cloNIDine HCl (CATAPRES) 0.1 mg tablet 6/21/2018 at Unknown time Significant Other Yes Yes   Sig: Take 0.1 mg by mouth three (3) times daily. escitalopram oxalate (LEXAPRO) 10 mg tablet 6/21/2018 at Unknown time Significant Other Yes Yes   Sig: Take 10 mg by mouth daily. insulin NPH (HUMULIN N NPH U-100 INSULIN) 100 unit/mL injection 6/28/2018 at Unknown time Significant Other Yes Yes   Sig: 10 Units by SubCUTAneous route daily. insulin glargine (LANTUS,BASAGLAR) 100 unit/mL (3 mL) inpn 6/27/2018 at Unknown time Significant Other No Yes   Sig: 10 units at bedtime   lisinopril (PRINIVIL, ZESTRIL) 40 mg tablet 6/21/2018 at Unknown time Significant Other No Yes   Sig: TAKE 1 T PO  DAILY   pravastatin (PRAVACHOL) 10 mg tablet 6/21/2018 at Unknown time Significant Other Yes Yes   Sig: Take 10 mg by mouth nightly. sevelamer carbonate (RENVELA) 800 mg tab tab 6/21/2018 at Unknown time Significant Other Yes Yes   Sig: Take 800 mg by mouth three (3) times daily.       Facility-Administered Medications: None     Hospital medications:  Current Facility-Administered Medications   Medication Dose Route Frequency    albumin human 25% (BUMINATE) solution 12.5 g  12.5 g IntraVENous DIALYSIS PRN    morphine injection 2 mg  2 mg IntraVENous Q4H PRN    lactobac ac& pc-s.therm-b.anim (DAVIN Q/RISAQUAD)  1 Cap Oral DAILY    balsam peru-castor oil (VENELEX)  mg/gram ointment   Topical BID    guaiFENesin ER (MUCINEX) tablet 1,200 mg  1,200 mg Oral Q12H    acetylcysteine (MUCOMYST) 200 mg/mL (20 %) solution 400 mg  400 mg Nebulization Q6H RT    albuterol-ipratropium (DUO-NEB) 2.5 mg-0.5 mg/3 ml nebulizer solution        albuterol-ipratropium (DUO-NEB) 2.5 MG-0.5 MG/3 ML  3 mL Nebulization Q6H RT    pantoprazole (PROTONIX) 40 mg in sodium chloride 0.9% 10 mL injection  40 mg IntraVENous Q12H    senna-docusate (PERICOLACE) 8.6-50 mg per tablet 2 Tab  2 Tab Oral QHS    epoetin niya (EPOGEN;PROCRIT) injection 10,000 Units  10,000 Units SubCUTAneous Q MON, WED & FRI    metroNIDAZOLE (FLAGYL) IVPB premix 500 mg  500 mg IntraVENous Q12H    aspirin delayed-release tablet 81 mg  81 mg Oral DAILY    heparin (porcine) injection 5,000 Units  5,000 Units SubCUTAneous Q8H    carvedilol (COREG) tablet 3.125 mg  3.125 mg Oral BID    piperacillin-tazobactam (ZOSYN) 3.375 g in 0.9% sodium chloride (MBP/ADV) 100 mL  3.375 g IntraVENous Q12H    insulin lispro (HUMALOG) injection   SubCUTAneous AC&HS    glucose chewable tablet 16 g  4 Tab Oral PRN    dextrose (D50W) injection syrg 12.5-25 g  12.5-25 g IntraVENous PRN    glucagon (GLUCAGEN) injection 1 mg  1 mg IntraMUSCular PRN    escitalopram oxalate (LEXAPRO) tablet 10 mg  10 mg Oral DAILY    sevelamer carbonate (RENVELA) tab 800 mg  800 mg Oral TID WITH MEALS    pravastatin (PRAVACHOL) tablet 10 mg  10 mg Oral QHS    vancomycin (VANCOCIN) 750 mg in 0.9% sodium chloride (MBP/ADV) 250 mL  750 mg IntraVENous DIALYSIS PRN    Vancomycin 750 mg: give at end of dialysis on dialysis days   Other DAILY     REVIEW OF SYSTEMS:     []     Unable to obtain  ROS due to  []    mental status change  []    sedated   []    intubated   [x]    Total of 11 systems reviewed as follows:  Const:  negative fever, negative chills, negative weight loss  Eyes:   negative diplopia or visual changes, negative eye pain  ENT:   negative coryza, negative sore throat  :  negative for frequency, dysuria and hematuria  Skin:   negative for rash and pruritus  Heme:  negative for easy bruising and gum/nose bleeding  MS:  negative for myalgias, arthralgias, back pain and muscle weakness  Neurolo:  negative for headaches, dizziness, vertigo, memory problems   Psych:  negative for feelings of anxiety, depression     Pertinent Positives include :    Objective:   VITALS:    Visit Vitals    /51    Pulse 77  Comment: hemodialysis ended     Temp 97.8 °F (36.6 °C) (Oral)    Resp 20    Ht 5' 10\" (1.778 m)    Wt 73.1 kg (161 lb 3.2 oz)    SpO2 97%    BMI 23.13 kg/m2     Temp (24hrs), Av.8 °F (36.6 °C), Min:97.3 °F (36.3 °C), Max:98.2 °F (36.8 °C)    PHYSICAL EXAM:   General:    Alert, cooperative, no distress, appears stated age. Head:   Normocephalic, without obvious abnormality, atraumatic. Eyes:   Conjunctivae clear, anicteric sclerae. Pupils are equal  Nose:  Nares normal. No drainage or sinus tenderness. Throat:    Lips, mucosa, and tongue normal.  No Thrush  Neck:  Supple, symmetrical,  no adenopathy, thyroid: non tender  Back:    Symmetric,  No CVA tenderness. Lungs:   +rhonci  Chest wall:  No tenderness or deformity. No Accessory muscle use. Heart:   Regular rate and rhythm,  no murmur, rub or gallop. Abdomen:   Soft, non-tender. Not distended. Bowel sounds normal. No masses  Extremities: Atraumatic, No cyanosis. No edema. No clubbing  Skin:     Texture, turgor normal. No rashes/lesions/jaundice  Lymph: Cervical, supraclavicular normal.  Psych:  Good insight. Not depressed. Not anxious or agitated. Neurologic: EOMs intact. No facial asymmetry. No aphasia or slurred speech. Normal  strength, A/O X 3.      LAB DATA REVIEWED:    Recent Results (from the past 48 hour(s))   GLUCOSE, POC    Collection Time: 18  7:20 PM   Result Value Ref Range    Glucose (POC) 127 (H) 65 - 100 mg/dL    Performed by Miah Whatley (JIA)    GLUCOSE, POC    Collection Time: 18  8:37 PM   Result Value Ref Range    Glucose (POC) 121 (H) 65 - 100 mg/dL    Performed by Amy Funk, POC    Collection Time: 18 10:50 PM   Result Value Ref Range    Glucose (POC) 145 (H) 65 - 100 mg/dL    Performed by Jose M Schwartz    CBC WITH AUTOMATED DIFF    Collection Time: 06/29/18  2:47 AM   Result Value Ref Range    WBC 23.2 (H) 4.1 - 11.1 K/uL    RBC 3.01 (L) 4.10 - 5.70 M/uL    HGB 8.2 (L) 12.1 - 17.0 g/dL    HCT 26.0 (L) 36.6 - 50.3 %    MCV 86.4 80.0 - 99.0 FL    MCH 27.2 26.0 - 34.0 PG    MCHC 31.5 30.0 - 36.5 g/dL    RDW 17.2 (H) 11.5 - 14.5 %    PLATELET 534 872 - 741 K/uL    MPV 10.4 8.9 - 12.9 FL    NRBC 0.0 0  WBC    ABSOLUTE NRBC 0.00 0.00 - 0.01 K/uL    NEUTROPHILS 91 (H) 32 - 75 %    LYMPHOCYTES 4 (L) 12 - 49 %    MONOCYTES 3 (L) 5 - 13 %    EOSINOPHILS 0 0 - 7 %    BASOPHILS 0 0 - 1 %    IMMATURE GRANULOCYTES 2 (H) 0.0 - 0.5 %    ABS. NEUTROPHILS 21.1 (H) 1.8 - 8.0 K/UL    ABS. LYMPHOCYTES 0.9 0.8 - 3.5 K/UL    ABS. MONOCYTES 0.7 0.0 - 1.0 K/UL    ABS. EOSINOPHILS 0.0 0.0 - 0.4 K/UL    ABS. BASOPHILS 0.0 0.0 - 0.1 K/UL    ABS. IMM.  GRANS. 0.5 (H) 0.00 - 0.04 K/UL    DF SMEAR SCANNED      RBC COMMENTS BASOPHILIC STIPPLING  PRESENT        RBC COMMENTS POLYCHROMASIA  1+       METABOLIC PANEL, BASIC    Collection Time: 06/29/18  2:47 AM   Result Value Ref Range    Sodium 137 136 - 145 mmol/L    Potassium 4.2 3.5 - 5.1 mmol/L    Chloride 100 97 - 108 mmol/L    CO2 27 21 - 32 mmol/L    Anion gap 10 5 - 15 mmol/L    Glucose 119 (H) 65 - 100 mg/dL    BUN 36 (H) 6 - 20 MG/DL    Creatinine 7.67 (H) 0.70 - 1.30 MG/DL    BUN/Creatinine ratio 5 (L) 12 - 20      GFR est AA 9 (L) >60 ml/min/1.73m2    GFR est non-AA 7 (L) >60 ml/min/1.73m2    Calcium 8.2 (L) 8.5 - 10.1 MG/DL   MAGNESIUM    Collection Time: 06/29/18  2:47 AM   Result Value Ref Range    Magnesium 2.1 1.6 - 2.4 mg/dL   PHOSPHORUS    Collection Time: 06/29/18  2:47 AM   Result Value Ref Range    Phosphorus 4.3 2.6 - 4.7 MG/DL   TROPONIN I    Collection Time: 06/29/18  2:47 AM   Result Value Ref Range    Troponin-I, Qt. 1.80 (H) <0.05 ng/mL   GLUCOSE, POC    Collection Time: 06/29/18  9:06 AM   Result Value Ref Range    Glucose (POC) 124 (H) 65 - 100 mg/dL    Performed by Lorrie Henson GLUCOSE, POC    Collection Time: 06/29/18 11:28 AM   Result Value Ref Range    Glucose (POC) 117 (H) 65 - 100 mg/dL    Performed by Oral Burch    TROPONIN I    Collection Time: 06/29/18 11:49 AM   Result Value Ref Range    Troponin-I, Qt. 1.73 (H) <0.05 ng/mL   CULTURE, BLOOD    Collection Time: 06/29/18 11:49 AM   Result Value Ref Range    Special Requests: NO SPECIAL REQUESTS      Culture result: NO GROWTH AFTER 20 HOURS     GLUCOSE, POC    Collection Time: 06/29/18  4:35 PM   Result Value Ref Range    Glucose (POC) 113 (H) 65 - 100 mg/dL    Performed by Shady Hodge (PCT)    GLUCOSE, POC    Collection Time: 06/29/18  8:35 PM   Result Value Ref Range    Glucose (POC) 125 (H) 65 - 100 mg/dL    Performed by [address] [place] [address], ARTERIAL    Collection Time: 06/29/18 11:02 PM   Result Value Ref Range    pH 7.38 7.35 - 7.45      PCO2 40 35.0 - 45.0 mmHg    PO2 68 (L) 80 - 100 mmHg    O2 SAT 93 92 - 97 %    BICARBONATE 23 22 - 26 mmol/L    BASE DEFICIT 1.7 mmol/L    O2 METHOD NASAL O2      O2 FLOW RATE 4.00 L/min    SPONTANEOUS RATE 12.0      Sample source ARTERIAL      SITE RIGHT BRACHIAL      MANSOOR'S TEST N/A     CBC WITH AUTOMATED DIFF    Collection Time: 06/29/18 11:40 PM   Result Value Ref Range    WBC 21.2 (H) 4.1 - 11.1 K/uL    RBC 2.77 (L) 4.10 - 5.70 M/uL    HGB 7.5 (L) 12.1 - 17.0 g/dL    HCT 24.6 (L) 36.6 - 50.3 %    MCV 88.8 80.0 - 99.0 FL    MCH 27.1 26.0 - 34.0 PG    MCHC 30.5 30.0 - 36.5 g/dL    RDW 17.2 (H) 11.5 - 14.5 %    PLATELET 902 221 - 697 K/uL    MPV 10.7 8.9 - 12.9 FL    NRBC 0.0 0  WBC    ABSOLUTE NRBC 0.00 0.00 - 0.01 K/uL    NEUTROPHILS 90 (H) 32 - 75 %    LYMPHOCYTES 4 (L) 12 - 49 %    MONOCYTES 6 5 - 13 %    EOSINOPHILS 0 0 - 7 %    BASOPHILS 0 0 - 1 %    IMMATURE GRANULOCYTES 0 %    ABS. NEUTROPHILS 19.1 (H) 1.8 - 8.0 K/UL    ABS. LYMPHOCYTES 0.8 0.8 - 3.5 K/UL    ABS. MONOCYTES 1.3 (H) 0.0 - 1.0 K/UL    ABS. EOSINOPHILS 0.0 0.0 - 0.4 K/UL    ABS.  BASOPHILS 0.0 0.0 - 0.1 K/UL    ABS. IMM. GRANS. 0.0 K/UL    DF MANUAL      RBC COMMENTS ANISOCYTOSIS  1+       METABOLIC PANEL, COMPREHENSIVE    Collection Time: 06/29/18 11:40 PM   Result Value Ref Range    Sodium 135 (L) 136 - 145 mmol/L    Potassium 4.5 3.5 - 5.1 mmol/L    Chloride 99 97 - 108 mmol/L    CO2 23 21 - 32 mmol/L    Anion gap 13 5 - 15 mmol/L    Glucose 127 (H) 65 - 100 mg/dL    BUN 45 (H) 6 - 20 MG/DL    Creatinine 8.68 (H) 0.70 - 1.30 MG/DL    BUN/Creatinine ratio 5 (L) 12 - 20      GFR est AA 7 (L) >60 ml/min/1.73m2    GFR est non-AA 6 (L) >60 ml/min/1.73m2    Calcium 8.1 (L) 8.5 - 10.1 MG/DL    Bilirubin, total 0.5 0.2 - 1.0 MG/DL    ALT (SGPT) 22 12 - 78 U/L    AST (SGOT) 48 (H) 15 - 37 U/L    Alk.  phosphatase 151 (H) 45 - 117 U/L    Protein, total 7.4 6.4 - 8.2 g/dL    Albumin 1.7 (L) 3.5 - 5.0 g/dL    Globulin 5.7 (H) 2.0 - 4.0 g/dL    A-G Ratio 0.3 (L) 1.1 - 2.2     TROPONIN I    Collection Time: 06/29/18 11:40 PM   Result Value Ref Range    Troponin-I, Qt. 1.65 (H) <0.05 ng/mL   NT-PRO BNP    Collection Time: 06/29/18 11:40 PM   Result Value Ref Range    NT pro-BNP >13259 (H) 0 - 125 PG/ML   AMMONIA    Collection Time: 06/29/18 11:40 PM   Result Value Ref Range    Ammonia <10 <32 UMOL/L   LACTIC ACID    Collection Time: 06/29/18 11:40 PM   Result Value Ref Range    Lactic acid 1.3 0.4 - 2.0 MMOL/L   GLUCOSE, POC    Collection Time: 06/30/18  8:11 AM   Result Value Ref Range    Glucose (POC) 141 (H) 65 - 100 mg/dL    Performed by KHADAR SAUCEDO (PCT) CON    GLUCOSE, POC    Collection Time: 06/30/18 11:40 AM   Result Value Ref Range    Glucose (POC) 112 (H) 65 - 100 mg/dL    Performed by Dulce Izaguirre (PCT)      IMAGING RESULTS:   [x]      I have personally reviewed the actual   []    CXR  [x]    CT  []     US    Assessment/Plan:      Principal Problem:    Pseudoaneurysm of iliac artery ([facility] (6/28/2018)    Active Problems:    Hypertension (4/12/2010)      PAD (peripheral artery disease) ([facility] (12/9/2010)      S/P coronary artery stent placement (2/18/2011)      Overview: 2/17/11 PCI/CRISTI to RCA      ASHD (arteriosclerotic heart disease) (4/11/2013)      Mixed hyperlipidemia (4/11/2013)      ESRD on hemodialysis ([facility] (12/30/2014)      Ataxia (1/4/2015)      Multiple sclerosis ([facility] (1/4/2015)      Diabetic oculomotor palsy ([facility] (3/25/2016)      Diabetic peripheral neuropathy associated with type 2 diabetes mellitus ([facility] (3/25/2016)      Type 2 diabetes with nephropathy ([facility] (3/1/2018)      Elevated troponin (6/29/2018)    1. Aspiration event night of 6/29 with a RR  2. CT Chest with aspiration  3. EGD on 6/13 without any pathology in esophagus  4. EGD in 4/18 with erosive esophagitis but no strictures  5. Illiac artery pseudoaneurysm s/p stenting/repair  ___________________________________________________  RECOMMENDATIONS:    - recommend Speech pathology evaluation  - no plans for repeat EGD given pt just had an EGD 2 weeks ago with normal esophagus  - PPI q 12hr given prior EGD w/ erosive esophagitis    GI will see on request tomorrow.  Otherwise Dr. Callie Stoner will assume care on Monday 7/2    Discussed Code Status:    []    Full Code      []    DNR    ___________________________________________________  Care Plan discussed with:    [x]    Patient   [x]    Family   [x]    Nursing   []    Attending   ___________________________________________________  GI: Dulce Kapadia MD

## 2018-06-30 NOTE — PROGRESS NOTES
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  Vascular placed stent to occlude over the aneurysm today  Very lethargic through the course of the day, last percocet was 5 pm  Arousable and talking for me when asked, not particularly conversant  He denies complains, abdominal pain is controlled  No HA, CP, N/V, SOB, cough  One episode of diarrhea today    Patient Vitals for the past 24 hrs:   Temp Pulse Resp BP SpO2   06/29/18 2310 97.4 °F (36.3 °C) 70 11 94/51 96 %   06/29/18 2300 - 66 - (!) 89/46 -   06/29/18 2259 - 65 - (!) 89/46 -   06/29/18 2251 97.3 °F (36.3 °C) 66 13 99/49 93 %   06/29/18 2216 - 68 - (!) 87/43 -   06/29/18 2100 - 76 - 99/53 -   06/29/18 2000 - 80 - 120/60 -   06/29/18 1900 98.2 °F (36.8 °C) 81 18 105/52 98 %   06/29/18 1700 98 °F (36.7 °C) 98 20 122/52 98 %   06/29/18 1125 98 °F (36.7 °C) 83 16 109/46 95 %   06/29/18 0927 98 °F (36.7 °C) 87 16 114/55 -   06/29/18 0914 - 82 - (!) 88/48 -   06/29/18 0900 - 81 - 98/48 -   06/29/18 0800 98 °F (36.7 °C) 82 16 101/48 95 %   06/29/18 0500 - 81 - 98/49 -   06/29/18 0401 - 84 - 101/48 -   06/29/18 0300 97 °F (36.1 °C) 83 16 104/49 100 %   06/29/18 0200 - 86 - 103/50 -   06/29/18 0100 - 86 - 107/51 -   06/29/18 0000 98.3 °F (36.8 °C) 85 12 101/49 100 %   06/28/18 2350 - 86 - 101/49 -     CTA bilaterally  RRR, Nl S1/S2, no murmurs  Abdomen: soft, moderately tender in RLQ, ND, positive BS, no rebound  No LE edema  Alert, talking, follows commands    pCXR atelectasis left base    ABG 7.38/40/68 on 4 L NC    Labs reviewed    IMP  Acute encephalopathy with lethargy    Acute respiratory failure with hypoxia     Severe sepsis     Infected right iliac pseudoaneurysms    Elevated troponin, recent cath negative    ESRD     Plan:  IVF held by renal, ?  Some element of volume overload  Continue IV vanco and zosyn, unless C difficile a concern, can stop flagyl as zosyn has excellent anaerobic coverage  pCXR with no ASD or edema  CTA chest with elevated troponin, negative recent cath, worsening hypoxia and relatively clear CXR  Stat labs  Hold pain meds for now   Paged Dr Pablo Rodas on call, never got a call back      addendum  CTA chest FINDINGS:  THYROID: Unremarkable. MEDIASTINUM/ABISAI: No mass or lymphadenopathy. Debris within the trachea and  esophagus  HEART/PERICARDIUM: Unremarkable. PULMONARY ARTERIES:No pulmonary embolism. AORTA:  No aneurysm. LUNGS/PLEURA: Debris in the left lower lobe and lingular airways, with mild  airspace disease and bronchial wall thickening in the left lower lobe. There is  right basilar atelectasis. There is a 12 mm groundglass opacity nodule in the  right lower lobe. INCIDENTALLY IMAGED UPPER ABDOMEN: No significant abnormality. BONES: No destructive bone lesion. ADDITIONAL COMMENTS:  N/A  IMPRESSION:  1. No evidence of pulmonary embolus. 2. Debris within the trachea and esophagus as well as the lingular and left  lower lobe airways, with associated left basilar airspace disease. 3. Small right pleural effusion.   4. 12 mm groundglass nodule right lower lobe warrants close follow-up or PET  Scan.    ? Aspirating, would recommend speech consult and consider NPO  Continue antibiotics  Still hypoxic, ask renal to consider HD this Am as pBNP > 30,000  We were not formally consulted, if we can be of help, please consult as needed      Nupur Figueroa MD

## 2018-06-30 NOTE — PROGRESS NOTES
Patient feels generally puny   Flank pain much better  On dialysis now  Continue supportive care  Dr Stuart Zamudio to consider surgical exploration pending clinical course.

## 2018-06-30 NOTE — CONSULTS
[facility] Consult Service Note  Pulmonary, Critical Care, and Sleep Medicine    Name: Radha Douglas MRN: [id number]   : 1950 Hospital: [facility]   Date: 2018   Hospital Day: 3       Subjective/Interval History:   I have reviewed the notes from other providers and old records readily available and summarized findings below with the flowsheet. Seen earlier today on rounds. Excerpts from admission and consult notes reviewed as follows:     Hospital Problems  Date Reviewed: 2018          Codes Class Noted POA    Elevated troponin ICD-10-CM: R74.8  ICD-9-CM: 790.6  2018 Yes        * (Principal)Pseudoaneurysm of iliac artery (HCC) ICD-10-CM: I72.3  ICD-9-CM: 442.2  2018 Unknown        Type 2 diabetes with nephropathy ([facility] ICD-10-CM: E11.21  ICD-9-CM: 250.40, 583.81  3/1/2018 Yes        Diabetic oculomotor palsy ([facility] ICD-10-CM: E11.39, H49.00  ICD-9-CM: 250.50, 378.51  3/25/2016 Yes        Diabetic peripheral neuropathy associated with type 2 diabetes mellitus ([facility] ICD-10-CM: E11.42  ICD-9-CM: 250.60, 357.2  3/25/2016 Yes        Ataxia ICD-10-CM: R27.0  ICD-9-CM: 781.3  2015 Yes        Multiple sclerosis ([facility] ICD-10-CM: Lawerence Brace  ICD-9-CM: 361  2015 Yes        ESRD on hemodialysis ([facility] ICD-10-CM: N18.6, Z99.2  ICD-9-CM: 585.6, V45.11  2014 Yes        ASHD (arteriosclerotic heart disease) ICD-10-CM: I25.10  ICD-9-CM: 414.00  2013 Yes        Mixed hyperlipidemia ICD-10-CM: E78.2  ICD-9-CM: 272.2  2013 Yes        S/P coronary artery stent placement ICD-10-CM: Z95.5  ICD-9-CM: V45.82  2011 Yes    Overview Signed 2011  8:52 AM by Arnaud Hill NP     11 PCI/CRISTI to RCA             PAD (peripheral artery disease) ([facility] (Chronic) ICD-10-CM: I73.9  ICD-9-CM: 443.9  2010 Yes        Hypertension ICD-10-CM: I10  ICD-9-CM: 401.9  2010 Yes              IMPRESSION:   1.  Acute aspiration with retained secretions; minimal changes LLL on CXR but airway debris in trach and Left > right on CT scan  2. Acute respiratory failure with hypoxia better after cough and dialysis; pCXR atelectasis left base; ABG 7.38/40/68 on 4 L NC  3. Acute encephalopathy with lethargy  4. Sepsis but no shock  5. Infected right iliac pseudoaneurysms  6. Severe PAD  7. Elevated troponin, recent cath negative  8. ESRD on HD  9. Body mass index is 23.13 kg/(m^2). 10. Multiorgan dysfunction as outlined above: Pt has one or more acute or chronic illnesses with severe exacerbation with progression or side effects of treatment that poses a threat to life or bodily function  11. Additional workup outlined below  12. Pt is requiring Drug therapy requiring intensive monitoring for toxicity  13. Pt is unstable, unpredictable needing PCU/CCU monitoring; is critically ill and at high risk of sudden decline and decompensation with life threatening consequenses and continued end organ dysfunction and failure  14. Prognosis guarded with significant risk of sudden decline       RECOMMENDATIONS/PLAN:   1. PCU monitoring   2. Bronchodilators  3. aspiration precautions  4. CXR in AM  5. Bronchial hygiene with respiratory therapy techniques  6. Supplemental O2 to keep sats > 93%  7. Labs to follow electrolytes, renal function and and blood counts  8. Glucose monitoring and SSI  9. DVT, SUP prophylaxis  10. Pt needs IV fluids with additives and Drug therapy requiring intensive monitoring for toxicity  11. Prescription drug management with home med reconciliation reviewed          My assessment/management discussed with: Consultants, Nursing, Pharmacy, Case Management, PT, OT, Respiratory Therapy, Hospitalist and Family for coordination of care    Pt's condition is acute and unstable requiring inpatient hospitalization.  This care involved high complexity decision making which includes independently reviewing the patient's past medical records, current laboratory results, medication profiles that were immediately available to me and actual Xray images at the bedside in order to assess, support vital system function, and to treat this degree of vital organ system failure, and to prevent further deterioration of the patients condition. Risk of deterioration: medium and high   [x] High complexity decision making was performed  [x] See my orders for details  Tubes:       Subjective/Initial History:     I was asked by Carmen Hurd MD to see Milagros Burleson  a 70 y.o.  male in consultation for a chief complaint of acute respiratory ailure with hypoxia and increased congestion, work of breathing    Excerpts from RAT call by Charles Mcmahon and admission notes as follows:     \"Pt had RAT call, I was called to see pt by nursing supervisor less responsive, low sats, low BP. 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 Vascular placed stent to occlude over the aneurysm today. Very lethargic through the course of the day, last percocet was 5 pm. Arousable and talking for me when asked, not particularly conversant. He denies complains, abdominal pain is controlled. No HA, CP, N/V, SOB, cough. One episode of diarrhea today. \"   Spoke with PCU nursing. Ordered nebs, ABG reviewed. HD started and pt slowly improving  but is still very congested. Cannot clear thick sputum. No chest pain or fever. Recalls throwing up yesterday but nausea minimal now. No more abdominal pain. Remote ex smoker. Allergies   Allergen Reactions    Sensipar [Cinacalcet] Other (comments)     Cinacalcet (Sensipar) has been added as an \"allergy\" / intolerance with a comment to assure providers at discharge and post discharge are aware of Dr. Arturo Vines recommendation to avoid Sensipar. Patient takes Ronalee Ruffmarla as an outpatient; Thus, Mita Dheeraj has been discontinued per Nephrology (cannot give with recent Parsabiv b/c risk of low Calcium). MAR reviewed and pertinent medications noted or modified as needed     Current Facility-Administered Medications   Medication    ipratropium (ATROVENT) 0.02 % nebulizer solution 0.5 mg    albumin human 25% (BUMINATE) solution 12.5 g    morphine injection 2 mg    lactobac ac& pc-s.therm-b.anim (DAVIN Q/RISAQUAD)    senna-docusate (PERICOLACE) 8.6-50 mg per tablet 2 Tab    epoetin niya (EPOGEN;PROCRIT) injection 10,000 Units    famotidine (PEPCID) tablet 20 mg    metroNIDAZOLE (FLAGYL) IVPB premix 500 mg    aspirin delayed-release tablet 81 mg    heparin (porcine) injection 5,000 Units    carvedilol (COREG) tablet 3.125 mg    piperacillin-tazobactam (ZOSYN) 3.375 g in 0.9% sodium chloride (MBP/ADV) 100 mL    insulin lispro (HUMALOG) injection    glucose chewable tablet 16 g    dextrose (D50W) injection syrg 12.5-25 g    glucagon (GLUCAGEN) injection 1 mg    escitalopram oxalate (LEXAPRO) tablet 10 mg    sevelamer carbonate (RENVELA) tab 800 mg    pravastatin (PRAVACHOL) tablet 10 mg    vancomycin (VANCOCIN) 750 mg in 0.9% sodium chloride (MBP/ADV) 250 mL    Vancomycin 750 mg: give at end of dialysis on dialysis days      PMH:  has a past medical history of Anemia associated with chronic renal failure; Autoimmune disease ([address] CAD (coronary artery disease); Chronic kidney disease; Chronic kidney disease; Diabetes ([address] Elevated troponin (6/29/2018); GERD (gastroesophageal reflux disease); Hypertension; Ill-defined condition; Multiple sclerosis ([address] Other ill-defined conditions(799.89); Other ill-defined conditions(799.89); Other ill-defined conditions(799.89); Peripheral vascular disease ([address] Secondary hyperparathyroidism of renal origin [facility]); and Thyroid disease.   PSH:   has a past surgical history that includes pr colonoscopy w/biopsy single/multiple (5/10/2010); hx gi; coronary stent single w/ptca (2/17/2011); vascular surgery procedure unlist; hx appendectomy; hx cholecystectomy; hx heart catheterization; colonoscopy,biopsy (2016); colonoscopy (N/A, 2016); hx other surgical; hx other surgical; hx other surgical; upper gi endoscopy,biopsy (2018); upper gi endoscopy,remv tumor,snare (2018); hx heent; and hx cataract removal (10/18/10). FHX: family history includes Diabetes in his mother. SHX:  reports that he quit smoking about 15 years ago. He quit after 1.00 year of use. He has never used smokeless tobacco. He reports that he does not drink alcohol or use illicit drugs. ROS:A comprehensive review of systems was negative except for that written in the HPI. Objective:     Vital Signs: Telemetry:    normal sinus rhythm Intake/Output:   Visit Vitals    /51    Pulse 80  Comment: Cardiologist rounding     Temp 97.6 °F (36.4 °C)    Resp 20    Ht 5' 10\" (1.778 m)    Wt 73.1 kg (161 lb 3.2 oz)    SpO2 100%    BMI 23.13 kg/m2       Temp (24hrs), Av.8 °F (36.6 °C), Min:97.3 °F (36.3 °C), Max:98.2 °F (36.8 °C)        O2 Device: Nasal cannula O2 Flow Rate (L/min): 4 l/min       Wt Readings from Last 4 Encounters:   18 73.1 kg (161 lb 3.2 oz)   18 68 kg (150 lb)   18 70.3 kg (155 lb)   18 75.3 kg (166 lb)          Intake/Output Summary (Last 24 hours) at 18 1416  Last data filed at 18 2310   Gross per 24 hour   Intake             1050 ml   Output                0 ml   Net             1050 ml       Last shift:         Last 3 shifts:  1901 -  0700  In: 2178.3 [P.O.:150; I.V.:8.3]  Out: 110        Physical Exam:    General:   male; in no respiratory distress and acyanotic, alert and oriented times 3; room air   HEAD: Normocephalic, without obvious abnormality, atraumatic   EYES: conjunctivae clear. PERRL,  AN Icteric sclerae   NOSE: nares normal, no drainage, no nasal flaring,    THROAT: mucous membranes moist; Lips, mucosa dry;  No Thrush; class 4 airway; tongue midline   Neck: Supple, symmetrical, trachea midline,  No accessory mm use; No Stridor/ cuff leak, No goiter or thyroid tenderness   LYMPH: No abnormally enlarged lymph nodes. in neck or groin   Chest: normal   Lungs: rhonchi and poor cough, shallow efforts but no distress   Heart: Regular rate and rhythm; NO edema   Abdomen: soft, non-tender, without masses or organomegaly   : No Saravia    Musculoskeletal: NO kyphosis; Right foot amputation; no joint swelling or erythema   Neuro: alert; speech fluent ;withdraws to pain; unable to check gait and station; does follow simple commands   Psych: oriented to time, place and person; No agitation;  normal affect;    Skin: Warm;    Pulses:Bilateral, Radial, 2+   Capillary refill: normal; well perfused,    Data:     Lab results reviewed. For significant abnormal values and values requiring intervention, see assessment and plan.              Labs:    Recent Labs      06/29/18 2340 06/29/18 0247 06/28/18   1145   WBC  21.2*  23.2*  26.1*   HGB  7.5*  8.2*  10.9*   PLT  227  216  270     Recent Labs      06/29/18 2340 06/29/18   0247  06/28/18   1145   NA  135*  137  135*   K  4.5  4.2  4.0   CL  99  100  97   CO2  23  27  27   GLU  127*  119*  127*   BUN  45*  36*  29*   CREA  8.68*  7.67*  6.84*   CA  8.1*  8.2*  9.3   MG   --   2.1   --    PHOS   --   4.3   --    LAC  1.3   --    --    ALB  1.7*   --   2.3*   SGOT  48*   --   25   ALT  22   --   20   AML   --    --   28   LPSE   --    --   31*     Recent Labs      06/29/18   2302   PH  7.38   PCO2  40   PO2  68*   HCO3  23     Recent Labs      06/29/18   2340  06/29/18   1149  06/29/18   0247   TROIQ  1.65*  1.73*  1.80*     No results found for: BNPP, BNP   Lab Results   Component Value Date/Time    Culture result: NO GROWTH AFTER 20 HOURS 06/29/2018 11:49 AM    Culture result: NO GROWTH 6 DAYS 02/04/2018 02:38 AM    Culture result: NO SIGNIFICANT GROWTH 12/30/2014 06:40 PM     Lab Results   Component Value Date/Time    TSH 0.260 (L) 01/04/2018 12:29 PM       Imaging:          Results from Hospital Encounter encounter on 06/28/18   XR CHEST PORT   Narrative INDICATION:   respiratory distress    EXAM:  AP CHEST RADIOGRAPH    COMPARISON: Earlier today    FINDINGS:    AP portable view of the chest demonstrates a normal cardiomediastinal  silhouette. The lungs are adequately expanded. There is no edema, effusion,  consolidation, or pneumothorax. The osseous structures are unremarkable. Impression IMPRESSION:  No acute process. Results from [place] encounter on 06/28/18   CTA CHEST W OR W WO CONT   Narrative INDICATION:  unexplained hypoxia     EXAM:  CTA Chest with contrast for Pulmonary Embolus    COMPARISON:  None    TECHNIQUE:  Following the uneventful intravenous administration of 100 cc Isovue  992, thin helical axial images were obtained through the chest. 3D image  postprocessing was performed. CT dose reduction was achieved through use of a  standardized protocol tailored for this examination and automatic exposure  control for dose modulation. FINDINGS:    THYROID: Unremarkable. MEDIASTINUM/ABISAI: No mass or lymphadenopathy. Debris within the trachea and  esophagus  HEART/PERICARDIUM: Unremarkable. PULMONARY ARTERIES:No pulmonary embolism. AORTA:  No aneurysm. LUNGS/PLEURA: Debris in the left lower lobe and lingular airways, with mild  airspace disease and bronchial wall thickening in the left lower lobe. There is  right basilar atelectasis. There is a 12 mm groundglass opacity nodule in the  right lower lobe. INCIDENTALLY IMAGED UPPER ABDOMEN: No significant abnormality. BONES: No destructive bone lesion. ADDITIONAL COMMENTS:  N/A         Impression IMPRESSION:    1. No evidence of pulmonary embolus. 2. Debris within the trachea and esophagus as well as the lingular and left  lower lobe airways, with associated left basilar airspace disease. 3. Small right pleural effusion.   4. 12 mm groundglass nodule right lower lobe warrants close follow-up or PET  scan. I have personally and independently reviewed the patients interval and diagnostic data, radiographs and have reviewed the reports. I have ordered additional labs to follow the current medical conditions and to monitor treatment responses over the next 24 hours or sooner if needed. If the pt needs,  additional imaging will be obtained to follow longitudinal changes found on the most current imaging.       Medical Decision Making Today:  · Reviewed the flowsheet and previous days notes  · Reviewed and summarized records or history from previous days note or discussions with staff, family  · Parenteral controlled substances - Reviewed/ Adjusted / Arlon Guerrero / Started  · High Risk Drug therapy requiring intensive monitoring for toxicity: eg steroids, pressors, antibiotics  · Reviewed and/or ordered Clinical lab tests  · Reviewed and/or ordered Radiology tests  · Reviewed and/or ordered of Medicine tests  · Independently visualized radiologic Images  · I have personally reviewed the patients ECG / Telemetry  ·  evaluated an Abrupt change in neurologic status    John Lopez MD

## 2018-06-30 NOTE — PROGRESS NOTES
[id number] Nasal trumpet (Nasal airway) placed in left nare. Suctioned patient and small amount of white thick secretions was obtained. SPO2 99 % on 5 LPM nasal cannula.

## 2018-06-30 NOTE — PROGRESS NOTES
AFlow rate (L/min) ADULT PROTOCOL: JET AEROSOL ASSESSMENT    Patient  London Mora     70 y.o.   male     6/30/2018  9:03 AM    Breath Sounds Pre Procedure: Right Breath Sounds: Diminished                               Left Breath Sounds: Diminished    Breath Sounds Post Procedure: Right Breath Sounds: Diminished                                 Left Breath Sounds: Diminished    Breathing pattern: Pre procedure Breathing Pattern: Regular          Post procedure Breathing Pattern: Regular    Heart Rate: Pre procedure Pulse: 75           Post procedure Pulse: 79    Resp Rate: Pre procedure Respirations: 16           Post procedure Respirations: 16            Cough: Pre procedure Cough: Non-productive               Post procedure Cough: Non-productive      Oxygen: O2 Device: Nasal cannula4 lpmFlow rate (L/min) 4 lpm     Changed: NO    SpO2: Pre procedure SpO2: 99 %   with oxygen              Post procedure SpO2: 95 %  with oxygen    Nebulizer Therapy: Current medications Aerosolized Medications: Ipratropium bromide      Changed: NO    Smoking History: former smoker    Problem List:   Patient Active Problem List   Diagnosis Code    Hypertension I10    PAD (peripheral artery disease) ([facility] I73.9    S/P coronary artery stent placement Z95.5    ASHD (arteriosclerotic heart disease) I25.10    Mixed hyperlipidemia E78.2    ESRD on hemodialysis ([facility]) N18.6, Z99.2    Cervical stenosis of spinal canal M48.02    Ataxia R27.0    Multiple sclerosis ([facility]) G35    Ulnar neuropathy at elbow of left upper extremity G56.22    Carpal tunnel syndrome on both sides G56.03    Ulnar neuropathy at elbow of right upper extremity G56.21    Diabetic oculomotor palsy ([facility]) E11.39, H49.00    Diabetic peripheral neuropathy associated with type 2 diabetes mellitus ([facility]) E11.42    History of colonoscopy Z98.890    Type 2 diabetes with nephropathy ([facility]) E11.21    Pseudoaneurysm of iliac artery ([facility]) I72.3    Elevated troponin R74.8 Respiratory Therapist: Stacy Ghotra, RT

## 2018-06-30 NOTE — PROGRESS NOTES
RRT called at [phone], arrived at 2252. Upon arrival patient's primary nurse Logan Gardner, RN gave brief update on patient's reason for admit, procedures performed during this hospitalization, and current status and reasons for concern. Upon arrival patient found to be drowsy, eyes open to voice and at times open to stimulus, following all commands, shallow respirations at 13-16 breaths per minute. Nurse unable to obtain pulse oximetry numbers via finger probe, finger probe switched out and move to another finger - value 98%. ABG drawn by RT and stat portable chest xray done. Dr. Danita Kimble called to patient's bedside for additional assessment and possible additional orders, physician ok with interventions already carried out, spoke with vascular surgeon on call, decision made to that patient can remain in current room, will follow up.

## 2018-07-01 NOTE — PROGRESS NOTES
Seems to have some increased WOB  Abd tender but stable  Hgb better. WBC better  Continue as is. Dr Hortencia Flanagan to reeval tomorrow.

## 2018-07-01 NOTE — PROGRESS NOTES
Hospitalist Progress Note    NAME: Jose Juan Belcher   :  1950   MRN:  [id number]       Assessment / Plan:  Assessment and plan:  Diabetes mellitus 2 on Insulin at home  Holding NPH considering NPO  SSI only for now    Sepsis (leukocytosis and hypotension earlier during admission)  Due to Aspiration PNA  ? Hypoxia (he was on 4L O2 but can't find O2 sat < 90 in the chart)  On IV Zosyn, Vancomycin and Flagyl  May only need Zosyn  Consider d/c other abx if ok with vascular surgery  Off O2  speech eval pending  NPO for now  Pulmonary and GI also seems to be on the case  CTA chest with Debris within the trachea and esophagus as well as the lingular and left lower lobe airways, with associated left basilar airspace disease. Small right pleural effusion. 12 mm groundglass nodule right lower lobe warrants close follow-up or PET  scan. ESRD  Nephrology is following for ongoing HD    Hyperlipidemia  On statins     Hypertension  Continue Coreg    Multiple sclerosis   ? 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     CAD hx, now presenting with no chest pain and nearly flat trend of troponin and marginally abnormal EKG with ST -T changes   agree with cardiologist   Conservative approach is ideal under the current circumstances.      ESRD / HD ~ 8 years   PAD   S/p remote partial (Syme) amputation of RIGHT foot.      Body mass index is 23.13 kg/(m^2). Full Code, Wife is POA     Subjective: Pt seen and examined at bedside. Breathing better.  Overnight events d/w RN     Chief Complaint / Reason for Physician Visit: f/u \"Aspiration PNA, ESRD, CAD, DM, HTN\"    Review of Systems:  Symptom Y/N Comments  Symptom Y/N Comments   Fever/Chills n   Chest Pain n    Poor Appetite    Edema     Cough y   Abdominal Pain n    Sputum    Joint Pain     SOB/BANKS y improving  Pruritis/Rash     Nausea/vomit    Tolerating PT/OT     Diarrhea    Tolerating Diet     Constipation    Other       Could NOT obtain due to:      Objective:     VITALS:   Last 24hrs VS reviewed since prior progress note. Most recent are:  Patient Vitals for the past 24 hrs:   Temp Pulse Resp BP SpO2   07/01/18 1124 98.8 °F (37.1 °C) 87 18 144/73 100 %   07/01/18 1000 - 87 - 138/89 -   07/01/18 0934 - 90 - 144/64 -   07/01/18 0800 - 84 - 144/64 -   07/01/18 0756 - - - - 100 %   07/01/18 0724 98.7 °F (37.1 °C) 85 20 148/71 -   07/01/18 0400 98.2 °F (36.8 °C) 82 20 141/60 99 %   07/01/18 0200 - 78 - 122/51 -   07/01/18 0000 99.8 °F (37.7 °C) 84 20 117/56 99 %   06/30/18 2246 - 86 - 127/55 -   06/30/18 2027 - - - - 99 %   06/30/18 2000 97.8 °F (36.6 °C) 84 20 130/53 100 %   06/30/18 1800 - - - 125/52 -   06/30/18 1600 97.7 °F (36.5 °C) 81 20 141/57 97 %   06/30/18 1457 - - - - 97 %   06/30/18 1401 - - - 124/51 -       Intake/Output Summary (Last 24 hours) at 07/01/18 1323  Last data filed at 06/30/18 2144   Gross per 24 hour   Intake              600 ml   Output                0 ml   Net              600 ml        PHYSICAL EXAM:  General: WD, WN. Alert, cooperative, no acute distress    EENT:  EOMI. Anicteric sclerae. MMM  Resp:  crackles, no wheezing or rales. No accessory muscle use  CV:  Regular  rhythm,  No edema  GI:  Soft, Non distended, Non tender.  +Bowel sounds  Neurologic:  Alert and oriented X 3, normal speech,   Psych:   Good insight. Not anxious nor agitated  Skin:  No rashes.   No jaundice    Reviewed most current lab test results and cultures  YES  Reviewed most current radiology test results   YES  Review and summation of old records today    NO  Reviewed patient's current orders and MAR    YES  PMH/SH reviewed - no change compared to H&P  ________________________________________________________________________  Care Plan discussed with:    Comments   Patient y    Family RN y    Care Manager     Consultant                        Multidiciplinary team rounds were held today with , nursing, pharmacist and clinical coordinator. Patient's plan of care was discussed; medications were reviewed and discharge planning was addressed. ________________________________________________________________________  Total NON critical care TIME:  35  Minutes    Total CRITICAL CARE TIME Spent:   Minutes non procedure based      Comments   >50% of visit spent in counseling and coordination of care     ________________________________________________________________________  Noemi Patel MD     Procedures: see electronic medical records for all procedures/Xrays and details which were not copied into this note but were reviewed prior to creation of Plan. LABS:  I reviewed today's most current labs and imaging studies.   Pertinent labs include:  Recent Labs      07/01/18   0439  06/29/18   2340  06/29/18   0247   WBC  15.8*  21.2*  23.2*   HGB  7.9*  7.5*  8.2*   HCT  25.5*  24.6*  26.0*   PLT  239  227  216     Recent Labs      07/01/18   0439  06/29/18   2340  06/29/18   0247   NA  137  135*  137   K  4.0  4.5  4.2   CL  100  99  100   CO2  27  23  27   GLU  78  127*  119*   BUN  27*  45*  36*   CREA  5.75*  8.68*  7.67*   CA  7.3*  8.1*  8.2*   MG   --    --   2.1   PHOS   --    --   4.3   ALB   --   1.7*   --    TBILI   --   0.5   --    SGOT   --   48*   --    ALT   --   22   --        Signed: Noemi Patel MD

## 2018-07-01 NOTE — PROGRESS NOTES
NAME: Corinne Obrien        :  1950        MRN:  [id number]        Assessment :    Plan:  --ESRD  Hypotension/sepsis  Left AVF  Anemia of CKD  SHPT  Usually HTN  DM   Right iliac pseudoaneuysm  Hypoxia/sob Usually MWF at [facility], but is off schedule this week. s/p HD . HD again tomorrow.      He is on Parsabiv at outpatient unit; d/c'd Sensipar (cannot give with recent Parsabiv b/c risk of low Calcium); on Renvela     epo 10 k sc tiw. Prn prbc's.      Most BP meds on hold       Subjective:     Chief Complaint:  SOB is better. Much better after HD/UF. Denies pain. Review of Systems:    Symptom Y/N Comments  Symptom Y/N Comments   Fever/Chills    Chest Pain n    Poor Appetite y   Edema n    Cough    Abdominal Pain     Sputum    Joint Pain     SOB/BANKS    Pruritis/Rash     Nausea/vomit n   Tolerating PT/OT     Diarrhea    Tolerating Diet     Constipation    Other       Could not obtain due to:      Objective:     VITALS:   Last 24hrs VS reviewed since prior progress note. Most recent are:  Visit Vitals    /60 (BP 1 Location: Right arm, BP Patient Position: At rest)    Pulse 82    Temp 98.2 °F (36.8 °C)    Resp 20    Ht 5' 10\" (1.778 m)    Wt 72.4 kg (159 lb 9.6 oz)    SpO2 99%    BMI 22.9 kg/m2       Intake/Output Summary (Last 24 hours) at 18 0717  Last data filed at 18 2144   Gross per 24 hour   Intake              850 ml   Output             2000 ml   Net            -1150 ml      Telemetry Reviewed:     PHYSICAL EXAM:  General:  Alert, cooperative, no acute distress  Resp:  Rhonchi. No access. muscle use  CV:  Regular  rhythm,  No murmur (), No Rubs, No Gallops.  No edema  GI:  Soft, Non distended, Non tender.  +Bowel sounds, no HSM      Lab Data Reviewed: (see below)    Medications Reviewed: (see below)    PMH/SH reviewed - no change compared to H&P  ________________________________________________________________________  Care Plan discussed with:  Patient y    Family      RN y    Care Manager                    Consultant:          Comments   >50% of visit spent in counseling and coordination of care       ________________________________________________________________________  Morro Curtis MD     Procedures: see electronic medical records for all procedures/Xrays and details which  were not copied into this note but were reviewed prior to creation of Plan. LABS:  Recent Labs      07/01/18 0439 06/29/18   2340   WBC  15.8*  21.2*   HGB  7.9*  7.5*   HCT  25.5*  24.6*   PLT  239  227     Recent Labs      07/01/18   0439 06/29/18   2340 06/29/18   0247   NA  137  135*  137   K  4.0  4.5  4.2   CL  100  99  100   CO2  27  23  27   BUN  27*  45*  36*   CREA  5.75*  8.68*  7.67*   GLU  78  127*  119*   CA  7.3*  8.1*  8.2*   MG   --    --   2.1   PHOS   --    --   4.3     Recent Labs      06/29/18   2340  06/28/18   1145   SGOT  48*  25   AP  151*  177*   TP  7.4  9.6*   ALB  1.7*  2.3*   GLOB  5.7*  7.3*   AML   --   28   LPSE   --   31*     No results for input(s): INR, PTP, APTT in the last 72 hours. No lab exists for component: INREXT, INREXT   No results for input(s): FE, TIBC, PSAT, FERR in the last 72 hours. No results found for: FOL, RBCF   Recent Labs      06/29/18   2302   PH  7.38   PCO2  40   PO2  68*     No results for input(s): CPK, CKMB in the last 72 hours.     No lab exists for component: TROPONINI  No components found for: [place]  Lab Results   Component Value Date/Time    Color YELLOW/STRAW 12/30/2014 06:40 PM    Appearance CLEAR 12/30/2014 06:40 PM    Specific gravity 1.015 12/30/2014 06:40 PM    pH (UA) 7.5 12/30/2014 06:40 PM    Protein 300 (A) 12/30/2014 06:40 PM    Glucose >1000 (A) 12/30/2014 06:40 PM    Ketone NEGATIVE  12/30/2014 06:40 PM    Bilirubin NEGATIVE  12/30/2014 06:40 PM    Urobilinogen 0.2 12/30/2014 06:40 PM    Nitrites NEGATIVE  12/30/2014 06:40 PM    Leukocyte Esterase NEGATIVE  12/30/2014 06:40 PM    Epithelial cells FEW 12/30/2014 06:40 PM    Bacteria NEGATIVE  12/30/2014 06:40 PM    WBC 0-4 12/30/2014 06:40 PM    RBC 5-10 12/30/2014 06:40 PM       MEDICATIONS:  Current Facility-Administered Medications   Medication Dose Route Frequency    albumin human 25% (BUMINATE) solution 12.5 g  12.5 g IntraVENous DIALYSIS PRN    morphine injection 2 mg  2 mg IntraVENous Q4H PRN    lactobac ac& pc-s.therm-b.anim (DAVIN Q/RISAQUAD)  1 Cap Oral DAILY    balsam peru-castor oil (VENELEX)  mg/gram ointment   Topical BID    guaiFENesin ER (MUCINEX) tablet 1,200 mg  1,200 mg Oral Q12H    acetylcysteine (MUCOMYST) 200 mg/mL (20 %) solution 400 mg  400 mg Nebulization Q6H RT    albuterol-ipratropium (DUO-NEB) 2.5 MG-0.5 MG/3 ML  3 mL Nebulization Q6H RT    pantoprazole (PROTONIX) 40 mg in sodium chloride 0.9% 10 mL injection  40 mg IntraVENous Q12H    senna-docusate (PERICOLACE) 8.6-50 mg per tablet 2 Tab  2 Tab Oral QHS    epoetin niya (EPOGEN;PROCRIT) injection 10,000 Units  10,000 Units SubCUTAneous Q MON, WED & FRI    metroNIDAZOLE (FLAGYL) IVPB premix 500 mg  500 mg IntraVENous Q12H    aspirin delayed-release tablet 81 mg  81 mg Oral DAILY    heparin (porcine) injection 5,000 Units  5,000 Units SubCUTAneous Q8H    carvedilol (COREG) tablet 3.125 mg  3.125 mg Oral BID    piperacillin-tazobactam (ZOSYN) 3.375 g in 0.9% sodium chloride (MBP/ADV) 100 mL  3.375 g IntraVENous Q12H    insulin lispro (HUMALOG) injection   SubCUTAneous AC&HS    glucose chewable tablet 16 g  4 Tab Oral PRN    dextrose (D50W) injection syrg 12.5-25 g  12.5-25 g IntraVENous PRN    glucagon (GLUCAGEN) injection 1 mg  1 mg IntraMUSCular PRN    escitalopram oxalate (LEXAPRO) tablet 10 mg  10 mg Oral DAILY    sevelamer carbonate (RENVELA) tab 800 mg  800 mg Oral TID WITH MEALS    pravastatin (PRAVACHOL) tablet 10 mg  10 mg Oral QHS  vancomycin (VANCOCIN) 750 mg in 0.9% sodium chloride (MBP/ADV) 250 mL  750 mg IntraVENous DIALYSIS PRN    Vancomycin 750 mg: give at end of dialysis on dialysis days   Other DAILY

## 2018-07-01 NOTE — PROGRESS NOTES
Issues regarding possible aspiration noted  Breathing comfortably now and CXR clear  abd benign on exam this afternoon  Afebrile  WBC normalizing    No indication for operative intervention at present    Plan Monday:    1. Dialysis  2. Speech/swallowing eval  3. Repeat CTA abd/pelvis  4. PT/OT

## 2018-07-01 NOTE — PROGRESS NOTES
Heparin Dosing     Indication:  DVT ppx    Heparin 5,000 units SQ q8h has been adjusted to 5,000units SQ q12h due to patient's renal function/HD orders.     Thank you,  Donald Hoffman, [facility]

## 2018-07-01 NOTE — PROGRESS NOTES
Consistently asking for food and wants to drink despite explaining risks several times. Visible hiccupping and gasps for air after drinking thickened water with meds. Held all large pills today. Sputum culture and stool sample needed. No bowel movement and unable to cough up any mucus.

## 2018-07-01 NOTE — PROGRESS NOTES
ADULT PROTOCOL: JET AEROSOL  REASSESSMENT    Patient  Yamilex Carrizales     70 y.o.   male     7/1/2018  9:30 AM    Breath Sounds Pre Procedure: Right Breath Sounds: Diminished                               Left Breath Sounds: Diminished    Breath Sounds Post Procedure: Right Breath Sounds: Diminished                                 Left Breath Sounds: Diminished    Breathing pattern: Pre procedure Breathing Pattern: Regular          Post procedure Breathing Pattern: Regular    Heart Rate: Pre procedure Pulse: 84           Post procedure Pulse: 85    Resp Rate: Pre procedure Respirations: 16           Post procedure Respirations: 16      Cough: Pre procedure Cough: Non-productive               Post procedure Cough: Non-productive      Oxygen: O2 Device: Room air   room air     Changed: NO    SpO2: Pre procedure SpO2: 100 %   without oxygen              Post procedure SpO2: 100 %  without oxygen    Nebulizer Therapy: Current medications Aerosolized Medications: DuoNeb, Mucomyst      Changed: NO    Smoking History: former smoker    Problem List:   Patient Active Problem List   Diagnosis Code    Hypertension I10    PAD (peripheral artery disease) ([facility]) I73.9    S/P coronary artery stent placement Z95.5    ASHD (arteriosclerotic heart disease) I25.10    Mixed hyperlipidemia E78.2    ESRD on hemodialysis ([facility]) N18.6, Z99.2    Cervical stenosis of spinal canal M48.02    Ataxia R27.0    Multiple sclerosis ([facility]) G35    Ulnar neuropathy at elbow of left upper extremity G56.22    Carpal tunnel syndrome on both sides G56.03    Ulnar neuropathy at elbow of right upper extremity G56.21    Diabetic oculomotor palsy ([facility]) E11.39, H49.00    Diabetic peripheral neuropathy associated with type 2 diabetes mellitus ([facility]) E11.42    History of colonoscopy Z98.890    Type 2 diabetes with nephropathy ([facility]) E11.21    Pseudoaneurysm of iliac artery ([facility]) I72.3    Elevated troponin R74.8       Respiratory Therapist: Susan Turner Colgin, RT

## 2018-07-02 NOTE — PROGRESS NOTES
Problem: Dysphagia (Adult)  Goal: *Acute Goals and Plan of Care (Insert Text)  7/2/2018  Speech path goals:  1. Pt will participate with MBS as medically necessary. Speech LAnguage Pathology bedside swallow evaluation  Patient: Allie Mortensen (82 y.o. male)  Date: 7/2/2018  Primary Diagnosis: ILIAC ANEURYSM  Pseudoaneurysm of iliac artery (HCC)  VASCULAR INSUFFICIENCY  Procedure(s) (LRB):  RIGHT LEG ANGIOGRAM AND ILIAC ANEURYSM REPAIR (Right) 4 Days Post-Op   Precautions:      ASSESSMENT :  Based on the objective data described below, the patient presents with mild oropharyngeal dysphagia. Mildly slow oral phase and slow posterior propulsion. Pharyngeal phase was characterized by mildly reduced hyolaryngeal excursion. Of note after taking a few sips of water, he reported he felt like he was going to vomit and hiccups started. He continue to eat crackers and applesauce and hiccups continued. He has a very weak cough. And his vocal quality is mildly hoarse. He has GERD and MS which can both be associated with hiccups. He reported that food sticks in his chest. This seems possible that he is having esophageal dysphagia. Aware that Dr. Ronal Addison did an EGD but did not see a barium swallow was done. The BS can look at motility, GERD and esophageal emptying. Dr. lFor Lin is agreeable to barium swallow. Patient will benefit from skilled intervention to address the above impairments. Patients rehabilitation potential is considered to be Good  Factors which may influence rehabilitation potential include:   []            None noted  [x]            Mental ability/status  [x]            Medical condition  [x]            Home/family situation and support systems  []            Safety awareness  []            Pain tolerance/management  []            Other:      PLAN :  Recommendations and Planned Interventions:  Recommend barium swallow.    Frequency/Duration: Patient will be followed by speech-language pathology 4 times a week to address goals. Discharge Recommendations: None     SUBJECTIVE:   Patient stated he felt like throwing up after a few sips of water.      OBJECTIVE:     Past Medical History:   Diagnosis Date    Anemia associated with chronic renal failure     Autoimmune disease (HCC)     hx ms    CAD (coronary artery disease)     Chronic kidney disease     catheter removed and no dialysis at this time    Chronic kidney disease     left arm fistula dialysis MWF    Diabetes ([facility]     type II    Elevated troponin 6/29/2018    GERD (gastroesophageal reflux disease)     Hypertension     Ill-defined condition     prostate cancer    Multiple sclerosis ([address]     diagnosed '01    Other ill-defined conditions(799.89)     anemia    Other ill-defined conditions(799.89)     elevated cholesterol    Other ill-defined conditions(799.89)     hx septic right foot    Peripheral vascular disease ([address]     Secondary hyperparathyroidism of renal origin ([address]     Thyroid disease      Past Surgical History:   Procedure Laterality Date    COLONOSCOPY N/A 12/6/2016    COLONOSCOPY performed [facility]gr[facility]OSCOPY    COLONOSCOPY,DIAGNOSTIC  12/6/2016         CORONARY STENT SINGLE W/PTCA  2/17/2011         HX APPENDECTOMY      HX CATARACT REMOVAL  10/18/10    bilateral    HX CHOLECYSTECTOMY      HX GI      ileostomy due to infection    HX HEART CATHETERIZATION      HX HEENT      hx post photocoagulation eye 2009    HX OTHER SURGICAL      right partial foot amputation    HX OTHER SURGICAL      cardiac cath    HX OTHER SURGICAL      prostate removed    PA COLONOSCOPY W/BIOPSY SINGLE/MULTIPLE  5/10/2010         UPPER GI ENDOSCOPY,BIOPSY  4/17/2018         UPPER GI ENDOSCOPY,REMV TUMOR,SNARE  6/13/2018         VASCULAR SURGERY PROCEDURE UNLIST      katelyn. leg bypasses     Prior Level of Function/Home Situation:   Home Situation  Home Environment: Private residence  One/Two Story Residence: One story  Living Alone: No  Support Systems: Child(ben), Spouse/Significant Other/Partner  Patient Expects to be Discharged to[de-identified] Private residence  Current DME Used/Available at Home: None  Diet prior to admission:   Current Diet:  NPO since aspiration event    Cognitive and Communication Status:     Orientation Level: Oriented to person              Oral Assessment:  Oral Assessment  Labial: No impairment  Dentition: Full;Natural  Lingual:  (reduced ROM)  Velum: No impairment  Mandible: No impairment  P.O. Trials:  Patient Position: upright in kbed  Vocal quality prior to P.O.: Hoarse  Consistency Presented: Puree; Solid; Thin liquid  How Presented: Self-fed/presented;Cup/sip     Bolus Acceptance: No impairment  Bolus Formation/Control: Impaired  Type of Impairment: Delayed  Propulsion: Delayed (# of seconds)  Oral Residue: None  Initiation of Swallow: Delayed (# of seconds)  Laryngeal Elevation: Decreased  Aspiration Signs/Symptoms: None  Pharyngeal Phase Characteristics: No impairment, issues, or problems              Oral Phase Severity: Mild  Pharyngeal Phase Severity : Mild    NOMS:   The NOMS functional outcome measure was used to quantify this patient's level of swallowing impairment. Based on the NOMS, the patient was determined to be at level 6 for swallow function     G Codes: In compliance with CMSs Claims Based Outcome Reporting, the following G-code set was chosen for this patient based the use of the NOMS functional outcome to quantify this patient's level of swallowing impairment. Using the NOMS, the patient was determined to be at level 6 for swallow function which correlates with the CI= 1-19% level of severity.     Based on the objective assessment provided within this note, the current, goal, and discharge g-codes are as follows:    Swallow  Swallowing:   Swallow Current Status CI= 1-19%   Swallow Goal Status CI= 1-19%        NOMS Swallowing Levels:  Level 1 (CN): NPO  Level 2 (CM): NPO but takes consistency in therapy  Level 3 (CL): Takes less than 50% of nutrition p.o. and continues with nonoral feedings; and/or safe with mod cues; and/or max diet restriction  Level 4 (CK): Safe swallow but needs mod cues; and/or mod diet restriction; and/or still requires some nonoral feeding/supplements  Level 5 (CJ): Safe swallow with min diet restriction; and/or needs min cues  Level 6 (CI): Independent with p.o.; rare cues; usually self cues; may need to avoid some foods or needs extra time  Level 7 ([address] [place] [address]): Independent for all p.o.  MONISHA. (2003). National Outcomes Measurement System (NOMS): Adult Speech-Language Pathology User's Guide. Pain:  Pain Scale 1: Numeric (0 - 10)  Pain Intensity 1: 0     After treatment:   []            Patient left in no apparent distress sitting up in chair  [x]            Patient left in no apparent distress in bed  [x]            Call bell left within reach  []            Nursing notified  []            Caregiver present  []            Bed alarm activated    COMMUNICATION/EDUCATION:   The patients plan of care including recommendations, planned interventions, and recommended diet changes were discussed with: Registered Nurse. []            Posted safety precautions in patient's room. [x]            Patient/family have participated as able in goal setting and plan of care. []            Patient/family agree to work toward stated goals and plan of care. []            Patient understands intent and goals of therapy, but is neutral about his/her participation. []            Patient is unable to participate in goal setting and plan of care.     Thank you for this referral.  AWAIS Ramos  Time Calculation: 10 mins

## 2018-07-02 NOTE — PROGRESS NOTES
Renal-out of room at time of rounds earlier today. Plan for HD today. See Dr. Carley Brittle note from yesterday.   Stacy Ivan MD

## 2018-07-02 NOTE — PROGRESS NOTES
CM attempted to complete initial assessment for pt but pt was asleep while receiving bedside dialysis. CM will contact family to complete.      Joshua Dior, MSW, [address] [place] [address]   [phone]

## 2018-07-02 NOTE — PROGRESS NOTES
No c/o  Feels well  Anxious to eat  Afebrile  WBC con't to normalize    Repeat CTA looks a lot better. There is still some surrounding density layered around iliac artery and stent grafts but no gas or air and no evidence of abscess. May well be bland. Nothing to do but treat expectantly for now.     Swallowing test this am  Up with PT

## 2018-07-02 NOTE — PROGRESS NOTES
Hospitalist Progress Note    NAME: Moise Burton   :  1950   MRN:  [id number]       Assessment / Plan:  Assessment and plan:  Diabetes mellitus 2 on Insulin at home  Holding NPH considering NPO  SSI only for now    Sepsis (leukocytosis and hypotension earlier during admission)  Due to Aspiration PNA  ? Hypoxia (he was on 4L O2 but can't find O2 sat < 90 in the chart)  On IV Zosyn, Vancomycin and Flagyl  May only need Zosyn  Consider d/c other abx if ok with vascular surgery  Off O2  speech eval ongoing, s/p barium swallow results pending  NPO for now  Pulmonary and GI also seems to be on the case  Per GI pt had normal EGD 2 weeks ago, symptoms consistent with dysphagia, meds held today as couldn't even swallow meds  May need PEG placement  CTA chest with Debris within the trachea and esophagus as well as the lingular and left lower lobe airways, with associated left basilar airspace disease. Small right pleural effusion. 12 mm groundglass nodule right lower lobe warrants close follow-up or PET  scan. ESRD  Nephrology is following for ongoing HD    Hyperlipidemia  On statins, will give if able to    Hypertension  Switch Coreg to IV BB with parametes  Add PRN nitropaste    Multiple sclerosis   ? 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     CAD hx, now presenting with no chest pain and nearly flat trend of troponin and marginally abnormal EKG with ST -T changes   agree with cardiologist   Conservative approach is ideal under the current circumstances.      ESRD / HD ~ 8 years   PAD   S/p remote partial (Syme) amputation of RIGHT foot.      Body mass index is 23.13 kg/(m^2). Full Code, Wife is POA     Subjective: Pt seen and examined at bedside. Breathing better, still unable to swallow.  Overnight events d/w RN     Chief Complaint / Reason for Physician Visit: f/u \"Aspiration PNA, ESRD, CAD, DM, HTN\"    Review of Systems:  Symptom Y/N Comments  Symptom Y/N Comments   Fever/Chills n   Chest Pain n    Poor Appetite    Edema     Cough y   Abdominal Pain n    Sputum    Joint Pain     SOB/BANKS y improving  Pruritis/Rash     Nausea/vomit    Tolerating PT/OT     Diarrhea    Tolerating Diet     Constipation    Other y Difficulty swallowing     Could NOT obtain due to:      Objective:     VITALS:   Last 24hrs VS reviewed since prior progress note. Most recent are:  Patient Vitals for the past 24 hrs:   Temp Pulse Resp BP SpO2   07/02/18 0755 98 °F (36.7 °C) 81 20 159/68 99 %   07/02/18 0400 97.6 °F (36.4 °C) 76 20 141/65 99 %   07/02/18 0000 98 °F (36.7 °C) 73 18 140/66 99 %   07/01/18 2200 - 76 - 138/62 -   07/01/18 1929 - - - - 100 %   07/01/18 1908 97.8 °F (36.6 °C) 77 18 138/61 99 %   07/01/18 1501 97.8 °F (36.6 °C) 82 18 146/68 100 %   07/01/18 1342 - - - - 100 %       Intake/Output Summary (Last 24 hours) at 07/02/18 1321  Last data filed at 07/01/18 1909   Gross per 24 hour   Intake              300 ml   Output                0 ml   Net              300 ml        PHYSICAL EXAM:  General: WD, WN. Alert, cooperative, no acute distress    EENT:  EOMI. Anicteric sclerae. MMM  Resp:  crackles, no wheezing or rales. No accessory muscle use  CV:  Regular  rhythm,  No edema  GI:  Soft, Non distended, Non tender.  +Bowel sounds  Neurologic:  Alert and oriented X 3, normal speech,   Psych:   Good insight. Not anxious nor agitated  Skin:  No rashes.   No jaundice    Reviewed most current lab test results and cultures  YES  Reviewed most current radiology test results   YES  Review and summation of old records today    NO  Reviewed patient's current orders and MAR    YES  PMH/SH reviewed - no change compared to H&P  ________________________________________________________________________  Care Plan discussed with:    Comments Patient y    Family      RN y    Care Manager     Consultant                        Multidiciplinary team rounds were held today with , nursing, pharmacist and clinical coordinator. Patient's plan of care was discussed; medications were reviewed and discharge planning was addressed. ________________________________________________________________________  Total NON critical care TIME:  30 Minutes    Total CRITICAL CARE TIME Spent:   Minutes non procedure based      Comments   >50% of visit spent in counseling and coordination of care     ________________________________________________________________________  Sergio Aceves MD     Procedures: see electronic medical records for all procedures/Xrays and details which were not copied into this note but were reviewed prior to creation of Plan. LABS:  I reviewed today's most current labs and imaging studies.   Pertinent labs include:  Recent Labs      07/02/18   0327  07/01/18   0439  06/29/18   2340   WBC  14.6*  15.8*  21.2*   HGB  7.8*  7.9*  7.5*   HCT  25.3*  25.5*  24.6*   PLT  250  239  227     Recent Labs      07/02/18   0327  07/01/18   0439  06/29/18   2340   NA  138  137  135*   K  4.3  4.0  4.5   CL  100  100  99   CO2  25  27  23   GLU  80  78  127*   BUN  35*  27*  45*   CREA  6.94*  5.75*  8.68*   CA  7.3*  7.3*  8.1*   ALB   --    --   1.7*   TBILI   --    --   0.5   SGOT   --    --   48*   ALT   --    --   22       Signed: Sergio Aceves MD

## 2018-07-02 NOTE — PROGRESS NOTES
CM was advised by [facility] representative they have beds available and will be in contact on 07.03.2018 to review pt.      CAROLYN Tabares, [address] [place] [address]   [phone]

## 2018-07-02 NOTE — PROGRESS NOTES
Problem: Self Care Deficits Care Plan (Adult)  Goal: *Acute Goals and Plan of Care (Insert Text)  Occupational Therapy Goals  Initiated 7/2/2018  1. Patient will perform grooming in supported sitting with supervision/set-up within 7 day(s). 2.  Patient will feed himself with supervision and cues as needed within 7 day(s). 3.  Patient will perform upper body dressing with minimal assistance within 7 day(s). 4.  Patient will participate in assessment of toilet transfer and establish goals as appropriate within 7 day(s). 5.  Patient will participate in upper extremity therapeutic exercise with minimal cues for 10 minutes within 7 day(s). Occupational Therapy EVALUATION  Patient: David Wade (16 y.o. male)  Date: 7/2/2018  Primary Diagnosis: ILIAC ANEURYSM  Pseudoaneurysm of iliac artery (HCC)  VASCULAR INSUFFICIENCY  Procedure(s) (LRB):  RIGHT LEG ANGIOGRAM AND ILIAC ANEURYSM REPAIR (Right) 4 Days Post-Op   Precautions:        ASSESSMENT :  Based on the objective data described below, the patient presents with significant deficits in all self-care, primarily due decreased B LE function, poor strength, decreased activity tolerance, and confusion/decreased safety. Per family, he was able to manage basic self-care tasks at a Mod I level as recently as last week. He uses a power w/c in the home, family indicates that he could complete toilet transfers to [facility] without difficulty, but that they assist him into the bathroom when he showers. He will benefit from skilled OT treatment to maximize independence and safety in ADL. Recommend inpatient rehab. Patient will benefit from skilled intervention to address the above impairments.   Patients rehabilitation potential is considered to be Good  Factors which may influence rehabilitation potential include:   []             None noted  []             Mental ability/status  []             Medical condition  []             Home/family situation and support systems  []             Safety awareness  []             Pain tolerance/management  []             Other:      PLAN :  Recommendations and Planned Interventions:  [x]               Self Care Training                  [x]        Therapeutic Activities  [x]               Functional Mobility Training    [x]        Cognitive Retraining  [x]               Therapeutic Exercises           [x]        Endurance Activities  [x]               Balance Training                   [x]        Neuromuscular Re-Education  []               Visual/Perceptual Training     [x]   Home Safety Training  [x]               Patient Education                 [x]        Family Training/Education  []               Other (comment):    Frequency/Duration: Patient will be followed by occupational therapy 5 times a week to address goals. Discharge Recommendations: Inpatient Rehab  Further Equipment Recommendations for Discharge: TBD     SUBJECTIVE:   Patient stated I can't move my legs.     OBJECTIVE DATA SUMMARY:   HISTORY:   Past Medical History:   Diagnosis Date    Anemia associated with chronic renal failure     Autoimmune disease (HCC)     hx ms    CAD (coronary artery disease)     Chronic kidney disease     catheter removed and no dialysis at this time    Chronic kidney disease     left arm fistula dialysis MWF    Diabetes ([facility]     type II    Elevated troponin 6/29/2018    GERD (gastroesophageal reflux disease)     Hypertension     Ill-defined condition     prostate cancer    Multiple sclerosis ([address]     diagnosed '01    Other ill-defined conditions(799.89)     anemia    Other ill-defined conditions(799.89)     elevated cholesterol    Other ill-defined conditions(799.89)     hx septic right foot    Peripheral vascular disease ([address]     Secondary hyperparathyroidism of renal origin ([address]     Thyroid disease      Past Surgical History:   Procedure Laterality Date    COLONOSCOPY N/A 12/6/2016    COLONOSCOPY performed by Tomas Daniels Francisco Abbott MD at [facility] ENDOSCOPY    COLONOSCOPY,DIAGNOSTIC  12/6/2016         CORONARY STENT SINGLE W/PTCA  2/17/2011         HX APPENDECTOMY      HX CATARACT REMOVAL  10/18/10    bilateral    HX CHOLECYSTECTOMY      HX GI      ileostomy due to infection    HX HEART CATHETERIZATION      HX HEENT      hx post photocoagulation eye 2009    HX OTHER SURGICAL      right partial foot amputation    HX OTHER SURGICAL      cardiac cath    HX OTHER SURGICAL      prostate removed    NE COLONOSCOPY W/BIOPSY SINGLE/MULTIPLE  5/10/2010         UPPER GI ENDOSCOPY,BIOPSY  4/17/2018         UPPER GI ENDOSCOPY,REMV TUMOR,SNARE  6/13/2018         VASCULAR SURGERY PROCEDURE UNLIST      katelyn. leg bypasses       Prior Level of Function/Environment/Context: see above; wife works during the day  Expanded or extensive additional review of patient history:     Home Situation  Home Environment: Private residence  Wheelchair Ramp: Yes  One/Two Story Residence: One story  Living Alone: No  Support Systems: Child(ben), Spouse/Significant Other/Partner  Patient Expects to be Discharged to[de-identified] Private residence  Current DME Used/Available at [facility]: Wheelchair, power, Walker, rolling, Tub transfer bench, Grab bars, Commode, bedside (R LE prosthesis)  Tub or Shower Type: Tub/Shower combination    Hand dominance: Right    EXAMINATION OF PERFORMANCE DEFICITS:  Cognitive/Behavioral Status:  Neurologic State: Alert;Confused  Orientation Level: Oriented to person  Cognition: Decreased attention/concentration; Follows commands     Perseveration: No perseveration noted       Hearing: Auditory  Auditory Impairment: None  Hearing Aids/Status: Does not own    Vision/Perceptual:    Not tested; will evaluate as indicated. Noted history of B cataract removal per chart.                                 Range of Motion:  B UE grossly within functional limits                            Strength:  B UE general decreased, but functional Coordination:     Fine Motor Skills-Upper: Left Impaired;Right Impaired    Gross Motor Skills-Upper: Left Intact; Right Intact (grossly)      Balance:  Unsafe to attempt at this time    Functional Mobility and Transfers for ADLs:  Bed Mobility:  Rolling: Total assistance;Assist x2    Transfers:  Unsafe to attempt at this time    ADL Assessment:  Feeding: Moderate assistance (due to fatigue; dysphagia)    Oral Facial Hygiene/Grooming: Moderate assistance    Bathing: Total assistance    Upper Body Dressing: Maximum assistance    Lower Body Dressing: Total assistance    Toileting: Total assistance                Functional Measure:  Barthel Index:    Bathin  Bladder: 0  Bowels: 0  Groomin  Dressin  Feedin  Mobility: 0  Stairs: 0  Toilet Use: 0  Transfer (Bed to Chair and Back): 0  Total: 0       Barthel and G-code impairment scale:  Percentage of impairment CH  0% CI  1-19% CJ  20-39% CK  40-59% CL  60-79% CM  80-99% CN  100%   Barthel Score 0-100 100 99-80 79-60 59-40 20-39 1-19   0   Barthel Score 0-20 20 17-19 13-16 9-12 5-8 1-4 0      The Barthel ADL Index: Guidelines  1. The index should be used as a record of what a patient does, not as a record of what a patient could do. 2. The main aim is to establish degree of independence from any help, physical or verbal, however minor and for whatever reason. 3. The need for supervision renders the patient not independent. 4. A patient's performance should be established using the best available evidence. Asking the patient, friends/relatives and nurses are the usual sources, but direct observation and common sense are also important. However direct testing is not needed. 5. Usually the patient's performance over the preceding 24-48 hours is important, but occasionally longer periods will be relevant. 6. Middle categories imply that the patient supplies over 50 per cent of the effort. 7. Use of aids to be independent is allowed.     Teodora Amin F.l., Barthel DAedW. (1965). Functional evaluation: the Barthel Index. [facility] (14)2. RYLAN Godinez, Wanda Yee, [place], [address] (1999). Measuring the change indisability after inpatient rehabilitation; comparison of the responsiveness of the Barthel Index and Functional [place] Measure. Journal of Neurology, Neurosurgery, and Psychiatry, 66(4), 914-846. DEWAYNE Kelley, JARRELL Lord, & Marino Giles MANDREIA. (2004.) Assessment of post-stroke quality of life in cost-effectiveness studies: The usefulness of the Barthel Index and the EuroQoL-5D. Quality of Life Research, 13, 779-59       G codes: In compliance with CMSs Claims Based Outcome Reporting, the following G-code set was chosen for this patient based on their primary functional limitation being treated: The outcome measure chosen to determine the severity of the functional limitation was the Barthel Index with a score of 0/100 which was correlated with the impairment scale. ? Self Care:     - CURRENT STATUS: CN - 100% impaired, limited or restricted    - GOAL STATUS: CM - 80%-99% impaired, limited or restricted    - D/C STATUS:  ---------------To be determined---------------     Pain:  Pain Scale 1: Numeric (0 - 10)  Pain Intensity 1: 0              Activity Tolerance:   After treatment:   [] Patient left in no apparent distress sitting up in chair  [x] Patient left in no apparent distress in bed  [] Call bell left within reach  [x] Nursing notified  [x] Caregiver present  [] Bed alarm activated    COMMUNICATION/EDUCATION:   The patients plan of care was discussed with: Physical Therapist, Speech Therapist and Registered Nurse. [x] Home safety education was provided and the patient/caregiver indicated understanding. [] Patient/family have participated as able in goal setting and plan of care. [x] Patient/family agree to work toward stated goals and plan of care.   [] Patient understands intent and goals of therapy, but is neutral about his/her participation. [] Patient is unable to participate in goal setting and plan of care. This patients plan of care is appropriate for delegation to DESIREE.     Thank you for this referral.  Anh Pacheco, OTR/L  Time Calculation: 18 mins

## 2018-07-02 NOTE — CONSULTS
IP CONSULT TO NEUROLOGY  Consult performed by: Tess Olszewski  Consult ordered by: Bib Lin            NEUROLOGY CONSULT    NAME Jocelyn Fuentes AGE 70 y.o. MRN [id number]  1950     REQUESTING PHYSICIAN: Rachel Lam MD      CHIEF COMPLAINT:  dysphagia     This is a 70 y.o. male with a medical history of multiple sclerosis non compliant with treatment and follow up. He is admitted after suffering hypoxic events that are attributed to aspiration. MRI  and 2016 show rather significant disease burden with a sizeable lesion in the mercedes that may responsible for his dysphagia. ASSESSMENT AND PLAN     1. Multiple sclerosis  Non compliant with treatment and follow up. Does not want an MRI. Would at least get a head CT. I don't recommend treatment beyond aspirin at this time. His MS has not been active in the years of his MRI and is likely to be less active as he ages. This most likely is worsening of his preexisting deficits as \"secondary progressive\" MS. As he is not interested in treatment there is nothing to offer at this poin and I'm not sure that the potential benefit of steroids in absence of maintenance therapy is worth the risk. Will follow up on CT    2. ESRD on hemodialysis ([facility] [address]  On dialysis    3. Dysphagia  May need a feeding tube if he continues to aspirate. 4. Diabetes  S/p chopart amputation right foot    5. Peripheral vascular disease    6. Hyperlipidemia   continue pravstatin    7.  Aspiration pneumonia  On zosyn        ALLERGIES:  Sensipar [cinacalcet]     Current Facility-Administered Medications   Medication Dose Route Frequency    sodium chloride (NS) flush 10 mL  10 mL IntraVENous RAD ONCE    sodium chloride (NS) flush        metoprolol (LOPRESSOR) injection 1.25 mg  1.25 mg IntraVENous Q6H    nitroglycerin (NITROBID) 2 % ointment 1 Inch  1 Inch Topical Q6H PRN    ondansetron (ZOFRAN) injection 4 mg  4 mg IntraVENous Q4H PRN    heparin (porcine) injection 5,000 Units 5,000 Units SubCUTAneous Q12H    albumin human 25% (BUMINATE) solution 12.5 g  12.5 g IntraVENous DIALYSIS PRN    morphine injection 2 mg  2 mg IntraVENous Q4H PRN    lactobac ac& pc-s.therm-b.anim (DAVIN Q/RISAQUAD)  1 Cap Oral DAILY    balsam peru-castor oil (VENELEX)  mg/gram ointment   Topical BID    guaiFENesin ER (MUCINEX) tablet 1,200 mg  1,200 mg Oral Q12H    acetylcysteine (MUCOMYST) 200 mg/mL (20 %) solution 400 mg  400 mg Nebulization Q6H RT    albuterol-ipratropium (DUO-NEB) 2.5 MG-0.5 MG/3 ML  3 mL Nebulization Q6H RT    pantoprazole (PROTONIX) 40 mg in sodium chloride 0.9% 10 mL injection  40 mg IntraVENous Q12H    senna-docusate (PERICOLACE) 8.6-50 mg per tablet 2 Tab  2 Tab Oral QHS    epoetin niya (EPOGEN;PROCRIT) injection 10,000 Units  10,000 Units SubCUTAneous Q MON, WED & FRI    metroNIDAZOLE (FLAGYL) IVPB premix 500 mg  500 mg IntraVENous Q12H    aspirin delayed-release tablet 81 mg  81 mg Oral DAILY    piperacillin-tazobactam (ZOSYN) 3.375 g in 0.9% sodium chloride (MBP/ADV) 100 mL  3.375 g IntraVENous Q12H    insulin lispro (HUMALOG) injection   SubCUTAneous AC&HS    glucose chewable tablet 16 g  4 Tab Oral PRN    dextrose (D50W) injection syrg 12.5-25 g  12.5-25 g IntraVENous PRN    glucagon (GLUCAGEN) injection 1 mg  1 mg IntraMUSCular PRN    escitalopram oxalate (LEXAPRO) tablet 10 mg  10 mg Oral DAILY    sevelamer carbonate (RENVELA) tab 800 mg  800 mg Oral TID WITH MEALS    pravastatin (PRAVACHOL) tablet 10 mg  10 mg Oral QHS    vancomycin (VANCOCIN) 750 mg in 0.9% sodium chloride (MBP/ADV) 250 mL  750 mg IntraVENous DIALYSIS PRN    Vancomycin 750 mg: give at end of dialysis on dialysis days   Other DAILY       Past Medical History:   Diagnosis Date    Anemia associated with chronic renal failure     Autoimmune disease (HCC)     hx ms    CAD (coronary artery disease)     Chronic kidney disease     catheter removed and no dialysis at this time    Chronic kidney disease     left arm fistula dialysis MWF    Diabetes ([facility] [address]     type II    Elevated troponin 6/29/2018    GERD (gastroesophageal reflux disease)     Hypertension     Ill-defined condition     prostate cancer    Multiple sclerosis ([facility] [address]     diagnosed '01    Other ill-defined conditions(799.89)     anemia    Other ill-defined conditions(799.89)     elevated cholesterol    Other ill-defined conditions(799.89)     hx septic right foot    Peripheral vascular disease ([facility] [address]     Secondary [facility] renal [facility]     Thyroid disease        Social History   Substance Use Topics    Smoking status: Former Smoker     Years: 1.00     Quit date: 4/12/2003    Smokeless tobacco: Never Used      Comment: quit 5 years ago    Alcohol use No      Comment: Stopped drinking 10 years ago       Family History   Problem Relation Age of Onset    Diabetes Mother      Review of Systems   Constitutional: Positive for malaise/fatigue. Negative for chills and fever. HENT: Negative for ear pain. Eyes: Negative for pain and discharge. Respiratory: Positive for sputum production and shortness of breath. Negative for cough and hemoptysis. Cardiovascular: Negative for chest pain and claudication. Gastrointestinal: Negative for constipation and diarrhea. Genitourinary: Negative for flank pain and hematuria. Musculoskeletal: Positive for myalgias. Negative for back pain. Skin: Negative for itching and rash. Neurological: Positive for focal weakness and weakness. Negative for headaches. Endo/Heme/Allergies: Negative for environmental allergies. Does not bruise/bleed easily. Psychiatric/Behavioral: Negative for depression and hallucinations. Visit Vitals    BP (!) 148/38    Pulse 79    Temp 97.5 °F (36.4 °C) (Oral)    Resp 12    Ht 5' 10\" (1.778 m)    Wt 159 lb 9.6 oz (72.4 kg)    SpO2 100%    BMI 22.9 kg/m2      General: Well developed, well nourished. In moderate discomfort. Undergoing dialysis   Head: Normocephalic, atraumatic, anicteric sclera   Neck Normal ROM, No thyromegally   Lungs:  Distant sounds    Cardiac: Regular rate and rhythm with no murmurs. Abd: Bowel sounds were audible. No tenderness on palpation   Ext: No pedal edema   Skin: Supple no rash     NeurologicExam:  Mental Status: Alert and oriented to person place and time   Speech: Fluent no aphasia. Dysarthric. Cranial Nerves:  II - XII Intact with the exception of dysphagia poor gag on the right    Motor:  4/5 upper extremities 2/5 lower extremities with bilateral ulnar wasting    Reflexes:   Deep tendon reflexes 0+/4 and symmetric. Sensory:   Symmetric distal reduction in sensory perception affecting all modalitiess. Gait:  Bed bound. Tremor:   No tremor noted. Neurovascular: No carotid bruits. No JVD       REVIEWED IMAGING:    MRI :    Results from Hospital Encounter encounter on 03/21/16   MRA NECK WO CONT   Narrative **Final Report**      ICD Codes / Adm. Diagnosis: 784.0  473.2 / Headache(784.0)  Chronic ethmoidal   sinusitis  Examination:  MR NECK MRA  CON  - [id number] - Mar 21 2016 10:06PM  Accession No:  [id number]  Reason:  CVA      REPORT:  INDICATION:  CVA     MRA NECK with contrast    TECHNIQUE:    Unenhanced 3-D time-of-flight MR imaging of the neck is performed. Multiplanar 3D reconstructions performed. cc IV Magnevist.    FINDINGS:  Carotid bifurcations show no irregularity or stenosis. Right carotid artery stenosis: 0%  Left  carotid artery stenosis: 0%    NASCET criteria were utilized for calculating stenosis. The vertebral arteries are codominant and patent. IMPRESSION:  Normal neck MRA.              Signing/Reading Doctor: Genie Guadalupe ([id number])    Approved: Genie Guadalupe ([id number])  Mar 21 2016 10:42PM                                   CT:    Results from Hospital Encounter encounter on 06/28/18   CTA CHEST W OR W WO CONT   Narrative INDICATION:  unexplained hypoxia EXAM:  CTA Chest with contrast for Pulmonary Embolus    COMPARISON:  None    TECHNIQUE:  Following the uneventful intravenous administration of 100 cc Isovue  192, thin helical axial images were obtained through the chest. 3D image  postprocessing was performed. CT dose reduction was achieved through use of a  standardized protocol tailored for this examination and automatic exposure  control for dose modulation. FINDINGS:    THYROID: Unremarkable. MEDIASTINUM/ABISAI: No mass or lymphadenopathy. Debris within the trachea and  esophagus  HEART/PERICARDIUM: Unremarkable. PULMONARY ARTERIES:No pulmonary embolism. AORTA:  No aneurysm. LUNGS/PLEURA: Debris in the left lower lobe and lingular airways, with mild  airspace disease and bronchial wall thickening in the left lower lobe. There is  right basilar atelectasis. There is a 12 mm groundglass opacity nodule in the  right lower lobe. INCIDENTALLY IMAGED UPPER ABDOMEN: No significant abnormality. BONES: No destructive bone lesion. ADDITIONAL COMMENTS:  N/A         Impression IMPRESSION:    1. No evidence of pulmonary embolus. 2. Debris within the trachea and esophagus as well as the lingular and left  lower lobe airways, with associated left basilar airspace disease. 3. Small right pleural effusion. 4. 12 mm groundglass nodule right lower lobe warrants close follow-up or PET  scan.             REVIEWED LABS:  Lab Results   Component Value Date/Time    WBC 14.6 (H) 07/02/2018 03:27 AM    HCT 25.3 (L) 07/02/2018 03:27 AM    HGB 7.8 (L) 07/02/2018 03:27 AM    PLATELET 425 78/45/1256 03:27 AM     Lab Results   Component Value Date/Time    Sodium 138 07/02/2018 03:27 AM    Potassium 4.3 07/02/2018 03:27 AM    Chloride 100 07/02/2018 03:27 AM    CO2 25 07/02/2018 03:27 AM    Glucose 80 07/02/2018 03:27 AM    BUN 35 (H) 07/02/2018 03:27 AM    Creatinine 6.94 (H) 07/02/2018 03:27 AM    Calcium 7.3 (L) 07/02/2018 03:27 AM     Lab Results   Component Value Date/Time    Vitamin B12 542 01/01/2015 03:10 AM     Lab Results   Component Value Date/Time    LDL, calculated 39 02/02/2018 05:32 AM     Lab Results   Component Value Date/Time    Hemoglobin A1c 4.9 06/28/2018 11:45 AM    Hemoglobin A1c (POC) 6.3 03/01/2018 02:51 PM       Patient is in critical condition and is at risk for sudden deterioration.  45 minutes spent on floor examining patient and reviewing chart

## 2018-07-02 NOTE — PROGRESS NOTES
CM left voicemail for Ms. Matthew Weinstein ([phone]) requesting return call to complete initial assessment for pt.      Sergio Zacarias, MSW, [address] [place] [address]   [phone]

## 2018-07-02 NOTE — DIABETES MGMT
DTC Progress Note    Recommendations/ Comments:Please consider changing correctional insulin to high sensitivity given pt's weight, current BG and sepsis. Current hospital DM medication: humalog correction normal sensitivity    Chart reviewed on [facility] Avery. Patient is a 70 y.o. male with known Type 2 Diabetes on NPH 10 units daily and lantus 10 units nightly at home. A1c:   Lab Results   Component Value Date/Time    Hemoglobin A1c 4.9 06/28/2018 11:45 AM    Hemoglobin A1c 5.5 12/11/2010 09:20 AM       Recent Glucose Results: Lab Results   Component Value Date/Time    GLU 80 07/02/2018 03:27 AM    GLUCPOC 90 07/02/2018 04:15 PM    GLUCPOC 84 07/02/2018 01:21 PM    GLUCPOC 87 07/02/2018 07:21 AM        Lab Results   Component Value Date/Time    Creatinine 6.94 (H) 07/02/2018 03:27 AM     Estimated Creatinine Clearance: 10.3 mL/min (based on Cr of 6.94). Active Orders   Diet    DIET NPO Except Meds        PO intake: Patient Vitals for the past 72 hrs:   % Diet Eaten   06/29/18 1716 5 %       Will continue to follow as needed.     Thank you  Zhanna Melo, ANGELN, RN, [address]

## 2018-07-02 NOTE — ANESTHESIA PROCEDURE NOTES
Peripheral Block    Start time: 7/2/2018 7:13 AM  End time: 7/2/2018 7:23 AM  Performed by: Reinier Cao  Authorized by: Reinier Cao       Pre-procedure:    Indications: at surgeon's request and post-op pain management    Preanesthetic Checklist: patient identified, risks and benefits discussed, site marked, timeout performed, anesthesia consent given and patient being monitored      Block Type:   Block Type:  Supraclavicular  Laterality:  Left  Monitoring:  Standard ASA monitoring, continuous pulse ox, frequent vital sign checks, heart rate, responsive to questions and oxygen  Injection Technique:  Single shot  Procedures: ultrasound guided    Patient Position: supine  Prep: chlorhexidine    Location:  Supraclavicular  Needle Type:  Stimuplex  Needle Gauge:  22 G  Needle Localization:  Ultrasound guidance  Motor Response: minimal motor response >0.4 mA    Medication Injected:  2.0%  lidocaine  Volume (mL):  20    Assessment:  Number of attempts:  1  Injection Assessment:  Incremental injection every 5 mL, local visualized surrounding nerve on ultrasound, negative aspiration for blood, no intravascular symptoms and no paresthesia  Patient tolerance:  Patient tolerated the procedure well with no immediate complications

## 2018-07-02 NOTE — PROGRESS NOTES
Pt remain stable overnight-    BG at 2200=75 1/2amp D50 given with recheck 15mins after of 99  Due to aspiration risk pt is NPO-

## 2018-07-02 NOTE — PROCEDURES
[facility] Team [facility]  [phone]    Vitals   Pre   Post   Assessment   Pre   Post     Temp  Temp: 97.5 °F (36.4 °C) (07/02/18 1425)  97 axillary LOC  AxO X4 AxO X4   HR   Pulse (Heart Rate): 83 (07/02/18 1430) 82 Lungs   Unlabored, RA SpO2 100% Unlabored   B/P   BP: (!) 182/39 (07/02/18 1430) 130/63 Cardiac   Regular Regular    Resp   Resp Rate: 12 (07/02/18 1430) 12 Skin   Warm, dry Warm, dry    Pain level  Pain Intensity 1: 0 (07/02/18 0755) 0 Edema  None     None   Orders:    Duration:   Start:    1425 End:    1745 Total:   3.25   Dialyzer:   Dialyzer/Set Up Inspection: Revaclear (07/02/18 1425)   K Bath:   Dialysate K (mEq/L): 3 (07/02/18 1425)   Ca Bath:   Dialysate CA (mEq/L): 2.5 (07/02/18 1425)   Na/Bicarb:   Dialysate NA (mEq/L): 140 (07/02/18 1425)   Target Fluid Removal:   Goal/Amount of Fluid to Remove (mL): 2000 mL (07/02/18 1425)   Access     Type & Location:   DENI AV fistula. No redness, drainage or signs of infection. Cleansed with alcohol per policy and procedure. Cannulated with two 15 gauge needles. Pre-labs drawn. Labs     Obtained/Reviewed   Critical Results Called   Date when labs were drawn-  Hgb-    HGB   Date Value Ref Range Status   07/02/2018 7.8 (L) 12.1 - 17.0 g/dL Final     K-    Potassium   Date Value Ref Range Status   07/02/2018 4.3 3.5 - 5.1 mmol/L Final     Ca-   Calcium   Date Value Ref Range Status   07/02/2018 7.3 (L) 8.5 - 10.1 MG/DL Final     Bun-   BUN   Date Value Ref Range Status   07/02/2018 35 (H) 6 - 20 MG/DL Final     Creat-   Creatinine   Date Value Ref Range Status   07/02/2018 6.94 (H) 0.70 - 1.30 MG/DL Final        Medications/ Blood Products Given     Name   Dose   Route and Time     Vancomycin 750 mg  Drip over last hour via pump.              Blood Volume Processed (BVP):   73.7  Net Fluid   Removed:  1923   Comments   Time Out Done: 1606  Primary Nurse Rpt Pre: Monica Adams  Primary Nurse Rpt Post: Maykel Turner RN  Pt Education: Dwight Trejo fistula infection prevention. Care Plan: Continue current hemodialysis plan of care. Tx Summary: 1425 pre-labs drawn & initiated hemodialysis. 1545 /75, 100 ml saline given. 1550 /56. 1640 Vancomycin initiated. 1745 treatment ended 15 min early due to clot in dialyzer per Dr. Erik Osuna. All possible blood returned. Hemostasis achieved <20 min. Admiting Diagnosis: RLQ pain  Pt's previous clinic- [address]  Consent signed - Informed Consent Verified: Yes (07/02/18 1425)  [facility] Consent - Verified  Hepatitis Status- Unknown, Ag and Ab drawn today 7/2/18. Machine #- Machine Number: [id number] (07/02/18 1425)  Telemetry status- N/A  Pre-dialysis wt. -  N/A  Reviewed & agreed Kareem Ferrell RN

## 2018-07-02 NOTE — PROGRESS NOTES
Problem: Mobility Impaired (Adult and Pediatric)  Goal: *Acute Goals and Plan of Care (Insert Text)  Physical Therapy Goals  Initiated 7/2/2018  1. Patient will move from supine to sit and sit to supine , scoot up and down and roll side to side in bed with minimal assistance/contact guard assist within 7 day(s). 2.  Patient will transfer from bed to chair and chair to bed with moderate assistance  using the least restrictive device within 7 day(s). 3.  Patient will perform sit to stand with moderate assistance  within 7 day(s). 4.  Patient will ambulate with moderate assistance  for 5 feet with the least restrictive device within 7 day(s). physical Therapy EVALUATION  Patient: Karthik Henderson (30 y.o. male)  Date: 7/2/2018  Primary Diagnosis: ILIAC ANEURYSM  Pseudoaneurysm of iliac artery (HCC)  VASCULAR INSUFFICIENCY  Procedure(s) (LRB):  RIGHT LEG ANGIOGRAM AND ILIAC ANEURYSM REPAIR (Right) 4 Days Post-Op   Precautions:  Contact, Fall, Aspiration    ASSESSMENT :  Based on the objective data described below, the patient presents severe weakness katelyn LE, but good ROM and decreased mobility skills. 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 Otherwise he as wheelchair bound using a power wheelchair. His home is 1 story and has a ramp. He has help during the day, and can be left alone for short periods as needed. Currently his strength was 0/5 L ankle df/pf and katelyn quads; katelyn hips grossly 1/5 - 0/5. ROM was wfl katelyn lower extremities. He was total assistance for lateral transfers from bed to stretcher to go for a procedure. Deferred sitting on edge of bed due to he needs his stump socks from home for his prosthesis  - spouse will bring tomorrow. He will benefit from continued PT to improve strength and mobility skills.  Recommend IP Rehab upon discharge for intensive therapy to improve his functional status to the point where his family can manage his needs at home. Patient will benefit from skilled intervention to address the above impairments. Patients rehabilitation potential is considered to be Fair  Factors which may influence rehabilitation potential include:   []         None noted  []         Mental ability/status  [x]         Medical condition  [x]         Home/family situation and support systems  []         Safety awareness  []         Pain tolerance/management  []         Other:      PLAN :  Recommendations and Planned Interventions:  [x]           Bed Mobility Training             []    Neuromuscular Re-Education  [x]           Transfer Training                   []    Orthotic/Prosthetic Training  [x]           Gait Training                         []    Modalities  [x]           Therapeutic Exercises           []    Edema Management/Control  [x]           Therapeutic Activities            [x]    Patient and Family Training/Education  []           Other (comment):    Frequency/Duration: Patient will be followed by physical therapy  5 times a week to address goals. Discharge Recommendations: Inpatient Rehab  Further Equipment Recommendations for Discharge: TBD     SUBJECTIVE:   Patient stated I need my sock to be able to use my prosthesis.     OBJECTIVE DATA SUMMARY:   HISTORY:    Past Medical History:   Diagnosis Date    Anemia associated with chronic renal failure     Autoimmune disease (HCC)     hx ms    CAD (coronary artery disease)     Chronic kidney disease     catheter removed and no dialysis at this time    Chronic kidney disease     left arm fistula dialysis MWF    Diabetes ([facility] [address]     type II    Elevated troponin 6/29/2018    GERD (gastroesophageal reflux disease)     Hypertension     Ill-defined condition     prostate cancer    Multiple sclerosis ([address]     diagnosed '01    Other ill-defined conditions(799.89)     anemia    Other ill-defined conditions(799.89) elevated cholesterol    Other ill-defined conditions(799.89)     hx septic right foot    Peripheral vascular disease (HCC)     Secondary hyperparathyroidism of renal origin ([facility] [address]     Thyroid disease      Past Surgical History:   Procedure Laterality Date    COLONOSCOPY N/A 12/6/2016    COLON[facility] performed [facility]an [facility] at [facility] ENDOSCOPY    COLONOSCOPY,DIAGNOSTIC  12/6/2016         CORONARY STENT SINGLE W/PTCA  2/17/2011         HX APPENDECTOMY      HX CATARACT REMOVAL  10/18/10    bilateral    HX CHOLECYSTECTOMY      HX GI      ileostomy due to infection    HX HEART CATHETERIZATION      HX HEENT      hx post photocoagulation eye 2009    HX OTHER SURGICAL      right partial foot amputation    HX OTHER SURGICAL      cardiac cath    HX OTHER SURGICAL      prostate removed    MA COLONOSCOPY W/BIOPSY SINGLE/MULTIPLE  5/10/2010         UPPER GI ENDOSCOPY,BIOPSY  4/17/2018         UPPER GI ENDOSCOPY,REMV TUMOR,SNARE  6/13/2018         VASCULAR SURGERY PROCEDURE UNLIST      katelyn. 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 Otherwise he as wheelchair bound using a power wheelchair. His home is 1 story and has a ramp. He has help during the day, and can be left alone for short periods as needed. Denies any falls in the last year. Personal factors and/or comorbidities impacting plan of care: MS, R foot amputation, severe strength loss bilateral lower extremities.     Home Situation  Home Environment: Private residence  Wheelchair Ramp: Yes  One/Two Story Residence: One story  Living Alone: No  Support Systems: Child(ben), Spouse/Significant Other/Partner  Patient Expects to be Discharged to[de-identified] Private residence  Current DME Used/Available at Home: Wheelchair, power, Walker, rolling, Tub transfer bench, Grab bars, Commode, bedside (R LE prosthesis)  Tub or Shower Type: Tub/Shower combination    EXAMINATION/PRESENTATION/DECISION MAKING:   Critical Behavior:  Neurologic State: Alert  Orientation Level: Oriented to person  Cognition: Decreased attention/concentration, Follows commands     Hearing: Auditory  Auditory Impairment: None  Hearing Aids/Status: Does not own  Skin:    Edema:   Range Of Motion:  AROM: Grossly decreased, non-functional           PROM: Within functional limits           Strength:    Strength: Grossly decreased, non-functional (B legs L ankle and katelyn quads 0/5; hips 1/5)                    Tone & Sensation:   Tone: Abnormal (hypotonicity katelyn legs)                              Coordination:  Coordination: Grossly decreased, non-functional  Vision:      Functional Mobility:  Bed Mobility:  Rolling: Total assistance;Assist x2           Transfers:                 Lateral Transfers: Assist x2           Balance:      Ambulation/Gait Training:                                                     Functional Measure:  Barthel Index:    Bathin  Bladder: 0  Bowels: 0  Groomin  Dressin  Feedin  Mobility: 0  Stairs: 0  Toilet Use: 0  Transfer (Bed to Chair and Back): 0  Total: 0       Barthel and G-code impairment scale:  Percentage of impairment CH  0% CI  1-19% CJ  20-39% CK  40-59% CL  60-79% CM  80-99% CN  100%   Barthel Score 0-100 100 99-80 79-60 59-40 20-39 1-19   0   Barthel Score 0-20 20 17-19 13-16 9-12 5-8 1-4 0      The Barthel ADL Index: Guidelines  1. The index should be used as a record of what a patient does, not as a record of what a patient could do. 2. The main aim is to establish degree of independence from any help, physical or verbal, however minor and for whatever reason. 3. The need for supervision renders the patient not independent. 4. A patient's performance should be established using the best available evidence.  Asking the patient, friends/relatives and nurses are the usual sources, but direct observation and common sense are also important. However direct testing is not needed. 5. Usually the patient's performance over the preceding 24-48 hours is important, but occasionally longer periods will be relevant. 6. Middle categories imply that the patient supplies over 50 per cent of the effort. 7. Use of aids to be independent is allowed. Harlee Cowden., Barthel, D.W. (9638). Functional evaluation: the Barthel Index. [address] (14)2. RYLAN Crain, Delfino Mays, Caleb Quick., José, [facility] (1999). Measuring the change indisability after inpatient rehabilitation; comparison of the responsiveness of the Barthel Index and Functional Winkler Measure. Journal of Neurology, Neurosurgery, and Psychiatry, 66(4), 040-202. DEWAYNE Diamond, JARRELL Lord, & Rashad Snyder M.A. (2004.) Assessment of post-stroke quality of life in cost-effectiveness studies: The usefulness of the Barthel Index and the EuroQoL-5D. Quality of Life Research, 13, 712-78         G codes: In compliance with CMSs Claims Based Outcome Reporting, the following G-code set was chosen for this patient based on their primary functional limitation being treated: The outcome measure chosen to determine the severity of the functional limitation was the Barthel with a score of 0/100 which was correlated with the impairment scale.     ? Mobility - Walking and Moving Around:     - CURRENT STATUS: CN - 100% impaired, limited or restricted    - GOAL STATUS: CM - 80%-99% impaired, limited or restricted    - D/C STATUS:  ---------------To be determined---------------      Physical Therapy Evaluation Charge Determination   History Examination Presentation Decision-Making   HIGH Complexity :3+ comorbidities / personal factors will impact the outcome/ POC  HIGH Complexity : 4+ Standardized tests and measures addressing body structure, function, activity limitation and / or participation in recreation  HIGH Complexity : Unstable and unpredictable characteristics  Other outcome measures Barthel  HIGH       Based on the above components, the patient evaluation is determined to be of the following complexity level: HIGH     Pain:  Pain Scale 1: Numeric (0 - 10)  Pain Intensity 1: 0              Activity Tolerance:   Fair. Please refer to the flowsheet for vital signs taken during this treatment. After treatment:   []         Patient left in no apparent distress sitting up in chair  [x]         Patient left in no apparent distress in stretcher going to test.  []         Call bell left within reach  [x]         Nursing notified  [x]         Caregiver present  []         Bed alarm activated    COMMUNICATION/EDUCATION:   The patients plan of care was discussed with: Occupational Therapist, Registered Nurse and . [x]         Fall prevention education was provided and the patient/caregiver indicated understanding. [x]         Patient/family have participated as able in goal setting and plan of care. [x]         Patient/family agree to work toward stated goals and plan of care. []         Patient understands intent and goals of therapy, but is neutral about his/her participation. []         Patient is unable to participate in goal setting and plan of care.     Thank you for this referral.  Leonora Collier, PT   Time Calculation: 17 mins

## 2018-07-02 NOTE — PROGRESS NOTES
1325:Return from barium swallow eval. Nauseous, hiccups, abdominal spasms. Received order for zofran. No vomiting. Dialysis arrived. Stool sample obtained.

## 2018-07-02 NOTE — PROGRESS NOTES
CM was advised pt was recommended for inpatient rehab. CM spoke with pt and sent referral to [facility] via [employer]. CM placed FOC on pt bedside chart. CM will wait [facility] determination.     CAROLYN Guevara, [address] [facility]   [phone]

## 2018-07-02 NOTE — PROGRESS NOTES
GI Progress Note (for Aryan Sterling)  NAME:Philippe Denny :1950 TFW:[id number]   Prim GI: Sadie Arroyo MD  PCP: Tisha Mederos MD  Date/Time:  2018 7:44 AM   Assessment:   1. Aspiration event night of  with a RR  2. CT Chest with aspiration  3. EGD on  without any pathology in esophagus  4. EGD in  with erosive esophagitis but no strictures  5. Illiac artery pseudoaneurysm s/p stenting/repair  6. Pt seen 71 AM and denies any dysphagia     Plan:   - Speech pathology evaluation pending  - no plans for repeat EGD given pt just had an EGD 2 weeks ago with normal esophagus  - PPI q 12hr given prior EGD w/ erosive esophagitis    GI will follow peripherally. Feel free to page w/ any questions    Addendum:  Barium swallow recommended by Speech Pathology  IMPRESSION   impression: Significant abnormal peristalsis of the esophagus, small hiatal  Hernia. Pt w/ esophageal dyskinesia. Recommend he take small bites, chew thoroughly, and sit upright for at least 30-60 minutes after eating. No medications are helpful. No indication for repeat EGD (last one 2 weeks ago was unremarkable)     Subjective:   Pt feels well this AM. Denies dysphagia. Complaint Y/N Description   Abdominal Pain     Hematemesis     Hematochezia     Melena     Constipation     Diarrhea     Dyspepsia     Dysphagia     Jaundiced     Nausea/vomiting       Review of Systems:  Symptom Y/N Comments  Symptom Y/N Comments   Fever/Chills    Chest Pain     Cough    Headaches     Sputum    Joint Pain     SOB/BANKS    Pruritis/Rash     Tolerating Diet    Other       Could NOT obtain due to:      Objective:   VITALS:   Last 24hrs VS reviewed since prior progress note.  Most recent are:  Visit Vitals    /65 (BP 1 Location: Right arm, BP Patient Position: At rest)    Pulse 76    Temp 97.6 °F (36.4 °C)    Resp 20    Ht 5' 10\" (1.778 m)    Wt 72.4 kg (159 lb 9.6 oz)    SpO2 99%    BMI 22.9 kg/m2       Intake/Output Summary (Last 24 hours) at 07/02/18 0744  Last data filed at 07/01/18 1909   Gross per 24 hour   Intake              300 ml   Output                0 ml   Net              300 ml     PHYSICAL EXAM:  General: WD, WN. Alert, cooperative, no acute distress    HEENT: NC, Atraumatic. PERRLA, EOMI. Anicteric sclerae. Lungs:  CTA Bilaterally. No Wheezing/Rhonchi/Rales. Heart:  Regular  rhythm,  No murmur (), No Rubs, No Gallops  Abdomen: Soft, Non distended, Non tender.  +Bowel sounds, no HSM  Extremities: No c/c/e  Neurologic:  No acute neurological distress     Lab and Radiology Data Reviewed: (see below)    Medications Reviewed: (see below)  PMH/SH reviewed - no change compared to H&P  ________________________________________________________________________  Care Plan discussed with:  Patient x   Family     RN               Consultant:       Pushpa Kendall MD     Procedures: see electronic medical records for all procedures/Xrays and details which were not copied into this note but were reviewed prior to creation of Plan. LABS:  Recent Labs      07/02/18 0327 07/01/18 0439   WBC  14.6*  15.8*   HGB  7.8*  7.9*   HCT  25.3*  25.5*   PLT  250  239     Recent Labs      07/02/18 0327  07/01/18   0439  06/29/18   2340   NA  138  137  135*   K  4.3  4.0  4.5   CL  100  100  99   CO2  25  27  23   BUN  35*  27*  45*   CREA  6.94*  5.75*  8.68*   GLU  80  78  127*   CA  7.3*  7.3*  8.1*     Recent Labs      06/29/18   2340   SGOT  48*   AP  151*   TP  7.4   ALB  1.7*   GLOB  5.7*     No results for input(s): INR, PTP, APTT in the last 72 hours. No lab exists for component: INREXT   No results for input(s): FE, TIBC, PSAT, FERR in the last 72 hours. No results found for: FOL, RBCF  Recent Labs      06/29/18   2302   PH  7.38   PCO2  40   PO2  68*     No results for input(s): CPK, CKMB in the last 72 hours.     No lab exists for component: TROPONINI  Lab Results   Component Value Date/Time    Color YELLOW/STRAW 12/30/2014 06:40 PM Appearance CLEAR 12/30/2014 06:40 PM    Specific gravity 1.015 12/30/2014 06:40 PM    pH (UA) 7.5 12/30/2014 06:40 PM    Protein 300 (A) 12/30/2014 06:40 PM    Glucose >1000 (A) 12/30/2014 06:40 PM    Ketone NEGATIVE  12/30/2014 06:40 PM    Bilirubin NEGATIVE  12/30/2014 06:40 PM    Urobilinogen 0.2 12/30/2014 06:40 PM    Nitrites NEGATIVE  12/30/2014 06:40 PM    Leukocyte Esterase NEGATIVE  12/30/2014 06:40 PM    Epithelial cells FEW 12/30/2014 06:40 PM    Bacteria NEGATIVE  12/30/2014 06:40 PM    WBC 0-4 12/30/2014 06:40 PM    RBC 5-10 12/30/2014 06:40 PM       MEDICATIONS:  Current Facility-Administered Medications   Medication Dose Route Frequency    heparin (porcine) injection 5,000 Units  5,000 Units SubCUTAneous Q12H    albumin human 25% (BUMINATE) solution 12.5 g  12.5 g IntraVENous DIALYSIS PRN    morphine injection 2 mg  2 mg IntraVENous Q4H PRN    lactobac ac& pc-s.therm-b.anim (DAVIN Q/RISAQUAD)  1 Cap Oral DAILY    balsam peru-castor oil (VENELEX)  mg/gram ointment   Topical BID    guaiFENesin ER (MUCINEX) tablet 1,200 mg  1,200 mg Oral Q12H    acetylcysteine (MUCOMYST) 200 mg/mL (20 %) solution 400 mg  400 mg Nebulization Q6H RT    albuterol-ipratropium (DUO-NEB) 2.5 MG-0.5 MG/3 ML  3 mL Nebulization Q6H RT    pantoprazole (PROTONIX) 40 mg in sodium chloride 0.9% 10 mL injection  40 mg IntraVENous Q12H    senna-docusate (PERICOLACE) 8.6-50 mg per tablet 2 Tab  2 Tab Oral QHS    epoetin niya (EPOGEN;PROCRIT) injection 10,000 Units  10,000 Units SubCUTAneous Q MON, WED & FRI    metroNIDAZOLE (FLAGYL) IVPB premix 500 mg  500 mg IntraVENous Q12H    aspirin delayed-release tablet 81 mg  81 mg Oral DAILY    carvedilol (COREG) tablet 3.125 mg  3.125 mg Oral BID    piperacillin-tazobactam (ZOSYN) 3.375 g in 0.9% sodium chloride (MBP/ADV) 100 mL  3.375 g IntraVENous Q12H    insulin lispro (HUMALOG) injection   SubCUTAneous AC&HS    glucose chewable tablet 16 g  4 Tab Oral PRN    dextrose (D50W) injection syrg 12.5-25 g  12.5-25 g IntraVENous PRN    glucagon (GLUCAGEN) injection 1 mg  1 mg IntraMUSCular PRN    escitalopram oxalate (LEXAPRO) tablet 10 mg  10 mg Oral DAILY    sevelamer carbonate (RENVELA) tab 800 mg  800 mg Oral TID WITH MEALS    pravastatin (PRAVACHOL) tablet 10 mg  10 mg Oral QHS    vancomycin (VANCOCIN) 750 mg in 0.9% sodium chloride (MBP/ADV) 250 mL  750 mg IntraVENous DIALYSIS PRN    Vancomycin 750 mg: give at end of dialysis on dialysis days   Other DAILY

## 2018-07-02 NOTE — PROGRESS NOTES
1000: Neuro consult called again to Dr. Maryalice Fabry. Per Infection control hospital policy must determine if colonized. 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 Must be off of antibiotics for 72 hours before samples can be taken. Samples obtained through perianal swab x 3. Patient will remain on contact precautions.

## 2018-07-03 PROBLEM — K22.4 ESOPHAGEAL DYSKINESIA: Status: ACTIVE | Noted: 2018-01-01

## 2018-07-03 PROBLEM — R63.4 WEIGHT LOSS: Status: ACTIVE | Noted: 2018-01-01

## 2018-07-03 PROBLEM — Z71.89 COUNSELING REGARDING GOALS OF CARE: Status: ACTIVE | Noted: 2018-01-01

## 2018-07-03 PROBLEM — R53.1 WEAKNESS GENERALIZED: Status: ACTIVE | Noted: 2018-01-01

## 2018-07-03 NOTE — PROGRESS NOTES
Problem: Mobility Impaired (Adult and Pediatric)  Goal: *Acute Goals and Plan of Care (Insert Text)  Physical Therapy Goals  Initiated 7/2/2018  1. Patient will move from supine to sit and sit to supine , scoot up and down and roll side to side in bed with minimal assistance/contact guard assist within 7 day(s). 2.  Patient will transfer from bed to chair and chair to bed with moderate assistance  using the least restrictive device within 7 day(s). 3.  Patient will perform sit to stand with moderate assistance  within 7 day(s). 4.  Patient will ambulate with moderate assistance  for 5 feet with the least restrictive device within 7 day(s). physical Therapy TREATMENT  Patient: Moise Burton (30 y.o. male)  Date: 7/3/2018  Diagnosis: ILIAC ANEURYSM  Pseudoaneurysm of iliac artery (HCC)  VASCULAR INSUFFICIENCY Pseudoaneurysm of iliac artery (HCC)  Procedure(s) (LRB):  RIGHT LEG ANGIOGRAM AND ILIAC ANEURYSM REPAIR (Right) 5 Days Post-Op  Precautions: Contact, Fall, Aspiration  Chart, physical therapy assessment, plan of care and goals were reviewed. ASSESSMENT:  Pt cleared by RN following pt receiving pain medication. Pt supine in bed with wife present and initially hesitant to working with therapy d/t pain and feelings of fatigue. With gentle encouragement pt agreeable to bed level mobility to attempt sit<>stand transfer. Pt's wife donned prosthesis and shoes prior to mobility. Pt continues to require Max-Total A for bed mobility. Poor-No sitting balance at EOB despite bilateral hands on mattress and both feet flat on the floor. Pt with minimal head and trunk support often lurching forward and laterally. Requiring Max A to maintain balance constantly at EOB as well as manual cueing at sacrum and approximation at bilateral wrists and elbows. RN hoping to have pt stand to change dressing on buttocks however at this time pt is not safe to stand and unable to demonstrate any active motion of either LE. Returned to supine where pt completed a few reps of bilateral shoulder flexion before becoming too fatigued to continue. At this time pt is not safe to attempt transfer to chair or OOB without therapy. If wife insists pt needs to be in chair must use [employer] and pt needs to be in a recliner with feet elevated and back reclined to avoid sliding out to the floor. Pt will need rehab at discharge. Progression toward goals:  []    Improving appropriately and progressing toward goals  [x]    Improving slowly and progressing toward goals  []    Not making progress toward goals and plan of care will be adjusted     PLAN:  Patient continues to benefit from skilled intervention to address the above impairments. Continue treatment per established plan of care. Discharge Recommendations:  Rehab  Further Equipment Recommendations for Discharge:  TBD     SUBJECTIVE:   Patient stated Im going to throw up.     OBJECTIVE DATA SUMMARY:   Critical Behavior:  Neurologic State: Alert  Orientation Level: Oriented to person  Cognition: No command following     Functional Mobility Training:  Bed Mobility:  Rolling: Maximum assistance  Supine to Sit: Maximum assistance  Sit to Supine: Maximum assistance;Assist x2           Transfers:                                   Balance:  Sitting: Impaired  Sitting - Static: None  Sitting - Dynamic: None  Standing:  (Not safe to stand)  Ambulation/Gait Training:                                                        Stairs:              Neuro Re-Education:    Therapeutic Exercises:   Shoulder flexion, bicep curls, neck extension  Pain:  Pain Scale 1: Numeric (0 - 10)  Pain Intensity 1: 9  Pain Location 1: Flank  Pain Orientation 1: Right     Pain Intervention(s) 1: Medication (see MAR)  Activity Tolerance:   Fair  Please refer to the flowsheet for vital signs taken during this treatment.   After treatment:   []    Patient left in no apparent distress sitting up in chair  [x]    Patient left in no apparent distress in bed  [x]    Call bell left within reach  [x]    Nursing notified  [x]    Caregiver present  [x]    Bed alarm activated    COMMUNICATION/COLLABORATION:   The patients plan of care was discussed with: Registered Nurse    Sd Esparza, PT   Time Calculation: 26 mins

## 2018-07-03 NOTE — PROGRESS NOTES
Spiritual Care Assessment/Progress Note  [facility]      NAME: Sawyer Weiss      MRN: [id number]  AGE: 70 y.o.  SEX: male  Christianity Affiliation: Restorationist   Language: English     7/3/2018     Total Time (in minutes): 28     Spiritual Assessment begun in [facility] PROGRESSIVE CARE through conversation with:         [x]Patient        [] Family    [] Friend(s)        Reason for Consult: Palliative Care, Initial/Spiritual Assessment     Spiritual beliefs: (Please include comment if needed)     [x] Identifies with a lovely tradition:         [] Supported by a lovely community:            [] Claims no spiritual orientation:           [] Seeking spiritual identity:                [] Adheres to an individual form of spirituality:           [] Not able to assess:                           Identified resources for coping:      [] Prayer                               [] Music                  [] Guided Imagery     [x] Family/friends                 [] Pet visits     [] Devotional reading                         [] Unknown     [x] Other: storytelling                                              Interventions offered during this visit: (See comments for more details)    Patient Interventions: Affirmation of emotions/emotional suffering, Affirmation of lovely, Catharsis/review of pertinent events in supportive environment, Coping skills reviewed/reinforced, Iconic (affirming the presence of God/Higher Power), Initial/Spiritual assessment, patient floor, Normalization of emotional/spiritual concerns, Life review/legacy, Prayer (assurance of), Christianity beliefs/image of God discussed           Plan of Care:     [x] Support spiritual and/or cultural needs    [] Support AMD and/or advance care planning process      [] Support grieving process   [] Coordinate Rites and/or Rituals    [] Coordination with community clergy   [] No spiritual needs identified at this time   [] Detailed Plan of Care below (See Comments)  [] Make referral to Music Therapy  [] Make referral to Pet Therapy     [] Make referral to Addiction services  [] Make referral to [facility]  [] Make referral to Spiritual Care Partner  [] No future visits requested        [] Follow up visits as needed     Comments: Initial visit with patient on PCU in response to new palliative care consult. Introduced self to patient who indicated that he likes to be called \"Redman\" and began sharing stories from his life in the [place] before moving to [place]. Patient takes great pride in having lived his life to the fullest and enjoys talking about his six sons and one daughter. Patient also has a very deep and personal lovely and trust in God that he shared became more real for him after he chose several years ago to turn from God for a time only to be amazed to find Him standing in the same place waiting for him when he returned to Him. Patient is struggling with his newest medical obstacles as they are impacting his ability to communicate and sharing stories is one of the ways he shares his legacy with others. Offered words of encouragement and supportive presence as patient struggled at times to speak. Concluded visit when it appeared that patient was becoming tired but assured of ongoing support as needed and desired in response to patient's stated appreciation for 's visit and request for follow-up. Chaplain Rowan Enrique M.Div.    Paging Service [phone])

## 2018-07-03 NOTE — PROGRESS NOTES
5285 ; no coverage needed  0725 Pt alert; some confusion; Report received from [facility], [address] Wife arrived and received update about the night  0850 Dr. Jose Luis Lu placed consult - is pt candidate for peg  0945 Physical therapy in room; pt unable to get out of bed;   1000 Received morphine for right flank pain 9/10  1015 Called radiology about portable chest xray  56 Spoke with Dr. Jay Patel office Jama Villafana) about changing pt's meds from PO to IV  1030 Received zofran for nausea; will continue to monitor pt  1035 Dr. Merle Vega - hold all po meds until pt is not NPO  1109 Pulse ox monitor 99%  1111 ; pt to receive 2 units of lispro  1121 Dr. Charlene Nixon at bedside  [phone] Dr. Merle Vega will place order for NG tube; speech will continue to follow pt for next few days; Peg tube is last resort   1150 Rechecking O2 on pulse ox; pulse ox showing 100%; rechecked with clinical coordinator Carolina Molina) and charge nurse Deloris Avina)  0120 [facility] rep (Gene) at bedside with family  1400 Pt refused breathing treatment  1500 Speech in room to evaluate pt  [phone] Pt refused NG tube placement; placed call to hospitalist  18 Dr. Merle Vega made aware of pt's refusal; attempted to contact wife Jerica Mckeon) but she was unavailable  1855 Received Dimple Veronica for nausea; will continue to monitor pt  1910 Updated night nurse Gloria Linder) about the pt's condition

## 2018-07-03 NOTE — CONSULTS
Palliative Medicine Consult  Simon: [phone] ([phone])    Patient Name: Neftaly Mantilla  YOB: 1950    Date of Initial Consult: 7/3/18  Reason for Consult: care decisions  Requesting Provider: Yessy Oviedo MD   Primary Care Physician: Lucy Callejas MD     SUMMARY:   Neftaly Mantilla is a 70 y.o. with a past history of  multiple sclerosis, ESRD on HD, anemia of chronic disease, secondary hyperparathyroidisim,  ASHD, HTN, HLD DM II, GERD, and PVD, was a smoker but quit smoking 5 years ago, has a hx of ETOH abuse and has been sober x 10 years, recent hx of an EGD with snare polypectomy on 06/13/2018. He was admitted on 6/28/2018 from home with a diagnosis of large right EIA pseudoaneurysm. Current medical issues leading to Palliative Medicine involvement include: pt was taken to OR for right leg angiogram and iliac aneurysm repair. PALLIATIVE DIAGNOSES:   1. Abdominal pain  2. Nausea and vomiting  3. Diarrhea   4. Weakness   5. Gait problem   6. Sepsis  7. ESRD on HD MWF  8. Aspiration PNA: pt has esophageal dyskinesia, rec small bites, chew thoroughly, remain upright for at least 30-60 min after eating  9. Weight loss 3/2017  195 lbs,  2/2018 170 lbs   10. Care decisions     PLAN:   1. Met with pt, introduced Palliative Medicine as a support for pt and families dealing with life limiting disease, to help with symptom management, education and understanding disease process and treatment options, and  to discuss goals of care, what pt wants in terms of treatment as well as code status. Pt shared his history, that he was a [place]  in [place], enjoyed working nights as this was the busiest time of day and he met the most interesting people at night. 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 He  his first wife twice, and is now  for the 3rd time to his second wife. He has 6 children, all 5 boys are named \"Redman\", which is the name he goes by.  He was diagnosed with diabetes about 20 years ago; his vision is real bad 2/2 diabetes, he has been in renal failure for about 8 years, and received a kidney transplant last Sept, which immediately failed due to bleeding complications. He reports significant weight loss over the past 2 years due to appetite. He struggled to speak 2/2 esophageal spasms that worsens when he's in the hospital and better, although still present at home. He is fine with PEG placement as he has been unable to eat for 4 days. 2. Visit was interupted by phone call from daughter, which pt wanted; will return tomorrow to discuss AMD.  3. Initial consult note routed to primary continuity provider  4. Communicated plan of care with: Palliative IDT, RN     GOALS OF CARE / TREATMENT PREFERENCES:     GOALS OF CARE:  Patient/Health Care Proxy Stated Goals: Prolong life      TREATMENT PREFERENCES:   Code Status: Full Code    Advance Care Planning:  Advance Care Planning 6/28/2018   Patient's Healthcare Decision Maker is: Verbal statement (Legal Next of Kin remains as decision maker)   Confirm Advance Directive None       Medical Interventions: Full interventions   Other Instructions:    Artificially Administered Nutrition: Feeding tube long-term, if indicated     Other:    As far as possible, the palliative care team has discussed with patient / health care proxy about goals of care / treatment preferences for patient. HISTORY:     History obtained from: chart, patient    CHIEF COMPLAINT: abdominal pain    HPI/SUBJECTIVE:    The patient is:   [x] Verbal and participatory  [] Non-participatory due to:     Presented ambulatory with c/o right flank and abdominal pain with associated N/V/D x 3 days, and overnight his RLE began to swell. He has missed 2 HD sessions due to the pain and when he presented to the dialysis unit today he was referred to the ED for evaluation following a brief session.   Pt reports that last Sept he was in the process of receiving a right sided transplant going through his external iliac artery that was complicated by post-op bleeding requiring surgical repair. It bled again and he underwent placement of a covered stent graft over the external iliac artery. While this stopped the bleeding it killed his donor transplant. The transplant was removed and everything was ok until 1 week ago, when he started having right flank pain. 6/29: RAT call for lethargy, low Sp02 and hypotension, atelectasis. 7/2: neurology eval indicates pt refuses treatment and f/u for MS. ED w/u:  EXAM: chronically ill appearing, RLE swelling, RLQ of abdomen very TTP  LAB: wbc 26.1, plt 270, troponin 1.99, amylase 28, lipase 31, BUN/Cr 29/6.84, alk phos 177, albumin 2.3  IMAGING: ABD CT revealed a large right EIA pseudoaneurysm with central hyperdense contrast measuring 2.6 x 5.2 x 5.6cm and the overall dimensions measuring 4.6 x 6.8 x 9.6 cm.     Clinical Pain Assessment (nonverbal scale for severity on nonverbal patients):   Clinical Pain Assessment  Severity: 3          Duration: for how long has pt been experiencing pain (e.g., 2 days, 1 month, years)  Frequency: how often pain is an issue (e.g., several times per day, once every few days, constant)     FUNCTIONAL ASSESSMENT:     Palliative Performance Scale (PPS):  PPS: 40       PSYCHOSOCIAL/SPIRITUAL SCREENING:     Palliative IDT has assessed this patient for cultural preferences / practices and a referral made as appropriate to needs (Cultural Services, Patient Advocacy, Ethics, etc.)    Advance Care Planning:  Advance Care Planning 6/28/2018   Patient's Healthcare Decision Maker is: Verbal statement (Legal Next of Kin remains as decision maker)   Confirm Advance Directive None       Any spiritual / Tenriism concerns:  [] Yes /  [x] No    Caregiver Burnout:  [] Yes /  [] No /  [x] No Caregiver Present      Anticipatory grief assessment:   [x] Normal  / [] Maladaptive       ESAS Anxiety: Anxiety: 3    ESAS Depression: Depression: 2        REVIEW OF SYSTEMS:     Positive and pertinent negative findings in ROS are noted above in HPI. The following systems were [x] reviewed / [] unable to be reviewed as noted in HPI  Other findings are noted below. Systems: constitutional, ears/nose/mouth/throat, respiratory, gastrointestinal, genitourinary, musculoskeletal, integumentary, neurologic, psychiatric, endocrine. Positive findings noted below. Modified ESAS Completed by: provider   Fatigue: 5 Drowsiness: 0   Depression: 2 Pain: 3   Anxiety: 3 Nausea: 0   Anorexia: 9 Dyspnea: 2           Stool Occurrence(s): 1        PHYSICAL EXAM:     From RN flowsheet:  Wt Readings from Last 3 Encounters:   07/03/18 147 lb 7.8 oz (66.9 kg)   06/13/18 150 lb (68 kg)   04/17/18 155 lb (70.3 kg)     Blood pressure 147/61, pulse 82, temperature 98.2 °F (36.8 °C), resp. rate 20, height 5' 10\" (1.778 m), weight 147 lb 7.8 oz (66.9 kg), SpO2 98 %.     Pain Scale 1: Numeric (0 - 10)  Pain Intensity 1: 9  Pain Onset 1: chronic  Pain Location 1: Flank  Pain Orientation 1: Right     Pain Intervention(s) 1: Medication (see MAR)  Last bowel movement, if known:     Constitutional: Thin, temporal and thenar wasting, awake, alert, oriented x 3  Eyes: pupils equal, anicteric  ENMT: no nasal discharge, moist mucous membranes, difficulty with speech 2/2 esophageal spasms  Cardiovascular: regular rhythm, distal pulses intact  Respiratory: breathing not labored, symmetric  Gastrointestinal: soft non-tender, +bowel sounds  Musculoskeletal: no deformity, no tenderness to palpation  Skin: warm, dry  Neurologic: following commands, moving all extremities  Psychiatric: full affect, no hallucinations       HISTORY:     Principal Problem:    Pseudoaneurysm of iliac artery (HCC) (6/28/2018)    Active Problems:    Hypertension (4/12/2010)      PAD (peripheral artery disease) ([facility] [address] (12/9/2010)      S/P coronary artery stent placement (2/18/2011)      Overview: 2/17/11 PCI/CRISTI to RCA      ASHD (arteriosclerotic heart disease) (4/11/2013)      Mixed hyperlipidemia (4/11/2013)      ESRD on hemodialysis ([address] (12/30/2014)      Ataxia (1/4/2015)      Multiple sclerosis ([address] (1/4/2015)      Diabetic oculomotor palsy ([address] (3/25/2016)      Diabetic peripheral neuropathy associated with type 2 diabetes mellitus ([address] (3/25/2016)      Type 2 diabetes with nephropathy ([address] (3/1/2018)      Elevated troponin (6/29/2018)      Past Medical History:   Diagnosis Date    Anemia associated with chronic renal failure     Autoimmune disease ([address]     hx ms    CAD (coronary artery disease)     Chronic kidney disease     catheter removed and no dialysis at this time    Chronic kidney disease     left arm fistula dialysis MWF    Diabetes ([address]     type II    Elevated troponin 6/29/2018    GERD (gastroesophageal reflux disease)     Hypertension     Ill-defined condition     prostate cancer    Multiple sclerosis ([address]     diagnosed '01    Other ill-defined conditions(799.89)     anemia    Other ill-defined conditions(799.89)     elevated cholesterol    Other ill-defined conditions(799.89)     hx septic right foot    Peripheral vascular disease ([address]     Secondary hyperparathyroidism of renal origin ([address]     Thyroid disease       Past Surgical History:   Procedure Laterality Date    COLONOSCOPY N/A 12/6/2016    COLON[facility]COPY performed [facility]ch Ron [facility] at [facility]OSCOPY    COLONOSCOPY,DIAGNOSTIC  12/6/2016         CORONARY STENT SINGLE W/PTCA  2/17/2011         HX APPENDECTOMY      HX CATARACT REMOVAL  10/18/10    bilateral    HX CHOLECYSTECTOMY      HX GI      ileostomy due to infection    HX HEART CATHETERIZATION      HX HEENT      hx post photocoagulation eye 2009    HX OTHER SURGICAL      right partial foot amputation    HX OTHER SURGICAL      cardiac cath    HX OTHER SURGICAL      prostate removed    TN COLONOSCOPY W/BIOPSY SINGLE/MULTIPLE  5/10/2010         UPPER GI ENDOSCOPY,BIOPSY  4/17/2018         UPPER GI ENDOSCOPY,REMV TUMOR,SNARE  6/13/2018         VASCULAR SURGERY PROCEDURE UNLIST      katelyn. leg bypasses      Family History   Problem Relation Age of Onset    Diabetes Mother       History reviewed, no pertinent family history. Social History   Substance Use Topics    Smoking status: Former Smoker     Years: 1.00     Quit date: 4/12/2003    Smokeless tobacco: Never Used      Comment: quit 5 years ago    Alcohol use No      Comment: Stopped drinking 10 years ago     Allergies   Allergen Reactions    Sensipar [Cinacalcet] Other (comments)     Cinacalcet (Sensipar) has been added as an \"allergy\" / intolerance with a comment to assure providers at discharge and post discharge are aware of Dr. Abeba Osorio recommendation to avoid Sensipar. Patient takes [facility] Brandi as an outpatient; Thus, Donnie Lenny has been discontinued per Nephrology (cannot give with recent Parsabiv b/c risk of low Calcium).       Current Facility-Administered Medications   Medication Dose Route Frequency    metoprolol (LOPRESSOR) injection 1.25 mg  1.25 mg IntraVENous Q6H    nitroglycerin (NITROBID) 2 % ointment 1 Inch  1 Inch Topical Q6H PRN    ondansetron (ZOFRAN) injection 4 mg  4 mg IntraVENous Q4H PRN    heparin (porcine) injection 5,000 Units  5,000 Units SubCUTAneous Q12H    albumin human 25% (BUMINATE) solution 12.5 g  12.5 g IntraVENous DIALYSIS PRN    morphine injection 2 mg  2 mg IntraVENous Q4H PRN    lactobac ac& pc-s.therm-b.anim (DAVIN Q/RISAQUAD)  1 Cap Oral DAILY    balsam peru-castor oil (VENELEX)  mg/gram ointment   Topical BID    guaiFENesin ER (MUCINEX) tablet 1,200 mg  1,200 mg Oral Q12H    acetylcysteine (MUCOMYST) 200 mg/mL (20 %) solution 400 mg  400 mg Nebulization Q6H RT    albuterol-ipratropium (DUO-NEB) 2.5 MG-0.5 MG/3 ML  3 mL Nebulization Q6H RT    pantoprazole (PROTONIX) 40 mg in sodium chloride 0.9% 10 mL injection  40 mg IntraVENous Q12H    senna-docusate (PERICOLACE) 8.6-50 mg per tablet 2 Tab  2 Tab Oral QHS    epoetin niya (EPOGEN;PROCRIT) injection 10,000 Units  10,000 Units SubCUTAneous Q MON, WED & FRI    metroNIDAZOLE (FLAGYL) IVPB premix 500 mg  500 mg IntraVENous Q12H    aspirin delayed-release tablet 81 mg  81 mg Oral DAILY    piperacillin-tazobactam (ZOSYN) 3.375 g in 0.9% sodium chloride (MBP/ADV) 100 mL  3.375 g IntraVENous Q12H    insulin lispro (HUMALOG) injection   SubCUTAneous AC&HS    glucose chewable tablet 16 g  4 Tab Oral PRN    dextrose (D50W) injection syrg 12.5-25 g  12.5-25 g IntraVENous PRN    glucagon (GLUCAGEN) injection 1 mg  1 mg IntraMUSCular PRN    escitalopram oxalate (LEXAPRO) tablet 10 mg  10 mg Oral DAILY    sevelamer carbonate (RENVELA) tab 800 mg  800 mg Oral TID WITH MEALS    pravastatin (PRAVACHOL) tablet 10 mg  10 mg Oral QHS    vancomycin (VANCOCIN) 750 mg in 0.9% sodium chloride (MBP/ADV) 250 mL  750 mg IntraVENous DIALYSIS PRN    Vancomycin 750 mg: give at end of dialysis on dialysis days   Other DAILY          LAB AND IMAGING FINDINGS:     Lab Results   Component Value Date/Time    WBC 15.3 (H) 07/03/2018 05:06 AM    HGB 8.3 (L) 07/03/2018 05:06 AM    PLATELET 632 41/53/2175 05:06 AM     Lab Results   Component Value Date/Time    Sodium 139 07/03/2018 05:06 AM    Potassium 4.0 07/03/2018 05:06 AM    Chloride 100 07/03/2018 05:06 AM    CO2 28 07/03/2018 05:06 AM    BUN 18 07/03/2018 05:06 AM    Creatinine 4.56 (H) 07/03/2018 05:06 AM    Calcium 7.9 (L) 07/03/2018 05:06 AM    Magnesium 2.1 06/29/2018 02:47 AM    Phosphorus 4.3 06/29/2018 02:47 AM      Lab Results   Component Value Date/Time    AST (SGOT) 48 (H) 06/29/2018 11:40 PM    Alk.  phosphatase 151 (H) 06/29/2018 11:40 PM    Protein, total 7.4 06/29/2018 11:40 PM    Albumin 1.7 (L) 06/29/2018 11:40 PM    Globulin 5.7 (H) 06/29/2018 11:40 PM     Lab Results   Component Value Date/Time    INR 1.1 03/21/2016 02:01 PM    Prothrombin time 10.6 03/21/2016 02:01 PM    aPTT 30.1 03/21/2016 02:01 PM      Lab Results   Component Value Date/Time    Iron 37 12/10/2010 05:00 AM    TIBC 189 (L) 12/10/2010 05:00 AM    Iron % saturation 20 12/10/2010 05:00 AM    Ferritin 122 12/10/2010 05:00 AM      Lab Results   Component Value Date/Time    pH 7.38 06/29/2018 11:02 PM    PCO2 40 06/29/2018 11:02 PM    PO2 68 (L) 06/29/2018 11:02 PM     No components found for: [place]   Lab Results   Component Value Date/Time    CK 90 02/01/2018 01:50 PM    CK - MB 3.7 (H) 02/01/2018 01:50 PM                Total time: 80 min  Counseling / coordination time, spent as noted above: 65 min  > 50% counseling / coordination?: yes    Prolonged service was provided for  []30 min   []75 min in face to face time in the presence of the patient, spent as noted above. Time Start:   Time End:   Note: this can only be billed with 01393 (initial) or [id number] (follow up). If multiple start / stop times, list each separately.

## 2018-07-03 NOTE — PROGRESS NOTES
NAME: Thomas Galan        :  1950        MRN:  [id number]        Assessment :    Plan:  --ESRD  Hypotension/sepsis  Left AVF  Anemia of CKD  SHPT  Usually HTN  DM   Right iliac pseudoaneuysm  Hypoxia/sob Usually MWF at [facility] HD Monday without problem     He is on Parsabiv at outpatient unit; d/c'd Sensipar (cannot give with recent Parsabiv b/c risk of low Calcium); on Renvela     epo 10 k sc tiw. Prn prbc's.      Wife asking me questions about PEG-defer to GI  Pt needs nutritional support-TF through NG/OG? Subjective:     Chief Complaint:  SOB is better. Much better after HD/UF. Denies pain. Review of Systems:    Symptom Y/N Comments  Symptom Y/N Comments   Fever/Chills    Chest Pain n    Poor Appetite y   Edema n    Cough    Abdominal Pain     Sputum    Joint Pain     SOB/BANKS    Pruritis/Rash     Nausea/vomit n   Tolerating PT/OT     Diarrhea    Tolerating Diet     Constipation    Other       Could not obtain due to:      Objective:     VITALS:   Last 24hrs VS reviewed since prior progress note. Most recent are:  Visit Vitals    /63 (BP 1 Location: Right arm, BP Patient Position: At rest)    Pulse 84    Temp 98.1 °F (36.7 °C)    Resp 18    Ht 5' 10\" (1.778 m)    Wt 66.9 kg (147 lb 7.8 oz)    SpO2 98%    BMI 21.16 kg/m2       Intake/Output Summary (Last 24 hours) at 18 1022  Last data filed at 18 1900   Gross per 24 hour   Intake              400 ml   Output             1700 ml   Net            -1300 ml      Telemetry Reviewed:     PHYSICAL EXAM:  General:  Alert, cooperative, no acute distress  Resp:  Clear anteriorly. No access. muscle use  CV:  Regular  rhythm, No edema  GI:  Soft, Non distended, Non tender.   EXT  S/p (R) foot amputation      Lab Data Reviewed: (see below)    Medications Reviewed: (see below)    PMH/SH reviewed - no change compared to H&P  ________________________________________________________________________  Care Plan discussed with:  Patient y    Family  y wife   RN y    Care Manager                    Consultant:          Comments   >50% of visit spent in counseling and coordination of care       ________________________________________________________________________  Anastasiia Rueda MD     Procedures: see electronic medical records for all procedures/Xrays and details which  were not copied into this note but were reviewed prior to creation of Plan. LABS:  Recent Labs      07/03/18 0506 07/02/18   0327   WBC  15.3*  14.6*   HGB  8.3*  7.8*   HCT  27.1*  25.3*   PLT  261  250     Recent Labs      07/03/18   0506  07/02/18 0327  07/01/18   0439   NA  139  138  137   K  4.0  4.3  4.0   CL  100  100  100   CO2  28  25  27   BUN  18  35*  27*   CREA  4.56*  6.94*  5.75*   GLU  85  80  78   CA  7.9*  7.3*  7.3*     No results for input(s): SGOT, GPT, AP, TBIL, TP, ALB, GLOB, GGT, AML, LPSE in the last 72 hours. No lab exists for component: AMYP, HLPSE  No results for input(s): INR, PTP, APTT in the last 72 hours. No lab exists for component: INREXT, INREXT   No results for input(s): FE, TIBC, PSAT, FERR in the last 72 hours. No results found for: FOL, RBCF   No results for input(s): PH, PCO2, PO2 in the last 72 hours. No results for input(s): CPK, CKMB in the last 72 hours.     No lab exists for component: TROPONINI  No components found for: [place]  Lab Results   Component Value Date/Time    Color YELLOW/STRAW 12/30/2014 06:40 PM    Appearance CLEAR 12/30/2014 06:40 PM    Specific gravity 1.015 12/30/2014 06:40 PM    pH (UA) 7.5 12/30/2014 06:40 PM    Protein 300 (A) 12/30/2014 06:40 PM    Glucose >1000 (A) 12/30/2014 06:40 PM    Ketone NEGATIVE  12/30/2014 06:40 PM    Bilirubin NEGATIVE  12/30/2014 06:40 PM    Urobilinogen 0.2 12/30/2014 06:40 PM    Nitrites NEGATIVE  12/30/2014 06:40 PM    Leukocyte Esterase NEGATIVE 12/30/2014 06:40 PM    Epithelial cells FEW 12/30/2014 06:40 PM    Bacteria NEGATIVE  12/30/2014 06:40 PM    WBC 0-4 12/30/2014 06:40 PM    RBC 5-10 12/30/2014 06:40 PM       MEDICATIONS:  Current Facility-Administered Medications   Medication Dose Route Frequency    metoprolol (LOPRESSOR) injection 1.25 mg  1.25 mg IntraVENous Q6H    nitroglycerin (NITROBID) 2 % ointment 1 Inch  1 Inch Topical Q6H PRN    ondansetron (ZOFRAN) injection 4 mg  4 mg IntraVENous Q4H PRN    heparin (porcine) injection 5,000 Units  5,000 Units SubCUTAneous Q12H    albumin human 25% (BUMINATE) solution 12.5 g  12.5 g IntraVENous DIALYSIS PRN    morphine injection 2 mg  2 mg IntraVENous Q4H PRN    lactobac ac& pc-s.therm-b.anim (DAVIN Q/RISAQUAD)  1 Cap Oral DAILY    balsam peru-castor oil (VENELEX)  mg/gram ointment   Topical BID    guaiFENesin ER (MUCINEX) tablet 1,200 mg  1,200 mg Oral Q12H    acetylcysteine (MUCOMYST) 200 mg/mL (20 %) solution 400 mg  400 mg Nebulization Q6H RT    albuterol-ipratropium (DUO-NEB) 2.5 MG-0.5 MG/3 ML  3 mL Nebulization Q6H RT    pantoprazole (PROTONIX) 40 mg in sodium chloride 0.9% 10 mL injection  40 mg IntraVENous Q12H    senna-docusate (PERICOLACE) 8.6-50 mg per tablet 2 Tab  2 Tab Oral QHS    epoetin niya (EPOGEN;PROCRIT) injection 10,000 Units  10,000 Units SubCUTAneous Q MON, WED & FRI    metroNIDAZOLE (FLAGYL) IVPB premix 500 mg  500 mg IntraVENous Q12H    aspirin delayed-release tablet 81 mg  81 mg Oral DAILY    piperacillin-tazobactam (ZOSYN) 3.375 g in 0.9% sodium chloride (MBP/ADV) 100 mL  3.375 g IntraVENous Q12H    insulin lispro (HUMALOG) injection   SubCUTAneous AC&HS    glucose chewable tablet 16 g  4 Tab Oral PRN    dextrose (D50W) injection syrg 12.5-25 g  12.5-25 g IntraVENous PRN    glucagon (GLUCAGEN) injection 1 mg  1 mg IntraMUSCular PRN    escitalopram oxalate (LEXAPRO) tablet 10 mg  10 mg Oral DAILY    sevelamer carbonate (RENVELA) tab 800 mg  800 mg Oral TID WITH MEALS    pravastatin (PRAVACHOL) tablet 10 mg  10 mg Oral QHS    vancomycin (VANCOCIN) 750 mg in 0.9% sodium chloride (MBP/ADV) 250 mL  750 mg IntraVENous DIALYSIS PRN    Vancomycin 750 mg: give at end of dialysis on dialysis days   Other DAILY

## 2018-07-03 NOTE — PROGRESS NOTES
Initial Nutrition Assessment:    INTERVENTIONS/RECOMMENDATIONS:   · Recommend initiating TF with standard formula Jevity 1.5 @ 10 mL/hr and advance as tolerated by 10 mL q 8 hours to goal rate of 50 mL/hr + 160 mL water flushes q 4 hours (1800 kcal, 77g protein, 1872 mL water)    ASSESSMENT:   Patient medically noted for ileac pseudoaneurysm and aspiration pneumonia. PMH for HTN, PAD, ESRD on HD, MS, and DM. Patient remains NPO after MBS. Plans for NGT placement today. MD consult received for management of TF. Recommendations provided above to start feeding with a standard formula. Will monitor labs and need for specialty formula. Currently K+ WNL, last phos normal, and BG well controlled. Palliative care consulted. TF recommendations above will meet 96% of estimated kcal needs and 100% of estimated protein needs. Will monitor TF tolerance, labs, and weights to provide further recommendations. Diet Order: NPO  % Eaten:  No data found. Pertinent Medications: [x]Reviewed []Other: Epogen, Lexapro, Humalog, Hansa Q, Lopressor, Protonix, Pravastatin, Pericolace, Renvela   Pertinent Labs: [x]Reviewed []Other: -033-  Food Allergies: [x]None []Other   Last BM: 7/1   []Active     []Hyperactive  []Hypoactive       [] Absent BS  Skin:    [] Intact   [x] Incision  [] Breakdown: [] Edema []Other:    Anthropometrics:   Height: 5' 10\" (177.8 cm) Weight: 66.9 kg (147 lb 7.8 oz)   IBW (%IBW):   ( ) UBW (%UBW):   (  %)   Last Weight Metrics:  Weight Loss Metrics 7/3/2018 6/13/2018 4/17/2018 4/12/2018 4/5/2018 3/1/2018 2/28/2018   Today's Wt 147 lb 7.8 oz 150 lb 155 lb 166 lb 166 lb 14.4 oz 153 lb 165 lb 4.8 oz   BMI 21.16 kg/m2 21.52 kg/m2 22.24 kg/m2 23.82 kg/m2 23.95 kg/m2 21.95 kg/m2 23.72 kg/m2       BMI: Body mass index is 21.16 kg/(m^2).     This BMI is indicative of:   []Underweight    [x]Normal    []Overweight    [] Obesity   [] Extreme Obesity (BMI>40)     Estimated Nutrition Needs (Based on):   1874 Kcals/day (BMR (1441) x 1. 3AF) , 67 g (-80g (1.0-1.2 g/kg bw)) Protein  Carbohydrate:  At Least 130 g/day  Fluids: 1900 mL/day (1ml/kcal)    Pt expected to meet estimated nutrient needs: [x]Yes []No    NUTRITION DIAGNOSES:   Problem:  Inadequate protein-energy intake      Etiology: related to dysphagia     Signs/Symptoms: as evidenced by NPO status, plans for NGT placement, discussion on PEG      NUTRITION INTERVENTIONS:    Enteral/Parenteral Nutrition: Initiate enteral nutrition                GOAL:   TF advanced to goal rate with residual <250 mL next 1-3 days    LEARNING NEEDS (Diet, Food/Nutrient-Drug Interaction):    [x] None Identified   [] Identified and Education Provided/Documented   [] Identified and Pt declined/was not appropriate     Cultural, Jain, OR Ethnic Dietary Needs:    [x] None Identified   [] Identified and Addressed     [x] Interdisciplinary Care Plan Reviewed/Documented    [x] Discharge Planning: TBD pending progress/plan of care       MONITORING /EVALUATION:   Behavioral-Environmental Outcomes: Behavior  Food/Nutrient Intake Outcomes: Enteral/parenteral nutrition intake  Physical Signs/Symptoms Outcomes: Weight/weight change, Glucose profile, Electrolyte and renal profile, GI profile    NUTRITION RISK:    [x] High              [] Moderate           []  Low  []  Minimal/Uncompromised    PT SEEN FOR:    [x]  MD Consult: []Calorie Count      []Diabetic Diet Education        []Diet Education     []Electrolyte Management     []General Nutrition Management and Supplements     [x]Management of Tube Feeding     []TPN Recommendations    []  RN Referral:  []MST score >=2     []Enteral/Parenteral Nutrition PTA     []Pregnant: Gestational DM or Multigestation     []Pressure Ulcer/Wound Care needs        []  Low BMI  []  [facility]    [facility]  Pager [phone]                 Weekend Pager [phone]

## 2018-07-03 NOTE — PROGRESS NOTES
No c/o, but weak and a little confused  Has not eaten or gotten out of bed post op  UGI suggests significant dysmotility  abd soft  WBC 15K    Will await GI input  Does he need a PEG tube(?)  Needs PT  Repeat CXR today    Given ongoing medical issues and disposition challenges would like to consider transfer to Hospitalist service at this point

## 2018-07-03 NOTE — PROGRESS NOTES
Problem: Dysphagia (Adult)  Goal: *Acute Goals and Plan of Care (Insert Text)  7/2/2018  Speech path goals:  1. Pt will participate with MBS as medically necessary. Speech language pathology dysphagia treatment  Patient: Paulo Blanco (81 y.o. male)  Date: 7/3/2018  Diagnosis: ILIAC ANEURYSM  Pseudoaneurysm of iliac artery (HCC)  VASCULAR INSUFFICIENCY Pseudoaneurysm of iliac artery (HCC)  Procedure(s) (LRB):  RIGHT LEG ANGIOGRAM AND ILIAC ANEURYSM REPAIR (Right) 5 Days Post-Op  Precautions:  Contact, Fall, Aspiration    ASSESSMENT:  Pt seen today for swallowing therapy. He tolerated ice chips, purees and thins. Oral phase is slow and mastication is slow. Oral propulsion is mildly slow. Mild swallow delay with good hyolaryngeal excursion. Pharyngeal swallow is characterized by mild swallow delay. Good hyolaryngeal excursion. No coughing or choking. He has mild oropharyngeal dysphagia due to his MS. He does not appear to be aspirating. But will proceed with MBS prior to PEG placement. If he could eat something for pleasure it would be good for him. Progression toward goals:  []         Improving appropriately and progressing toward goals  []         Improving slowly and progressing toward goals  []         Not making progress toward goals and plan of care will be adjusted     PLAN:  Recommendations and Planned Interventions:  MBS Thursday. Patient continues to benefit from skilled intervention to address the above impairments. Continue treatment per established plan of care. Discharge Recommendations:  None     SUBJECTIVE:   Patient stated he could tell some stories. OBJECTIVE:   Cognitive and Communication Status:  Neurologic State: Alert  Orientation Level: Oriented to person, Oriented to place, Oriented to situation  Cognition: Impaired decision making  Perception: Appears intact  Perseveration: Perseverates during conversation     Dysphagia Treatment:  Oral Assessment:     P.O.  Trials:  Patient Position: upright in bed  Vocal quality prior to P.O.: Hoarse  Consistency Presented: Thin liquid;Puree; Ice chips  How Presented: Self-fed/presented;Straw     Bolus Acceptance: No impairment  Bolus Formation/Control: Impaired  Type of Impairment: Delayed  Propulsion: Delayed (# of seconds)     Initiation of Swallow: Delayed (# of seconds)  Laryngeal Elevation: Functional  Aspiration Signs/Symptoms: None                Oral Phase Severity: Mild-moderate  Pharyngeal Phase Severity : Mild-moderate                  Pain:  Pain Scale 1: Numeric (0 - 10)  Pain Intensity 1: 9  Pain Location 1: Flank  After treatment:   []              Patient left in no apparent distress sitting up in chair  [x]              Patient left in no apparent distress in bed  [x]              Call bell left within reach  []              Nursing notified  []              Caregiver present  []              Bed alarm activated    COMMUNICATION/EDUCATION:       The patients plan of care including recommendations, planned interventions, and recommended diet changes were discussed with: Registered Nurse. []              Posted safety precautions in patient's room.     AWAIS Carroll  Time Calculation: 10 mins

## 2018-07-03 NOTE — PROGRESS NOTES
GI Progress Note (for Caridad)  NAME:Philippe Chi :1950 BDB:[id number]   Prim GI: Anshul Davey MD  PCP: Jamie Gruber MD  Date/Time:  7/3/2018 11:49 AM   Assessment:   1. Aspiration event night of  with a RR  2. CT Chest with aspiration  3. EGD on  without any pathology in esophagus  4. EGD in  with erosive esophagitis but no strictures  5. Illiac artery pseudoaneurysm s/p stenting/repair     Plan:   - Speech pathology evaluation recommended Barium:  IMPRESSION   impression: Significant abnormal peristalsis of the esophagus, small hiatal  Hernia.     Pt w/ esophageal dyskinesia. Recommend he take small bites, chew thoroughly, and sit upright for at least 30-60 minutes after eating. No medications are helpful. No indication for repeat EGD (last one 2 weeks ago was unremarkable)    - would recommend NGT placement for now if pt is deemed not safe for PO intake for nutrition and PO meds  - no plans for repeat EGD given pt just had an EGD 2 weeks ago with normal esophagus  - PPI q 12hr given prior EGD w/ erosive esophagitis  - Speech pathology to re-evaluate pt today  - GI will re-evaluate pt on . If it is deemed that pt needs to remain NPO by Speech pathology then PEG would be reasonable if pt does not have a reversible cause that may improve soon (i.e. MS flare). PEG could be possibly placed on  or early next week if family would like to proceed in this scenario    Dr. Mclean will assume care on        Subjective:   Pt with choking sensation when eating. GI asked to comment about PEG.     Complaint Y/N Description   Abdominal Pain     Hematemesis     Hematochezia     Melena     Constipation     Diarrhea     Dyspepsia     Dysphagia     Jaundiced     Nausea/vomiting       Review of Systems:  Symptom Y/N Comments  Symptom Y/N Comments   Fever/Chills    Chest Pain     Cough    Headaches     Sputum    Joint Pain     SOB/BANKS    Pruritis/Rash     Tolerating Diet    Other       Could NOT obtain due to:      Objective:   VITALS:   Last 24hrs VS reviewed since prior progress note. Most recent are:  Visit Vitals    /61    Pulse 82    Temp 98.2 °F (36.8 °C)    Resp 20    Ht 5' 10\" (1.778 m)    Wt 66.9 kg (147 lb 7.8 oz)    SpO2 98%    BMI 21.16 kg/m2       Intake/Output Summary (Last 24 hours) at 07/03/18 1149  Last data filed at 07/02/18 1900   Gross per 24 hour   Intake              400 ml   Output             1700 ml   Net            -1300 ml     PHYSICAL EXAM:  General: WD, WN. Alert, cooperative, no acute distress    HEENT: NC, Atraumatic. PERRLA, EOMI. Anicteric sclerae. Lungs:  CTA Bilaterally. No Wheezing/Rhonchi/Rales. Heart:  Regular  rhythm,  No murmur (), No Rubs, No Gallops  Abdomen: Soft, Non distended, Non tender.  +Bowel sounds, no HSM  Extremities: No c/c/e  Neurologic:  No acute neurological distress   Psych:   Good insight. Not anxious nor agitated. Lab and Radiology Data Reviewed: (see below)    Medications Reviewed: (see below)  PMH/SH reviewed - no change compared to H&P  ________________________________________________________________________  Care Plan discussed with:  Patient x   Family  x   RN x              Consultant:  burak Graham MD     Procedures: see electronic medical records for all procedures/Xrays and details which were not copied into this note but were reviewed prior to creation of Plan. LABS:  Recent Labs      07/03/18   0506  07/02/18   0327   WBC  15.3*  14.6*   HGB  8.3*  7.8*   HCT  27.1*  25.3*   PLT  261  250     Recent Labs      07/03/18   0506  07/02/18   0327  07/01/18   0439   NA  139  138  137   K  4.0  4.3  4.0   CL  100  100  100   CO2  28  25  27   BUN  18  35*  27*   CREA  4.56*  6.94*  5.75*   GLU  85  80  78   CA  7.9*  7.3*  7.3*     No results for input(s): SGOT, GPT, AP, TBIL, TP, ALB, GLOB, GGT, AML, LPSE in the last 72 hours.     No lab exists for component: AMYP, HLPSE  No results for input(s): INR, PTP, APTT in the last 72 hours. No lab exists for component: INREXT   No results for input(s): FE, TIBC, PSAT, FERR in the last 72 hours. No results found for: FOL, RBCF  No results for input(s): PH, PCO2, PO2 in the last 72 hours. No results for input(s): CPK, CKMB in the last 72 hours.     No lab exists for component: TROPONINI  Lab Results   Component Value Date/Time    Color YELLOW/STRAW 12/30/2014 06:40 PM    Appearance CLEAR 12/30/2014 06:40 PM    Specific gravity 1.015 12/30/2014 06:40 PM    pH (UA) 7.5 12/30/2014 06:40 PM    Protein 300 (A) 12/30/2014 06:40 PM    Glucose >1000 (A) 12/30/2014 06:40 PM    Ketone NEGATIVE  12/30/2014 06:40 PM    Bilirubin NEGATIVE  12/30/2014 06:40 PM    Urobilinogen 0.2 12/30/2014 06:40 PM    Nitrites NEGATIVE  12/30/2014 06:40 PM    Leukocyte Esterase NEGATIVE  12/30/2014 06:40 PM    Epithelial cells FEW 12/30/2014 06:40 PM    Bacteria NEGATIVE  12/30/2014 06:40 PM    WBC 0-4 12/30/2014 06:40 PM    RBC 5-10 12/30/2014 06:40 PM       MEDICATIONS:  Current Facility-Administered Medications   Medication Dose Route Frequency    metoprolol (LOPRESSOR) injection 1.25 mg  1.25 mg IntraVENous Q6H    nitroglycerin (NITROBID) 2 % ointment 1 Inch  1 Inch Topical Q6H PRN    ondansetron (ZOFRAN) injection 4 mg  4 mg IntraVENous Q4H PRN    heparin (porcine) injection 5,000 Units  5,000 Units SubCUTAneous Q12H    albumin human 25% (BUMINATE) solution 12.5 g  12.5 g IntraVENous DIALYSIS PRN    morphine injection 2 mg  2 mg IntraVENous Q4H PRN    lactobac ac& pc-s.therm-b.anim (DAVIN Q/RISAQUAD)  1 Cap Oral DAILY    balsam peru-castor oil (VENELEX)  mg/gram ointment   Topical BID    guaiFENesin ER (MUCINEX) tablet 1,200 mg  1,200 mg Oral Q12H    acetylcysteine (MUCOMYST) 200 mg/mL (20 %) solution 400 mg  400 mg Nebulization Q6H RT    albuterol-ipratropium (DUO-NEB) 2.5 MG-0.5 MG/3 ML  3 mL Nebulization Q6H RT    pantoprazole (PROTONIX) 40 mg in sodium chloride 0.9% 10 mL injection  40 mg IntraVENous Q12H    senna-docusate (PERICOLACE) 8.6-50 mg per tablet 2 Tab  2 Tab Oral QHS    epoetin niya (EPOGEN;PROCRIT) injection 10,000 Units  10,000 Units SubCUTAneous Q MON, WED & FRI    metroNIDAZOLE (FLAGYL) IVPB premix 500 mg  500 mg IntraVENous Q12H    aspirin delayed-release tablet 81 mg  81 mg Oral DAILY    piperacillin-tazobactam (ZOSYN) 3.375 g in 0.9% sodium chloride (MBP/ADV) 100 mL  3.375 g IntraVENous Q12H    insulin lispro (HUMALOG) injection   SubCUTAneous AC&HS    glucose chewable tablet 16 g  4 Tab Oral PRN    dextrose (D50W) injection syrg 12.5-25 g  12.5-25 g IntraVENous PRN    glucagon (GLUCAGEN) injection 1 mg  1 mg IntraMUSCular PRN    escitalopram oxalate (LEXAPRO) tablet 10 mg  10 mg Oral DAILY    sevelamer carbonate (RENVELA) tab 800 mg  800 mg Oral TID WITH MEALS    pravastatin (PRAVACHOL) tablet 10 mg  10 mg Oral QHS    vancomycin (VANCOCIN) 750 mg in 0.9% sodium chloride (MBP/ADV) 250 mL  750 mg IntraVENous DIALYSIS PRN    Vancomycin 750 mg: give at end of dialysis on dialysis days   Other DAILY

## 2018-07-03 NOTE — PROGRESS NOTES
Hospitalist Progress Note    NAME: Tierney Almendarez   :  1950   MRN:  [id number]       Assessment / Plan:  Assessment and plan:  Diabetes mellitus 2 on Insulin at home  Dysphagia due to esophageal dysmotility on MBstudy  Holding NPH considering NPO- will resume once started on tube feeding via NGT today- as per the recommendations of GI, ? PEG in future if dysphagia doesn't improve in next few days  SSI only for now  Cont daily SLP eval as per GI recommendations  Start Tube feeding after NGT- resume PO meds    Sepsis (leukocytosis and hypotension earlier during admission)  Due to Aspiration PNA  ? Hypoxia (he was on 4L O2 but can't find O2 sat < 90 in the chart)  On IV Zosyn, Vancomycin and Flagyl  May only need Zosyn  Consider d/c other abx soon if ok with vascular surgery  Off O2  speech eval ongoing, s/p barium swallow results pending  NPO for now  Pulmonary and GI also seems to be on the case  Per GI pt had normal EGD 2 weeks ago, symptoms consistent with dysphagia, meds held today as couldn't even swallow meds  May need PEG placement if not improved soon  CTA chest with Debris within the trachea and esophagus as well as the lingular and left lower lobe airways, with associated left basilar airspace disease. Small right pleural effusion. 12 mm groundglass nodule right lower lobe warrants close follow-up or PET  scan. ESRD  Nephrology is following for ongoing HD    Hyperlipidemia  On statins, will give if able to    Hypertension  Switch Coreg to IV BB with parametes  Add PRN nitropaste    Multiple sclerosis   ?  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     CAD hx, now presenting with no chest pain and nearly flat trend of troponin and marginally abnormal EKG with ST -T changes   agree with cardiologist Conservative approach is ideal under the current circumstances.      ESRD / HD ~ 8 years   PAD   S/p remote partial (Syme) amputation of RIGHT foot.      Body mass index is 23.13 kg/(m^2). Full Code, Wife is POA     Subjective: Pt seen and examined at bedside. Breathing better, still unable to swallow. Overnight events d/w RN     Chief Complaint / Reason for Physician Visit: f/u Aspiration PNA, ESRD, CAD, DM, HTN, dysphagia    Review of Systems:  Symptom Y/N Comments  Symptom Y/N Comments   Fever/Chills n   Chest Pain n    Poor Appetite    Edema     Cough y   Abdominal Pain n    Sputum    Joint Pain     SOB/BANKS y improving  Pruritis/Rash     Nausea/vomit    Tolerating PT/OT     Diarrhea    Tolerating Diet     Constipation    Other y Difficulty swallowing     Could NOT obtain due to:      Objective:     VITALS:   Last 24hrs VS reviewed since prior progress note.  Most recent are:  Patient Vitals for the past 24 hrs:   Temp Pulse Resp BP SpO2   07/03/18 1109 98.2 °F (36.8 °C) 82 20 147/61 -   07/03/18 0911 - - - - 98 %   07/03/18 0734 98.1 °F (36.7 °C) 84 18 152/63 -   07/03/18 0600 - 82 - 138/57 -   07/03/18 0400 98.2 °F (36.8 °C) 84 20 135/63 100 %   07/03/18 0244 - - - - 100 %   07/03/18 0200 - 82 - 142/62 -   07/03/18 0000 98.5 °F (36.9 °C) 75 20 129/57 100 %   07/02/18 2200 - 93 - 145/67 -   07/02/18 2149 - 92 - 160/67 -   07/02/18 1930 - - - - 100 %   07/02/18 1900 98.3 °F (36.8 °C) 82 20 129/48 100 %   07/02/18 1845 - 84 - 120/49 -   07/02/18 1830 - 93 - 114/63 -   07/02/18 1823 - 93 - (!) 124/95 -   07/02/18 1818 - (!) 118 - (!) 124/95 -   07/02/18 1815 - 90 - 153/52 -   07/02/18 1750 - 83 - 130/63 -   07/02/18 1745 97 °F (36.1 °C) 82 16 130/63 100 %   07/02/18 1730 - 82 12 148/48 100 %   07/02/18 1700 98 °F (36.7 °C) 82 12 120/40 100 %   07/02/18 1630 - 82 12 114/73 100 %   07/02/18 1600 - 79 12 (!) 148/38 100 %   07/02/18 1550 - 80 - 128/56 -   07/02/18 1545 - 81 12 113/75 100 %   07/02/18 1530 - 80 12 126/53 100 %   07/02/18 1500 - 79 12 136/42 100 %   07/02/18 1430 - 83 12 (!) 182/39 100 %   07/02/18 1425 97.5 °F (36.4 °C) 78 12 (!) 173/33 100 %       Intake/Output Summary (Last 24 hours) at 07/03/18 1208  Last data filed at 07/02/18 1900   Gross per 24 hour   Intake              400 ml   Output             1700 ml   Net            -1300 ml        PHYSICAL EXAM:  General: WD, WN. Alert, cooperative, no acute distress    EENT:  EOMI. Anicteric sclerae. MMM  Resp:  crackles, no wheezing or rales. No accessory muscle use  CV:  Regular  rhythm,  No edema  GI:  Soft, Non distended, Non tender.  +Bowel sounds  Neurologic:  Alert and oriented X 3, normal speech,   Psych:   Good insight. Not anxious nor agitated  Skin:  No rashes. No jaundice    Reviewed most current lab test results and cultures  YES  Reviewed most current radiology test results   YES  Review and summation of old records today    NO  Reviewed patient's current orders and MAR    YES  PMH/ reviewed - no change compared to H&P  ________________________________________________________________________  Care Plan discussed with:    Comments   Patient x    Family  x Wife   RN x    Care Manager     Consultant  x Dr Trav Mason (GI), Dr Natalie Carmona (Nephrology)                     Multidiciplinary team rounds were held today with , nursing, pharmacist and clinical coordinator. Patient's plan of care was discussed; medications were reviewed and discharge planning was addressed.      ________________________________________________________________________  Total NON critical care TIME:  26 Minutes    Total CRITICAL CARE TIME Spent:   Minutes non procedure based      Comments   >50% of visit spent in counseling and coordination of care     ________________________________________________________________________  Kia Haley MD     Procedures: see electronic medical records for all procedures/Xrays and details which were not copied into this note but were reviewed prior to creation of Plan. LABS:  I reviewed today's most current labs and imaging studies.   Pertinent labs include:  Recent Labs      07/03/18   0506  07/02/18 0327 07/01/18 0439   WBC  15.3*  14.6*  15.8*   HGB  8.3*  7.8*  7.9*   HCT  27.1*  25.3*  25.5*   PLT  261  250  239     Recent Labs      07/03/18   0506  07/02/18 0327 07/01/18 0439   NA  139  138  137   K  4.0  4.3  4.0   CL  100  100  100   CO2  28  25  27   GLU  85  80  78   BUN  18  35*  27*   CREA  4.56*  6.94*  5.75*   CA  7.9*  7.3*  7.3*       Signed: Demarcus Enriquez MD

## 2018-07-04 NOTE — PROGRESS NOTES
Hospitalist Progress Note    NAME: Batool Reyes   :  1950   MRN:  [id number]       Assessment / Plan:  Assessment and plan:  Diabetes mellitus 2 on Insulin at home  Dysphagia due to esophageal dysmotility on MBstudy  Holding NPH considering NPO- will resume once started on tube feeding via NGT - pt has apparently refused NGT placement yesterday   ? PEG in future if dysphagia doesn't improve in next few days- MBS tomorrow as per SLP  SSI only for now  Cont daily SLP eval as per GI recommendations  When able to start Tube feeding after NGT- resume PO meds    Sepsis (leukocytosis and hypotension earlier during admission)  Due to Aspiration PNA  ? Hypoxia (he was on 4L O2 but can't find O2 sat < 90 in the chart)  On IV Zosyn, Vancomycin and Flagyl  May only need Zosyn  Consider d/c other abx soon if ok with vascular surgery  Off O2  speech eval ongoing, s/p barium swallow results pending  NPO for now  Pulmonary and GI also seems to be on the case  Per GI pt had normal EGD 2 weeks ago, symptoms consistent with dysphagia, meds held today as couldn't even swallow meds  May need PEG placement if not improved soon  CTA chest with Debris within the trachea and esophagus as well as the lingular and left lower lobe airways, with associated left basilar airspace disease. Small right pleural effusion. 12 mm groundglass nodule right lower lobe warrants close follow-up or PET  scan. ESRD  Nephrology is following for ongoing HD    Hyperlipidemia  On statins, will give if able to    Hypertension  Switch Coreg to IV BB with parametes  Add PRN nitropaste    Multiple sclerosis   ?  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     CAD hx, now presenting with no chest pain and nearly flat trend of troponin and marginally abnormal EKG with ST -T changes   agree with cardiologist   Conservative approach is ideal under the current circumstances.      ESRD / HD ~ 8 years   PAD   S/p remote partial (Syme) amputation of RIGHT foot.      Body mass index is 23.13 kg/(m^2). Full Code, Wife is POA     Subjective: Pt seen and examined at bedside. Breathing better, still unable to swallow. Overnight events d/w RN     Chief Complaint / Reason for Physician Visit: f/u Aspiration PNA, ESRD, CAD, DM, HTN, dysphagia    Review of Systems:  Symptom Y/N Comments  Symptom Y/N Comments   Fever/Chills n   Chest Pain n    Poor Appetite    Edema     Cough y   Abdominal Pain n    Sputum    Joint Pain     SOB/BANKS y improving  Pruritis/Rash     Nausea/vomit    Tolerating PT/OT     Diarrhea    Tolerating Diet     Constipation    Other y Difficulty swallowing     Could NOT obtain due to:      Objective:     VITALS:   Last 24hrs VS reviewed since prior progress note. Most recent are:  Patient Vitals for the past 24 hrs:   Temp Pulse Resp BP SpO2   07/04/18 1121 98.1 °F (36.7 °C) 86 20 155/63 96 %   07/04/18 0725 98.2 °F (36.8 °C) 85 18 154/67 100 %   07/04/18 0337 98.1 °F (36.7 °C) 88 20 155/68 97 %   07/04/18 0200 - 92 - 162/74 94 %   07/03/18 2204 98.4 °F (36.9 °C) 90 20 166/66 99 %   07/03/18 2200 - 94 - 175/81 100 %   07/03/18 2034 - - - - 97 %   07/03/18 2000 - 87 - 154/66 92 %   07/03/18 1916 98.7 °F (37.1 °C) 88 20 159/69 97 %   07/03/18 1400 98.4 °F (36.9 °C) 86 20 153/68 -       Intake/Output Summary (Last 24 hours) at 07/04/18 1135  Last data filed at 07/04/18 0631   Gross per 24 hour   Intake              300 ml   Output                0 ml   Net              300 ml        PHYSICAL EXAM:  General: WD, WN. Alert, cooperative, no acute distress    EENT:  EOMI. Anicteric sclerae. MMM  Resp:  crackles, no wheezing or rales.   No accessory muscle use  CV:  Regular  rhythm,  No edema  GI:  Soft, Non distended, Non tender.  +Bowel sounds  Neurologic:  Alert and oriented X 3, normal speech,   Psych:   Good insight. Not anxious nor agitated  Skin:  No rashes. No jaundice    Reviewed most current lab test results and cultures  YES  Reviewed most current radiology test results   YES  Review and summation of old records today    NO  Reviewed patient's current orders and MAR    YES  PMH/SH reviewed - no change compared to H&P  ________________________________________________________________________  Care Plan discussed with:    Comments   Patient x    Family      RN x    Care Manager     Consultant  x Dr Duke Yu (GI), Dr Henry Berry (Nephrology) yesterday                     Multidiciplinary team rounds were held today with , nursing, pharmacist and clinical coordinator. Patient's plan of care was discussed; medications were reviewed and discharge planning was addressed. ________________________________________________________________________  Total NON critical care TIME:  16 Minutes    Total CRITICAL CARE TIME Spent:   Minutes non procedure based      Comments   >50% of visit spent in counseling and coordination of care     ________________________________________________________________________  Irving De Souza MD     Procedures: see electronic medical records for all procedures/Xrays and details which were not copied into this note but were reviewed prior to creation of Plan. LABS:  I reviewed today's most current labs and imaging studies.   Pertinent labs include:  Recent Labs      07/04/18   0314  07/03/18   0506  07/02/18   0327   WBC  17.0*  15.3*  14.6*   HGB  8.3*  8.3*  7.8*   HCT  27.1*  27.1*  25.3*   PLT  311  261  250     Recent Labs      07/04/18   0314  07/03/18   0506  07/02/18   0327   NA  138  139  138   K  4.2  4.0  4.3   CL  101  100  100   CO2  26  28  25   GLU  74  85  80   BUN  23*  18  35*   CREA  5.94*  4.56*  6.94*   CA  8.0*  7.9*  7.3*   MG  2.3   --    --    PHOS  5.4*   --    --        Signed: Irving De Souza MD

## 2018-07-04 NOTE — PROGRESS NOTES
Pt continues to refuse NGT, will continue to reassess and monitor.      Contacted pt spouse Tatiana Es about pt refusing NGT placement, she is aware and will discuss with  when she arrives at the hospital 7/4/2018    Pt agreeable to NGT placement, NGT placed awaiting KUB for confirmation

## 2018-07-04 NOTE — DIALYSIS
[facility]  [phone]    Vitals   Pre   Post   Assessment   Pre   Post     Temp  Temp: 98 °F (36.7 °C) (07/04/18 1145)  98.5 oral LOC  Alert and oriented x 4; cooperative Alert and oriented x 4; cooperative   HR   Pulse (Heart Rate): 81 (07/04/18 1200) 88 Lungs   CTAB upper lobes and diminished bases; on RA and w/o c/o SOB CTAB upper lobes and diminished bases; on RA and w/o c/o SOB   B/P   BP: 157/59 (07/04/18 1200) 158/66 Cardiac   S1S2 and w/o c/o CP S1S2 and w/o c/o CP   Resp   Resp Rate: 20 (07/04/18 1200) 20 Skin   Warm and dry; note old R foot amputation and L foot in support boot Warm and dry; note old R foot amputation and L foot in support boot   Pain level  Pain Intensity 1: 0 (07/04/18 0725) No complaints Edema  Mild generalized Improved; achieved 2 kg fluid removal w/o issue   Orders:    Duration:   Start:   1200 End:   1530 Total:   3.5 hours     Dialyzer:   Dialyzer/Set Up Inspection: Revaclear (07/04/18 1200)   K Bath:   Dialysate K (mEq/L): 3 (07/04/18 1200)   Ca Bath:   Dialysate CA (mEq/L): 2.5 (07/04/18 1200)   Na/Bicarb:   Dialysate NA (mEq/L): 140 (07/04/18 1200)   Target Fluid Removal:   Goal/Amount of Fluid to Remove (mL): 2000 mL (07/04/18 1200)   Access     Type & Location:   DENI AVF   Labs     Obtained/Reviewed   Critical Results Called   Date when labs were drawn-  Hgb-    HGB   Date Value Ref Range Status   07/04/2018 8.3 (L) 12.1 - 17.0 g/dL Final     K-    Potassium   Date Value Ref Range Status   07/04/2018 4.2 3.5 - 5.1 mmol/L Final     Ca-   Calcium   Date Value Ref Range Status   07/04/2018 8.0 (L) 8.5 - 10.1 MG/DL Final     Bun-   BUN   Date Value Ref Range Status   07/04/2018 23 (H) 6 - 20 MG/DL Final     Creat-   Creatinine   Date Value Ref Range Status   07/04/2018 5.94 (H) 0.70 - 1.30 MG/DL Final     Comment:     INVESTIGATED PER DELTA CHECK PROTOCOL        Medications/ Blood Products Given     Name   Dose   Route and Time     Vancomycin 750 mg IVPB at 1415 per dialysis p&p             Blood Volume Processed (BVP):     Net Fluid   Removed:  2000 mL   Comments   Time Out Done: yes, at 1145  Primary Nurse Rpt Pre: Chintan Cherry RN  Primary Nurse Rpt Post: Chintan Cherry RN  Pt Education:  discussed aseptic technique and infection control  Care Plan: patient will complete the ordered tx as prescribed  Tx Summary: arrived to pt room at or about 1100 and set up and tested equipment per order and policy; order includes RTD 3.5 hours on 3251 bath and attempt to achieve 2 kg fluid removal, as tolerated by pt, , ; pt assessed prior to tx and found to be stable for HD and to have a DENI AVF w aneurysm on distal portion, which was found to be patent and aseptically cannulated w 15 ga x 2 needles and tx initiated w/o issue; pt completed all of the ordered tx and achieved 2 kg fluid removal w/o issue; all blood was rinsed back and patient was aseptically de-cannulated, pressure dressings applied and hemostasis was achieved w/o prolonged bleeding, dressings CDI and pt was stable at the time of my departure and report given to the primary nurse  Admiting Diagnosis: ABD pain w n/v x 3 days / [facility]  Pt's previous clinic- MWF at [facility]  Consent signed - Informed Consent Verified: Yes (07/04/18 1200)  [facility] Consent - visualized  Hepatitis Status- Ag neg and Ab 18 as of 7/2/2018  Machine #- Machine Number: [id number] (07/04/18 1200)  Telemetry status- on and monitored at bedside and remotely  Pre-dialysis wt. - Pre-Dialysis Weight: 66.5 kg (146 lb 9.7 oz) (07/04/18 1200)

## 2018-07-04 NOTE — PROGRESS NOTES
Vascular Surgery  --no complaints--on HD now  --both legs are well perfused  --swallow study tomorrow   --appreciate everyone's help

## 2018-07-04 NOTE — ROUTINE PROCESS
PCU SHIFT NURSING NOTE      Bedside and Verbal shift change report given to Lana (oncoming nurse) by Angelika Avelar (offgoing nurse). Report included the following information SBAR, Kardex, MAR, Recent Results and Med Rec Status. Shift Summary:       Admission Date 6/28/2018   Admission Diagnosis ILIAC ANEURYSM  Pseudoaneurysm of iliac artery (HCC)  VASCULAR INSUFFICIENCY   Consults IP CONSULT TO VASCULAR SURGERY  IP CONSULT TO NEPHROLOGY  IP CONSULT TO INTENSIVIST  IP CONSULT TO GASTROENTEROLOGY  IP CONSULT TO NEUROLOGY  IP CONSULT TO PALLIATIVE CARE - PROVIDER  IP CONSULT TO CARDIOLOGY  IP CONSULT TO HOSPITALIST        Consults   []PT   []OT   []Speech   []Case Management      [] Palliative      Cardiac Monitoring Order   [x]Yes   []No     IV drips   []Yes    Drip:                            Dose:  Drip:                            Dose:  Drip:                            Dose:   [x]No     GI Prophylaxis   []Yes   []No         DVT Prophylaxis   SCDs:  Sequential Compression Device: Bilateral     Patient Refused VTE Prophylaxis: Yes    Danny stockings:         [] Medication   []Contraindicated   []None      Activity Level Activity Level: Bed Rest     Activity Assistance: Complete care   Purposeful Rounding every 1-2 hour? [x]Yes   Lopez Score  Total Score: 3   Bed Alarm (If score 3 or >)   []Yes   [] Refused (See signed refusal form in chart)   Delbert Score  Delbert Score: 11   Delbert Score (if score 14 or less)   []PMT consult   []Wound Care consult      []Specialty bed   [x] Nutrition consult          Needs prior to discharge:   Home O2 required:    []Yes   []No    If yes, how much O2 required?     Other:    Last Bowel Movement: Last Bowel Movement Date: 07/01/18      Influenza Vaccine Received Flu Vaccine for Current Season (usually Sept-March): Not Flu Season        Pneumonia Vaccine           Diet Active Orders   Diet    DIET NPO With Tube Feedings      LDAs               Peripheral IV 06/30/18 Right Forearm (Active) Site Assessment Clean, dry, & intact 7/3/2018  3:30 PM   Phlebitis Assessment 0 7/3/2018  3:30 PM   Infiltration Assessment 0 7/3/2018  3:30 PM   Dressing Status Clean, dry, & intact 7/3/2018  3:30 PM   Dressing Type Transparent 7/3/2018  3:30 PM   Hub Color/Line Status Pink 7/3/2018  3:30 PM   Action Taken Open ports on tubing capped 7/3/2018  3:30 PM   Alcohol Cap Used Yes 7/3/2018  3:30 PM            Airway - Endotracheal Tube 06/28/18 (Active)   Line Bernard Lips 6/28/2018 12:00 AM              Urinary Catheter      Intake & Output   Date 07/02/18 1900 - 07/03/18 0659 07/03/18 0700 - 07/04/18 0659   Shift 1043-2578 24 Hour Total 1371-2118 4526-4624 24 Hour Total   I  N  T  A  K  E   I.V.  (mL/kg/hr) 400 400         Volume (metroNIDAZOLE (FLAGYL) IVPB premix 500 mg) 200 200         Volume (piperacillin-tazobactam (ZOSYN) 3.375 g in 0.9% sodium chloride (MBP/ADV) 100 mL) 200 200       Shift Total  (mL/kg) 400  (6) 400  (6)      O  U  T  P  U  T   Stool           Stool Occurrence(s) 5 x 5 x       Dialysis  1700         NET Fluid Removed (mL)  1700       Shift Total  (mL/kg)  1700  (25.4)       -1300      Weight (kg) 66.9 66.9 66.9 66.9 66.9         Readmission Risk Assessment Tool Score High Risk            39       Total Score        3 Has Seen PCP in Last 6 Months (Yes=3, No=0)    2 . Living with Significant Other. Assisted Living. LTAC. SNF. or   Rehab    3 Patient Length of Stay (>5 days = 3)    4 IP Visits Last 12 Months (1-3=4, 4=9, >4=11)    5 Pt.  Coverage (Medicare=5 , Medicaid, or Self-Pay=4)    22 Charlson Comorbidity Score (Age + Comorbid Conditions)       Expected Length of Stay 5d 9h   Actual Length of Stay 5

## 2018-07-04 NOTE — PROGRESS NOTES
Nutrition: Patient has been refusing NGT. Will discontinue TF orders until patient is agreeable or PEG is placed. MBS Thursday. Will continue to monitor plan of care. If patient becomes agreeable to NGT, orders can be restarted. Please see recommendations from RD note on 7/3. Thank you.   Yakelin Albert RD

## 2018-07-04 NOTE — PROGRESS NOTES
NAME: Whit Oswald        :  1950        MRN:  [id number]        Assessment :    Plan:  --ESRD  Hypotension/sepsis  Left AVF  Anemia of CKD  SHPT  Usually HTN  DM   Right iliac pseudoaneuysm  Hypoxia/sob Usually MWF at [facility]-  He is on Parsabiv at outpatient unit; d/c'd Sensipar (cannot give with recent Parsabiv b/c risk of low Calcium); on Renvela     epo 10 k sc tiw. Prn prbc's.      Pt refused ng-I think he'll need a PEG to support him through this. GI to fu tomorrow--hold baby aspirin, check coags in preparation       Subjective:     Chief Complaint:  Asleep earlier on rounds  Review of Systems:    Symptom Y/N Comments  Symptom Y/N Comments   Fever/Chills    Chest Pain     Poor Appetite    Edema     Cough    Abdominal Pain     Sputum    Joint Pain     SOB/BANKS    Pruritis/Rash     Nausea/vomit    Tolerating PT/OT     Diarrhea    Tolerating Diet     Constipation    Other       Could not obtain due to:      Objective:     VITALS:   Last 24hrs VS reviewed since prior progress note.  Most recent are:  Visit Vitals    /67    Pulse 85    Temp 98.2 °F (36.8 °C)    Resp 18    Ht 5' 10\" (1.778 m)    Wt 66.6 kg (146 lb 13.2 oz)    SpO2 100%    BMI 21.07 kg/m2       Intake/Output Summary (Last 24 hours) at 18 0956  Last data filed at 18 0631   Gross per 24 hour   Intake              300 ml   Output                0 ml   Net              300 ml      Telemetry Reviewed:     PHYSICAL EXAM:  General:  no acute distress  Resp:  Clear anteriorly  CV:  Regular  rhythm, No edema  GI:  Soft,   EXT  S/p (R) foot amputation      Lab Data Reviewed: (see below)    Medications Reviewed: (see below)    PMH/SH reviewed - no change compared to H&P  ________________________________________________________________________  Care Plan discussed with:  Patient     Family      RN     Care Manager                    Consultant: Comments   >50% of visit spent in counseling and coordination of care       ________________________________________________________________________  Tanya Renae MD     Procedures: see electronic medical records for all procedures/Xrays and details which  were not copied into this note but were reviewed prior to creation of Plan. LABS:  Recent Labs      07/04/18 0314 07/03/18   0506   WBC  17.0*  15.3*   HGB  8.3*  8.3*   HCT  27.1*  27.1*   PLT  311  261     Recent Labs      07/04/18 0314 07/03/18   0506  07/02/18   0327   NA  138  139  138   K  4.2  4.0  4.3   CL  101  100  100   CO2  26  28  25   BUN  23*  18  35*   CREA  5.94*  4.56*  6.94*   GLU  74  85  80   CA  8.0*  7.9*  7.3*   MG  2.3   --    --    PHOS  5.4*   --    --      No results for input(s): SGOT, GPT, AP, TBIL, TP, ALB, GLOB, GGT, AML, LPSE in the last 72 hours. No lab exists for component: AMYP, HLPSE  No results for input(s): INR, PTP, APTT in the last 72 hours. No lab exists for component: INREXT, INREXT   No results for input(s): FE, TIBC, PSAT, FERR in the last 72 hours. No results found for: FOL, RBCF   No results for input(s): PH, PCO2, PO2 in the last 72 hours. No results for input(s): CPK, CKMB in the last 72 hours.     No lab exists for component: TROPONINI  No components found for: [place]  Lab Results   Component Value Date/Time    Color YELLOW/STRAW 12/30/2014 06:40 PM    Appearance CLEAR 12/30/2014 06:40 PM    Specific gravity 1.015 12/30/2014 06:40 PM    pH (UA) 7.5 12/30/2014 06:40 PM    Protein 300 (A) 12/30/2014 06:40 PM    Glucose >1000 (A) 12/30/2014 06:40 PM    Ketone NEGATIVE  12/30/2014 06:40 PM    Bilirubin NEGATIVE  12/30/2014 06:40 PM    Urobilinogen 0.2 12/30/2014 06:40 PM    Nitrites NEGATIVE  12/30/2014 06:40 PM    Leukocyte Esterase NEGATIVE  12/30/2014 06:40 PM    Epithelial cells FEW 12/30/2014 06:40 PM    Bacteria NEGATIVE  12/30/2014 06:40 PM    WBC 0-4 12/30/2014 06:40 PM    RBC 5-10 12/30/2014 06:40 PM       MEDICATIONS:  Current Facility-Administered Medications   Medication Dose Route Frequency    acetylcysteine (MUCOMYST) 200 mg/mL (20 %) solution 400 mg  400 mg Nebulization TID    albuterol-ipratropium (DUO-NEB) 2.5 MG-0.5 MG/3 ML  3 mL Nebulization TID RT    albuterol-ipratropium (DUO-NEB) 2.5 MG-0.5 MG/3 ML  3 mL Nebulization Q6H PRN    metoprolol (LOPRESSOR) injection 1.25 mg  1.25 mg IntraVENous Q6H    nitroglycerin (NITROBID) 2 % ointment 1 Inch  1 Inch Topical Q6H PRN    ondansetron (ZOFRAN) injection 4 mg  4 mg IntraVENous Q4H PRN    heparin (porcine) injection 5,000 Units  5,000 Units SubCUTAneous Q12H    albumin human 25% (BUMINATE) solution 12.5 g  12.5 g IntraVENous DIALYSIS PRN    morphine injection 2 mg  2 mg IntraVENous Q4H PRN    lactobac ac& pc-s.therm-b.anim (DAVIN Q/RISAQUAD)  1 Cap Oral DAILY    balsam peru-castor oil (VENELEX)  mg/gram ointment   Topical BID    guaiFENesin ER (MUCINEX) tablet 1,200 mg  1,200 mg Oral Q12H    pantoprazole (PROTONIX) 40 mg in sodium chloride 0.9% 10 mL injection  40 mg IntraVENous Q12H    senna-docusate (PERICOLACE) 8.6-50 mg per tablet 2 Tab  2 Tab Oral QHS    epoetin niya (EPOGEN;PROCRIT) injection 10,000 Units  10,000 Units SubCUTAneous Q MON, WED & FRI    metroNIDAZOLE (FLAGYL) IVPB premix 500 mg  500 mg IntraVENous Q12H    aspirin delayed-release tablet 81 mg  81 mg Oral DAILY    piperacillin-tazobactam (ZOSYN) 3.375 g in 0.9% sodium chloride (MBP/ADV) 100 mL  3.375 g IntraVENous Q12H    insulin lispro (HUMALOG) injection   SubCUTAneous AC&HS    glucose chewable tablet 16 g  4 Tab Oral PRN    dextrose (D50W) injection syrg 12.5-25 g  12.5-25 g IntraVENous PRN    glucagon (GLUCAGEN) injection 1 mg  1 mg IntraMUSCular PRN    escitalopram oxalate (LEXAPRO) tablet 10 mg  10 mg Oral DAILY    sevelamer carbonate (RENVELA) tab 800 mg  800 mg Oral TID WITH MEALS    pravastatin (PRAVACHOL) tablet 10 mg  10 mg Oral QHS    vancomycin (VANCOCIN) 750 mg in 0.9% sodium chloride (MBP/ADV) 250 mL  750 mg IntraVENous DIALYSIS PRN    Vancomycin 750 mg: give at end of dialysis on dialysis days   Other DAILY

## 2018-07-04 NOTE — PROGRESS NOTES
Speech path  MBS will be completed tomorrow to rule out aspiration and evaluate swallow objectively.    German Turpin, SLP

## 2018-07-04 NOTE — PROGRESS NOTES
ADULT PROTOCOL: JET AEROSOL  REASSESSMENT    Patient  Penelope Whyte     70 y.o.   male     7/4/2018  12:56 AM    Breath Sounds Pre Procedure: Right Breath Sounds: Diminished                               Left Breath Sounds: Diminished    Breath Sounds Post Procedure: Right Breath Sounds: Diminished                                 Left Breath Sounds: Diminished    Breathing pattern: Pre procedure Breathing Pattern: Regular          Post procedure Breathing Pattern: Regular    Heart Rate: Pre procedure Pulse: 89           Post procedure Pulse: 86    Resp Rate: Pre procedure Respirations: 20           Post procedure Respirations: 18    Peak Flow: Pre bronchodilator   n/a          Post bronchodilator   n/a    Incentive Spirometry:   n/a      n/a    Cough: Pre procedure Cough: Non-productive               Post procedure Cough: Non-productive    Sputum: Pre procedure  none                 Post procedure  none    Oxygen: O2 Device: Room air   FiO2 (%) room air     Changed: NO    SpO2: Pre procedure SpO2: 97 %   without oxygen              Post procedure SpO2: 100 %  without oxygen    Nebulizer Therapy: Current medications Aerosolized Medications: DuoNeb, Mucomyst      Changed: YES      Problem List:   Patient Active Problem List   Diagnosis Code    Hypertension I10    PAD (peripheral artery disease) ([facility]) I73.9    S/P coronary artery stent placement Z95.5    ASHD (arteriosclerotic heart disease) I25.10    Mixed hyperlipidemia E78.2    ESRD on hemodialysis ([facility]) N18.6, Z99.2    Cervical stenosis of spinal canal M48.02    Ataxia R27.0    Multiple sclerosis ([facility]) G35    Ulnar neuropathy at elbow of left upper extremity G56.22    Carpal tunnel syndrome on both sides G56.03    Ulnar neuropathy at elbow of right upper extremity G56.21    Diabetic oculomotor palsy ([facility]) E11.39, H49.00    Diabetic peripheral neuropathy associated with type 2 diabetes mellitus ([facility]) E11.42    History of colonoscopy Z98.890    Type 2 diabetes with nephropathy (HCC) E11.21    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

## 2018-07-04 NOTE — PROGRESS NOTES
1915-Bedside report received from [address] [place] [address]  Pt refused NGT - VSS- continue to monitor    2145-pt requesting a cup of water- explained to pt the risk of aspiration if he was to drink water- revisited NGT placement and pt stated \"No\".      Pt turned and repositioned q2hrs throughout the shift  Oral hygiene     0720-bedside report given to Phillip Powers

## 2018-07-04 NOTE — PROGRESS NOTES
Patient refused neb txs. His chest xray is clear and his sats are 97% on room air. May we discontinue nebs with mucomyst and Duo neb?

## 2018-07-05 NOTE — PROGRESS NOTES
Patient was seen and examined and revisited. I spoke to him at length. He refuses MRI and treatment for Multiple Sclerosis. There is nothing further to offer him unless he is willing to get treatment.

## 2018-07-05 NOTE — WOUND CARE
Wound care attempted to see this patient today but he is off of the unit again. Awaiting a thorough nursing assessment of the consulted wound - sacrum. Will see patient when he returns today to the unit.    Alexandro Thompson RN, BSN, [address] [place] [address]

## 2018-07-05 NOTE — PROGRESS NOTES
Problem: Self Care Deficits Care Plan (Adult)  Goal: *Acute Goals and Plan of Care (Insert Text)  Occupational Therapy Goals  Initiated 7/2/2018  1. Patient will perform grooming in supported sitting with supervision/set-up within 7 day(s). 2.  Patient will feed himself with supervision and cues as needed within 7 day(s). 3.  Patient will perform upper body dressing with minimal assistance within 7 day(s). 4.  Patient will participate in assessment of toilet transfer and establish goals as appropriate within 7 day(s). 5.  Patient will participate in upper extremity therapeutic exercise with minimal cues for 10 minutes within 7 day(s). Occupational Therapy TREATMENT  Patient: Fay Roberto (84 y.o. male)  Date: 7/5/2018  Diagnosis: ILIAC ANEURYSM  Pseudoaneurysm of iliac artery (HCC)  VASCULAR INSUFFICIENCY  needs peg tube Pseudoaneurysm of iliac artery (HCC)  Procedure(s) (LRB):  RIGHT LEG ANGIOGRAM AND ILIAC ANEURYSM REPAIR (Right) 7 Days Post-Op  Precautions: Contact, Fall, Aspiration  Chart, occupational therapy assessment, plan of care, and goals were reviewed. ASSESSMENT:  Nursing cleared pt for therapy. Pt performed bed mobility-rolling with minimal assistance and was total assistance X2 for supine to and from sitting. Once seated EOB, pt demonstrated very poor trunk control, including head/neck control requiring maximal assistance X2 for safety. VS were stable and plan was to use jessy lift to work on standing, however, pt was incontinent of bowel (this is new) and was returned to bed level for cleaning requiring total assistance. Pt is profoundly weak overall-has no strength in his BLEs, very poor in his trunk and head/neck. He is able to grasp onto the bed/bed rail to assist in supporting himself at the EOB, but his BUEs are also weak.       Since pt was recently walking and independent in adls about a week ago, and now he is profoundly weak with new bowel incontinence, and with his MS history, recommend that pt have a Neurology Consult to determine cause for this weakness and hopefully provide appropriate medical treatment. Pt reported that he'd only had one MS exacerbation in the past.  He was diagnosed with MS in 2001 and reports having many medical issues in 2010 when he started dialysis. Recommend neurology consult. Pt will need rehab at discharge. Progression toward goals:  []       Improving appropriately and progressing toward goals  []       Improving slowly and progressing toward goals  [x]       Making minimal  progress toward goals     PLAN:  Patient continues to benefit from skilled intervention to address the above impairments. Continue treatment per established plan of care. Discharge Recommendations:  Rehab  Further Equipment Recommendations for Discharge:  tbd     SUBJECTIVE:   Patient stated  I started dialysis in 2010.     OBJECTIVE DATA SUMMARY:   Cognitive/Behavioral Status:  Neurologic State: Alert  Orientation Level: Disoriented to time;Oriented to person;Oriented to place;Oriented to situation  Cognition: Follows commands  Perception: Appears intact  Perseveration: No perseveration noted  Safety/Judgement:  (fearful of falling while seated with total support at EOB)    Functional Mobility and Transfers for ADLs:  Bed Mobility:  Rolling: Minimum assistance;Assist x2; Additional time  Supine to Sit: Maximum assistance;Assist x2; Additional time  Sit to Supine: Maximum assistance;Assist x2; Additional time  Scooting: Maximum assistance;Assist x2; Additional time    Transfers:  Sit to Stand:  (unable this date)     Bed to Chair:  (not safe at this time-trunk control is very poor)    Balance:  Sitting: Impaired  Sitting - Static: Poor (constant support)  Sitting - Dynamic: None  Standing:  (unable to test)    ADL Intervention:                                Toileting  Toileting Assistance: Total assistance(dependent)  Bowel Hygiene:  Total assistance (dependent)  Clothing Management: Total assistance (dependent)    Cognitive Retraining  Safety/Judgement:  (fearful of falling while seated with total support at EOB)     Total assist for donning prosthesis-R which is about 8years old and may need refitting. Needed 3 of pt socks to improve fit. Neuro Re-Education:   performed supine to sit and sat briefly EOB, he is profoundly weak requiring total support at EOB        Pain:  Pain Scale 1: Numeric (0 - 10)  Pain Intensity 1: 7  Pain Location 1: Flank  Pain Orientation 1: Right     Pain Intervention(s) 1: Medication (see MAR)  Activity Tolerance:   Pt reported feeling dizzy when sitting up, however, BP could not safely be taken in sitting and when pt was placed in supine, BP was stable. .    When lying flat, pt reported having difficulty breathing during bowel incontinence care, and nursing placed pt on nasal cannula. O2 sats in high 90s   Please refer to the flowsheet for vital signs taken during this treatment.   After treatment:   [] Patient left in no apparent distress sitting up in chair  [x] Patient left in no apparent distress in bed with nursing techs finishing cleaning pt  [x] Call bell left within reach  [x] Nursing notified  [] Caregiver present  [] Bed alarm activated    COMMUNICATION/COLLABORATION:   The patients plan of care was discussed with: Physical Therapist, Registered Nurse and Certified Nursing [id number] [place] Prashanth, OTR/L  Time Calculation: 41 mins

## 2018-07-05 NOTE — PROGRESS NOTES
Palliative Medicine  [facility]: [phone])  [facility]: [phone])      LCSW went by room to see patient, but he is off the floor for a procedure. Phone call made to wife, Tatiana Suggs, to introduce role of palliative team and to see if she can meet with us today. She indicated that she would be at the hospital, \"after work around 3:30 pm\". Palliative team will meet her and patient this afternoon. The best number to reach wife is #[phone].

## 2018-07-05 NOTE — PROGRESS NOTES
Comfortable  RA sats OK  WBC still 17K  CXR has been normal despite CT chest findings    'We' need to make some decisions after MBS today about nutrition, treating his MS, and other big picture questions. ...... Reese Perera Palliative care input here critical regarding pt goals for care, symptom management, etc.     Have ordered repeat CXR and MBS

## 2018-07-05 NOTE — PROGRESS NOTES
NAME: Radha Douglas        :  1950        MRN:  [id number]        Assessment :    Plan:  --ESRD  Hypotension/sepsis  Left AVF  Anemia of CKD  SHPT  Usually HTN  DM   Right iliac pseudoaneuysm  Hypoxia/sob Usually MWF at [facility]-  He is on Parsabiv at outpatient unit; d/c'd Sensipar (cannot give with recent Parsabiv b/c risk of low Calcium); on Renvela     epo 10 k sc tiw. Prn prbc's.      Pt pulled ng out-I think he'll need a PEG to support him through this. GI to fu tomorrow--hold baby aspirin, check coags in preparation  Palliative care to see-       Subjective:     Chief Complaint:  Asleep earlier on rounds  Review of Systems:    Symptom Y/N Comments  Symptom Y/N Comments   Fever/Chills    Chest Pain     Poor Appetite    Edema     Cough    Abdominal Pain     Sputum    Joint Pain     SOB/BANKS    Pruritis/Rash     Nausea/vomit    Tolerating PT/OT     Diarrhea    Tolerating Diet     Constipation    Other       Could not obtain due to:      Objective:     VITALS:   Last 24hrs VS reviewed since prior progress note.  Most recent are:  Visit Vitals    /73    Pulse 82    Temp 97.5 °F (36.4 °C)    Resp 18    Ht 5' 10\" (1.778 m)    Wt 67.8 kg (149 lb 7.6 oz)    SpO2 100%    BMI 21.45 kg/m2       Intake/Output Summary (Last 24 hours) at 18 1123  Last data filed at 18 1530   Gross per 24 hour   Intake                0 ml   Output             2000 ml   Net            -2000 ml      Telemetry Reviewed:     PHYSICAL EXAM:  General:  no acute distress  Resp:  Clear anteriorly  CV:  Regular  rhythm, No edema  GI:  Soft,   EXT  S/p (R) foot amputation      Lab Data Reviewed: (see below)    Medications Reviewed: (see below)    PMH/SH reviewed - no change compared to H&P  ________________________________________________________________________  Care Plan discussed with:  Patient     Family      RN     Care Manager Consultant:          Comments   >50% of visit spent in counseling and coordination of care       ________________________________________________________________________  Murray Wilks MD     Procedures: see electronic medical records for all procedures/Xrays and details which  were not copied into this note but were reviewed prior to creation of Plan. LABS:  Recent Labs      07/05/18 0157 07/04/18 0314   WBC  17.1*  17.0*   HGB  10.1*  8.3*   HCT  33.8*  27.1*   PLT  325  311     Recent Labs      07/05/18 0157 07/04/18 0314 07/03/18   0506   NA  138  138  139   K  4.0  4.2  4.0   CL  99  101  100   CO2  30  26  28   BUN  12  23*  18   CREA  4.08*  5.94*  4.56*   GLU  89  74  85   CA  8.6  8.0*  7.9*   MG  2.2  2.3   --    PHOS  4.3  5.4*   --      No results for input(s): SGOT, GPT, AP, TBIL, TP, ALB, GLOB, GGT, AML, LPSE in the last 72 hours. No lab exists for component: AMYP, HLPSE  Recent Labs      07/05/18 0157   INR  1.2*   PTP  12.4*      No results for input(s): FE, TIBC, PSAT, FERR in the last 72 hours. No results found for: FOL, RBCF   No results for input(s): PH, PCO2, PO2 in the last 72 hours. No results for input(s): CPK, CKMB in the last 72 hours.     No lab exists for component: TROPONINI  No components found for: [place]  Lab Results   Component Value Date/Time    Color YELLOW/STRAW 12/30/2014 06:40 PM    Appearance CLEAR 12/30/2014 06:40 PM    Specific gravity 1.015 12/30/2014 06:40 PM    pH (UA) 7.5 12/30/2014 06:40 PM    Protein 300 (A) 12/30/2014 06:40 PM    Glucose >1000 (A) 12/30/2014 06:40 PM    Ketone NEGATIVE  12/30/2014 06:40 PM    Bilirubin NEGATIVE  12/30/2014 06:40 PM    Urobilinogen 0.2 12/30/2014 06:40 PM    Nitrites NEGATIVE  12/30/2014 06:40 PM    Leukocyte Esterase NEGATIVE  12/30/2014 06:40 PM    Epithelial cells FEW 12/30/2014 06:40 PM    Bacteria NEGATIVE  12/30/2014 06:40 PM    WBC 0-4 12/30/2014 06:40 PM    RBC 5-10 12/30/2014 06:40 PM MEDICATIONS:  Current Facility-Administered Medications   Medication Dose Route Frequency    acetylcysteine (MUCOMYST) 200 mg/mL (20 %) solution 400 mg  400 mg Nebulization TID    albuterol-ipratropium (DUO-NEB) 2.5 MG-0.5 MG/3 ML  3 mL Nebulization TID RT    albuterol-ipratropium (DUO-NEB) 2.5 MG-0.5 MG/3 ML  3 mL Nebulization Q6H PRN    metoprolol (LOPRESSOR) injection 1.25 mg  1.25 mg IntraVENous Q6H    nitroglycerin (NITROBID) 2 % ointment 1 Inch  1 Inch Topical Q6H PRN    ondansetron (ZOFRAN) injection 4 mg  4 mg IntraVENous Q4H PRN    heparin (porcine) injection 5,000 Units  5,000 Units SubCUTAneous Q12H    albumin human 25% (BUMINATE) solution 12.5 g  12.5 g IntraVENous DIALYSIS PRN    morphine injection 2 mg  2 mg IntraVENous Q4H PRN    lactobac ac& pc-s.therm-b.anim (DAVIN Q/RISAQUAD)  1 Cap Oral DAILY    balsam peru-castor oil (VENELEX)  mg/gram ointment   Topical BID    guaiFENesin ER (MUCINEX) tablet 1,200 mg  1,200 mg Oral Q12H    pantoprazole (PROTONIX) 40 mg in sodium chloride 0.9% 10 mL injection  40 mg IntraVENous Q12H    senna-docusate (PERICOLACE) 8.6-50 mg per tablet 2 Tab  2 Tab Oral QHS    epoetin niya (EPOGEN;PROCRIT) injection 10,000 Units  10,000 Units SubCUTAneous Q MON, WED & FRI    piperacillin-tazobactam (ZOSYN) 3.375 g in 0.9% sodium chloride (MBP/ADV) 100 mL  3.375 g IntraVENous Q12H    insulin lispro (HUMALOG) injection   SubCUTAneous AC&HS    glucose chewable tablet 16 g  4 Tab Oral PRN    dextrose (D50W) injection syrg 12.5-25 g  12.5-25 g IntraVENous PRN    glucagon (GLUCAGEN) injection 1 mg  1 mg IntraMUSCular PRN    escitalopram oxalate (LEXAPRO) tablet 10 mg  10 mg Oral DAILY    sevelamer carbonate (RENVELA) tab 800 mg  800 mg Oral TID WITH MEALS    pravastatin (PRAVACHOL) tablet 10 mg  10 mg Oral QHS    vancomycin (VANCOCIN) 750 mg in 0.9% sodium chloride (MBP/ADV) 250 mL  750 mg IntraVENous DIALYSIS PRN

## 2018-07-05 NOTE — PROGRESS NOTES
Occupational Therapy  Will defer initiating OT at this time as transporter is taking pt off the floor for testing. Will continue to follow.

## 2018-07-05 NOTE — PROGRESS NOTES
Hospitalist Progress Note    NAME: Batool Reyes   :  1950   MRN:  [id number]       Assessment / Plan:  Assessment and plan:  Diabetes mellitus 2 on Insulin at home- FS running low due to prolonged NPO status  Dysphagia due to esophageal dysmotility on MBstudy  Holding NPH considering NPO- will resume once started on tube feeding via NGT - pt has apparently refused NGT placement till yesterday- attempted to put one in when pt agreed -- but pulled it out later before confirmation KUB could be done   ? PEG in future if dysphagia doesn't improve in next few days- MBS today as per SLP  SSI only for now  Cont daily SLP eval as per GI recommendations  When able to start Tube feeding after NGT- resume PO meds    Sepsis (leukocytosis and hypotension earlier during admission)  Due to Aspiration PNA  ? Hypoxia (he was on 4L O2 but can't find O2 sat < 90 in the chart)  On IV Zosyn,  s/p Vancomycin  and Flagyl- now discontinued since yesterday   May only need Zosyn  Off O2  speech eval ongoing, s/p barium swallow - repeat today pending  NPO for now  Pulmonary and GI also seems to be on the case  Per GI pt had normal EGD 2 weeks ago, symptoms consistent with dysphagia, meds held today as couldn't even swallow meds  May need PEG placement if not improved soon - GI following  CTA chest with Debris within the trachea and esophagus as well as the lingular and left lower lobe airways, with associated left basilar airspace disease. Small right pleural effusion. 12 mm groundglass nodule right lower lobe warrants close follow-up or PET  scan. ESRD  Nephrology is following for ongoing HD    Hyperlipidemia  On statins, will give if able to    Hypertension  Switch Coreg to IV BB with parametes  Add PRN nitropaste    Multiple sclerosis   ?  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     CAD hx, now presenting with no chest pain and nearly flat trend of troponin and marginally abnormal EKG with ST -T changes   agree with cardiologist   Conservative approach is ideal under the current circumstances.      ESRD / HD ~ 8 years   PAD   S/p remote partial (Syme) amputation of RIGHT foot.      Body mass index is 23.13 kg/(m^2). Full Code, Wife is POA     Subjective: Pt seen and examined at bedside. Breathing better, still unable to swallow. Overnight events d/w RN     Chief Complaint / Reason for Physician Visit: f/u Aspiration PNA, ESRD, CAD, DM, HTN, dysphagia    Review of Systems:  Symptom Y/N Comments  Symptom Y/N Comments   Fever/Chills n   Chest Pain n    Poor Appetite    Edema     Cough y   Abdominal Pain n    Sputum    Joint Pain     SOB/BANKS y improving  Pruritis/Rash     Nausea/vomit    Tolerating PT/OT     Diarrhea    Tolerating Diet     Constipation    Other y Difficulty swallowing     Could NOT obtain due to:      Objective:     VITALS:   Last 24hrs VS reviewed since prior progress note.  Most recent are:  Patient Vitals for the past 24 hrs:   Temp Pulse Resp BP SpO2   07/05/18 0725 97.5 °F (36.4 °C) 82 18 163/73 100 %   07/05/18 0600 - 80 - 149/69 100 %   07/05/18 0514 - 86 - 171/74 -   07/05/18 0355 98 °F (36.7 °C) 87 20 161/76 95 %   07/04/18 2333 97.7 °F (36.5 °C) 84 20 157/75 100 %   07/04/18 2206 - 86 - 141/58 -   07/04/18 1938 98.1 °F (36.7 °C) 89 20 165/66 97 %   07/04/18 1600 98 °F (36.7 °C) 84 20 121/55 -   07/04/18 1532 - 88 20 158/66 98 %   07/04/18 1530 98.5 °F (36.9 °C) 84 20 156/66 100 %   07/04/18 1500 - 87 20 123/56 100 %   07/04/18 1430 - 84 20 149/61 100 %   07/04/18 1400 - 83 20 136/59 99 %   07/04/18 1330 - 83 20 145/62 98 %   07/04/18 1300 - 83 20 153/65 100 %   07/04/18 1230 - 81 20 152/61 100 %   07/04/18 1200 - 81 20 157/59 100 %   07/04/18 1145 98 °F (36.7 °C) 82 20 154/60 100 %   07/04/18 1121 98.1 °F (36.7 °C) 86 20 155/63 96 %       Intake/Output Summary (Last 24 hours) at 07/05/18 0919  Last data filed at 07/04/18 1530   Gross per 24 hour   Intake                0 ml   Output             2000 ml   Net            -2000 ml        PHYSICAL EXAM:  General: WD, WN. Alert, cooperative, no acute distress    EENT:  EOMI. Anicteric sclerae. MMM  Resp:  crackles, no wheezing or rales. No accessory muscle use  CV:  Regular  rhythm,  No edema  GI:  Soft, Non distended, Non tender.  +Bowel sounds  Neurologic:  Alert and oriented X 3, normal speech,   Psych:   Good insight. Not anxious nor agitated  Skin:  No rashes. No jaundice    Reviewed most current lab test results and cultures  YES  Reviewed most current radiology test results   YES  Review and summation of old records today    NO  Reviewed patient's current orders and MAR    YES  PMH/SH reviewed - no change compared to H&P  ________________________________________________________________________  Care Plan discussed with:    Comments   Patient x    Family      RN x    Care Manager     Consultant  x Dr Gregoria Metcalf (GI) on tuesday                     Multidiciplinary team rounds were held today with , nursing, pharmacist and clinical coordinator. Patient's plan of care was discussed; medications were reviewed and discharge planning was addressed. ________________________________________________________________________  Total NON critical care TIME:  16 Minutes    Total CRITICAL CARE TIME Spent:   Minutes non procedure based      Comments   >50% of visit spent in counseling and coordination of care     ________________________________________________________________________  Alex Issa MD     Procedures: see electronic medical records for all procedures/Xrays and details which were not copied into this note but were reviewed prior to creation of Plan. LABS:  I reviewed today's most current labs and imaging studies.   Pertinent labs include:  Recent Labs 07/05/18 0157 07/04/18   0314  07/03/18   0506   WBC  17.1*  17.0*  15.3*   HGB  10.1*  8.3*  8.3*   HCT  33.8*  27.1*  27.1*   PLT  325  311  261     Recent Labs      07/05/18 0157 07/04/18   0314  07/03/18   0506   NA  138  138  139   K  4.0  4.2  4.0   CL  99  101  100   CO2  30  26  28   GLU  89  74  85   BUN  12  23*  18   CREA  4.08*  5.94*  4.56*   CA  8.6  8.0*  7.9*   MG  2.2  2.3   --    PHOS  4.3  5.4*   --    INR  1.2*   --    --        Signed: Daryle Crosser, MD

## 2018-07-05 NOTE — PROGRESS NOTES
GI Progress Note [facility])  NAME:Philippe Torres :1950 VTZ:[id number]   Sam Nicholas MD  PCP: Radha Bhandari MD  Date/Time:  2018 9:04 AM   Assessment:   1. Aspiration event night of  with a RR  2. CT Chest with aspiration  3. EGD on 2013 without any pathology in esophagus, EGD in 2018 with erosive esophagitis but no strictures  5. Illiac artery pseudoaneurysm s/p stenting/repair  6. Inability to eat for 6-7 days  7. NGT placed last night but pulled out this AM prior to KUB for confirmation of placement. Stated it was painful in right nare and not able to place per nursing in left nare. 8. History of MS - MS flare vs Chronic changes     Plan:   1. Barium Swallow on 18 showed abnormal peristalsis of the esophagus. Repeating Modified Barium swallow with video today. If modified BS does not show real improve, he is agreeable to PEG tube place. Will plan to complete this tomorrow to be sure he isn't NPO all weekend. 2. Maintain NPO at this time as he did not tolerate NG tube  3. No plan for repeat EGD given last was completed 2 weeks ago within 2 weeks ago with normal esophagus  4. Continue PPI BID - hx of erosive esophagitis  5. Speech pathology evaluating daily     GI Attending---Pt seen and examined. Will proceed with PEG in am.  Subjective:   Discussed with RN events overnight. Appears comfortable in bed now with no complaints.      Complaint Y/N Description   Abdominal Pain n    Hematemesis n    Hematochezia n    Melena n    Constipation n  reports large BM today   Diarrhea n    Dyspepsia     Dysphagia y See above   Jaundiced n    Nausea/vomiting Y Nausea without vomiting, zofran helping     Review of Systems:  Symptom Y/N Comments  Symptom Y/N Comments   Fever/Chills n   Chest Pain n    Cough y occasionally but weak due to MS  Headaches n    Sputum n   Joint Pain     SOB/BANKS n   Pruritis/Rash n    Tolerating Diet n NPO  Other       Could NOT obtain due to:      Objective:   VITALS: Last 24hrs VS reviewed since prior progress note. Most recent are:  Visit Vitals    /73    Pulse 82    Temp 97.5 °F (36.4 °C)    Resp 18    Ht 5' 10\" (1.778 m)    Wt 66.7 kg (147 lb 0.8 oz)    SpO2 100%    BMI 21.1 kg/m2       Intake/Output Summary (Last 24 hours) at 07/05/18 0904  Last data filed at 07/04/18 1530   Gross per 24 hour   Intake                0 ml   Output             2000 ml   Net            -2000 ml     PHYSICAL EXAM:  General: WD, WN. Alert, cooperative, no acute distress    HEENT: NC, Atraumatic. PERRLA, EOMI. Anicteric sclerae. Lungs:  Right sided rhonchi and wheezing, left side diminished by clear. Heart:  Regular  rhythm,  HR 75  Abdomen: Soft, Non distended, Non tender.  +Bowel sounds, no HSM, surgical scars present  Extremities: Right foot amputated. Unable to palpate BLE pulses. Neurologic:  Alert and oriented X 3. No acute neurological distress   Psych:   Good insight. Not anxious nor agitated. Lab and Radiology Data Reviewed: (see below)    Medications Reviewed: (see below)  PMH/SH reviewed - no change compared to H&P  ________________________________________________________________________  Total time spent with patient: 20 minutes ________________________________________________________________________  Care Plan discussed with:  Patient Mickie Thelma              Consultant: Adrián العلي NP     Procedures: see electronic medical records for all procedures/Xrays and details which were not copied into this note but were reviewed prior to creation of Plan.       LABS:  Recent Labs      07/05/18   0157  07/04/18   0314   WBC  17.1*  17.0*   HGB  10.1*  8.3*   HCT  33.8*  27.1*   PLT  325  311     Recent Labs      07/05/18   0157  07/04/18   0314  07/03/18   0506   NA  138  138  139   K  4.0  4.2  4.0   CL  99  101  100   CO2  30  26  28   BUN  12  23*  18   CREA  4.08*  5.94*  4.56*   GLU  89  74  85   CA  8.6  8.0*  7.9*   MG  2.2  2.3   -- PHOS  4.3  5.4*   --      No results for input(s): SGOT, GPT, AP, TBIL, TP, ALB, GLOB, GGT, AML, LPSE in the last 72 hours. No lab exists for component: AMYP, HLPSE  Recent Labs      07/05/18   0157   INR  1.2*   PTP  12.4*      No results for input(s): FE, TIBC, PSAT, FERR in the last 72 hours. No results found for: FOL, RBCF  No results for input(s): PH, PCO2, PO2 in the last 72 hours. No results for input(s): CPK, CKMB in the last 72 hours.     No lab exists for component: TROPONINI  Lab Results   Component Value Date/Time    Color YELLOW/STRAW 12/30/2014 06:40 PM    Appearance CLEAR 12/30/2014 06:40 PM    Specific gravity 1.015 12/30/2014 06:40 PM    pH (UA) 7.5 12/30/2014 06:40 PM    Protein 300 (A) 12/30/2014 06:40 PM    Glucose >1000 (A) 12/30/2014 06:40 PM    Ketone NEGATIVE  12/30/2014 06:40 PM    Bilirubin NEGATIVE  12/30/2014 06:40 PM    Urobilinogen 0.2 12/30/2014 06:40 PM    Nitrites NEGATIVE  12/30/2014 06:40 PM    Leukocyte Esterase NEGATIVE  12/30/2014 06:40 PM    Epithelial cells FEW 12/30/2014 06:40 PM    Bacteria NEGATIVE  12/30/2014 06:40 PM    WBC 0-4 12/30/2014 06:40 PM    RBC 5-10 12/30/2014 06:40 PM       MEDICATIONS:  Current Facility-Administered Medications   Medication Dose Route Frequency    acetylcysteine (MUCOMYST) 200 mg/mL (20 %) solution 400 mg  400 mg Nebulization TID    albuterol-ipratropium (DUO-NEB) 2.5 MG-0.5 MG/3 ML  3 mL Nebulization TID RT    albuterol-ipratropium (DUO-NEB) 2.5 MG-0.5 MG/3 ML  3 mL Nebulization Q6H PRN    metoprolol (LOPRESSOR) injection 1.25 mg  1.25 mg IntraVENous Q6H    nitroglycerin (NITROBID) 2 % ointment 1 Inch  1 Inch Topical Q6H PRN    ondansetron (ZOFRAN) injection 4 mg  4 mg IntraVENous Q4H PRN    heparin (porcine) injection 5,000 Units  5,000 Units SubCUTAneous Q12H    albumin human 25% (BUMINATE) solution 12.5 g  12.5 g IntraVENous DIALYSIS PRN    morphine injection 2 mg  2 mg IntraVENous Q4H PRN    jane chunb.anim (DAVIN Q/RISAQUAD)  1 Cap Oral DAILY    balsam peru-castor oil (VENELEX)  mg/gram ointment   Topical BID    guaiFENesin ER (MUCINEX) tablet 1,200 mg  1,200 mg Oral Q12H    pantoprazole (PROTONIX) 40 mg in sodium chloride 0.9% 10 mL injection  40 mg IntraVENous Q12H    senna-docusate (PERICOLACE) 8.6-50 mg per tablet 2 Tab  2 Tab Oral QHS    epoetin niya (EPOGEN;PROCRIT) injection 10,000 Units  10,000 Units SubCUTAneous Q MON, WED & FRI    piperacillin-tazobactam (ZOSYN) 3.375 g in 0.9% sodium chloride (MBP/ADV) 100 mL  3.375 g IntraVENous Q12H    insulin lispro (HUMALOG) injection   SubCUTAneous AC&HS    glucose chewable tablet 16 g  4 Tab Oral PRN    dextrose (D50W) injection syrg 12.5-25 g  12.5-25 g IntraVENous PRN    glucagon (GLUCAGEN) injection 1 mg  1 mg IntraMUSCular PRN    escitalopram oxalate (LEXAPRO) tablet 10 mg  10 mg Oral DAILY    sevelamer carbonate (RENVELA) tab 800 mg  800 mg Oral TID WITH MEALS    pravastatin (PRAVACHOL) tablet 10 mg  10 mg Oral QHS    vancomycin (VANCOCIN) 750 mg in 0.9% sodium chloride (MBP/ADV) 250 mL  750 mg IntraVENous DIALYSIS PRN

## 2018-07-05 NOTE — PROGRESS NOTES
9:51AM  PC to [facility] at [facility]. She is following pt. Waiting to see if pt requires PEG tube to determine appropriateness for admission. CM will continue to follow and assist with d/c planning.      CAROLYN Mccabe  Care Manager

## 2018-07-05 NOTE — PROGRESS NOTES
PCU SHIFT NURSING NOTE      Bedside and Verbal shift change report given to Luisa Fisher RN (oncoming nurse) by Tess Swartz (offgoing nurse). Report included the following information SBAR, Kardex, Intake/Output, MAR, Recent Results and Cardiac Rhythm NSR. Shift Summary: Received pt lying in bed with HOB elevated 45degrees. Left nare NGT placed on day shift RN and awaiting KUB for placement verification. See flow charting for full assessment. Will monitor. 2114 Security on unit stating pt called 911.  2115 Paged Hospitalist because pt pulled L nare NGT out. 2116 Dr Milton Mario hospitalist on call instr pt pulled L nare NGT out and that he is to be NPO at midnight for barium swallow in am. Dr Milton Mario states do not reinsert NGT tonight. Notified [facility] RN charge of 2114 thru 2116 notes. 0609 pt states not feeling well, asked pt if hurting, admin Morphine 2mg IV    0641 pt states not feeling well, went through list and when got to nausea, pt states yes.  Admin Zofran 4mg IV              Admission Date 6/28/2018   Admission Diagnosis ILIAC ANEURYSM  Pseudoaneurysm of iliac artery (HCC)  VASCULAR INSUFFICIENCY   Consults IP CONSULT TO VASCULAR SURGERY  IP CONSULT TO NEPHROLOGY  IP CONSULT TO INTENSIVIST  IP CONSULT TO GASTROENTEROLOGY  IP CONSULT TO NEUROLOGY  IP CONSULT TO PALLIATIVE CARE - PROVIDER  IP CONSULT TO CARDIOLOGY  IP CONSULT TO HOSPITALIST        Consults   []PT   []OT   []Speech   []Case Management      [] Palliative      Cardiac Monitoring Order   []Yes   []No     IV drips   []Yes    Drip:                            Dose:  Drip:                            Dose:  Drip:                            Dose:   []No     GI Prophylaxis   []Yes   []No         DVT Prophylaxis   SCDs:  Sequential Compression Device: Bilateral (both on alongwith L foot boot)     Patient Refused VTE Prophylaxis: Yes    Danny stockings:         [] Medication   []Contraindicated   []None      Activity Level Activity Level: Bed Rest Activity Assistance: Complete care   Purposeful Rounding every 1-2 hour? []Yes   Lopez Score  Total Score: 3   Bed Alarm (If score 3 or >)   []Yes   [] Refused (See signed refusal form in chart)   Delbert Score  Delbert Score: 11   Delbert Score (if score 14 or less)   []PMT consult   []Wound Care consult      []Specialty bed   [] Nutrition consult          Needs prior to discharge:   Home O2 required:    []Yes   []No    If yes, how much O2 required? Other:    Last Bowel Movement: Last Bowel Movement Date: 07/04/18      Influenza Vaccine Received Flu Vaccine for Current Season (usually Sept-March): Not Flu Season        Pneumonia Vaccine           Diet Active Orders   Diet    DIET NPO      LDAs               Peripheral IV 06/30/18 Right Forearm (Active)   Site Assessment Clean, dry, & intact 7/4/2018  7:38 PM   Phlebitis Assessment 0 7/4/2018  7:38 PM   Infiltration Assessment 0 7/4/2018  7:38 PM   Dressing Status Clean, dry, & intact 7/4/2018  7:38 PM   Dressing Type Transparent 7/4/2018  7:38 PM   Hub Color/Line Status Pink;Flushed; Infusing 7/4/2018  7:38 PM   Action Taken Open ports on tubing capped 7/4/2018  7:38 PM   Alcohol Cap Used Yes 7/4/2018  7:38 PM          Nasogastric Tube 07/04/18 (Active)   Site Assessment Clean, dry, & intact 7/4/2018  7:38 PM   Dressing Status Clean, dry, & intact 7/4/2018  7:38 PM   Action Taken Other (comment) 7/4/2018  7:38 PM                Urinary Catheter      Intake & Output   Date 07/03/18 1900 - 07/04/18 0659 07/04/18 0700 - 07/05/18 0659   Shift 7472-6942 24 Hour Total 6404-3932 3166-3643 24 Hour Total   I  N  T  A  K  E   I.V.  (mL/kg/hr) 300 300         Volume (piperacillin-tazobactam (ZOSYN) 3.375 g in 0.9% sodium chloride (MBP/ADV) 100 mL) 300 300       Shift Total  (mL/kg) 300  (4.5) 300  (4.5)      O  U  T  P  U  T   Stool           Stool Occurrence(s) 1 x 1 x       Dialysis   2000 2000      NET Fluid Removed (mL)   2000 2000    Shift Total  (mL/kg) 2000  (30)  2000  (30)    300 -2000  -2000   Weight (kg) 66.6 66.6 66.6 66.6 66.6         Readmission Risk Assessment Tool Score High Risk            39       Total Score        3 Has Seen PCP in Last 6 Months (Yes=3, No=0)    2 . Living with Significant Other. Assisted Living. LTAC. SNF. or   Rehab    3 Patient Length of Stay (>5 days = 3)    4 IP Visits Last 12 Months (1-3=4, 4=9, >4=11)    5 Pt.  Coverage (Medicare=5 , Medicaid, or Self-Pay=4)    22 Charlson Comorbidity Score (Age + Comorbid Conditions)       Expected Length of Stay 5d 9h   Actual Length of Stay 6

## 2018-07-05 NOTE — PROGRESS NOTES
Shift Summary    0725 Bedside report; pt alert and calm; resting in bed; prevalon boot on L. foot  0810 Received bath; ointment applied on buttocks, and heel  0830 Dr. Mandeep Albert at bedside - pt will need a PEG tube from GI; received updated report about pt pulling out NG tube in the am; (pt unable to take PO meds and is not able to eat)  0900 Refused Duo Neb treatment; will notify doctor and attempted to notify wife  2333 GI doctor at bedside Damaris Oneill); PEG tube insertion depends on results from barium swallow  1007 Pt to be transported to radiology for barium swallow  1145 Chest pa and lat completed; barium swallow completed  1158 BS 99; no coverage needed  1200 Pt back in room resting  1236 Pt complaining of nausea and will receive zofran; received morphine IV for back pain 9/10; will continue to monitor pt's condition  1315 Placed call to Dr. Lorie Gilford about pt refusing Duo nebs and po meds; barium swallow procedure completed ; per Dr. Kenna Jacob and speech will look at the results; decision to be made regarding the PEG  1335 Dr. Luis M Tee at bedside  1505 PT working with patient; concerned that patient is weaker with new concerns about loss of bowel control, extremity weakness, neck weakness; plan to use sacra lift tomorrow  1510 Pt had bowel movement while doing physical therapy; pt unable to complete physical therapy  1511 Refused to continue wearing SCDs; prevalon boot on L.foot  1515 Updated Dr. Lorie Gilford about physical therapy's concerns; received telephone order for neurology consult, MRI,  result with barium swallow  1520 Wife at bedside; concerned about pt not having NG tube; updated wife on day's events - refusal of treatment  1530 Palliative at bedside with pt and wife  1542 Received morphine for back pain 7/10; will continue to monitor pt's condition   1631 ; no coverage needed  1655 Wife's cell number is [phone]  1705 Received update from [place]; Dr. Cyndee Willis will speak with Dr. Lorie Gilford about neurology consult and pt's decreased status  1715 Wife departs room  21  Daughter in room  417 [facility] Completed wound care       PCU SHIFT NURSING NOTE      Bedside and Verbal shift change report given to Mikey (oncoming nurse) by Raul Mejia (offgoing nurse). Report included the following information SBAR, Kardex, MAR, Recent Results and Med Rec Status. Admission Date 6/28/2018   Admission Diagnosis ILIAC ANEURYSM  Pseudoaneurysm of iliac artery (HCC)  VASCULAR INSUFFICIENCY  needs peg tube   Consults IP CONSULT TO VASCULAR SURGERY  IP CONSULT TO NEPHROLOGY  IP CONSULT TO INTENSIVIST  IP CONSULT TO GASTROENTEROLOGY  IP CONSULT TO NEUROLOGY  IP CONSULT TO PALLIATIVE CARE - PROVIDER  IP CONSULT TO NEUROLOGY  IP CONSULT TO CARDIOLOGY  IP CONSULT TO HOSPITALIST        Consults   [x]PT   []OT   []Speech   [x]Case Management      [] Palliative      Cardiac Monitoring Order   [x]Yes   []No     IV drips   []Yes    Drip:                            Dose:  Drip:                            Dose:  Drip:                            Dose:   [x]No     GI Prophylaxis   []Yes   []No         DVT Prophylaxis   SCDs:  Sequential Compression Device: Bilateral (both on alongwith L foot boot)     Patient Refused VTE Prophylaxis: Yes    Danny stockings:         [] Medication   []Contraindicated   []None      Activity Level Activity Level: Bed Rest     Activity Assistance: Complete care   Purposeful Rounding every 1-2 hour? [x]Yes   Lopez Score  Total Score: 3   Bed Alarm (If score 3 or >)   []Yes   [] Refused (See signed refusal form in chart)   Delbert Score  Delbert Score: 11   Delbert Score (if score 14 or less)   []PMT consult   [x]Wound Care consult      []Specialty bed   [x] Nutrition consult          Needs prior to discharge:   Home O2 required:    []Yes   []No    If yes, how much O2 required?     Other:    Last Bowel Movement: Last Bowel Movement Date: 07/04/18      Influenza Vaccine Received Flu Vaccine for Current Season (usually Sept-March): Not Flu Season        Pneumonia Vaccine           Diet Active Orders   Diet    DIET NPO      LDAs               Peripheral IV 06/30/18 Right Forearm (Active)   Site Assessment Clean, dry, & intact 7/5/2018  3:25 PM   Phlebitis Assessment 0 7/5/2018  3:25 PM   Infiltration Assessment 0 7/5/2018  3:25 PM   Dressing Status Clean, dry, & intact 7/5/2018  3:25 PM   Dressing Type Transparent 7/5/2018  3:25 PM   Hub Color/Line Status Pink;Flushed 7/5/2018  3:25 PM   Action Taken Open ports on tubing capped 7/5/2018  3:25 PM   Alcohol Cap Used Yes 7/5/2018  3:25 PM                      Urinary Catheter      Intake & Output   Date 07/04/18 0700 - 07/05/18 0659 07/05/18 0700 - 07/06/18 0659   Shift 3593-0715 7852-4038 24 Hour Total 6285-5295 8277-4268 24 Hour Total   I  N  T  A  K  E   Shift Total  (mL/kg)         O  U  T  P  U  T   Urine  (mL/kg/hr)            Urine Occurrence(s)  0 x 0 x       Stool            Stool Occurrence(s)  0 x 0 x       Dialysis 2000 2000         NET Fluid Removed (mL) 2000 2000       Shift Total  (mL/kg) 2000  (30)  2000  (30)      NET -2000  -2000      Weight (kg) 66.6 66.7 66.7 67.8 67.8 67.8         Readmission Risk Assessment Tool Score High Risk            39       Total Score        3 Has Seen PCP in Last 6 Months (Yes=3, No=0)    2 . Living with Significant Other. Assisted Living. LTAC. SNF. or   Rehab    3 Patient Length of Stay (>5 days = 3)    4 IP Visits Last 12 Months (1-3=4, 4=9, >4=11)    5 Pt.  Coverage (Medicare=5 , Medicaid, or Self-Pay=4)    22 Charlson Comorbidity Score (Age + Comorbid Conditions)       Expected Length of Stay 5d 9h   Actual Length of Stay 7

## 2018-07-05 NOTE — PROGRESS NOTES
Palliative Medicine Psychosocial Assessment  [place]: [phone] ([phone])  [facility]: [phone] ([phone])        Jordy Evans is a 71 yo , black male who lives with his wife, Natalie Kraus. They have children, but none live at home. Mr Stefano Chowdary goes by Infinia . According to his wife, he has been on disability for about 4 or 5 years, for physical/medical reasons. Patient has a diagnosis of MS. He is on dialysis thrice weekly and he has an amputated limb. Patient was taking his breathing treatment when LCSW went in room. He was polite, able to follow directions and participated in conversation today. Palliative SW explained role of palliative team, SW, being available for emotional support and care decisions. Natalie Kraus quite anxious about results of the second swallow exam and shared with her in basic terms that patient had not been able to swallow the various consistencies of fluids and a PEG tube was being recommended. Both patient and Natalie Kraus need more information in terms of the details of surgery related to this and especially how it may or may not impact patient's dialysis. Palliative NP, Ashleigh Maldonado will be available to meet with them shortly. Wife shared that patient has a history of depression and voiced that she hoped this new information will not \"get him down\". Patient did not seem affected by the news of his dysphagia, he seemed to be \"matter of fact\" and noted that he \"would want to try\" the PEG tube. Natalie Kraus is very supportive but works 6 a m to 3:00 pm daily (at [employer] [address] [place] [address]). She is available to come to the hospital around 3:30. Natalie Kraus shared that patient was previously quite independent, able to be alone at home, get to dialysis with the [address] and could motor in his motorized wheelchair. At times, patient's 15 yo grandson spends time at the house with him.      Natalie Kraus is anxious to find what doctors are saying about patient, she has spoken to some doctors, but  wanted more information about the evaluation today regarding patient's swallowing. She supports a PEG if that is patient's only source of nutrition. Palliative SW will be available to the patient and family as needed for support and health care wishes. Reason for Assessment:    []Depression  []Anxiety  []Caregiver [place]  []Maladaptive Coping with Serious Illness       [x]Other: Patient has dysphagia, NG tube placed, but was pulled out by patient sometime this a m per bedside RN, decision making re PEG. Emotional support to patient, family, he also has a history of depression per wife.      Sources of Information:    [x]Patient  [x]Family  []Staff  [x]Medical Record    Medical/Physical Functioning:  Principal Problem:    Pseudoaneurysm of iliac artery ([facility] (6/28/2018)    Active Problems:    Hypertension (4/12/2010)      PAD (peripheral artery disease) ([address] (12/9/2010)      S/P coronary artery stent placement (2/18/2011)      Overview: 2/17/11 PCI/CRISTI to RCA      ASHD (arteriosclerotic heart disease) (4/11/2013)      Mixed hyperlipidemia (4/11/2013)      ESRD on hemodialysis ([address] (12/30/2014)      Ataxia (1/4/2015)      Multiple sclerosis ([facility] (1/4/2015)      Diabetic oculomotor palsy ([facility] (3/25/2016)      Diabetic peripheral neuropathy associated with type 2 diabetes mellitus ([address] (3/25/2016)      Type 2 diabetes with nephropathy ([facility] [address] (3/1/2018)      Elevated troponin (6/29/2018)      Esophageal dyskinesia (7/3/2018)      Weight loss (7/3/2018)      Counseling regarding goals of care (7/3/2018)      Weakness generalized (7/3/2018)        Advance Care Planning:  Advance Care Planning 6/28/2018   Patient's [address] is: Verbal statement (Legal Next of Kin remains as decision maker)   Confirm Advance Directive None       Mental Status:    [x]Alert  []Lethargic  []Unresponsive  Oriented to:  [x]Person  [x]Place  [x]Time  []Situation      Barriers to Learning:    []Language  []Developmental []Cognitive  []Altered Mental Status  []Visual/Hearing Impairment  []Unable to Read/Write  []Motivational   [x]No Barriers Identified  []Other:    Relationship Status:  []Single  [x]  []Significant Other/Life Partner  []  []  []      Living Circumstances:  []Lives Alone  [x]Family/Significant Other in Household  []Roommates  []Children in the Home  []Paid Caregivers  []Assisted Living Facility/Group Home  []Skilled [address]  []Homeless  []Incarcerated  []Environmental/Care Concerns  []Transportation Issues  [] Issues  []Domestic Violence []Other:    Support System:    [x]Strong  []Fair  []Limited    Financial/Legal Concerns:    []Uninsured  []Limited Income/Resources  []Non-Citizen  []Utility Issues  []Food Insecurity [x]No Concerns Identified  []Financial POA:    []Other:    Adventism/Spiritual/Existential:  []Strong Sense of Spirituality  []Involved in [address]  []Request  Visit  []Expressing Spiritual/Existential Angst  [x]No Concerns Identified    Coping with Illness:         Patient: Family/Caregiver:   Understanding and Acceptance of Illness/Prognosis  [x] [x]   Strong Sense of Resilience [x] [x]   Self Reflection [] []   Engaged Support System [x] []   Does not Readily Discuss Illness [] []   Denial of Terminal Status [] []   Anger [] []   Depression [] []   Anxiety/Fear [] []   Bargaining [] []   Recent Diagnosis/Prognosis [] []   Difficulties with Body Image [] []   Loss of Identity [] []   Excessive Substance Use [] []   Mental Health History [] []   Enmeshed Relationships [] []   History of Loss [x] []   Anticipatory Grief [] [x]   Concern for Complicated Grief [] []   Suicidal Ideation or Plan [] []   Unable to assess [] []                      Goals/Plan:LCSW will be available to patient and family as needed for emotional support. (Alicia Ramos completed POST with patient today, will be scanned in chart).

## 2018-07-05 NOTE — PROGRESS NOTES
Problem: Mobility Impaired (Adult and Pediatric)  Goal: *Acute Goals and Plan of Care (Insert Text)  Physical Therapy Goals  Initiated 7/2/2018  1. Patient will move from supine to sit and sit to supine , scoot up and down and roll side to side in bed with minimal assistance/contact guard assist within 7 day(s). 2.  Patient will transfer from bed to chair and chair to bed with moderate assistance  using the least restrictive device within 7 day(s). 3.  Patient will perform sit to stand with moderate assistance  within 7 day(s). 4.  Patient will ambulate with moderate assistance  for 5 feet with the least restrictive device within 7 day(s). physical Therapy TREATMENT  Patient: Jocelyn Fuentes (2 y.o. male)  Date: 7/5/2018  Diagnosis: ILIAC ANEURYSM  Pseudoaneurysm of iliac artery (HCC)  VASCULAR INSUFFICIENCY  needs peg tube Pseudoaneurysm of iliac artery (HCC)  Procedure(s) (LRB):  RIGHT LEG ANGIOGRAM AND ILIAC ANEURYSM REPAIR (Right) 7 Days Post-Op  Precautions: Contact, Fall, Aspiration  Chart, physical therapy assessment, plan of care and goals were reviewed. ASSESSMENT:  Patient lying in bed, agrees to therapy with PT and OT. Cleared by nursing. PT and OT both needed due to profound bilateral lower extremity weakness. Provided muscular facilitation techniques of quick stretches and tapping with no response or increase in output of ankles, knees or hips - strength in these joints is currently 0/5. Patient reports recent loss of bowel control the last few days. Applied R leg prosthesis with total assistance needed. With OT assistance, transferred patient supine to sitting edge of bed with max a x 2 with additional time taken to try to get patient to assist as much as possible. Once sitting, he needed max a 1-2 persons due to very poor trunk control - he assisted with his arms, but this was inadequate to control his balance.  Planned to stand patient using Porfirio Candy, but he had a loose stool and needed to return to bed to clean him up instead. Max assistance x 2 needed for sit to supine transfer back into bed and for repositioning. Recommend neuro consult to assess profound weakness and loss of bowel control - is it related to his MS or some other cause. Recommend IP Rehab upon discharge. Progression toward goals:  []    Improving appropriately and progressing toward goals  [x]    Improving slowly and progressing toward goals  []    Not making progress toward goals and plan of care will be adjusted     PLAN:  Patient continues to benefit from skilled intervention to address the above impairments. Continue treatment per established plan of care. Discharge Recommendations:  Inpatient Rehab  Further Equipment Recommendations for Discharge:  TBD     SUBJECTIVE:   Patient stated I can't move my legs since being at the hospital..    OBJECTIVE DATA SUMMARY:   Critical Behavior:  Neurologic State: Alert  Orientation Level: Disoriented to time, Oriented to person, Oriented to place, Oriented to situation  Cognition: Follows commands     Functional Mobility Training:  Bed Mobility:  Rolling: Minimum assistance;Assist x2; Additional time  Supine to Sit: Maximum assistance;Assist x2; Additional time  Sit to Supine: Maximum assistance;Assist x2; Additional time  Scooting: Maximum assistance;Assist x2; Additional time        Transfers:                                   Balance:  Sitting: Impaired  Sitting - Static: Poor (constant support)  Sitting - Dynamic: None  Standing:  (unable to test)  Ambulation/Gait Training:                                                     Therapeutic Exercises:    Ankle pumps, knee flexion/extension, hip flexion, hip and adduction 10-15 reps each leg  Pain:  Pain Scale 1: Numeric (0 - 10)  Pain Intensity 1: 7  Pain Location 1: Flank  Pain Orientation 1: Right     Pain Intervention(s) 1: Repositioned  Activity Tolerance:   Fair  Please refer to the flowsheet for vital signs taken during this treatment. After treatment:   []    Patient left in no apparent distress sitting up in chair  [x]    Patient left in no apparent distress in bed with nurse techs.   [x]    Call bell left within reach  [x]    Nursing notified  []    Caregiver present  []    Bed alarm activated    COMMUNICATION/COLLABORATION:   The patients plan of care was discussed with: Occupational Therapist, Registered Nurse and     Anisa Muñoz, PT   Time Calculation: 51 mins

## 2018-07-05 NOTE — ACP (ADVANCE CARE PLANNING)
Primary Decision Maker Info:  Primary Decision Maker Name: Pierre Wilkins  Primary Decision Maker Phone Number: [phone]  Primary Decision Maker Relationship to Patient: Spouse    Secondary Decision Maker Info:              Advance Directive Info:  Confirm Advance Directive: None    POST FORM:    1) full code  2) full interventions including life support  3) feeding tube long term

## 2018-07-05 NOTE — PROGRESS NOTES
Problem: Dysphagia (Adult)  Goal: *Acute Goals and Plan of Care (Insert Text)  7/2/2018  Speech path goals:  1. Pt will participate with MBS as medically necessary. Speech Pathology Modified barium swallow Study  Patient: Jose M Vargas (89 y.o. male)  Date: 7/5/2018  Primary Diagnosis: ILIAC ANEURYSM  Pseudoaneurysm of iliac artery (HCC)  VASCULAR INSUFFICIENCY  needs peg tube  Procedure(s) (LRB):  RIGHT LEG ANGIOGRAM AND ILIAC ANEURYSM REPAIR (Right) 7 Days Post-Op   Precautions:   Contact, Fall, Aspiration    ASSESSMENT :    Patient with recent significant esophageal dysphagia on esophagram. The below recs are based on pharyngeal swallow only. Based on the objective data described below, the patient presents with significant dysphagia with all liquids. Thin liquids resulted in significant swallow delay with difficulty initiating a swallow, but no airway compromise, with smaller sips. With larger sips of thin, there was deep silent penetration to the cords. Nectar Liquids did not show improvement in swallowing in this patient and were frankly aspirated, this aspiration did not clear with a cued throat clear. Honey thick liquids did not show aspiration but did have significant residue. If patient does not desire PEG, based , Best option for continued PO would then be thin liquids with a very small sips, and Intentional throat clear after sips. Patient can continue to eat mechanical soft solids as tolerated, though he may have more efficiency and success with puréed solids. Though This recommendation is for mechanical soft diet and thin liquids, patient should still be considered to have a serious dysphagia that should not be masked by this more aggressive recommendation. There was no method of liquids that appeared reliably safe, though there was no aspiration with very small sips of thin. If he is at risk of aspiration with all liquids, thin liquids are the safest to aspirate.     If aggressive medical care is desired, PEG tube would be in accordance with that, uncertain of patient's plan. Patient will benefit from skilled intervention to address the above impairments. Patients rehabilitation potential is considered to be Fair  Factors which may influence rehabilitation potential include:   []              None noted  []              Mental ability/status  [x]              Medical condition  []              Home/family situation and support systems  []              Safety awareness  []              Pain tolerance/management  []              Other:      PLAN :  Recommendations and Planned Interventions: BASED ON PRIOR NOTES, PATIENT APPEARS TO POSSIBLY NEED PEG TUBE BASED ON HIS ESOPHAGEAL DYSPHAGIA ALONE.      BASED ON THIS STUDY, THAT IS NOT UNREASONABLE, THOUGH I ALSO OFFER AND OPTION FOR PO IF THAT IS THE PLAN.     1, If PEG not desired,   mech soft diet and thin liquids  2. If patient does get PEG, for both this and his esophogeal dysphagia, can have food with this as desired for pleasure, with items from above diet. Frequency/Duration: Patient will be followed by speech-language pathology 3 times a week to address goals. Discharge Recommendations: To Be Determined     SUBJECTIVE:   Patient stated Now I feel nasueaous.     OBJECTIVE:     Past Medical History:   Diagnosis Date    Anemia associated with chronic renal failure     Autoimmune disease (HCC)     hx ms    CAD (coronary artery disease)     Chronic kidney disease     catheter removed and no dialysis at this time    Chronic kidney disease     left arm fistula dialysis MWF    Diabetes ([facility]     type II    Elevated troponin 6/29/2018    GERD (gastroesophageal reflux disease)     Hypertension     Ill-defined condition     prostate cancer    Multiple sclerosis ([facility]     diagnosed '01    Other ill-defined conditions(799.89)     anemia    Other ill-defined conditions(799.89)     elevated cholesterol    Other ill-defined conditions(799.89)     hx septic right foot    Peripheral vascular disease ([facility]     Secondary hyperparathyroidism of renal origin ([facility]     Thyroid disease      Past Surgical History:   Procedure Laterality Date    COLONOSCOPY N/A 12/6/2016    COL[facility]COPY    COLONOSCOPY,DIAGNOSTIC  12/6/2016         CORONARY STENT SINGLE W/PTCA  2/17/2011         HX APPENDECTOMY      HX CATARACT REMOVAL  10/18/10    bilateral    HX CHOLECYSTECTOMY      HX GI      ileostomy due to infection    HX HEART CATHETERIZATION      HX HEENT      hx post photocoagulation eye 2009    HX OTHER SURGICAL      right partial foot amputation    HX OTHER SURGICAL      cardiac cath    HX OTHER SURGICAL      prostate removed    [facility] COLONOSCOPY W/BIOPSY SINGLE/MULTIPLE  5/10/2010         UPPER GI ENDOSCOPY,BIOPSY  4/17/2018         UPPER GI ENDOSCOPY,REMV TUMOR,SNARE  6/13/2018         VASCULAR SURGERY PROCEDURE UNLIST      katelyn. leg bypasses     Prior Level of Function/Home Situation:   Home Situation  Home Environment: Private residence  Wheelchair Ramp: Yes  One/Two Story Residence: One story  Living Alone: No  Support Systems: Child(ben), Spouse/Significant Other/Partner  Patient Expects to be Discharged to[de-identified] Private residence  Current DME Used/Available at Home: Wheelchair, power, Walker, rolling, Tub transfer bench, Grab bars, Commode, bedside (R LE prosthesis)  Tub or Shower Type: Tub/Shower combination  Diet prior to admission: unrestricted  Current Diet:  NPO  Radiologist: Lizeth Peralta Views: AP  Patient Position: upright     Trial 1: Trial 2:   Consistency Presented: Puree; Solid; Thin liquid; Nectar thick liquid;Honey thick liquid                        :    :     Propulsion: Delayed (# of seconds)     Oral Residue:  (significnat with nectar liquid, eventually cleared with bite of puree)     Initiation of Swallow: Triggered at vallecula;Triggered at pyriform sinus(es) (very delayed with smaller sips)     Timing: Pooling 1-5 sec;Pooling 6-10 sec     Penetration:  (before/during)     Aspiration/Timing:  (trace silent with larger sips of thin )     Pharyngeal Clearance: Vallecular residue;Pyriform residue      Attempted Modifications: Alternate liquids/solids;Effortful swallow;Cup/sip; Chin tuck (cued throat clear)     Effective Modifications: Alternate liquids/solids (cued throat clear partially effective)                           Aspiration/Penetration Score: 8 (Aspiration-Contrast passes cords/glottis with no effort to eject, ie/silent aspiration) (silent aspiration with large sip of nectar)  Pharyngeal Symmetry: Not assessed  Pharyngeal-Esophageal Segment:  (UES appears open for prlonged perios, bolus \"leaks\" in )       Oral Phase Severity: Mild-moderate  Pharyngeal Phase Severity: Moderately severe  NOMS:   The NOMS functional outcome measure was used to quantify this patient's level of swallowing impairment. Based on the NOMS, the patient was determined to be at level 3 for swallow function     G Codes: In compliance with CMSs Claims Based Outcome Reporting, the following G-code set was chosen for this patient based the use of the NOMS functional outcome to quantify this patient's level of swallowing impairment. Using the NOMS, the patient was determined to be at level 3 for swallow function which correlates with the CL= 60-79% level of severity. Based on the objective assessment provided within this note, the current, goal, and discharge g-codes are as follows:    Swallow  Swallowing:   Swallow Current Status CL= 60-79%   Swallow Goal Status CL= 60-79%      NOMS Swallowing Levels:  Level 1 (CN): NPO  Level 2 (CM): NPO but takes consistency in therapy  Level 3 (CL): Takes less than 50% of nutrition p.o. and continues with nonoral feedings; and/or safe with mod cues; and/or max diet restriction  Level 4 (CK):  Safe swallow but needs mod cues; and/or mod diet restriction; and/or still requires some nonoral feeding/supplements  Level 5 (CJ): Safe swallow with min diet restriction; and/or needs min cues  Level 6 (CI): Independent with p.o.; rare cues; usually self cues; may need to avoid some foods or needs extra time  Level 7 ([address] [place] [address]): Independent for all p.o.  MONISHA. (2003). National Outcomes Measurement System (NOMS): Adult Speech-Language Pathology User's Guide. COMMUNICATION/EDUCATION:   Patient was educated regarding His deficit(s) of 3 as this relates to His diagnosis of MS. He demonstrated Fair understanding as evidenced by Micky Crawford. The patients plan of care including findings from [facility], recommendations, planned interventions, and recommended diet changes were discussed with: Physician. []  Posted safety precautions in patient's room. [x]  Patient/family have participated as able in goal setting and plan of care. []  Patient/family agree to work toward stated goals and plan of care. []  Patient understands intent and goals of therapy, but is neutral about his/her participation. []  Patient is unable to participate in goal setting and plan of care.     Thank you for this referral.  Darylene Lenz, SLP  Time Calculation: 30 mins

## 2018-07-05 NOTE — PROGRESS NOTES
Palliative Medicine Consult  Simon: [phone] ([phone])    Patient Name: Batool Reyes  YOB: 1950    Date of Initial Consult: 7/3/18  Reason for Consult: care decisions  Requesting Provider: Kena Driver MD   Primary Care Physician: Donya Bonilla MD     SUMMARY:   Batool Reyes is a 70 y.o. with a past history of  multiple sclerosis, ESRD on HD, anemia of chronic disease, secondary hyperparathyroidisim,  ASHD, HTN, HLD DM II, GERD, and PVD, was a smoker but quit smoking 5 years ago, has a hx of ETOH abuse and has been sober x 10 years, recent hx of an EGD with snare polypectomy on 06/13/2018. He was admitted on 6/28/2018 from home with a diagnosis of large right EIA pseudoaneurysm. Current medical issues leading to Palliative Medicine involvement include: pt was taken to OR for right leg angiogram and iliac aneurysm repair. PALLIATIVE DIAGNOSES:   1. Abdominal pain  2. Nausea and vomiting  3. Diarrhea   4. Weakness   5. Gait problem   6. Sepsis  7. ESRD on HD MWF  8. Aspiration PNA: pt has esophageal dyskinesia, rec small bites, chew thoroughly, remain upright for at least 30-60 min after eating  9. Weight loss 3/2017  195 lbs,  2/2018 170 lbs   10. Care decisions     PLAN:   1. Met with pt and wife to talk about PEG tube as wife had many questions, ie, who will manage the tube, what about dialysis. . Discussed risks and benefits of feeding tube and not having a feeding tube. Counseled pt and wife that they will be taught how to use the feeding tube, described bolus feedings using a syringe, that feedings can be done before and after dialysis, so it will not cause any issues with his dialysis. Also helped pt complete a POST form. His wife is mPOA. 2. Initial consult note routed to primary continuity provider  3.  Communicated plan of care with: Palliative IDT, RN     GOALS OF CARE / TREATMENT PREFERENCES:     GOALS OF CARE:  Patient/Health Care Proxy Stated Goals: Prolong life      TREATMENT PREFERENCES:   Code Status: Full Code    Advance Care Planning:  Advance Care Planning 6/28/2018   Patient's Healthcare Decision Maker is: Verbal statement (Legal Next of Kin remains as decision maker)   Confirm Advance Directive None       Medical Interventions: Full interventions   Other Instructions:    Artificially Administered Nutrition: Feeding tube long-term, if indicated     Other:    As far as possible, the palliative care team has discussed with patient / health care proxy about goals of care / treatment preferences for patient. HISTORY:     History obtained from: chart, patient    CHIEF COMPLAINT: abdominal pain    HPI/SUBJECTIVE:    The patient is:   [x] Verbal and participatory  [] Non-participatory due to:     Presented ambulatory with c/o right flank and abdominal pain with associated N/V/D x 3 days, and overnight his RLE began to swell. He has missed 2 HD sessions due to the pain and when he presented to the dialysis unit today he was referred to the ED for evaluation following a brief session. Pt reports that last Sept he was in the process of receiving a right sided transplant going through his external iliac artery that was complicated by post-op bleeding requiring surgical repair. It bled again and he underwent placement of a covered stent graft over the external iliac artery. While this stopped the bleeding it killed his donor transplant. The transplant was removed and everything was ok until 1 week ago, when he started having right flank pain. 6/29: RAT call for lethargy, low Sp02 and hypotension, atelectasis. 7/2: neurology eval indicates pt refuses treatment and f/u for MS.     ED w/u:  EXAM: chronically ill appearing, RLE swelling, RLQ of abdomen very TTP  LAB: wbc 26.1, plt 270, troponin 1.99, amylase 28, lipase 31, BUN/Cr 29/6.84, alk phos 177, albumin 2.3  IMAGING: ABD CT revealed a large right EIA pseudoaneurysm with central hyperdense contrast measuring 2.6 x 5.2 x 5.6cm and the overall dimensions measuring 4.6 x 6.8 x 9.6 cm. Clinical Pain Assessment (nonverbal scale for severity on nonverbal patients):   Clinical Pain Assessment  Severity: 3          Duration: for how long has pt been experiencing pain (e.g., 2 days, 1 month, years)  Frequency: how often pain is an issue (e.g., several times per day, once every few days, constant)     FUNCTIONAL ASSESSMENT:     Palliative Performance Scale (PPS):  PPS: 40       PSYCHOSOCIAL/SPIRITUAL SCREENING:     Palliative IDT has assessed this patient for cultural preferences / practices and a referral made as appropriate to needs (Cultural Services, Patient Advocacy, Ethics, etc.)    Advance Care Planning:  Advance Care Planning 6/28/2018   Patient's Healthcare Decision Maker is: Verbal statement (Legal Next of Kin remains as decision maker)   Confirm Advance Directive None       Any spiritual / Roman Catholic concerns:  [] Yes /  [x] No    Caregiver Burnout:  [] Yes /  [] No /  [x] No Caregiver Present      Anticipatory grief assessment:   [x] Normal  / [] Maladaptive       ESAS Anxiety: Anxiety: 3    ESAS Depression: Depression: 2        REVIEW OF SYSTEMS:     Positive and pertinent negative findings in ROS are noted above in HPI. The following systems were [x] reviewed / [] unable to be reviewed as noted in HPI  Other findings are noted below. Systems: constitutional, ears/nose/mouth/throat, respiratory, gastrointestinal, genitourinary, musculoskeletal, integumentary, neurologic, psychiatric, endocrine. Positive findings noted below.   Modified ESAS Completed by: provider   Fatigue: 5 Drowsiness: 0   Depression: 2 Pain: 3   Anxiety: 3 Nausea: 0   Anorexia: 9 Dyspnea: 2           Stool Occurrence(s): 0        PHYSICAL EXAM:     From RN flowsheet:  Wt Readings from Last 3 Encounters:   07/05/18 149 lb 7.6 oz (67.8 kg)   06/13/18 150 lb (68 kg)   04/17/18 155 lb (70.3 kg)     Blood pressure 131/68, pulse 78, temperature 97.6 °F (36.4 °C), resp. rate 18, height 5' 10\" (1.778 m), weight 149 lb 7.6 oz (67.8 kg), SpO2 100 %.     Pain Scale 1: Numeric (0 - 10)  Pain Intensity 1: 7  Pain Onset 1: chronic  Pain Location 1: Flank  Pain Orientation 1: Right     Pain Intervention(s) 1: Medication (see MAR)  Last bowel movement, if known:     Constitutional: Thin, temporal and thenar wasting, awake, alert, oriented x 3  Eyes: pupils equal, anicteric  ENMT: no nasal discharge, moist mucous membranes, difficulty with speech 2/2 esophageal spasms  Cardiovascular: regular rhythm, distal pulses intact  Respiratory: breathing not labored, symmetric  Gastrointestinal: soft non-tender, +bowel sounds  Musculoskeletal: no deformity, no tenderness to palpation  Skin: warm, dry  Neurologic: following commands, moving all extremities  Psychiatric: full affect, no hallucinations       HISTORY:     Principal Problem:    Pseudoaneurysm of iliac artery (HCC) (6/28/2018)    Active Problems:    Hypertension (4/12/2010)      PAD (peripheral artery disease) ([address] (12/9/2010)      S/P coronary artery stent placement (2/18/2011)      Overview: 2/17/11 PCI/CRISTI to RCA      ASHD (arteriosclerotic heart disease) (4/11/2013)      Mixed hyperlipidemia (4/11/2013)      ESRD on hemodialysis ([address] (12/30/2014)      Ataxia (1/4/2015)      Multiple sclerosis ([address] (1/4/2015)      Diabetic oculomotor palsy ([address] (3/25/2016)      Diabetic peripheral neuropathy associated with type 2 diabetes mellitus ([address] (3/25/2016)      Type 2 diabetes with nephropathy (HCC) (3/1/2018)      Elevated troponin (6/29/2018)      Esophageal dyskinesia (7/3/2018)      Weight loss (7/3/2018)      Counseling regarding goals of care (7/3/2018)      Weakness generalized (7/3/2018)      Past Medical History:   Diagnosis Date    Anemia associated with chronic renal failure     Autoimmune disease ([address]     hx ms    CAD (coronary artery disease)     Chronic kidney disease     catheter removed and no dialysis at this time  Chronic kidney disease     left arm fistula dialysis MWF    Diabetes ([facility]     type II    Elevated troponin 6/29/2018    GERD (gastroesophageal reflux disease)     Hypertension     Ill-defined condition     prostate cancer    Multiple sclerosis ([facility]     diagnosed '01    Other ill-defined conditions(799.89)     anemia    Other ill-defined conditions(799.89)     elevated cholesterol    Other ill-defined conditions(799.89)     hx septic right foot    Peripheral vascular disease ([facility]     Secondary hyperparathyroidism of renal origin ([facility]     Thyroid disease       Past Surgical History:   Procedure Laterality Date    COLONOSCOPY N/A 12/6/2016    COLONOSCOPY performed [facility] Reggie Mcgrath MD [facility] END[facility]COPY    COLONOSCOPY,DIAGNOSTIC  12/6/2016         CORONARY STENT SINGLE W/PTCA  2/17/2011         HX APPENDECTOMY      HX CATARACT REMOVAL  10/18/10    bilateral    HX CHOLECYSTECTOMY      HX GI      ileostomy due to infection    HX HEART CATHETERIZATION      HX HEENT      hx post photocoagulation eye 2009    HX OTHER SURGICAL      right partial foot amputation    HX OTHER SURGICAL      cardiac cath    HX OTHER SURGICAL      prostate removed    [place] COLONOSCOPY W/BIOPSY SINGLE/MULTIPLE  5/10/2010         UPPER GI ENDOSCOPY,BIOPSY  4/17/2018         UPPER GI ENDOSCOPY,REMV TUMOR,SNARE  6/13/2018         VASCULAR SURGERY PROCEDURE UNLIST      katelyn. leg bypasses      Family History   Problem Relation Age of Onset    Diabetes Mother       History reviewed, no pertinent family history.   Social History   Substance Use Topics    Smoking status: Former Smoker     Years: 1.00     Quit date: 4/12/2003    Smokeless tobacco: Never Used      Comment: quit 5 years ago    Alcohol use No      Comment: Stopped drinking 10 years ago     Allergies   Allergen Reactions    Sensipar [Cinacalcet] Other (comments)     Cinacalcet (Sensipar) has been added as an \"allergy\" / intolerance with a comment to assure providers at discharge and post discharge are aware of Dr. Sandhya Chou recommendation to avoid Sensipar. Patient takes Thersia Kubas as an outpatient; Thus, Anish Maykel has been discontinued per Nephrology (cannot give with recent Parsabiv b/c risk of low Calcium).       Current Facility-Administered Medications   Medication Dose Route Frequency    acetylcysteine (MUCOMYST) 200 mg/mL (20 %) solution 400 mg  400 mg Nebulization TID    albuterol-ipratropium (DUO-NEB) 2.5 MG-0.5 MG/3 ML  3 mL Nebulization TID RT    albuterol-ipratropium (DUO-NEB) 2.5 MG-0.5 MG/3 ML  3 mL Nebulization Q6H PRN    metoprolol (LOPRESSOR) injection 1.25 mg  1.25 mg IntraVENous Q6H    nitroglycerin (NITROBID) 2 % ointment 1 Inch  1 Inch Topical Q6H PRN    ondansetron (ZOFRAN) injection 4 mg  4 mg IntraVENous Q4H PRN    heparin (porcine) injection 5,000 Units  5,000 Units SubCUTAneous Q12H    albumin human 25% (BUMINATE) solution 12.5 g  12.5 g IntraVENous DIALYSIS PRN    morphine injection 2 mg  2 mg IntraVENous Q4H PRN    lactobac ac& pc-s.therm-b.anim (DAVIN Q/RISAQUAD)  1 Cap Oral DAILY    balsam peru-castor oil (VENELEX)  mg/gram ointment   Topical BID    guaiFENesin ER (MUCINEX) tablet 1,200 mg  1,200 mg Oral Q12H    pantoprazole (PROTONIX) 40 mg in sodium chloride 0.9% 10 mL injection  40 mg IntraVENous Q12H    senna-docusate (PERICOLACE) 8.6-50 mg per tablet 2 Tab  2 Tab Oral QHS    epoetin niya (EPOGEN;PROCRIT) injection 10,000 Units  10,000 Units SubCUTAneous Q MON, WED & FRI    piperacillin-tazobactam (ZOSYN) 3.375 g in 0.9% sodium chloride (MBP/ADV) 100 mL  3.375 g IntraVENous Q12H    insulin lispro (HUMALOG) injection   SubCUTAneous AC&HS    glucose chewable tablet 16 g  4 Tab Oral PRN    dextrose (D50W) injection syrg 12.5-25 g  12.5-25 g IntraVENous PRN    glucagon (GLUCAGEN) injection 1 mg  1 mg IntraMUSCular PRN    escitalopram oxalate (LEXAPRO) tablet 10 mg  10 mg Oral DAILY    sevelamer carbonate (RENVELA) tab 800 mg  800 mg Oral TID WITH MEALS    pravastatin (PRAVACHOL) tablet 10 mg  10 mg Oral QHS          LAB AND IMAGING FINDINGS:     Lab Results   Component Value Date/Time    WBC 17.1 (H) 07/05/2018 01:57 AM    HGB 10.1 (L) 07/05/2018 01:57 AM    PLATELET 498 12/88/0188 01:57 AM     Lab Results   Component Value Date/Time    Sodium 138 07/05/2018 01:57 AM    Potassium 4.0 07/05/2018 01:57 AM    Chloride 99 07/05/2018 01:57 AM    CO2 30 07/05/2018 01:57 AM    BUN 12 07/05/2018 01:57 AM    Creatinine 4.08 (H) 07/05/2018 01:57 AM    Calcium 8.6 07/05/2018 01:57 AM    Magnesium 2.2 07/05/2018 01:57 AM    Phosphorus 4.3 07/05/2018 01:57 AM      Lab Results   Component Value Date/Time    AST (SGOT) 48 (H) 06/29/2018 11:40 PM    Alk. phosphatase 151 (H) 06/29/2018 11:40 PM    Protein, total 7.4 06/29/2018 11:40 PM    Albumin 1.7 (L) 06/29/2018 11:40 PM    Globulin 5.7 (H) 06/29/2018 11:40 PM     Lab Results   Component Value Date/Time    INR 1.2 (H) 07/05/2018 01:57 AM    Prothrombin time 12.4 (H) 07/05/2018 01:57 AM    aPTT 30.1 03/21/2016 02:01 PM      Lab Results   Component Value Date/Time    Iron 37 12/10/2010 05:00 AM    TIBC 189 (L) 12/10/2010 05:00 AM    Iron % saturation 20 12/10/2010 05:00 AM    Ferritin 122 12/10/2010 05:00 AM      Lab Results   Component Value Date/Time    pH 7.38 06/29/2018 11:02 PM    PCO2 40 06/29/2018 11:02 PM    PO2 68 (L) 06/29/2018 11:02 PM     No components found for: [place]   Lab Results   Component Value Date/Time    CK 90 02/01/2018 01:50 PM    CK - MB 3.7 (H) 02/01/2018 01:50 PM                Total time: 40 min  Counseling / coordination time, spent as noted above: 35 min  > 50% counseling / coordination?: yes    Prolonged service was provided for  []30 min   []75 min in face to face time in the presence of the patient, spent as noted above. Time Start:   Time End:   Note: this can only be billed with 81152 (initial) or 99401 (follow up).   If multiple start / stop times, list each separately.

## 2018-07-06 NOTE — PROGRESS NOTES
Patient is off floor at this time for surgical procedure. PT will follow up with patient later.     Valerie Shoemaker, PT

## 2018-07-06 NOTE — PROGRESS NOTES
NAME: Lane Anthony        :  1950        MRN:  [id number]        Assessment :    Plan:  --ESRD  Hypotension/sepsis  Left AVF  Anemia of CKD  SHPT  Usually HTN  DM   Right iliac pseudoaneuysm  Hypoxia/sob  MWF at [facility] today  He is on Parsabiv at outpatient unit; d/c'd Sensipar (cannot give with recent Parsabiv b/c risk of low Calcium); on Renvela     epo 10 k sc tiw. Prn prbc's.      PEG placed today, TF started-note on jevity not nepro  Palliative care folowing    Will see again on Monday, call if problems arise on        Subjective:     Chief Complaint:  No pain after PEG  Review of Systems:    Symptom Y/N Comments  Symptom Y/N Comments   Fever/Chills    Chest Pain     Poor Appetite    Edema     Cough    Abdominal Pain     Sputum    Joint Pain     SOB/BANKS    Pruritis/Rash     Nausea/vomit    Tolerating PT/OT     Diarrhea    Tolerating Diet     Constipation    Other       Could not obtain due to:      Objective:     VITALS:   Last 24hrs VS reviewed since prior progress note.  Most recent are:  Visit Vitals    BP (!) 131/35 (BP 1 Location: Right arm, BP Patient Position: At rest)    Pulse 85    Temp 97.9 °F (36.6 °C)    Resp 14    Ht 5' 10\" (1.778 m)    Wt 70 kg (154 lb 6.4 oz)    SpO2 100%    BMI 22.15 kg/m2       Intake/Output Summary (Last 24 hours) at 18 1544  Last data filed at 18 0932   Gross per 24 hour   Intake              100 ml   Output                0 ml   Net              100 ml      Telemetry Reviewed:     PHYSICAL EXAM:  General:  no acute distress  Resp:  Clear anteriorly  CV:  Regular  rhythm, No edema  GI:  Soft,   EXT  S/p (R) foot amputation      Lab Data Reviewed: (see below)    Medications Reviewed: (see below)    PMH/SH reviewed - no change compared to H&P  ________________________________________________________________________  Care Plan discussed with:  Patient Family      RN     Care Manager                    Consultant:          Comments   >50% of visit spent in counseling and coordination of care       ________________________________________________________________________  Ronal Castro MD     Procedures: see electronic medical records for all procedures/Xrays and details which  were not copied into this note but were reviewed prior to creation of Plan. LABS:  Recent Labs      07/06/18 0316 07/05/18 0157   WBC  12.0*  17.1*   HGB  9.5*  10.1*   HCT  31.7*  33.8*   PLT  330  325     Recent Labs      07/06/18 0316 07/05/18 0157 07/04/18 0314   NA  138  138  138   K  4.0  4.0  4.2   CL  101  99  101   CO2  27  30  26   BUN  18  12  23*   CREA  5.33*  4.08*  5.94*   GLU  100  89  74   CA  8.4*  8.6  8.0*   MG  2.3  2.2  2.3   PHOS  5.4*  4.3  5.4*     Recent Labs      07/06/18 0316   SGOT  45*   AP  419*   TP  8.1   ALB  1.6*   GLOB  6.5*     Recent Labs      07/05/18 0157   INR  1.2*   PTP  12.4*      No results for input(s): FE, TIBC, PSAT, FERR in the last 72 hours. No results found for: FOL, RBCF   No results for input(s): PH, PCO2, PO2 in the last 72 hours. No results for input(s): CPK, CKMB in the last 72 hours.     No lab exists for component: TROPONINI  No components found for: [place]  Lab Results   Component Value Date/Time    Color YELLOW/STRAW 12/30/2014 06:40 PM    Appearance CLEAR 12/30/2014 06:40 PM    Specific gravity 1.015 12/30/2014 06:40 PM    pH (UA) 7.5 12/30/2014 06:40 PM    Protein 300 (A) 12/30/2014 06:40 PM    Glucose >1000 (A) 12/30/2014 06:40 PM    Ketone NEGATIVE  12/30/2014 06:40 PM    Bilirubin NEGATIVE  12/30/2014 06:40 PM    Urobilinogen 0.2 12/30/2014 06:40 PM    Nitrites NEGATIVE  12/30/2014 06:40 PM    Leukocyte Esterase NEGATIVE  12/30/2014 06:40 PM    Epithelial cells FEW 12/30/2014 06:40 PM    Bacteria NEGATIVE  12/30/2014 06:40 PM    WBC 0-4 12/30/2014 06:40 PM    RBC 5-10 12/30/2014 06:40 PM MEDICATIONS:  Current Facility-Administered Medications   Medication Dose Route Frequency    acetylcysteine (MUCOMYST) 200 mg/mL (20 %) solution 400 mg  400 mg Nebulization TID    albuterol-ipratropium (DUO-NEB) 2.5 MG-0.5 MG/3 ML  3 mL Nebulization TID RT    albuterol-ipratropium (DUO-NEB) 2.5 MG-0.5 MG/3 ML  3 mL Nebulization Q6H PRN    metoprolol (LOPRESSOR) injection 1.25 mg  1.25 mg IntraVENous Q6H    nitroglycerin (NITROBID) 2 % ointment 1 Inch  1 Inch Topical Q6H PRN    ondansetron (ZOFRAN) injection 4 mg  4 mg IntraVENous Q4H PRN    heparin (porcine) injection 5,000 Units  5,000 Units SubCUTAneous Q12H    albumin human 25% (BUMINATE) solution 12.5 g  12.5 g IntraVENous DIALYSIS PRN    morphine injection 2 mg  2 mg IntraVENous Q4H PRN    lactobac ac& pc-s.therm-b.anim (DAVIN Q/RISAQUAD)  1 Cap Oral DAILY    balsam peru-castor oil (VENELEX)  mg/gram ointment   Topical BID    guaiFENesin ER (MUCINEX) tablet 1,200 mg  1,200 mg Oral Q12H    pantoprazole (PROTONIX) 40 mg in sodium chloride 0.9% 10 mL injection  40 mg IntraVENous Q12H    senna-docusate (PERICOLACE) 8.6-50 mg per tablet 2 Tab  2 Tab Oral QHS    epoetin niya (EPOGEN;PROCRIT) injection 10,000 Units  10,000 Units SubCUTAneous Q MON, WED & FRI    piperacillin-tazobactam (ZOSYN) 3.375 g in 0.9% sodium chloride (MBP/ADV) 100 mL  3.375 g IntraVENous Q12H    insulin lispro (HUMALOG) injection   SubCUTAneous AC&HS    glucose chewable tablet 16 g  4 Tab Oral PRN    dextrose (D50W) injection syrg 12.5-25 g  12.5-25 g IntraVENous PRN    glucagon (GLUCAGEN) injection 1 mg  1 mg IntraMUSCular PRN    escitalopram oxalate (LEXAPRO) tablet 10 mg  10 mg Oral DAILY    sevelamer carbonate (RENVELA) tab 800 mg  800 mg Oral TID WITH MEALS    pravastatin (PRAVACHOL) tablet 10 mg  10 mg Oral QHS

## 2018-07-06 NOTE — PROGRESS NOTES
Hospitalist Progress Note    NAME: Maren Jimenez   :  1950   MRN:  [id number]       Assessment / Plan:  Assessment and plan:  Diabetes mellitus 2 on Insulin at home- FS running low due to prolonged NPO status- follow with initiation of tube feeding today via  PEG tube placed today  Dysphagia due to esophageal dysmotility on MBstudy  Holding NPH considering NPO- will resume once FS rises on tube feeding via PEG - s/p PEG today  Repeat MBS  as per SLP - remains abnormal  SSI Lispro now  Appreciate GI help- s/p PEG now  Resume PO meds via PEG today as well    Sepsis (leukocytosis and hypotension earlier during admission)- WBC trending down 12 k today  Due to Aspiration PNA  CTA chest with Debris within the trachea and esophagus as well as the lingular and left lower lobe airways, with associated left basilar airspace disease. Small right pleural effusion. 12 mm groundglass nodule right lower lobe warrants close follow-up or PET  Scan.    ? Hypoxia (he was on 4L O2 but can't find O2 sat < 90 in the chart)  On IV Zosyn,  s/p Vancomycin  and Flagyl- now discontinued since    Cont Zosyn for 10 days then stop  Off O2  speech eval ongoing, s/p barium swallow - s/p PEG now  Cont NPO as per SLP  Pulmonary and GI also seems to be on the case  Per GI pt had normal EGD 2 weeks ago, symptoms consistent with dysphagia, meds held today as couldn't even swallow meds  Follow BMP, Mag, Phos    ESRD  Nephrology is following for ongoing HD    Hyperlipidemia  On statins, will give if able to    Hypertension  Switch Coreg to IV BB with parametes  Add PRN nitropaste    Multiple sclerosis   ?  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     CAD hx, now presenting with no chest pain and nearly flat trend of troponin and marginally abnormal EKG with ST -T changes   agree with cardiologist   Conservative approach is ideal under the current circumstances.      ESRD / HD ~ 8 years   PAD   S/p remote partial (Syme) amputation of RIGHT foot.      Body mass index is 23.13 kg/(m^2). Full Code, Wife is POA    Disposition: [facility]/IP rehab recommended, when pt stable on PEG tube feeding     Subjective: Pt seen and examined at bedside. Breathing better, still unable to swallow. Overnight events d/w RN     Chief Complaint / Reason for Physician Visit: f/u Aspiration PNA, ESRD, CAD, DM, HTN, dysphagia    Review of Systems:  Symptom Y/N Comments  Symptom Y/N Comments   Fever/Chills n   Chest Pain n    Poor Appetite    Edema     Cough y   Abdominal Pain n    Sputum    Joint Pain     SOB/BANKS y improving  Pruritis/Rash     Nausea/vomit    Tolerating PT/OT     Diarrhea    Tolerating Diet     Constipation    Other y Difficulty swallowing     Could NOT obtain due to:      Objective:     VITALS:   Last 24hrs VS reviewed since prior progress note.  Most recent are:  Patient Vitals for the past 24 hrs:   Temp Pulse Resp BP SpO2   07/06/18 1116 97.6 °F (36.4 °C) 82 16 99/53 100 %   07/06/18 1013 - 84 14 164/58 93 %   07/06/18 1010 - 83 15 124/49 94 %   07/06/18 1008 - 75 12 117/75 100 %   07/06/18 1005 - 76 - 125/52 98 %   07/06/18 1002 - 76 15 163/42 100 %   07/06/18 1001 - 76 15 155/47 99 %   07/06/18 0957 - 76 15 141/46 99 %   07/06/18 0954 - 77 16 149/49 100 %   07/06/18 0950 97.8 °F (36.6 °C) 78 17 (!) 154/31 94 %   07/06/18 0940 - 74 16 135/42 94 %   07/06/18 0935 - 76 16 (!) 126/32 93 %   07/06/18 0934 - 76 14 (!) 131/35 95 %   07/06/18 0853 97.8 °F (36.6 °C) 80 10 178/42 92 %   07/06/18 0751 - 86 - (!) 149/38 98 %   07/06/18 0745 97.5 °F (36.4 °C) - - (!) 92/33 99 %   07/06/18 0411 97.9 °F (36.6 °C) 84 18 134/81 99 %   07/06/18 0409 97.5 °F (36.4 °C) 82 - 134/81 -   07/06/18 0017 98.8 °F (37.1 °C) 89 18 150/69 98 %   07/05/18 2200 - 84 - 147/66 99 % 07/05/18 2000 97.5 °F (36.4 °C) 81 18 143/66 100 %   07/05/18 1556 97.5 °F (36.4 °C) 78 18 131/68 100 %   07/05/18 1511 - - - - 100 %   07/05/18 1434 - 83 - - 100 %   07/05/18 1433 - 83 - 141/83 -       Intake/Output Summary (Last 24 hours) at 07/06/18 1330  Last data filed at 07/06/18 0932   Gross per 24 hour   Intake              100 ml   Output                0 ml   Net              100 ml        PHYSICAL EXAM:  General: WD, WN. Alert, cooperative, no acute distress    EENT:  EOMI. Anicteric sclerae. MMM  Resp:  crackles, no wheezing or rales. No accessory muscle use  CV:  Regular  rhythm,  No edema  GI:  Soft, Non distended, Non tender.  +Bowel sounds, PEG tube Noted +  Neurologic:  Alert and oriented X 3, normal speech,   Psych:   Good insight. Not anxious nor agitated  Skin:  No rashes. No jaundice    Reviewed most current lab test results and cultures  YES  Reviewed most current radiology test results   YES  Review and summation of old records today    NO  Reviewed patient's current orders and MAR    YES  PMH/SH reviewed - no change compared to H&P  ________________________________________________________________________  Care Plan discussed with:    Comments   Patient x    Family      RN x    Care Manager x    Consultant  x Neurology Dr Emerson Mcclain yesterday                     Multidiciplinary team rounds were held today with , nursing, pharmacist and clinical coordinator. Patient's plan of care was discussed; medications were reviewed and discharge planning was addressed.      ________________________________________________________________________  Total NON critical care TIME:  16 Minutes    Total CRITICAL CARE TIME Spent:   Minutes non procedure based      Comments   >50% of visit spent in counseling and coordination of care     ________________________________________________________________________  Morales Fisher MD     Procedures: see electronic medical records for all procedures/Xrays and details which were not copied into this note but were reviewed prior to creation of Plan. LABS:  I reviewed today's most current labs and imaging studies.   Pertinent labs include:  Recent Labs      07/06/18 0316 07/05/18 0157 07/04/18 0314   WBC  12.0*  17.1*  17.0*   HGB  9.5*  10.1*  8.3*   HCT  31.7*  33.8*  27.1*   PLT  330  325  311     Recent Labs      07/06/18 0316 07/05/18 0157 07/04/18 0314   NA  138  138  138   K  4.0  4.0  4.2   CL  101  99  101   CO2  27  30  26   GLU  100  89  74   BUN  18  12  23*   CREA  5.33*  4.08*  5.94*   CA  8.4*  8.6  8.0*   MG  2.3  2.2  2.3   PHOS  5.4*  4.3  5.4*   ALB  1.6*   --    --    TBILI  0.7   --    --    SGOT  45*   --    --    ALT  28   --    --    INR   --   1.2*   --        Signed: Kena Ricketts MD

## 2018-07-06 NOTE — DIALYSIS
[facility]  [phone]    Vitals   Pre   Post   Assessment   Pre   Post     Temp  Temp: 98 °F (36.7 °C) (07/06/18 1615)  98 LOC  Alert and oriented x 4; cooperative Alert and oriented x 4; cooperative   HR   Pulse (Heart Rate): 77 (07/06/18 1630) 93 Lungs   CTAB upper lobes and diminished bases; on RA and w/o c/o SOB CTAB upper lobes and diminished bases; on RA and w/o c/o SOB   B/P   BP: 104/44 (07/06/18 1630) 160/42 Cardiac   S1S2 and w/o c/o CP S1S2 and w/o c/o CP   Resp   Resp Rate: 18 (07/06/18 1630) 18 Skin   Warm and dry and with new PEG tube to R ABD and old R foot amp Warm and dry and with new PEG tube to R ABD and old R foot amp   Pain level  Pain Intensity 1: 0 (07/06/18 1523) No complaints Edema  None discernable None discernable   Orders:    Duration:   Start:   1430 End:   1736 Total:   3.1 hours   Dialyzer:   Dialyzer/Set Up Inspection: Revaclear (07/06/18 1630)   K Bath:   Dialysate K (mEq/L): 3 (07/06/18 1630)   Ca Bath:   Dialysate CA (mEq/L): 2.5 (07/06/18 1630)   Na/Bicarb:   Dialysate NA (mEq/L): 140 (07/06/18 1630)   Target Fluid Removal:   Goal/Amount of Fluid to Remove (mL): 0 mL (07/06/18 1630)   Access     Type & Location:   DENI AVF   Labs     Obtained/Reviewed   Critical Results Called   Date when labs were drawn-  Hgb-    HGB   Date Value Ref Range Status   07/06/2018 9.5 (L) 12.1 - 17.0 g/dL Final     K-    Potassium   Date Value Ref Range Status   07/06/2018 4.0 3.5 - 5.1 mmol/L Final     Ca-   Calcium   Date Value Ref Range Status   07/06/2018 8.4 (L) 8.5 - 10.1 MG/DL Final     Bun-   BUN   Date Value Ref Range Status   07/06/2018 18 6 - 20 MG/DL Final     Creat-   Creatinine   Date Value Ref Range Status   07/06/2018 5.33 (H) 0.70 - 1.30 MG/DL Final     Comment:     INVESTIGATED PER DELTA CHECK PROTOCOL        Medications/ Blood Products Given     Name   Dose   Route and Time           None by HD          Blood Volume Processed (BVP):    65 Net Fluid   Removed:  0   Comments   Time Out Done: yes, at 1620  Primary Nurse Rpt Pre: RN  Primary Nurse Rpt Post: RN  Pt Education:  discussed aseptic technique and infection control  Care Plan:  patient will complete the ordered tx as prescribed  Tx Summary: arrived to pt room at or about 1530 and set up and tested equipment per order and policy; order includes RTD 3.5 hours on 3251 bath and attempt to achieve 0 kg fluid removal, as tolerated by pt, , ; pt assessed prior to tx and found to be stable for HD and to have a DENI AVF, which was found to be patent and aseptically cannulated w 15 ga x 2 needles and tx initiated w/o issue; no fluid removal being attempted r/t assessment notes no discernable edema and low BP; pt completed 3.1 hours of the ordered tx and no fluid was removed, he was rinsed back early r/t clotting venous chamber; all blood was rinsed back and patient was aseptically de-cannulated, pressure dressings applied and hemostasis was achieved w/o prolonged bleeding, dressings CDI and pt was stable at the time of my departure and report given to the primary nurse   Admiting Diagnosis: ABD pain w n/v x 3 days / [facility]  Pt's previous clinic- [facility] signed - Informed Consent Verified: Yes (07/06/18 1630)  [facility] Consent - visualized  Hepatitis Status- Ag neg and Ab 18 as of 7/2/2018  Machine #- Machine Number: [id number] (07/06/18 1630)  Telemetry status- on and monitored at bedside and remotely  Pre-dialysis wt. - Pre-Dialysis Weight: 66.5 kg (146 lb 9.7 oz) (07/04/18 1200)   Post wt 66.5 kg

## 2018-07-06 NOTE — ANESTHESIA POSTPROCEDURE EVALUATION
Post-Anesthesia Evaluation and Assessment    Patient: Adin Rdz MRN: [id number]  SSN: [id number]    YOB: 1950  Age: 70 y.o. Sex: male       Cardiovascular Function/Vital Signs  Visit Vitals    /49    Pulse 83    Temp 36.6 °C (97.8 °F)    Resp 15    Ht 5' 10\" (1.778 m)    Wt 67.8 kg (149 lb 7.6 oz)    SpO2 94%    BMI 21.45 kg/m2       Patient is status post total IV anesthesia, general anesthesia for Procedure(s):  PERCUTANEOUS ENDOSCOPIC GASTROSTOMY TUBE INSERTION  ESOPHAGOGASTRODUODENOSCOPY (EGD). Nausea/Vomiting: None    Postoperative hydration reviewed and adequate. Pain:  Pain Scale 1: Numeric (0 - 10) (07/06/18 1008)  Pain Intensity 1: 8 (07/06/18 1008)   Managed    Neurological Status:   Neuro (WDL): Within Defined Limits (06/28/18 2145)  Neuro  Neurologic State: Alert (07/06/18 0745)  Orientation Level: Oriented to person;Oriented to place;Oriented to time (07/06/18 0745)  Cognition: Follows commands (07/06/18 0745)   At baseline    Mental Status and Level of Consciousness: Arousable    Pulmonary Status:   O2 Device: Room air (07/06/18 1010)   Adequate oxygenation and airway patent    Complications related to anesthesia: None    Post-anesthesia assessment completed.  No concerns    Signed By: Irving Gates MD     July 6, 2018

## 2018-07-06 NOTE — PROGRESS NOTES
Transported back to inpatient room per stretcher and per [facility] from Transport. Pt in stable condition.

## 2018-07-06 NOTE — PROCEDURES
[facility]                   Endoscopic Gastroduodenoscopy with PEG Placement Procedure Note    Penelope Whyte  1950  [id number]    Procedure: Endoscopic Gastroduodenoscopy with PEG placement    Indication: Oropharyngeal dysphagia. Feeding difficulties. Pre-operative Diagnosis: see indication above    Post-operative Diagnosis: Successful PEG placement    : Ricki Sung MD    Assistant: Shima Forrester NP    Referring Provider:  Ian Castillo MD    Anesthesia/Sedation:  MAC anesthesia Propofol    Airway assessment: No airway problems anticipated    Pre-Procedural Exam:      Airway: clear, no airway problems anticipated  Heart: RRR, without gallops or rubs  Lungs: clear bilaterally without wheezes, crackles, or rhonchi  Abdomen: soft, nontender, nondistended, bowel sounds present  Mental Status: awake, alert and oriented to person, place and time       Procedure Details     After MAC anesthesia, the endoscope was passed into the esophagus without difficulty. The proximal esophagus is normal as is the distal esophagus. The fundus, body, antrum, pylorus, bulb and postbulbar area are unremarkable. On slow withdrawal of the scope, the stomach was transilluminated into the abdominal wall. Under sterile conditions and 1% Xylocaine anesthesia, a small incision was made in the abdominal wall. A needle was passed through the incision, and under direct vision into the stomach. A wire was passed through the needle, snared and brought out the mouth. The PEG tube was passed over the wire and brought out the abdominal wall without difficulty. The scope was then reinserted and the positioning of the PEG tube was excellent. He or she tolerated the procedure without complication and will return to his or her room in satisfactory condition. Therapies:  PEG placement    Specimens: None           Complications:   None; patient tolerated the procedure well.     EBL: None.            Impression:    Successful PEG placement  -Successful PEG tube placement. Recommendations: May start tube feedings today.  -The PEG tube may be used for feedings as soon as bowel sounds are confirmed post-procedure. The head of the bed should be kept elevated to 30 degrees during tube feeds. .  Monitor the PEG site for bleeding and infection.     April Jalloh MD

## 2018-07-06 NOTE — PROGRESS NOTES
3:26PM  Per Sergio Delcid at [facility], continuing to follow pt to determine appropriateness of IP rehab.     4:35PM  Multiple attempts throughout the day to assess pt, but pt resting and unable to participate. CM will continue to follow and assist with d/c planning.      Daniel Mcgee MSW  Care Manager

## 2018-07-06 NOTE — PROGRESS NOTES
TRANSFER - OUT REPORT:    Verbal report given to Ellen(name) on Jonah Kasper  being transferred to pcu(unit) for routine progression of care       Report consisted of patients Situation, Background, Assessment and   Recommendations(SBAR). Information from the following report(s) SBAR was reviewed with the receiving nurse. Lines:   Peripheral IV 07/06/18 Right Arm (Active)   Site Assessment Clean, dry, & intact 7/6/2018  9:09 AM   Phlebitis Assessment 0 7/6/2018  9:09 AM   Infiltration Assessment 0 7/6/2018  9:09 AM   Dressing Status Clean, dry, & intact 7/6/2018  9:09 AM   Dressing Type Transparent;Tape 7/6/2018  9:09 AM   Hub Color/Line Status Blue; Infusing 7/6/2018  9:09 AM        Opportunity for questions and clarification was provided.

## 2018-07-06 NOTE — PROGRESS NOTES
Problem: Mobility Impaired (Adult and Pediatric)  Goal: *Acute Goals and Plan of Care (Insert Text)  Physical Therapy Goals  Initiated 7/2/2018  1. Patient will move from supine to sit and sit to supine , scoot up and down and roll side to side in bed with minimal assistance/contact guard assist within 7 day(s). 2.  Patient will transfer from bed to chair and chair to bed with moderate assistance  using the least restrictive device within 7 day(s). 3.  Patient will perform sit to stand with moderate assistance  within 7 day(s). 4.  Patient will ambulate with moderate assistance  for 5 feet with the least restrictive device within 7 day(s). physical Therapy TREATMENT  Patient: Jama Wray (33 y.o. male)  Date: 7/6/2018  Diagnosis: ILIAC ANEURYSM  Pseudoaneurysm of iliac artery (HCC)  VASCULAR INSUFFICIENCY  needs peg tube Pseudoaneurysm of iliac artery (HCC)  Procedure(s) (LRB):  PERCUTANEOUS ENDOSCOPIC GASTROSTOMY TUBE INSERTION (N/A)  ESOPHAGOGASTRODUODENOSCOPY (EGD) (N/A) Day of Surgery  Precautions: Contact, Fall, Aspiration  Chart, physical therapy assessment, plan of care and goals were reviewed. ASSESSMENT:  Patient lying in bed sleeping when PT arrived. Reports being still sleepy from having PEG placed this morning, but willing to work with PT on exercises for legs. Cleared by nursing prior to entering room. Provided manual facilitory techniques to left ankle, bilateral knees and bilateral hips using quick stretches and tapping of tendons and muscle bellies - no response to techniques demonstrating 0/5 strength in left ankle, bilateral knee extensors and flexors, and hip flexion, extension, abduction and adduction. No response or active force producing contraction detected with internal/external hip rotation either. Provided manual resistive strengthening to bilateral triceps and latissimus dorsi x 8-10 reps each side. After 1 set patient reported being very fatigued.   Patient will need extensive rehab in an inpatient rehab setting to regain strength, or be trained in compensatory strategies to improve balance and mobility skills to maximize independence. He is not safe to return home to families care in his current level of function. Progression toward goals:  []    Improving appropriately and progressing toward goals  [x]    Improving slowly and progressing toward goals  []    Not making progress toward goals and plan of care will be adjusted     PLAN:  Patient continues to benefit from skilled intervention to address the above impairments. Continue treatment per established plan of care. Discharge Recommendations:  Inpatient Rehab  Further Equipment Recommendations for Discharge:  TBD     SUBJECTIVE:   Patient stated I feel like I haven't eaten.     OBJECTIVE DATA SUMMARY:   Critical Behavior:  Neurologic State: Alert  Orientation Level: Oriented to person, Oriented to place, Oriented to time  Cognition: Follows commands  Safety/Judgement:  (fearful of falling while seated with total support at EOB)  Functional Mobility Training:  Bed Mobility:                    Transfers:                                   Balance:     Ambulation/Gait Training:                                                        Pain:  Pain Scale 1: Numeric (0 - 10)  Pain Intensity 1: 0  Pain Location 1: Abdomen (at tube site)  Pain Orientation 1: Right  Pain Description 1: Aching  Pain Intervention(s) 1: Distraction; Emotional support  Activity Tolerance:   Fair. Please refer to the flowsheet for vital signs taken during this treatment.   After treatment:   []    Patient left in no apparent distress sitting up in chair  [x]    Patient left in no apparent distress in bed  [x]    Call bell left within reach  [x]    Nursing notified  []    Caregiver present  [x]    Bed alarm activated    COMMUNICATION/COLLABORATION:   The patients plan of care was discussed with: Occupational Therapist and Registered Nurse    Celia Hunter Hal Sepulveda, PT   Time Calculation: 17 mins

## 2018-07-06 NOTE — ROUTINE PROCESS
[facility]  1950  [id number]    Situation:  Verbal report received from: Mickey Callahan RN  Procedure: Procedure(s):  PERCUTANEOUS ENDOSCOPIC GASTROSTOMY TUBE INSERTION  ESOPHAGOGASTRODUODENOSCOPY (EGD)    Background:    Preoperative diagnosis: needs peg tube  Postoperative diagnosis: peg tube placement    :  Dr. Whitley Go  Assistant(s): Endoscopy Technician-1: Kenyatta Goldman  Endoscopy RN-1: Idania Hayward RN  Endoscopy RN-Relief: Dimitri Dorsey RN    Specimens: * No specimens in log *  H. Pylori  no    Assessment:  Intra-procedure medications       Anesthesia gave intra-procedure sedation and medications, see anesthesia flow sheet yes    Intravenous fluids: NS@ KVO     Vital signs stable       Abdominal assessment: round and soft       Recommendation:  Discharge patient per MD order  .   Return to floor yes   Family or Friend no    Permission to share finding with family or friend n/a

## 2018-07-06 NOTE — PROGRESS NOTES
Occupational Therapy  Patient off the floor at OR, will follow up as able/appropriate. Attempted to see for OT, however PT reported minimally able to participate and pending dialysis. Will follow up tomorrow.    Dominick Bedoya, OTR/L

## 2018-07-06 NOTE — ANESTHESIA PREPROCEDURE EVALUATION
Anesthetic History   No history of anesthetic complications            Review of Systems / Medical History  Patient summary reviewed, nursing notes reviewed and pertinent labs reviewed    Pulmonary                Comments: Former smoker - Quit 2003   Neuro/Psych             Comments: Multiple Sclerosis--stable  Diabetic Peripheral Neuropathy  Cervical Stenosis  Ataxia Cardiovascular    Hypertension          Past MI (NSTEMI), CAD, PAD, cardiac stents and hyperlipidemia    Exercise tolerance: <4 METS  Comments: S/P Bilateral Lower Extremity bypasses     Uses wheelchair to ambulate    2-2018 EKG:  SR with first degree AV Blk     50% EF 2-2018    Pt reports he saw his cardiologist and was cleared for this procedure.    GI/Hepatic/Renal     GERD    Renal disease (MWF): ESRD and dialysis      Comments: Hematemesis  Intractable Hiccups  Ileostomy due to infection Endo/Other    Diabetes: type 2, using insulin    Cancer (Prostate cancer with surgery) and anemia  Pertinent negatives: No hypothyroidism   Other Findings   Comments: Secondary hyperparathyroidism of renal origin  Dysphagia           Physical Exam    Airway  Mallampati: II  TM Distance: > 6 cm  Neck ROM: normal range of motion   Mouth opening: Normal     Cardiovascular  Regular rate and rhythm,  S1 and S2 normal,  no murmur, click, rub, or gallop             Dental    Dentition: Poor dentition  Comments: Multiple damaged, multiple missing, significant decay   Pulmonary  Breath sounds clear to auscultation               Abdominal  GI exam deferred       Other Findings            Anesthetic Plan    ASA: 3  Anesthesia type: total IV anesthesia and general          Induction: Intravenous  Anesthetic plan and risks discussed with: Patient      Propofol MAC

## 2018-07-06 NOTE — PROGRESS NOTES
0730: Received report from [facility]. Assumed care    0800:  at bedside to discuss procedure with patient. 9199: Consent obtained. 0820: Patient transported to Endo for procedure. 1642: TRANSFER - OUT REPORT:    Verbal report given to ERNESTO Joy(name) on Batool Reyes  being transferred to [facility](unit) for ordered procedure       Report consisted of patients Situation, Background, Assessment and   Recommendations(SBAR). Information from the following report(s) SBAR and MAR was reviewed with the receiving nurse. Lines:   Peripheral IV 07/06/18 Right Arm (Active)   Site Assessment Clean, dry, & intact 7/6/2018  9:09 AM   Phlebitis Assessment 0 7/6/2018  9:09 AM   Infiltration Assessment 0 7/6/2018  9:09 AM   Dressing Status Clean, dry, & intact 7/6/2018  9:09 AM   Dressing Type Transparent;Tape 7/6/2018  9:09 AM   Hub Color/Line Status Blue; Infusing 7/6/2018  9:09 AM        Opportunity for questions and clarification was provided. Patient transported with:   tele box          0950: TRANSFER - IN REPORT:    Verbal report received from Filipe Watkins RN(name) on Batool Reyes  being received from [facility](unit) for ordered procedure      Report consisted of patients Situation, Background, Assessment and   Recommendations(SBAR). Information from the following report(s) SBAR, MAR and Recent Results was reviewed with the receiving nurse. Opportunity for questions and clarification was provided. Assessment completed upon patients arrival to unit and care assumed.

## 2018-07-06 NOTE — PROGRESS NOTES
TRANSFER - IN REPORT:    Verbal report received from [facility] (name) on Radha Eaton  being received from PCU room 2268 (unit) for ordered procedure      Report consisted of patients Situation, Background, Assessment and   Recommendations(SBAR). Information from the following report(s) SBAR, Intake/Output, MAR and Recent Results was reviewed with the receiving nurse. Opportunity for questions and clarification was provided. Will give report to oncoming nurse upon arrival to unit. 0915:  Report also received from [facility]. Report given to Rony Nur, ERNESTO and Merle Valenzuela RN who are taking care of pt here in ENDO.

## 2018-07-06 NOTE — PROGRESS NOTES
Anesthesia reports 50mg Propofol, 100mg Lidocaine and 100mL NS given during procedure. Received report from anesthesia staff on vital signs and status of patient.

## 2018-07-06 NOTE — PROGRESS NOTES
Nutrition Assessment:    INTERVENTIONS/RECOMMENDATIONS:   · Recommend a trial of standard formula Jevity 1.5; start at 10 mL/hr and advance as tolerated by 10 mL q 8 hours to goal rate of 50 mL/hr + Prosource 1 packet daily + 160 mL water flushes q 4 hours (1860 kcal, 92g protein, 1872 mL water)  · Monitor K+, phos, Mg daily    ASSESSMENT:   Chart reviewed; patient medically noted for dysphagia and PEG placement today. PMH for ESRD on HD, MS, DM, and HTN. Previously had NGT but pulled it out before placement was confirmed. K+ is WNL today, phos elevated. Recommendations provided above for standard formula. Continue phos binder. Monitor electrolytes daily as patient at risk for refeeding given NPO x 7 days. If K+ and phos trend up d/t ESRD, can consider transition to Nepro @ 45 mL/hr + 170 mL water flushes q 4 hours (1944 kcal, 87.5g protein, 1805 mL water). Will continue to follow patient closely to provide further recommendations. Bolus recommendation: Jevity 1.5 240 mL 5x/day + 75 mL flush before and 100 mL flush after each bolus + prosource 1 packet daily; 1860 kcal, 92g protein, 1787 mL water  Or Nepro 240 mL bolus TID + 360 mL bolus daily (total of 4 bolus feedings) + 170 mL water flushes q 4 hours (70 mL flush before and 100 mL flush after each bolus + 2 additional water flushes); 1944 kcal, 87.5g protein, 1805 mL water       Diet Order: NPO  % Eaten:  No data found.     Pertinent Medications: [x] Reviewed []Other: Epogen, Lexapro, humalog, Hansa Q, Lopressor, Protonix, Pravastatin, Pericolace, Renvela   Pertinent Labs: [x]Reviewed  []Other: K+ 4.0, Phos 5.4, BG -685-  Food Allergies: [x]None []Other:     Last BM: 7/6   []Active     []Hyperactive  []Hypoactive       [] Absent  BS  Skin:    [] Intact   [] Incision  [] Breakdown   []Edema   []Other:    Anthropometrics: Height: 5' 10\" (177.8 cm) Weight: 67.8 kg (149 lb 7.6 oz)    IBW (%IBW):   ( ) UBW (%UBW):   (  %)    BMI: Body mass index is 21.45 kg/(m^2). This BMI is indicative of:  []Underweight   [x]Normal   []Overweight   [] Obesity   [] Extreme Obesity (BMI>40)  Last Weight Metrics:  Weight Loss Metrics 7/5/2018 6/13/2018 4/17/2018 4/12/2018 4/5/2018 3/1/2018 2/28/2018   Today's Wt 149 lb 7.6 oz 150 lb 155 lb 166 lb 166 lb 14.4 oz 153 lb 165 lb 4.8 oz   BMI 21.45 kg/m2 21.52 kg/m2 22.24 kg/m2 23.82 kg/m2 23.95 kg/m2 21.95 kg/m2 23.72 kg/m2       Estimated Nutrition Needs (Based on): 1887 Kcals/day (BMR (1451) x 1. 3AF) , 82 g (-95g (1.2-1.4 g/kg bw)) Protein  Carbohydrate:  At Least 130 g/day  Fluids:1900 mL/day     Pt expected to meet estimated nutrient needs: [x]Yes []No    NUTRITION DIAGNOSES:   Problem:  Inadequate protein-energy intake      Etiology: related to dysphagia     Signs/Symptoms: as evidenced by NPO status, PEG placement      NUTRITION INTERVENTIONS:    Enteral/Parenteral Nutrition: Initiate enteral nutrition                GOAL:   TF advanced to goal with electrolytes WNL next 2-4 days    NUTRITION MONITORING AND EVALUATION   Behavioral-Environmental Outcomes: Behavior  Food/Nutrient Intake Outcomes: Enteral/parenteral nutrition intake  Physical Signs/Symptoms Outcomes: Weight/weight change, Electrolyte and renal profile, GI profile, Glucose profile    Previous Goal Met:   [] Met              [x] Progressing Towards Goal              [] Not Progressing Towards Goal   Previous Recommendations:   [x] Implemented          [] Not Implemented          [] Not Applicable    LEARNING NEEDS (Diet, Food/Nutrient-Drug Interaction):    [x] None Identified   [] Identified and Education Provided/Documented   [] Identified and Pt declined/was not appropriate     Cultural, Taoist, OR Ethnic Dietary Needs:    [x] None Identified   [] Identified and Addressed     [x] Interdisciplinary Care Plan Reviewed/Documented    [x] Discharge Planning: see TF recommendations above   [] Participated in Interdisciplinary Rounds    NUTRITION RISK:    [x] High [] Moderate           []  Low  []  Minimal/Uncompromised      Racheltran Richardsry  Pager [phone]              Weekend Pager [phone]

## 2018-07-06 NOTE — WOUND CARE
Wound care attempted to see patient again today but he was in a test / procedure. Will try again on Monday.    Aixa Del Castillo RN, BSN, [employer]

## 2018-07-06 NOTE — PROGRESS NOTES
PEG tube inserted by Dr Fox \[facility] [place] [facility]\"" catalog # [id number]  Product # [id number]  Lot # [id number]   Expiration date 2019-09-30

## 2018-07-07 NOTE — PROGRESS NOTES
PCU SHIFT NURSING NOTE Bedside and Verbal shift change report given to Nain Felix (oncoming nurse) by Chito Blackman RN (offgoing nurse). Report included the following information SBAR, Kardex, ED Summary, Intake/Output, MAR and Accordion. Procedures Shift Summary:  
0730 Received report and assumed care of patient. 56 Spoke with Dr. Malvin Alvarez regarding patient's pain management and mucinex prescription. Explained that the 2mg morphine every 4 hours is not keeping patient pain free. Patient reporting 8/10 pain two hours before next pain med is due. Also told MD that I needed to hold mucinex because it is an extended release tab that cannot be crushed and administered through peg-tube. Dr. Kaitlin Guillermo indicated he would put in order for additional pain medication and gave permission to switch mucinex to an oral solution 400 mg BID. 
1200 Advanced tube feed another 10 ml towards goal. Tube feeding Jevity 1.5 now going 40 ml/hr. Will continue to assess if patient able to tolerate. 1400 Patient daily weight populated chart. Patient does not get out of bed and the bed is not zeroed. Will need to get weight using lizandro lift first thing in the morning. Overall uneventful shift. Patient tolerated advance in tube feeds. Wife, Codey Mcpherson, requested some education on tube feeds so I reviewed with her and showed her how to use the tube. Pain somewhat better managed alternating morphine and Percocet every two hours. Explained to night nurse that next 10 ml advancement in tube feeds happens at 2000. 
 
1930 Bedside and Verbal shift change report given to Darian Jacobson RN (oncoming nurse) by Gabby Dalal RN (offgoing nurse). Report included the following information SBAR, Kardex, ED Summary, Procedure Summary, Intake/Output and MAR. Admission Date 6/28/2018 Admission Diagnosis ILIAC ANEURYSM Pseudoaneurysm of iliac artery (HCC) VASCULAR INSUFFICIENCY 
needs peg tube Consults IP CONSULT TO VASCULAR SURGERY 
IP CONSULT TO NEPHROLOGY 
IP CONSULT TO INTENSIVIST 
IP CONSULT TO GASTROENTEROLOGY 
IP CONSULT TO NEUROLOGY 
IP CONSULT TO PALLIATIVE CARE - PROVIDER 
IP CONSULT TO NEUROLOGY 
IP CONSULT TO CARDIOLOGY 
IP CONSULT TO HOSPITALIST Consults [x]PT [x]OT [x]Speech [x]Case Management  
  
[] Palliative Cardiac Monitoring Order  
[x]Yes []No  
 
IV drips []Yes Drip:                            Dose: 
Drip:                            Dose: 
Drip:                            Dose:  
[x]No  
 
GI Prophylaxis [x]Yes []No  
 
 
 
DVT Prophylaxis SCDs:  Sequential Compression Device: Bilateral  
  Patient Refused VTE Prophylaxis: Yes Danny stockings:     
  
[x] Medication []Contraindicated []None Activity Level Activity Level: Bed Rest   
 Activity Assistance: Partial (two people) Purposeful Rounding every 1-2 hour? [x]Yes Lopez Score  Total Score: 3 Bed Alarm (If score 3 or >) []Yes  
[] Refused (See signed refusal form in chart) Delbert Score  Delbert Score: 13 Delbert Score (if score 14 or less) []PMT consult [x]Wound Care consult []Specialty bed  
[x] Nutrition consult Needs prior to discharge:  
Home O2 required:   
[]Yes  
[x]No  
 If yes, how much O2 required? Other:  
 Last Bowel Movement: Last Bowel Movement Date: 07/06/18 Influenza Vaccine Received Flu Vaccine for Current Season (usually Sept-March): Not Flu Season Pneumonia Vaccine Diet Active Orders Diet DIET NPO  
  
LDAs Peripheral IV 07/06/18 Right Arm (Active) Site Assessment Clean, dry, & intact 7/6/2018  8:00 PM  
Phlebitis Assessment 0 7/6/2018  8:00 PM  
Infiltration Assessment 0 7/6/2018  8:00 PM  
Dressing Status Clean, dry, & intact 7/6/2018  8:00 PM  
Dressing Type Transparent;Tape 7/6/2018  8:00 PM  
Hub Color/Line Status Blue;Patent; Flushed; Infusing 7/6/2018  8:00 PM  
Action Taken Open ports on tubing capped 7/6/2018  8:00 PM  
Alcohol Cap Used Yes 7/6/2018  8:00 PM  
      
PEG/Gastrostomy Tube 07/06/18 (Active) Site Assessment Clean 7/6/2018  8:00 PM  
Dressing Status Clean, dry, & intact 7/6/2018  8:00 PM  
G Port Status Infusing 7/6/2018  8:00 PM  
External Catheter Length (cm) 2 centimeters 7/6/2018  9:31 AM  
Tube Feeding/Formula Options Jevity 1.5 7/6/2018  8:00 PM  
Modular Nutrients Protein liquid 7/6/2018  2:00 PM  
Tube Feeding/Verify Rate (mL/hr) 30 7/7/2018  2:00 AM  
Water Flush Volume (mL) 160 mL 7/7/2018  5:41 AM  
Intake (ml) 186 ml 7/7/2018  5:41 AM  
Medication Volume 40 ml 7/6/2018  8:00 PM  
            
Urinary Catheter Intake & Output Date 07/06/18 0700 - 07/07/18 4331 07/07/18 0700 - 07/08/18 2385 Shift 6308-6568 1254-2320 24 Hour Total 8107-6499 9442-8625 24 Hour Total  
I 
N 
T 
A 
K 
E 
 I.V. 
(mL/kg/hr) 100 
(0.1)  100 
(0.1) Volume (0.9% sodium chloride infusion) 100  100 NG/GT  778 778 Water Flush Volume (mL) (PEG/Gastrostomy Tube 07/06/18)  480 480 Medication Volume (PEG/Gastrostomy Tube 07/06/18)  40 40 Intake (ml) (PEG/Gastrostomy Tube 07/06/18)  258 258 Shift Total 
(mL/kg) 100 
(1.4) 778 
(11.1) 878 
(12.5) O 
U T 
P 
U Reynaldo Sims Blood 0  0 Estimated Blood Loss 0  0 Dialysis  0 0 NET Fluid Removed (mL)  0 0 Shift Total 
(mL/kg) 0 
(0) 0 
(0) 0 
(0)  778 878 Weight (kg) 70 70 70 70 70 70 Readmission Risk Assessment Tool Score High Risk   
      
 39 Total Score 3 Has Seen PCP in Last 6 Months (Yes=3, No=0) 2 . Living with Significant Other. Assisted Living. LTAC. SNF. or  
Rehab  
 3 Patient Length of Stay (>5 days = 3) 4 IP Visits Last 12 Months (1-3=4, 4=9, >4=11) 5 Pt. Coverage (Medicare=5 , Medicaid, or Self-Pay=4) 22 Charlson Comorbidity Score (Age + Comorbid Conditions) Expected Length of Stay 5d 9h  
Actual Length of Stay 9

## 2018-07-07 NOTE — PROGRESS NOTES
[facility] LEE BARRAGAN, MAR, VS reviewed. Pt 's family at bedside. Pt is currently on Dialysis. RN also at bedside. 2030 Dialysis tx complete. Peg tube dressing CDI. No residual noted. Increased by 10 ml as ordered. 0000 BGT= 70. Dextrose given as ordered. 0020 BGT= 136 
 
0400 Tube feeding rate increased to 30 ml/hr. No residual noted. VSS. 0700 Bedside shift report handoff to [facility]. LAUREL, MAR, LEE, VS reviewed.

## 2018-07-07 NOTE — CONSULTS
Pt seen by Dr. Celsa Pierce on 7/2/18 and 7/5/18. According to him pt does not want to get MRI brain or treatment for MS. Nothing else to offer at this time.

## 2018-07-07 NOTE — PROGRESS NOTES
GI Progress Note (for Caridad) NAME:Philippe Avery :1950 ZLZ:[id number] ATTG: MD Jae Prim GI: MIR Duran MD  PCP: Denisa Monk MD 
Date/Time:  2018 9:01 AM  
Assessment:  
· Oropharyngeal dysphagia- PEG placed 18 working well Plan:  
· Continue TF as planned Will sign off Subjective:  
Discussed with RN events overnight. Tolerating TF with minimal residual (10cc) Complaint Y/N Description Abdominal Pain n Hematemesis n Hematochezia n Melena n   
Constipation n   
Diarrhea n Dyspepsia n Dysphagia n   
Jaundiced n   
Nausea/vomiting n Review of Systems: 
Symptom Y/N Comments  Symptom Y/N Comments Fever/Chills n   Chest Pain n   
Cough n   Headaches n Sputum n   Joint Pain n   
SOB/BANKS n   Pruritis/Rash n Tolerating Diet y TF at 30cc/h  Other Could NOT obtain due to:   
 
Objective: VITALS:  
Last 24hrs VS reviewed since prior progress note. Most recent are: 
Visit Vitals  /63  Pulse 87  Temp 98.3 °F (36.8 °C)  Resp 20  
 Ht 5' 10\" (1.778 m)  Wt 70 kg (154 lb 6.4 oz)  SpO2 100%  BMI 22.15 kg/m2 Intake/Output Summary (Last 24 hours) at 18 0901 Last data filed at 18 1457 Gross per 24 hour Intake              878 ml Output                0 ml Net              878 ml PHYSICAL EXAM: 
General: WD, WN. Alert, cooperative, no acute distress   
HEENT: NC, Atraumatic. PERRL. Anicteric sclerae. Lungs:  CTA Bilaterally. No Wheezing/Rhonchi/Rales. Heart:  Regular  rhythm,  No murmur/Rub/Gallops Abdomen: Soft, ND, NT, NABS. +PEG site c/d/i Extremities: No c/c/e Neurologic:  CN 2-12 gi, A/O X 3. No acute neurological distress Psych:   Good insight. Not anxious nor agitated. Lab and Radiology Data Reviewed: (see below) Medications Reviewed: (see below) PMH/SH reviewed - no change compared to H&P 
________________________________________________________________________ Total time spent with patient: 15 minutes ________________________________________________________________________ Care Plan discussed with: 
Patient y Family RN y  
           Consultant:    
 
Kristy Dan MD  
 
Procedures: see electronic medical records for all procedures/Xrays and details which were not copied into this note but were reviewed prior to creation of Plan. LABS: 
Recent Labs  
   07/06/18 0316 07/05/18 0157 WBC  12.0*  17.1* HGB  9.5*  10.1* HCT  31.7*  33.8*  
PLT  330  325 Recent Labs  
   07/07/18 0155 07/06/18 0316 07/05/18 0157 NA  137  138  138  
K  4.1  4.0  4.0  
CL  102  101  99 CO2  27  27  30 BUN  11  18  12 CREA  3.84*  5.33*  4.08* GLU  129*  100  89  
CA  8.4*  8.4*  8.6 MG  2.1  2.3  2.2 PHOS  3.6  5.4*  4.3 Recent Labs  
   07/07/18 0155 07/06/18 0316 SGOT  39*  45* AP  374*  419* TP  8.3*  8.1 ALB  1.6*  1.6*  
GLOB  6.7*  6.5* Recent Labs  
   07/05/18 0157 INR  1.2* PTP  12.4* No results for input(s): FE, TIBC, PSAT, FERR in the last 72 hours. No results found for: FOL, RBCF No results for input(s): PH, PCO2, PO2 in the last 72 hours. No results for input(s): CPK, CKMB in the last 72 hours. No lab exists for component: TROPONINI Lab Results Component Value Date/Time  Color YELLOW/STRAW 12/30/2014 06:40 PM  
 Appearance CLEAR 12/30/2014 06:40 PM  
 Specific gravity 1.015 12/30/2014 06:40 PM  
 pH (UA) 7.5 12/30/2014 06:40 PM  
 Protein 300 (A) 12/30/2014 06:40 PM  
 Glucose >1000 (A) 12/30/2014 06:40 PM  
 Ketone NEGATIVE  12/30/2014 06:40 PM  
 Bilirubin NEGATIVE  12/30/2014 06:40 PM  
 Urobilinogen 0.2 12/30/2014 06:40 PM  
 Nitrites NEGATIVE  12/30/2014 06:40 PM  
 Leukocyte Esterase NEGATIVE  12/30/2014 06:40 PM  
 Epithelial cells FEW 12/30/2014 06:40 PM  
 Bacteria NEGATIVE  12/30/2014 06:40 PM  
 WBC 0-4 12/30/2014 06:40 PM  
 RBC 5-10 12/30/2014 06:40 PM  
 
 
MEDICATIONS: 
Current Facility-Administered Medications Medication Dose Route Frequency  insulin lispro (HUMALOG) injection   SubCUTAneous Q6H  
 acetylcysteine (MUCOMYST) 200 mg/mL (20 %) solution 400 mg  400 mg Nebulization TID  albuterol-ipratropium (DUO-NEB) 2.5 MG-0.5 MG/3 ML  3 mL Nebulization TID RT  
 albuterol-ipratropium (DUO-NEB) 2.5 MG-0.5 MG/3 ML  3 mL Nebulization Q6H PRN  
 metoprolol (LOPRESSOR) injection 1.25 mg  1.25 mg IntraVENous Q6H  
 nitroglycerin (NITROBID) 2 % ointment 1 Inch  1 Inch Topical Q6H PRN  
 ondansetron (ZOFRAN) injection 4 mg  4 mg IntraVENous Q4H PRN  
 heparin (porcine) injection 5,000 Units  5,000 Units SubCUTAneous Q12H  
 albumin human 25% (BUMINATE) solution 12.5 g  12.5 g IntraVENous DIALYSIS PRN  
 morphine injection 2 mg  2 mg IntraVENous Q4H PRN  
 lactobac ac& pc-s.therm-b.anim (DAVIN Q/RISAQUAD)  1 Cap Oral DAILY  balsam peru-castor oil (VENELEX) [id number] mg/gram ointment   Topical BID  
 guaiFENesin ER (MUCINEX) tablet 1,200 mg  1,200 mg Oral Q12H  pantoprazole (PROTONIX) 40 mg in sodium chloride 0.9% 10 mL injection  40 mg IntraVENous Q12H  
 senna-docusate (PERICOLACE) 8.6-50 mg per tablet 2 Tab  2 Tab Oral QHS  epoetin niya (EPOGEN;PROCRIT) injection 10,000 Units  10,000 Units SubCUTAneous Q MON, WED & FRI  piperacillin-tazobactam (ZOSYN) 3.375 g in 0.9% sodium chloride (MBP/ADV) 100 mL  3.375 g IntraVENous Q12H  
 glucose chewable tablet 16 g  4 Tab Oral PRN  
 dextrose (D50W) injection syrg 12.5-25 g  12.5-25 g IntraVENous PRN  
 glucagon (GLUCAGEN) injection 1 mg  1 mg IntraMUSCular PRN  
 escitalopram oxalate (LEXAPRO) tablet 10 mg  10 mg Oral DAILY  sevelamer carbonate (RENVELA) tab 800 mg  800 mg Oral TID WITH MEALS  pravastatin (PRAVACHOL) tablet 10 mg  10 mg Oral QHS

## 2018-07-07 NOTE — PROGRESS NOTES
Hospitalist Progress Note NAME: Tierney Almendarez :  1950 MRN:  [id number] Assessment / Plan: 
Assessment and plan: 
Diabetes mellitus 2 on Insulin at home- FS running low due to prolonged NPO status- follow with initiation of tube feeding today via  PEG tube  Dysphagia due to esophageal dysmotility on [facility] Holding NPH considering NPO- will resume once FS rises on tube feeding via PEG - s/p PEG  Repeat MBS  as per SLP - remains abnormal 
SSI Lispro now Appreciate GI help- s/p PEG now Resume PO meds via PEG as well Sepsis (leukocytosis and hypotension earlier during admission)- WBC trending down 12 k now Due to Aspiration PNA 
CTA chest with Debris within the trachea and esophagus as well as the lingular and left lower lobe airways, with associated left basilar airspace disease. Small right pleural effusion. 12 mm groundglass nodule right lower lobe warrants close follow-up or PET Scan. 
 
? Hypoxia (he was on 4L O2 but can't find O2 sat < 90 in the chart) On IV Zosyn, 
s/p Vancomycin  and Flagyl- now discontinued since   Cont Zosyn for 10 days then stop Off O2 
speech eval ongoing, s/p barium swallow - s/p PEG now Cont NPO as per SLP Pulmonary and GI also seems to be on the case Per GI pt had normal EGD 2 weeks ago, symptoms consistent with dysphagia, meds held today as couldn't even swallow meds Follow BMP, Mag, Phos- OK today ESRD Nephrology is following for ongoing HD Hyperlipidemia On statins, will give if able to Hypertension Switch Coreg to IV BB with parametes Add PRN nitropaste Multiple sclerosis ? Exacerbation causing dysphagia Neurology consult pending CAD S/P coronary artery stent placement On ASA 
  
Hypotension Resolved 
  
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 
  
CAD hx, now presenting with no chest pain and nearly flat trend of troponin and marginally abnormal EKG with ST -T changes  
agree with cardiologist  
Conservative approach is ideal under the current circumstances.  
  
ESRD / HD ~ 8 years PAD S/p remote partial (Syme) amputation of RIGHT foot. 
  
 Body mass index is 23.13 kg/(m^2). Full Code, Wife is POA Disposition: [facility]/IP rehab recommended, when pt stable on PEG tube feeding-- CM working on placement Subjective: Pt seen and examined at bedside. Breathing better, still unable to swallow. Overnight events d/w RN Chief Complaint / Reason for Physician Visit: f/u Aspiration PNA, ESRD, CAD, DM, HTN, dysphagia Review of Systems: 
Symptom Y/N Comments  Symptom Y/N Comments Fever/Chills n   Chest Pain n   
Poor Appetite    Edema Cough y   Abdominal Pain n   
Sputum    Joint Pain SOB/BANKS y improving  Pruritis/Rash Nausea/vomit    Tolerating PT/OT Diarrhea    Tolerating Diet Constipation    Other y Difficulty swallowing Could NOT obtain due to:   
 
Objective: VITALS:  
Last 24hrs VS reviewed since prior progress note. Most recent are: 
Patient Vitals for the past 24 hrs: 
 Temp Pulse Resp BP SpO2  
07/07/18 0800 - 87 - 152/63 100 % 07/07/18 0710 98.3 °F (36.8 °C) 91 20 158/73 100 % 07/07/18 0427 97.6 °F (36.4 °C) 92 18 180/74 99 % 07/06/18 2029 97.9 °F (36.6 °C) 87 18 - 99 % 07/06/18 2019 - - - - 100 % 07/06/18 2000 98 °F (36.7 °C) 93 18 160/42 100 % 07/06/18 1930 - 89 18 109/55 100 % 07/06/18 1900 - 80 18 93/45 100 % 07/06/18 1830 - 81 18 107/43 100 % 07/06/18 1803 - 78 18 95/66 100 % 07/06/18 1800 - 79 18 (!) 81/64 100 % 07/06/18 1730 - 81 18 103/56 100 % 07/06/18 1700 - 78 18 95/77 100 % 07/06/18 1630 - 77 18 104/44 100 % 07/06/18 1615 98 °F (36.7 °C) 77 18 98/63 100 % 07/06/18 1524 - - - - 100 % 07/06/18 1523 97.9 °F (36.6 °C) 85 14 (!) 131/35 100 % 07/06/18 1116 97.6 °F (36.4 °C) 82 16 99/53 100 % 07/06/18 1013 - 84 14 164/58 93 % 07/06/18 1010 - 83 15 124/49 94 % 07/06/18 1008 - 75 12 117/75 100 % Intake/Output Summary (Last 24 hours) at 07/07/18 1006 Last data filed at 07/07/18 4388 Gross per 24 hour Intake              778 ml Output                0 ml Net              778 ml PHYSICAL EXAM: 
General: WD, WN. Alert, cooperative, no acute distress   
EENT:  EOMI. Anicteric sclerae. MMM Resp:  crackles, no wheezing or rales. No accessory muscle use CV:  Regular  rhythm,  No edema GI:  Soft, Non distended, Non tender.  +Bowel sounds, PEG tube Noted + Neurologic:  Alert and oriented X 3, normal speech, Psych:   Good insight. Not anxious nor agitated Skin:  No rashes. No jaundice Reviewed most current lab test results and cultures  YES Reviewed most current radiology test results   YES Review and summation of old records today    NO Reviewed patient's current orders and MAR    YES 
PMH/SH reviewed - no change compared to H&P 
________________________________________________________________________ Care Plan discussed with: 
  Comments Patient x Family RN x Care Manager x Ken Prince yesterday Consultant  x Dr Jazzmine Chan Multidiciplinary team rounds were held today with , nursing, pharmacist and clinical coordinator. Patient's plan of care was discussed; medications were reviewed and discharge planning was addressed. ________________________________________________________________________ Total NON critical care TIME:  16 Minutes Total CRITICAL CARE TIME Spent:   Minutes non procedure based Comments >50% of visit spent in counseling and coordination of care    
________________________________________________________________________ Kena Ricketts MD  
 
Procedures: see electronic medical records for all procedures/Xrays and details which were not copied into this note but were reviewed prior to creation of Plan.    
 
LABS: 
I reviewed today's most current labs and imaging studies. Pertinent labs include: 
Recent Labs  
   07/06/18 0316 07/05/18 0157 WBC  12.0*  17.1* HGB  9.5*  10.1* HCT  31.7*  33.8*  
PLT  330  325 Recent Labs  
   07/07/18 0155 07/06/18 0316 07/05/18 0157 NA  137  138  138  
K  4.1  4.0  4.0  
CL  102  101  99 CO2  27  27  30 GLU  129*  100  89 BUN  11  18  12 CREA  3.84*  5.33*  4.08* CA  8.4*  8.4*  8.6 MG  2.1  2.3  2.2 PHOS  3.6  5.4*  4.3 ALB  1.6*  1.6*   --   
TBILI  0.5  0.7   --   
SGOT  39*  45*   --   
ALT  24  28   --   
INR   --    --   1.2* Signed: Gris Durant MD

## 2018-07-08 NOTE — PROGRESS NOTES
Problem: Falls - Risk of 
Goal: *Absence of Falls Document [facility] Fall Risk and appropriate interventions in the flowsheet. Outcome: Progressing Towards Goal 
Fall Risk Interventions: 
Mobility Interventions: Communicate number of staff needed for ambulation/transfer, OT consult for ADLs, Patient to call before getting OOB, PT Consult for mobility concerns Mentation Interventions: Adequate sleep, hydration, pain control, Door open when patient unattended, More frequent rounding Medication Interventions: Patient to call before getting OOB, Teach patient to arise slowly Elimination Interventions: Call light in reach, Patient to call for help with toileting needs, Toileting schedule/hourly rounds History of Falls Interventions: Door open when patient unattended, Investigate reason for fall

## 2018-07-08 NOTE — PROGRESS NOTES
0725 Bedside reporting; pt resting in bed  0730 Breathing treatment  0740 Nausea and excessive secretions, hiccups; received zofran; pt unable to complete breathing tx; ERNESTO Walls in room monitoring pt;  Pt asked for pain medicine - not due for pain medication at this time; will continue to assess pt's condition  0750 Pt states that he is feeling better at the moment;  Nausea has passed  0755 Pt on phone  [phone] Received percocet for abdominal pain; will continue to monitor pt  0955 Placed venelex on sacrum; skin peeling  1100 Wife at bedside  1140 Pt had large bowel movement; wife assisted with turning pt (tech)  1200 Placed mepilex on sacrum; bed ordered; skin peeling off; wife voiced concern about peeling skin; ERNESTO Walls and assistant in room with pt  1256 ; pt received 5 units of NPH  1310 Received morphine for back pain 7/10  1356 Called report to Gen Surg, Yessica Bang RN  1500 Pt transported to 2178

## 2018-07-08 NOTE — PROGRESS NOTES
Pt medicated as scheduled with metoprolol bp 213/36 and 179/67  Bp recheck noted at 184/73 Call or return to clinic prn if these symptoms worsen or fail to improve as anticipated. Nitropaste given . Pt restless in bed , having Hiccups for past 30 minutes . Pt medicated for pain as requested by pt . Pt tolerating tube feeding no c/o abd pain or nausea or vomiting. Rate increased to 50 m /hr at 2000 0715 Bedside shift change report given to Fany Waller RN and Noreen Guido RN  (oncoming nurse) by me (offgoing nurse). Report given with SBAR, MAR and Recent Results.

## 2018-07-08 NOTE — PROGRESS NOTES
Problem: Pressure Injury - Risk of  Goal: *Prevention of pressure injury  Document Delbert Scale and appropriate interventions in the flowsheet.    Outcome: Progressing Towards Goal  Pressure Injury Interventions:  Sensory Interventions: Assess changes in LOC, Check visual cues for pain, Float heels, Keep linens dry and wrinkle-free, Minimize linen layers    Moisture Interventions: Absorbent underpads, Apply protective barrier, creams and emollients, Check for incontinence Q2 hours and as needed, Limit adult briefs    Activity Interventions: Pressure redistribution bed/mattress(bed type)    Mobility Interventions: Float heels    Nutrition Interventions: Document food/fluid/supplement intake    Friction and Shear Interventions: Lift sheet, Lift team/patient mobility team

## 2018-07-08 NOTE — PROGRESS NOTES
Bedside and Verbal shift change report given to 4050 Anna Connors (oncoming nurse) by Danie Burton RN (offgoing nurse). Report included the following information SBAR, Kardex, Intake/Output, MAR and Recent Results.

## 2018-07-08 NOTE — PROGRESS NOTES
[id number] confirmation code for specialty bed envision on [facility] for DTI. To be delivered to room 2178 as patient is being transferred to Gen Surg.

## 2018-07-08 NOTE — PROGRESS NOTES
TRANSFER - IN REPORT:    Verbal report received from [address] W Audie Hedrick RN(name) on Radha Eaton  being received from PCU(unit) for routine progression of care      Report consisted of patients Situation, Background, Assessment and   Recommendations(SBAR). Information from the following report(s) SBAR, Kardex, ED Summary, Intake/Output, MAR and Recent Results was reviewed with the receiving nurse. Opportunity for questions and clarification was provided. Assessment completed upon patients arrival to unit and care assumed.

## 2018-07-08 NOTE — PROGRESS NOTES
Hospitalist Progress Note NAME: Jonah Kasper :  1950 MRN:  [id number] Assessment / Plan: 
Assessment and plan: 
Diabetes mellitus 2 on Insulin at home- FS better on tube feedings now Dysphagia due to esophageal dysmotility on [facility] NPH considering NPO- will resume once FS rises on tube feeding via PEG - s/p PEG  Repeat MBS  as per SLP - remains abnormal 
SSI Lispro now, will add nph 5 units today Appreciate GI help- s/p PEG now Resume PO meds via PEG as well Sepsis (leukocytosis and hypotension earlier during admission)- WBC trending down 12 k now Due to Aspiration PNA 
CTA chest with Debris within the trachea and esophagus as well as the lingular and left lower lobe airways, with associated left basilar airspace disease. Small right pleural effusion. 12 mm groundglass nodule right lower lobe warrants close follow-up or PET Scan. 
 
? Hypoxia (he was on 4L O2 but can't find O2 sat < 90 in the chart) On IV Zosyn, 
s/p Vancomycin  and Flagyl- now discontinued since   Cont Zosyn for 10 days then stop Off O2 
speech eval ongoing, s/p barium swallow - s/p PEG now Cont NPO as per SLP Pulmonary and GI also seems to be on the case Per GI pt had normal EGD 2 weeks ago, symptoms consistent with dysphagia, meds held today as couldn't even swallow meds Follow BMP, Mag, Phos- OK today ESRD Nephrology is following for ongoing HD Hyperlipidemia On statins, will give if able to Hypertension Switch Coreg to IV BB with parametes Add PRN nitropaste Multiple sclerosis ? Exacerbation causing dysphagia Neurology consult pending CAD S/P coronary artery stent placement On ASA 
  
Hypotension Resolved 
  
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 
  
CAD hx, now presenting with no chest pain and nearly flat trend of troponin and marginally abnormal EKG with ST -T changes  
agree with cardiologist Conservative approach is ideal under the current circumstances.  
  
ESRD / HD ~ 8 years PAD S/p remote partial (Syme) amputation of RIGHT foot. 
  
 Body mass index is 23.13 kg/(m^2). Full Code, Wife is POA Disposition: [facility] rehab recommended, when pt stable on PEG tube feeding-- CM working on placement Can transfer off PCU today- -- DC to [facility] once accepted & bed available Subjective: Pt seen and examined at bedside. Breathing better, still unable to swallow. Overnight events d/w RN Chief Complaint / Reason for Physician Visit: f/u Aspiration PNA, ESRD, CAD, DM, HTN, dysphagia Review of Systems: 
Symptom Y/N Comments  Symptom Y/N Comments Fever/Chills n   Chest Pain n   
Poor Appetite    Edema Cough y   Abdominal Pain n   
Sputum    Joint Pain SOB/BANKS y improving  Pruritis/Rash Nausea/vomit    Tolerating PT/OT Diarrhea    Tolerating Diet Constipation    Other y Difficulty swallowing Could NOT obtain due to:   
 
Objective: VITALS:  
Last 24hrs VS reviewed since prior progress note. Most recent are: 
Patient Vitals for the past 24 hrs: 
 Temp Pulse Resp BP SpO2  
07/08/18 0738 98 °F (36.7 °C) (!) 102 20 183/86 98 % 07/08/18 0730 - - - - 100 % 07/08/18 0558 - 81 - 164/54 96 % 07/08/18 0503 - 79 - 172/60 100 % 07/08/18 0425 - 81 - 184/73 98 % 07/08/18 0402 - 81 - 179/67 100 % 07/08/18 0400 98 °F (36.7 °C) 80 22 (!) 213/36 100 % 07/08/18 0359 - 81 - 179/67 -  
07/08/18 0300 - 69 - - 100 % 07/08/18 0200 - 71 - - 96 % 07/08/18 0100 - 73 - - 100 % 07/07/18 2350 98.4 °F (36.9 °C) 73 16 (!) 141/39 100 % 07/07/18 2300 - 72 - - 100 % 07/07/18 2201 - 78 - - 99 % 07/07/18 2200 - 73 - (!) 142/25 100 % 07/07/18 2142 - 82 - 166/51 98 % 07/07/18 2027 - 80 - 115/52 92 % 07/07/18 2001 - 77 - - 98 % 07/07/18 1900 98.4 °F (36.9 °C) 73 - - 96 % 07/07/18 1556 98.2 °F (36.8 °C) 82 19 136/55 100 % 07/07/18 1553 - 81 - 136/55 -  
07/07/18 1200 98.8 °F (37.1 °C) 87 17 158/69 100 % Intake/Output Summary (Last 24 hours) at 07/08/18 1025 Last data filed at 07/08/18 4864 Gross per 24 hour Intake             2230 ml Output                0 ml Net             2230 ml PHYSICAL EXAM: 
General: WD, WN. Alert, cooperative, no acute distress   
EENT:  EOMI. Anicteric sclerae. MMM Resp:  crackles, no wheezing or rales. No accessory muscle use CV:  Regular  rhythm,  No edema GI:  Soft, Non distended, Non tender.  +Bowel sounds, PEG tube Noted + Neurologic:  Alert and oriented X 3, normal speech, Psych:   Good insight. Not anxious nor agitated Skin:  No rashes. No jaundice Reviewed most current lab test results and cultures  YES Reviewed most current radiology test results   YES Review and summation of old records today    NO Reviewed patient's current orders and MAR    YES 
PMH/ reviewed - no change compared to H&P 
________________________________________________________________________ Care Plan discussed with: 
  Comments Patient x Family RN x Care Manager Consultant Multidiciplinary team rounds were held today with , nursing, pharmacist and clinical coordinator. Patient's plan of care was discussed; medications were reviewed and discharge planning was addressed. ________________________________________________________________________ Total NON critical care TIME:  16 Minutes Total CRITICAL CARE TIME Spent:   Minutes non procedure based Comments >50% of visit spent in counseling and coordination of care    
________________________________________________________________________ Adra Bee, MD  
 
Procedures: see electronic medical records for all procedures/Xrays and details which were not copied into this note but were reviewed prior to creation of Plan. LABS: 
I reviewed today's most current labs and imaging studies.  
Pertinent labs include: 
Recent Labs  
   07/06/18 0316 WBC  12.0* HGB  9.5* HCT  31.7* PLT  330 Recent Labs  
   07/07/18 
 0155  07/06/18 0316 NA  137  138  
K  4.1  4.0  
CL  102  101 CO2  27  27 GLU  129*  100 BUN  11  18 CREA  3.84*  5.33* CA  8.4*  8.4* MG  2.1  2.3 PHOS  3.6  5.4* ALB  1.6*  1.6* TBILI  0.5  0.7 SGOT  39*  45* ALT  24  28 Signed: Kena Ricketts MD

## 2018-07-09 NOTE — PROGRESS NOTES
ADULT PROTOCOL: JET AEROSOL  REASSESSMENT    Patient  Karthik Henderson     70 y.o.   male     7/9/2018  8:39 AM    Breath Sounds Pre Procedure: Right Breath Sounds: Diminished                               Left Breath Sounds: Diminished    Breath Sounds Post Procedure: Right Breath Sounds: Diminished                                 Left Breath Sounds: Diminished    Breathing pattern: Pre procedure Breathing Pattern: Regular          Post procedure Breathing Pattern: Regular    Heart Rate: Pre procedure Pulse: 92           Post procedure Pulse: 92    Resp Rate: Pre procedure Respirations: 16           Post procedure Respirations: 16            Cough: Pre procedure Cough: Non-productive               Post procedure Cough: Non-productive    Oxygen: O2 Device: Room air   room air     Changed: NO    SpO2: Pre procedure SpO2: 99 %   without oxygen              Post procedure SpO2: 99 %  without oxygen    Nebulizer Therapy: Current medications Aerosolized Medications: DuoNeb      Changed: YES/ changed to PRN      Problem List:   Patient Active Problem List   Diagnosis Code    Hypertension I10    PAD (peripheral artery disease) ([facility]) I73.9    S/P coronary artery stent placement Z95.5    ASHD (arteriosclerotic heart disease) I25.10    Mixed hyperlipidemia E78.2    ESRD on hemodialysis ([facility] [address] N18.6, Z99.2    Cervical stenosis of spinal canal M48.02    Ataxia R27.0    Multiple sclerosis ([facility]) G35    Ulnar neuropathy at elbow of left upper extremity G56.22    Carpal tunnel syndrome on both sides G56.03    Ulnar neuropathy at elbow of right upper extremity G56.21    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

## 2018-07-09 NOTE — PROGRESS NOTES
Responded to request from care manager to assist patient and his wife with completion of an Advance Medical Directive. Directive completed with patient. Copy and original given to Ms. Avery for her medical records - copy placed in patient's medical chart for scanning. Visit by: Brandie Hayes. Anaya Nascimento.  Kirby Tinoco MA, [facility]    Lead  [employer]

## 2018-07-09 NOTE — PROGRESS NOTES
7AM-7PM    Patient resting quietly    Given pain medication ( morphine)    Patient had dialysis today. Patient will be [facility] to [facility].

## 2018-07-09 NOTE — WOUND CARE
Wound Care Consult: Chart reviewed and patient assessed. Attempted several times to see him last week but he was out of the room for testing each time. Assessment: Pt. Alert and oriented x 3 and answers questions. Pt. Stated that the pressure injury has been present for about 1.5 years. He turns off of the sacrum with his own strength but cannot remain off without assistance / being held in place. The sacrum injury / ulcer is covered with eschar 100 % except some peeling along some of the edges. There is no soft areas or excessive drainage. No redness along the edges. WOUND POA CONDITIONS    Wound Sacral/coccyx Medial (Active)   DRESSING STATUS Removed 7/9/2018 11:30 AM   DRESSING TYPE Foam 7/9/2018 11:30 AM   Pressure Injury Unstageable 7/9/2018 11:30 AM   Wound Length (cm) 5 cm 7/7/2018 10:26 AM   Wound Width (cm) 7 cm 7/7/2018 10:26 AM   Wound Surface area (cm^2) 35 cm^2 7/7/2018 10:26 AM   Condition of Base Eschar 7/9/2018 11:30 AM   Condition of Edges Rolled/curled 7/9/2018 11:30 AM   Tissue Type Black 7/9/2018 11:30 AM   Tissue Type Percent Black 100 % 7/9/2018 11:30 AM   Drainage Amount  Scant 7/9/2018 11:30 AM   Drainage Color Brown 7/9/2018 11:30 AM   Wound Odor None 7/9/2018 11:30 AM   Periwound Skin Condition Excoriated 7/9/2018 11:30 AM   Cleansing and Cleansing Agents  Soap and water 7/8/2018 12:00 PM   Dressing Type Applied Other (Comment) 7/8/2018 12:00 PM   Procedure Tolerated Well 7/9/2018 11:30 AM       Treatment / Recommendation: Pt. Needs Santyl Collagenase to continue the debridement. Orders written for the same and to add [facility] for the wet dressing over the santyl.    Blane Hampton, ERNESTO, BSN, [employer]

## 2018-07-09 NOTE — DISCHARGE INSTRUCTIONS
HOSPITALIST DISCHARGE INSTRUCTIONS    NAME: Chintan Grider   :  1950   MRN:  [id number]     Date/Time:  2018 10:03 AM    ADMIT DATE: 2018     DISCHARGE DATE: 2018     DISCHARGE DIAGNOSIS:    Sepsis (leukocytosis and hypotension earlier during admission)- resolved  Due to Aspiration PNA- s/p IV Abx therapy- completed  Diabetes mellitus 2 on Insulin at home- FS better on tube feedings now, cont NPH+ SSI lispro  Dysphagia due to esophageal dysmotility on MBstudy- s/p PEG tube with NPO status- cont Nutrition + Pills via PEG  Multiple sclerosis -?  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 - seen by vascular this admission initially as attending- then transferred to medicine service, OP follow up in office for routine HD/AVF   Checks    Principal Problem:    Pseudoaneurysm of iliac artery ([address] (2018)    Active Problems:    Hypertension (2010)      PAD (peripheral artery disease) ([address] (2010)      S/P coronary artery stent placement (2011)      Overview: 11 PCI/CRISTI to RCA      ASHD (arteriosclerotic heart disease) (2013)      Mixed hyperlipidemia (2013)      ESRD on hemodialysis ([address] (2014)      Ataxia (2015)      Multiple sclerosis ([address] (2015)      Diabetic oculomotor palsy ([address] (3/25/2016)      Diabetic peripheral neuropathy associated with type 2 diabetes mellitus ([address] (3/25/2016)      Type 2 diabetes with nephropathy ([address] (3/1/2018)      Elevated troponin (2018)      Esophageal dyskinesia (7/3/2018)      Weight loss (7/3/2018)      Counseling regarding goals of care (7/3/2018)      Weakness generalized (7/3/2018)         MEDICATIONS:  As per medication reconciliation  list  · It is important that you take the medication exactly as they are prescribed. · Keep your medication in the bottles provided by the pharmacist and keep a list of the medication names, dosages, and times to be taken in your wallet. · Do not take other medications without consulting your doctor. Pain Management: per above medications    What to do at Home    Recommended diet:  NPO, PEG tube feeding + meds via PEG only    Recommended activity: Activity as tolerated    If you have questions regarding the hospital related prescriptions or hospital related issues please call [facility] at . If you experience any of the following symptoms then please call your primary care physician or return to the emergency room if you cannot get hold of your doctor:  Fever, chills, nausea, vomiting, diarrhea, change in mentation, falling, bleeding, shortness of breath,     Follow Up:  Dr. Mack Wu MD  you are to call and set up an appointment to see them in 7-10 days. Dr Kaity Merchant ([facility]) in office in 2-3 weeks if dysphagia continues & doesn't improve-- to get MRI of brain for MS therapy    Information obtained by :  I understand that if any problems occur once I am at home I am to contact my physician. I understand and acknowledge receipt of the instructions indicated above.                                                                                                                                            Physician's or R.N.'s Signature                                                                  Date/Time                                                                                                                                              Patient or Representative Signature                                                          Date/Time

## 2018-07-09 NOTE — DISCHARGE SUMMARY
Hospitalist Discharge Summary Patient ID: Sawyer Weiss [id number] 
70 y.o. 
1950 PCP on record: Linh Gomez MD 
 
Admit date: 6/28/2018 Discharge date and time: 7/9/2018 DISCHARGE DIAGNOSIS: 
 
Sepsis (leukocytosis and hypotension earlier during admission)- resolved Due to Aspiration PNA- s/p IV Abx therapy- completed Diabetes mellitus 2 on Insulin at home- FS better on tube feedings now, cont NPH+ SSI lispro Dysphagia due to esophageal dysmotility on MBstudy- s/p PEG tube with NPO status- cont Nutrition + Pills via PEG Multiple sclerosis -? 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 
IP CONSULT TO VASCULAR SURGERY 
IP CONSULT TO NEPHROLOGY 
IP CONSULT TO INTENSIVIST 
IP CONSULT TO GASTROENTEROLOGY 
IP CONSULT TO NEUROLOGY 
IP CONSULT TO PALLIATIVE CARE - PROVIDER 
IP CONSULT TO NEUROLOGY 
IP CONSULT TO CARDIOLOGY 
IP CONSULT TO HOSPITALIST Excerpted HPI from H&P/ER consult of Marcus Alejandro MD: 
Jesse.Chenega y.o. male with a pmhx significant for multiple sclerosis, ESRD on HD, anemia of chronic disease, secondary hyperparathyroidisim,  ASHD, HTN, HLD DM II, GERD, and PVD. He is an smoker that stopped smoking 5 years ago. 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 He has a recent hx of an EGD with snare polypectomy on 06/13/2018. He presented to the hospital with complaint of right flank and abd pain that has been ongoing since Monday. He missed 2 session of HD due to the pain.   When he presented to his dialysis unit today he was referred to the hospital for evaluation post a brief session. He has had associated N/V/D. He denies fever and chills. Overnight his RLE began to swell.     
  
He is known to our service for multiple vascular issues including issues with HD access, PAD, and intestinal vascular insufficiency. He is s/p Syme amputation. He reports that last September he was in the process of receiving a renal cell transplant at [facility] when the \"artery burst\"  It was repair and 1 weak later it \"burst again\" and he had a second repair.     
  
On arrival he was normal tensive with a nl HR and RR. He was afebrile. He had a significant leukocytosis of 26.1. His PLT count was 270. His troponin was elevated at 1.99. His amylase was 28. His lipase 31. CT of the abd and pelvis was positive for a large right EIA pseudoaneurysm with central hyperdense contrast measuring 2.6 x 5.2 x 5.6 cm and the overall dimensions measuring 4.6 x 6.8 x 9.6 cm. We have been asked to evaluate\" ______________________________________________________________________ DISCHARGE SUMMARY/HOSPITAL COURSE:  for full details see H&P, daily progress notes, labs, consult notes. Diabetes mellitus 2 on Insulin at home- FS better on tube feedings now Dysphagia due to esophageal dysmotility on [facility] NPH considering NPO- will resume once FS rises on tube feeding via PEG - s/p PEG 7/7 Repeat MBS 7/5 as per SLP - remains abnormal 
SSI Lispro now, Cont nph 5 units started 7/8 Appreciate GI help- s/p PEG now Resume PO meds via PEG as well 
  
Sepsis (leukocytosis and hypotension earlier during admission)- WBC 15k Due to Aspiration PNA 
CTA chest with Debris within the trachea and esophagus as well as the lingular and left lower lobe airways, with associated left basilar airspace disease. Small right pleural effusion. 12 mm groundglass nodule right lower lobe warrants close follow-up or PET Scan. 
  
? Hypoxia (he was on 4L O2 but can't find O2 sat < 90 in the chart) On IV Zosyn, 
s/p Vancomycin  and Flagyl- now discontinued since  7/4 S/p Zosyn x 10 days completed now Off O2 
speech eval ongoing, s/p barium swallow - s/p PEG now Cont NPO as per SLP Pulmonary and GI also seems to be on the case Per GI pt had normal EGD 2 weeks ago, symptoms consistent with dysphagia, meds held today as couldn't even swallow meds Follow BMP, Mag, Phos- OK today 
  
ESRD Nephrology is following for ongoing HD 
  
Hyperlipidemia On statins, will give if able to 
  
Hypertension Switch Coreg to IV BB with parametes Add PRN nitropaste 
  
Multiple sclerosis ? Exacerbation causing dysphagia- cannot tell till MRI done Neurology consult noted- pt has been refusing MRI till now, no further workup recommended 
  
CAD S/P coronary artery stent placement On ASA 
   
Hypotension Resolved 
   
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 
   
CAD hx, now presenting with no chest pain and nearly flat trend of troponin and marginally abnormal EKG with ST -T changes  
agree with cardiologist  
Conservative approach is ideal under the current circumstances.  
   
ESRD / HD ~ 8 years PAD S/p remote partial (Syme) amputation of RIGHT foot. 
   
 Body mass index is 23.13 kg/(m^2).   
  
Full Code, Wife is POA 
  
Disposition: [facility]/IP rehab recommended, when pt stable on PEG tube feeding-- CM working on placement 
  
DC to [facility] once accepted & bed available 
 
 
 
_______________________________________________________________________ Patient seen and examined by me on discharge day. Pertinent Findings: 
Gen:    Not in distress Chest: Clear lungs CVS:   Regular rhythm. No edema Abd:  Soft, not distended, mild tenderness at PEG site + Neuro:  Alert, oriented x 3 
_______________________________________________________________________ DISCHARGE MEDICATIONS:  
Current Discharge Medication List  
  
START taking these medications  Details  
guaiFENesin (ROBITUSSIN) 100 mg/5 mL liquid 20 mL by Per G Tube route two (2) times a day. Qty: 1 Bottle, Refills: 0  
  
oxyCODONE-acetaminophen (PERCOCET 10)  mg per tablet 1 Tab by Per G Tube route every four (4) hours as needed. Max Daily Amount: 6 Tabs. Qty: 10 Tab, Refills: 0 Associated Diagnoses: Status post insertion of percutaneous endoscopic gastrostomy (PEG) tube ([facility] 75.) CONTINUE these medications which have CHANGED Details  
insulin NPH (HUMULIN N NPH U-100 INSULIN) 100 unit/mL injection 5 Units by SubCUTAneous route daily. Qty: 1 Vial, Refills: 0  
  
pravastatin (PRAVACHOL) 10 mg tablet 1 Tab by Per G Tube route nightly. Qty: 30 Tab, Refills: 0  
  
lisinopril (PRINIVIL, ZESTRIL) 40 mg tablet Take 0.5 Tabs by mouth daily. Qty: 90 Tab, Refills: 1 Associated Diagnoses: Hypertension goal BP (blood pressure) < 130/80  
  
escitalopram oxalate (LEXAPRO) 10 mg tablet 1 Tab by Per G Tube route daily. Qty: 30 Tab, Refills: 0  
  
cloNIDine HCl (CATAPRES) 0.1 mg tablet 1 Tab by Per G Tube route three (3) times daily. Qty: 90 Tab, Refills: 0  
  
carvedilol (COREG) 25 mg tablet 2 Tabs by Per G Tube route two (2) times daily (with meals). Qty: 120 Tab, Refills: 0  
  
aspirin 81 mg chewable tablet 1 Tab by Per G Tube route daily. Qty: 30 Tab, Refills: 0  
  
amLODIPine (NORVASC) 10 mg tablet 1 Tab by Per G Tube route daily. Qty: 30 Tab, Refills: 0 CONTINUE these medications which have NOT CHANGED Details VIRT-CAPS 1 mg capsule TAKE ONE CAPSULE BY MOUTH DAILY Qty: 30 Cap, Refills: 0  
  
sevelamer carbonate (RENVELA) 800 mg tab tab Take 800 mg by mouth three (3) times daily. STOP taking these medications  
  
 cinacalcet (SENSIPAR) 60 mg tab Comments:  
Reason for Stopping:   
   
 acetaminophen (TYLENOL) 500 mg tablet Comments:  
Reason for Stopping:   
   
 insulin glargine (LANTUS,BASAGLAR) 100 unit/mL (3 mL) inpn Comments:  
Reason for Stopping:  HUMULIN N NPH INSULIN KWIKPEN 100 unit/mL (3 mL) inpn Comments:  
Reason for Stopping:   
   
 acetaminophen-codeine (TYLENOL #3) 300-30 mg per tablet Comments:  
Reason for Stopping: My Recommended Diet, Activity, Wound Care, and follow-up labs are listed in the patient's Discharge Insturctions which I have personally completed and reviewed. ______________________________________________________________________ Risk of deterioration: Moderate Condition at Discharge:  Stable 
______________________________________________________________________ Disposition IP Rehab 
______________________________________________________________________ Care Plan discussed with:  
Patient, Family, RN, Care Manager 
 
______________________________________________________________________ Code Status: Full Code 
______________________________________________________________________ Follow up with: PCP : Nora Gonzalez MD 
Follow-up Information Follow up With Details Comments Contact Info Nora Gonzalez MD   [address] [place] [address] 
[phone] Total time in minutes spent coordinating this discharge (includes going over instructions, follow-up, prescriptions, and preparing report for sign off to her PCP) :  36 minutes Signed: 
Demarcus Enriquez MD

## 2018-07-09 NOTE — PROGRESS NOTES
NAME: Jocelyn Fuentes :  1950 MRN:  [id number] Assessment :    Plan: 
--[facility]-[place] 
Hypotension/sepsis Left AVF Anemia of CKD SHPT Usually HTN 
DM Right iliac pseudoaneuysm Hypoxia/sob HD today. He is on Parsabiv at outpatient unit; d/c'd Sensipar (cannot give with recent Parsabiv b/c risk of low Calcium); on Renvela 
  
epo 10 k sc tiw. Prn prbc's.  
  
PEG placed  TF started-note on jevity not nepro Palliative care folowing ADDENDUM:  Seen on HD at 12:00. Tolerating well. Subjective: Chief Complaint:  \"My stomach hurts a little. \"  No N/V. No dyspnea. No Ha. Review of Systems: 
 
Symptom Y/N Comments  Symptom Y/N Comments Fever/Chills    Chest Pain Poor Appetite    Edema Cough    Abdominal Pain Sputum    Joint Pain SOB/BANKS    Pruritis/Rash Nausea/vomit    Tolerating PT/OT Diarrhea    Tolerating Diet Constipation    Other Could not obtain due to:   
 
Objective: VITALS:  
Last 24hrs VS reviewed since prior progress note. Most recent are: 
Visit Vitals  /74 (BP 1 Location: Right arm, BP Patient Position: At rest)  Pulse 95  Temp 98.5 °F (36.9 °C)  Resp 18  Ht 5' 10\" (1.778 m)  Wt 74.5 kg (164 lb 3.9 oz)  SpO2 99%  BMI 23.57 kg/m2 Intake/Output Summary (Last 24 hours) at 18 4055 Last data filed at 18 1472 Gross per 24 hour Intake             1400 ml Output                0 ml Net             1400 ml Telemetry Reviewed: PHYSICAL EXAM: 
General:  no acute distress GI:  Soft, +bs EXT  S/p (R) foot amputation Lab Data Reviewed: (see below) Medications Reviewed: (see below) PMH/SH reviewed - no change compared to H&P 
________________________________________________________________________ Care Plan discussed with: 
Patient Family RN Care Manager Consultant:     
 
  Comments >50% of visit spent in counseling and coordination of care    
 
________________________________________________________________________ Indy Reyes MD  
 
Procedures: see electronic medical records for all procedures/Xrays and details which 
were not copied into this note but were reviewed prior to creation of Plan. LABS: 
Recent Labs  
   07/09/18 0420 WBC  15.3* HGB  8.4* HCT  28.3*  
PLT  355 Recent Labs  
   07/09/18 0420 07/07/18 0155 NA  135*  137  
K  4.2  4.1 CL  99  102 CO2  27  27 BUN  25*  11  
CREA  6.53*  3.84* GLU  179*  129* CA  8.6  8.4* MG  2.1  2.1 PHOS  3.4  3.6 Recent Labs  
   07/07/18 0155 SGOT  39* AP  374* TP  8.3* ALB  1.6*  
GLOB  6.7* No results for input(s): INR, PTP, APTT in the last 72 hours. No lab exists for component: INREXT, INREXT No results for input(s): FE, TIBC, PSAT, FERR in the last 72 hours. No results found for: FOL, RBCF No results for input(s): PH, PCO2, PO2 in the last 72 hours. No results for input(s): CPK, CKMB in the last 72 hours. No lab exists for component: TROPONINI No components found for: [place] Lab Results Component Value Date/Time Color YELLOW/STRAW 12/30/2014 06:40 PM  
 Appearance CLEAR 12/30/2014 06:40 PM  
 Specific gravity 1.015 12/30/2014 06:40 PM  
 pH (UA) 7.5 12/30/2014 06:40 PM  
 Protein 300 (A) 12/30/2014 06:40 PM  
 Glucose >1000 (A) 12/30/2014 06:40 PM  
 Ketone NEGATIVE  12/30/2014 06:40 PM  
 Bilirubin NEGATIVE  12/30/2014 06:40 PM  
 Urobilinogen 0.2 12/30/2014 06:40 PM  
 Nitrites NEGATIVE  12/30/2014 06:40 PM  
 Leukocyte Esterase NEGATIVE  12/30/2014 06:40 PM  
 Epithelial cells FEW 12/30/2014 06:40 PM  
 Bacteria NEGATIVE  12/30/2014 06:40 PM  
 WBC 0-4 12/30/2014 06:40 PM  
 RBC 5-10 12/30/2014 06:40 PM  
 
 
MEDICATIONS: 
Current Facility-Administered Medications Medication Dose Route Frequency  albuterol-ipratropium (DUO-NEB) 2.5 MG-0.5 MG/3 ML  3 mL Nebulization Q4H PRN  
 insulin NPH (NOVOLIN N, HUMULIN N) injection 5 Units  5 Units SubCUTAneous DAILY  oxyCODONE-acetaminophen (PERCOCET 10)  mg per tablet 1 Tab  1 Tab Per G Tube Q4H PRN  
 guaiFENesin (ROBITUSSIN) 100 mg/5 mL oral liquid 400 mg  400 mg Per G Tube BID  insulin lispro (HUMALOG) injection   SubCUTAneous Q6H  
 albuterol-ipratropium (DUO-NEB) 2.5 MG-0.5 MG/3 ML  3 mL Nebulization Q6H PRN  
 metoprolol (LOPRESSOR) injection 1.25 mg  1.25 mg IntraVENous Q6H  
 nitroglycerin (NITROBID) 2 % ointment 1 Inch  1 Inch Topical Q6H PRN  
 ondansetron (ZOFRAN) injection 4 mg  4 mg IntraVENous Q4H PRN  
 heparin (porcine) injection 5,000 Units  5,000 Units SubCUTAneous Q12H  
 albumin human 25% (BUMINATE) solution 12.5 g  12.5 g IntraVENous DIALYSIS PRN  
 morphine injection 2 mg  2 mg IntraVENous Q4H PRN  
 lactobac ac& pc-s.therm-b.anim (DAVIN Q/RISAQUAD)  1 Cap Oral DAILY  balsam peru-castor oil (VENELEX) [id number] mg/gram ointment   Topical BID  pantoprazole (PROTONIX) 40 mg in sodium chloride 0.9% 10 mL injection  40 mg IntraVENous Q12H  
 senna-docusate (PERICOLACE) 8.6-50 mg per tablet 2 Tab  2 Tab Oral QHS  epoetin niya (EPOGEN;PROCRIT) injection 10,000 Units  10,000 Units SubCUTAneous Q MON, WED & FRI  glucose chewable tablet 16 g  4 Tab Oral PRN  
 dextrose (D50W) injection syrg 12.5-25 g  12.5-25 g IntraVENous PRN  
 glucagon (GLUCAGEN) injection 1 mg  1 mg IntraMUSCular PRN  
 escitalopram oxalate (LEXAPRO) tablet 10 mg  10 mg Oral DAILY  sevelamer carbonate (RENVELA) tab 800 mg  800 mg Oral TID WITH MEALS  pravastatin (PRAVACHOL) tablet 10 mg  10 mg Oral QHS

## 2018-07-09 NOTE — PROGRESS NOTES
Bedside shift change report given to [facility] (oncoming nurse) by Mamie Abbott (offgoing nurse). Report included the following information SBARMAR, Telemetry,Intake/feedings.

## 2018-07-09 NOTE — PROCEDURES
[facility]  [phone]    Vitals   Pre   Post   Assessment   Pre   Post     Temp  Temp: 98.4 °F (36.9 °C) (07/09/18 1145) 98.4 oral LOC  A&Ox4, poor short term memory A&Ox4, poor short term memory   HR   Pulse (Heart Rate): 86 (07/09/18 1145) 85 Lungs   CTA CTA   B/P   BP: 158/76 (07/09/18 1145) 133/68 Cardiac   HRR, S1 S2 present HRR, S1 S2 present   Resp   Resp Rate: 18 (07/09/18 1145) 16 Skin   Large wound on sacrum, dressing CDI, wound care RN in to see patient. Sacral wound   Pain level  Pain Intensity 1: 0 (07/08/18 1611) 0 Edema  None noted     None noted   Orders:    Duration:   Start:    11:41 End:    15:54 Total:   3.75 hrs   Dialyzer:   Dialyzer/Set Up Inspection: Loyd Yao (07/09/18 1145)   K Bath:   Dialysate K (mEq/L): 3 (07/09/18 1145)   Ca Bath:   Dialysate CA (mEq/L): 2.5 (07/09/18 1145)   Na/Bicarb:   Dialysate NA (mEq/L): 140 (07/09/18 1145)   Target Fluid Removal:   Goal/Amount of Fluid to Remove (mL): 2000 mL (07/09/18 1145)   Access     Type & Location:   DENI AVF: skin CDI. + B/T. No s/s of infection. No issues with cannulation or hemostasis. Running well at -450.     Labs     Obtained/Reviewed   Critical Results Called   Date when labs were drawn-  Hgb-    HGB   Date Value Ref Range Status   07/09/2018 8.4 (L) 12.1 - 17.0 g/dL Final     K-    Potassium   Date Value Ref Range Status   07/09/2018 4.2 3.5 - 5.1 mmol/L Final     Ca-   Calcium   Date Value Ref Range Status   07/09/2018 8.6 8.5 - 10.1 MG/DL Final     Bun-   BUN   Date Value Ref Range Status   07/09/2018 25 (H) 6 - 20 MG/DL Final     Creat-   Creatinine   Date Value Ref Range Status   07/09/2018 6.53 (H) 0.70 - 1.30 MG/DL Final     Comment:     INVESTIGATED PER DELTA CHECK PROTOCOL        Medications/ Blood Products Given     Name   Dose   Route and Time     None from HD RN                Blood Volume Processed (BVP):   90.4 L Net Fluid   Removed:  2000 ml   Comments   Time Out Done: 11:30  Primary Nurse Rpt Pre: Vishal Wilcox RN  Primary Nurse Rpt Post: Vishal Wilcox RN  Pt Education: procedural, keeping arm in neutral position for procedure to prevent tx complications  Care Plan: d/c after HD today  Tx Summary:  Unable to reach primary RN via phone to obtain SBAR. Arrived to patient room at 10:20. SBAR received from Primary RN Vishal Wilcox. Unable to attach adapter hose to sink, notified maintenance department. Maintenance attached plumbing piece to faucet so HD machine could be hooked up. Set up and tested machine and water per policy and procedure. Consent signed & on file. Patient is A&Ox4 but with poor short term memory. Assisted primary RN to reposition and clean patient. Wound care RN in see patient, assisted wound care RN to turn patient to assess wound on sacrum. Patient was medicated for pain at 10:09 by primary RN, 2 mg of IV Morphine given. 11:41- Pt cannulated with 13V needles per policy & without issue. VSS. Dialysis Tx initiated. 12:00- MD Worley at bedside. 12:15- Venous pressure fluctuating in excess of 260, lowered BFR to 425 to keep pressures under 260. Patient needs frequent reminding and encouragement to keep his access arm still and in a neutral position. 12:30- Pt resting quietly. VSS. Denies pain or SOB. Encouraging patient to keep his arm still and in a neutral position. 13:00- MD Ramo Everett at bedside. 13:30- Pt resting quietly. VSS. Denies pain or SOB. 14:00- Flushed patient lines with 50 ml of NS to prevent clotting. 14:15- Pt resting quietly. VSS. Denies pain or SOB. Flushed patient lines with 50 ml of NS to prevent clotting. 14:44- Patient complains of pain, primary RN notified and gave patient 10/325 mg of PO Percocet.  Venous pressures increasing in excess of 260, lowered BFR to 300 to keep pressures in range, pressures continue increasing in excess of 260.  14:48- Notified MD Worley that lines are clotting, discussed whether to end treatment at this time or change lines and continue. MD Elisabeth Doty states to change lines and dialyzer and to extend tx by 15 minutes to safely get the stated UF goal of 2000 ml.  14:49- HD tx paused, returned all possible blood to patient. VSS. Re strung and retested machine and water per policy and procedure. 15:08- HD treatment resumed. VSS. 15 Minutes added to treatment time. 15:54- Tx ended. Returned all possible blood to patient. VSS. Hemostasis achieved without issue. SBAR given to Primary, ERNESTO Jamison. Patient is stable at the time of my arrival.    Admiting Diagnosis: Iliac Aneurysm  Pt's [facility] clinic- [facility]  Consent signed - Informed Consent Verified: Yes (07/09/18 1145)  [facility] Consent - on file  Hepatitis Status- HbAg negative 07/02/18, HbAB 18  Machine #- Machine Number: [id number] (07/09/18 8125)  Telemetry status- remote telemetry, NSR per primary RN  Pre-dialysis wt. - Pre-Dialysis Weight: 70 kg (154 lb 5.2 oz) (07/06/18 1630)

## 2018-07-09 NOTE — PROGRESS NOTES
Hospitalist Progress Note NAME: Batool Reyes :  1950 MRN:  [id number] Assessment / Plan: 
Assessment and plan: 
Diabetes mellitus 2 on Insulin at home- FS better on tube feedings now Dysphagia due to esophageal dysmotility on [facility] NPH considering NPO- will resume once FS rises on tube feeding via PEG - s/p PEG  Repeat MBS  as per SLP - remains abnormal 
SSI Lispro now, Cont nph 5 units started  Appreciate GI help- s/p PEG now Resume PO meds via PEG as well Sepsis (leukocytosis and hypotension earlier during admission)- WBC 15k Due to Aspiration PNA 
CTA chest with Debris within the trachea and esophagus as well as the lingular and left lower lobe airways, with associated left basilar airspace disease. Small right pleural effusion. 12 mm groundglass nodule right lower lobe warrants close follow-up or PET Scan. 
 
? Hypoxia (he was on 4L O2 but can't find O2 sat < 90 in the chart) On IV Zosyn, 
s/p Vancomycin  and Flagyl- now discontinued since   S/p Zosyn x 10 days completed now Off O2 
speech eval ongoing, s/p barium swallow - s/p PEG now Cont NPO as per SLP Pulmonary and GI also seems to be on the case Per GI pt had normal EGD 2 weeks ago, symptoms consistent with dysphagia, meds held today as couldn't even swallow meds Follow BMP, Mag, Phos- OK today ESRD Nephrology is following for ongoing HD Hyperlipidemia On statins, will give if able to Hypertension Switch Coreg to IV BB with parametes Add PRN nitropaste Multiple sclerosis ? Exacerbation causing dysphagia- cannot tell till MRI done Neurology consult noted- pt has been refusing MRI till now, no further workup recommended CAD S/P coronary artery stent placement On ASA 
  
Hypotension Resolved 
  
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 
  
CAD hx, now presenting with no chest pain and nearly flat trend of troponin and marginally abnormal EKG with ST -T changes  
agree with cardiologist  
Conservative approach is ideal under the current circumstances.  
  
ESRD / HD ~ 8 years PAD S/p remote partial (Syme) amputation of RIGHT foot. 
  
 Body mass index is 23.13 kg/(m^2). Full Code, Wife is POA Disposition: [facility]/IP rehab recommended, when pt stable on PEG tube feeding-- CM working on placement DC to [facility] once accepted & bed available Subjective: Pt seen and examined at bedside. Breathing better, still unable to swallow. Overnight events d/w RN Chief Complaint / Reason for Physician Visit: f/u Aspiration PNA, ESRD, CAD, DM, HTN, dysphagia Review of Systems: 
Symptom Y/N Comments  Symptom Y/N Comments Fever/Chills n   Chest Pain n   
Poor Appetite    Edema Cough y   Abdominal Pain n   
Sputum    Joint Pain SOB/BANKS y improving  Pruritis/Rash Nausea/vomit    Tolerating PT/OT Diarrhea    Tolerating Diet Constipation    Other y Difficulty swallowing Could NOT obtain due to:   
 
Objective: VITALS:  
Last 24hrs VS reviewed since prior progress note. Most recent are: 
Patient Vitals for the past 24 hrs: 
 Temp Pulse Resp BP SpO2  
07/09/18 0720 98.5 °F (36.9 °C) 95 18 145/74 99 % 07/09/18 0315 97.9 °F (36.6 °C) 98 18 176/86 99 % 07/08/18 2306 98.1 °F (36.7 °C) 94 18 181/82 100 % 07/08/18 2102 - - - - 100 % 07/08/18 1937 97.9 °F (36.6 °C) 90 18 171/82 100 % 07/08/18 1812 - 96 - 153/71 -  
07/08/18 1611 97.8 °F (36.6 °C) 74 18 135/62 97 % 07/08/18 1353 - - - - 99 % 07/08/18 1338 97.5 °F (36.4 °C) 85 18 147/65 96 % 07/08/18 1200 97.8 °F (36.6 °C) 85 18 140/61 - Intake/Output Summary (Last 24 hours) at 07/09/18 0801 Last data filed at 07/09/18 0956 Gross per 24 hour Intake             1400 ml Output                0 ml Net             1400 ml PHYSICAL EXAM: 
General: WD, WN. Alert, cooperative, no acute distress   
EENT:  EOMI. Anicteric sclerae. MMM 
Resp:  crackles, no wheezing or rales. No accessory muscle use CV:  Regular  rhythm,  No edema GI:  Soft, Non distended, Non tender.  +Bowel sounds, PEG tube Noted + Neurologic:  Alert and oriented X 3, normal speech, Psych:   Good insight. Not anxious nor agitated Skin:  No rashes. No jaundice Reviewed most current lab test results and cultures  YES Reviewed most current radiology test results   YES Review and summation of old records today    NO Reviewed patient's current orders and MAR    YES 
PMH/SH reviewed - no change compared to H&P 
________________________________________________________________________ Care Plan discussed with: 
  Comments Patient x Family RN x Care Manager x Consultant Multidiciplinary team rounds were held today with , nursing, pharmacist and clinical coordinator. Patient's plan of care was discussed; medications were reviewed and discharge planning was addressed. ________________________________________________________________________ Total NON critical care TIME:  16 Minutes Total CRITICAL CARE TIME Spent:   Minutes non procedure based Comments >50% of visit spent in counseling and coordination of care    
________________________________________________________________________ Maryam Caruso MD  
 
Procedures: see electronic medical records for all procedures/Xrays and details which were not copied into this note but were reviewed prior to creation of Plan. LABS: 
I reviewed today's most current labs and imaging studies. Pertinent labs include: 
Recent Labs  
   07/09/18 0420 WBC  15.3* HGB  8.4* HCT  28.3*  
PLT  355 Recent Labs  
   07/09/18 0420 07/07/18 
 0155 NA  135*  137  
K  4.2  4.1 CL  99  102 CO2  27  27 GLU  179*  129* BUN  25*  11  
CREA  6.53*  3.84* CA  8.6  8.4* MG  2.1  2.1 PHOS  3.4  3.6 ALB   --   1.6* TBILI   --   0.5 SGOT   -- 39*  
ALT   --   24 Signed: Rhonda Rene MD

## 2018-07-09 NOTE — ACP (ADVANCE CARE PLANNING)
Responded to request from care manager to assist patient and his wife with completion of an Advance Medical Directive. Directive completed with patient. Copy and original given to Ms. Avery for her medical records - copy placed in patient's medical chart for scanning. Visit by: Donavon Becker. Enrique Saldivar.  Kirby Tinoco MA, [facility]    Lead  Profession Development & Advancement

## 2018-07-09 NOTE — PROGRESS NOTES
CM accepting transfer of patient. Medical record review. CM spoke with Dr. Dania Corona. Pt is stable with peg feedings and ready for discharge. CM sent message thru allscripts to , acknowledging discharge readiness and requesting response for status. CM will await response and notify Dr. Dania Corona if discharge will be today. Update @ 9:40am - CM received call from Eleni Ramos at [facility]. Pt is accepted and bed is available today. Pt can go after HD. Eleni Ramos will call back with room number. Dr. Dania Corona notified. Update @ 10:30am - CM met with patient and pt was able to answer some of assessment information but dialysis came to begin procedure. CM phoned wife to update on discharge plan to [address] [place] [address] today after dialysis. Wife expressed concern of wound and not evaluated. CM acknowledged concern and finished assessment with wife. Reason for Admission:   Iliac aneurysm               RRAT Score:     39             Resources/supports as identified by patient/family:   Wife and family              Top Challenges facing patient (as identified by patient/family and CM): Finances/Medication cost?      Insurance covers costs, no concerns at this time              Transportation? Pt does not drive, dependent on wife or chair van on dialysis days              Support system or lack thereof? No concerns at this time. Wife and  do well together and neighbor assists during the day. Living arrangements? 1 story home with ramp into entrance with wife           Self-care/ADLs/Cognition? Alert and oriented. Pt does ADL's and wife does IADL's for him. Pt is able to transfer self from bed to wheelchair in am and gets around house well, takes out of house and down ramp on dialysis days without assistance to meet chair van. Wife only assists with getting pt up from wheelchair onto walker and into bathroom as wheelchair will not fit in room.             Current Advanced Directive/Advance Care Plan:  Wife is interested,  called and in room now with wife an pt. Plan for utilizing home health:    Patient is transporting to [address]. Pt has had [address] in past. Has not stayed at [facility] but has stayed at [address] [place] [address] in past. DME at home includes motorized wheelchair, regular wheelchair and a walker. Wife plans to look into some additional care at discharge from [address] [place] [address]. Likelihood of readmission: high                 Transition of Care Plan:     Patient is a 70year old  male. Demographics verified. Preferred pharmacy is [facility] on [address]. Hospital transport team will transfer to [address] [place] [address] at discharge and wife will transport to home from there. Follow up appt are with wife or chair riddhi. Bedside nurse aware of wife concern for wound care eval. Wound care called and are at bedside. [facility] is aware of Wound care notes in chart. FOC on chart. 2nd IM reviewed, signed and placed on chart with copy to patient. Wife and pt are agreeable to discharge plan. Wife given billing office phone number for itemized bill requirements after discharge for insurance. Care Management Interventions  PCP Verified by CM: Yes (sees dr. Juana Maciel)  Mode of Transport at Discharge: Other (see comment) ([facility])  Transition of Care Consult (CM Consult):  Other ([facility] has accepted.)  Discharge Durable Medical Equipment: No (not at this time, pt has power wc, regular wc and a walker at home)  Physical Therapy Consult: Yes  Occupational Therapy Consult: Yes  Speech Therapy Consult: Yes  Current Support Network: Own Home, Lives with Spouse (one story with ramp into entrance with wife)  Confirm Follow Up Transport: Other (see comment) (wife or chair Alexandra Matt hammond to follow up appt and to dialysis)  Plan discussed with Pt/Family/Caregiver: Yes  Freedom of Choice Offered: Yes  Discharge Location  Discharge Placement: Rehab hospital/unit acute                              Allie Bobo, ANGELN, RN  Care Manager [id number]  [phone]

## 2018-07-11 NOTE — PROGRESS NOTES
Date of previous inpatient admission/ ED visit? Pt was admitted from 6/28/18-7/9/18 with a diagnosis of pseudoaneurysm of iliac artery    What brought the patient back to ED? Pt presented to the ED from [facility] with cc of diarrhea    Did patient decline recommended services during last admission/ ED visit (if yes, what)? No    Has patient seen a provider since their last inpatient admission/ED visit (if yes, when)? Yes is being seen at [facility]    CM Interventions:  From previous inpatient admission/ED visit: Pt was discharged to [facility]    From current inpatient admission/ED visit: CM discussed with Dr. Fer Murdock re: readmission possibility. Pt received a CT scan. Per results, pt will likely discharge back to [facility] from the ED once pt has received fluids. CM will continue to remain available for support, discharge planning as needed.      CAROLYN Couch  [facility]  [phone]

## 2018-07-11 NOTE — ED PROVIDER NOTES
EMERGENCY DEPARTMENT HISTORY AND PHYSICAL EXAM 
 
 
Date: 7/11/2018 Patient Name: Fay Roberto History of Presenting Illness Chief Complaint Patient presents with  Diarrhea Patient started tube feed 4 days ago; chronic diarrhea since History Provided By: Patient HPI: Fay Roberto, 70 y.o. male with PMHx significant for HTN, DM, GERD, CAD, CKD, and anemia, presents ambulatory to the ED with cc of diarrhea alongside abdominal and nausea with associated emesis. Per pt, he has been presenting with persistent diarrhea for the past four days since having a feeding tube placed. Pt informs he presents from [address] [place] [address] with concerns for dehydration given the diarrhea. Alongside the diarrhea, the pt informs he has been experiencing increased discomfort to the lower abdomen for the past two days. Pt informs the abdominal pain presents with a mild-moderate intensity alongside an associated dull, aching sensation to the lower abdomen. Pt reports the abdominal pain presents with moderate intensity nausea alongside several episodes of associated emesis. Pt reports increased discomfort with active episodes of diarrhea and emesis. Pt reports no OTC medications or other modifying factors for the discomfort. Pt specifically denies any fevers, chills, headaches, chest pain, or SOB. PMHx: HLD, prostate cancer PSHx: colonoscopy, gastrostomy, EGD,appendectomy, cholecystectomy, cardiac catheterization Social Hx: Former EtOH; Former Smoker; - Illicit Drugs PCP: Jamie Gruber MD 
 
There are no other complaints, changes, or physical findings at this time. Current Outpatient Prescriptions Medication Sig Dispense Refill  insulin NPH (HUMULIN N NPH U-100 INSULIN) 100 unit/mL injection 5 Units by SubCUTAneous route daily. 1 Vial 0  
 guaiFENesin (ROBITUSSIN) 100 mg/5 mL liquid 20 mL by Per G Tube route two (2) times a day.  1 Bottle 0  
 oxyCODONE-acetaminophen (PERCOCET 10)  mg per tablet 1 Tab by Per G Tube route every four (4) hours as needed. Max Daily Amount: 6 Tabs. 10 Tab 0  pravastatin (PRAVACHOL) 10 mg tablet 1 Tab by Per G Tube route nightly. 30 Tab 0  
 lisinopril (PRINIVIL, ZESTRIL) 40 mg tablet Take 0.5 Tabs by mouth daily. 90 Tab 1  
 escitalopram oxalate (LEXAPRO) 10 mg tablet 1 Tab by Per G Tube route daily. 30 Tab 0  cloNIDine HCl (CATAPRES) 0.1 mg tablet 1 Tab by Per G Tube route three (3) times daily. 90 Tab 0  carvedilol (COREG) 25 mg tablet 2 Tabs by Per G Tube route two (2) times daily (with meals). 120 Tab 0  
 aspirin 81 mg chewable tablet 1 Tab by Per G Tube route daily. 30 Tab 0  
 amLODIPine (NORVASC) 10 mg tablet 1 Tab by Per G Tube route daily. 30 Tab 0  VIRT-CAPS 1 mg capsule TAKE ONE CAPSULE BY MOUTH DAILY 30 Cap 0  
 sevelamer carbonate (RENVELA) 800 mg tab tab Take 800 mg by mouth three (3) times daily. Past History Past Medical History: 
Past Medical History:  
Diagnosis Date  Anemia associated with chronic renal failure  Autoimmune disease (HCC)   
 hx ms  CAD (coronary artery disease)  Chronic kidney disease   
 catheter removed and no dialysis at this time  Chronic kidney disease   
 left arm fistula dialysis MWF  
 Diabetes ([facility] type II  
 Elevated troponin 6/29/2018  GERD (gastroesophageal reflux disease)  Hypertension  Ill-defined condition   
 prostate cancer  Multiple sclerosis ([address] diagnosed '01  
 Other ill-defined conditions(799.89)   
 anemia  Other ill-defined conditions(799.89)   
 elevated cholesterol  Other ill-defined conditions(799.89)   
 hx septic right foot  Peripheral vascular disease ([address]  Secondary hyperparathyroidism of renal origin ([address]  Thyroid disease Past Surgical History: 
Past Surgical History:  
Procedure Laterality Date  COLONOSCOPY N/A 12/6/2016 COLONOSCOPY performed by Silvestre Courtney MD at [facility] ENDOSCOPY  COLONOSCOPY,DIAGNOSTIC  12/6/2016  CORONARY STENT SINGLE W/PTCA  2/17/2011  HX APPENDECTOMY  HX CATARACT REMOVAL  10/18/10  
 bilateral  
 HX CHOLECYSTECTOMY  HX GI    
 ileostomy due to infection  HX HEART CATHETERIZATION    
 HX HEENT    
 hx post photocoagulation eye 2009  HX OTHER SURGICAL    
 right partial foot amputation  HX OTHER SURGICAL    
 cardiac cath  HX OTHER SURGICAL    
 prostate removed  PLACE PERCUT GASTROSTOMY TUBE  7/6/2018  WA COLONOSCOPY W/BIOPSY SINGLE/MULTIPLE  5/10/2010  UPPER GI ENDOSCOPY,BIOPSY  4/17/2018  UPPER GI ENDOSCOPY,REMV TUMOR,SNARE  6/13/2018  VASCULAR SURGERY PROCEDURE UNLIST    
 katelyn. leg bypasses Family History: 
Family History Problem Relation Age of Onset  Diabetes Mother Social History: 
Social History Substance Use Topics  Smoking status: Former Smoker Years: 1.00 Quit date: 4/12/2003  Smokeless tobacco: Never Used Comment: quit 5 years ago  Alcohol use No  
   Comment: Stopped drinking 10 years ago Allergies: Allergies Allergen Reactions  Sensipar [Cinacalcet] Other (comments) Cinacalcet (Sensipar) has been added as an \"allergy\" / intolerance with a comment to assure providers at discharge and post discharge are aware of Dr. Handy Mcintyre recommendation to avoid Sensipar. Patient takes Maddie Reasons as an outpatient; Thus, Theotis Sammi has been discontinued per Nephrology (cannot give with recent Parsabiv b/c risk of low Calcium). Review of Systems Review of Systems Constitutional: Negative for chills, fatigue and fever. HENT: Negative. Eyes: Negative. Respiratory: Negative for shortness of breath and wheezing. Cardiovascular: Negative for chest pain and leg swelling. Gastrointestinal: Positive for abdominal pain, diarrhea, nausea and vomiting. Negative for blood in stool and constipation. Endocrine: Negative.    
Genitourinary: Negative for difficulty urinating, dysuria, frequency, hematuria and urgency. Musculoskeletal: Negative. Skin: Negative for rash. Allergic/Immunologic: Negative. Neurological: Negative for dizziness, weakness, numbness and headaches. Hematological: Negative. Psychiatric/Behavioral: Negative. Physical Exam  
Physical Exam  
Constitutional: He is oriented to person, place, and time. He appears well-developed and well-nourished. No distress. HENT:  
Head: Normocephalic and atraumatic. Mouth/Throat: Oropharynx is clear and moist.  
Thrush on tongue Eyes: Conjunctivae and EOM are normal.  
Neck: Neck supple. No JVD present. No tracheal deviation present. Cardiovascular: Normal rate, regular rhythm and intact distal pulses. Exam reveals gallop and S3. Exam reveals no friction rub. No murmur heard. Pulmonary/Chest: Effort normal and breath sounds normal. No stridor. No respiratory distress. He has no wheezes. Abdominal: Soft. Bowel sounds are normal. He exhibits no distension and no mass. There is no tenderness. There is CVA tenderness (R). There is no rebound and no guarding. Peg tube to upper abdomen Musculoskeletal: Normal range of motion. He exhibits no edema or tenderness. No deformity Neurological: He is alert and oriented to person, place, and time. He has normal strength. No focal deficits Skin: Skin is warm, dry and intact. No rash noted. Psychiatric: He has a normal mood and affect. His behavior is normal. Judgment and thought content normal.  
Nursing note and vitals reviewed. Diagnostic Study Results Labs - Recent Results (from the past 12 hour(s)) METABOLIC PANEL, COMPREHENSIVE Collection Time: 07/11/18 11:16 AM  
Result Value Ref Range Sodium 135 (L) 136 - 145 mmol/L Potassium 4.1 3.5 - 5.1 mmol/L Chloride 95 (L) 97 - 108 mmol/L  
 CO2 32 21 - 32 mmol/L Anion gap 8 5 - 15 mmol/L Glucose 211 (H) 65 - 100 mg/dL  BUN 30 (H) 6 - 20 MG/DL  
 Creatinine 5.79 (H) 0.70 - 1.30 MG/DL  
 BUN/Creatinine ratio 5 (L) 12 - 20 GFR est AA 12 (L) >60 ml/min/1.73m2 GFR est non-AA 10 (L) >60 ml/min/1.73m2 Calcium 9.2 8.5 - 10.1 MG/DL Bilirubin, total 0.5 0.2 - 1.0 MG/DL  
 ALT (SGPT) 16 12 - 78 U/L  
 AST (SGOT) 19 15 - 37 U/L Alk. phosphatase 220 (H) 45 - 117 U/L Protein, total 7.9 6.4 - 8.2 g/dL Albumin 1.6 (L) 3.5 - 5.0 g/dL Globulin 6.3 (H) 2.0 - 4.0 g/dL A-G Ratio 0.3 (L) 1.1 - 2.2    
CBC WITH AUTOMATED DIFF Collection Time: 07/11/18 11:16 AM  
Result Value Ref Range WBC 14.3 (H) 4.1 - 11.1 K/uL  
 RBC 3.33 (L) 4.10 - 5.70 M/uL HGB 9.3 (L) 12.1 - 17.0 g/dL HCT 30.4 (L) 36.6 - 50.3 % MCV 91.3 80.0 - 99.0 FL  
 MCH 27.9 26.0 - 34.0 PG  
 MCHC 30.6 30.0 - 36.5 g/dL RDW 20.5 (H) 11.5 - 14.5 % PLATELET 000 797 - 296 K/uL MPV 9.9 8.9 - 12.9 FL  
 NRBC 0.0 0  WBC ABSOLUTE NRBC 0.00 0.00 - 0.01 K/uL NEUTROPHILS 75 32 - 75 % LYMPHOCYTES 12 12 - 49 % MONOCYTES 10 5 - 13 % EOSINOPHILS 1 0 - 7 % BASOPHILS 0 0 - 1 % IMMATURE GRANULOCYTES 2 (H) 0.0 - 0.5 % ABS. NEUTROPHILS 10.8 (H) 1.8 - 8.0 K/UL  
 ABS. LYMPHOCYTES 1.7 0.8 - 3.5 K/UL  
 ABS. MONOCYTES 1.4 (H) 0.0 - 1.0 K/UL  
 ABS. EOSINOPHILS 0.1 0.0 - 0.4 K/UL  
 ABS. BASOPHILS 0.0 0.0 - 0.1 K/UL  
 ABS. IMM. GRANS. 0.3 (H) 0.00 - 0.04 K/UL  
 DF AUTOMATED    
 RBC COMMENTS ANISOCYTOSIS 2+ 
    
 RBC COMMENTS HYPOCHROMIA 1+ LACTIC ACID Collection Time: 07/11/18 11:16 AM  
Result Value Ref Range Lactic acid 1.4 0.4 - 2.0 MMOL/L Radiologic Studies -  
CT ABD PELV W CONT Final Result Ct Abd Pelv W Cont Result Date: 7/11/2018 IMPRESSION: 1. Interval percutaneous gastrostomy tube placement. Interval moderate gastric distention. 2. Right iliac artery stenting and adjacent hematoma again shown, appearing stable. Moderate right renal pelvocaliectasis and ureterectasis again demonstrated.   
 
Medical Decision Making I am the first provider for this patient. I reviewed the vital signs, available nursing notes, past medical history, past surgical history, family history and social history. Vital Signs-Reviewed the patient's vital signs. Patient Vitals for the past 12 hrs: 
 Temp Pulse Resp BP SpO2  
07/11/18 1445 - 79 - (!) 124/35 100 % 07/11/18 1430 - 75 - 103/90 90 % 07/11/18 1400 - 74 - 122/55 97 % 07/11/18 1345 - 75 - 121/57 97 % 07/11/18 1336 - 76 - 125/58 96 % 07/11/18 1045 - 76 14 119/60 99 % 07/11/18 1031 99 °F (37.2 °C) 77 18 117/58 - Pulse Oximetry Analysis - 99% on RA Cardiac Monitor:  
Rate: 79 bpm 
Rhythm: Normal Sinus Rhythm Records Reviewed: Nursing Notes and Old Medical Records Provider Notes (Medical Decision Making): DDx: dehydration, electrolyte abnormality, C-diff, colitis, adverse effects from tube feeding, gastroenteritis ED Course:  
Initial assessment performed. The patients presenting problems have been discussed, and they are in agreement with the care plan formulated and outlined with them. I have encouraged them to ask questions as they arise throughout their visit. Progress Note:  
3:02 PM 
Pt reports he is feeling improved with no episodes of diarrhea in the ED. Unable to obtain stool sample secondary to no BM. Written by EDUARDO Franco, as dictated by Colleen Maravilla DO. Progress Note:  
3:04 PM 
Updated pt on all returned results and findings. Pt in agreement with the further progression of care plan and expresses agreement with and understanding of all items discussed. Written by EDUARDO Franco, as dictated by Colleen Maravilla DO. Disposition: 
DISCHARGE NOTE: 
 
3:04 PM 
The patient is ready for discharge. The patient signs, symptoms, diagnosis, and discharge instructions have been discussed and the patient has conveyed their understanding.  The patient is to follow-up as reccommended or returned to the ER should their symptoms worsen. Plan has been discussed and patient is in agreement. PLAN: 
1. Follow-up Information Follow up With Details Comments Contact Info Brenda Mcmullen MD Schedule an appointment as soon as possible for a visit  [address] [facility] 
[phone] [facility] EMERGENCY DEPT  As needed, If symptoms worsen [address] [facility] 
[phone] Return to ED if worse Diagnosis Clinical Impression: 1. Diarrhea, unspecified type Attestations: This note is prepared by Alyssa Armstrong, acting as Scribe for Lonnie Vega DO. Lonnie Vega DO: The scribe's documentation has been prepared under my direction and personally reviewed by me in its entirety. I confirm that the note above accurately reflects all work, treatment, procedures, and medical decision making performed by me. This note will not be viewable in [address]

## 2018-07-11 NOTE — DISCHARGE INSTRUCTIONS
Diarrhea: Care Instructions  Your Care Instructions    Diarrhea is loose, watery stools (bowel movements). The exact cause is often hard to find. Sometimes diarrhea is your body's way of getting rid of what caused an upset stomach. Viruses, food poisoning, and many medicines can cause diarrhea. Some people get diarrhea in response to emotional stress, anxiety, or certain foods. Almost everyone has diarrhea now and then. It usually isn't serious, and your stools will return to normal soon. The important thing to do is replace the fluids you have lost, so you can prevent dehydration. The doctor has checked you carefully, but problems can develop later. If you notice any problems or new symptoms, get medical treatment right away. Follow-up care is a key part of your treatment and safety. Be sure to make and go to all appointments, and call your doctor if you are having problems. It's also a good idea to know your test results and keep a list of the medicines you take. How can you care for yourself at home? · Watch for signs of dehydration, which means your body has lost too much water. Dehydration is a serious condition and should be treated right away. Signs of dehydration are:  ¨ Increasing thirst and dry eyes and mouth. ¨ Feeling faint or lightheaded. ¨ Darker urine, and a smaller amount of urine than normal.  · To prevent dehydration, drink plenty of fluids, enough so that your urine is light yellow or clear like water. Choose water and other caffeine-free clear liquids until you feel better. If you have kidney, heart, or liver disease and have to limit fluids, talk with your doctor before you increase the amount of fluids you drink. · Begin eating small amounts of mild foods the next day, if you feel like it. ¨ Try yogurt that has live cultures of Lactobacillus. (Check the label.)  ¨ Avoid spicy foods, fruits, alcohol, and caffeine until 48 hours after all symptoms are gone.   ¨ Avoid chewing gum that contains sorbitol. ¨ Avoid dairy products (except for yogurt with Lactobacillus) while you have diarrhea and for 3 days after symptoms are gone. · The doctor may recommend that you take over-the-counter medicine, such as loperamide (Imodium), if you still have diarrhea after 6 hours. Read and follow all instructions on the label. Do not use this medicine if you have bloody diarrhea, a high fever, or other signs of serious illness. Call your doctor if you think you are having a problem with your medicine. When should you call for help? Call 911 anytime you think you may need emergency care. For example, call if:    · You passed out (lost consciousness).     · Your stools are maroon or very bloody.    Call your doctor now or seek immediate medical care if:    · You are dizzy or lightheaded, or you feel like you may faint.     · Your stools are black and look like tar, or they have streaks of blood.     · You have new or worse belly pain.     · You have symptoms of dehydration, such as:  ¨ Dry eyes and a dry mouth. ¨ Passing only a little dark urine. ¨ Feeling thirstier than usual.     · You have a new or higher fever.    Watch closely for changes in your health, and be sure to contact your doctor if:    · Your diarrhea is getting worse.     · You see pus in the diarrhea.     · You are not getting better after 2 days (48 hours). Where can you learn more? Go to http://lesly-delfino.info/. Enter R074 in the search box to learn more about \"Diarrhea: Care Instructions. \"  Current as of: November 20, 2017  Content Version: 11.7  © 8542-6921 [employer]. Care instructions adapted under license by [employer] (which disclaims liability or warranty for this information). If you have questions about a medical condition or this instruction, always ask your healthcare professional. [address] any warranty or liability for your use of this information.

## 2018-07-11 NOTE — ED NOTES
TRANSFER - IN REPORT:    Verbal report received from Chetna MCCALL on Philippe Avery. Report consisted of patients Situation, Background, Assessment and   Recommendations(SBAR). Information from the following report(s) SBAR and ED Summary was reviewed with the receiving nurse. Opportunity for questions and clarification was provided. Pt resting on stretcher in POC with IVF flowing. Pt has been discharged by Dr. Brittany Wilson. Pt awaiting completion of fluids.

## 2018-07-11 NOTE — ED TRIAGE NOTES
PT. Arrives to ED today via stretcher form [facility].  Patient is being seen for diarrhea and potential dehydration. Pt. Recently started tube feeding and has had diarrhea since. Pt. Alert and oriented x4. Pt. Placed in position of comfort with call bell in reach.

## 2018-07-11 NOTE — ED NOTES
Report given to [facility] RN at this time. Patient stable for transport back to [facility] after fluids complete. Discharge instructions given to patient.

## 2018-07-11 NOTE — ED NOTES
Fluids completed. Called transport and put in transport request for pt to go back to [facility].

## 2018-07-11 NOTE — ED NOTES
Pt. Resting comfortably in bed at this time. Pt. With no complaints. Provided with warm blanket. Pt. Updated on plan of care.

## 2018-07-17 PROBLEM — D72.829 LEUKOCYTOSIS: Status: ACTIVE | Noted: 2018-01-01

## 2018-07-17 PROBLEM — Z99.2 ESRD (END STAGE RENAL DISEASE) ON DIALYSIS (HCC): Status: ACTIVE | Noted: 2018-01-01

## 2018-07-17 PROBLEM — R50.9 FEVER: Status: ACTIVE | Noted: 2018-01-01

## 2018-07-17 PROBLEM — Z99.2 ESRD (END STAGE RENAL DISEASE) ON DIALYSIS (HCC): Chronic | Status: ACTIVE | Noted: 2018-01-01

## 2018-07-17 PROBLEM — N18.6 ESRD (END STAGE RENAL DISEASE) ON DIALYSIS (HCC): Status: ACTIVE | Noted: 2018-01-01

## 2018-07-17 PROBLEM — N18.6 ESRD (END STAGE RENAL DISEASE) ON DIALYSIS (HCC): Chronic | Status: ACTIVE | Noted: 2018-01-01

## 2018-07-17 PROBLEM — A41.9 SEPSIS (HCC): Status: ACTIVE | Noted: 2018-01-01

## 2018-07-17 NOTE — PROGRESS NOTES
Pharmacy Clarification of Prior to Admission Medication Regimen     The patient was not interviewed regarding clarification of the prior to admission medication regimen. Patient was transferred from [facility], to [facility], with a current med list. [facility] received the [facility] report from [place]. [facility]    Pertinent Pharmacy Findings:   Updated patients preferred outpatient pharmacy to: [facility] did not update the outpatient pharmacy due to the patient living at a SNF   acetaminophen (TYLENOL) 160 mg/5 mL liquid, bisacodyl (DULCOLAX) 10 mg suppository, guaiFENesin-codeine (ROBITUSSIN AC) 100-10 mg/5 mL solution, morphine 10 mg/5 mL oral solution, ondansetron (ZOFRAN ODT) 4 mg disintegrating tablet, and sodium hypochlorite (DAKIN'S SOLUTION) external solution: Per [facility], the patient has these agents and takes them \"as needed. \"   mupirocin (BACTROBAN) 2 % ointment: Per [facility], the patient was supposed to start this agent today, 2018, for 10 days but did not start it due to being transferred to [facility]. PTA medication list was corrected to the following:     Prior to Admission Medications   Prescriptions Last Dose Informant Patient Reported? Taking?   acetaminophen (TYLENOL) 160 mg/5 mL liquid Not Taking at Unknown time Transfer Papers Yes No   Si mg by Per G Tube route every three (3) hours as needed for Pain. amLODIPine (NORVASC) 10 mg tablet 2018 at 0933 Transfer Papers No Yes   Si Tab by Per G Tube route daily. aspirin 81 mg chewable tablet 2018 at 0933 Transfer Papers No Yes   Si Tab by Per G Tube route daily. bisacodyl (DULCOLAX) 10 mg suppository Not Taking at Unknown time Transfer Papers Yes No   Sig: Insert 10 mg into rectum daily as needed (\"Constipation\").    carvedilol (COREG) 25 mg tablet 2018 at 0933 Transfer Papers No Yes   Si Tabs by Per G Tube route two (2) times daily (with meals). cloNIDine HCl (CATAPRES) 0.1 mg tablet 2018 at 0506 Transfer Papers No Yes   Si Tab by Per G Tube route three (3) times daily. epoetin niya (EPOGEN;PROCRIT) 10,000 unit/mL injection 2018 at Unknown time Transfer Papers Yes Yes   Sig: 10,000 Units by SubCUTAneous route Q TU, TH & SAT.   escitalopram oxalate (LEXAPRO) 5 mg/5 mL oral solution 2018 at 0914 Transfer Papers Yes Yes   Sig: 10 mg by Per G Tube route daily. guaiFENesin-codeine (ROBITUSSIN AC) 100-10 mg/5 mL solution Not Taking at Unknown time Transfer Papers Yes No   Sig: Take 2.5 mL by mouth every six (6) hours as needed for Cough. insulin lispro (HUMALOG) 100 unit/mL injection 2018 at 0933 Transfer Papers Yes Yes   Si-4 Units by SubCUTAneous route Before breakfast, lunch, dinner and at bedtime. lidocaine (LIDODERM) 5 % 2018 at 0933 Transfer Papers Yes Yes   Si Patches by TransDERmal route daily. Apply patch to the affected area for 12 hours a day and remove for 12 hours a day. lisinopril (PRINIVIL, ZESTRIL) 20 mg tablet 2018 at 0933 Transfer Papers Yes Yes   Si mg by Per G Tube route daily. morphine 10 mg/5 mL oral solution Not Taking at Unknown time Transfer Papers Yes No   Si mL by Per G Tube route every four (4) hours as needed for Pain. Pain scale 7-10   mupirocin (BACTROBAN) 2 % ointment Not Taking at Unknown time Transfer Papers Yes No   Sig: Apply  to affected area every eight (8) hours as needed (\"Dry skin\"). ondansetron (ZOFRAN ODT) 4 mg disintegrating tablet Not Taking at Unknown time Transfer Papers Yes No   Sig: Take 4 mg by mouth every six (6) hours as needed for Nausea. oxyCODONE-acetaminophen (PERCOCET) 5-325 mg per tablet 2018 at 1215 Transfer Papers Yes Yes   Si Tab by Per G Tube route every four (4) hours as needed for Pain.   pravastatin (PRAVACHOL) 10 mg tablet 2018 at 2418 Kamala Kline Transfer Papers No Yes   Si Tab by Per G Tube route nightly. sevelamer carbonate (RENVELA) 2.4 gram pwpk oral powder 2018 at 1244 Transfer Papers Yes Yes   Si.4 g by Per G Tube route three (3) times daily (with meals). sodium hypochlorite (DAKIN'S SOLUTION) external solution Not Taking at Unknown time Transfer Papers Yes No   Sig: Apply  to affected area every eight (8) hours as needed (\"Wound care\").       Facility-Administered Medications: None          Thank you,  Pinky Crawley CPhT  Medication History Pharmacy Technician

## 2018-07-17 NOTE — PROGRESS NOTES
Date of previous inpatient admission/ ED visit? 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 Sepsis with Hypotension POA- WBC 16.1k, fever 100.7    Did patient decline recommended services during last admission/ ED visit (if yes, what)? No  Has patient seen a provider since their last inpatient admission/ED visit (if yes, when)? Yes-  At [facility]    CM Interventions:  From previous inpatient admission/ED visit:[facility]  From current inpatient admission/ED visit:Consulted with Hospitalist pt is going to admit at Hospital under inpatient status under Sepsis protocole. Pt need IV abx therapy. Pt may benefit from Rehab admission upon discharge.     Alexus Viera MSW  ED Case Manager   [facility] [phone]

## 2018-07-17 NOTE — H&P
Hospitalist Admission Note NAME: Jonah Kasper :  1950 MRN:  [id number] Date/Time:  2018 3:54 PM 
 
Patient PCP: Hilda Iverson MD 
______________________________________________________________________ Given the patient's current clinical presentation, I have a high level of concern for decompensation if discharged from the emergency department. Complex decision making was performed, which includes reviewing the patient's available past medical records, laboratory results, and x-ray films. My assessment of this patient's clinical condition and my plan of care is as follows. Assessment / Plan: 
Sepsis with Hypotension POA- WBC 16.1k, fever 100.7 ? source at this time- suspected B/L Buttock Pressure injury ulcer/wound infection vs R iliac ar pseudoaneurysm/collection/stent infection CXR neg 
UA pending Blood Cx pending/sent in ER Lact normal at 1.4 S/p IVF bolus in ER, cont gentle IVF (ESRD pt) x 12 hrs only Broad spectrum IV Abx for now Follow Cultures, send stool studies, wound Cx/wound care consulted Cont NPO with PEG tube feeding only to prevent aspiration insult Check CT A/P with contrast tonight- will be getting HD tomorrow as he is MWF pt 
IP vascular surgery consult for opinion on R iliac ar stent ? infection May consider ID consult if WBC cont to remain high, watch for downward trend of WBC & fever on IV Abx for now Diabetes mellitus 2 on Insulin at home Dysphagia due to esophageal dysmotility on MBstudy last admission- s/p PEG for FS Q 6 hrs on tube feeding Lispro SSI here Add NPH as needed   
   
ESRD on HD MWF Anemia of chronic disease POA S/p L arm AVFistula Stool guaiac neg in ER 
 
IP nephrology consult for HD Cont epogen as per renal 
PRBC deferred to nephrology with HD tomorrow 
renvela TID Hyperlipidemia Cont on statins 
   
Hypertension Switch Coreg to IV BB with parametes Add PRN nitropaste 
   
? Multiple sclerosis POA ? Exacerbation causing dysphagia- cannot tell till MRI done Neurology consult noted last admission- pt has been refusing MRI till now, no further workup recommended 
   
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 
   
H/o CAD Chronic Diastolic & Systolic CHF POA- EF 83%, mod LVH on recent Echo 6/29 Conservative approach was recommended last admission by Cardiology in light of other co morbidities.    
ESRD / HD ~ 8 years PAD s/p b/l leg stents S/p remote partial (Syme) amputation of RIGHT foot. 
   
Nephrology consulted for HD MWF Cont ASA Vascular surgery consult as above for opinion on ? Stent infection Code Status: Full Surrogate Decision Maker: wife DVT Prophylaxis: Sq heparin GI Prophylaxis: not indicated Baseline: Pt was recently DC to [facility] from [id number] after being treated for R Iliac ar pseudoaneurysm s/p Stent by [facility] Sx Dr Hortencia Flanagan, Eso Dysmotility syndrome causing Dysphagia causing aspiration pneumonia s/p PEG tube Pt had denied any neuro workup - denied MRI as recommended by neurology to find the cause of his eso dysmotility- ?MS flare Subjective: CHIEF COMPLAINT: Abnormally elevated WBC with fever at [facility] HISTORY OF PRESENT ILLNESS:    
Cordell Alcala is a 70 y.o.  male who presents with above complains sent from [facility] next door to r/o sepsis/infection. Pt has been having Rehab at [facility] since last DC as above- was being NPO for severe aspiration/dysphagia due to eso dysmotility syndrome believed to be from [address]?- declined MRI last admission. Had been initially seen by Vascular Surgery for R iliac Artery pseudoaneurysm s/p stenting done- no infection was suspected at the time of DC & elevated WBC was thought to be from the Aspiration pneumonitis- was treated with IV ABx last admission. Pt was newly started on PEG tube feeding before DC to [facility].  
Subsequently pt was seen in ER on 7/11 for diarrhea - was sent back to [facility] then, diarrhea was thought to be from tube feedings. Pt denies any complains today except feeling tired. Pt was found to have Chronic Buttock Decubitus ulcers, PEG tube, L arm AVF for HD = all unremarkable on exam but persistent leukocytosis of 16.1 with low grade fever of 100.7 in ER. We were asked to admit for work up and evaluation of the above problems. Past Medical History:  
Diagnosis Date  Anemia associated with chronic renal failure  Autoimmune disease (HCC)   
 hx ms  CAD (coronary artery disease)  Chronic kidney disease   
 catheter removed and no dialysis at this time  Chronic kidney disease   
 left arm fistula dialysis MWF  
 Diabetes ([address] type II  
 Elevated troponin 6/29/2018  GERD (gastroesophageal reflux disease)  Hypertension  Ill-defined condition   
 prostate cancer  Multiple sclerosis ([address] diagnosed '01  
 Other ill-defined conditions(799.89)   
 anemia  Other ill-defined conditions(799.89)   
 elevated cholesterol  Other ill-defined conditions(799.89)   
 hx septic right foot  Peripheral vascular disease ([address]  Secondary hyperparathyroidism of renal origin ([address]  Thyroid disease Past Surgical History:  
Procedure Laterality Date  COLONOSCOPY N/A 12/6/2016 COLONOSCOPY performed by Amy Garcia MD at [facility] ENDOSCOPY  COLONOSCOPY,DIAGNOSTIC  12/6/2016  CORONARY STENT SINGLE W/PTCA  2/17/2011  HX APPENDECTOMY  HX CATARACT REMOVAL  10/18/10  
 bilateral  
 HX CHOLECYSTECTOMY  HX GI    
 ileostomy due to infection  HX HEART CATHETERIZATION    
 HX HEENT    
 hx post photocoagulation eye 2009  HX OTHER SURGICAL    
 right partial foot amputation  HX OTHER SURGICAL    
 cardiac cath  HX OTHER SURGICAL    
 prostate removed  PLACE PERCUT GASTROSTOMY TUBE  7/6/2018  KS COLONOSCOPY W/BIOPSY SINGLE/MULTIPLE  5/10/2010  UPPER GI ENDOSCOPY,BIOPSY  4/17/2018  UPPER GI ENDOSCOPY,REMV TUMOR,SNARE  6/13/2018  VASCULAR SURGERY PROCEDURE UNLIST    
 katelyn. leg bypasses Social History Substance Use Topics  Smoking status: Former Smoker Years: 1.00 Quit date: 4/12/2003  Smokeless tobacco: Never Used Comment: quit 5 years ago  Alcohol use No  
   Comment: Stopped drinking 10 years ago Family History Problem Relation Age of Onset  Diabetes Mother Allergies Allergen Reactions  Sensipar [Cinacalcet] Other (comments) Cinacalcet (Sensipar) has been added as an \"allergy\" / intolerance with a comment to assure providers at discharge and post discharge are aware of Dr. Arturo Vines recommendation to avoid Sensipar. Patient takes Ronalee Ruffing as an outpatient; Thus, Mita Dheeraj has been discontinued per Nephrology (cannot give with recent Parsabiv b/c risk of low Calcium). Prior to Admission medications Medication Sig Start Date End Date Taking? Authorizing Provider  
oxyCODONE-acetaminophen (PERCOCET) 5-325 mg per tablet 1 Tab by Per G Tube route every four (4) hours as needed for Pain. Yes Historical Provider  
epoetin niya (EPOGEN;PROCRIT) 10,000 unit/mL injection 10,000 Units by SubCUTAneous route Q TU, TH & SAT. Yes Historical Provider  
insulin lispro (HUMALOG) 100 unit/mL injection 2-4 Units by SubCUTAneous route Before breakfast, lunch, dinner and at bedtime. Yes Historical Provider  
lidocaine (LIDODERM) 5 % 2 Patches by TransDERmal route daily. Apply patch to the affected area for 12 hours a day and remove for 12 hours a day. Yes Historical Provider  
lisinopril (PRINIVIL, ZESTRIL) 20 mg tablet 20 mg by Per G Tube route daily. Yes Historical Provider  
escitalopram oxalate (LEXAPRO) 5 mg/5 mL oral solution 10 mg by Per G Tube route daily.    Yes Historical Provider  
sevelamer carbonate (RENVELA) 2.4 gram pwpk oral powder 2.4 g by Per G Tube route three (3) times daily (with meals). Yes Historical Provider  
pravastatin (PRAVACHOL) 10 mg tablet 1 Tab by Per G Tube route nightly. 7/9/18  Yes Maxx Hilton MD  
cloNIDine HCl (CATAPRES) 0.1 mg tablet 1 Tab by Per G Tube route three (3) times daily. 7/9/18  Yes Maxx Hilton MD  
carvedilol (COREG) 25 mg tablet 2 Tabs by Per G Tube route two (2) times daily (with meals). 7/9/18  Yes Maxx Hilton MD  
aspirin 81 mg chewable tablet 1 Tab by Per G Tube route daily. 7/9/18  Yes Maxx Hilton MD  
amLODIPine (NORVASC) 10 mg tablet 1 Tab by Per G Tube route daily. 7/9/18  Yes Maxx Hilton MD  
acetaminophen (TYLENOL) 160 mg/5 mL liquid 650 mg by Per G Tube route every three (3) hours as needed for Pain. Historical Provider  
bisacodyl (DULCOLAX) 10 mg suppository Insert 10 mg into rectum daily as needed (\"Constipation\"). Historical Provider  
guaiFENesin-codeine (ROBITUSSIN AC) 100-10 mg/5 mL solution Take 2.5 mL by mouth every six (6) hours as needed for Cough. Historical Provider  
morphine 10 mg/5 mL oral solution 5 mL by Per G Tube route every four (4) hours as needed for Pain. Pain scale 7-10    Historical Provider  
mupirocin (BACTROBAN) 2 % ointment Apply  to affected area every eight (8) hours as needed (\"Dry skin\"). Historical Provider  
ondansetron (ZOFRAN ODT) 4 mg disintegrating tablet Take 4 mg by mouth every six (6) hours as needed for Nausea. Historical Provider  
sodium hypochlorite (DAKIN'S SOLUTION) external solution Apply  to affected area every eight (8) hours as needed (\"Wound care\"). Historical Provider REVIEW OF SYSTEMS:    
 
 
 
Total of 12 systems reviewed as follows:   
   POSITIVE= underlined text  Negative = text not underlined General:  fever, chills, sweats, generalized weakness, weight loss/gain,  
   loss of appetite Eyes:    blurred vision, eye pain, loss of vision, double vision ENT:    rhinorrhea, pharyngitis, Dysphagia (chronic)- s/p PEG for dysmotility syndrome Respiratory:   cough, sputum production, SOB, BANKS, wheezing, pleuritic pain  
Cardiology:   chest pain, palpitations, orthopnea, PND, edema, syncope Gastrointestinal:  abdominal pain , N/V, Diarrhea (last week), dysphagia, constipation, bleeding Genitourinary:  frequency, urgency, dysuria, hematuria, incontinence Muskuloskeletal :  arthralgia, myalgia, back pain Hematology:  easy bruising, nose or gum bleeding, lymphadenopathy Dermatological: rash, ulceration, pruritis, color change / jaundice Endocrine:   hot flashes or polydipsia Neurological:  headache, dizziness, confusion, focal weakness, paresthesia, Speech difficulties, memory loss, gait difficulty Psychological: Feelings of anxiety, depression, agitation Objective: VITALS:   
Visit Vitals  /56 (BP 1 Location: Right arm, BP Patient Position: At rest)  Pulse 67  Temp (!) 100.7 °F (38.2 °C)  Resp 14  
 Ht 5' 10\" (1.778 m)  Wt 74.5 kg (164 lb 3.9 oz)  SpO2 96%  BMI 23.57 kg/m2 PHYSICAL EXAM: 
 
General:    Alert, cooperative, no distress, appears stated age. HEENT: Atraumatic, anicteric sclerae, pink conjunctivae No oral ulcers, mucosa moist, throat clear, dentition fair Neck:  Supple, symmetrical,  thyroid: non tender Lungs:   Clear to auscultation bilaterally. No Wheezing or Rhonchi. No rales. Chest wall:  No tenderness  No Accessory muscle use. Heart:   Regular  rhythm,  No  murmur   No edema Abdomen:   Soft, non-tender. Not distended. Bowel sounds normal, PEG tube noted +, no tenderness, no discharge, no redness noted around the site of PEG Extremities: No cyanosis. No clubbing,  L Arm AVFistula +, not warm, no discharge noted, thrill + Skin turgor normal, Capillary refill normal, Radial dial pulse 2+, R iliac Ar pseudoaneurysm site unremarkable + Skin:     Not pale.   Not Jaundiced  No rashes, Bilateral buttock pressure ulcer injury noted, no obvious discharge noted + Psych:  Good insight. Not depressed. Not anxious or agitated. Neurologic: EOMs intact. No facial asymmetry. No aphasia or slurred speech. Symmetrical strength, Sensation grossly intact. Alert and oriented X 4.  
 
_______________________________________________________________________ Care Plan discussed with: 
  Comments Patient x Family RN x Care Manager Consultant:     
_______________________________________________________________________ Expected  Disposition:  
Home with Family HH/PT/OT/RN   
SNF/LTC x  
SOUMY[facility] ?  
________________________________________________________________________ TOTAL TIME:  61 Minutes Critical Care Provided     Minutes non procedure based Comments  
 x Reviewed previous records  
>50% of visit spent in counseling and coordination of care x Discussion with patient and questions answered 
  
 
________________________________________________________________________ Signed: Gi Whitfield MD 
 
Procedures: see electronic medical records for all procedures/Xrays and details which were not copied into this note but were reviewed prior to creation of Plan. LAB DATA REVIEWED:   
Recent Results (from the past 24 hour(s)) LACTIC ACID Collection Time: 07/17/18 11:46 AM  
Result Value Ref Range Lactic acid 1.6 0.4 - 2.0 MMOL/L  
METABOLIC PANEL, COMPREHENSIVE Collection Time: 07/17/18 11:46 AM  
Result Value Ref Range Sodium 129 (L) 136 - 145 mmol/L Potassium 4.3 3.5 - 5.1 mmol/L Chloride 90 (L) 97 - 108 mmol/L  
 CO2 33 (H) 21 - 32 mmol/L Anion gap 6 5 - 15 mmol/L Glucose 216 (H) 65 - 100 mg/dL BUN 40 (H) 6 - 20 MG/DL Creatinine 4.91 (H) 0.70 - 1.30 MG/DL  
 BUN/Creatinine ratio 8 (L) 12 - 20 GFR est AA 14 (L) >60 ml/min/1.73m2 GFR est non-AA 12 (L) >60 ml/min/1.73m2 Calcium 9.3 8.5 - 10.1 MG/DL  Bilirubin, total 0.5 0.2 - 1.0 MG/DL  
 ALT (SGPT) 26 12 - 78 U/L  
 AST (SGOT) 40 (H) 15 - 37 U/L Alk. phosphatase 275 (H) 45 - 117 U/L Protein, total 8.2 6.4 - 8.2 g/dL Albumin 1.4 (L) 3.5 - 5.0 g/dL Globulin 6.8 (H) 2.0 - 4.0 g/dL A-G Ratio 0.2 (L) 1.1 - 2.2    
CBC WITH AUTOMATED DIFF Collection Time: 07/17/18 11:46 AM  
Result Value Ref Range WBC 16.1 (H) 4.1 - 11.1 K/uL  
 RBC 2.59 (L) 4.10 - 5.70 M/uL HGB 7.0 (L) 12.1 - 17.0 g/dL HCT 22.8 (L) 36.6 - 50.3 % MCV 88.0 80.0 - 99.0 FL  
 MCH 27.0 26.0 - 34.0 PG  
 MCHC 30.7 30.0 - 36.5 g/dL  
 RDW 18.0 (H) 11.5 - 14.5 % PLATELET 856 148 - 261 K/uL MPV 11.1 8.9 - 12.9 FL  
 NRBC 0.0 0  WBC ABSOLUTE NRBC 0.00 0.00 - 0.01 K/uL NEUTROPHILS 81 (H) 32 - 75 % LYMPHOCYTES 9 (L) 12 - 49 % MONOCYTES 7 5 - 13 % EOSINOPHILS 1 0 - 7 % BASOPHILS 0 0 - 1 % IMMATURE GRANULOCYTES 1 (H) 0.0 - 0.5 % ABS. NEUTROPHILS 13.0 (H) 1.8 - 8.0 K/UL  
 ABS. LYMPHOCYTES 1.5 0.8 - 3.5 K/UL  
 ABS. MONOCYTES 1.2 (H) 0.0 - 1.0 K/UL  
 ABS. EOSINOPHILS 0.2 0.0 - 0.4 K/UL  
 ABS. BASOPHILS 0.1 0.0 - 0.1 K/UL  
 ABS. IMM. GRANS. 0.2 (H) 0.00 - 0.04 K/UL  
 DF AUTOMATED    
TROPONIN I Collection Time: 07/17/18 11:46 AM  
Result Value Ref Range Troponin-I, Qt. <0.05 <0.05 ng/mL EKG, 12 LEAD, INITIAL Collection Time: 07/17/18 12:04 PM  
Result Value Ref Range Ventricular Rate 70 BPM  
 Atrial Rate 77 BPM  
 QRS Duration 94 ms Q-T Interval 396 ms QTC Calculation (Bezet) 427 ms Calculated P Axis 67 degrees Calculated R Axis 66 degrees Calculated T Axis 134 degrees Diagnosis Sinus rhythm with 2nd degree AV block (Mobitz I) Left ventricular hypertrophy with repolarization abnormality Confirmed by Scarlet Vela MD, Arnaud Patient ([id number]) on 7/17/2018 2:50:14 PM 
  
OCCULT BLOOD, STOOL Collection Time: 07/17/18 12:08 PM  
Result Value Ref Range Occult blood, stool NEGATIVE  NEG    
URINALYSIS W/ RFLX MICROSCOPIC Collection Time: 07/17/18  2:54 PM  
Result Value Ref Range Color RED Appearance CLOUDY (A) CLEAR Specific gravity 1.024 1.003 - 1.030    
 pH (UA) 7.0 5.0 - 8.0 Protein 300 (A) NEG mg/dL Glucose 100 (A) NEG mg/dL Ketone NEGATIVE  NEG mg/dL Bilirubin SMALL (A) NEG Blood MODERATE (A) NEG Urobilinogen 0.2 0.2 - 1.0 EU/dL Nitrites NEGATIVE  NEG Leukocyte Esterase SMALL (A) NEG    
 WBC >100 (H) 0 - 4 /hpf  
 RBC 10-20 0 - 5 /hpf Epithelial cells FEW FEW /lpf Bacteria 1+ (A) NEG /hpf Hyaline cast 5-10 0 - 5 /lpf  Bilirubin UA, confirm NEGATIVE  NEG

## 2018-07-17 NOTE — ED PROVIDER NOTES
EMERGENCY DEPARTMENT HISTORY AND PHYSICAL EXAM      Date: 7/17/2018  Patient Name: Allie Mortensen    History of Presenting Illness     Chief Complaint   Patient presents with    Abnormal Lab Results     Elevated WBC       History Provided By: Patient and [facility]    HPI: Allie Mortensen, 70 y.o. male with PMHx significant for HTN, multiple sclerosis, GERD, PVD, CAD, HLD, DM, CKD presents via EMS from [address] [place] [address] to the ED regarding elevated WBC at 15.2 and temperature of 100.3 degrees this morning. Pt is currently transitioning at [address] [place] [address] for aortic pseudoaneurysm and large sacral decubitus ulcer. Per paperwork provided by [address] [place] [address], pt was referred to the ED for admission to acute care with general surgery consult regarding possible debridement of the wound. The wound has been complicated by being soiled with stool, prompting discussion of placing a colostomy. While in the ED pt solely complains of moderate low back pain surrounding his decubitus ulcer, which he estimates he has had x 6 months. He is currently on dialysis and notes he makes very little urine, but denies any dysuria or hematuria. Pt was last dialyzed yesterday. He also notes chronic BLE weakness and states he has been wheelchair and bed bound. Pt denies any recent issues with the peg tube. He specifically denies any abdominal pain, chest pain, SOB, cough, choking, headache, sore throat. There are no other complaints, changes, or physical findings at this time.     PCP: Kait Virgen MD    Current Facility-Administered Medications   Medication Dose Route Frequency Provider Last Rate Last Dose    0.9% sodium chloride infusion 250 mL  250 mL IntraVENous PRN Ross Lew MD        heparin (porcine) injection 5,000 Units  5,000 Units SubCUTAneous Q12H Ross Lew MD   5,000 Units at 07/24/18 9353    balsam peru-castor oil (VENELEX)  mg/gram ointment   Topical BID Marla Childress MD        sodium hypochlorite (QUARTER STRENGTH DAKIN'S) 0.125% irrigation (bottle)   Topical DAILY Mortimer Diesel, MD   Stopped at 07/23/18 0900    collagenase (SANTYL) 250 unit/gram ointment   Topical DAILY Mortimer Diesel, MD        epoetin niya (EPOGEN;PROCRIT) injection 10,000 Units  10,000 Units SubCUTAneous Q MON, WED & Anupam Vaughn MD   10,000 Units at 07/23/18 1950    0.9% sodium chloride infusion 250 mL  250 mL IntraVENous PRN Estela Hayes MD        cefepime (MAXIPIME) 1 g in 0.9% sodium chloride (MBP/ADV) 50 mL  1 g IntraVENous Q24H Nathalie Gomez  mL/hr at 07/23/18 2219 1 g at 07/23/18 2219    acetaminophen (TYLENOL) solution 650 mg  650 mg Per G Tube Q3H PRN Clarita Sands MD   650 mg at 07/19/18 2038    aspirin chewable tablet 81 mg  81 mg Per G Tube DAILY Clarita Sands MD   81 mg at 07/23/18 0854    escitalopram oxalate (LEXAPRO) 5 mg/5 mL oral solution soln 10 mg  10 mg Per G Tube DAILY Clarita Sands MD   10 mg at 07/23/18 0854    insulin lispro (HUMALOG) injection   SubCUTAneous Antonina Del Angel MD   Stopped at 07/17/18 1930    glucose chewable tablet 16 g  4 Tab Oral PRN Clarita Sands MD        dextrose (D50W) injection syrg 12.5-25 g  12.5-25 g IntraVENous PRN Clarita Sands MD        glucagon (GLUCAGEN) injection 1 mg  1 mg IntraMUSCular PRN Clarita Sands MD        morphine 10 mg/5 mL oral solution 10 mg  5 mL Per G Tube Q4H PRN Clarita Sands MD   10 mg at 07/23/18 2209    oxyCODONE-acetaminophen (PERCOCET) 5-325 mg per tablet 1 Tab  1 Tab Per G Tube Q4H PRN Clarita Sands MD   1 Tab at 07/22/18 1409    sevelamer carbonate (RENVELA) oral powder 2,400 mg  2.4 g Per G Tube TID WITH MEALS Clarita Sands MD   2,400 mg at 07/23/18 1950    pravastatin (PRAVACHOL) tablet 10 mg  10 mg Per G Tube QHS Clarita Sands MD   10 mg at 07/23/18 9562    sodium chloride (NS) flush 5-10 mL  5-10 mL IntraVENous Antonina Del Angel MD   10 mL at 07/24/18 5490    sodium chloride (NS) flush 5-10 mL  5-10 mL IntraVENous PRN Bart Banks MD   10 mL at 07/17/18 2039    naloxone (NARCAN) injection 0.4 mg  0.4 mg IntraVENous PRN Bart Banks MD        ondansetron [facility]) injection 4 mg  4 mg IntraVENous Q4H PRN Bart Banks MD   4 mg at 07/20/18 1627       Past History     Past Medical History:  Past Medical History:   Diagnosis Date    Anemia associated with chronic renal failure     Autoimmune disease ([address]     hx ms    CAD (coronary artery disease)     Chronic kidney disease     catheter removed and no dialysis at this time    Chronic kidney disease     left arm fistula dialysis MWF    Diabetes [facility]     type II    Elevated troponin 6/29/2018    GERD (gastroesophageal reflux disease)     Hypertension     Ill-defined condition     prostate cancer    Multiple sclerosis ([address]     diagnosed '01    Other ill-defined conditions(799.89)     anemia    Other ill-defined conditions(799.89)     elevated cholesterol    Other ill-defined conditions(799.89)     hx septic right foot    Peripheral vascular disease ([address]     Secondary hyperparathyroidism of renal origin ([address]     Thyroid disease        Past Surgical History:  Past Surgical History:   Procedure Laterality Date    COLONOSCOPY N/A 12/6/2016    COL[facility] by Jakie Severe., [facility] at [facility] ENDOSCOPY    COLONOSCOPY,DIAGNOSTIC  12/6/2016         CORONARY STENT SINGLE W/PTCA  2/17/2011         HX APPENDECTOMY      HX CATARACT REMOVAL  10/18/10    bilateral    HX CHOLECYSTECTOMY      HX GI      ileostomy due to infection    HX HEART CATHETERIZATION      HX HEENT      hx post photocoagulation eye 2009    HX OTHER SURGICAL      right partial foot amputation    HX OTHER SURGICAL      cardiac cath    HX OTHER SURGICAL      prostate removed    PLACE PERCUT GASTROSTOMY TUBE  7/6/2018         [place] COLONOSCOPY W/BIOPSY SINGLE/MULTIPLE  5/10/2010         UPPER GI ENDOSCOPY,BIOPSY  4/17/2018         UPPER GI ENDOSCOPY,REMV TUMOR,SNARE  6/13/2018         VASCULAR SURGERY PROCEDURE UNLIST      katelyn. leg bypasses       Family History:  Family History   Problem Relation Age of Onset    Diabetes Mother        Social History:  Social History   Substance Use Topics    Smoking status: Former Smoker     Years: 1.00     Quit date: 4/12/2003    Smokeless tobacco: Never Used      Comment: quit 5 years ago    Alcohol use No      Comment: Stopped drinking 10 years ago       Allergies: Allergies   Allergen Reactions    Sensipar [Cinacalcet] Other (comments)     Cinacalcet (Sensipar) has been added as an \"allergy\" / intolerance with a comment to assure providers at discharge and post discharge are aware of Dr. Sandhya Chou recommendation to avoid Sensipar. Patient takes Thersia Kubas as an outpatient; Thus, Anish Maykel has been discontinued per Nephrology (cannot give with recent Parsabiv b/c risk of low Calcium). Review of Systems   Review of Systems   HENT: Negative for sore throat. Respiratory: Negative for cough, choking and shortness of breath. Cardiovascular: Negative for chest pain. Gastrointestinal: Negative for abdominal pain. Genitourinary: Negative for dysuria and hematuria. Musculoskeletal: Positive for back pain. Skin: Positive for wound. Neurological: Positive for weakness. Negative for headaches. All other systems reviewed and are negative. Physical Exam   Physical Exam   Vital signs and nursing notes reviewed    CONSTITUTIONAL: Alert, in mild distress; well-developed; well-nourished. Skin is warm to touch, 100.7 degree rectal temperature  HEAD:  Normocephalic, atraumatic  EYES: PERRL; EOM's intact. ENTM: Nose: no rhinorrhea; Throat: no erythema or exudate, mucous membranes moist, thrush like material on the tongue  Neck:  Supple. trachea is midline. RESP: Chest clear, equal breath sounds. - W/R/R  CV: S1 and S2 WNL; No murmurs, gallops or rubs.  2+ radial and DP pulses bilaterally. Vitals at time of examination include blood pressure 119/51, pulse 73, and oxygen saturation 96% on RA  GI: generalized tenderness, normal bowel sounds, abdomen soft. No masses or organomegaly. Peg tube to the upper abdomen. : No costo-vertebral angle tenderness. BACK:  Non-tender, normal appearance, lidocaine patches to lower back. UPPER EXT:  Normal inspection. no joint or soft tissue swelling, HD access LUE  LOWER EXT: No edema, no calf tenderness. Bandage covering superficial heel ulcers of the LLE. LLE is warm and well perfused. RLE above ankle joint amputation, no break down of the skin anteriorly, there is a small callous posteriorly. NEURO: Alert and oriented x3, 5/5 strength and light touch sensation intact in bilateral upper and lower extremities. SKIN: large stage 3 sacral decubitus ulcer, no purulent drainage visible   PSYCH: Normal mood, normal affect    Diagnostic Study Results     Labs -     Recent Results (from the past 12 hour(s))   GLUCOSE, POC    Collection Time: 07/24/18 12:09 AM   Result Value Ref Range    Glucose (POC) 122 (H) 65 - 100 mg/dL    Performed by Tomma Guillermo    GLUCOSE, POC    Collection Time: 07/24/18  6:36 AM   Result Value Ref Range    Glucose (POC) 109 (H) 65 - 100 mg/dL    Performed by Tomma Guillermo        Radiologic Studies -   CXR Results  (Last 48 hours)    None            Medical Decision Making   I am the first provider for this patient. I reviewed the vital signs, available nursing notes, past medical history, past surgical history, family history and social history. Vital Signs-Reviewed the patient's vital signs.   Patient Vitals for the past 12 hrs:   Temp Pulse Resp BP SpO2   07/24/18 0322 99 °F (37.2 °C) 82 18 142/54 98 %   07/23/18 2327 98.8 °F (37.1 °C) 83 18 144/64 97 %   07/23/18 1910 99.3 °F (37.4 °C) (!) 106 18 160/65 -   07/23/18 1901 99.3 °F (37.4 °C) (!) 106 18 160/65 -   07/23/18 1900 99.1 °F (37.3 °C) 95 18 151/64 -   07/23/18 1850 99.1 °F (37.3 °C) 97 18 151/62 -       Pulse Oximetry Analysis - 97% on RA    Cardiac Monitor:   Rate: 74 bpm  Rhythm: Sinus Rhythm      Records Reviewed: Nursing Notes, Old Medical Records, Previous electrocardiograms, Ambulance Run Sheet, Previous Radiology Studies and Previous Laboratory Studies    Provider Notes (Medical Decision Making):   Pt is a 71 y/o male here from transitioning from rehab with concern for non-healing sacral decubitus ulcer complicated by getting soiled with stool, persisting white count, and low grade fever. Pt has no complaint today other than low back pain. Lactic reassuring. ESRD labs at baseline, not requiring emergent dialysis. Infectious sources include UTI, possibly sacral decubitus ulcer. New today is a Mobitz type I AV block. Broadly covered with abx initially, including MRSA coverage. Plan for admission. ED Course:   Initial assessment performed. The patients presenting problems have been discussed, and they are in agreement with the care plan formulated and outlined with them. I have encouraged them to ask questions as they arise throughout their visit. Procedure Note - Rectal Exam:   11:54 AM  Performed by: Kylee Eason MD  Chaperoned by: Aixa Garzon RN  Rectal exam performed. Brown stool was collected. Stool was collected and sent to the lab for Hemoccult testing. No gross blood. The procedure took 1-15 minutes, and pt tolerated well. 12:12 PM  EKG at 1204, sinus rhythm at 70 with normal axis, a Mobitz type I second degree AV block with similar biphasic and T wave inversions in V2-V5 previously seen on 6/28/2018. AV block is new. Written by Bia Santos ED scribe, as dictated by Kylee Eason MD    2:26 PM  Nurse stopped fluids due to lactic being reassuring and not wanting to volume overload pt in the setting of his ESRD. Nurse getting black scan before straight cath.    Written by Bia Santos ED scribe, as dictated by Soni Del Real Carolina Avalos MD    2:28 PM - I suspect that this patient has an active infection. 2:28 PM - The patient met criteria for severe sepsis at this time. PROVIDER SEPSIS PHYSICAL EXAM EVAL  Vital signs reviewed (see nursing documentation for further details):  Vitals:    18 1901 18 1910 18 2327 18 0322   BP: 160/65 160/65 144/64 142/54   Pulse: (!) 106 (!) 106 83 82   Resp:    Temp: 99.3 °F (37.4 °C) 99.3 °F (37.4 °C) 98.8 °F (37.1 °C) 99 °F (37.2 °C)   TempSrc: Axillary      SpO2:   97% 98%       Cardiac exam:Regular Rate    Pulmonary exam:Normal    Peripheral pulses:Normal    Capillary refill:Normal    Skin exam:pink    Exam performed Eligio Lundberg MD    CONSULT NOTE:   2:32 PM  Jacob Snyder MD spoke with Little Nunn MD   Specialty: Hospitalist  Discussed pt's hx, disposition, and available diagnostic and imaging results. Reviewed care plans. Consultant will evaluate pt for admission. Written by Shea Apgar, ED Scribe, as dictated by Jacob Snyder MD.    CRITICAL CARE NOTE :    2:28 PM    IMPENDING DETERIORATION -Metabolic  ASSOCIATED RISK FACTORS - Dysrhythmia and Metabolic changes  MANAGEMENT- Bedside Assessment and Supervision of Care  INTERPRETATION -  Xrays, ECG, Blood Pressure and Cardiac Output Measures   INTERVENTIONS - hemodynamic mngmt and antibiotic management  CASE REVIEW - Hospitalist, Nursing and Family  TREATMENT RESPONSE -Stable  PERFORMED BY - Self    NOTES   :    I have spent 36 minutes of critical care time involved in lab review, consultations with specialist, family decision- making, bedside attention and documentation. During this entire length of time I was immediately available to the patient . Jacob Snyder MD    Disposition:  ADMIT NOTE:  2:30 PM  The patient is being admitted to the hospital by Little Nunn MD.  The results of their tests and reasons for their admission have been discussed with the patient and/or available family.   They convey agreement and understanding for the need to be admitted and for their admission diagnosis. PLAN:  1. Admit to hospitalist     Diagnosis     Clinical Impression:   1. SIRS (systemic inflammatory response syndrome) (HCC)    2. Acute cystitis without hematuria    3. Skin ulcer of sacrum with fat layer exposed ([address]    4. Mobitz type I Wenckebach atrioventricular block        Attestations: This note is prepared by Mario White, acting as Scribe for Carlos Morrissey MD.    .tlm: The scribe's documentation has been prepared under my direction and personally reviewed by me in its entirety. I confirm that the note above accurately reflects all work, treatment, procedures, and medical decision making performed by me. This note will not be viewable in [address]

## 2018-07-17 NOTE — ED NOTES
TRANSFER - IN REPORT:    Verbal report received from Roel Soria on London Mora  being received from [address] [place] [address] for change in patient condition(Elevated WBC and temp.)      Report consisted of patients Situation, Background, Assessment and   Recommendations(SBAR). Information from the following report(s) SBAR was reviewed with the receiving nurse. Opportunity for questions and clarification was provided. Assessment completed upon patients arrival to unit and care assumed.

## 2018-07-17 NOTE — ED NOTES
Pt's BG is 186 at this time. Turned pt and cleaned where sacral wound is draining. Rechecked rectal temp. Temp is up instead of down. Someone provided pt with blanket. Provided sheet and requested pt to leave blanket off until fever comes back down.

## 2018-07-17 NOTE — PROGRESS NOTES
Pharmacy Automatic Renal Dosing Protocol - Antimicrobials    Indication for Antimicrobials: sepsis of unknown origin     Current Regimen of Each Antimicrobial:  Levofloxacin 750mg IV, then 500mg IV every 48hr (Start Date ; Day # 1)  Cefepime 1g IV every 24hr (start date: , Day #1)  Vancomycin 1750mg IV loading dose, then 750mg as needed post dialysis (start date: , Day #1)    Previous Antimicrobial Therapy:    Goal Level: VANCOMYCIN TROUGH GOAL RANGE    Vancomycin Trough: 15 - 20 mcg/mL    Date Dose & Interval Measured (mcg/mL) Extrapolated (mcg/mL)                       Significant Cultures:   : blood x 2 - pending    Radiology / Imaging results: (X-ray, CT scan or MRI):   : chest xray - no acute process    Paralysis, amputations, malnutrition: none    Labs:  Recent Labs      18   1146  18   0520  18   1300   CREA   --   5.47*  6.36*   BUN   --   43*  44*   WBC  16.1*  15.2*  16.4*     Temp (24hrs), Av.8 °F (37.7 °C), Min:98.9 °F (37.2 °C), Max:100.7 °F (38.2 °C)    Creatinine Clearance (mL/min) or Dialysis: HD    Impression/Plan:   · Verified cefepime 1g IV every 24hr based on indication and Hemodialysis patient. · Verified levofloxacin 750mg IV x 1 dose, then 500mg IV every 48hr based on indication and hemodialysis patient. · Verified vancomycin 1750mg IV loading dose, followed by 750mg as needed to be given after dialysis on dialysis days. · Antimicrobial stop date to be determined. Pharmacy will follow daily and adjust medications as appropriate for renal function and/or serum levels. Thank you,  Coit SHANNAN Neri    Recommended duration of therapy  [email or web address]/[place]/[facility]/Pharmacy/Clinical%20Companion/[email or web address]. docx    Renal Dosing  [email or web address]/[place]/[facility]/Pharmacy/Clinical%20Companion/Renal%[id number]. pdf

## 2018-07-18 NOTE — WOUND CARE
Wound Care Nurse consult from admitting hospitalist stating \" Bilateral buttock Pressure injury ulcers-? Infection\".  Pt is a 71 y/o AAM admitted for leukocytosis and has PMHX:   Past Medical History:   Diagnosis Date    Anemia associated with chronic renal failure     Autoimmune disease (HCC)     hx ms    CAD (coronary artery disease)     Chronic kidney disease     catheter removed and no dialysis at this time    Chronic kidney disease     left arm fistula dialysis MWF    Diabetes ([facility] [address]     type II    Elevated troponin 6/29/2018    GERD (gastroesophageal reflux disease)     Hypertension     Ill-defined condition     prostate cancer    Multiple sclerosis ([address]     diagnosed '01    Other ill-defined conditions(799.89)     anemia    Other ill-defined conditions(799.89)     elevated cholesterol    Other ill-defined conditions(799.89)     hx septic right foot    Peripheral vascular disease ([address]     Secondary hyperparathyroidism of renal origin ([address]     Thyroid disease      Past Surgical History:   Procedure Laterality Date    COLONOSCOPY N/A 12/6/2016    COL[facility] END[facility]COPY    COLONOSCOPY,DIAGNOSTIC  12/6/2016         CORONARY STENT SINGLE W/PTCA  2/17/2011         HX APPENDECTOMY      HX CATARACT REMOVAL  10/18/10    bilateral    HX CHOLECYSTECTOMY      HX GI      ileostomy due to infection    HX HEART CATHETERIZATION      HX HEENT      hx post photocoagulation eye 2009    HX OTHER SURGICAL      right partial foot amputation    HX OTHER SURGICAL      cardiac cath    HX OTHER SURGICAL      prostate removed    PLACE PERCUT GASTROSTOMY TUBE  7/6/2018         [place] COLONOSCOPY W/BIOPSY SINGLE/MULTIPLE  5/10/2010         UPPER GI ENDOSCOPY,BIOPSY  4/17/2018         UPPER GI ENDOSCOPY,REMV TUMOR,SNARE  6/13/2018         VASCULAR SURGERY PROCEDURE UNLIST      katelyn. leg bypasses     Patient was being cleaned up from a large, soft BM when assessment done. POA wounds:  Sacral Large Unstageable PI  Left heel DTPI  Right lateral LE DTPI    Sacrum does not appear outwardly infected, no drainage, no odor, no extreme vicky-wound erythema: unable to take picture due to WiFi difficulties. Dr Donna Hooper called and recommended patient get General Surgery consult for sacral/buttocks wound. WOUND POA CONDITIONS        Wound Heel Left (Active)   DRESSING STATUS Removed 7/18/2018  4:03 PM   DRESSING TYPE Foam 7/18/2018  4:03 PM   Pressure Injury DTI 7/18/2018  4:03 PM   Wound Length (cm) 5 cm 7/18/2018  4:03 PM   Wound Width (cm) 3 cm 7/18/2018  4:03 PM   Wound Surface area (cm^2) 15 cm^2 7/18/2018  4:03 PM   Condition of Base Purple 7/18/2018  4:03 PM   Condition of Edges Open 7/18/2018  4:03 PM   Tissue Type Maroon/purple 7/18/2018  4:03 PM   Tissue Type Percent Maroon/Purple 100 % 7/18/2018  4:03 PM   Drainage Amount  None 7/18/2018  4:03 PM   Wound Odor None 7/18/2018  4:03 PM   Periwound Skin Condition Intact 7/18/2018  4:03 PM   Dressing Type Applied Open to air 7/18/2018  4:03 PM   Procedure Tolerated Well 7/18/2018  4:03 PM   Number of days:1           Wound Sacral/coccyx Posterior (Active)   DRESSING STATUS Removed 7/18/2018  4:03 PM   DRESSING TYPE Foam 7/18/2018  4:03 PM   Non-Pressure Injury Full thickness (subcut/muscle) 7/17/2018 10:11 PM   Pressure Injury Unstageable 7/18/2018  4:03 PM   Wound Length (cm) 20 cm 7/18/2018  4:03 PM   Wound Width (cm) 12 cm 7/18/2018  4:03 PM   Wound Surface area (cm^2) 240 cm^2 7/18/2018  4:03 PM   Condition of Base Eschar;Slough;Pink 7/18/2018  4:03 PM   Condition of Edges Rolled/curled 7/18/2018  4:03 PM   Tissue Type Black; Yellow;Pink 7/18/2018  4:03 PM   Tissue Type Percent Black 95 % 7/18/2018  4:03 PM   Tissue Type Percent Pink 3 7/18/2018  4:03 PM   Tissue Type Percent Yellow 2 7/18/2018  4:03 PM   Drainage Amount  Small  7/18/2018  4:03 PM   Wound Odor None 7/18/2018  4:03 PM   Periwound Skin Condition Intact 7/18/2018  4:03 PM   Cleansing and Cleansing Agents  Normal saline 7/18/2018  4:03 PM   Dressing Type Applied Wet to dry 7/18/2018  4:03 PM   Procedure Tolerated Well 7/18/2018  4:03 PM   Number of days:1       Wound Leg Lower Right;Lateral (Active)   DRESSING TYPE Open to air 7/18/2018  4:03 PM   Pressure Injury DTI 7/18/2018  4:03 PM   Wound Length (cm) 5 cm 7/18/2018  4:03 PM   Wound Width (cm) 1 cm 7/18/2018  4:03 PM   Wound Surface area (cm^2) 5 cm^2 7/18/2018  4:03 PM   Condition of Base Purple 7/18/2018  4:03 PM   Condition of Edges Open 7/18/2018  4:03 PM   Tissue Type Maroon/purple 7/18/2018  4:03 PM   Tissue Type Percent Maroon/Purple 100 % 7/18/2018  4:03 PM   Drainage Amount  None 7/18/2018  4:03 PM   Wound Odor None 7/18/2018  4:03 PM   Periwound Skin Condition Intact 7/18/2018  4:03 PM   Dressing Type Applied Open to air 7/18/2018  4:03 PM   Procedure Tolerated Well 7/18/2018  4:03 PM   Number of days:0             Recommend:    Specialty bed: Envision mattress on a [employer] bed frame wo# [id number]    Left heel and Rt.lateral LE DTPI's: apply Venelex BID. Float left heel in Prevalon boot. Sacral/buttocks/ischial Unstageable PI: cleanse with 1/4 strength Dakins solution. Wipe entire wound bed clean to remove debris from wound. Apply Santyl ointment over entire wound and then Dakins moist kerlix packing, followed by x3 Exu-dry pads secured with tape.     Carrie Lindo RN, [employer]

## 2018-07-18 NOTE — ROUTINE PROCESS
Bedside and Verbal shift change report given to Luz Marina Mcmahon RN (oncoming nurse) by Marissa Grider RN (offgoing nurse). Report included the following information SBAR, Kardex, Intake/Output, MAR and Recent Results.

## 2018-07-18 NOTE — PROGRESS NOTES
0900: Dialysis nurse at the bedside to perform dialysis. 1000: Tube feeds placed on hold for abdominal CT scan later today. Patient to be picked up around 1300.  1400: Patient not picked up for CT yet. Called CT and was informed that CT was to be performed tomorrow now. Patient's tube feed resumed.

## 2018-07-18 NOTE — PROCEDURES
[facility]  [phone]    Vitals   Pre   Post   Assessment   Pre   Post     Temp  Temp: 98.5 °F (36.9 °C) (07/18/18 0950)  98.4 LOC  A&OX4 A&OX4   HR   Pulse (Heart Rate): 74 (07/18/18 0950) 76 Lungs   clear  clear   B/P   BP: 133/61 (07/18/18 0950) 149/61 Cardiac   HRR  HRR   Resp   Resp Rate: 18 (07/18/18 0950) 18 Skin   intact intact   Pain level  Pain Intensity 1: 5 (07/17/18 2120) 0 Edema    none   none   Orders:    Duration:   Start:    1559 End:    1320 Total:   3.5 hours   Dialyzer:   Dialyzer/Set Up Inspection: James Hun (07/18/18 0950)   K Bath:   Dialysate K (mEq/L): 3 (07/18/18 0950)   Ca Bath:   Dialysate CA (mEq/L): 2.5 (07/18/18 0950)   Na/Bicarb:   Dialysate NA (mEq/L): 140 (07/18/18 0950)   Target Fluid Removal:   Goal/Amount of Fluid to Remove (mL): 500 mL (07/18/18 0950)   Access     Type & Location:   DENI AVF, 15 gauge needles used. Post tx: Sites held 10 minutes, bleeding stopped. Labs     Obtained/Reviewed   Critical Results Called   Date when labs were drawn-  Hgb-    HGB   Date Value Ref Range Status   07/18/2018 6.4 (L) 12.1 - 17.0 g/dL Final     K-    Potassium   Date Value Ref Range Status   07/18/2018 4.4 3.5 - 5.1 mmol/L Final     Ca-   Calcium   Date Value Ref Range Status   07/18/2018 9.0 8.5 - 10.1 MG/DL Final     Bun-   BUN   Date Value Ref Range Status   07/18/2018 44 (H) 6 - 20 MG/DL Final     Creat-   Creatinine   Date Value Ref Range Status   07/18/2018 5.03 (H) 0.70 - 1.30 MG/DL Final        Medications/ Blood Products Given     Name   Dose   Route and Time     none                Blood Volume Processed (BVP):    82.1 liters Net Fluid   Removed:  500 ml   Comments Patient tolerated tx well, no complaints during or after. Report given to Oncology nurse, Kamran Joe RN, using SBAR.   Time Out Done: yes, [address]  Primary Nurse Rpt Pre: Kamran Joe RN  Primary Nurse Rpt Post: Kamran Joe RN  Pt Education: Renal diet  Care Plan: HD with minimal volume removal  Tx Summary: 3.5 hours on 3 K 2.5 Ca removed 500 mkl  Admiting Diagnosis:  Pt's previous clinic-[facility]  Consent signed - Informed Consent Verified: Yes (07/18/18 0950)  Avril Consent - yes  Hepatitis Status- antigen negative 7-2-18, antibodies 18 7-2-18  Machine #- Machine Number: [id number] (07/18/18 0950)  Telemetry status-no  Pre-dialysis wt. -

## 2018-07-18 NOTE — PROGRESS NOTES
Initial Nutrition Assessment:    INTERVENTIONS/RECOMMENDATIONS:   · Continue current TF order: Nepro @ 45 mL/hr + 170 mL water flushes q 4 hours (1944 kcal, 87.5g protein, 1805 mL water). · Consider probiotics d/t diarrhea and recent antibiotic treatment    ASSESSMENT:   Chart reviewed, medically noted for dysphagia PEG dependent, T2DM, ESRD-HD, PAD, CAD, HTN, HLD and PMH shown below. Nutrition referral triggered due to enteral nutrition PTA. Pt was followed by nutrition team during recent admission. Current TF order of Nepro @ 45 mL/hr + 170 mL water flushes q 4 hours (1944 kcal, 87.5g protein, 1805 mL water) meets pt 100% and 97% of estimated kcal and protein needs respectively. Noted that pt is experiencing diarrhea which was thought to be from tube feeding. He was treated with antibiotics last admission, could provide probiotics. Past Medical History:   Diagnosis Date    Anemia associated with chronic renal failure     Autoimmune disease (HCC)     hx ms    CAD (coronary artery disease)     Chronic kidney disease     catheter removed and no dialysis at this time    Chronic kidney disease     left arm fistula dialysis MWF    Diabetes ([facility] [address]     type II    Elevated troponin 6/29/2018    GERD (gastroesophageal reflux disease)     Hypertension     Ill-defined condition     prostate cancer    Multiple sclerosis ([address]     diagnosed '01    Other ill-defined conditions(799.89)     anemia    Other ill-defined conditions(799.89)     elevated cholesterol    Other ill-defined conditions(799.89)     hx septic right foot    Peripheral vascular disease ([address]     Secondary hyperparathyroidism of renal origin ([facility] [address]     Thyroid disease        Diet Order: NPO (Nepro @ 45 ml/hr + 170 ml water flush q 4 hrs)  % Eaten:  No data found.     Pertinent Medications: [x]Reviewed: renvela  Pertinent Labs: [x]Reviewed:   Food Allergies: [x]NKFA  []Other   Last BM: 7/17  Edema:        []RUE   []LUE   []RLE   []LLE Pressure Injury:  B/L Buttock Pressure injury ulcer/wound infection     [] Stage I   [] Stage II   [] Stage III   [] Stage IV      Wt Readings from Last 30 Encounters:   07/17/18 74.5 kg (164 lb 3.9 oz)   07/09/18 74.5 kg (164 lb 3.9 oz)   06/13/18 68 kg (150 lb)   04/17/18 70.3 kg (155 lb)   04/12/18 75.3 kg (166 lb)   04/05/18 75.7 kg (166 lb 14.4 oz)   03/01/18 69.4 kg (153 lb)   02/28/18 75 kg (165 lb 4.8 oz)   02/08/18 69.6 kg (153 lb 8 oz)   02/06/18 70.7 kg (155 lb 12.8 oz)   12/05/17 72.1 kg (159 lb)   10/11/17 81.2 kg (179 lb)   03/29/17 88.7 kg (195 lb 9.6 oz)   12/06/16 90 kg (198 lb 5 oz)   10/31/16 91.9 kg (202 lb 9.6 oz)   03/25/16 92.1 kg (203 lb)   03/21/16 90.7 kg (200 lb)   07/30/15 85.7 kg (189 lb)   07/15/15 86.1 kg (189 lb 13.1 oz)   05/19/15 81.6 kg (180 lb)   04/21/15 81.6 kg (180 lb)   01/05/15 81.6 kg (180 lb)   01/05/15 83.2 kg (183 lb 7 oz)   10/24/13 90.7 kg (200 lb)   04/11/13 94.2 kg (207 lb 11.2 oz)   12/19/12 96.3 kg (212 lb 4.9 oz)   10/29/12 90.7 kg (200 lb)   10/02/12 94.9 kg (209 lb 3.2 oz)   02/17/11 88 kg (194 lb)   01/03/11 86.2 kg (190 lb)       Anthropometrics:   Height: 5' 10\" (177.8 cm) Weight: 74.5 kg (164 lb 3.9 oz)   IBW (%IBW):   ( ) UBW (%UBW):   (  %)   Last Weight Metrics:  Weight Loss Metrics 7/17/2018 7/9/2018 6/13/2018 4/17/2018 4/12/2018 4/5/2018 3/1/2018   Today's Wt 164 lb 3.9 oz 164 lb 3.9 oz 150 lb 155 lb 166 lb 166 lb 14.4 oz 153 lb   BMI 23.57 kg/m2 23.57 kg/m2 21.52 kg/m2 22.24 kg/m2 23.82 kg/m2 23.95 kg/m2 21.95 kg/m2       BMI: Body mass index is 23.57 kg/(m^2). This BMI is indicative of:   []Underweight    [x]Normal    []Overweight    [] Obesity   [] Extreme Obesity (BMI>40)     Estimated Nutrition Needs (Based on):   1800 Kcals/day (BMR: 1500 x 1.2) , 90 g (1.2 g/kg) Protein  Carbohydrate:  At Least 130 g/day  Fluids: 1800 mL/day (1ml/kcal) or per primary team    NUTRITION DIAGNOSES:   Problem:  Swallowing difficulty      Etiology: related to dysphagia     Signs/Symptoms: as evidenced by PEG dependent      NUTRITION INTERVENTIONS:    Enteral/Parenteral Nutrition: Other (Continue current TF order)                GOAL:   met >80% of ordered TF in 2-4 days    LEARNING NEEDS (Diet, Food/Nutrient-Drug Interaction):    [x] None Identified   [] Identified and Education Provided/Documented   [] Identified and Pt declined/was not appropriate     Cultureal, Anglican, OR Ethnic Dietary Needs:    [x] None Identified   [] Identified and Addressed     [x] Interdisciplinary Care Plan Reviewed/Documented    [x] Discharge Planning:  Continue current TF order or Nepro 240 mL bolus TID + 360 mL bolus daily (total of 4 bolus feedings) + 170 mL water flushes q 4 hours (70 mL flush before and 100 mL flush after each bolus + 2 additional water flushes); 1944 kcal, 87.5g protein, 1805 mL water      MONITORING /EVALUATION:      Food/Nutrient Intake Outcomes: Enteral/parenteral nutrition intake  Physical Signs/Symptoms Outcomes: Weight/weight change, Electrolyte and renal profile, Glucose profile, GI    NUTRITION RISK:    [x] High     to         [x] Moderate           []  Low  []  Minimal/Uncompromised    PT SEEN FOR:    []  MD Consult: []Calorie Count      []Diabetic Diet Education        []Diet Education     []Electrolyte Management     []General Nutrition Management and Supplements     []Management of Tube Feeding     []TPN Recommendations    [x]  RN Referral:  []MST score >=2     [x]Enteral/Parenteral Nutrition PTA     []Pregnant: Gestational DM or Multigestation     []Pressure Ulcer/Wound Care needs        []  Low BMI  []  LOS Referral       Briseyda Black RDN  Pager [phone]  Weekend Pager [phone]

## 2018-07-18 NOTE — PROGRESS NOTES
Discussed yesterday with Dr Kevin Howe at [address]  Febrile with persistent leukocytosis  He denies abdominal pain  abd exam benign: non-distended and non-tender  Enhancing collection on CT in R retroperitoneum may well be infected hematoma or abscess  Will ask IR to percutaneously aspirate and drain  He is in no condition for major transabdominal operation   Sacral decubitus and stool soiling should be addressed by general surgeons (may benefit from debridement and a laparoscopic diverting loop colostomy if plan is to cont to be aggressive)  Having been involved with Mr Avery's care for many years, I wonder if palliative care is not most appropriate at this point(?)

## 2018-07-18 NOTE — PROGRESS NOTES
Physical Therapy Screening:    [facility] triggered for physical therapy based on results obtained during the nursing admission assessment. The patients chart was reviewed and the patient is appropriate for a skilled therapy evaluation if there is a decline in functional mobility from baseline. Please order a consult for physical therapy if you are in agreement and would like an evaluation to be completed. Thank you.     Joesphine Lanes, PT

## 2018-07-18 NOTE — CDMP QUERY
Account Number: [de-identified]  MRN: [id number]  Patient: Rex Lloyd   Created: [id number]  Clinician Name: Taylor Quach RN, CCDS   Dr. Ryan Crump :  Patient is admitted for Sepsis Please clarify if this patient is (was) being treated/managed for:     => Degree of Unspecified protein-calorie malnutrition POA in the setting of PEG dependence, sacral decubitus, ESRD    => Other explanation of clinical findings  => Clinically Undetermined (no explanation for clinical findings)    The medical record reflects the following clinical findings, treatment, and risk factors. Risk Factors:  NPO for severe aspiration/dysphagia d/t esophageal dysmotility syndrome from MS?, hx of diarrhea, ESRD, PEG dependence, DTI to heel & sacral decub   Clinical Indicators:  swallowing difficulty r/t dysphagia, PEG dependence   Treatment: continued RD evaluation & tube feeding recommendations. Please clarify and document your clinical opinion in the progress notes and discharge summary including the definitive and/or presumptive diagnosis, (suspected or probable), related to the above clinical findings. Please include clinical findings supporting your diagnosis.   Thank Beth Quiñonez RN, CCDS

## 2018-07-18 NOTE — PROGRESS NOTES
Pt oriented to unit and room. Pt states 8/10 pain, will admin pain med once verified by pharmacy. Pt denies any n/v at this time. Call bell within reach.      Primary Nurse Ruthann Stiles RN and ERNESTO Gifford performed a dual skin assessment on this patient Impairment noted- see wound doc flow sheet  Delbert score is 12    R Shoulder area (poss mole)  Abd midline-PEG tube  L arm-fistula  R heel/lower back of leg callous   L outer heel-DTI/unstageable  L great toe nailbed-thick and blacken  Penis-pt family stated trauma from condom catheter, area swollen, thick, yellow/pink  Sacrum-pink/yellow/blacken/red unstagable    Wound care completed and wound care orders written

## 2018-07-18 NOTE — CDMP QUERY
Account Number: [de-identified]  MRN: [id number]  Patient: Surjit Gar   Created: [id number]  Clinician Name: Celina Dominguez RN, CCDS   Dr. Lu Tavarez :  Please clarify if this patient is (was) being treated/managed for:     => Encephalopathy POA in the setting of sepsis   => Other explanation of clinical findings  => Clinically Undetermined (no explanation for clinical findings)    The medical record reflects the following clinical findings, treatment, and risk factors. Risk Factors:  69 yo w/ low Na level 129 - 132, anemia, sepsis  Clinical Indicators:  confused, disoriented to situation,   Treatment: IVF bolus, IV abx, frequent nursing surveillance, total care. Please clarify and document your clinical opinion in the progress notes and discharge summary including the definitive and/or presumptive diagnosis, (suspected or probable), related to the above clinical findings. Please include clinical findings supporting your diagnosis.   Thank Milton Campo RN, CCDS

## 2018-07-18 NOTE — CDMP QUERY
Account Number: [de-identified]  MRN: [id number]  Patient: Rosalia Robles   Created: [id number]  Clinician Name: Kareem Noriega RN, CCDS   Dr. Rogerio Spann :  Please clarify the clinical significance of UA findings. Please clarify the following condition.      => Urinary tract infection  => no UTI   => Other explanation of clinical findings  => Clinically Undetermined (no explanation for clinical findings)    The medical record reflects the following clinical findings:    UA results 1+ Bacteria, WBC >100, Leukocyte esterase small. no urine culture sent. Please clarify and document your clinical opinion in the progress notes and discharge summary including the definitive and/or presumptive diagnosis, (suspected or probable), related to the above clinical findings. Please include clinical findings supporting your diagnosis.   Thank Marina Eaton RN, CCDS

## 2018-07-18 NOTE — PROGRESS NOTES
Hospitalist Progress Note NAME: Lafrances Goltz :  1950 MRN:  [id number] Assessment / Plan: 
Sepsis with Hypotension POA- WBC 16.1k, fever 100.7 ? source at this time- suspected B/L Buttock Pressure injury ulcer/wound infection vs R iliac ar pseudoaneurysm/collection/stent infection,UTI 
-CXR neg 
-UA c/w UTI - ordered cultures. 
-Blood Cx NGTD 
-Lact normal at 1.4 
-S/p IVF bolus in ER followed by gentle hydration x 12 hrs. 
-On cefepime/vancomycin 
-Follow Cultures, send stool studies, wound Cx/wound care consulted 
-CT abdm/pelvis showing Right common iliac artery stent with enhancing fluid collection surrounding the stent and extending into the retroperitoneum on the right. This is suspicious for infection. Also showing Right hydronephrosis and hydroureter secondary to compression of the ureter by the collection around the stent 
-Vascular surgery saw patient and will proceed with aspiration of the fluid noted on CT surrounding the right common iliac artery stent since this could be an abscess. 
-Will consult urology since there hydronephrosis right. 
  
Diabetes mellitus 2 on Insulin at home Dysphagia due to esophageal dysmotility on MBstudy last admission- s/p PEG for -FS Q 6 hrs on tube feeding 
-Lispro SSI here 
-Add NPH as needed   
   
ESRD on HD MWF Anemia of chronic disease POA S/p L arm AVFistula 
-Stool guaiac neg in ER 
-Nephrology consult for HD 
-Cont epogen as per renal 
-PRBC deferred to nephrology with HD  
-Renvela TID 
  
Hyperlipidemia 
-Cont on statins 
   
Hypertension 
-Switch Coreg to IV BB with parametes 
-Add PRN nitropaste 
   
? Multiple sclerosis POA ? Exacerbation causing dysphagia- cannot tell till MRI done 
-Neurology consult noted last admission- pt has been refusing MRI till now, no further workup recommended 
   
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 
-Vascular surgery consult as above 
   
H/o CAD Chronic Diastolic & Systolic CHF POA- EF 53%, mod LVH on recent Echo 6/29 
-Conservative approach was recommended last admission by Cardiology in light of other co morbidities.    
ESRD / HD ~ 8 years PAD s/p b/l leg stents S/p remote partial (Syme) amputation of RIGHT foot. -Nephrology consulted for HD MWF 
-Cont ASA 
-Vascular surgery consult as above for opinion on ? Stent infection 
  
  
Code Status: Full Surrogate Decision Maker: wife 
  
DVT Prophylaxis: Sq heparin GI Prophylaxis: not indicated 
  
Baseline: Pt was recently DC to [facility] from [facility] after being treated for R Iliac ar pseudoaneurysm s/p Stent by Vasc Sx Dr Kings Negron, Eso Dysmotility syndrome causing Dysphagia causing aspiration pneumonia s/p PEG tube Pt had denied any neuro workup - denied MRI as recommended by neurology to find the cause of his eso dysmotility- ?MS flare Body mass index is 23.57 kg/(m^2). Subjective: Chief Complaint / Reason for Physician Visit Admitted for sepsis suspected from multiple sources including possible abscess surrounding right iliac stent,UTI,decubitus ulcers. Patient feeling fine today. Undergoing dialysis now. Discussed with RN events overnight. Review of Systems: 
Symptom Y/N Comments  Symptom Y/N Comments Fever/Chills n   Chest Pain n   
Poor Appetite    Edema Cough n   Abdominal Pain n   
Sputum n   Joint Pain SOB/BANKS n   Pruritis/Rash Nausea/vomit n   Tolerating PT/OT Diarrhea    Tolerating Diet Constipation    Other Could NOT obtain due to:   
 
Objective: VITALS:  
Last 24hrs VS reviewed since prior progress note. Most recent are: 
Patient Vitals for the past 24 hrs: 
 Temp Pulse Resp BP SpO2  
07/18/18 1220 - 76 18 147/61 -  
07/18/18 1150 - 76 18 144/59 -  
07/18/18 1120 - 76 18 147/61 -  
07/18/18 1050 - 74 18 144/63 -  
07/18/18 1020 - 74 18 136/61 -  
07/18/18 0950 98.5 °F (36.9 °C) 74 18 133/61 -  
07/18/18 0835 98.3 °F (36.8 °C) 75 18 130/61 96 % 07/18/18 0306 98.1 °F (36.7 °C) 68 18 127/59 98 % 07/17/18 2320 98.4 °F (36.9 °C) 73 18 131/62 96 % 07/17/18 1845 99 °F (37.2 °C) 79 16 135/67 97 % 07/17/18 1817 (!) 101.2 °F (38.4 °C) 73 15 118/57 96 % 07/17/18 1315 - - - 128/56 96 % Intake/Output Summary (Last 24 hours) at 07/18/18 1227 Last data filed at 07/18/18 1965 Gross per 24 hour Intake               50 ml Output              400 ml Net             -350 ml PHYSICAL EXAM: 
General: WD, WN. Alert, cooperative, no acute distress   
EENT:  EOMI. Anicteric sclerae. MMM Resp:  CTA bilaterally, no wheezing or rales. No accessory muscle use CV:  Regular  rhythm,  No edema GI:  Soft, Non distended, Non tender.  +Bowel sounds Neurologic:  Alert and oriented X 3, normal speech, Psych:   Good insight. Not anxious nor agitated Skin:  No rashes. No jaundice Reviewed most current lab test results and cultures  YES Reviewed most current radiology test results   YES Review and summation of old records today    NO Reviewed patient's current orders and MAR    YES 
PMH/SH reviewed - no change compared to H&P 
________________________________________________________________________ Care Plan discussed with: 
  Comments Patient x Family RN x Care Manager Consultant  x Vascular surgery Multidiciplinary team rounds were held today with , nursing, pharmacist and clinical coordinator. Patient's plan of care was discussed; medications were reviewed and discharge planning was addressed. ________________________________________________________________________ Total NON critical care TIME: 35   Minutes Total CRITICAL CARE TIME Spent:   Minutes non procedure based Comments >50% of visit spent in counseling and coordination of care x   
________________________________________________________________________ Heidi De León MD  
 
Procedures: see electronic medical records for all procedures/Xrays and details which were not copied into this note but were reviewed prior to creation of Plan. LABS: 
I reviewed today's most current labs and imaging studies. Pertinent labs include: 
Recent Labs  
   07/18/18 0320 07/17/18 1146 07/16/18 
 0520 WBC  16.7*  16.1*  15.2* HGB  6.4*  7.0*  7.2* HCT  21.2*  22.8*  23.5*  
PLT  239  250  220 Recent Labs  
   07/18/18 0320 07/17/18 1146 07/16/18 
 0520 NA  132*  129*  130*  
K  4.4  4.3  4.6 CL  95*  90*  92* CO2  31  33*  30  
GLU  120*  216*  190* BUN  44*  40*  43* CREA  5.03*  4.91*  5.47* CA  9.0  9.3  9.0 ALB   --   1.4*   --   
TBILI   --   0.5   --   
SGOT   --   40*   --   
ALT   --   26   --   
 
 
Signed: Wilber Elizabeth MD

## 2018-07-18 NOTE — CONSULTS
Consultation Note NAME: Paulo Blanco :  1950 MRN:  [id number] Date/Time:  2018 12:27 PM 
 
I have been asked to see this patient by Dr. Merle Vega  for advice/opinion re: ESRD. Assessment :    Plan: 
ESRD Left AVF 
HTN Anemia Hyponatremia Hypoalbuminemia Sacral Decubitus Right retroperitoneal abscess/hematoma with asso right hydro MS 
PEG tube MWF HD [employer] (most recently at [facility]). HD today Hgb 6.4; give epo 10 k sc tiw; give one unit prbc's today (ok to get while not on HD) Holding Parsabiv (not available in the hospital) Subjective: CHIEF COMPLAINT:  ESRD HISTORY OF PRESENT ILLNESS:    
Alireza Granda is a 70 y.o.   male who has a history of the below health problems. Mr. Ozzie Dodson was sent to the ER yesterday from [facility]. Not doing well. Not able to participate in rehab. Sacral decubitus getting worse. PEG. Fever. The patient was seen on dialysis at 12:27 PM .  BP is stable. Access is working well. Past Medical History:  
Diagnosis Date  Anemia associated with chronic renal failure  Autoimmune disease (HCC)   
 hx ms  CAD (coronary artery disease)  Chronic kidney disease   
 catheter removed and no dialysis at this time  Chronic kidney disease   
 left arm fistula dialysis MWF  
 Diabetes ([address] type II  
 Elevated troponin 2018  GERD (gastroesophageal reflux disease)  Hypertension  Ill-defined condition   
 prostate cancer  Multiple sclerosis ([address] diagnosed '01  
 Other ill-defined conditions(799.89)   
 anemia  Other ill-defined conditions(799.89)   
 elevated cholesterol  Other ill-defined conditions(799.89)   
 hx septic right foot  Peripheral vascular disease ([address]  Secondary hyperparathyroidism of renal origin ([address]  Thyroid disease Past Surgical History:  
Procedure Laterality Date  COLONOSCOPY N/A 2016  COLONOSCOPY performed by Henry Silverio MD at [address] ENDOSCOPY  COLONOSCOPY,DIAGNOSTIC  12/6/2016  CORONARY STENT SINGLE W/PTCA  2/17/2011  HX APPENDECTOMY  HX CATARACT REMOVAL  10/18/10  
 bilateral  
 HX CHOLECYSTECTOMY  HX GI    
 ileostomy due to infection  HX HEART CATHETERIZATION    
 HX HEENT    
 hx post photocoagulation eye 2009  HX OTHER SURGICAL    
 right partial foot amputation  HX OTHER SURGICAL    
 cardiac cath  HX OTHER SURGICAL    
 prostate removed  PLACE PERCUT GASTROSTOMY TUBE  7/6/2018  OK COLONOSCOPY W/BIOPSY SINGLE/MULTIPLE  5/10/2010  UPPER GI ENDOSCOPY,BIOPSY  4/17/2018  UPPER GI ENDOSCOPY,REMV TUMOR,SNARE  6/13/2018  VASCULAR SURGERY PROCEDURE UNLIST    
 katelyn. leg bypasses Social History Substance Use Topics  Smoking status: Former Smoker Years: 1.00 Quit date: 4/12/2003  Smokeless tobacco: Never Used Comment: quit 5 years ago  Alcohol use No  
   Comment: Stopped drinking 10 years ago Family History Problem Relation Age of Onset  Diabetes Mother Allergies Allergen Reactions  Sensipar [Cinacalcet] Other (comments) Cinacalcet (Sensipar) has been added as an \"allergy\" / intolerance with a comment to assure providers at discharge and post discharge are aware of Dr. Handy Mcintyre recommendation to avoid Sensipar. Patient takes Maddie Reasons as an outpatient; Thus, Theotis Sammi has been discontinued per Nephrology (cannot give with recent Parsabiv b/c risk of low Calcium). Prior to Admission medications Medication Sig Start Date End Date Taking? Authorizing Provider  
oxyCODONE-acetaminophen (PERCOCET) 5-325 mg per tablet 1 Tab by Per G Tube route every four (4) hours as needed for Pain. Yes Historical Provider  
epoetin niya (EPOGEN;PROCRIT) 10,000 unit/mL injection 10,000 Units by SubCUTAneous route Q TU, TH & SAT.    Yes Historical Provider  
insulin lispro (HUMALOG) 100 unit/mL injection 2-4 Units by SubCUTAneous route Before breakfast, lunch, dinner and at bedtime. Yes Historical Provider  
lidocaine (LIDODERM) 5 % 2 Patches by TransDERmal route daily. Apply patch to the affected area for 12 hours a day and remove for 12 hours a day. Yes Historical Provider  
lisinopril (PRINIVIL, ZESTRIL) 20 mg tablet 20 mg by Per G Tube route daily. Yes Historical Provider  
escitalopram oxalate (LEXAPRO) 5 mg/5 mL oral solution 10 mg by Per G Tube route daily. Yes Historical Provider  
sevelamer carbonate (RENVELA) 2.4 gram pwpk oral powder 2.4 g by Per G Tube route three (3) times daily (with meals). Yes Historical Provider  
pravastatin (PRAVACHOL) 10 mg tablet 1 Tab by Per G Tube route nightly. 7/9/18  Yes Tera Bee MD  
cloNIDine HCl (CATAPRES) 0.1 mg tablet 1 Tab by Per G Tube route three (3) times daily. 7/9/18  Yes Tera Bee MD  
carvedilol (COREG) 25 mg tablet 2 Tabs by Per G Tube route two (2) times daily (with meals). 7/9/18  Yes Tera Bee MD  
aspirin 81 mg chewable tablet 1 Tab by Per G Tube route daily. 7/9/18  Yes Tera Bee MD  
amLODIPine (NORVASC) 10 mg tablet 1 Tab by Per G Tube route daily. 7/9/18  Yes Tera Bee MD  
acetaminophen (TYLENOL) 160 mg/5 mL liquid 650 mg by Per G Tube route every three (3) hours as needed for Pain. Historical Provider  
bisacodyl (DULCOLAX) 10 mg suppository Insert 10 mg into rectum daily as needed (\"Constipation\"). Historical Provider  
guaiFENesin-codeine (ROBITUSSIN AC) 100-10 mg/5 mL solution Take 2.5 mL by mouth every six (6) hours as needed for Cough. Historical Provider  
morphine 10 mg/5 mL oral solution 5 mL by Per G Tube route every four (4) hours as needed for Pain. Pain scale 7-10    Historical Provider  
mupirocin (BACTROBAN) 2 % ointment Apply  to affected area every eight (8) hours as needed (\"Dry skin\").     Historical Provider  
ondansetron (ZOFRAN ODT) 4 mg disintegrating tablet Take 4 mg by mouth every six (6) hours as needed for Nausea. Historical Provider  
sodium hypochlorite (DAKIN'S SOLUTION) external solution Apply  to affected area every eight (8) hours as needed (\"Wound care\"). Historical Provider REVIEW OF SYSTEMS:   
 []  Unable to obtain reliable ROS due to  [] mental status  [] sedated   [] intubated 
 [x] Total of 12 systems reviewed as follows (not very talkative; denied current complaint/issue): Constitutional: negative fever, negative chills, negative weight loss Eyes:   negative visual changes ENT:   negative sore throat, tongue or lip swelling Respiratory:  negative cough, negative dyspnea Cards:  negative for chest pain, palpitations, lower extremity edema GI:   negative for nausea, vomiting, diarrhea, and abdominal pain :  negative for frequency, dysuria Integument:  negative for rash and pruritus Heme:  negative for easy bruising and gum/nose bleeding Musculoskel: negative for myalgias,  back pain and muscle weakness Neuro:  negative for headaches, dizziness, vertigo Psych:  negative for feelings of anxiety, depression Travel?: none Objective: VITALS:   
Visit Vitals  /61  Pulse 76  Temp 98.5 °F (36.9 °C)  Resp 18  Ht 5' 10\" (1.778 m)  Wt 74.5 kg (164 lb 3.9 oz)  SpO2 96%  BMI 23.57 kg/m2 PHYSICAL EXAM: 
Gen:  []  WD []  WN  [] cachectic []  thin []  obese []  disheveled [x]  ill apearing  []   Critical  [x]   Chronic    [x]  No acute distress HEENT:   [x] NC/AT/PERRLA/EOMI 
  [] pink conjunctivae      [] pale conjunctivae PERRL  [] yes  [] no      [] moist mucosa    [] dry mucosa 
  hearing intact to voice [x] yes  [] No 
              
NECK:   supple [x] yes  [] no        masses [] yes  [] No 
             thyroid  []  non tender  []  tender RESP:   [x] CTA bilaterally/no wheezing/rhonchi/rales/crackles   [] rhonchi bilaterally - no dullness  [] wheezing   [] rhonchi   [] crackles  
  use of accessory muscles [] yes [] no CARD:   [x]  regular rate and rhythm/No murmurs/rubs/gallops 
  murmur  [] yes ()  [] no      Rubs  [] yes  [] no       Gallops [] yes  [] no 
  Rate []  regular  []  irregular        carotid bruits  [] Right  []  Left LE edema [] yes  [x] no           JVP  []  yes   []  no 
 
ABD:    [x] soft/non distended/non tender/+bowel sounds/no HSM []  Rigid    tenderness [] yes [] no   Liver enlargement  []  yes []  no  
             Spleen enlargement  []  yes []  no     distended []  yes [] no  
  bowel sound  [] hypoactive   [] hyperactive LYMPH:    Neck []  yes [x]  no       Axillae []  yes []  no SKIN:   Rashes []  yes   []  no    Ulcers []  yes   []  no 
             [] tight to palpitation    skin turgor []  good  [] poor  [] decreased Cyanosis/clubbing []  yes []  no 
 
NEUR:   [x] cranial nerves II-XII grossly intact    
  [] Cranial nerves deficit 
               []  facial droop    []  slurred speech   [] aphasic  
  [] Strength normal     []  weakness  []  LUE  []   RUE/ []  LLE  []   RLE 
  follows commands  [x]  yes []  no        
 
PSYCH:   insight [] poor [] good   Alert and Oriented to  [x] person  [x] place  [x]  time  
                 [] depressed [] anxious [] agitated  [] lethargic [] stuporous  [] sedated LAB DATA REVIEWED:   
Recent Labs  
   07/18/18 
 0320  07/17/18 
 1146 WBC  16.7*  16.1* HGB  6.4*  7.0*  
HCT  21.2*  22.8*  
PLT  239  250 Recent Labs  
   07/18/18 
 0320  07/17/18 
 1146  07/16/18 
 0520 NA  132*  129*  130*  
K  4.4  4.3  4.6 CL  95*  90*  92* CO2  31  33*  30 BUN  44*  40*  43* CREA  5.03*  4.91*  5.47* GLU  120*  216*  190* CA  9.0  9.3  9.0 Recent Labs  
   07/17/18 
 1146 SGOT  40* ALT  26 AP  275* TBILI  0.5 ALB  1.4*  
GLOB  6.8* No results for input(s): INR, PTP, APTT in the last 72 hours. No lab exists for component: INREXT No results for input(s): FE, TIBC, PSAT, FERR in the last 72 hours. No results for input(s): PH, PCO2, PO2 in the last 72 hours. No results for input(s): CPK, CKMB in the last 72 hours. No lab exists for component: TROPONINI Lab Results Component Value Date/Time Glucose (POC) 129 (H) 07/18/2018 12:03 PM  
 Glucose (POC) 121 (H) 07/18/2018 06:32 AM  
 Glucose (POC) 132 (H) 07/18/2018 12:01 AM  
 Glucose (POC) 186 (H) 07/17/2018 06:22 PM  
 Glucose (POC) 83 07/09/2018 06:39 PM  
 
 
Procedures: see electronic medical records for all procedures/Xrays and details which were not copied into this note but were reviewed prior to creation of Plan.   
________________________________________________________________________ 
  
 
___________________________________________________ Consulting Physician:  Alexi Kelley MD

## 2018-07-18 NOTE — CDMP QUERY
Account Number: [de-identified]  MRN: [id number]  Patient: Fatuma Rao   Created: [id number]  Clinician Name: Gianfranco Harris RN, CCDS   Dr. Fabiola Nicholson :  Please clarify if this patient is (was) being treated/managed for:     => Hyponatremia POA in the setting of sepsis & diarrhea   => Other explanation of clinical findings  => Clinically Undetermined (no explanation for clinical findings)    The medical record reflects the following clinical findings, treatment, and risk factors. Risk Factors:    Clinical Indicators:  confused, disoriented, Na 129 - 132. Treatment: ED tx 2.235 L IVF bolus in ED & lab monitoring. Please clarify and document your clinical opinion in the progress notes and discharge summary including the definitive and/or presumptive diagnosis, (suspected or probable), related to the above clinical findings. Please include clinical findings supporting your diagnosis.   Thank Neri Jackson RN, CCDS

## 2018-07-18 NOTE — CONSULTS
[address]  MR#: [id number]  : 1950  ACCOUNT #: [de-identified]   DATE OF SERVICE: 2018    REASON FOR CONSULTATION:  Right hydronephrosis. HISTORY OF PRESENT ILLNESS:  He is a 39-year-old  male who was found to have hydronephrosis on CT scan 2018 down to the level of a hematoma around a right iliac artery that had been stented. At that time it measured 10.3 x 7.6 x 3.7. Repeat CT on , the hematoma part one is 6.3 x 4.6 x 2.7 and the retroperitoneal part is 4.6 x 3.6 x 3.1, indicating some resolution with persistent hydronephrosis. The patient denies any pain. He is already in complete renal failure and on hemodialysis. He voids once or twice a day without difficulty. PAST MEDICAL HISTORY:  Pertinent for hypertension, MS, peripheral vascular disease, CAD, diabetes, GERD, as well as appendectomy, cholecystectomy, heart cath, foot amputation, and prostatectomy. MEDICATIONS:  On the chart and were reviewed. ALLERGIES:  SENSIPAR. REVIEW OF SYMPTOMS:  Per HPI. PHYSICAL EXAMINATION:  GENERAL:  He is alert and oriented x3, no apparent distress. No jaundice. NECK:  Supple. ABDOMEN:  Benign. No CVA tenderness. He has a G-tube in place. GENITALIA:  Demonstrate some superficial necrosis of the glans penis. Open meatus. Mild edema to the penis with a woody indurated feeling of his corpora. Normal scrotum and contents. RECTAL:  Exam was not done. EXTREMITIES:  Status post partial amputation. NEUROLOGIC:  Grossly intact on limited exam.  PSYCHIATRIC:  Normal.    LABORATORY DATA:  On the chart and were reviewed. His CTs were imaged by myself. Bilateral renal atrophy also noted in addition to the above comments. IMPRESSION:    1. Asymptomatic right hydronephrosis from resolving hematoma around iliac artery post-stent in a patient who is already on dialysis.   2.  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  3.  H/o ACP? RECOMMENDATION:  Wound care consult for the penis. No intervention necessary for the right hydronephrosis. Please re-consult as needed.       MD HOWARD Ruvalcaba / Madhavi Scott  D: 07/18/2018 16:49     T: 07/18/2018 17:12  JOB #: [id number]

## 2018-07-18 NOTE — PROGRESS NOTES
Judy Casarez, and [place], [place] , notified of patient's readmission./Rebecca Aggarwal Manager of Care Management

## 2018-07-18 NOTE — WOUND CARE
Wound care Nurse Consult from admitting hosptialist for POA sacral wounds. Patient currently on HD and unable to turn. He has aprox x2 more hours on HD and then he has a CT scan scheduled at 1pm. Will return later to see wounds . Staff nurse, [facility], notified  Denise Cosme RN, Abner Henson ph# [phone]

## 2018-07-19 NOTE — H&P
Radiology History and Physical    Patient: Radha Douglas 70 y.o. male     Chief Complaint:   Chief Complaint   Patient presents with    Abnormal Lab Results     Elevated WBC       History of Present Illness: Right pellvic collection. History:    Past Medical History:   Diagnosis Date    Anemia associated with chronic renal failure     Autoimmune disease (HCC)     hx ms    CAD (coronary artery disease)     Chronic kidney disease     catheter removed and no dialysis at this time    Chronic kidney disease     left arm fistula dialysis MWF    Diabetes ([facility]     type II    Elevated troponin 6/29/2018    GERD (gastroesophageal reflux disease)     Hypertension     Ill-defined condition     prostate cancer    Multiple sclerosis ([facility]     diagnosed '01    Other ill-defined conditions(799.89)     anemia    Other ill-defined conditions(799.89)     elevated cholesterol    Other ill-defined conditions(799.89)     hx septic right foot    Peripheral vascular disease ([facility]     Secondary hyperparathyroidism of renal origin ([facility]     Thyroid disease      Family History   Problem Relation Age of Onset    Diabetes Mother      Social History     Social History    Marital status:      Spouse name: N/A    Number of children: N/A    Years of education: N/A     Occupational History    not working      Social History Main Topics    Smoking status: Former Smoker     Years: 1.00     Quit date: 4/12/2003    Smokeless tobacco: Never Used      Comment: quit 5 years ago    Alcohol use No      Comment: Stopped drinking 10 years ago    Drug use: No    Sexual activity: Yes     Partners: Female     Birth control/ protection: None     Other Topics Concern    Not on file     Social History Narrative       Allergies:    Allergies   Allergen Reactions    Sensipar [Cinacalcet] Other (comments)     Cinacalcet (Sensipar) has been added as an \"allergy\" / intolerance with a comment to assure providers at discharge and post discharge are aware of Dr. Handy Mcintyre recommendation to avoid Sensipar. Patient takes Maddie Reasons as an outpatient; Thus, Theotis Sammi has been discontinued per Nephrology (cannot give with recent Parsabiv b/c risk of low Calcium).        Current Medications:  Current Facility-Administered Medications   Medication Dose Route Frequency    lidocaine (PF) (XYLOCAINE) 10 mg/mL (1 %) injection 10 mL  10 mL SubCUTAneous RAD ONCE    fentaNYL citrate (PF) injection 100 mcg  100 mcg IntraVENous Multiple    balsam peru-castor oil (VENELEX)  mg/gram ointment   Topical BID    sodium hypochlorite (QUARTER STRENGTH DAKIN'S) 0.125% irrigation (bottle)   Topical DAILY    collagenase (SANTYL) 250 unit/gram ointment   Topical DAILY    epoetin niya (EPOGEN;PROCRIT) injection 10,000 Units  10,000 Units SubCUTAneous Q MON, WED & FRI    0.9% sodium chloride infusion 250 mL  250 mL IntraVENous PRN    sodium chloride (NS) flush 5-10 mL  5-10 mL IntraVENous PRN    cefepime (MAXIPIME) 1 g in 0.9% sodium chloride (MBP/ADV) 50 mL  1 g IntraVENous Q24H    levoFLOXacin (LEVAQUIN) 500 mg in D5W IVPB  500 mg IntraVENous Q48H    vancomycin (VANCOCIN) 750 mg in 0.9% sodium chloride (MBP/ADV) 250 mL  750 mg IntraVENous DIALYSIS PRN    And    VANCOMYCIN INFORMATION NOTE   Other DAILY    acetaminophen (TYLENOL) solution 650 mg  650 mg Per G Tube Q3H PRN    aspirin chewable tablet 81 mg  81 mg Per G Tube DAILY    escitalopram oxalate (LEXAPRO) 5 mg/5 mL oral solution soln 10 mg  10 mg Per G Tube DAILY    insulin lispro (HUMALOG) injection   SubCUTAneous Q6H    glucose chewable tablet 16 g  4 Tab Oral PRN    dextrose (D50W) injection syrg 12.5-25 g  12.5-25 g IntraVENous PRN    glucagon (GLUCAGEN) injection 1 mg  1 mg IntraMUSCular PRN    morphine 10 mg/5 mL oral solution 10 mg  5 mL Per G Tube Q4H PRN    oxyCODONE-acetaminophen (PERCOCET) 5-325 mg per tablet 1 Tab  1 Tab Per G Tube Q4H PRN    sevelamer carbonate (RENVELA) oral powder 2,400 mg  2.4 g Per G Tube TID WITH MEALS    pravastatin (PRAVACHOL) tablet 10 mg  10 mg Per G Tube QHS    sodium chloride (NS) flush 5-10 mL  5-10 mL IntraVENous Q8H    sodium chloride (NS) flush 5-10 mL  5-10 mL IntraVENous PRN    naloxone (NARCAN) injection 0.4 mg  0.4 mg IntraVENous PRN    ondansetron (ZOFRAN) injection 4 mg  4 mg IntraVENous Q4H PRN    heparin (porcine) injection 5,000 Units  5,000 Units SubCUTAneous Q8H        Physical Exam:  Blood pressure 121/46, pulse 84, temperature 98 °F (36.7 °C), resp. rate 18, height 5' 10\" (1.778 m), weight 74.2 kg (163 lb 9.3 oz), SpO2 98 %. GENERAL: alert, cooperative, mild distress, appears stated age, LUNG: clear to auscultation bilaterally, HEART: regular rate and rhythm      Alerts:    Hospital Problems  Date Reviewed: 7/17/2018          Codes Class Noted POA    Leukocytosis ICD-10-CM: D72.829  ICD-9-CM: 288.60  7/17/2018 Yes        Fever ICD-10-CM: R50.9  ICD-9-CM: 780.60  7/17/2018 Yes        ESRD (end stage renal disease) on dialysis [facility]) (Chronic) ICD-10-CM: N18.6, Z99.2  ICD-9-CM: 585.6, V45.11  7/17/2018 Yes        Sepsis ([facility] ICD-10-CM: A41.9  ICD-9-CM: 038.9, 995.91  7/17/2018 Yes        Pseudoaneurysm of iliac artery ([facility] ICD-10-CM: I72.3  ICD-9-CM: 442.2  6/28/2018 Yes        Diabetic peripheral neuropathy associated with type 2 diabetes mellitus ([facility] ICD-10-CM: E11.42  ICD-9-CM: 250.60, 357.2  3/25/2016 Yes        Multiple sclerosis ([facility] (Chronic) ICD-10-CM: G35  ICD-9-CM: 000  1/4/2015 Yes        PAD (peripheral artery disease) (HCC) (Chronic) ICD-10-CM: I73.9  ICD-9-CM: 443.9  12/9/2010 Yes              Laboratory:      Recent Labs      07/19/18   0648   HGB  7.2*   HCT  24.1*   WBC  14.8*   PLT  246   BUN  25*   CREA  3.47*   K  4.1         Plan of Care/Planned Procedure:  Risks, benefits, and alternatives reviewed with patient and he agrees to proceed with the procedure.

## 2018-07-19 NOTE — PROGRESS NOTES
Oncology Interdisciplinary rounds were held today to discuss patient plan of care and outcomes. The following members were present: Nursing, Case Management, Pharmacy and Dietary.     Actual Length of Stay: 2    DRG GLOS: 4.9    Expected Length of Stay: 4d 21h                Plan for Day        Mobility        Plan for Stay      Plan for Way   NPO  Tube feeds  Wound care  Pain control bedrest Continue current plan TBD 7/21

## 2018-07-19 NOTE — PROGRESS NOTES
MD discussed procedure padmini pt. Consent signed. Fentanyl only due to tube feedings not stopped. Pt in agreement to proceed.

## 2018-07-19 NOTE — WOUND CARE
Wound care Nurse Consult from Dr Yolanda Davis stating \" dry gangrene of penis\". Patient was seen yesterday for POA wounds to sacrum, heel and RLE. Patient seen yestrerday evening for right hydronephrosis and tokk note of his gangrenous penis. Apparently patient has a hematoma around right iliac artery which was stented upon last admission. Patient awaiting an IR placed percutaneous drain to area today to this abscess which is causing an increased WBC count. Per Dr Yolanda Davis: patient is in complete renal failure, on HD and very rarely voids and there is superficial gangrene to glans penis. Recommend:    Penis: gently cleanse with soap and water, pat dry. Apply Venelex ointment BID. Place an ABD pad or disposable washcloth between penis and inner thigh to prevent skin on skin irritation and drainage.     Janiya Mistry RN, [employer]

## 2018-07-19 NOTE — PROGRESS NOTES
NAME: Shanika Conklin :  1950 MRN:  [id number] Assessment :    Plan: 
--ESRD Left AVF 
HTN Anemia Hyponatremia Hypoalbuminemia Sacral Decubitus Right retroperitoneal abscess/hematoma with asso right hydro MS 
PEG tube --MWF HD [employer] (most recently at [facility]). No HD today 
  
Hgb 7.2; epo 10 k sc tiw; one unit prbc's  
  
Holding Parsabiv (not available in the hospital) S/p FNA of retroperitoneal abscess/hematoma toay Subjective: Chief Complaint:  In bed. Resting. Denies current complaint. Review of Systems: 
 
Symptom Y/N Comments  Symptom Y/N Comments Fever/Chills    Chest Pain Poor Appetite    Edema Cough    Abdominal Pain Sputum    Joint Pain SOB/BANKS    Pruritis/Rash Nausea/vomit    Tolerating PT/OT Diarrhea    Tolerating Diet Constipation    Other Could not obtain due to:   
 
Objective: VITALS:  
Last 24hrs VS reviewed since prior progress note. Most recent are: 
Visit Vitals  /61 (BP 1 Location: Right arm, BP Patient Position: At rest)  Pulse 73  Temp 96.7 °F (35.9 °C)  Resp 18  Ht 5' 10\" (1.778 m)  Wt 74.2 kg (163 lb 9.3 oz)  SpO2 97%  BMI 23.47 kg/m2 Intake/Output Summary (Last 24 hours) at 18 8801 Last data filed at 18 1400 Gross per 24 hour Intake              340 ml Output              500 ml Net             -160 ml Telemetry Reviewed: PHYSICAL EXAM: 
General: WD, WN. Alert, cooperative, no acute distress Resp:  CTA Bilaterally. No Wheezing/Rhonchi/Rales. No access. muscle use CV:  Regular  rhythm,  No murmur (), No Rubs, No Gallops. No edema GI:  Soft, Non distended, Non tender.  +Bowel sounds, no HSM Lab Data Reviewed: (see below) Medications Reviewed: (see below) PMH/SH reviewed - no change compared to H&P 
________________________________________________________________________ Care Plan discussed with: 
Patient Family RN Care Manager Consultant:     
 
  Comments >50% of visit spent in counseling and coordination of care    
 
________________________________________________________________________ Rosa Hull MD  
 
Procedures: see electronic medical records for all procedures/Xrays and details which 
were not copied into this note but were reviewed prior to creation of Plan. LABS: 
Recent Labs  
   07/18/18 
 0320 07/17/18 
 1146 WBC  16.7*  16.1* HGB  6.4*  7.0*  
HCT  21.2*  22.8*  
PLT  239  250 Recent Labs  
   07/18/18 
 0320  07/17/18 
 1146 NA  132*  129*  
K  4.4  4.3 CL  95*  90* CO2  31  33* BUN  44*  40* CREA  5.03*  4.91* GLU  120*  216* CA  9.0  9.3 Recent Labs  
   07/17/18 
 1146 SGOT  40* AP  275* TP  8.2 ALB  1.4*  
GLOB  6.8* No results for input(s): INR, PTP, APTT in the last 72 hours. No lab exists for component: INREXT No results for input(s): FE, TIBC, PSAT, FERR in the last 72 hours. No results found for: FOL, RBCF No results for input(s): PH, PCO2, PO2 in the last 72 hours. No results for input(s): CPK, CKMB in the last 72 hours. No lab exists for component: TROPONINI No components found for: [place] Lab Results Component Value Date/Time  Color RED 07/17/2018 02:54 PM  
 Appearance CLOUDY (A) 07/17/2018 02:54 PM  
 Specific gravity 1.024 07/17/2018 02:54 PM  
 Specific gravity 1.015 12/30/2014 06:40 PM  
 pH (UA) 7.0 07/17/2018 02:54 PM  
 Protein 300 (A) 07/17/2018 02:54 PM  
 Glucose 100 (A) 07/17/2018 02:54 PM  
 Ketone NEGATIVE  07/17/2018 02:54 PM  
 Bilirubin SMALL (A) 07/17/2018 02:54 PM  
 Urobilinogen 0.2 07/17/2018 02:54 PM  
 Nitrites NEGATIVE  07/17/2018 02:54 PM  
 Leukocyte Esterase SMALL (A) 07/17/2018 02:54 PM  
 Epithelial cells FEW 07/17/2018 02:54 PM  
 Bacteria 1+ (A) 07/17/2018 02:54 PM  
 WBC >100 (H) 07/17/2018 02:54 PM  
 RBC 10-20 07/17/2018 02:54 PM  
 
 
MEDICATIONS: 
Current Facility-Administered Medications Medication Dose Route Frequency  balsam peru-castor oil (VENELEX) [id number] mg/gram ointment   Topical BID  sodium hypochlorite (QUARTER STRENGTH DAKIN'S) 0.125% irrigation (bottle)   Topical DAILY  collagenase (SANTYL) 250 unit/gram ointment   Topical DAILY  epoetin niya (EPOGEN;PROCRIT) injection 10,000 Units  10,000 Units SubCUTAneous Q MON, WED & FRI  
 0.9% sodium chloride infusion 250 mL  250 mL IntraVENous PRN  
 sodium chloride (NS) flush 5-10 mL  5-10 mL IntraVENous PRN  
 cefepime (MAXIPIME) 1 g in 0.9% sodium chloride (MBP/ADV) 50 mL  1 g IntraVENous Q24H  
 levoFLOXacin (LEVAQUIN) 500 mg in D5W IVPB  500 mg IntraVENous Q48H  
 vancomycin (VANCOCIN) 750 mg in 0.9% sodium chloride (MBP/ADV) 250 mL  750 mg IntraVENous DIALYSIS PRN And  
 VANCOMYCIN INFORMATION NOTE   Other DAILY  acetaminophen (TYLENOL) solution 650 mg  650 mg Per G Tube Q3H PRN  
 aspirin chewable tablet 81 mg  81 mg Per G Tube DAILY  escitalopram oxalate (LEXAPRO) 5 mg/5 mL oral solution soln 10 mg  10 mg Per G Tube DAILY  insulin lispro (HUMALOG) injection   SubCUTAneous Q6H  
 glucose chewable tablet 16 g  4 Tab Oral PRN  
 dextrose (D50W) injection syrg 12.5-25 g  12.5-25 g IntraVENous PRN  
 glucagon (GLUCAGEN) injection 1 mg  1 mg IntraMUSCular PRN  
 morphine 10 mg/5 mL oral solution 10 mg  5 mL Per G Tube Q4H PRN  
 oxyCODONE-acetaminophen (PERCOCET) 5-325 mg per tablet 1 Tab  1 Tab Per G Tube Q4H PRN  
 sevelamer carbonate (RENVELA) oral powder 2,400 mg  2.4 g Per G Tube TID WITH MEALS  pravastatin (PRAVACHOL) tablet 10 mg  10 mg Per G Tube QHS  sodium chloride (NS) flush 5-10 mL  5-10 mL IntraVENous Q8H  
 sodium chloride (NS) flush 5-10 mL  5-10 mL IntraVENous PRN  
 naloxone (NARCAN) injection 0.4 mg  0.4 mg IntraVENous PRN  
 ondansetron (ZOFRAN) injection 4 mg  4 mg IntraVENous Q4H PRN  
 heparin (porcine) injection 5,000 Units  5,000 Units SubCUTAneous Q8H

## 2018-07-19 NOTE — PROGRESS NOTES
0700: Report received, LEE BARRAGAN, ED SUMMARY, MAR, RECENT RESULTS were discussed.     Milton Barba RN

## 2018-07-19 NOTE — PROGRESS NOTES
Reason for Readmission:     Sepsis with hypotension         RRAT Score and Risk Level:     33 - ACO     Level of Readmission:    Level 2      Care Conference scheduled:   Not at present time - PCP notified of readmission via message to NN       Resources/supports as identified by patient/family:   Wife and family              Top Challenges facing patient (as identified by patient/family and CM):        Finances/Medication cost?      Insurance covers costs, no concerns at this time  [employer]? Pt does not drive, dependent on wife or chair van on dialysis days      Support system or lack thereof? No concerns at this time. Wife and  do well together and neighbor assists during the day.      Living arrangements? 1 story home with ramp into entrance with wife      Self-care/ADLs/Cognition? Alert and oriented. Pt does ADL's and wife does IADL's for him. Pt is able to transfer self from bed to wheelchair in am and gets around house well, takes out of house and down ramp on dialysis days without assistance to meet chair van. Wife only assists with getting pt up from wheelchair onto walker and into bathroom as wheelchair will not fit in room. Resources/supports as identified by patient/family:               Current Advanced Directive/Advance Care Plan:  Full Code Status and copy of Advanced Directive on chart           Plan for utilizing home health:   Open to [place] if needed. Likelihood of additional readmission:   High             Transition of Care Plan:    Based on readmission, the patient's previous Plan of Care   has been evaluated and/or modified. The current Transition of Care Plan is:           Patient was at [facility] prior to readmission - Currently being evaluated for wound care and medical needs - peripheral artery disease very extensive and concern for worsening wounds per wound care. CM will continue to follow for discharge needs.       Care Management Interventions  PCP Verified by CM: Yes (Dr. Lashonda Rodriguez - [phone])  Transition of Care Consult (CM Consult):  Other (Readmission assessment)  MyChart Signup: No  Discharge Durable Medical Equipment: No  Physical Therapy Consult: No  Occupational Therapy Consult: No  Speech Therapy Consult: No  Plan discussed with Pt/Family/Caregiver: No (Patient unwilling to interview - family not present - chart reviewed)    Kaci Malhotra, RN, BSN, ACM   - Medical Oncology  [phone]

## 2018-07-19 NOTE — PROGRESS NOTES
Name of procedure: Pelvic Abscess Drain Placement    Complications, if any, r/t procedure: none    Medications given: 50 mcg Fentanyl    VS : Stable    Post Procedure Care Needed/order sets in [facility]: see orders    Pt tolerated procedure well. VSS. No C/O pain. Dressing to site D&I. No bleeding or hematoma noted to site. Pt taken back to room by transport. NAD noted noted at time of transfer back to room. Floor RN to assume care of pt. TRANSFER - OUT REPORT:    Verbal report given to Anup Lucas RN on Fay Roberto  being transferred to room 1120 for routine post - op       Report consisted of patients Situation, Background, Assessment and   Recommendations(SBAR). Information from the following report(s) SBAR, Kardex, Procedure Summary, Intake/Output and MAR was reviewed with the receiving nurse. Lines:   Peripheral IV 07/17/18 Right Forearm (Active)   Site Assessment Clean, dry, & intact 7/19/2018  8:00 AM   Phlebitis Assessment 0 7/19/2018  8:00 AM   Infiltration Assessment 0 7/19/2018  8:00 AM   Dressing Status Clean, dry, & intact 7/19/2018  8:00 AM   Dressing Type Transparent;Tape 7/19/2018  8:00 AM   Hub Color/Line Status Pink 7/19/2018  8:00 AM   Alcohol Cap Used No 7/17/2018 11:29 AM       Peripheral IV 07/17/18 Right Antecubital (Active)   Site Assessment Clean, dry, & intact 7/19/2018  8:00 AM   Phlebitis Assessment 0 7/19/2018  8:00 AM   Infiltration Assessment 0 7/19/2018  8:00 AM   Dressing Status Clean, dry, & intact 7/19/2018  8:00 AM   Dressing Type Transparent;Tape 7/19/2018  8:00 AM   Hub Color/Line Status Pink 7/19/2018  8:00 AM   Action Taken Blood drawn 7/17/2018 12:12 PM        Opportunity for questions and clarification was provided.

## 2018-07-19 NOTE — PROGRESS NOTES
Hospitalist Progress Note NAME: Jonah Kasper :  1950 MRN:  [id number] Assessment / Plan: 
Sepsis with Hypotension POA- WBC 16.1k, fever 100.7 ? source at this time- suspected B/L Buttock Pressure injury ulcer/wound infection vs R iliac ar pseudoaneurysm/collection/stent infection,UTI 
-CXR neg 
-UA c/w UTI - ordered culture - still pending 
-Blood Cx NGTD 
-Lact normal at 1.4 
-S/p IVF bolus in ER followed by gentle hydration x 12 hrs. 
-On cefepime/vancomycin 
-Follow Cultures, send stool studies, wound Cx/wound care consulted 
-CT abdm/pelvis showing Right common iliac artery stent with enhancing fluid collection surrounding the stent and extending into the retroperitoneum on the right. This is suspicious for infection. Also showing Right hydronephrosis and hydroureter secondary to compression of the ureter by the collection around the stent 
-On ,Vascular surgery saw patient and will proceed with aspiration of the fluid noted on CT surrounding the right common iliac artery stent since this could be an abscess. 
-Urology consulted for hydronephrosis right ->saw pt on :no intervention needed 
-On :CT guided aspiration of pelvic fluid collection - culture results pending. -WBC trending down Diabetes mellitus 2 on Insulin at home Dysphagia due to esophageal dysmotility on MBstudy last admission- s/p PEG for -FS Q 6 hrs on tube feeding 
-Lispro SSI here 
-Add NPH as needed   
   
ESRD on HD MWF Anemia of chronic disease POA S/p L arm AVFistula 
-Stool guaiac neg in ER 
-Nephrology consult for HD 
-Cont epogen as per renal 
-PRBC deferred to nephrology with HD - received blood transfusion  
-Renvela TID 
  
Hyperlipidemia 
-Cont on statins 
   
Hypertension 
-Switch Coreg to IV BB with parametes 
-Add PRN nitropaste 
   
? Multiple sclerosis POA ?  Exacerbation causing dysphagia- cannot tell till MRI done 
-Neurology consult noted last admission- pt has been refusing MRI till now, no further workup recommended 
   
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 
-Vascular surgery consult as above 
   
H/o CAD Chronic Diastolic & Systolic CHF POA- EF 95%, mod LVH on recent Echo 6/29 
-Conservative approach was recommended last admission by Cardiology in light of other co morbidities.    
ESRD / HD ~ 8 years PAD s/p b/l leg stents S/p remote partial (Syme) amputation of RIGHT foot. -Nephrology consulted for HD MWF 
-Cont ASA 
-Vascular surgery consult as above for opinion on ? Stent infection 
  
  
Code Status: Full Surrogate Decision Maker: wife 
  
DVT Prophylaxis: Sq heparin GI Prophylaxis: not indicated 
  
Baseline: Pt was recently DC to [facility] from [facility] after being treated for R Iliac ar pseudoaneurysm s/p Stent by Vasc Sx Dr Mamie Hayes, Eso Dysmotility syndrome causing Dysphagia causing aspiration pneumonia s/p PEG tube Pt had denied any neuro workup - denied MRI as recommended by neurology to find the cause of his eso dysmotility- ?MS flare Body mass index is 23.57 kg/(m^2). Subjective: Chief Complaint / Reason for Physician Visit Admitted for sepsis suspected from multiple sources including possible abscess surrounding right iliac stent,UTI,decubitus ulcers. Patient feeling tired. Had dialysis yesterday. Discussed with RN events overnight. Review of Systems: 
Symptom Y/N Comments  Symptom Y/N Comments Fever/Chills n   Chest Pain n   
Poor Appetite    Edema Cough n   Abdominal Pain n   
Sputum n   Joint Pain SOB/BANKS n   Pruritis/Rash Nausea/vomit n   Tolerating PT/OT Diarrhea    Tolerating Diet Constipation    Other Could NOT obtain due to:   
 
Objective: VITALS:  
Last 24hrs VS reviewed since prior progress note. Most recent are: 
Patient Vitals for the past 24 hrs: 
 Temp Pulse Resp BP SpO2  
07/19/18 1456 98.3 °F (36.8 °C) 74 18 132/65 98 % 07/19/18 1450 - 87 18 132/52 98 % 07/19/18 1430 - 87 18 125/50 98 % 07/19/18 1420 - 84 18 121/46 98 % 07/19/18 1415 - 79 18 118/44 98 % 07/19/18 1330 98 °F (36.7 °C) 78 18 121/60 94 % 07/19/18 1145 98.6 °F (37 °C) 70 18 118/54 93 % 07/19/18 0800 97.9 °F (36.6 °C) 66 18 129/60 100 % 07/19/18 0150 96.7 °F (35.9 °C) 73 18 134/61 97 % 07/19/18 0133 99.7 °F (37.6 °C) 72 17 134/61 98 % 07/18/18 2327 98.3 °F (36.8 °C) 78 18 136/52 96 % 07/18/18 2148 98.3 °F (36.8 °C) - - - -  
07/18/18 1930 (!) 101 °F (38.3 °C) 82 16 137/58 96 % Intake/Output Summary (Last 24 hours) at 07/19/18 1628 Last data filed at 07/19/18 0800 Gross per 24 hour Intake              340 ml Output                0 ml Net              340 ml PHYSICAL EXAM: 
General: WD, WN. Alert, cooperative, no acute distress   
EENT:  EOMI. Anicteric sclerae. MMM Resp:  CTA bilaterally, no wheezing or rales. No accessory muscle use CV:  Regular  rhythm,  No edema GI:  Soft, Non distended, Non tender.  +Bowel sounds Neurologic:  Alert and oriented X 3, normal speech, Psych:   Good insight. Not anxious nor agitated Skin:  No rashes. No jaundice Reviewed most current lab test results and cultures  YES Reviewed most current radiology test results   YES Review and summation of old records today    NO Reviewed patient's current orders and MAR    YES 
PMH/SH reviewed - no change compared to H&P 
________________________________________________________________________ Care Plan discussed with: 
  Comments Patient x Family RN x Care Manager Consultant  x Vascular surgery Multidiciplinary team rounds were held today with , nursing, pharmacist and clinical coordinator. Patient's plan of care was discussed; medications were reviewed and discharge planning was addressed. ________________________________________________________________________ Total NON critical care TIME: 35   Minutes Total CRITICAL CARE TIME Spent: Minutes non procedure based Comments >50% of visit spent in counseling and coordination of care x   
________________________________________________________________________ Teresa Guajardo MD  
 
Procedures: see electronic medical records for all procedures/Xrays and details which were not copied into this note but were reviewed prior to creation of Plan. LABS: 
I reviewed today's most current labs and imaging studies. Pertinent labs include: 
Recent Labs  
   07/19/18 
 0648  07/18/18 
 0320  07/17/18 
 1146 WBC  14.8*  16.7*  16.1* HGB  7.2*  6.4*  7.0*  
HCT  24.1*  21.2*  22.8*  
PLT  246  239  250 Recent Labs  
   07/19/18 
 0648  07/18/18 
 0320  07/17/18 
 1146 NA  136  132*  129*  
K  4.1  4.4  4.3 CL  101  95*  90* CO2  29  31  33* GLU  104*  120*  216* BUN  25*  44*  40* CREA  3.47*  5.03*  4.91* CA  9.0  9.0  9.3 ALB  1.2*   --   1.4* TBILI  0.9   --   0.5 SGOT  27   --   40* ALT  21   --   26 Signed: Teresa Guajardo MD

## 2018-07-19 NOTE — PROGRESS NOTES
Febrile last night  WBC still 14K  Awaiting percutaneous drainage today  I updated wife by phone about drainage plans (she is travelling in [place] #[phone])

## 2018-07-19 NOTE — PROCEDURES
PROCEDURE:CT-guided aspiration of right pelvic collection. INDICATION:collection surrounding vessels. ANESTHESIA:local.  COMPLICATION:NONE. SPECIMENS REMOVED:bloody fluid. BLOOD LOSS:NONE. /ASSISTANT:EVERETT Newell RECOMMENDATIONS:none. CONSENT OBTAINED:YES.  NOTES:guide wire would not easily track into collection via groin approach. Aspiration of fluid performed (as much as possible) and fluid sent for labs. Possibly superinfected pertly liquified hematoma.

## 2018-07-20 NOTE — PROGRESS NOTES
Hospitalist Progress Note NAME: Jan Mcclain :  1950 MRN:  [id number] Assessment / Plan: 
Sepsis with Hypotension POA- WBC 16.1k, fever 100.7 ? source at this time- suspected B/L Buttock Pressure injury ulcer/wound infection vs R iliac ar pseudoaneurysm/collection/stent infection,UTI 
-CXR neg 
-UA c/w UTI - ordered culture - still pending 
-Blood Cx negative 
-Lact normal at 1.4 
-S/p IVF bolus in ER followed by gentle hydration x 12 hrs. Pt has ESRD 
-On cefepime/vancomycin 
-CT abdm/pelvis showing Right common iliac artery stent with enhancing fluid collection surrounding the stent and extending into the retroperitoneum on the right. This is suspicious for infection. Also showing Right hydronephrosis and hydroureter secondary to compression of the ureter by the collection around the stent 
-On ,Vascular surgery saw patient and will proceed with aspiration of the fluid noted on CT surrounding the right common iliac artery stent since this could be an abscess. 
-Urology consulted for hydronephrosis right ->saw pt on :no intervention needed 
-On :CT guided aspiration of pelvic fluid collection - culture of fluid showing few GNRs,identification pending. Cx from anaerobic bottle pending results 
-WBC trending down 13.6 today Diabetes mellitus 2 on Insulin at home Dysphagia due to esophageal dysmotility on MBstudy last admission- s/p PEG for -FS Q 6 hrs on tube feeding 
-Lispro SSI here 
-Add NPH as needed   
   
ESRD on HD MWF Anemia of chronic disease POA S/p L arm AVFistula 
-Stool guaiac neg in ER 
-Nephrology consult for HD 
-Cont epogen as per renal 
-PRBC deferred to nephrology with HD - received blood transfusion  
-Renvela TID 
  
Hyperlipidemia 
-Cont on statins 
   
Hypertension 
-Switched Coreg to IV BB with parametes -PRN nitropaste 
   
? Multiple sclerosis POA ?  Exacerbation causing dysphagia- cannot tell till MRI done 
-Neurology consult noted last admission- pt has been refusing MRI till now, no further workup recommended 
   
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 
-Vascular surgery consult as above 
   
H/o CAD Chronic Diastolic & Systolic CHF POA- EF 42%, mod LVH on recent Echo 6/29 
-Conservative approach was recommended last admission by Cardiology in light of other co morbidities.    
ESRD / HD ~ 8 years PAD s/p b/l leg stents S/p remote partial (Syme) amputation of RIGHT foot. -Nephrology consulted for HD MWF 
-Cont ASA 
-Vascular surgery consult as above for opinion on ? Stent infection 
  
  
Code Status: Full Surrogate Decision Maker: wife 
  
DVT Prophylaxis: Sq heparin GI Prophylaxis: not indicated 
  
Baseline: Pt was recently DC to [facility] from [facility] after being treated for R Iliac ar pseudoaneurysm s/p Stent by Vasc Sx Dr Soraya Baca, Eso Dysmotility syndrome causing Dysphagia causing aspiration pneumonia s/p PEG tube Pt had denied any neuro workup - denied MRI as recommended by neurology to find the cause of his eso dysmotility- ?MS flare Body mass index is 23.57 kg/(m^2). Subjective: Chief Complaint / Reason for Physician Visit Admitted for sepsis suspected from multiple sources including possible abscess surrounding right iliac stent,UTI,decubitus ulcers. Patient feeling better today. Discussed with RN events overnight. Review of Systems: 
Symptom Y/N Comments  Symptom Y/N Comments Fever/Chills n   Chest Pain n   
Poor Appetite    Edema Cough n   Abdominal Pain n   
Sputum n   Joint Pain SOB/BANKS n   Pruritis/Rash Nausea/vomit n   Tolerating PT/OT Diarrhea    Tolerating Diet Constipation    Other Could NOT obtain due to:   
 
Objective: VITALS:  
Last 24hrs VS reviewed since prior progress note.  Most recent are: 
Patient Vitals for the past 24 hrs: 
 Temp Pulse Resp BP SpO2  
07/20/18 1300 98 °F (36.7 °C) 77 18 137/69 98 %  
07/20/18 0815 98.5 °F (36.9 °C) 80 18 139/67 98 %  
07/20/18 0330 98.7 °F (37.1 °C) 79 16 130/57 95 % 07/19/18 2014 100.4 °F (38 °C) 82 16 149/68 96 % 07/19/18 1456 98.3 °F (36.8 °C) 74 18 132/65 98 % 07/19/18 1450 - 87 18 132/52 98 % 07/19/18 1430 - 87 18 125/50 98 % No intake or output data in the 24 hours ending 07/20/18 1428 PHYSICAL EXAM: 
General: WD, WN. Alert, cooperative, no acute distress   
EENT:  EOMI. Anicteric sclerae. MMM Resp:  CTA bilaterally, no wheezing or rales. No accessory muscle use CV:  Regular  rhythm,  No edema GI:  Soft, Non distended, Non tender.  +Bowel sounds Neurologic:  Alert and oriented X 3, normal speech, Psych:   Good insight. Not anxious nor agitated Skin:  No rashes. No jaundice Reviewed most current lab test results and cultures  YES Reviewed most current radiology test results   YES Review and summation of old records today    NO Reviewed patient's current orders and MAR    YES 
PMH/ reviewed - no change compared to H&P 
________________________________________________________________________ Care Plan discussed with: 
  Comments Patient x Family RN x Care Manager Consultant  x Vascular surgery Multidiciplinary team rounds were held today with , nursing, pharmacist and clinical coordinator. Patient's plan of care was discussed; medications were reviewed and discharge planning was addressed. ________________________________________________________________________ Total NON critical care TIME: 35   Minutes Total CRITICAL CARE TIME Spent:   Minutes non procedure based Comments >50% of visit spent in counseling and coordination of care x   
________________________________________________________________________ Darian Pearce MD  
 
Procedures: see electronic medical records for all procedures/Xrays and details which were not copied into this note but were reviewed prior to creation of Plan.    
 
LABS: 
I reviewed today's most current labs and imaging studies. Pertinent labs include: 
Recent Labs  
   07/20/18 
 0557  07/19/18 
 0648  07/18/18 
 0320 WBC  13.6*  14.8*  16.7* HGB  7.5*  7.2*  6.4* HCT  24.3*  24.1*  21.2*  
PLT  280  246  239 Recent Labs  
   07/20/18 
 0557  07/19/18 
 6577  07/18/18 
 0320 NA  135*  136  132* K  4.3  4.1  4.4 CL  99  101  95* CO2  29  29  31 GLU  116*  104*  120* BUN  32*  25*  44* CREA  4.44*  3.47*  5.03* CA  9.0  9.0  9.0 ALB  1.2*  1.2*   --   
TBILI  0.6  0.9   --   
SGOT  24  27   --   
ALT  20 21   --   
 
 
Signed: Ariana Resendiz MD

## 2018-07-20 NOTE — PROGRESS NOTES
NAME: Moise Burton :  1950 MRN:  [id number] Assessment :    Plan: 
--ESRD Left AVF 
HTN Anemia Hyponatremia Hypoalbuminemia Sacral Decubitus Right retroperitoneal abscess/hematoma with asso right hydro MS 
PEG tube --MWF HD [facility] (most recently at [facility]). HD today 
  
Hgb 7.5; epo 10 k sc tiw; one unit prbc's  
  
Holding Parsabiv (not available in the hospital) S/p FNA of retroperitoneal abscess/hematoma  Will see again on Monday, but please call if questions or issues in the meantime. Subjective: Chief Complaint:  In bed. Resting. Low back pain. Denies any other complaint. Review of Systems: 
 
Symptom Y/N Comments  Symptom Y/N Comments Fever/Chills    Chest Pain n   
Poor Appetite    Edema n   
Cough    Abdominal Pain Sputum    Joint Pain SOB/BANKS n   Pruritis/Rash Nausea/vomit n   Tolerating PT/OT Diarrhea    Tolerating Diet Constipation    Other Could not obtain due to:   
 
Objective: VITALS:  
Last 24hrs VS reviewed since prior progress note. Most recent are: 
Visit Vitals  /57 (BP 1 Location: Right arm, BP Patient Position: At rest)  Pulse 79  Temp 98.7 °F (37.1 °C)  Resp 16  
 Ht 5' 10\" (1.778 m)  Wt 74.2 kg (163 lb 9.3 oz)  SpO2 95%  BMI 23.47 kg/m2 Intake/Output Summary (Last 24 hours) at 18 0541 Last data filed at 18 0800 Gross per 24 hour Intake              170 ml Output                0 ml Net              170 ml Telemetry Reviewed: PHYSICAL EXAM: 
General: Chronically ill appearing. Alert, cooperative, no acute distress Resp:  CTA Bilaterally. No Wheezing/Rhonchi/Rales. No access. muscle use CV:  Regular  rhythm,  No murmur (), No Rubs, No Gallops. No edema GI:  Soft, Non distended, Non tender.  +Bowel sounds, no HSM Lab Data Reviewed: (see below) Medications Reviewed: (see below) PMH/SH reviewed - no change compared to H&P 
________________________________________________________________________ Care Plan discussed with: 
Patient y Family RN Care Manager Consultant:     
 
  Comments >50% of visit spent in counseling and coordination of care    
 
________________________________________________________________________ Humza Wall MD  
 
Procedures: see electronic medical records for all procedures/Xrays and details which 
were not copied into this note but were reviewed prior to creation of Plan. LABS: 
Recent Labs  
   07/19/18 
 0648  07/18/18 
 0320 WBC  14.8*  16.7* HGB  7.2*  6.4* HCT  24.1*  21.2*  
PLT  246  239 Recent Labs  
   07/19/18 
 0648  07/18/18 
 0320  07/17/18 
 1146 NA  136  132*  129*  
K  4.1  4.4  4.3 CL  101  95*  90* CO2  29  31  33* BUN  25*  44*  40* CREA  3.47*  5.03*  4.91* GLU  104*  120*  216* CA  9.0  9.0  9.3 Recent Labs  
   07/19/18 
 0648  07/17/18 
 1146 SGOT  27  40* AP  220*  275* TP  7.3  8.2 ALB  1.2*  1.4*  
GLOB  6.1*  6.8* No results for input(s): INR, PTP, APTT in the last 72 hours. No lab exists for component: INREXT, INREXT No results for input(s): FE, TIBC, PSAT, FERR in the last 72 hours. No results found for: FOL, RBCF No results for input(s): PH, PCO2, PO2 in the last 72 hours. No results for input(s): CPK, CKMB in the last 72 hours. No lab exists for component: TROPONINI No components found for: [place] Lab Results Component Value Date/Time  Color RED 07/17/2018 02:54 PM  
 Appearance CLOUDY (A) 07/17/2018 02:54 PM  
 Specific gravity 1.024 07/17/2018 02:54 PM  
 Specific gravity 1.015 12/30/2014 06:40 PM  
 pH (UA) 7.0 07/17/2018 02:54 PM  
 Protein 300 (A) 07/17/2018 02:54 PM  
 Glucose 100 (A) 07/17/2018 02:54 PM  
 Ketone NEGATIVE  07/17/2018 02:54 PM  
 Bilirubin SMALL (A) 07/17/2018 02:54 PM  
 Urobilinogen 0.2 07/17/2018 02:54 PM  
 Nitrites NEGATIVE  07/17/2018 02:54 PM  
 Leukocyte Esterase SMALL (A) 07/17/2018 02:54 PM  
 Epithelial cells FEW 07/17/2018 02:54 PM  
 Bacteria 1+ (A) 07/17/2018 02:54 PM  
 WBC >100 (H) 07/17/2018 02:54 PM  
 RBC 10-20 07/17/2018 02:54 PM  
 
 
MEDICATIONS: 
Current Facility-Administered Medications Medication Dose Route Frequency  balsam peru-castor oil (VENELEX) [id number] mg/gram ointment   Topical BID  sodium hypochlorite (QUARTER STRENGTH DAKIN'S) 0.125% irrigation (bottle)   Topical DAILY  collagenase (SANTYL) 250 unit/gram ointment   Topical DAILY  epoetin niya (EPOGEN;PROCRIT) injection 10,000 Units  10,000 Units SubCUTAneous Q MON, WED & FRI  
 0.9% sodium chloride infusion 250 mL  250 mL IntraVENous PRN  
 sodium chloride (NS) flush 5-10 mL  5-10 mL IntraVENous PRN  
 cefepime (MAXIPIME) 1 g in 0.9% sodium chloride (MBP/ADV) 50 mL  1 g IntraVENous Q24H  
 levoFLOXacin (LEVAQUIN) 500 mg in D5W IVPB  500 mg IntraVENous Q48H  
 vancomycin (VANCOCIN) 750 mg in 0.9% sodium chloride (MBP/ADV) 250 mL  750 mg IntraVENous DIALYSIS PRN And  
 VANCOMYCIN INFORMATION NOTE   Other DAILY  acetaminophen (TYLENOL) solution 650 mg  650 mg Per G Tube Q3H PRN  
 aspirin chewable tablet 81 mg  81 mg Per G Tube DAILY  escitalopram oxalate (LEXAPRO) 5 mg/5 mL oral solution soln 10 mg  10 mg Per G Tube DAILY  insulin lispro (HUMALOG) injection   SubCUTAneous Q6H  
 glucose chewable tablet 16 g  4 Tab Oral PRN  
 dextrose (D50W) injection syrg 12.5-25 g  12.5-25 g IntraVENous PRN  
 glucagon (GLUCAGEN) injection 1 mg  1 mg IntraMUSCular PRN  
 morphine 10 mg/5 mL oral solution 10 mg  5 mL Per G Tube Q4H PRN  
 oxyCODONE-acetaminophen (PERCOCET) 5-325 mg per tablet 1 Tab  1 Tab Per G Tube Q4H PRN  
 sevelamer carbonate (RENVELA) oral powder 2,400 mg  2.4 g Per G Tube TID WITH MEALS  pravastatin (PRAVACHOL) tablet 10 mg  10 mg Per G Tube QHS  sodium chloride (NS) flush 5-10 mL  5-10 mL IntraVENous Q8H  
 sodium chloride (NS) flush 5-10 mL  5-10 mL IntraVENous PRN  
 naloxone (NARCAN) injection 0.4 mg  0.4 mg IntraVENous PRN  
 ondansetron (ZOFRAN) injection 4 mg  4 mg IntraVENous Q4H PRN  
 heparin (porcine) injection 5,000 Units  5,000 Units SubCUTAneous Q8H

## 2018-07-20 NOTE — PROGRESS NOTES
Afebrile X 48 hrs  WBC = 13K  Aspirated fluid has WBC and gram(+) so far  Await final results to tailor antibiotics

## 2018-07-20 NOTE — PROGRESS NOTES
Pharmacy Automatic Renal Dosing Protocol - Antimicrobials    Indication for Antimicrobials: sepsis probably d/t iliac artery stent infection; sacral decubitus, in setting of MS; HD (MWF); PEG tube    Current Regimen of Each Antimicrobial:  Levofloxacin 750mg IV, then 500mg IV every 48hr (Start Date ; Day # 4)  Cefepime 1g IV every 24hr (start date: , Day #4)  Vancomycin 1750mg IV loading dose, then 750mg as needed post dialysis (start date: , Day #4)    Previous Antimicrobial Therapy:    Goal Level: VANCOMYCIN TROUGH GOAL RANGE    Vancomycin Trough: 15 - 20 mcg/mL    Date Dose & Interval Measured (mcg/mL) Extrapolated (mcg/mL)                750mg IV post HD 23.7 N/A     Significant Cultures:   : Blood = ng x3 days (pending)  : Pelvic Abscess Fluid = 3+ WBC; few GNR (pending)  : Pelvic Abscess Fluid-Anaerobic =  (pending)    Radiology / Imaging results: (X-ray, CT scan or MRI):   : CXR: no acute process  : CT Abd: Right common iliac artery stent with enhancing fluid collection surrounding  the stent and extending into the retroperitoneum on the right, suspicious for infection. Paralysis, amputations, malnutrition: none    Labs:  Recent Labs      18   0557  18   0648  18   0320   CREA  4.44*  3.47*  5.03*   BUN  32*  25*  44*   WBC  13.6*  14.8*  16.7*     Temp (24hrs), Av.8 °F (37.1 °C), Min:98 °F (36.7 °C), Max:100.4 °F (38 °C)    Creatinine Clearance (mL/min) or Dialysis: HD    Impression/Plan:   · Pelvic Abscess fluid w/ few GNR (pending)  · Vancomycin Trough 23.7 is higher than desired (15-20) to treat infections in HD patients  · Decrease Vancomycin to 500mg IV after each HD  and Monday, then repeat Vancomycin Trough before HD on Wednesday  · Suspect infection due to iliac artery stent  · Continue Cefepime 1gm IV q24h   · Continue Levofloxacin 500mg IV q48h  · Antimicrobial stop date to be determined.       Pharmacy will follow daily and adjust medications as appropriate for renal function and/or serum levels. Thank you,  Jarret Gloria [facility]    Recommended duration of therapy  [email or web address]/[facility]/Pharmacy/Clinical%[email or web address]. docx    Renal Dosing  [email or web address]/[facility]/Pharmacy/Clinical%20Companion/Renal%[id number]. pdf

## 2018-07-20 NOTE — PROGRESS NOTES
Spoke with Dr. Donna Hooper regarding patient having greater than 500ml in gastric residual. MD stated to remove all contents in stomach. (Pulled a total of 580ml out) Then restart tube feeds.  RN to recheck gastric residual in 4 hours and if great than 200ml page MD.

## 2018-07-20 NOTE — PROGRESS NOTES
Nutrition Assessment:    INTERVENTIONS/RECOMMENDATIONS:   · Continue current TF order   · TF are being held for HD, two option to ensure pt receives adequate nutrition:  · Option 1:  Nepro 240 mL bolus TID + 360 mL bolus daily (total of 4 bolus feedings) + 170 mL water flushes q 4 hours (70 mL flush before and 100 mL flush after each bolus + 2 additional water flushes); 1944 kcal, 87.5g protein  · Option 2: Increase rate to 50 ml/hr so that weekly average meets nutrition needs      ASSESSMENT:   Chart reviewed. Noted that pt only received ~55% of order TF in the past 72 hrs. Could be due to TF held for HD and other procedures. Will continue current TF order for now however if he does not meet at least 80% of order TF on average by next follow-up will increase rate. Diet Order: NPO (Nepro @ 45 ml/hr + 170 ml water flush q 4 hrs)  % Eaten:  No data found. Pertinent Medications: [x]Reviewed: epo, humalog, renvela,   Pertinent Labs: [x]Reviewed:   Food Allergies: [x]NKFA  []Other   Last BM:  7/19  Edema:      []RUE   []LUE   []RLE   []LLE      Pressure Ulcer:  Multiple DTI and unstageable PI     Anthropometrics: Height: 5' 10\" (177.8 cm) Weight: 74.2 kg (163 lb 9.3 oz)    IBW (%IBW):   ( ) UBW (%UBW):   (  %)    BMI: Body mass index is 23.47 kg/(m^2). This BMI is indicative of:  []Underweight   [x]Normal   []Overweight   [] Obesity   [] Extreme Obesity (BMI>40)  Last Weight Metrics:  Weight Loss Metrics 7/18/2018 7/9/2018 6/13/2018 4/17/2018 4/12/2018 4/5/2018 3/1/2018   Today's Wt 163 lb 9.3 oz 164 lb 3.9 oz 150 lb 155 lb 166 lb 166 lb 14.4 oz 153 lb   BMI 23.47 kg/m2 23.57 kg/m2 21.52 kg/m2 22.24 kg/m2 23.82 kg/m2 23.95 kg/m2 21.95 kg/m2       Estimated Nutrition Needs (Based on): 1800 Kcals/day (BMR: 1500 x 1.2) , 90 g (1.2 g/kg) Protein  Carbohydrate:  At Least 130 g/day  Fluids: 1800 mL/day or per primary team    NUTRITION DIAGNOSES:   Problem:  Less than optimal enteral nutrition      Etiology: related to time off pump     Signs/Symptoms: as evidenced by pt received ~55% of ordered TF in the past 72 hrs    Previous Nutrition Dx:  [] Resolved  [] Unresolved           [x] Progressing    NUTRITION INTERVENTIONS:    Enteral/Parenteral Nutrition: Other (Continue current TF order)                GOAL:   met >80% of ordered TF in 2-4 days    NUTRITION MONITORING AND EVALUATION      Food/Nutrient Intake Outcomes: Enteral/parenteral nutrition intake  Physical Signs/Symptoms Outcomes: Weight/weight change, Electrolyte and renal profile, Glucose profile, GI    Previous Goal Met:   [] Met              [x] Progressing Towards Goal              [] Not Progressing Towards Goal   Previous Recommendations:   [] Implemented          [] Not Implemented          [x] Not Applicable    LEARNING NEEDS (Diet, Food/Nutrient-Drug Interaction):    [x] None Identified   [] Identified and Education Provided/Documented   [] Identified and Pt declined/was not appropriate     Cultural, Spiritism, OR Ethnic Dietary Needs:    [x] None Identified   [] Identified and Addressed     [x] Interdisciplinary Care Plan Reviewed/Documented    [x] Discharge Planning: Continue current TF order or Nepro 240 mL bolus TID + 360 mL bolus daily (total of 4 bolus feedings) + 170 mL water flushes q 4 hours (70 mL flush before and 100 mL flush after each bolus + 2 additional water flushes); 1944 kcal, 87.5g protein, 1805 mL water     [] Participated in Interdisciplinary Rounds    NUTRITION RISK:    [x] High      to        [x] Moderate           []  Low  []  Minimal/Uncompromised      Erickson Greer RDN  Pager [phone]  Weekend Pager [phone]

## 2018-07-21 NOTE — PROGRESS NOTES
Hospitalist Progress Note NAME: Herma Fleischer :  1950 MRN:  [id number] Assessment / Plan: 
Sepsis with Hypotension POA- WBC 16.1k, fever 100.7 ? source at this time- suspected B/L Buttock Pressure injury ulcer/wound infection vs R iliac ar pseudoaneurysm/collection/stent infection, UTI 
-CXR neg 
-UA c/w UTI - ordered culture - still pending 
-Blood Cx negative 
-Lact normal at 1.4 
-S/p IVF bolus in ER followed by gentle hydration x 12 hrs. Pt has ESRD 
-On cefepime/vancomycin/levaquin 
-CT abdm/pelvis showing Right common iliac artery stent with enhancing fluid collection surrounding the stent and extending into the retroperitoneum on the right. suspicious for infection. Also showing Right hydronephrosis and hydroureter secondary to compression of the ureter by the collection around the stent 
-On ,Vascular surgery saw patient and will proceed with aspiration of the fluid noted on CT surrounding the right common iliac artery stent since this could be an abscess. 
-Urology consulted for hydronephrosis right ->saw pt on :no intervention needed 
-On :CT guided aspiration of pelvic fluid collection - culture of fluid showing few GNRs,identification pending. Cx from anaerobic bottle pending results 
-WBC trending down 13.6 today Diabetes mellitus 2 on Insulin at home Dysphagia due to esophageal dysmotility on MBstudy last admission- s/p PEG for -FS Q 6 hrs on tube feeding 
-Lispro SSI here 
-Add NPH as needed   
   
ESRD on HD MWF Anemia of chronic disease POA S/p L arm AVFistula 
-Stool guaiac neg in ER 
-Nephrology consult for HD 
-Cont epogen as per renal 
-PRBC deferred to nephrology with HD - received blood transfusion  
-Renvela TID 
  
Hyperlipidemia 
-Cont on statins 
   
Hypertension 
-Switched Coreg to IV BB with parametes -PRN nitropaste 
   
? Multiple sclerosis POA ?  Exacerbation causing dysphagia- cannot tell till MRI done 
-Neurology consult noted last admission- pt has been refusing MRI till now, no further workup recommended 
   
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 
-Vascular surgery consult as above 
   
H/o CAD Chronic Diastolic & Systolic CHF POA- EF 27%, mod LVH on recent Echo 6/29 
-Conservative approach was recommended last admission by Cardiology in light of other co morbidities.    
ESRD / HD ~ 8 years PAD s/p b/l leg stents S/p remote partial (Syme) amputation of RIGHT foot. -Nephrology consulted for HD MWF 
-Cont ASA 
-Vascular surgery consult as above for opinion on ? Stent infection 
  
  
Code Status: Full Surrogate Decision Maker: wife 
  
DVT Prophylaxis: Sq heparin GI Prophylaxis: not indicated 
  
Baseline: Pt was recently DC to [facility] from [address] after being treated for R Iliac ar pseudoaneurysm s/p Stent by Vasc Sx Dr Alex Cobian, Eso Dysmotility syndrome causing Dysphagia causing aspiration pneumonia s/p PEG tube Pt had denied any neuro workup - denied MRI as recommended by neurology to find the cause of his eso dysmotility- ?MS flare Body mass index is 23.57 kg/(m^2). Subjective: Chief Complaint / Reason for Physician Visit Admitted for sepsis suspected from multiple sources including possible abscess surrounding right iliac stent,UTI,decubitus ulcers. HPI: 
 
Patient seen and examined at the bedside. Discussed with RN events overnight. Feeling better today. No fevers, Chills or rigors, Ate well Review of Systems: 
Symptom Y/N Comments  Symptom Y/N Comments Fever/Chills n   Chest Pain n   
Poor Appetite    Edema Cough n   Abdominal Pain n   
Sputum n   Joint Pain SOB/BANKS n   Pruritis/Rash Nausea/vomit n   Tolerating PT/OT Diarrhea    Tolerating Diet Constipation    Other Could NOT obtain due to:   
 
Objective: VITALS:  
Last 24hrs VS reviewed since prior progress note.  Most recent are: 
Patient Vitals for the past 24 hrs: 
 Temp Pulse Resp BP SpO2  
07/21/18 0834 99.8 °F (37.7 °C) 86 22 149/64 97 %  
07/21/18 0417 - 77 - 144/65 -  
07/21/18 0400 - 72 - 125/55 -  
07/21/18 0330 - 71 - 115/59 -  
07/21/18 0300 - 70 - 128/85 -  
07/21/18 0230 - 71 - 123/59 -  
07/21/18 0200 - 72 - 135/62 -  
07/21/18 0130 - 74 - 138/64 -  
07/21/18 0100 98.7 °F (37.1 °C) 72 17 136/61 98 %  
07/20/18 1847 98.4 °F (36.9 °C) 77 18 139/65 98 %  
07/20/18 1551 98.3 °F (36.8 °C) 76 18 146/69 97 %  
07/20/18 1300 98 °F (36.7 °C) 77 18 137/69 98 % Intake/Output Summary (Last 24 hours) at 07/21/18 1057 Last data filed at 07/21/18 8113 Gross per 24 hour Intake                0 ml Output             1500 ml Net            -1500 ml PHYSICAL EXAM: 
General: WD, WN. Alert, cooperative, no acute distress   
EENT:  EOMI. Anicteric sclerae. MMM Resp:  CTA bilaterally, no wheezing or rales. No accessory muscle use CV:  Regular  Rhythm, 3/6 SM,  No edema GI:  Soft, Non distended, Non tender.  +Bowel sounds Neurologic:  Alert and oriented X 3, normal speech, Psych:   Good insight. Not anxious nor agitated Skin:  No rashes. No jaundice Reviewed most current lab test results and cultures  YES Reviewed most current radiology test results   YES Review and summation of old records today    NO Reviewed patient's current orders and MAR    YES 
PMH/SH reviewed - no change compared to H&P 
________________________________________________________________________ Care Plan discussed with: 
  Comments Patient x Family RN x Care Manager Consultant Multidiciplinary team rounds were held today with , nursing, pharmacist and clinical coordinator. Patient's plan of care was discussed; medications were reviewed and discharge planning was addressed. ________________________________________________________________________ Total NON critical care TIME: 35   Minutes Total CRITICAL CARE TIME Spent:   Minutes non procedure based Comments >50% of visit spent in counseling and coordination of care x   
________________________________________________________________________ Radha Kaur MD  
 
Procedures: see electronic medical records for all procedures/Xrays and details which were not copied into this note but were reviewed prior to creation of Plan. LABS: 
I reviewed today's most current labs and imaging studies. Pertinent labs include: 
Recent Labs  
   07/21/18 
 0537  07/20/18 
 0557  07/19/18 
 6491 WBC  14.5*  13.6*  14.8* HGB  10.0*  7.5*  7.2* HCT  32.5*  24.3*  24.1*  
PLT  339  280  246 Recent Labs  
   07/21/18 
 0537  07/20/18 
 0557  07/19/18 
 9225 NA  135*  135*  136  
K  4.0  4.3  4.1 CL  99  99  101 CO2  32  29  29 GLU  94  116*  104* BUN  17  32*  25* CREA  2.73*  4.44*  3.47* CA  9.8  9.0  9.0 ALB  1.5*  1.2*  1.2* TBILI  0.7  0.6  0.9 SGOT  38*  24  27 ALT  28  20  21 Signed: Radha Kaur MD

## 2018-07-21 NOTE — PROGRESS NOTES
Bedside and Verbal shift change report given to Hue Red (oncoming nurse) by Robert Kohler (offgoing nurse). Report included the following information SBAR, Kardex, MAR and Recent Results.

## 2018-07-21 NOTE — PROGRESS NOTES
MD ordered UA. This pt is ESRD, on HD. He does not make urine. Also, pt has a penis wound and inflammation at meatus. Straight cath could be traumatic. RN unable to obtain urine from this pt. WIfe says pt has not made urine for a long time. Can we consider a palliative care consult?    2:54 PM  Pt's wife and other family members are in room, visiting pt. Pt has become more alert and engaged since family arrived. Family agrees that pt is confused, gradually more confused lately. 1900  Bedside shift change report given to jorge Guadalupe RN by Kerry Stanley RN (offgoing nurse). Report given with SBAR, MAR, Recent Results, Vital Signs, and plan of care. Pt is resting in bed . Call bell within reach of patient. Safety/fall precautions in place. Tube feed is going continuously with intermittent flushes.

## 2018-07-21 NOTE — DIALYSIS
[facility]  [phone]    Vitals   Pre   Post   Assessment   Pre   Post     Temp  98.7  98.4 LOC  AXO to self, forgetful AXOX2   HR   80 77 Lungs   CTA  CTA   B/P   139/62 144/65 Cardiac   SR  SR with PVCs   Resp   18 19 Skin   Warm, L PEG tube,sacral decub. Unchanged   Pain level  2/10 back pain 0/10 Edema    None None   Orders:    Duration:   Start:   0043 End:    8265 Total:   3.5 hours   Dialyzer:   Dialyzer/Set Up Inspection: Michael Earing (07/21/18 0100)   K Bath:   Dialysate K (mEq/L): 3 (07/21/18 0100)   Ca Bath:   Dialysate CA (mEq/L): 2.5 (07/21/18 0100)   Na/Bicarb:   Dialysate NA (mEq/L): 140 (07/21/18 0100)   Target Fluid Removal:   Goal/Amount of Fluid to Remove (mL): 1500 mL (07/21/18 0100)   Access     Type & Location:   LUE AVF  with large aneurysms, +T/+B pre/post HD, prepped and cannulated aseptically per protocol, lines visible and secure. Labs     Obtained/Reviewed   Critical Results Called   Date when labs were drawn-  Hgb-    HGB   Date Value Ref Range Status   07/20/2018 7.5 (L) 12.1 - 17.0 g/dL Final     K-    Potassium   Date Value Ref Range Status   07/20/2018 4.3 3.5 - 5.1 mmol/L Final     Ca-   Calcium   Date Value Ref Range Status   07/20/2018 9.0 8.5 - 10.1 MG/DL Final     Bun-   BUN   Date Value Ref Range Status   07/20/2018 32 (H) 6 - 20 MG/DL Final     Creat-   Creatinine   Date Value Ref Range Status   07/20/2018 4.44 (H) 0.70 - 1.30 MG/DL Final     Comment:     INVESTIGATED PER DELTA CHECK PROTOCOL        Medications/ Blood Products Given     Name   Dose   Route and Time                     Blood Volume Processed (BVP):    80.5 Net Fluid   Removed:  1500 ml   Comments   Time Out Done: Yes, ICEBOAT  Primary Nurse Rpt Eleni Arias RN  Primary Nurse Rpt Post:LUI Hall RN  Pt Education:Access care, pt constantly bending fistula extremity while needles in place. Does not seem to understand importance of keeping his arm straight.   Care Plan:HD as ordered by nephrologists. Tx Summary:Uneventful tx, pt mainly forgetful but vss. Tx completed and UF goal achieved. All blood returned, pt was left in bed in stable condition. Admiting Diagnosis:  Pt's previous [address]  Consent signed - Informed Consent Verified: Yes (07/21/18 0100)  [facility] Consent - Verified  Hepatitis Status- Immune/Ab 18 on 07/02/18  Machine #- Machine Number: [id number] (07/21/18 0100)  Telemetry status-Remote telemetry  Pre-dialysis wt. - Pre-Dialysis Weight:  (No bedscale available) (07/21/18 0100)

## 2018-07-22 NOTE — PROGRESS NOTES
Notified Dr. Jessika Burns of not able to draw labs for this morning, and patients requesting that they be drawn tomorrow while of dialysis. Deisi Sánchez RN    Return call from MD stated OK to do labs tomorrow with dialysis.   Deisi Sánchez RN

## 2018-07-22 NOTE — PROGRESS NOTES
Hospitalist Progress Note NAME: Radha Eaton :  1950 MRN:  [id number] Assessment / Plan: 
Sepsis with Hypotension POA- WBC 16.1k, fever 100.7 ? source at this time- suspected B/L Buttock Pressure injury ulcer/wound infection vs R iliac ar pseudoaneurysm/collection/stent infection, UTI Encephalopathy POA in the setting of sepsis -CXR neg 
-UA c/w UTI - ordered culture - still pending 
-Blood Cx negative 
-Lact normal at 1.4 
-S/p IVF bolus in ER followed by gentle hydration x 12 hrs. Pt has ESRD 
-On cefepime/vancomycin/levaquin 
-CT abdm/pelvis showing Right common iliac artery stent with enhancing fluid collection surrounding the stent and extending into the retroperitoneum on the right. suspicious for infection. Also showing Right hydronephrosis and hydroureter secondary to compression of the ureter by the collection around the stent 
-On ,Vascular surgery saw patient and will proceed with aspiration of the fluid noted on CT surrounding the right common iliac artery stent since this could be an abscess. 
-Urology consulted for hydronephrosis right ->saw pt on :no intervention needed 
-On :CT guided aspiration of pelvic fluid collection - culture of fluid showing few -GNRs,identification pending. Cx from anaerobic bottle pending results 
-WBC trending down  
-Body fluid Cx-Proteus sens to cefepime/levaquin 
-DC Vanco 
-Refused labs today Diabetes mellitus 2 on Insulin at home Dysphagia due to esophageal dysmotility on MBS study last admission - s/p PEG for FS Q 6 hrs on tube feeding 
-Lispro SSI here 
-Add NPH as needed   
-BS stable 
   
Unspecified protein-calorie malnutrition POA in the setting of PEG dependence, sacral decubitus, ESRD   
onTF 
 
ESRD on HD MWF Anemia of chronic disease POA S/p L arm AVFistula 
-Stool guaiac neg in ER 
-Nephrology consult for HD 
-Cont epogen as per renal 
-PRBC deferred to nephrology with HD - received blood transfusion  
-Kaycee Cabral TID 
  
Hyperlipidemia 
-Cont on statins 
   
Hypertension 
-Switched Coreg to IV BB with parametes -PRN nitropaste 
   
? Multiple sclerosis POA ? Exacerbation causing dysphagia- cannot tell till MRI done 
-Neurology consult noted last admission- pt has been refusing MRI till now, no further workup recommended 
   
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 
-Vascular surgery consult as above 
   
H/o CAD Chronic Diastolic & Systolic CHF POA- EF 52%, mod LVH on recent Echo 6/29 
-Conservative approach was recommended last admission by Cardiology in light of other co morbidities.    
PAD s/p b/l leg stents S/p remote partial (Syme) amputation of RIGHT foot. -Nephrology consulted for HD MWF 
-Cont ASA 
-Vascular surgery consult as above for opinion on ? Stent infection 
  
  
Code Status: Full Surrogate Decision Maker: wife 
  
DVT Prophylaxis: Sq heparin GI Prophylaxis: not indicated 
  
Baseline: Pt was recently DC to [facility] from [facility] after being treated for R Iliac ar pseudoaneurysm s/p Stent by Vasc Sx Dr Yosef Landry, Eso Dysmotility syndrome causing Dysphagia causing aspiration pneumonia s/p PEG tube Pt had denied any neuro workup - denied MRI as recommended by neurology to find the cause of his eso dysmotility- ?MS flare Body mass index is 23.57 kg/(m^2). Subjective: Chief Complaint / Reason for Physician Visit Admitted for sepsis suspected from multiple sources including possible abscess surrounding right iliac stent,UTI,decubitus ulcers. HPI: 
 
Patient seen and examined at the bedside. Discussed with RN events overnight. Feeling better today. No fevers, Chills or rigors, On TF tolerating well, BM 2 days back Review of Systems: 
Symptom Y/N Comments  Symptom Y/N Comments Fever/Chills n   Chest Pain n   
Poor Appetite    Edema Cough n   Abdominal Pain n   
Sputum n   Joint Pain SOB/BANKS n   Pruritis/Rash Nausea/vomit n Tolerating PT/OT Diarrhea    Tolerating Diet Constipation    Other Could NOT obtain due to:   
 
Objective: VITALS:  
Last 24hrs VS reviewed since prior progress note. Most recent are: 
Patient Vitals for the past 24 hrs: 
 Temp Pulse Resp BP SpO2  
07/22/18 0758 98.4 °F (36.9 °C) 82 18 145/69 96 %  
07/22/18 0254 98.4 °F (36.9 °C) 80 18 150/67 99 % 07/21/18 2300 98.7 °F (37.1 °C) 87 18 141/68 95 %  
07/21/18 2038 97.9 °F (36.6 °C) 86 18 150/69 93 % 07/21/18 1536 99.2 °F (37.3 °C) 82 16 144/64 94 %  
07/21/18 1117 99.1 °F (37.3 °C) 88 20 142/63 91 % Intake/Output Summary (Last 24 hours) at 07/22/18 1107 Last data filed at 07/22/18 7204 Gross per 24 hour Intake             1710 ml Output                0 ml Net             1710 ml PHYSICAL EXAM: 
General: WD, WN. Alert, cooperative, no acute distress   
EENT:  EOMI. Anicteric sclerae. MMM Resp:  CTA bilaterally, no wheezing or rales. No accessory muscle use CV:  Regular  Rhythm, 3/6 SM,  No edema GI:  Soft, Non distended, Non tender.  +Bowel sounds Neurologic:  Alert and oriented X 3, normal speech, Psych:   Good insight. Not anxious nor agitated Skin:  No rashes. No jaundice Reviewed most current lab test results and cultures  YES Reviewed most current radiology test results   YES Review and summation of old records today    NO Reviewed patient's current orders and MAR    YES 
PMH/ reviewed - no change compared to H&P 
________________________________________________________________________ Care Plan discussed with: 
  Comments Patient x Family RN x Care Manager Consultant Multidiciplinary team rounds were held today with , nursing, pharmacist and clinical coordinator. Patient's plan of care was discussed; medications were reviewed and discharge planning was addressed. ________________________________________________________________________ Total NON critical care TIME: 35   Minutes Total CRITICAL CARE TIME Spent:   Minutes non procedure based Comments >50% of visit spent in counseling and coordination of care x   
________________________________________________________________________ Cabrera Engle MD  
 
Procedures: see electronic medical records for all procedures/Xrays and details which were not copied into this note but were reviewed prior to creation of Plan. LABS: 
I reviewed today's most current labs and imaging studies. Pertinent labs include: 
Recent Labs  
   07/21/18 0537  07/20/18 
 0557 WBC  14.5*  13.6* HGB  10.0*  7.5* HCT  32.5*  24.3*  
PLT  339  280 Recent Labs  
   07/21/18 0537  07/20/18 
 0557 NA  135*  135* K  4.0  4.3 CL  99  99 CO2  32  29 GLU  94  116* BUN  17  32* CREA  2.73*  4.44* CA  9.8  9.0 ALB  1.5*  1.2* TBILI  0.7  0.6 SGOT  38*  24 ALT  28  20 Signed: Cabrera Engle MD

## 2018-07-23 NOTE — PROCEDURES
[facility] Dialysis Team [facility]  [phone]    Vitals   Pre   Post   Assessment   Pre   Post     Temp  Temp: 98.5 °F (36.9 °C) (07/23/18 1520) 99.3  LOC  Responsive to painful stimuli & comands Responsive to commands, communicating   HR   Pulse (Heart Rate): 85 (07/23/18 1530) 106 Lungs   Unlabored Unlabored    B/P   BP: 138/57 (07/23/18 1530) 160/65 Cardiac   Remote Tele Remote Tele    Resp   Resp Rate: 18 (07/23/18 1530) 18 Skin   Warm, dry Warm, dry    Pain level  Pain Intensity 1: 0 (07/23/18 1300) 0 Edema  None     None   Orders:    Duration:   Start:   1520 End:    1910 Total:   3.75 hrs   Dialyzer:   Dialyzer/Set Up Inspection: Orin Villavicencio (07/23/18 1520)   K Bath:   Dialysate K (mEq/L): 3 (07/23/18 1520)   Ca Bath:   Dialysate CA (mEq/L): 2.5 (07/23/18 1520)   Na/Bicarb:   Dialysate NA (mEq/L): 140 (07/23/18 1520)   Target Fluid Removal:   Goal/Amount of Fluid to Remove (mL): 1500 mL (07/23/18 1520)   Access     Type & Location:   DENI AV fistula with no redness, drainage or signs of infection. Pulsatile with aneurism. Venous access cite has scab, avoided during cannulation. Cannulated with two 15 gauge needles. Pre-labs drawn. HD initiated. Labs     Obtained/Reviewed   Critical Results Called   Date when labs were drawn-  Hgb-    HGB   Date Value Ref Range Status   07/21/2018 10.0 (L) 12.1 - 17.0 g/dL Final     K-    Potassium   Date Value Ref Range Status   07/21/2018 4.0 3.5 - 5.1 mmol/L Final     Ca-   Calcium   Date Value Ref Range Status   07/21/2018 9.8 8.5 - 10.1 MG/DL Final     Bun-   BUN   Date Value Ref Range Status   07/21/2018 17 6 - 20 MG/DL Final     Creat-   Creatinine   Date Value Ref Range Status   07/21/2018 2.73 (H) 0.70 - 1.30 MG/DL Final     Comment:     INVESTIGATED PER DELTA CHECK PROTOCOL        Medications/ Blood Products Given     Name   Dose   Route and Time     PRBC's 2 units, 500 ml  IV drip 3919-0704.              Blood Volume Processed (BVP):   90.8  Net Fluid Removed:  1300 ml   Comments   Time Out Done: 1854  Primary Nurse Rpt Pre: Yaron Meneses RN  Primary Nurse Rpt Post: Rachel Valdivia RN  Pt Education: Unable due to mental status. Care Plan: Continue current hemodialysis plan of care. Tx Summary: Pre-labs drawn. 1520 initiated hemodialysis treatment. 1607 Hgb 6.7, notified Dr. Adwoa Delaney & she ordered 2 units PRBC's. Type & crossmatch labs drawn & sent for review. Octaviano Santiago RN discussed pulsatile access with Dr. Mohit Frias who will evaluate it tomorrow. 80 Dr. Mohit Frias in to see pt while on HD. 1720 verbal consent given by pt for blood transfusion & called wife with pt permission for verbal consent as well. [id number] transfused 2 units of PRBCs without any issues. Hemodialysis completed, all possible blood returned, hemostasis achieved <20 min, SBAR report given to primary nurse. Admiting Diagnosis: end stage renal failure  Pt's previous clinic- [address]  Consent signed - Informed Consent Verified: Yes (07/23/18 1520)  [facility] Consent - Verified, obtained on 7/18/18. Hepatitis Status- Negative Ag on 7/2/18, Ab 18.05 on 7/2/18 obtained from [facility]. Machine #- Machine Number: [id number] (07/23/18 1520)  Telemetry status- Remote Tele  Pre-dialysis wt. - Pre-Dialysis Weight:  (No bedscale available) (07/21/18 0100)

## 2018-07-23 NOTE — PROGRESS NOTES
Oncology Nursing Communication Tool 11:41 AM 
7/23/2018 Bedside shift change report given to [place], RN (incoming nurse) by Miya Elena (outgoing nurse) on [facility]. Report included the following information SBAR, Kardex, Intake/Output, MAR and Recent Results. Shift Summary:  
  
 Issues for physician to address: Oncology Shift Note Admission Date 7/17/2018 Admission Diagnosis Leukocytosis Fever ESRD (end stage renal disease) on dialysis ([facility] [address] Code Status Full Code Consults IP CONSULT TO VASCULAR SURGERY 
IP CONSULT TO NEPHROLOGY 
IP CONSULT TO UROLOGY 
IP CONSULT TO PALLIATIVE CARE - PROVIDER Cardiac Monitoring [] Yes [] No  
  
Purposeful Hourly Rounding [] Yes   
Julissa Score Total Score: 3 Julissa score 3 or > [] Bed Alarm [] Avasys [] 1:1 sitter [] Patient refused (Place signed refusal form in chart) Pain Managed [] Yes [] No  
 Key Pain Meds   
    
  
 oxyCODONE-acetaminophen (PERCOCET) 5-325 mg per tablet  (Taking) 1 Tab by Per G Tube route every four (4) hours as needed for Pain. acetaminophen (TYLENOL) 160 mg/5 mL liquid 650 mg by Per G Tube route every three (3) hours as needed for Pain. morphine 10 mg/5 mL oral solution 5 mL by Per G Tube route every four (4) hours as needed for Pain. Pain scale 7-10 Influenza Vaccine Received Flu Vaccine for Current Season (usually Sept-March): Not Flu Season Oxygen needs? [] Room air Oxygen @  []1L    []2L    []3L   []4L    []5L   []6L Use home O2? [] Yes [] No 
Perform O2 challenge test using  smartphrase (.oxygenchallenge) Last bowel movement Last Bowel Movement Date: 07/22/18 
bowel movement Urinary Catheter LDAs Peripheral IV 07/21/18 Right Antecubital (Active) Site Assessment Clean, dry, & intact 7/22/2018 11:26 PM  
Phlebitis Assessment 0 7/22/2018 11:26 PM  
Infiltration Assessment 0 7/22/2018 11:26 PM  
Dressing Status Clean, dry, & intact 7/22/2018 11:26 PM  
Dressing Type Transparent;Tape 7/22/2018 11:26 PM  
Hub Color/Line Status Blue;Flushed 7/22/2018 11:26 PM  
Alcohol Cap Used Yes 7/21/2018  6:00 PM  
      
PEG/Gastrostomy Tube 07/17/18 (Active) Site Assessment Clean, dry, & intact 7/22/2018 11:26 PM  
Dressing Status Clean, dry, & intact 7/22/2018 11:26 PM  
G Port Status Infusing 7/22/2018 11:26 PM  
Action Taken Placement verified (comment) 7/22/2018 11:26 PM  
Drainage Description Green 7/22/2018 11:26 PM  
Gastric Residual (mL) 0 ml 7/22/2018 11:26 PM  
Tube Feeding/Formula Options Renal (Nepro) 7/22/2018 11:26 PM  
Tube Feeding/Verify Rate (mL/hr) 45 7/22/2018 11:26 PM  
Water Flush Volume (mL) 170 mL 7/22/2018 11:26 PM  
Intake (ml) 470 ml 7/23/2018  6:56 AM  
Medication Volume 60 ml 7/22/2018  6:59 AM  
Output (ml) 0 ml 7/22/2018 11:26 PM  
            
  
Readmission Risk Assessment Tool Score High Risk 39 Total Score 3 Has Seen PCP in Last 6 Months (Yes=3, No=0) 2 . Living with Significant Other. Assisted Living. LTAC. SNF. or  
Rehab  
 3 Patient Length of Stay (>5 days = 3) 4 IP Visits Last 12 Months (1-3=4, 4=9, >4=11) 5 Pt. Coverage (Medicare=5 , Medicaid, or Self-Pay=4) 19 Charlson Comorbidity Score (Age + Comorbid Conditions) Expected Length of Stay 4d 21h Actual Length of Stay 6 Lamar Nichole

## 2018-07-23 NOTE — PROGRESS NOTES
NAME: Clarke Found :  1950 MRN:  [id number] Assessment :    Plan: 
--ESRD Left AVF 
HTN Anemia Hyponatremia Hypoalbuminemia Sacral Decubitus Right retroperitoneal abscess/hematoma with asso right hydro MS 
PEG tube --MWF HD [facility] (most recently at [facility]).   
Hgb 10; epo 10 k sc tiw; one unit prbc's  
  
Holding Parsabiv (not available in the hospital) S/p FNA of retroperitoneal abscess/hematoma --not a surgical candidate. longterm prognosis very poor. Palliative care-  
 
 
Subjective: Chief Complaint:  In bed. Resting. Denies current complaint. Review of Systems: 
 
Symptom Y/N Comments  Symptom Y/N Comments Fever/Chills    Chest Pain Poor Appetite    Edema Cough    Abdominal Pain Sputum    Joint Pain SOB/BANKS    Pruritis/Rash Nausea/vomit    Tolerating PT/OT Diarrhea    Tolerating Diet Constipation    Other Could not obtain due to:   
 
Objective: VITALS:  
Last 24hrs VS reviewed since prior progress note. Most recent are: 
Visit Vitals  /73 (BP 1 Location: Right arm, BP Patient Position: At rest)  Pulse 84  Temp 98.5 °F (36.9 °C)  Resp 18  Ht 5' 10\" (1.778 m)  Wt 74.2 kg (163 lb 9.3 oz)  SpO2 98%  BMI 23.47 kg/m2 Intake/Output Summary (Last 24 hours) at 18 1427 Last data filed at 18 7769 Gross per 24 hour Intake             1450 ml Output                0 ml Net             1450 ml Telemetry Reviewed: PHYSICAL EXAM: 
General: WD, WN. Alert, cooperative, no acute distress Resp:  CTA Bilaterally. . No access. muscle use CV:  Regular  rhythm,  . No edema GI:  Soft, Non distended, Non tender. Lab Data Reviewed: (see below) Medications Reviewed: (see below) PMH/SH reviewed - no change compared to H&P 
________________________________________________________________________ Care Plan discussed with: 
Patient Family RN Care Manager Consultant:     
 
  Comments >50% of visit spent in counseling and coordination of care    
 
________________________________________________________________________ Duarte Gordillo MD  
 
Procedures: see electronic medical records for all procedures/Xrays and details which 
were not copied into this note but were reviewed prior to creation of Plan. LABS: 
Recent Labs  
   07/21/18 
 0537 WBC  14.5* HGB  10.0* HCT  32.5*  
PLT  339 Recent Labs  
   07/21/18 
 7944 NA  135* K  4.0  
CL  99  
CO2  32 BUN  17 CREA  2.73* GLU  94  
CA  9.8 Recent Labs  
   07/21/18 
 3609 SGOT  38* AP  323* TP  10.0* ALB  1.5*  
GLOB  8.5* No results for input(s): INR, PTP, APTT in the last 72 hours. No lab exists for component: INREXT, INREXT No results for input(s): FE, TIBC, PSAT, FERR in the last 72 hours. No results found for: FOL, RBCF No results for input(s): PH, PCO2, PO2 in the last 72 hours. No results for input(s): CPK, CKMB in the last 72 hours. No lab exists for component: TROPONINI No components found for: [place] Lab Results Component Value Date/Time  Color RED 07/17/2018 02:54 PM  
 Appearance CLOUDY (A) 07/17/2018 02:54 PM  
 Specific gravity 1.024 07/17/2018 02:54 PM  
 Specific gravity 1.015 12/30/2014 06:40 PM  
 pH (UA) 7.0 07/17/2018 02:54 PM  
 Protein 300 (A) 07/17/2018 02:54 PM  
 Glucose 100 (A) 07/17/2018 02:54 PM  
 Ketone NEGATIVE  07/17/2018 02:54 PM  
 Bilirubin SMALL (A) 07/17/2018 02:54 PM  
 Urobilinogen 0.2 07/17/2018 02:54 PM  
 Nitrites NEGATIVE  07/17/2018 02:54 PM  
 Leukocyte Esterase SMALL (A) 07/17/2018 02:54 PM  
 Epithelial cells FEW 07/17/2018 02:54 PM  
 Bacteria 1+ (A) 07/17/2018 02:54 PM  
 WBC >100 (H) 07/17/2018 02:54 PM  
 RBC 10-20 07/17/2018 02:54 PM MEDICATIONS: 
Current Facility-Administered Medications Medication Dose Route Frequency  heparin (porcine) injection 5,000 Units  5,000 Units SubCUTAneous Q12H  
 balsam peru-castor oil (VENELEX)  mg/gram ointment   Topical BID  sodium hypochlorite (QUARTER STRENGTH DAKIN'S) 0.125% irrigation (bottle)   Topical DAILY  collagenase (SANTYL) 250 unit/gram ointment   Topical DAILY  epoetin niya (EPOGEN;PROCRIT) injection 10,000 Units  10,000 Units SubCUTAneous Q MON, WED & FRI  
 0.9% sodium chloride infusion 250 mL  250 mL IntraVENous PRN  
 cefepime (MAXIPIME) 1 g in 0.9% sodium chloride (MBP/ADV) 50 mL  1 g IntraVENous Q24H  
 levoFLOXacin (LEVAQUIN) 500 mg in D5W IVPB  500 mg IntraVENous Q48H  
 acetaminophen (TYLENOL) solution 650 mg  650 mg Per G Tube Q3H PRN  
 aspirin chewable tablet 81 mg  81 mg Per G Tube DAILY  escitalopram oxalate (LEXAPRO) 5 mg/5 mL oral solution soln 10 mg  10 mg Per G Tube DAILY  insulin lispro (HUMALOG) injection   SubCUTAneous Q6H  
 glucose chewable tablet 16 g  4 Tab Oral PRN  
 dextrose (D50W) injection syrg 12.5-25 g  12.5-25 g IntraVENous PRN  
 glucagon (GLUCAGEN) injection 1 mg  1 mg IntraMUSCular PRN  
 morphine 10 mg/5 mL oral solution 10 mg  5 mL Per G Tube Q4H PRN  
 oxyCODONE-acetaminophen (PERCOCET) 5-325 mg per tablet 1 Tab  1 Tab Per G Tube Q4H PRN  
 sevelamer carbonate (RENVELA) oral powder 2,400 mg  2.4 g Per G Tube TID WITH MEALS  pravastatin (PRAVACHOL) tablet 10 mg  10 mg Per G Tube QHS  sodium chloride (NS) flush 5-10 mL  5-10 mL IntraVENous Q8H  
 sodium chloride (NS) flush 5-10 mL  5-10 mL IntraVENous PRN  
 naloxone (NARCAN) injection 0.4 mg  0.4 mg IntraVENous PRN  
 ondansetron (ZOFRAN) injection 4 mg  4 mg IntraVENous Q4H PRN

## 2018-07-23 NOTE — PROGRESS NOTES
Hospitalist Progress Note NAME: David Wade :  1950 MRN:  [id number] Assessment / Plan: 
Sepsis with Hypotension POA- WBC 16.1k, fever 100.7 ? source at this time- suspected B/L Buttock Pressure injury ulcer/wound infection vs R iliac ar pseudoaneurysm/collection/stent infection, UTI Encephalopathy POA in the setting of sepsis -CXR neg 
-UA c/w UTI - ordered culture - still pending 
-Blood Cx negative 
-Lact normal at 1.4 
-S/p IVF bolus in ER followed by gentle hydration x 12 hrs. Pt has ESRD 
-On cefepime/vancomycin/levaquin 
-CT abdm/pelvis showing Right common iliac artery stent with enhancing fluid collection surrounding the stent and extending into the retroperitoneum on the right. suspicious for infection. Also showing Right hydronephrosis and hydroureter secondary to compression of the ureter by the collection around the stent 
-On ,Vascular surgery saw patient and will proceed with aspiration of the fluid noted on CT surrounding the right common iliac artery stent since this could be an abscess. 
-Urology consulted for hydronephrosis right ->saw pt on :no intervention needed 
-On :CT guided aspiration of pelvic fluid collection - culture of fluid showing few -GNRs,identification pending. Cx from anaerobic bottle pending results 
-WBC trending down  
-Body fluid Cx-Proteus sens to cefepime/levaquin 
-DC Vanco 
-Refused labs today Diabetes mellitus 2 on Insulin at home Dysphagia due to esophageal dysmotility on MBS study last admission - s/p PEG for FS Q 6 hrs on tube feeding 
-Lispro SSI here 
-Add NPH as needed   
-BS stable 
   
Unspecified protein-calorie malnutrition POA in the setting of PEG dependence, sacral decubitus, ESRD   
onTF 
 
ESRD on HD MWF Anemia of chronic disease POA S/p L arm AVFistula 
-Stool guaiac neg in ER 
-Nephrology consult for HD 
-Cont epogen as per renal 
-PRBC deferred to nephrology with HD - received blood transfusion  
-Ayan Bean TID 
  
Hyperlipidemia 
-Cont on statins 
   
Hypertension 
-Switched Coreg to IV BB with parametes -PRN nitropaste 
   
? Multiple sclerosis POA ? Exacerbation causing dysphagia- cannot tell till MRI done 
-Neurology consult noted last admission- pt has been refusing MRI till now, no further workup recommended 
   
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 
-Vascular surgery consult as above 
   
H/o CAD Chronic Diastolic & Systolic CHF POA- EF 89%, mod LVH on recent Echo 6/29 
-Conservative approach was recommended last admission by Cardiology in light of other co morbidities.    
PAD s/p b/l leg stents S/p remote partial (Syme) amputation of RIGHT foot. -Nephrology consulted for HD MWF 
-Cont ASA 
-Vascular surgery consult as above for opinion on ? Stent infection 
  
  
Code Status: Full Surrogate Decision Maker: wife 
  
DVT Prophylaxis: Sq heparin GI Prophylaxis: not indicated 
  
Baseline: Pt was recently DC to [facility] from [facility] after being treated for R Iliac ar pseudoaneurysm s/p Stent by Vasc Sx Dr Shelbie Hawkins, Eso Dysmotility syndrome causing Dysphagia causing aspiration pneumonia s/p PEG tube Pt had denied any neuro workup - denied MRI as recommended by neurology to find the cause of his eso dysmotility- ?MS flare Body mass index is 23.57 kg/(m^2). Subjective: Chief Complaint / Reason for Physician Visit Admitted for sepsis suspected from multiple sources including possible abscess surrounding right iliac stent,UTI,decubitus ulcers. HPI: 
 
Patient seen and examined at the bedside. Discussed with RN events overnight. Feeling better today. No fevers, Chills or rigors, On TF tolerating well, BM 2 days back Review of Systems: 
Symptom Y/N Comments  Symptom Y/N Comments Fever/Chills n   Chest Pain n   
Poor Appetite    Edema Cough n   Abdominal Pain n   
Sputum n   Joint Pain SOB/BANKS n   Pruritis/Rash Nausea/vomit n Tolerating PT/OT Diarrhea    Tolerating Diet Constipation    Other Could NOT obtain due to:   
 
Objective: VITALS:  
Last 24hrs VS reviewed since prior progress note. Most recent are: 
Patient Vitals for the past 24 hrs: 
 Temp Pulse Resp BP SpO2  
07/23/18 1117 98.5 °F (36.9 °C) 84 18 146/73 98 %  
07/23/18 0721 97.5 °F (36.4 °C) 80 18 142/63 97 %  
07/23/18 0520 98.3 °F (36.8 °C) 84 20 133/59 99 % 07/22/18 2326 99.5 °F (37.5 °C) 83 18 134/58 96 %  
07/22/18 2027 98.5 °F (36.9 °C) 80 16 123/57 95 %  
07/22/18 1529 98.3 °F (36.8 °C) 84 16 125/62 95 % Intake/Output Summary (Last 24 hours) at 07/23/18 1214 Last data filed at 07/23/18 6921 Gross per 24 hour Intake             1450 ml Output                0 ml Net             1450 ml PHYSICAL EXAM: 
General: WD, WN. Alert, cooperative, no acute distress   
EENT:  EOMI. Anicteric sclerae. MMM Resp:  CTA bilaterally, no wheezing or rales. No accessory muscle use CV:  Regular  Rhythm, 3/6 SM,  No edema GI:  Soft, Non distended, Non tender.  +Bowel sounds Neurologic:  Alert and oriented X 3, normal speech, Psych:   Good insight. Not anxious nor agitated Skin:  No rashes. No jaundice Reviewed most current lab test results and cultures  YES Reviewed most current radiology test results   YES Review and summation of old records today    NO Reviewed patient's current orders and MAR    YES 
PMH/ reviewed - no change compared to H&P 
________________________________________________________________________ Care Plan discussed with: 
  Comments Patient x Family RN x Care Manager Consultant Multidiciplinary team rounds were held today with , nursing, pharmacist and clinical coordinator. Patient's plan of care was discussed; medications were reviewed and discharge planning was addressed. ________________________________________________________________________ Total NON critical care TIME: 35   Minutes Total CRITICAL CARE TIME Spent:   Minutes non procedure based Comments >50% of visit spent in counseling and coordination of care x   
________________________________________________________________________ Myrna Mario MD  
 
Procedures: see electronic medical records for all procedures/Xrays and details which were not copied into this note but were reviewed prior to creation of Plan. LABS: 
I reviewed today's most current labs and imaging studies. Pertinent labs include: 
Recent Labs  
   07/21/18 
 0537 WBC  14.5* HGB  10.0* HCT  32.5*  
PLT  339 Recent Labs  
   07/21/18 
 0997 NA  135* K  4.0  
CL  99  
CO2  32 GLU  94 BUN  17 CREA  2.73* CA  9.8 ALB  1.5* TBILI  0.7 SGOT  38* ALT  28 Signed: Myrna Mario MD

## 2018-07-24 NOTE — PROGRESS NOTES
1140H    Patient's residual is now at 25 ml, color still green. Patient continues to burp. Wide stated it is more frequent than before. Denies nausea/vomting. Notifed MD, instructed to continue feeding, if residual increased again, stop feeding.

## 2018-07-24 NOTE — CONSULTS
Palliative Medicine Consult  Simon: [phone] ([phone])    Patient Name: Juan Jose Veras  YOB: 1950    Date of Initial Consult: 7/23/18  Reason for Consult: Care Decisions  Requesting Provider: Janel Villanueva MD  Primary Care Physician: Constantino Chandler MD     SUMMARY:   Juan Jose Veras is a 70 y.o. male with a past history of multiple sclerosis,ESRD on HD, anemia of chronic disease, cad , htn , gerd, peripheral vascular disease, s/p recent discarge from [facility] on 7/9/18 s/p repair / stent for infected right iliac artery pseudoanuresym, esophageal dysmotility, s/p peg,. He was admitted on 7/17/2018 from  [facility] with a diagnosis of fever,  leucocytosis and sepsis from infected hematoma of right iliac artery     Current medical issues leading to Palliative Medicine involvement include: sepsis, debility from  acute on chronic illness, sepsis from infected hematoma of right iliac artery aneurysm , s/p stent ,  poor candidate for surgery,  care decisions. PALLIATIVE DIAGNOSES:   1. Goals of care. 2. Debility  3. Weight loss  4. Malnutrition  5. PLAN:     1. Patient is lethargic , not engages in conversation . 2. Spoke to his wife Mariya Mcduffie who was driving , could not talk much . 3. Tentative family  Meeting planned for 10:30 am tomorrow. 4. Patient completed Post on 7/5/18 ,  with palliative care , reflecting full restoratrive measures and Full code. 5. Advance directives reviewed , in place. 6. Initial consult note routed to primary continuity provider  7.  Communicated plan of care with: Palliative IDT       GOALS OF CARE / TREATMENT PREFERENCES:     GOALS OF CARE:  Patient/Health Care Proxy Stated Goals:  (Post form reflects , full code , full restorative care.)      TREATMENT PREFERENCES:   Code Status: Full Code    Advance Care Planning:  Advance Care Planning 7/17/2018   Patient's Healthcare Decision Maker is: Named in scanned ACP document   Primary Decision Maker Name - Primary Decision Maker Phone Number -   Primary Decision Maker Relationship to Patient -   Secondary Decision Maker Name -   Secondary Decision Maker Phone Number -   Confirm Advance Directive Yes, on file   Does the patient have other document types -           Other Instructions: Other:    As far as possible, the palliative care team has discussed with patient / health care proxy about goals of care / treatment preferences for patient. HISTORY:     History obtained from: chart . CHIEF COMPLAINT: admitted for above. HPI/SUBJECTIVE:    The patient is:   [] Verbal , very minimal participation because of lethargy , receiving dialysis in her room currently .   \" I am sick \"    Clinical Pain Assessment (nonverbal scale for severity on nonverbal patients):   Clinical Pain Assessment  Severity: 0          Duration: for how long has pt been experiencing pain (e.g., 2 days, 1 month, years)  Frequency: how often pain is an issue (e.g., several times per day, once every few days, constant)     FUNCTIONAL ASSESSMENT:     Palliative Performance Scale (PPS):  PPS: 40       PSYCHOSOCIAL/SPIRITUAL SCREENING:     Palliative IDT has assessed this patient for cultural preferences / practices and a referral made as appropriate to needs (Cultural Services, Patient Advocacy, Ethics, etc.)    Advance Care Planning:  Advance Care Planning 7/17/2018   Patient's Healthcare Decision Maker is: Named in scanned ACP document   Primary Decision Maker Name -   Primary Decision Maker Phone Number -   Primary Decision Maker Relationship to Patient -   Secondary Decision Maker Name -   Secondary Decision [address] Phone Number -   Confirm Advance Directive Yes, on file   Does the patient have other document types -       Any spiritual / Hoahaoism concerns:  [] Yes /  [] No    Caregiver Burnout:  [] Yes /  [] No /  [] No Caregiver Present      Anticipatory grief assessment:   [] Normal  / [] Maladaptive       ESAS Anxiety: ESAS Depression:     Unable to evaluate above because of patient condition. REVIEW OF SYSTEMS:     Positive and pertinent negative findings in ROS are noted above in HPI. The following systems were [] reviewed / [x] unable to be reviewed as noted in HPI  Other findings are noted below. Systems: constitutional, ears/nose/mouth/throat, respiratory, gastrointestinal, genitourinary, musculoskeletal, integumentary, neurologic, psychiatric, endocrine. Positive findings noted below. Modified ESAS Completed by: provider           Pain: 0     Nausea: 0     Dyspnea: 0           Stool Occurrence(s): 1        PHYSICAL EXAM:     From RN flowsheet:  Wt Readings from Last 3 Encounters:   07/18/18 163 lb 9.3 oz (74.2 kg)   07/09/18 164 lb 3.9 oz (74.5 kg)   06/13/18 150 lb (68 kg)     Blood pressure 142/54, pulse 82, temperature 99 °F (37.2 °C), resp. rate 18, height 5' 10\" (1.778 m), weight 163 lb 9.3 oz (74.2 kg), SpO2 98 %. Pain Scale 1: Numeric (0 - 10)  Pain Intensity 1: 0     Pain Location 1: Generalized  Pain Orientation 1: Other (comment)  Pain Description 1: Sore  Pain Intervention(s) 1: Repositioned  Last bowel movement, if known:     Constitutional: cachectic, ill appearing , receiving dialysis, lethargic , arousal, does not engages in conversation. Eyes: pupils equal, anicteric  ENMT: no nasal discharge, moist mucous membranes  Cardiovascular: regular rhythm, distal pulses intact  Respiratory: breathing not labored, symmetric  Gastrointestinal: soft non-tender, +bowel sounds  Musculoskeletal: no deformity, no tenderness to palpation  Neurologic: lethargic, arousable.   Other:       HISTORY:     Active Problems:    PAD (peripheral artery disease) ([facility] (12/9/2010)      Multiple sclerosis ([facility] (1/4/2015)      Diabetic peripheral neuropathy associated with type 2 diabetes mellitus ([facility] (3/25/2016)      Pseudoaneurysm of iliac artery ([facility] (6/28/2018)      Leukocytosis (7/17/2018)      Fever (7/17/2018) ESRD (end stage renal disease) on dialysis ([address] (7/17/2018)      Sepsis ([address] (7/17/2018)      Past Medical History:   Diagnosis Date    Anemia associated with chronic renal failure     Autoimmune disease (HCC)     hx ms    CAD (coronary artery disease)     Chronic kidney disease     catheter removed and no dialysis at this time    Chronic kidney disease     left arm fistula dialysis MWF    Diabetes ([address]     type II    Elevated troponin 6/29/2018    GERD (gastroesophageal reflux disease)     Hypertension     Ill-defined condition     prostate cancer    Multiple sclerosis ([address]     diagnosed '01    Other ill-defined conditions(799.89)     anemia    Other ill-defined conditions(799.89)     elevated cholesterol    Other ill-defined conditions(799.89)     hx septic right foot    Peripheral vascular disease ([address]     Secondary hyperparathyroidism of renal origin ([address]     Thyroid disease       Past Surgical History:   Procedure Laterality Date    COLONOSCOPY N/A 12/6/2016    COLONOSCOPY performed [facility] Gisel Platt MD at [facility] ENDOSCOPY    COLONOSCOPY,DIAGNOSTIC  12/6/2016         CORONARY STENT SINGLE W/PTCA  2/17/2011         HX APPENDECTOMY      HX CATARACT REMOVAL  10/18/10    bilateral    HX CHOLECYSTECTOMY      HX GI      ileostomy due to infection    HX HEART CATHETERIZATION      HX HEENT      hx post photocoagulation eye 2009    HX OTHER SURGICAL      right partial foot amputation    HX OTHER SURGICAL      cardiac cath    HX OTHER SURGICAL      prostate removed    PLACE PERCUT GASTROSTOMY TUBE  7/6/2018         ME COLONOSCOPY W/BIOPSY SINGLE/MULTIPLE  5/10/2010         UPPER GI ENDOSCOPY,BIOPSY  4/17/2018         UPPER GI ENDOSCOPY,REMV TUMOR,SNARE  6/13/2018         VASCULAR SURGERY PROCEDURE UNLIST      katelyn. leg bypasses      Family History   Problem Relation Age of Onset    Diabetes Mother       History reviewed, no pertinent family history.   Social History Substance Use Topics    Smoking status: Former Smoker     Years: 1.00     Quit date: 4/12/2003    Smokeless tobacco: Never Used      Comment: quit 5 years ago    Alcohol use No      Comment: Stopped drinking 10 years ago     Allergies   Allergen Reactions    Sensipar [Cinacalcet] Other (comments)     Cinacalcet (Sensipar) has been added as an \"allergy\" / intolerance with a comment to assure providers at discharge and post discharge are aware of Dr. Alton Anderson recommendation to avoid Sensipar. Patient takes Nonah Kaycee as an outpatient; Thus, Chelsea Amis has been discontinued per Nephrology (cannot give with recent Parsabiv b/c risk of low Calcium).       Current Facility-Administered Medications   Medication Dose Route Frequency    0.9% sodium chloride infusion 250 mL  250 mL IntraVENous PRN    heparin (porcine) injection 5,000 Units  5,000 Units SubCUTAneous Q12H    balsam peru-castor oil (VENELEX)  mg/gram ointment   Topical BID    sodium hypochlorite (QUARTER STRENGTH DAKIN'S) 0.125% irrigation (bottle)   Topical DAILY    collagenase (SANTYL) 250 unit/gram ointment   Topical DAILY    epoetin niya (EPOGEN;PROCRIT) injection 10,000 Units  10,000 Units SubCUTAneous Q MON, WED & FRI    0.9% sodium chloride infusion 250 mL  250 mL IntraVENous PRN    cefepime (MAXIPIME) 1 g in 0.9% sodium chloride (MBP/ADV) 50 mL  1 g IntraVENous Q24H    acetaminophen (TYLENOL) solution 650 mg  650 mg Per G Tube Q3H PRN    aspirin chewable tablet 81 mg  81 mg Per G Tube DAILY    escitalopram oxalate (LEXAPRO) 5 mg/5 mL oral solution soln 10 mg  10 mg Per G Tube DAILY    insulin lispro (HUMALOG) injection   SubCUTAneous Q6H    glucose chewable tablet 16 g  4 Tab Oral PRN    dextrose (D50W) injection syrg 12.5-25 g  12.5-25 g IntraVENous PRN    glucagon (GLUCAGEN) injection 1 mg  1 mg IntraMUSCular PRN    morphine 10 mg/5 mL oral solution 10 mg  5 mL Per G Tube Q4H PRN    oxyCODONE-acetaminophen (PERCOCET) 5-325 mg per tablet 1 Tab  1 Tab Per G Tube Q4H PRN    sevelamer carbonate (RENVELA) oral powder 2,400 mg  2.4 g Per G Tube TID WITH MEALS    pravastatin (PRAVACHOL) tablet 10 mg  10 mg Per G Tube QHS    sodium chloride (NS) flush 5-10 mL  5-10 mL IntraVENous Q8H    sodium chloride (NS) flush 5-10 mL  5-10 mL IntraVENous PRN    naloxone (NARCAN) injection 0.4 mg  0.4 mg IntraVENous PRN    ondansetron (ZOFRAN) injection 4 mg  4 mg IntraVENous Q4H PRN          LAB AND IMAGING FINDINGS:     Lab Results   Component Value Date/Time    WBC 13.5 (H) 07/23/2018 03:20 PM    HGB 6.7 (L) 07/23/2018 03:20 PM    PLATELET 776 10/73/1510 03:20 PM     Lab Results   Component Value Date/Time    Sodium 134 (L) 07/23/2018 03:20 PM    Potassium 4.4 07/23/2018 03:20 PM    Chloride 96 (L) 07/23/2018 03:20 PM    CO2 33 (H) 07/23/2018 03:20 PM    BUN 37 (H) 07/23/2018 03:20 PM    Creatinine 4.68 (H) 07/23/2018 03:20 PM    Calcium 8.9 07/23/2018 03:20 PM    Magnesium 2.1 07/09/2018 04:20 AM    Phosphorus 2.5 (L) 07/23/2018 03:20 PM      Lab Results   Component Value Date/Time    AST (SGOT) 38 (H) 07/21/2018 05:37 AM    Alk.  phosphatase 323 (H) 07/21/2018 05:37 AM    Protein, total 10.0 (H) 07/21/2018 05:37 AM    Albumin 1.5 (L) 07/21/2018 05:37 AM    Globulin 8.5 (H) 07/21/2018 05:37 AM     Lab Results   Component Value Date/Time    INR 1.2 (H) 07/05/2018 01:57 AM    Prothrombin time 12.4 (H) 07/05/2018 01:57 AM    aPTT 30.1 03/21/2016 02:01 PM      Lab Results   Component Value Date/Time    Iron 37 12/10/2010 05:00 AM    TIBC 189 (L) 12/10/2010 05:00 AM    Iron % saturation 20 12/10/2010 05:00 AM    Ferritin 122 12/10/2010 05:00 AM      Lab Results   Component Value Date/Time    pH 7.38 06/29/2018 11:02 PM    PCO2 40 06/29/2018 11:02 PM    PO2 68 (L) 06/29/2018 11:02 PM     No components found for: [place]   Lab Results   Component Value Date/Time    CK 90 02/01/2018 01:50 PM    CK - MB 3.7 (H) 02/01/2018 01:50 PM                Total time: Counseling / coordination time, spent as noted above:   > 50% counseling / coordination?:     Prolonged service was provided for  []30 min   []75 min in face to face time in the presence of the patient, spent as noted above. Time Start:   Time End:   Note: this can only be billed with 03331 (initial) or 76287 (follow up). If multiple start / stop times, list each separately.

## 2018-07-24 NOTE — PROGRESS NOTES
Nutrition Assessment:    INTERVENTIONS/RECOMMENDATIONS:   EN:  RD will continue to re-evaluate tube feeding for adjustments as needed. Continue to monitor for tolerance - abdominal distention, pain, n/v/d.    ASSESSMENT:   7/24:  Chart reviewed; pt had a PEG tube placed back on 7/6 due to esophageal dysphagia. Pt transferred to [facility] on 7/11 and then readmitted through the ED on for elevated WBC and elevated temp. Has a hx of ESRD-HD, DM, HTN, and pressure ulcer>  Pt has been receiving Nepro TF via PEG with a goal rate if 45 ml/hr x 24 hr + 170 ml free water q4h. TF at goal rate + free water provides daily approx. 1944 kcals/87.5 g protein/1805 ml free water. Pt has experienced difficulty achieving goal rate this admission related to reported residual of 250 ml on multiple occassions - recent green in color. Noted per RN note, placement was verified. Pt also had a rapid response called today for increased SOB. CXR also ordered. Since, TF was restarted conservatively - Nepro at 20 ml/hr x 24 hr.    Diet Order: NPO (Nepro @ 45 ml/hr + 170 ml water flush q 4 hrs)  % Eaten:  No data found. Pertinent Medications: [x] Reviewed []Other:  Pertinent Labs: [x]Reviewed  []Other: Creat 4.68, Phos 2.5  Food Allergies: [x]None []Other:     Last BM: 7/23 soft   [x]Active     []Hyperactive  []Hypoactive       [] Absent  BS  Skin:    [] Intact   [] Incision  [x] Breakdown   []Edema   []Other:    Anthropometrics: Height: 5' 10\" (177.8 cm) Weight: 74.2 kg (163 lb 9.3 oz)    IBW (%IBW):   ( ) UBW (%UBW):   (  %)    BMI: Body mass index is 23.47 kg/(m^2).     This BMI is indicative of:  []Underweight   [x]Normal   []Overweight   [] Obesity   [] Extreme Obesity (BMI>40)  Last Weight Metrics:  Weight Loss Metrics 7/18/2018 7/9/2018 6/13/2018 4/17/2018 4/12/2018 4/5/2018 3/1/2018   Today's Wt 163 lb 9.3 oz 164 lb 3.9 oz 150 lb 155 lb 166 lb 166 lb 14.4 oz 153 lb   BMI 23.47 kg/m2 23.57 kg/m2 21.52 kg/m2 22.24 kg/m2 23.82 kg/m2 23.95 kg/m2 21.95 kg/m2       Estimated Nutrition Needs (Based on): 1800 Kcals/day (BMR: 1500 x 1.2) , 90 g (1.2 g/kg) Protein  Carbohydrate:  At Least 130 g/day  Fluids: per Nephrology mL/day    NUTRITION DIAGNOSES:   Problem:  Less than optimal enteral nutrition      Etiology: related to time off pump     Signs/Symptoms: as evidenced by pt received ~55% of ordered TF in the past 72 hrs    Previous Nutrition Dx:  [] Resolved  [x] Unresolved           [] Progressing    NUTRITION INTERVENTIONS:    Enteral/Parenteral Nutrition: Other (Continue current TF order)                GOAL:   met >80% of ordered TF in 2-4 days    NUTRITION MONITORING AND EVALUATION   Food/Nutrient Intake Outcomes: Enteral/parenteral nutrition intake  Physical Signs/Symptoms Outcomes: Weight/weight change, Electrolyte and renal profile, Glucose profile, GI    Previous Goal Met:   [] Met              [] Progressing Towards Goal              [x] Not Progressing Towards Goal   Previous Recommendations:   [] Implemented          [] Not Implemented          [x] Not Applicable    LEARNING NEEDS (Diet, Food/Nutrient-Drug Interaction):    [x] None Identified   [] Identified and Education Provided/Documented   [] Identified and Pt declined/was not appropriate     Cultural, Adventism, OR Ethnic Dietary Needs:    [x] None Identified   [] Identified and Addressed     [x] Interdisciplinary Care Plan Reviewed/Documented    [x] Discharge Planning: Pending TF tolerance   [] Participated in Interdisciplinary Rounds    NUTRITION RISK:    [x] Patient At Nutritional Risk    [x] High              [] Moderate/Mild           []  Low     [] Patient Not At [address] [place] [address]  Pager [phone]  Weekend Pager [phone]

## 2018-07-24 NOTE — PROGRESS NOTES
Patient's residual = 150. Flush reduced to 100 ml per 4 hrs, Feeding remained at 20 ml/hr. Will continue to monitor. Noticed patient is not burping at this time. Patient is resting quietly.

## 2018-07-24 NOTE — PROGRESS NOTES
Spiritual Care Assessment/Progress Note  [facility]      NAME: Lafrances Goltz      MRN: [id number]  AGE: 70 y.o. SEX: male  Confucianism Affiliation: Scientologist   Language: English     7/24/2018     Total Time (in minutes): 5     Spiritual Assessment begun in [facility] through conversation with:         []Patient        [] Family    [] Friend(s)        Reason for Consult: Palliative Care, Initial/Spiritual Assessment     Spiritual beliefs: (Please include comment if needed)     [] Identifies with a lovely tradition:         [] Supported by a lovely community:            [] Claims no spiritual orientation:           [] Seeking spiritual identity:                [] Adheres to an individual form of spirituality:           [x] Not able to assess:                           Identified resources for coping:      [] Prayer                               [] Music                  [] Guided Imagery     [] Family/friends                 [] Pet visits     [] Devotional reading                         [] Unknown     [] Other:                                             Interventions offered during this visit: (See comments for more details)    Patient Interventions: Initial visit           Plan of Care:     [] Support spiritual and/or cultural needs    [] Support AMD and/or advance care planning process      [] Support grieving process   [] Coordinate Rites and/or Rituals    [] Coordination with community clergy   [] No spiritual needs identified at this time   [] Detailed Plan of Care below (See Comments)  [] Make referral to Music Therapy  [] Make referral to Pet Therapy     [] Make referral to Addiction services  [] Make referral to [facility]  [] Make referral to Spiritual Care Partner  [] No future visits requested        [] Follow up visits as needed     Comments: Initial visit attempted with patient who was an initial palliative care assessment. Patient was sleeping at this time.  No family present. Pastoral care will attempt to follow up with patient at a later time. Please page as needed/desired. [phone]. Visit by: Donna Dumont. Christine Huang.  Kirby Tinoco MA, [facility]    Lead  Profession Development & Advancement

## 2018-07-24 NOTE — PROGRESS NOTES
Oncology Nursing Communication Tool 6:50 AM 
7/24/2018 Bedside shift change report given to Akanksha Crabtree RN (incoming nurse) by Kali Alonso RN (outgoing nurse) on Everrett Older. Report included the following information SBAR. Shift Summary: During report, offgoing RN noted 250ml gastric residual and stopped tube feeding. Raised HOB and Placement verified X2 but Residual remained significant @ 0600 200ml dark green. Unable to restart tube feeding,  Even at lower rate d/t pressure from gastric residual backing up and out of PEG and \"popping\" feeding tube out. Wound care completed and supplies replenished at 0600. Issues for physician to address: Pt not tolerating tube feeding ( see above note). Oncology Shift Note Admission Date 7/17/2018 Admission Diagnosis Leukocytosis Fever ESRD (end stage renal disease) on dialysis ([facility] [address] Code Status Full Code Consults IP CONSULT TO VASCULAR SURGERY 
IP CONSULT TO NEPHROLOGY 
IP CONSULT TO UROLOGY 
IP CONSULT TO PALLIATIVE CARE - PROVIDER 
IP CONSULT TO INFECTIOUS DISEASES Cardiac Monitoring [] Yes [] No  
  
Purposeful Hourly Rounding [] Yes   
Julissa Score Total Score: 3 Julissa score 3 or > [] Bed Alarm [] Avasys [] 1:1 sitter [] Patient refused (Place signed refusal form in chart) Pain Managed [] Yes [] No  
 Key Pain Meds   
    
  
 oxyCODONE-acetaminophen (PERCOCET) 5-325 mg per tablet  (Taking) 1 Tab by Per G Tube route every four (4) hours as needed for Pain. acetaminophen (TYLENOL) 160 mg/5 mL liquid 650 mg by Per G Tube route every three (3) hours as needed for Pain. morphine 10 mg/5 mL oral solution 5 mL by Per G Tube route every four (4) hours as needed for Pain. Pain scale 7-10 Influenza Vaccine Received Flu Vaccine for Current Season (usually Sept-March): Not Flu Season Oxygen needs? [] Room air Oxygen @  []1L    []2L    []3L   []4L    []5L   []6L Use home O2? [] Yes [] No 
Perform O2 challenge test using  smartphrase (.oxygenchallenge) Last bowel movement Last Bowel Movement Date: 07/23/18 
bowel movement Urinary Catheter LDAs Peripheral IV 07/21/18 Right Antecubital (Active) Site Assessment Clean, dry, & intact 7/24/2018  2:26 AM  
Phlebitis Assessment 0 7/24/2018  2:26 AM  
Infiltration Assessment 0 7/24/2018  2:26 AM  
Dressing Status Clean, dry, & intact 7/24/2018  2:26 AM  
Dressing Type Transparent;Tape 7/24/2018  2:26 AM  
Hub Color/Line Status Blue;Flushed 7/24/2018  2:26 AM  
Alcohol Cap Used Yes 7/21/2018  6:00 PM  
      
PEG/Gastrostomy Tube 07/17/18 (Active) Site Assessment Intact 7/24/2018  2:26 AM  
Dressing Status Intact 7/24/2018  2:26 AM  
G Port Status Clamped 7/24/2018  5:54 AM  
Action Taken Placement verified (comment) 7/24/2018  2:26 AM  
Drainage Description Green 7/24/2018  5:54 AM  
Gastric Residual (mL) 200 ml 7/24/2018  5:54 AM  
Tube Feeding/Formula Options Renal (Nepro) 7/24/2018  2:26 AM  
Tube Feeding/Verify Rate (mL/hr) 45 7/23/2018  3:00 PM  
Water Flush Volume (mL) 420 mL 7/23/2018  8:03 PM  
Intake (ml) 100 ml 7/24/2018  5:54 AM  
Medication Volume 60 ml 7/23/2018 10:07 PM  
Output (ml) 0 ml 7/23/2018 10:07 PM  
            
  
Readmission Risk Assessment Tool Score High Risk 39 Total Score 3 Has Seen PCP in Last 6 Months (Yes=3, No=0) 2 . Living with Significant Other. Assisted Living. LTAC. SNF. or  
Rehab  
 3 Patient Length of Stay (>5 days = 3) 4 IP Visits Last 12 Months (1-3=4, 4=9, >4=11) 5 Pt. Coverage (Medicare=5 , Medicaid, or Self-Pay=4) 19 Charlson Comorbidity Score (Age + Comorbid Conditions) Expected Length of Stay 4d 21h Actual Length of Stay 7 Vamsi Pinzon RN

## 2018-07-24 NOTE — PROGRESS NOTES
Hospitalist Progress Note NAME: Abebe Wang :  1950 MRN:  [id number] Assessment / Plan: 
Sepsis with Hypotension POA- WBC 16.1k, fever 100.7 ? source at this time- suspected B/L Buttock Pressure injury ulcer/wound infection vs R iliac ar pseudoaneurysm/collection/stent infection, UTI Encephalopathy POA in the setting of sepsis -CXR neg 
-UA c/w UTI - ordered culture - still pending 
-Blood Cx negative 
-Lact normal at 1.4 
-S/p IVF bolus in ER followed by gentle hydration x 12 hrs. Pt has ESRD 
-On cefepime/vancomycin/levaquin 
-CT abdm/pelvis showing Right common iliac artery stent with enhancing fluid collection surrounding the stent and extending into the retroperitoneum on the right. suspicious for infection. Also showing Right hydronephrosis and hydroureter secondary to compression of the ureter by the collection around the stent 
-On ,Vascular surgery saw patient and will proceed with aspiration of the fluid noted on CT surrounding the right common iliac artery stent since this could be an abscess. 
-Urology consulted for hydronephrosis right ->saw pt on :no intervention needed 
-On :CT guided aspiration of pelvic fluid collection - culture of fluid showing few -GNRs,identification pending. Cx from anaerobic bottle pending results 
-WBC trending down  
-Body fluid Cx-Proteus sens to cefepime/levaquin 
-DC Vanco 
-Refused labs today : As noted below, extended conversation with family in regards to what should be done about pt's antibiotics - but importantly pt and family to understand that medical treatment alone is not going to be curative. Family need to decide whether pt wants the aggressive therapy approach with daily abx or less aggressive with abx while undergoing dialysis. Family and pt want 24 hours to think about it. Palliative and ID input very much appreciated. Addendum: 
Rapid Response called for the pt at approx 1240pm. Responded immediately.  RN at bedside states that when she moved the pt his eyes became very wide and then he became SOB. Currently pt still complaining of SOB. He denies any CP, cough or dizziness. Pt saturating well on RA but still placed on 1L and saturating well still. Will ask for CXR. No further workup at this point. Lungs on PE benign, CTAB. May be anxiety related. Will continue to monitor. 
  
Diabetes mellitus 2 on Insulin at home Dysphagia due to esophageal dysmotility on MBS study last admission - s/p PEG for FS Q 6 hrs on tube feeding 
-Lispro SSI here 
-Add NPH as needed   
-BS stable 
   
Unspecified protein-calorie malnutrition POA in the setting of PEG dependence, sacral decubitus, ESRD -- onTF 
  
ESRD on HD MWF Anemia of chronic disease POA S/p L arm AVFistula 
-Stool guaiac neg in ER 
-Nephrology consult for HD 
-Cont epogen as per renal 
-PRBC deferred to nephrology with HD - received blood transfusion 7/18 and 7/23 
-Renvela TID 
   
Hyperlipidemia 
-Cont on statins 
   
Hypertension 
-Switched Coreg to IV BB with parametes -PRN nitropaste 
   
? Multiple sclerosis POA ? Exacerbation causing dysphagia- cannot tell till MRI done 
-Neurology consult noted last admission- pt has been refusing MRI till now, no further workup recommended 
   
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 
-Vascular surgery consult as above 
   
H/o CAD Chronic Diastolic & Systolic CHF POA- EF 33%, mod LVH on recent Echo 6/29 
-Conservative approach was recommended last admission by Cardiology in light of other co morbidities.    
PAD s/p b/l leg stents S/p remote partial (Syme) amputation of RIGHT foot. -Nephrology consulted for HD MWF 
-Cont ASA 
-Vascular surgery consult as above for opinion on stent infection    
   
Code Status: Full Surrogate Decision Maker: wife 
   
DVT Prophylaxis: Sq heparin GI Prophylaxis: not indicated 
   
Baseline: Pt was recently DC to [facility] from [facility] after being treated for R Iliac ar pseudoaneurysm s/p Stent by Vas Sx Dr Shannan Hewitt, Eso Dysmotility syndrome causing Dysphagia causing aspiration pneumonia s/p PEG tube Pt had denied any neuro workup - denied MRI as recommended by neurology to find the cause of his eso dysmotility- ?MS flare 
  
Body mass index is 23.57 kg/(m^2). Subjective: Chief Complaint / Reason for Physician Visit Had extended conversation with Palliative also at bedside with pt and his family. ID was also initially at bedside and appreciate Dr. Fidel Perez input. Pt and family to decide further course of action after having digested the information for the next 24 hours. Discussed with RN events overnight. Review of Systems: 
Symptom Y/N Comments  Symptom Y/N Comments Fever/Chills n   Chest Pain n   
Poor Appetite n   Edema Cough n   Abdominal Pain n   
Sputum    Joint Pain SOB/BANKS    Pruritis/Rash Nausea/vomit n   Tolerating PT/OT Diarrhea    Tolerating Diet Constipation    Other Could NOT obtain due to:   
 
Objective: VITALS:  
Last 24hrs VS reviewed since prior progress note. Most recent are: 
Patient Vitals for the past 24 hrs: 
 Temp Pulse Resp BP SpO2  
07/24/18 1156 98.6 °F (37 °C) 81 18 154/62 98 %  
07/24/18 0833 98.5 °F (36.9 °C) 79 18 134/58 99 % 07/24/18 0322 99 °F (37.2 °C) 82 18 142/54 98 %  
07/23/18 2327 98.8 °F (37.1 °C) 83 18 144/64 97 %  
07/23/18 1910 99.3 °F (37.4 °C) (!) 106 18 160/65 -  
07/23/18 1901 99.3 °F (37.4 °C) (!) 106 18 160/65 -  
07/23/18 1900 99.1 °F (37.3 °C) 95 18 151/64 -  
07/23/18 1850 99.1 °F (37.3 °C) 97 18 151/62 -  
07/23/18 1840 99 °F (37.2 °C) 95 18 153/63 -  
07/23/18 1830 98.8 °F (37.1 °C) 83 18 135/56 -  
07/23/18 1812 99.5 °F (37.5 °C) 84 18 135/56 100 % 07/23/18 1757 99.1 °F (37.3 °C) 95 18 143/67 -  
07/23/18 1730 99.7 °F (37.6 °C) 91 18 147/56 -  
07/23/18 1700 - 95 18 141/58 -  
07/23/18 1630 - 95 18 137/57 -  
07/23/18 1600 - 87 18 143/57 -  
07/23/18 1530 - 85 18 138/57 -  
07/23/18 1520 98.5 °F (36.9 °C) (!) 107 18 144/66 - Intake/Output Summary (Last 24 hours) at 07/24/18 1231 Last data filed at 07/24/18 3004 Gross per 24 hour Intake             1800 ml Output             1300 ml Net              500 ml PHYSICAL EXAM: 
General: Alert, cooperative, no acute distress   
EENT:  EOMI. Anicteric sclerae. MMM Resp:  CTA bilaterally, no wheezing or rales. No accessory muscle use CV:  Regular  rhythm,  No edema GI:  Soft, Non distended, Non tender.  +Bowel sounds Neurologic:  Alert and oriented X 3, normal speech Psych:   Fair insight. Not anxious nor agitated Skin:  No rashes. No jaundice. LUE fistula+ Reviewed most current lab test results and cultures  YES Reviewed most current radiology test results   YES Review and summation of old records today    NO Reviewed patient's current orders and MAR    YES 
PMH/SH reviewed - no change compared to H&P 
________________________________________________________________________ Care Plan discussed with: 
  Comments Patient x Family  x   
RN x Care Manager Consultant  x Multidiciplinary team rounds were held today with , nursing, pharmacist and clinical coordinator. Patient's plan of care was discussed; medications were reviewed and discharge planning was addressed. ________________________________________________________________________ Total NON critical care TIME:  35   Minutes Total CRITICAL CARE TIME Spent:   Minutes non procedure based Comments >50% of visit spent in counseling and coordination of care x   
________________________________________________________________________ Naomi Mario MD  
 
Procedures: see electronic medical records for all procedures/Xrays and details which were not copied into this note but were reviewed prior to creation of Plan.    
 
LABS: 
I reviewed today's most current labs and imaging studies. Pertinent labs include: 
Recent Labs  
   07/23/18 
 1520 WBC  13.5* HGB  6.7* HCT  22.2*  
PLT  364 Recent Labs  
   07/23/18 
 1520 NA  134* K  4.4 CL  96* CO2  33* GLU  137* BUN  37* CREA  4.68* CA  8.9 PHOS  2.5* Signed: Jose Lehman MD

## 2018-07-24 NOTE — PROGRESS NOTES
Problem: Falls - Risk of  Goal: *Absence of Falls  Document Julissa Fall Risk and appropriate interventions in the flowsheet. Outcome: Progressing Towards Goal  Fall Risk Interventions:  Mobility Interventions: Patient to call before getting OOB    Mentation Interventions: Adequate sleep, hydration, pain control    Medication Interventions: Evaluate medications/consider consulting pharmacy, Patient to call before getting OOB    Elimination Interventions: Call light in reach, Patient to call for help with toileting needs    History of Falls Interventions: Room close to nurse's station        Problem: Pressure Injury - Risk of  Goal: *Prevention of pressure injury  Document Delbert Scale and appropriate interventions in the flowsheet. Outcome: Progressing Towards Goal  Pressure Injury Interventions:  Sensory Interventions: Assess changes in LOC    Moisture Interventions: Absorbent underpads    Activity Interventions: Increase time out of bed, Pressure redistribution bed/mattress(bed type)    Mobility Interventions: HOB 30 degrees or less, Pressure redistribution bed/mattress (bed type)    Nutrition Interventions: Document food/fluid/supplement intake    Friction and Shear Interventions: HOB 30 degrees or less, Lift sheet               Comments:   RA    TF infusing. Clamped now d/t residual >200, was 250    Bedrest    Dialysis gave 2 PRB's    Ordered up venalex for dressing changes, next RN reported she will address.

## 2018-07-24 NOTE — PROGRESS NOTES
1248H      Called RRT for patient complaining difficulty breathing, eyes opened wide and gasping after being turned. Patient's saturation on room air is 97%, placed on 1L NC, sats 97-99%. Attending MD and staff responded, blood sugar and vitals taken. MD ordered stat Chest Xray. 1255H       Patient is now resting quietly. Denies SOB.

## 2018-07-24 NOTE — PROGRESS NOTES
Infectious Disease Progress Note       Subjective:   Mr Davie Pena seen today. Discussed with his wife in detail. He is intermittently confused and not really able to fully hold a conversation today.         ROS: unable to obtain         Objective:    Vitals:   Patient Vitals for the past 24 hrs:   Temp Pulse Resp BP SpO2   07/24/18 1519 98.8 °F (37.1 °C) 80 18 142/66 100 %   07/24/18 1252 - 79 - 148/70 99 %   07/24/18 1248 - 81 - 177/75 97 %   07/24/18 1156 98.6 °F (37 °C) 81 18 154/62 98 %   07/24/18 0833 98.5 °F (36.9 °C) 79 18 134/58 99 %   07/24/18 0322 99 °F (37.2 °C) 82 18 142/54 98 %   07/23/18 2327 98.8 °F (37.1 °C) 83 18 144/64 97 %       Physical Exam:    ¨ GEN: NAD  ¨ HEENT: Normocephalic, atraumatic, no scleral icterus,   no thrush  ¨ CV: S1, S2 heard regularly  ¨ Lungs: Clear to auscultation bilaterally  ¨ Abdomen: soft, non distended, non tender  ¨ Extremities: no edema  ¨ Neuro: Alert to self, intermittently holds conversation but tired overall and not able to fully assess   ¨ Skin: no rash  ¨ Psych: not able to fully assess   ¨ Lines: LUE HD access        Medications:    Current Facility-Administered Medications:     0.9% sodium chloride infusion 250 mL, 250 mL, IntraVENous, PRN, Juarez Krishna MD    heparin (porcine) injection 5,000 Units, 5,000 Units, SubCUTAneous, Q12H, Juarez Krishna MD, 5,000 Units at 07/24/18 1749    balsam peru-castor oil (VENELEX)  mg/gram ointment, , Topical, BID, Angelica San MD    sodium hypochlorite (QUARTER STRENGTH DAKIN'S) 0.125% irrigation (bottle), , Topical, DAILY, Cortney Spear MD    collagenase (SANTYL) 250 unit/gram ointment, , Topical, DAILY, Cortney Spear MD    epoetin niya (EPOGEN;PROCRIT) injection 10,000 Units, 10,000 Units, SubCUTAneous, Q MON, WED & FRI, Rosa Hull MD, 10,000 Units at 07/23/18 1950    0.9% sodium chloride infusion 250 mL, 250 mL, IntraVENous, PRN, Rosa Hull MD    cefepime (MAXIPIME) 1 g in 0.9% sodium chloride (MBP/ADV) 50 mL, 1 g, IntraVENous, Q24H, Helene Rousseau MD, Last Rate: 100 mL/hr at 07/23/18 2219, 1 g at 07/23/18 2219    acetaminophen (TYLENOL) solution 650 mg, 650 mg, Per Alisson Persons, Q3H PRN, Anastacio Parra MD, 650 mg at 07/19/18 2038    aspirin chewable tablet 81 mg, 81 mg, Per G Tube, DAILY, Anastacio Parra MD, 81 mg at 07/24/18 6726    escitalopram oxalate (LEXAPRO) 5 mg/5 mL oral solution soln 10 mg, 10 mg, Per G Tube, DAILY, Anastacio Parra MD, 10 mg at 07/24/18 0443    insulin lispro (HUMALOG) injection, , SubCUTAneous, Q6H, Anastacio Parra MD, Stopped at 07/17/18 1930    glucose chewable tablet 16 g, 4 Tab, Oral, PRN, Anastacio Parra MD    dextrose (D50W) injection syrg 12.5-25 g, 12.5-25 g, IntraVENous, PRN, Anastacio Parra MD    glucagon (GLUCAGEN) injection 1 mg, 1 mg, IntraMUSCular, PRN, Anastacio Parra MD    morphine 10 mg/5 mL oral solution 10 mg, 5 mL, Per G Tube, Q4H PRN, Anastacio Parra MD, 10 mg at 07/23/18 2209    oxyCODONE-acetaminophen (PERCOCET) 5-325 mg per tablet 1 Tab, 1 Tab, Per G Tube, Q4H PRN, Anastacio Parra MD, 1 Tab at 07/22/18 1409    sevelamer carbonate (RENVELA) oral powder 2,400 mg, 2.4 g, Per G Tube, TID WITH MEALS, Anastacio Parra MD, 2,400 mg at 07/24/18 1749    pravastatin (PRAVACHOL) tablet 10 mg, 10 mg, Per G Tube, QHS, Anastacio Parra MD, 10 mg at 07/23/18 2209    sodium chloride (NS) flush 5-10 mL, 5-10 mL, IntraVENous, Q8H, Anastacio Parra MD, 10 mL at 07/24/18 1302    sodium chloride (NS) flush 5-10 mL, 5-10 mL, IntraVENous, PRN, Anastacio Parra MD, 10 mL at 07/17/18 2039    naloxone (NARCAN) injection 0.4 mg, 0.4 mg, IntraVENous, PRN, Anastacio Parra MD    ondansetron [facility]) injection 4 mg, 4 mg, IntraVENous, Q4H PRN, Anastacio Parra MD, 4 mg at 07/20/18 1627      Labs:  Recent Results (from the past 24 hour(s))   GLUCOSE, POC    Collection Time: 07/24/18 12:09 AM   Result Value Ref Range    Glucose (POC) 122 (H) 65 - 100 mg/dL    Performed by Leonardo Che, POC    Collection Time: 07/24/18  6:36 AM   Result Value Ref Range    Glucose (POC) 109 (H) 65 - 100 mg/dL    Performed by Valerie Parker    GLUCOSE, POC    Collection Time: 07/24/18  7:39 AM   Result Value Ref Range    Glucose (POC) 71 65 - 100 mg/dL    Performed by Stephanie SportSetter Bellaire Cornwall, POC    Collection Time: 07/24/18  7:57 AM   Result Value Ref Range    Glucose (POC) 75 65 - 100 mg/dL    Performed by Claudia Monk    GLUCOSE, POC    Collection Time: 07/24/18  8:20 AM   Result Value Ref Range    Glucose (POC) 94 65 - 100 mg/dL    Performed by Stephanie EquinextllNOSTROMO ICT, POC    Collection Time: 07/24/18 11:54 AM   Result Value Ref Range    Glucose (POC) 94 65 - 100 mg/dL    Performed by Frances Mendez 75, POC    Collection Time: 07/24/18 12:43 PM   Result Value Ref Range    Glucose (POC) 91 65 - 100 mg/dL    Performed by Frances Mendez 75, POC    Collection Time: 07/24/18  5:43 PM   Result Value Ref Range    Glucose (POC) 93 65 - 100 mg/dL    Performed by Stefanie Villafana            Micro:                   Blood 7/17         Component Results       Component Value Ref Range & Units Status      Special Requests: NO SPECIAL REQUESTS    Final      Culture result: NO GROWTH 6 DAYS    Final        Result History             Imaging:      CT 7/17  FINDINGS:  LOWER CHEST:  The visualized portions of the lung bases are clear.  There is mild atelectasis  in the posterior lower lobes.    ABDOMEN:  The unenhanced images demonstrate visibility of the interventricular septum of  the heart. There is extensive vascular calcification and a right iliac stent. There are clips in the gallbladder fossa. Liver: The liver is normal in size and contour with no focal abnormality. Gallbladder and bile ducts: There is no calcified gallstone or biliary duct  dilatation. Spleen: No abnormality. Pancreas: No abnormality. Adrenal glands:  No abnormality. Kidneys: There are small left renal cysts. There is hydronephrosis and  hydroureter on the right down to the level of the pelvis. PELVIS:  Reproductive organs: The prostate gland is absent. Bladder: No abnormality. BOWEL AND MESENTERY: There is a gastrostomy tube in the antrum of the stomach. The small bowel is not obstructed. There is gas and stool throughout the colon. There is no mesenteric mass or adenopathy.  The appendix is absent. PERITONEUM: Colonel Sachs is no ascites or free intraperitoneal air. RETROPERITONEUM: The aorta is atherosclerotic without aneurysm. There is a right  external iliac artery stent. There is an enhancing fluid collection surrounding  the right external iliac artery stent extending into the right retroperitoneum. The component around the stent measures 4.6 x 2.7 x 6.3 cm and the component in  the right retroperitoneum measures 3.6 x 3.1 x 4.6 cm. There is no  retroperitoneal mass or adenopathy. BONES AND SOFT TISSUES: There is diffuse body edema.      IMPRESSION  IMPRESSION:   1. Right common iliac artery stent with enhancing fluid collection surrounding  the stent and extending into the retroperitoneum on the right. This is  suspicious for infection. 2. Right hydronephrosis and hydroureter secondary to compression of the ureter  by the collection around the stent. 3. Extensive atherosclerotic calcification of the aorta and mesenteric vessels. This is consistent with the patient's end-stage renal disease. 4. Status post cholecystectomy. 5. Gastrostomy tube. 6. Small left renal cysts. 7. Status post prostatectomy.      CT 7/11  FINDINGS:   LUNG BASES: 7 mm subpleural nodule at posterior basilar right lower lobe  slightly smaller in the interval.  INCIDENTALLY IMAGED HEART AND MEDIASTINUM: Unremarkable. LIVER: Mild intrahepatic and extrahepatic biliary duct distention again shown  with common bile duct diameter measuring up to 1.5 cm.  Findings not  substantially changed in the interval.  GALLBLADDER: Status post cholecystectomy. SPLEEN: No mass. PANCREAS: No mass or ductal dilatation. ADRENALS: Unremarkable. KIDNEYS: Bilateral renal atrophy again shown. Moderate right renal  pelvocaliectasis and ureteral dilation again demonstrated. Finding extends to  level of retroperitoneal hematoma surrounding right external iliac stent. STOMACH: Moderate gastric distention in the interval to the level of the second  portion of the duodenum. Percutaneous gastrostomy tube in the interval  positioned anteriorly in the gastric body. SMALL BOWEL: No dilatation or wall thickening. COLON: No dilatation or wall thickening. APPENDIX: Not demonstrated. PERITONEUM: No free intraperitoneal air or fluid demonstrated. RETROPERITONEUM: As noted above, right external iliac stent again shown within a  moderate-sized hematoma extending along the anterior margin of the right  iliopsoas. Dimension is difficult to estimate due to the lack of oral contrast.  It measures at least 7.6 x 3.5 cm transverse and 10.3 cm craniocaudal and size  is similar to that shown previously. URINARY BLADDER: No mass or calculus. BONES: No destructive bone lesion. ADDITIONAL COMMENTS: N/A      IMPRESSION  IMPRESSION:      1. Interval percutaneous gastrostomy tube placement. Interval moderate gastric  distention. 2. Right iliac artery stenting and adjacent hematoma again shown, appearing  stable. Moderate right renal pelvocaliectasis and ureterectasis again  demonstrated.      CT 7/2  FINDINGS: There is interval right common/external iliac artery stenting for  exclusion of pseudoaneurysm with patent stent, and minimal hyperdensity in the  pseudoaneurysm contents but no overt arterial extravasation. Pseudoaneurysm size  is stable. There is no apparent soft tissue emphysema or abscess.      Right hydroureteronephrosis remains. Kidneys are atrophic. There is trace  ascites.  There is no pneumoperitoneum.      Abdominal aorta is without stenosis, aneurysm or dissection. Celiac artery, SMA,  ARTIE and bilateral solitary renal arteries are patent. Right internal iliac  artery is patent. Femoral arteries are patent.      IMPRESSION  IMPRESSION:  1. Interval right iliac artery pseudoaneurysm stenting. 2. No overt arterial extravasation. 3. Stable pseudosac size. 4. No apparent soft tissue emphysema/abscess. 5. Stable right hydroureteronephrosis.               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      From chart review, he was admitted from 6/28-7/9/18 and had stenting and repair of aneurysm. Had leukocytosis and was being treated for aspiration pneumonia per chart. Per DC summary he was on Vancomycin, and flagyl at one point and then Zosyn for 10 days during last admit.     This admission, he is admitted with fever (101.2 ) and leukocytosis (16) and found to have fluid collection around R iliac artery stent extending into retroperitoneum on right. CT read as \" The component around the stent measures 4.6 x 2.7 x 6.3 cm and the component in  the right retroperitoneum measures 3.6 x 3.1 x 4.6 cm\"  Right hydronephrosis and hydroureter due to compression fo ureter seen as well. He had a CT guided aspiration done and cultures + for Proteus mirabilis. He had a CT on 7/11 prior to this admission that showed a \"moderate-sized hematoma extending along the anterior margin of the right iliopsoas\". \" It measures at least 7.6 x 3.5 cm transverse and 10.3 cm craniocaudal in size\".    He was started on Cefepime and Levaquin this admission.   Levaquin was stopped later                  1) Infected R iliac pseudoaneurysm and stent     S/P repair with stenting on 6/29/18, S/P CT guided drainage of pelvic collection 7/19 wt cx + for Proteus mirabilis     CT with  component around the stent measures 4.6 x 2.7 x 6.3 cm and the component in the right retroperitoneum measures 3.6 x 3.1 x 4.6 cm\"      Poor surgical candidate given functional status and multiple co morbidities     Successful treatment of infections is not usually possible without adequate source control. Additionally, in his situation, as there is fluid  around the stent, raising high concern for stent to be seeded/infected.     If his hematoma/fluid collections/stent cannot be surgically removed,   I doubt antibiotics alone will eradicate such an infection entirely. Antibiotics may keep the infection at [facility] 994 or suppressed. Discussed with his wife in detail today and per discussion they would like more time to address goals of care     There is a chance that he can develop resistance on Cefazolin therapy but reasonable to try this with HD as this would avoid another line etc and can odette given with HD. Will decided based on goals of care discussion again. For now on Cefepime     Will need IV antibiotics for 4-6 weeks with possibly suppression thereafter     I would be hesitant to give oral Levaquin for long durations given GI issues and difficulty with long term tolerability, risk for CDI     Will continue to discuss with patient's wife and other teams.     [facility] Palliative care team, Dr Elias Denton today            2) Per urology notes, superficial dry gangrene of glans penis      3) Sacral decubitus ulcers      4) MS     5) ESRD on HD     6) S/P PEG      7) DVT px                     Josef Rowland, DO   7:54 PM

## 2018-07-25 NOTE — INTERDISCIPLINARY ROUNDS
Bedside shift change report given to Polina Cowart RN  (oncoming nurse) by Helga Schlatter, RN  (offgoing nurse). Report included the following information SBAR and MAR.

## 2018-07-25 NOTE — PROGRESS NOTES
Hospitalist Progress Note NAME: Lane Anthony :  1950 MRN:  [id number] Assessment / Plan: 
Sepsis with Hypotension POA- WBC 16.1k, fever 100.7 ? source at this time- suspected B/L Buttock Pressure injury ulcer/wound infection vs R iliac ar pseudoaneurysm/collection/stent infection, UTI Encephalopathy POA in the setting of sepsis -CXR neg 
-UA c/w UTI - ordered culture - still pending 
-Blood Cx negative 
-Lact normal at 1.4 
-S/p IVF bolus in ER followed by gentle hydration x 12 hrs. Pt has ESRD 
-On cefepime/vancomycin/levaquin 
-CT abdm/pelvis showing Right common iliac artery stent with enhancing fluid collection surrounding the stent and extending into the retroperitoneum on the right. suspicious for infection. Also showing Right hydronephrosis and hydroureter secondary to compression of the ureter by the collection around the stent 
-On ,Vascular surgery saw patient and will proceed with aspiration of the fluid noted on CT surrounding the right common iliac artery stent since this could be an abscess. 
-Urology consulted for hydronephrosis right ->saw pt on :no intervention needed 
-On :CT guided aspiration of pelvic fluid collection - culture of fluid showing few -GNRs,identification pending. Cx from anaerobic bottle pending results 
-WBC trending down  
-Body fluid Cx-Proteus sens to cefepime/levaquin 
-DC Vanco 
-Refused labs today : As noted below, extended conversation with family in regards to what should be done about pt's antibiotics - but importantly pt and family to understand that medical treatment alone is not going to be curative. Family need to decide whether pt wants the aggressive therapy approach with daily abx or less aggressive with abx while undergoing dialysis. Family and pt want 24 hours to think about it. Palliative and ID input very much appreciated. Addendum: 
Rapid Response called for the pt at approx 1240pm. Responded immediately.  RN at bedside states that when she moved the pt his eyes became very wide and then he became SOB. Currently pt still complaining of SOB. He denies any CP, cough or dizziness. Pt saturating well on RA but still placed on 1L and saturating well still. Will ask for CXR. No further workup at this point. Lungs on PE benign, CTAB. May be anxiety related. Will continue to monitor. 7/25: 
CXR from yday without any acute changes noted. Pt without any respiratory complaints today. Family requesting one more day to decide antibiotic course of action. Palliative help appreciated. Continue IV abx for now as ordered 
   
Diabetes mellitus 2 on Insulin at home Dysphagia due to esophageal dysmotility on MBS study last admission - s/p PEG for FS Q 6 hrs on tube feeding 
-Lispro SSI here 
-Add NPH as needed   
-BS stable 
   
Unspecified protein-calorie malnutrition POA in the setting of PEG dependence, sacral decubitus, ESRD -- onTF 
   
ESRD on HD MWF Anemia of chronic disease POA S/p L arm AVFistula 
-Stool guaiac neg in ER 
-Nephrology consult for HD 
-Cont epogen as per renal 
-PRBC deferred to nephrology with HD - received blood transfusion 7/18 and 7/23 
-Renvela TID 
   
Hyperlipidemia 
-Cont on statins 
   
Hypertension 
-Switched Coreg to IV BB with parametes -PRN nitropaste 
   
? Multiple sclerosis POA ? Exacerbation causing dysphagia- cannot tell till MRI done 
-Neurology consult noted last admission- pt has been refusing MRI till now, no further workup recommended 
   
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 
-Vascular surgery consult as above 
   
H/o CAD Chronic Diastolic & Systolic CHF POA- EF 66%, mod LVH on recent Echo 6/29 
-Conservative approach was recommended last admission by Cardiology in light of other co morbidities.    
PAD s/p b/l leg stents S/p remote partial (Syme) amputation of RIGHT foot.  
-Nephrology consulted for HD MWF 
-Cont ASA 
-Vascular surgery consult as above for opinion on stent infection    
   
Code Status: Full Surrogate Decision Maker: wife 
   
DVT Prophylaxis: Sq heparin GI Prophylaxis: not indicated 
   
Baseline: Pt was recently DC to [facility] after being treated for R Iliac ar pseudoaneurysm s/p Stent by [facility] Sx Dr Radha Arellano, Eso Dysmotility syndrome causing Dysphagia causing aspiration pneumonia s/p PEG tube Pt had denied any neuro workup - denied MRI as recommended by neurology to find the cause of his eso dysmotility- ?MS flare 
   
Body mass index is 23.57 kg/(m^2). Subjective: Chief Complaint / Reason for Physician Visit Denies any SOB from yesterday. Confused. Discussed with RN events overnight. Review of Systems: 
Symptom Y/N Comments  Symptom Y/N Comments Fever/Chills n   Chest Pain n   
Poor Appetite n   Edema Cough n   Abdominal Pain n   
Sputum    Joint Pain SOB/BANKS n   Pruritis/Rash Nausea/vomit n   Tolerating PT/OT Diarrhea    Tolerating Diet Constipation    Other Could NOT obtain due to:   
 
Objective: VITALS:  
Last 24hrs VS reviewed since prior progress note. Most recent are: 
Patient Vitals for the past 24 hrs: 
 Temp Pulse Resp BP SpO2  
07/25/18 1105 98.4 °F (36.9 °C) 69 18 130/64 -  
07/25/18 1058 98.4 °F (36.9 °C) 76 18 142/66 99 % 07/25/18 1030 - 66 18 141/67 -  
07/25/18 0959 - 77 18 136/66 -  
07/25/18 0930 - 75 18 115/64 -  
07/25/18 0900 - 76 18 118/58 -  
07/25/18 0827 98.4 °F (36.9 °C) 76 20 120/50 100 % 07/25/18 0800 - 75 20 112/49 -  
07/25/18 0734 98.6 °F (37 °C) 78 20 151/70 -  
07/25/18 0338 98.5 °F (36.9 °C) 85 18 151/69 98 %  
07/24/18 2356 98.7 °F (37.1 °C) 78 18 153/69 99 % 07/1950 98.2 °F (36.8 °C) 77 18 145/66 100 % 07/24/18 1519 98.8 °F (37.1 °C) 80 18 142/66 100 % Intake/Output Summary (Last 24 hours) at 07/25/18 1333 Last data filed at 07/25/18 1214 Gross per 24 hour Intake              890 ml Output             1501 ml Net             -611 ml PHYSICAL EXAM: 
General:                    Alert, cooperative, no acute distress   
EENT:                       EOMI. Anicteric sclerae. MMM Resp:                        CTA bilaterally, no wheezing or rales. No accessory muscle use CV:                            Regular  rhythm,  No edema GI:                             Soft, Non distended, Non tender.  +Bowel sounds Neurologic:                Alert and oriented X 3, normal speech, also intermittently confused Psych:                       Fair insight. Not anxious nor agitated Skin:                          No rashes. No jaundice. LUE fistula+ Reviewed most current lab test results and cultures  YES Reviewed most current radiology test results   YES Review and summation of old records today    NO Reviewed patient's current orders and MAR    YES 
PMH/SH reviewed - no change compared to H&P 
________________________________________________________________________ Care Plan discussed with: 
  Comments Patient x Family RN Care Manager Consultant Multidiciplinary team rounds were held today with , nursing, pharmacist and clinical coordinator. Patient's plan of care was discussed; medications were reviewed and discharge planning was addressed. ________________________________________________________________________ Total NON critical care TIME:  25   Minutes Total CRITICAL CARE TIME Spent:   Minutes non procedure based Comments >50% of visit spent in counseling and coordination of care    
________________________________________________________________________ Day Yip MD  
 
Procedures: see electronic medical records for all procedures/Xrays and details which were not copied into this note but were reviewed prior to creation of Plan. LABS: 
I reviewed today's most current labs and imaging studies. Pertinent labs include: 
Recent Labs 07/25/18 
 0344  07/23/18 
 1520 WBC  11.5*  13.5* HGB  9.2*  6.7* HCT  29.4*  22.2*  
PLT  337  364 Recent Labs  
   07/25/18 
 0344  07/23/18 
 1520 NA  135*  134* K  3.8  4.4  
CL  100  96* CO2  30  33* GLU  90  137* BUN  19  37* CREA  3.50*  4.68* CA  9.4  8.9 PHOS   --   2.5* Signed: Ron Wall MD

## 2018-07-25 NOTE — CONSULTS
Palliative Medicine Consult  Simon: [phone] ([phone])    Patient Name: Neftaly Mantilla  YOB: 1950    Date of Initial Consult: 7/23/18  Reason for Consult: Care Decisions  Requesting Provider: Shannan Arenas MD  Primary Care Physician: Lucy Callejas MD     SUMMARY:   Neftaly Mantilla is a 70 y.o. male with a past history of multiple sclerosis,ESRD on HD, anemia of chronic disease, cad , htn , gerd, peripheral vascular disease, s/p recent [facility] s/p repair / stent for infected right iliac artery pseudoanuresym, esophageal dysmotility, s/p peg,. 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 PALLIATIVE DIAGNOSES:   1. Goals of care. 2.  Severe Debility  3. Weight loss  4. Malnutrition  5. Altered mental status. 6. Sepsis        PLAN:     1. Patient is alert, oriented x3, intermittent confused. 2. Patient has very poor recall, does not remember the discussion he had with ID Dr Alba Gomez, shortly before my visit ,  3. Patient does not have capacity to understand the depth of his illness and treatment plan. 4.  I tried revisiting code status , he is not able to answer appropriately and does not have capacity to make simple medical decisions. 5. Me and his wife talked about  , how seriously ill he is , risk of recurrent sepsis, high risk of decompensation and cardiac arrest.  6.  wife understand he may not make through this illness. 7.  we talked about CPR facts , and its non meaningful out come given he is debilitated with complex medical issues, advised to protect him from CPR.   8. His wife wants to continue with full code sattus and full restorative care , per his wishes reflected on POST FORM completed by patient earlier this month. 9.  Advance directives reviewed , in place. 10. Initial consult note routed to primary continuity provider  11. Communicated plan of care with: Palliative IDT       GOALS OF CARE / TREATMENT PREFERENCES:     GOALS OF CARE:  Patient/Health Care Proxy Stated Goals: Prolong life      TREATMENT PREFERENCES:   Code Status: Full Code    Advance Care Planning:  Advance Care Planning 7/17/2018   Patient's Healthcare Decision Maker is: Named in scanned ACP document   Primary Decision Maker Name -   Primary Decision Maker Phone Number -   Primary Decision Maker Relationship to Patient -   Secondary Decision Maker Name -   Secondary Decision [address] Phone Number -   Confirm Advance Directive Yes, on file   Does the patient have other document types -       Medical Interventions: Full interventions   Other Instructions: Artificially Administered Nutrition:  (patient hasa feeding tube.)     Other:    As far as possible, the palliative care team has discussed with patient / health care proxy about goals of care / treatment preferences for patient. HISTORY:     History obtained from: chart  And wife. CHIEF COMPLAINT: admitted for above. HPI/SUBJECTIVE:    The patient is:   [] Verbal , very minimal participation because of lethargy , receiving dialysis in her room currently . \" I am sick \"    7/25/18 : patient is not in any distress, denies pain , intermittently confused, unable to recall , the conversation he had shortly before my visit with Infectious disease.     Clinical Pain Assessment (nonverbal scale for severity on nonverbal patients):   Clinical Pain Assessment  Severity: 0  Location: right back  Character: throbbing   Duration: unable to tell me  Frequency: comes and go          Duration: for how long has pt been experiencing pain (e.g., 2 days, 1 month, years)  Frequency: how often pain is an issue (e.g., several times per day, once every few days, constant)     FUNCTIONAL ASSESSMENT:     Palliative Performance Scale (PPS):  PPS: 30       PSYCHOSOCIAL/SPIRITUAL SCREENING:     Palliative IDT has assessed this patient for cultural preferences / practices and a referral made as appropriate to needs (Cultural Services, Patient Advocacy, Ethics, etc.)    Advance Care Planning:  Advance Care Planning 7/17/2018   Patient's Healthcare Decision Maker is: Named in scanned ACP document   Primary Decision Maker Name -   Primary Decision Maker Phone Number -   Primary Decision Maker Relationship to Patient -   Secondary Decision [address] Name -   Secondary Decision [address] Phone Number -   Confirm Advance Directive Yes, on file   Does the patient have other document types -       Any spiritual / Mormon concerns:  [] Yes /  [x] No    Caregiver Burnout:  [] Yes /  [x] No /  [] No Caregiver Present      Anticipatory grief assessment:   [x] Normal  / [] Maladaptive       ESAS Anxiety: Anxiety: 0    ESAS Depression: Depression: 1        REVIEW OF SYSTEMS:     Positive and pertinent negative findings in ROS are noted above in HPI. The following systems were [x] reviewed  limited because of intermittent/ [] unable to be reviewed as noted in HPI  Other findings are noted below. Systems: constitutional, ears/nose/mouth/throat, respiratory, gastrointestinal, genitourinary, musculoskeletal, integumentary, neurologic, psychiatric, endocrine. Positive findings noted below. Modified ESAS Completed by: provider   Fatigue: 7 Drowsiness: 1   Depression: 1 Pain: 0   Anxiety: 0 Nausea: 0   Anorexia: 5 Dyspnea: 0     Constipation: No     Stool Occurrence(s): 1        PHYSICAL EXAM:     From RN flowsheet:  Wt Readings from Last 3 Encounters:   07/18/18 163 lb 9.3 oz (74.2 kg)   07/09/18 164 lb 3.9 oz (74.5 kg)   06/13/18 150 lb (68 kg)     Blood pressure 150/71, pulse 94, temperature (!) 100.6 °F (38.1 °C), resp.  rate 15, height 5' 10\" (1.778 m), weight 163 lb 9.3 oz (74.2 kg), SpO2 93 %. Pain Scale 1: Numeric (0 - 10)  Pain Intensity 1: 0     Pain Location 1: Abdomen  Pain Orientation 1: Other (comment)  Pain Description 1: Aching, Constant  Pain Intervention(s) 1: Medication (see MAR)  Last bowel movement, if known:     Constitutional: cachectic, ill appearing , alert, oriented x3, intermittent confusion. Eyes: pupils equal, anicteric  ENMT: no nasal discharge, moist mucous membranes  Cardiovascular: regular rhythm, distal pulses intact  Respiratory: breathing not labored, symmetric  Gastrointestinal: soft non-tender, +bowel sounds  Musculoskeletal: generalized wasting . Neurologic: intermittent confusion , follow commands.   Other:       HISTORY:     Active Problems:    PAD (peripheral artery disease) ([address] (12/9/2010)      Multiple sclerosis ([address] (1/4/2015)      Diabetic peripheral neuropathy associated with type 2 diabetes mellitus ([address] (3/25/2016)      Pseudoaneurysm of iliac artery (HCC) (6/28/2018)      Leukocytosis (7/17/2018)      Fever (7/17/2018)      ESRD (end stage renal disease) on dialysis ([address] (7/17/2018)      Sepsis ([address] (7/17/2018)      Past Medical History:   Diagnosis Date    Anemia associated with chronic renal failure     Autoimmune disease ([address]     hx ms    CAD (coronary artery disease)     Chronic kidney disease     catheter removed and no dialysis at this time    Chronic kidney disease     left arm fistula dialysis MWF    Diabetes ([address]     type II    Elevated troponin 6/29/2018    GERD (gastroesophageal reflux disease)     Hypertension     Ill-defined condition     prostate cancer    Multiple sclerosis ([address]     diagnosed '01    Other ill-defined conditions(799.89)     anemia    Other ill-defined conditions(799.89)     elevated cholesterol    Other ill-defined conditions(799.89)     hx septic right foot    Peripheral vascular disease ([address]     Secondary hyperparathyroidism of renal origin ([address]  Thyroid disease       Past Surgical History:   Procedure Laterality Date    COLONOSCOPY N/A 12/6/2016    COLONOSCOPY [facility] Diogenes [facility]ulkner [facility] at [facility] ENDOSCOPY    COLONOSCOPY,DIAGNOSTIC  12/6/2016         CORONARY STENT SINGLE W/PTCA  2/17/2011         HX APPENDECTOMY      HX CATARACT REMOVAL  10/18/10    bilateral    HX CHOLECYSTECTOMY      HX GI      ileostomy due to infection    HX HEART CATHETERIZATION      HX HEENT      hx post photocoagulation eye 2009    HX OTHER SURGICAL      right partial foot amputation    HX OTHER SURGICAL      cardiac cath    HX OTHER SURGICAL      prostate removed    PLACE PERCUT GASTROSTOMY TUBE  7/6/2018         TN COLONOSCOPY W/BIOPSY SINGLE/MULTIPLE  5/10/2010         UPPER GI ENDOSCOPY,BIOPSY  4/17/2018         UPPER GI ENDOSCOPY,REMV TUMOR,SNARE  6/13/2018         VASCULAR SURGERY PROCEDURE UNLIST      katelyn. leg bypasses      Family History   Problem Relation Age of Onset    Diabetes Mother       History reviewed, no pertinent family history. Social History   Substance Use Topics    Smoking status: Former Smoker     Years: 1.00     Quit date: 4/12/2003    Smokeless tobacco: Never Used      Comment: quit 5 years ago    Alcohol use No      Comment: Stopped drinking 10 years ago     Allergies   Allergen Reactions    Sensipar [Cinacalcet] Other (comments)     Cinacalcet (Sensipar) has been added as an \"allergy\" / intolerance with a comment to assure providers at discharge and post discharge are aware of Dr. Cheri Jansen recommendation to avoid Sensipar. Patient takes Leidy Primmer as an outpatient; Thus, Monda Sleek has been discontinued per Nephrology (cannot give with recent Parsabiv b/c risk of low Calcium).       Current Facility-Administered Medications   Medication Dose Route Frequency    0.9% sodium chloride infusion 250 mL  250 mL IntraVENous PRN    heparin (porcine) injection 5,000 Units  5,000 Units SubCUTAneous Q12H    balsam peru-castor oil (VENELEX)  mg/gram ointment   Topical BID    sodium hypochlorite (QUARTER STRENGTH DAKIN'S) 0.125% irrigation (bottle)   Topical DAILY    collagenase (SANTYL) 250 unit/gram ointment   Topical DAILY    epoetin niya (EPOGEN;PROCRIT) injection 10,000 Units  10,000 Units SubCUTAneous Q MON, WED & FRI    0.9% sodium chloride infusion 250 mL  250 mL IntraVENous PRN    cefepime (MAXIPIME) 1 g in 0.9% sodium chloride (MBP/ADV) 50 mL  1 g IntraVENous Q24H    acetaminophen (TYLENOL) solution 650 mg  650 mg Per G Tube Q3H PRN    aspirin chewable tablet 81 mg  81 mg Per G Tube DAILY    escitalopram oxalate (LEXAPRO) 5 mg/5 mL oral solution soln 10 mg  10 mg Per G Tube DAILY    insulin lispro (HUMALOG) injection   SubCUTAneous Q6H    glucose chewable tablet 16 g  4 Tab Oral PRN    dextrose (D50W) injection syrg 12.5-25 g  12.5-25 g IntraVENous PRN    glucagon (GLUCAGEN) injection 1 mg  1 mg IntraMUSCular PRN    morphine 10 mg/5 mL oral solution 10 mg  5 mL Per G Tube Q4H PRN    oxyCODONE-acetaminophen (PERCOCET) 5-325 mg per tablet 1 Tab  1 Tab Per G Tube Q4H PRN    sevelamer carbonate (RENVELA) oral powder 2,400 mg  2.4 g Per G Tube TID WITH MEALS    pravastatin (PRAVACHOL) tablet 10 mg  10 mg Per G Tube QHS    sodium chloride (NS) flush 5-10 mL  5-10 mL IntraVENous Q8H    sodium chloride (NS) flush 5-10 mL  5-10 mL IntraVENous PRN    naloxone (NARCAN) injection 0.4 mg  0.4 mg IntraVENous PRN    ondansetron (ZOFRAN) injection 4 mg  4 mg IntraVENous Q4H PRN          LAB AND IMAGING FINDINGS:     Lab Results   Component Value Date/Time    WBC 11.5 (H) 07/25/2018 03:44 AM    HGB 9.2 (L) 07/25/2018 03:44 AM    PLATELET 852 75/04/5332 03:44 AM     Lab Results   Component Value Date/Time    Sodium 135 (L) 07/25/2018 03:44 AM    Potassium 3.8 07/25/2018 03:44 AM    Chloride 100 07/25/2018 03:44 AM    CO2 30 07/25/2018 03:44 AM    BUN 19 07/25/2018 03:44 AM    Creatinine 3.50 (H) 07/25/2018 03:44 AM Calcium 9.4 07/25/2018 03:44 AM    Magnesium 2.1 07/09/2018 04:20 AM    Phosphorus 2.5 (L) 07/23/2018 03:20 PM      Lab Results   Component Value Date/Time    AST (SGOT) 38 (H) 07/21/2018 05:37 AM    Alk. phosphatase 323 (H) 07/21/2018 05:37 AM    Protein, total 10.0 (H) 07/21/2018 05:37 AM    Albumin 1.5 (L) 07/21/2018 05:37 AM    Globulin 8.5 (H) 07/21/2018 05:37 AM     Lab Results   Component Value Date/Time    INR 1.2 (H) 07/05/2018 01:57 AM    Prothrombin time 12.4 (H) 07/05/2018 01:57 AM    aPTT 30.1 03/21/2016 02:01 PM      Lab Results   Component Value Date/Time    Iron 37 12/10/2010 05:00 AM    TIBC 189 (L) 12/10/2010 05:00 AM    Iron % saturation 20 12/10/2010 05:00 AM    Ferritin 122 12/10/2010 05:00 AM      Lab Results   Component Value Date/Time    pH 7.38 06/29/2018 11:02 PM    PCO2 40 06/29/2018 11:02 PM    PO2 68 (L) 06/29/2018 11:02 PM     No components found for: [place]   Lab Results   Component Value Date/Time    CK 90 02/01/2018 01:50 PM    CK - MB 3.7 (H) 02/01/2018 01:50 PM                Total time:   Counseling / coordination time, spent as noted above:   > 50% counseling / coordination?:     Prolonged service was provided for  []30 min   []75 min in face to face time in the presence of the patient, spent as noted above. Time Start:   Time End:   Note: this can only be billed with 70777 (initial) or 88486 (follow up). If multiple start / stop times, list each separately.

## 2018-07-25 NOTE — PROCEDURES
[facility]  [phone]    Vitals   Pre   Post   Assessment   Pre   Post     Temp  Temp: 98.6 °F (37 °C) (07/25/18 0734)  98.4 LOC  Oriented to self only Oriented to seld   HR   Pulse (Heart Rate): 78 (07/25/18 0734) 69 Lungs   coarse on room air  coarse on room air   B/P   BP: 151/70 (07/25/18 0734) 130/64 Cardiac   B/p wnl  b/p wnl   Resp   Resp Rate: 20 (07/25/18 0734) 20 Skin   intact  intact   Pain level  0 0 Edema  None noted     None noted   Orders:    Duration:   Start:   0730 End:    1105 Total:   3.5hrs    Dialyzer:   Dialyzer/Set Up Inspection: Murvin Pool (07/25/18 0734)   K Bath:   Dialysate K (mEq/L): 3 (07/25/18 0734)   Ca Bath:   Dialysate CA (mEq/L): 2.5 (07/25/18 0734)   Na/Bicarb:   Dialysate NA (mEq/L): 140 (07/25/18 0734)   Target Fluid Removal:   Goal/Amount of Fluid to Remove (mL): 1500 mL (07/23/18 1520)   Access     Type & Location:   Lt arm fistula, good thrill and bruit. Area cleaned and accessed with 15 g needles x2 without any issues. Good blood flow noted.  No signs of infection noted   Labs     Obtained/Reviewed   Critical Results Called   Date when labs were drawn-  Hgb-    HGB   Date Value Ref Range Status   07/25/2018 9.2 (L) 12.1 - 17.0 g/dL Final     K-    Potassium   Date Value Ref Range Status   07/25/2018 3.8 3.5 - 5.1 mmol/L Final     Ca-   Calcium   Date Value Ref Range Status   07/25/2018 9.4 8.5 - 10.1 MG/DL Final     Bun-   BUN   Date Value Ref Range Status   07/25/2018 19 6 - 20 MG/DL Final     Creat-   Creatinine   Date Value Ref Range Status   07/25/2018 3.50 (H) 0.70 - 1.30 MG/DL Final     Comment:     INVESTIGATED PER DELTA CHECK PROTOCOL        Medications/ Blood Products Given     Name   Dose   Route and Time        None ordered             Blood Volume Processed (BVP):    86.8 Net Fluid   Removed:  1.5kg   Comments   Time Out Done:   Primary Nurse Rpt Pre: Eb Harrison RN  Primary Nurse Rpt Jeffrey Piedra RN  Pt Education: frequent reminding of arm postioning during tx  Care Plan:ESRd  Tx Summary:  0730 dialysis started  1045 Dr Ramiro Clayton at bedside, no new orders given   Dialysis completed and blood rinsed back. Needles removed and pressure held till bleeding stopped . Dressing applied to each site, pt stable  Admiting Diagnosis:ESRD  Pt's previous clinic-[facility]  Consent signed - Informed Consent Verified: Yes (07/25/18 0734)  [facility] Consent - on file  Hepatitis Status- NEG AG 7/2/18  Machine #- Machine Number: [id number] (07/25/18 1340)  Telemetry status-remote tele  Pre-dialysis wt. - Pre-Dialysis Weight:  (No bedscale available) (07/21/18 0100)

## 2018-07-25 NOTE — TELEPHONE ENCOUNTER
----- Message from Felix Suarez sent at 7/25/2018  2:02 PM EDT -----  Regarding: Dr. Margoth Tavera  Mrs. Ceci Newby, pt's daughter, requesting to speak with provider in regards to pt's condition in the hospital. Requesting to get the same information which was given to pt's wife yesterday. Best contact number [phone].

## 2018-07-25 NOTE — PROGRESS NOTES
Nutrition Assessment:    INTERVENTIONS/RECOMMENDATIONS:   EN:  Continue Nepro TFs as tolerated; continue to increase in small increments by 5 ml/hr until goal rate achieved as long as pt is not experiencing and s/s intolerance - I.e. high residuals, n/v/abdominal pain/distention. Goal rate per orders - 45 ml/hr + 170 ml free water q4h. Provides daily approx. 1944 kcals/87.5 g protein/1805 ml free water. ASSESSMENT:   7/25:  Chart reviewed; med noted for right infected iliac pseudoaneurysm and stent infection. Pt is receiving high dose antibiotics. H/O DM, ESRD-HD, MS, HTN. Pt has a PEG tube and has been receiving Nepro TF with a goal rate of 45 ml/hr. However, pt has experienced difficulty achieving goal rate due to some suspected intolerance with residuals as high as 250 ml. At time of visit, TF infusing at 20 ml/hr. Noted, formula bubbling up in bottle. Recommended to change out formula as unsure of hang-time. New bottle in room; RN agreed to change formula. Last residual check was 20 ml, so plans were to increase TF rate from 20 ml/hr to 25 ml/hr and see how pt tolerates. No reported n/v or abdominal distention per RN. Pt very talkative during visit but is weak and had difficulty coughing up sputum. Had suction available as needed. Pt discussing his grandchildren and previous love for basketball. Diet Order: NPO  % Eaten:  No data found. Pertinent Medications: [x] Reviewed []Other:  Pertinent Labs: [x]Reviewed  []Other: reviewed; Phos 2.5, K+ WNL  Food Allergies: [x]None []Other:     Last BM: 7/25 loose   [x]Active     []Hyperactive  []Hypoactive       [] Absent  BS  Skin:    [] Intact   [] Incision  [x] Breakdown   []Edema   []Other:    Anthropometrics: Height: 5' 10\" (177.8 cm) Weight: 74.2 kg (163 lb 9.3 oz)    IBW (%IBW):   ( ) UBW (%UBW):   (  %)    BMI: Body mass index is 23.47 kg/(m^2).     This BMI is indicative of:  []Underweight   [x]Normal   []Overweight   [] Obesity   [] Extreme Obesity (BMI>40)  Last Weight Metrics:  Weight Loss Metrics 7/18/2018 7/9/2018 6/13/2018 4/17/2018 4/12/2018 4/5/2018 3/1/2018   Today's Wt 163 lb 9.3 oz 164 lb 3.9 oz 150 lb 155 lb 166 lb 166 lb 14.4 oz 153 lb   BMI 23.47 kg/m2 23.57 kg/m2 21.52 kg/m2 22.24 kg/m2 23.82 kg/m2 23.95 kg/m2 21.95 kg/m2       Estimated Nutrition Needs (Based on): 1800 Kcals/day (BMR: 1500 x 1.2) , 90 g (1.2 g/kg) Protein  Carbohydrate: At Least 130 g/day  Fluids: 1800 mL/day    NUTRITION DIAGNOSES:   Problem:  Less than optimal enteral nutrition      Etiology: related to time off pump     Signs/Symptoms: as evidenced by pt received ~55% of ordered TF in the past 72 hrs    Previous Nutrition Dx:  [] Resolved  [x] Unresolved           [] Progressing    NUTRITION INTERVENTIONS:    Enteral/Parenteral Nutrition: Other (Continue current TF order)                GOAL:   Pt will tolerate TF at goal rate next 2-4 days    NUTRITION MONITORING AND EVALUATION   Food/Nutrient Intake Outcomes:  Total energy intake  Physical Signs/Symptoms Outcomes: Weight/weight change    Previous Goal Met:   [] Met              [x] Progressing Towards Goal              [] Not Progressing Towards Goal   Previous Recommendations:   [] Implemented          [] Not Implemented          [x] Not Applicable    LEARNING NEEDS (Diet, Food/Nutrient-Drug Interaction):    [x] None Identified   [] Identified and Education Provided/Documented   [] Identified and Pt declined/was not appropriate     Cultural, Tenriism, OR Ethnic Dietary Needs:    [x] None Identified   [] Identified and Addressed     [x] Interdisciplinary Care Plan Reviewed/Documented    [x] Discharge Planning:  Pending TF tolerance   [] Participated in Interdisciplinary Rounds    NUTRITION RISK:    [x] Patient At Nutritional Risk    [x] High              [] Moderate/Mild           []  Low     [] Patient Not At [address] [place] [address], [address] [place] [address] Street  Pager [phone]  Weekend Pager [phone]

## 2018-07-25 NOTE — PROGRESS NOTES
Oncology Nursing Communication Tool 7:58 AM 
7/25/2018 Bedside and Verbal shift change report given to Eliseo Wetzel RN (incoming nurse) by Jaycee Steel (outgoing nurse) on [place] Mora. Report included the following information SBAR, Kardex, Intake/Output, MAR, Recent Results and Cardiac Rhythm NSR. Shift Summary: 7P-7A Issues for physician to address: Plan of Care Tube feed residual     
 
 
Oncology Shift Note Admission Date 7/17/2018 Admission Diagnosis Leukocytosis Fever ESRD (end stage renal disease) on dialysis ([facility] [address] Code Status Full Code Consults IP CONSULT TO VASCULAR SURGERY 
IP CONSULT TO NEPHROLOGY 
IP CONSULT TO UROLOGY 
IP CONSULT TO PALLIATIVE CARE - PROVIDER 
IP CONSULT TO INFECTIOUS DISEASES Cardiac Monitoring [] Yes [] No  
  
Purposeful Hourly Rounding [] Yes   
Julissa Score Total Score: 3 Julissa score 3 or > [] Bed Alarm [] Avasys [] 1:1 sitter [] Patient refused (Place signed refusal form in chart) Pain Managed [] Yes [] No  
 Key Pain Meds   
    
  
 oxyCODONE-acetaminophen (PERCOCET) 5-325 mg per tablet  (Taking) 1 Tab by Per G Tube route every four (4) hours as needed for Pain. acetaminophen (TYLENOL) 160 mg/5 mL liquid 650 mg by Per G Tube route every three (3) hours as needed for Pain. morphine 10 mg/5 mL oral solution 5 mL by Per G Tube route every four (4) hours as needed for Pain. Pain scale 7-10 Influenza Vaccine Received Flu Vaccine for Current Season (usually Sept-March): Not Flu Season Oxygen needs? [] Room air Oxygen @  []1L    []2L    []3L   []4L    []5L   []6L Use home O2? [] Yes [] No 
Perform O2 challenge test using  smartphrase (.oxygenchallenge) Last bowel movement Last Bowel Movement Date: 07/24/18 
bowel movement Urinary Catheter LDAs Peripheral IV 07/21/18 Right Antecubital (Active) Site Assessment Clean, dry, & intact 7/25/2018  4:19 AM  
Phlebitis Assessment 0 7/25/2018  4:19 AM  
Infiltration Assessment 0 7/25/2018  4:19 AM  
Dressing Status Clean, dry, & intact 7/25/2018  4:19 AM  
Dressing Type Tape;Transparent 7/25/2018  4:19 AM  
Hub Color/Line Status Blue;Patent; Flushed 7/25/2018  4:19 AM  
Action Taken Blood drawn 7/25/2018  4:19 AM  
Alcohol Cap Used Yes 7/25/2018  4:19 AM  
      
PEG/Gastrostomy Tube 07/17/18 (Active) Site Assessment Clean, dry, & intact 7/25/2018  4:19 AM  
Dressing Status Clean, dry, & intact 7/25/2018  4:19 AM  
G Port Status Infusing 7/25/2018  4:19 AM  
Action Taken Placement verified (comment); Other (comment) 7/25/2018  4:19 AM  
Drainage Description Green 7/25/2018  4:19 AM  
Gastric Residual (mL) 110 ml 7/25/2018  4:19 AM  
Tube Feeding/Formula Options Renal (Nepro) 7/25/2018  4:19 AM  
Tube Feeding/Verify Rate (mL/hr) 30 7/25/2018  4:19 AM  
Water Flush Volume (mL) 100 mL 7/25/2018  4:19 AM  
Intake (ml) 30 ml 7/24/2018 10:45 PM  
Medication Volume 60 ml 7/25/2018  4:19 AM  
Output (ml) 0 ml 7/23/2018 10:07 PM  
            
  
Readmission Risk Assessment Tool Score High Risk 39 Total Score 3 Has Seen PCP in Last 6 Months (Yes=3, No=0) 2 . Living with Significant Other. Assisted Living. LTAC. SNF. or  
Rehab  
 3 Patient Length of Stay (>5 days = 3) 4 IP Visits Last 12 Months (1-3=4, 4=9, >4=11) 5 Pt. Coverage (Medicare=5 , Medicaid, or Self-Pay=4) 19 Charlson Comorbidity Score (Age + Comorbid Conditions) Expected Length of Stay 4d 21h Actual Length of Stay 8 Alexandrea Jacobson Ignacia

## 2018-07-25 NOTE — PROGRESS NOTES
CM completed chart review. Pt is a Readmission: Level 2. Pt was admitted from [facility]. CM called Eunice Hsu with [facility] to see if they would consider taking him back when Pt is medically stable. . CM had to leave a VM. Pt lives in one story home with ramp to enter. Alert and oriented. Pt does ADL's and wife does IADL's for him. Pt is able to transfer self from bed to wheelchair in am and gets around house well, takes out of house and down ramp on dialysis days without assistance to meet chair van. Wife only assists with getting pt up from wheelchair onto walker and into bathroom as wheelchair will not fit in room. CM noted that Physical Therapy Screened him on 7/18/18. CM noted that in order for [facility] to review they will probably want to see updated PT/OT notes. CM just talked to Levar Fine with [facility] and she indicated that she did not feel that Pt was appropriate for [facility]. Ivan Hassan He was very complicated and was not progressing well when he was here. CM will need to figure out from MD/therapy new disposition recommendations. . HH vs SNF vs Hospice? 11:07 AM   CM reviewed the chart and Pt goes to HD at [facility] M/W/F.      3:20 pm   CM spoke to spouse, Jammie Noble about SNF and gave her choice list for rehabs. She stated that he uses [facility] to and from HD. She wanted to call the HD center to see if they had a recommendation for which SNF she should pick that is close by and works with Micheal Almaguer if he is able to use that. She is researching rehabs and will give me her top three choices tomorrow at the latest.    CM asked RN for therapy order to screen. CM will continue to monitor discharge plan.       Riley Avalos

## 2018-07-25 NOTE — CONSULTS
Palliative Medicine Consult  Simon: [phone] ([phone]    Patient Name: Juan Jose Veras  YOB: 1950    Date of Initial Consult: 7/23/18  Reason for Consult: Care Decisions  Requesting Provider: Janel Villanueva MD  Primary Care Physician: Constantino Chandler MD     SUMMARY:   Juan Jose Veras is a 70 y.o. male with a past history of multiple sclerosis,ESRD on HD, anemia of chronic disease, cad , htn , gerd, peripheral vascular disease, s/p recent discarge from [facility] on 7/9/18 s/p repair / stent for infected right iliac artery pseudoanuresym, esophageal dysmotility, s/p peg,. 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 Psychosocial : lives with wife , has 3 children from previous marriage, wife works, has a great supportive neighbor who checks on him every morning , has been in and out of hospital and rehab for past last 8-10 months, was home last month , able to walk with walker and get around home . PALLIATIVE DIAGNOSES:   1. Goals of care. 2. Debility(multiple chronic illness, MS, ESRD )  3. Weight loss  4. Malnutrition  5. Risk of recurrent sepsis (infected stent of right iliac artery )       PLAN:     1. Met with patient and his wife Messi Hickey Patient is alert, oriented x3, intermittent confused. 2. Part of visit combined with Dr Yakelin Fuentes from infectious disease and Dr Hannah Deshpande. 3.  Dr Fany Rose expalined the risk of aggressive antibiotics, everday with a new line vs non aggressive antibiotics on dial;yis day , explained risk and benefits of both options.   4. Wife notes patient has been in and out of hospital and rehab since last sept 2017 , failed kidney transplant. 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 5. We talked about , he is high risk for surgery to remove the stent , and risk of recurrent sepsis with conservative antibiotic management ,  As per ID  there is fluid  around the stent, raising high concern for stent to be seeded/infected. 6. We visited the POST form he completed last admission on 7/5/18 with palliative care . 7. Wife understands things are not going in right direction , patient currently is intermittently confused lacks full capacity to absorb the information . 8. Wife currently , does not want to make any changes towards his care as of now continue with full code and full restorative care status . 9. She wants time to absorb and process information , we agreed to meet tomorrow, wife is usually comes to visit in the morning. 10. Advance directives : reviewed. 11. We will continue to support , through this overwhelming /challenging course of illness. 12. Spoke to his wife Dottie Torres who was driving , could not talk much . 13. Tentative family  Meeting planned for 10:30 am tomorrow. 14. Patient completed Post on 7/5/18 ,  with palliative care , reflecting full restoratrive measures and Full code. 15. Advance directives reviewed , in place, POST FORM reviewed. 16. Initial consult note routed to primary continuity provider  17.  Communicated plan of care with: Palliative IDT       GOALS OF CARE / TREATMENT PREFERENCES:     GOALS OF CARE:  Patient/Health Care Proxy Stated Goals: Prolong life      TREATMENT PREFERENCES:   Code Status: Full Code    Advance Care Planning:  Advance Care Planning 7/17/2018   Patient's Healthcare Decision Maker is: Named in scanned ACP document   Primary Decision Maker Name -   Primary Decision Maker Phone Number -   Primary Decision Maker Relationship to Patient -   Secondary Decision Maker Name -   Secondary Decision Maker Phone Number -   Confirm Advance Directive Yes, on file   Does the patient have other document types -       Medical Interventions: Full interventions   Other Instructions: Artificially Administered Nutrition:  (patient hasa feeding tube.)     Other:    As far as possible, the palliative care team has discussed with patient / health care proxy about goals of care / treatment preferences for patient. HISTORY:     History obtained from: chart . CHIEF COMPLAINT: admitted for above. HPI/SUBJECTIVE:    The patient is:   [] Verbal , very minimal participation because of lethargy , receiving dialysis in her room currently . \" I am sick \"    7/24/18 : patient is awake , alert, however very weak, participate in conversation , however mentally very slow. Denies any complaints.     Clinical Pain Assessment (nonverbal scale for severity on nonverbal patients):   Clinical Pain Assessment  Severity: 1  Location: right back  Character: throbbing   Duration: unable to tell me  Frequency: comes and go          Duration: for how long has pt been experiencing pain (e.g., 2 days, 1 month, years)  Frequency: how often pain is an issue (e.g., several times per day, once every few days, constant)     FUNCTIONAL ASSESSMENT:     Palliative Performance Scale (PPS):  PPS: 40       PSYCHOSOCIAL/SPIRITUAL SCREENING:     Palliative IDT has assessed this patient for cultural preferences / practices and a referral made as appropriate to needs (Cultural Services, Patient Advocacy, Ethics, etc.)    Advance Care Planning:  Advance Care Planning 7/17/2018   Patient's Healthcare Decision Maker is: Named in scanned ACP document   Primary Decision Maker Name -   Primary Decision Maker Phone Number -   Primary Decision Maker Relationship to Patient -   Secondary Decision Maker Name -   Secondary Decision Maker Phone Number -   Confirm Advance Directive Yes, on file Does the patient have other document types -       Any spiritual / Protestant concerns:  [] Yes /  [x] No    Caregiver Burnout:  [] Yes /  [x] No /  [] No Caregiver Present      Anticipatory grief assessment:   [x] Normal  / [] Maladaptive       ESAS Anxiety: Anxiety: 0    ESAS Depression: Depression: 1   Unable to evaluate above because of patient condition. REVIEW OF SYSTEMS:     Positive and pertinent negative findings in ROS are noted above in HPI. The following systems were [x] reviewed / [] unable to be reviewed as noted in HPI  Other findings are noted below. Systems: constitutional, ears/nose/mouth/throat, respiratory, gastrointestinal, genitourinary, musculoskeletal, integumentary, neurologic, psychiatric, endocrine. Positive findings noted below. Modified ESAS Completed by: provider   Fatigue: 7 Drowsiness: 1   Depression: 1 Pain: 1 (after recieving oxycodone. )   Anxiety: 0 Nausea: 0   Anorexia: 5 Dyspnea: 0     Constipation: No     Stool Occurrence(s): 1        PHYSICAL EXAM:     From RN flowsheet:  Wt Readings from Last 3 Encounters:   07/18/18 163 lb 9.3 oz (74.2 kg)   07/09/18 164 lb 3.9 oz (74.5 kg)   06/13/18 150 lb (68 kg)     Blood pressure 136/66, pulse 77, temperature 98.4 °F (36.9 °C), resp. rate 18, height 5' 10\" (1.778 m), weight 163 lb 9.3 oz (74.2 kg), SpO2 100 %. Pain Scale 1: Numeric (0 - 10)  Pain Intensity 1: 5     Pain Location 1: Abdomen  Pain Orientation 1: Other (comment)  Pain Description 1: Aching, Constant  Pain Intervention(s) 1: Medication (see MAR)  Last bowel movement, if known:     Constitutional: cachectic, ill appearing , alert, oriented x3, intermittent confusion . Eyes: pupils equal, anicteric  ENMT: no nasal discharge, moist mucous membranes  Cardiovascular: regular rhythm, distal pulses intact  Respiratory: breathing not labored, symmetric  Gastrointestinal: soft non-tender, +bowel sounds  Musculoskeletal: muscle wasting .   Neurologic: alert, oriented x3, intermittent confusion .   Other:       HISTORY:     Active Problems:    PAD (peripheral artery disease) ([address] (12/9/2010)      Multiple sclerosis ([address] (1/4/2015)      Diabetic peripheral neuropathy associated with type 2 diabetes mellitus ([address] (3/25/2016)      Pseudoaneurysm of iliac artery (HCC) (6/28/2018)      Leukocytosis (7/17/2018)      Fever (7/17/2018)      ESRD (end stage renal disease) on dialysis ([address] (7/17/2018)      Sepsis ([address] (7/17/2018)      Past Medical History:   Diagnosis Date    Anemia associated with chronic renal failure     Autoimmune disease ([address]     hx ms    CAD (coronary artery disease)     Chronic kidney disease     catheter removed and no dialysis at this time    Chronic kidney disease     left arm fistula dialysis MWF    Diabetes ([address]     type II    Elevated troponin 6/29/2018    GERD (gastroesophageal reflux disease)     Hypertension     Ill-defined condition     prostate cancer    Multiple sclerosis ([address]     diagnosed '01    Other ill-defined conditions(799.89)     anemia    Other ill-defined conditions(799.89)     elevated cholesterol    Other ill-defined conditions(799.89)     hx septic right foot    Peripheral vascular disease ([address]     Secondary hyperparathyroidism of renal origin ([address]     Thyroid disease       Past Surgical History:   Procedure Laterality Date    COLONOSCOPY N/A 12/6/2016    COLONOSCOPY performed by Lenny Tam MD at [facility] ENDOSCOPY    COLONOSCOPY,DIAGNOSTIC  12/6/2016         CORONARY STENT SINGLE W/PTCA  2/17/2011         HX APPENDECTOMY      HX CATARACT REMOVAL  10/18/10    bilateral    HX CHOLECYSTECTOMY      HX GI      ileostomy due to infection    HX HEART CATHETERIZATION      HX HEENT      hx post photocoagulation eye 2009    HX OTHER SURGICAL      right partial foot amputation    HX OTHER SURGICAL      cardiac cath    HX OTHER SURGICAL      prostate removed    PLACE PERCUT GASTROSTOMY TUBE  7/6/2018  AK COLONOSCOPY W/BIOPSY SINGLE/MULTIPLE  5/10/2010         UPPER GI ENDOSCOPY,BIOPSY  4/17/2018         UPPER GI ENDOSCOPY,REMV TUMOR,SNARE  6/13/2018         VASCULAR SURGERY PROCEDURE UNLIST      katelyn. leg bypasses      Family History   Problem Relation Age of Onset    Diabetes Mother       History reviewed, no pertinent family history. Social History   Substance Use Topics    Smoking status: Former Smoker     Years: 1.00     Quit date: 4/12/2003    Smokeless tobacco: Never Used      Comment: quit 5 years ago    Alcohol use No      Comment: Stopped drinking 10 years ago     Allergies   Allergen Reactions    Sensipar [Cinacalcet] Other (comments)     Cinacalcet (Sensipar) has been added as an \"allergy\" / intolerance with a comment to assure providers at discharge and post discharge are aware of Dr. Matthew Romero recommendation to avoid Sensipar. Patient takes Laury Polanco as an outpatient; Thus, Deanne Francesville has been discontinued per Nephrology (cannot give with recent Parsabiv b/c risk of low Calcium).       Current Facility-Administered Medications   Medication Dose Route Frequency    0.9% sodium chloride infusion 250 mL  250 mL IntraVENous PRN    heparin (porcine) injection 5,000 Units  5,000 Units SubCUTAneous Q12H    balsam peru-castor oil (VENELEX)  mg/gram ointment   Topical BID    sodium hypochlorite (QUARTER STRENGTH DAKIN'S) 0.125% irrigation (bottle)   Topical DAILY    collagenase (SANTYL) 250 unit/gram ointment   Topical DAILY    epoetin niya (EPOGEN;PROCRIT) injection 10,000 Units  10,000 Units SubCUTAneous Q MON, WED & FRI    0.9% sodium chloride infusion 250 mL  250 mL IntraVENous PRN    cefepime (MAXIPIME) 1 g in 0.9% sodium chloride (MBP/ADV) 50 mL  1 g IntraVENous Q24H    acetaminophen (TYLENOL) solution 650 mg  650 mg Per G Tube Q3H PRN    aspirin chewable tablet 81 mg  81 mg Per G Tube DAILY    escitalopram oxalate (LEXAPRO) 5 mg/5 mL oral solution soln 10 mg  10 mg Per G Tube DAILY    insulin lispro (HUMALOG) injection   SubCUTAneous Q6H    glucose chewable tablet 16 g  4 Tab Oral PRN    dextrose (D50W) injection syrg 12.5-25 g  12.5-25 g IntraVENous PRN    glucagon (GLUCAGEN) injection 1 mg  1 mg IntraMUSCular PRN    morphine 10 mg/5 mL oral solution 10 mg  5 mL Per G Tube Q4H PRN    oxyCODONE-acetaminophen (PERCOCET) 5-325 mg per tablet 1 Tab  1 Tab Per G Tube Q4H PRN    sevelamer carbonate (RENVELA) oral powder 2,400 mg  2.4 g Per G Tube TID WITH MEALS    pravastatin (PRAVACHOL) tablet 10 mg  10 mg Per G Tube QHS    sodium chloride (NS) flush 5-10 mL  5-10 mL IntraVENous Q8H    sodium chloride (NS) flush 5-10 mL  5-10 mL IntraVENous PRN    naloxone (NARCAN) injection 0.4 mg  0.4 mg IntraVENous PRN    ondansetron (ZOFRAN) injection 4 mg  4 mg IntraVENous Q4H PRN          LAB AND IMAGING FINDINGS:     Lab Results   Component Value Date/Time    WBC 11.5 (H) 07/25/2018 03:44 AM    HGB 9.2 (L) 07/25/2018 03:44 AM    PLATELET 194 84/91/4357 03:44 AM     Lab Results   Component Value Date/Time    Sodium 135 (L) 07/25/2018 03:44 AM    Potassium 3.8 07/25/2018 03:44 AM    Chloride 100 07/25/2018 03:44 AM    CO2 30 07/25/2018 03:44 AM    BUN 19 07/25/2018 03:44 AM    Creatinine 3.50 (H) 07/25/2018 03:44 AM    Calcium 9.4 07/25/2018 03:44 AM    Magnesium 2.1 07/09/2018 04:20 AM    Phosphorus 2.5 (L) 07/23/2018 03:20 PM      Lab Results   Component Value Date/Time    AST (SGOT) 38 (H) 07/21/2018 05:37 AM    Alk.  phosphatase 323 (H) 07/21/2018 05:37 AM    Protein, total 10.0 (H) 07/21/2018 05:37 AM    Albumin 1.5 (L) 07/21/2018 05:37 AM    Globulin 8.5 (H) 07/21/2018 05:37 AM     Lab Results   Component Value Date/Time    INR 1.2 (H) 07/05/2018 01:57 AM    Prothrombin time 12.4 (H) 07/05/2018 01:57 AM    aPTT 30.1 03/21/2016 02:01 PM      Lab Results   Component Value Date/Time    Iron 37 12/10/2010 05:00 AM    TIBC 189 (L) 12/10/2010 05:00 AM    Iron % saturation 20 12/10/2010 05:00 AM    Ferritin 122 12/10/2010 05:00 AM      Lab Results   Component Value Date/Time    pH 7.38 06/29/2018 11:02 PM    PCO2 40 06/29/2018 11:02 PM    PO2 68 (L) 06/29/2018 11:02 PM     No components found for: [place]   Lab Results   Component Value Date/Time    CK 90 02/01/2018 01:50 PM    CK - MB 3.7 (H) 02/01/2018 01:50 PM                Total time:   Counseling / coordination time, spent as noted above:   > 50% counseling / coordination?:     Prolonged service was provided for  []30 min   []75 min in face to face time in the presence of the patient, spent as noted above. Time Start:   Time End:   Note: this can only be billed with 53397 (initial) or 82562 (follow up). If multiple start / stop times, list each separately.

## 2018-07-25 NOTE — PROGRESS NOTES
NAME: Juan Jose Veras :  1950 MRN:  [id number] Assessment :    Plan: 
--ESRD Left AVF 
HTN Anemia Hyponatremia Hypoalbuminemia Sacral Decubitus Right retroperitoneal abscess/hematoma with asso right hydro MS 
PEG tube --MWF HD [facility] (most recently at [facility]). Seen on HD, bp stable, pulling 1. 5 kg 
  
Hgb 10; epo 10 k sc tiw; one unit prbc's  
  
Holding Parsabiv (not available in the hospital) S/p FNA of retroperitoneal abscess/hematoma --not a surgical candidate. longterm prognosis very poor. Palliative care-THis pt has been chronically ill for so many years;and in the past has recovered enough to have a fairly good qol--it wasn't long ago he received a renal tx---albeit short lived--however, I do not see him surviving this-  
 
 
Subjective: Chief Complaint:  In bed. Resting. Denies current complaint. Review of Systems: 
 
Symptom Y/N Comments  Symptom Y/N Comments Fever/Chills    Chest Pain Poor Appetite    Edema Cough    Abdominal Pain Sputum    Joint Pain SOB/BANKS    Pruritis/Rash Nausea/vomit    Tolerating PT/OT Diarrhea    Tolerating Diet Constipation    Other Could not obtain due to:   
 
Objective: VITALS:  
Last 24hrs VS reviewed since prior progress note. Most recent are: 
Visit Vitals  /64  Pulse 69  Temp 98.4 °F (36.9 °C)  Resp 18  Ht 5' 10\" (1.778 m)  Wt 74.2 kg (163 lb 9.3 oz)  SpO2 99%  BMI 23.47 kg/m2 Intake/Output Summary (Last 24 hours) at 18 1401 Last data filed at 18 8401 Gross per 24 hour Intake              950 ml Output             1501 ml Net             -551 ml Telemetry Reviewed: PHYSICAL EXAM: 
General: WD, WN. Alert, cooperative, no acute distress Resp:  CTA Bilaterally. . No access. muscle use CV:  Regular  rhythm,  . No edema GI:  Soft, Non distended, Non tender. Lab Data Reviewed: (see below) Medications Reviewed: (see below) PMH/SH reviewed - no change compared to H&P 
________________________________________________________________________ Care Plan discussed with: 
Patient Family RN Care Manager Consultant:     
 
  Comments >50% of visit spent in counseling and coordination of care    
 
________________________________________________________________________ Romeo Adorno MD  
 
Procedures: see electronic medical records for all procedures/Xrays and details which 
were not copied into this note but were reviewed prior to creation of Plan. LABS: 
Recent Labs  
   07/25/18 
 0344  07/23/18 
 1520 WBC  11.5*  13.5* HGB  9.2*  6.7* HCT  29.4*  22.2*  
PLT  337  364 Recent Labs  
   07/25/18 
 0344  07/23/18 
 1520 NA  135*  134* K  3.8  4.4  
CL  100  96* CO2  30  33* BUN  19  37* CREA  3.50*  4.68* GLU  90  137* CA  9.4  8.9 PHOS   --   2.5* No results for input(s): SGOT, GPT, AP, TBIL, TP, ALB, GLOB, GGT, AML, LPSE in the last 72 hours. No lab exists for component: AMYP, HLPSE No results for input(s): INR, PTP, APTT in the last 72 hours. No lab exists for component: INREXT, INREXT No results for input(s): FE, TIBC, PSAT, FERR in the last 72 hours. No results found for: FOL, RBCF No results for input(s): PH, PCO2, PO2 in the last 72 hours. No results for input(s): CPK, CKMB in the last 72 hours. No lab exists for component: TROPONINI No components found for: [place] Lab Results Component Value Date/Time  Color RED 07/17/2018 02:54 PM  
 Appearance CLOUDY (A) 07/17/2018 02:54 PM  
 Specific gravity 1.024 07/17/2018 02:54 PM  
 Specific gravity 1.015 12/30/2014 06:40 PM  
 pH (UA) 7.0 07/17/2018 02:54 PM  
 Protein 300 (A) 07/17/2018 02:54 PM  
 Glucose 100 (A) 07/17/2018 02:54 PM  
 Ketone NEGATIVE  07/17/2018 02:54 PM  
 Bilirubin SMALL (A) 07/17/2018 02:54 PM  
 Urobilinogen 0.2 07/17/2018 02:54 PM  
 Nitrites NEGATIVE  07/17/2018 02:54 PM  
 Leukocyte Esterase SMALL (A) 07/17/2018 02:54 PM  
 Epithelial cells FEW 07/17/2018 02:54 PM  
 Bacteria 1+ (A) 07/17/2018 02:54 PM  
 WBC >100 (H) 07/17/2018 02:54 PM  
 RBC 10-20 07/17/2018 02:54 PM  
 
 
MEDICATIONS: 
Current Facility-Administered Medications Medication Dose Route Frequency  0.9% sodium chloride infusion 250 mL  250 mL IntraVENous PRN  
 heparin (porcine) injection 5,000 Units  5,000 Units SubCUTAneous Q12H  
 balsam peru-castor oil (VENELEX)  mg/gram ointment   Topical BID  sodium hypochlorite (QUARTER STRENGTH DAKIN'S) 0.125% irrigation (bottle)   Topical DAILY  collagenase (SANTYL) 250 unit/gram ointment   Topical DAILY  epoetin niya (EPOGEN;PROCRIT) injection 10,000 Units  10,000 Units SubCUTAneous Q MON, WED & FRI  
 0.9% sodium chloride infusion 250 mL  250 mL IntraVENous PRN  
 cefepime (MAXIPIME) 1 g in 0.9% sodium chloride (MBP/ADV) 50 mL  1 g IntraVENous Q24H  
 acetaminophen (TYLENOL) solution 650 mg  650 mg Per G Tube Q3H PRN  
 aspirin chewable tablet 81 mg  81 mg Per G Tube DAILY  escitalopram oxalate (LEXAPRO) 5 mg/5 mL oral solution soln 10 mg  10 mg Per G Tube DAILY  insulin lispro (HUMALOG) injection   SubCUTAneous Q6H  
 glucose chewable tablet 16 g  4 Tab Oral PRN  
 dextrose (D50W) injection syrg 12.5-25 g  12.5-25 g IntraVENous PRN  
 glucagon (GLUCAGEN) injection 1 mg  1 mg IntraMUSCular PRN  
 morphine 10 mg/5 mL oral solution 10 mg  5 mL Per G Tube Q4H PRN  
 oxyCODONE-acetaminophen (PERCOCET) 5-325 mg per tablet 1 Tab  1 Tab Per G Tube Q4H PRN  
 sevelamer carbonate (RENVELA) oral powder 2,400 mg  2.4 g Per G Tube TID WITH MEALS  pravastatin (PRAVACHOL) tablet 10 mg  10 mg Per G Tube QHS  sodium chloride (NS) flush 5-10 mL  5-10 mL IntraVENous Q8H  
 sodium chloride (NS) flush 5-10 mL  5-10 mL IntraVENous PRN  
 naloxone [facility]) injection 0.4 mg  0.4 mg IntraVENous PRN  
 ondansetron (ZOFRAN) injection 4 mg  4 mg IntraVENous Q4H PRN

## 2018-07-25 NOTE — PROGRESS NOTES
Palliative Medicine  [place]: [phone] ([phone])  [facility]: [phone])      Leah Sauceda is a 71 yo , black man with a diagnosis of DM, ESRD, HTN, Sepsis. Patient is known to the Palliative team from previous admissions. At this time palliative care has been called in for Care Decisions. At time of last admission, patient completed the POST form and opted for all measures to prolong life such as wanting resuscitation, tube feedings and full restorative measures for treatment. LCSW met with patient and wife, Troy Jin for this meeting. Patient in bed, awake but eyes closed much of the time. He did speak to LCSW and interacted when he was spoken to. Wife Troy Jin shared that she is trying to find a rehab (skilled facility) that would also be convenient for patient to continue going to dialysis. She noted several times that she is \"not ready to give up, I don't think it's time for that yet\". LCSW discussed with Troy Jin that if getting to and from dialysis becomes difficult then she and patient would have some difficult decisions to make. At this time, patient noting that he would like to continue dialysis. Troy Jin did tell him, \"you have to let me know when you are tired of it, I don't know how you feel until you tell me\". Troy Jin aware that she may have to make decisions for patient if he is confused, briefly discussed code status and patient and Troy Jin want to have patient remain Full Code. Troy Jin indicated that she needs to choose a SNF and wants some questions answered such as if patient cannot ride the [facility], would an ambulance take him to dialysis and would medicare pay. She has called the dialysis SW who is off until 7/27. Wife is hoping to go see some facilities. Discussed case with Osito Vergara and with Dr Beatrice Ames.

## 2018-07-26 NOTE — PROGRESS NOTES
[facility]  Pulmonary, Critical Care, and Sleep Medicine    Name: Paulo Blanco MRN: [id number]   : 1950 Hospital: [facility]   Date: 2018        IMPRESSION:   · Sepsis due to infected right iliac hematoma where a stent is in place from right iliac artery pseudoaneurysm - Proteus on culture  · Encephalopathy - no evidence of encephalopathy at this time  · DM  · ESRD  · Dysphagia  · Possible MS  · CAD  · CHF - EF 35%      PLAN:   · O2   · Check labs  · Continue IV antibiotics  · Surgical, nephrology, ID evals all ongoing  · Dialysis per nephrology  · Insulin   · DVT prophylaxis  · While his overall prognosis is poor due to his comorbidities, he is hemodynamically stable, on minimal O2, labs improving - no clear indication for his transfer to the ICU (or the PCU). Will transfer back to the floor in the AM if remains stable. Subjective/Interval History:   I have reviewed the flowsheet and previous days notes. Transferred to the ICU as PCU overflow. Patient has been in the hospital for 9 days with an infected right iliac hematoma. Has a stent in place there. He had a low grade temp this morning and was transferred to the ICU. He is awake and alert. Has mild shortness of breath. No pain. Review of Systems   Constitutional: Negative. HENT: Negative. Eyes: Negative. Respiratory: Positive for shortness of breath. Cardiovascular: Negative. Gastrointestinal: Negative.       Objective:   Vital Signs:    Visit Vitals    /62 (BP 1 Location: Right arm, BP Patient Position: At rest)    Pulse 82    Temp 97.9 °F (36.6 °C)    Resp 16    Ht 5' 10\" (1.778 m)    Wt 74.2 kg (163 lb 9.3 oz)    SpO2 90%    BMI 23.47 kg/m2       O2 Device: Room air   O2 Flow Rate (L/min): 0 l/min   Temp (24hrs), Av.3 °F (37.4 °C), Min:97.9 °F (36.6 °C), Max:100.6 °F (38.1 °C)       Intake/Output:   Last shift:         Last 3 shifts: 1901 -  0700  In: 810   Out: 1501     Intake/Output Summary (Last 24 hours) at 07/26/18 1101  Last data filed at 07/26/18 0500   Gross per 24 hour   Intake              220 ml   Output             1501 ml   Net            -1281 ml      Physical Exam   Constitutional: He is cooperative. No distress. HENT:   Head: Normocephalic and atraumatic. Mouth/Throat: No oropharyngeal exudate. Eyes: No scleral icterus. Cardiovascular: Normal rate and regular rhythm. No murmur heard. Pulmonary/Chest: No respiratory distress. He has no wheezes. He has rhonchi (few). He has no rales. Abdominal: Soft. Bowel sounds are normal. He exhibits no distension. There is no tenderness. Neurological: He is alert. Skin: Skin is warm and dry. No rash noted. Data:   Labs:  Recent Labs      07/25/18   0344  07/23/18   1520   WBC  11.5*  13.5*   HGB  9.2*  6.7*   HCT  29.4*  22.2*   PLT  337  364     Recent Labs      07/25/18   0344  07/23/18   1520   NA  135*  134*   K  3.8  4.4   CL  100  96*   CO2  30  33*   GLU  90  137*   BUN  19  37*   CREA  3.50*  4.68*   CA  9.4  8.9   PHOS   --   2.5*     No results for input(s): PH, PCO2, PO2, HCO3, FIO2 in the last 72 hours.     Imaging:  I have personally reviewed the patients radiographs:  CT abdomen pending, chest X-ray 7/24 without acute disease        Dominic Bernardo MD

## 2018-07-26 NOTE — PROGRESS NOTES
PCU SHIFT NURSING NOTE      Bedside and Verbal shift change report given to Courtney Arciniega (oncoming nurse) by Sebastián Medina RN (CCU). Report included the following information SBAR, Kardex, MAR, Recent Results and Med Rec Status. Shift Summary:     200 Pt arrived on unit; alert; pt feeling nauseous  1830 Family at bedside        Admission Date 7/17/2018   Admission Diagnosis Leukocytosis  Fever  ESRD (end stage renal disease) on dialysis ([facility] [address]   Consults IP CONSULT TO VASCULAR SURGERY  IP CONSULT TO NEPHROLOGY  IP CONSULT TO UROLOGY  IP CONSULT TO PALLIATIVE CARE - PROVIDER  IP CONSULT TO INFECTIOUS DISEASES        Consults   []PT   []OT   []Speech   []Case Management      [] Palliative      Cardiac Monitoring Order   [x]Yes   []No     IV drips   []Yes    Drip:                            Dose:  Drip:                            Dose:  Drip:                            Dose:   [x]No     GI Prophylaxis   []Yes   []No         DVT Prophylaxis   SCDs:  Sequential Compression Device: Bilateral     Patient Refused VTE Prophylaxis: Yes    Danny stockings:         [] Medication   []Contraindicated   []None      Activity Level Activity Level: Bed Rest     Activity Assistance: Complete care   Purposeful Rounding every 1-2 hour? [x]Yes   Lopez Score  Total Score: 3   Bed Alarm (If score 3 or >)   []Yes   [] Refused (See signed refusal form in chart)   Delbert Score  Delbert Score: 11   Delbert Score (if score 14 or less)   []PMT consult   [x]Wound Care consult      []Specialty bed   [x] Nutrition consult          Needs prior to discharge:   Home O2 required:    []Yes   []No    If yes, how much O2 required?     Other:    Last Bowel Movement: Last Bowel Movement Date: 07/26/18      Influenza Vaccine Received Flu Vaccine for Current Season (usually Sept-March): Not Flu Season        Pneumonia Vaccine           Diet Active Orders   Diet    DIET NPO With Tube Feedings      LDAs               Peripheral IV 07/21/18 Right Antecubital (Active) Site Assessment Clean, dry, & intact 7/26/2018  5:45 PM   Phlebitis Assessment 0 7/26/2018  5:45 PM   Infiltration Assessment 0 7/26/2018  5:45 PM   Dressing Status Clean, dry, & intact 7/26/2018  5:45 PM   Dressing Type Transparent 7/26/2018  5:45 PM   Hub Color/Line Status Blue 7/26/2018  5:45 PM   Action Taken Other (comment) 7/26/2018  5:45 PM   Alcohol Cap Used Yes 7/25/2018  4:19 AM        Hemodialysis Access 07/25/18 (Active)   Central Line Being Utilized No 7/26/2018  5:45 PM   Criteria for Appropriate Use Dialysis/apheresis 7/26/2018  5:45 PM   Site Assessment Clean, dry, & intact 7/26/2018  5:45 PM   Date of Last Dressing Change 07/25/18 7/26/2018  5:45 PM   Dressing Status Clean, dry, & intact 7/26/2018  5:45 PM   Dressing Type 4 X 4;Tape 7/26/2018  5:45 PM      PEG/Gastrostomy Tube 07/17/18 (Active)   Site Assessment Intact 7/26/2018  5:45 PM   Dressing Status Intact 7/26/2018  5:45 PM   G Port Status Clamped 7/26/2018  5:45 PM   Action Taken Placement verified (comment) 7/26/2018 12:00 PM   Drainage Description Green 7/26/2018 12:00 PM   Gastric Residual (mL) 0 ml 7/26/2018 12:00 PM   Tube Feeding/Formula Options Renal (Nepro) 7/26/2018  5:45 PM   Tube Feeding/Verify Rate (mL/hr) 10 7/26/2018  3:00 PM   Water Flush Volume (mL) 75 mL 7/26/2018  1:00 PM   Intake (ml) 10 ml 7/26/2018  3:00 PM   Medication Volume 100 ml 7/26/2018  2:00 PM   Output (ml) 0 ml 7/23/2018 10:07 PM                Urinary Catheter      Intake & Output   Date 07/25/18 1900 - 07/26/18 0659 07/26/18 0700 - 07/27/18 0659   Shift 4561-7024 24 Hour Total 9325-3042 6495-3561 24 Hour Total   I  N  T  A  K  E   NG/ 320 205  205      Water Flush Volume (mL) (PEG/Gastrostomy Tube 07/17/18) 100 200 75  75      Medication Volume (PEG/Gastrostomy Tube 07/17/18)  120 100  100      Intake (ml) (PEG/Gastrostomy Tube 07/17/18)   30  30    Shift Total  (mL/kg) 100  (1.3) 320  (4.3) 205  (2.8)  205  (2.8)   O  U  T  P  U  T   Stool  1 Stool Occurrence(s)   2 x  2 x      Stool  1       Dialysis  1500         NET Fluid Removed (mL)  1500       Shift Total  (mL/kg)  1501  (20.2)       -1181 205  205   Weight (kg) 74.2 74.2 72 72 72         Readmission Risk Assessment Tool Score High Risk            36       Total Score        3 Has Seen PCP in Last 6 Months (Yes=3, No=0)    2 . Living with Significant Other. Assisted Living. LTAC. SNF. or   Rehab    3 Patient Length of Stay (>5 days = 3)    4 IP Visits Last 12 Months (1-3=4, 4=9, >4=11)    5 Pt.  Coverage (Medicare=5 , Medicaid, or Self-Pay=4)    19 Charlson Comorbidity Score (Age + Comorbid Conditions)       Expected Length of Stay 4d 21h   Actual Length of Stay 9

## 2018-07-26 NOTE — PROGRESS NOTES
Receiving report from [address], patient reports he does \"not feel very good. \" Patient reports the feeling is like he needs Dialysis. RN reported that TF @ 20 has been turned off d/t patient's reported nausea. No goal established. Paged Dr. Jef Lima, awaiting return call. Reports that he will come soon.

## 2018-07-26 NOTE — PROGRESS NOTES
Infectious Disease Progress Note       Subjective:   Mr Kieran Mascorro seen today. Discussed with his wife as well. He is not complaining of any issues at this time. Denied pain     Otherwise, he is not much verbal when I saw him.       Had another CT today             Objective:    Vitals:   Patient Vitals for the past 24 hrs:   Temp Pulse Resp BP SpO2   07/26/18 1100 - 85 23 134/56 90 %   07/26/18 1051 97.9 °F (36.6 °C) 86 19 133/58 93 %   07/26/18 0912 97.9 °F (36.6 °C) 82 16 131/62 90 %   07/26/18 0325 (!) 100.6 °F (38.1 °C) 94 15 150/71 93 %   07/26/18 0002 99 °F (37.2 °C) 90 20 139/65 95 %   07/25/18 2008 100.2 °F (37.9 °C) 91 20 152/68 94 %   07/25/18 1544 99.5 °F (37.5 °C) 85 18 149/69 99 %       Physical Exam:    ¨ GEN: NAD  ¨ HEENT: Normocephalic, atraumatic, no scleral icterus,   no thrush  ¨ CV: S1, S2 heard regularly  ¨ Lungs: Clear to auscultation bilaterally  ¨ Abdomen: soft, non distended, non tender  ¨ Extremities: no edema  ¨ Neuro: Alert to self, not much verbal   ¨ Skin: no rash  ¨ Psych: not able to fully assess   ¨ Lines: LUE HD access        Medications:    Current Facility-Administered Medications:     carvedilol (COREG) tablet 25 mg, 25 mg, Per G Tube, BID WITH MEALS, Jennie Tamayo MD    heparin (porcine) injection 5,000 Units, 5,000 Units, SubCUTAneous, Q12H, Murray Wilks MD, 5,000 Units at 07/26/18 1120    balsam peru-castor oil (VENELEX)  mg/gram ointment, , Topical, BID, Aylin Grimes MD    sodium hypochlorite (QUARTER STRENGTH DAKIN'S) 0.125% irrigation (bottle), , Topical, DAILY, Elizabeth Albarran MD    collagenase (SANTYL) 250 unit/gram ointment, , Topical, DAILY, Elizabeth Albarran MD    epoetin niya (EPOGEN;PROCRIT) injection 10,000 Units, 10,000 Units, SubCUTAneous, Q MON, WED & Israel Chakraborty MD, 10,000 Units at 07/25/18 1701    cefepime (MAXIPIME) 1 g in 0.9% sodium chloride (MBP/ADV) 50 mL, 1 g, IntraVENous, Q24H, Titus Worley MD, Last Rate: 100 mL/hr at 07/25/18 2110, 1 g at 07/25/18 2110    acetaminophen (TYLENOL) solution 650 mg, 650 mg, Per G Tube, Q3H PRN, Miguel Rodriguez MD, 650 mg at 07/26/18 0806    aspirin chewable tablet 81 mg, 81 mg, Per G Tube, DAILY, Miguel Rodriguez MD, 81 mg at 07/26/18 0905    escitalopram oxalate (LEXAPRO) 5 mg/5 mL oral solution soln 10 mg, 10 mg, Per G Tube, DAILY, Miguel Rodriguez MD, 10 mg at 07/26/18 0905    insulin lispro (HUMALOG) injection, , SubCUTAneous, Q6H, Miguel Rodriguez MD, Stopped at 07/17/18 1930    glucose chewable tablet 16 g, 4 Tab, Oral, PRN, Miguel Rodriguez MD    dextrose (D50W) injection syrg 12.5-25 g, 12.5-25 g, IntraVENous, PRN, Miguel Rodriguez MD    glucagon (GLUCAGEN) injection 1 mg, 1 mg, IntraMUSCular, PRN, Miguel Rodriguez MD    morphine 10 mg/5 mL oral solution 10 mg, 5 mL, Per G Tube, Q4H PRN, Miguel Rodriguez MD, 10 mg at 07/23/18 2209    oxyCODONE-acetaminophen (PERCOCET) 5-325 mg per tablet 1 Tab, 1 Tab, Per G Tube, Q4H PRN, Miguel Rodriguez MD, 1 Tab at 07/25/18 0406    sevelamer carbonate (RENVELA) oral powder 2,400 mg, 2.4 g, Per G Tube, TID WITH MEALS, Mgiuel Rodriguez MD, 2,400 mg at 07/26/18 0906    pravastatin (PRAVACHOL) tablet 10 mg, 10 mg, Per G Tube, QHS, Miguel Rodriguez MD, 10 mg at 07/25/18 2111    sodium chloride (NS) flush 5-10 mL, 5-10 mL, IntraVENous, Q8H, Lorenzo Velasco MD, 10 mL at 07/26/18 0531    sodium chloride (NS) flush 5-10 mL, 5-10 mL, IntraVENous, PRN, Miguel Rodirguez MD, 10 mL at 07/17/18 2039    naloxone (NARCAN) injection 0.4 mg, 0.4 mg, IntraVENous, PRN, Miguel Rodriguez MD    ondansetron [facility]) injection 4 mg, 4 mg, IntraVENous, Q4H PRN, Miguel Rodriguez MD, 4 mg at 07/26/18 5220      Labs:  Recent Results (from the past 24 hour(s))   GLUCOSE, POC    Collection Time: 07/25/18  5:50 PM   Result Value Ref Range    Glucose (POC) 105 (H) 65 - 100 mg/dL    Performed by TOM Muro Collection Time: 07/25/18 11:59 PM   Result Value Ref Range    Glucose (POC) 109 (H) 65 - 100 mg/dL    Performed by Ursula Reynolds (PCT)    GLUCOSE, POC    Collection Time: 07/26/18  6:05 AM   Result Value Ref Range    Glucose (POC) 107 (H) 65 - 100 mg/dL    Performed by Ursula Reynolds (PCT)    GLUCOSE, POC    Collection Time: 07/26/18 12:37 PM   Result Value Ref Range    Glucose (POC) 112 (H) 65 - 100 mg/dL    Performed by Mo العراقي            Micro:                   Blood 7/17         Component Results       Component Value Ref Range & Units Status      Special Requests: NO SPECIAL REQUESTS    Final      Culture result: NO GROWTH 6 DAYS    Final        Result History        Pelvic abscess 7/19  Component Value Ref Range & Units Status      Special Requests: NO SPECIAL REQUESTS   Final     GRAM STAIN 3+ WBCS SEEN   Final     GRAM STAIN NO ORGANISMS SEEN   Final     Culture result: FEW PROTEUS MIRABILIS (A)   Final       Culture & Susceptibility        Antibiotic   Organism Organism Organism       Proteus mirabilis       AMIKACIN ($)   <=16   ug/mL   S Final         AMPICILLIN ($)   <=8   ug/mL   S Final         AMPICILLIN/SULBACTAM ($)   <=8/4   ug/mL   S Final         AZTREONAM ($$$$)   <=4   ug/mL   S Final         CEFAZOLIN ($)   <=8   ug/mL   S Final         CEFEPIME ($$)   <=4   ug/mL   S Final         CEFOTAXIME   <=2   ug/mL   S Final         CEFTAZIDIME ($)   <=1   ug/mL   S Final         CEFTRIAXONE ($)   <=1   ug/mL   S Final         CEFUROXIME ($)   <=4   ug/mL   S Final         CIPROFLOXACIN ($)   <=1   ug/mL   S Final         GENTAMICIN ($)   <=4   ug/mL   S Final         LEVOFLOXACIN ($)   <=2   ug/mL   S Final         MEROPENEM ($$)   <=1   ug/mL   S Final         PIPERACILLIN/TAZOBAC ($)   <=16   ug/mL   S Final         TOBRAMYCIN ($)   <=4   ug/mL   S Final         TRIMETH/SULFA   <=2/38   ug/mL   S Final                             Imaging:      CT 7/17  FINDINGS:  LOWER CHEST:  The visualized portions of the lung bases are clear.  There is mild atelectasis  in the posterior lower lobes.    ABDOMEN:  The unenhanced images demonstrate visibility of the interventricular septum of  the heart. There is extensive vascular calcification and a right iliac stent. There are clips in the gallbladder fossa. Liver: The liver is normal in size and contour with no focal abnormality. Gallbladder and bile ducts: There is no calcified gallstone or biliary duct  dilatation. Spleen: No abnormality. Pancreas: No abnormality. Adrenal glands: No abnormality. Kidneys: There are small left renal cysts. There is hydronephrosis and  hydroureter on the right down to the level of the pelvis. PELVIS:  Reproductive organs: The prostate gland is absent. Bladder: No abnormality. BOWEL AND MESENTERY: There is a gastrostomy tube in the antrum of the stomach. The small bowel is not obstructed. There is gas and stool throughout the colon. There is no mesenteric mass or adenopathy.  The appendix is absent. PERITONEUM: Philemon [place] is no ascites or free intraperitoneal air. RETROPERITONEUM: The aorta is atherosclerotic without aneurysm. There is a right  external iliac artery stent. There is an enhancing fluid collection surrounding  the right external iliac artery stent extending into the right retroperitoneum. The component around the stent measures 4.6 x 2.7 x 6.3 cm and the component in  the right retroperitoneum measures 3.6 x 3.1 x 4.6 cm. There is no  retroperitoneal mass or adenopathy. BONES AND SOFT TISSUES: There is diffuse body edema.      IMPRESSION  IMPRESSION:   1. Right common iliac artery stent with enhancing fluid collection surrounding  the stent and extending into the retroperitoneum on the right. This is  suspicious for infection. 2. Right hydronephrosis and hydroureter secondary to compression of the ureter  by the collection around the stent.   3. Extensive atherosclerotic calcification of the aorta and mesenteric vessels. This is consistent with the patient's end-stage renal disease. 4. Status post cholecystectomy. 5. Gastrostomy tube. 6. Small left renal cysts. 7. Status post prostatectomy.      CT 7/11  FINDINGS:   LUNG BASES: 7 mm subpleural nodule at posterior basilar right lower lobe  slightly smaller in the interval.  INCIDENTALLY IMAGED HEART AND MEDIASTINUM: Unremarkable. LIVER: Mild intrahepatic and extrahepatic biliary duct distention again shown  with common bile duct diameter measuring up to 1.5 cm. Findings not  substantially changed in the interval.  GALLBLADDER: Status post cholecystectomy. SPLEEN: No mass. PANCREAS: No mass or ductal dilatation. ADRENALS: Unremarkable. KIDNEYS: Bilateral renal atrophy again shown. Moderate right renal  pelvocaliectasis and ureteral dilation again demonstrated. Finding extends to  level of retroperitoneal hematoma surrounding right external iliac stent. STOMACH: Moderate gastric distention in the interval to the level of the second  portion of the duodenum. Percutaneous gastrostomy tube in the interval  positioned anteriorly in the gastric body. SMALL BOWEL: No dilatation or wall thickening. COLON: No dilatation or wall thickening. APPENDIX: Not demonstrated. PERITONEUM: No free intraperitoneal air or fluid demonstrated. RETROPERITONEUM: As noted above, right external iliac stent again shown within a  moderate-sized hematoma extending along the anterior margin of the right  iliopsoas. Dimension is difficult to estimate due to the lack of oral contrast.  It measures at least 7.6 x 3.5 cm transverse and 10.3 cm craniocaudal and size  is similar to that shown previously. URINARY BLADDER: No mass or calculus. BONES: No destructive bone lesion. ADDITIONAL COMMENTS: N/A      IMPRESSION  IMPRESSION:      1. Interval percutaneous gastrostomy tube placement. Interval moderate gastric  distention.   2. Right iliac artery stenting and adjacent hematoma again shown, appearing  stable. Moderate right renal pelvocaliectasis and ureterectasis again  demonstrated.      CT 7/2  FINDINGS: There is interval right common/external iliac artery stenting for  exclusion of pseudoaneurysm with patent stent, and minimal hyperdensity in the  pseudoaneurysm contents but no overt arterial extravasation. Pseudoaneurysm size  is stable. There is no apparent soft tissue emphysema or abscess.      Right hydroureteronephrosis remains. Kidneys are atrophic. There is trace  ascites. There is no pneumoperitoneum.      Abdominal aorta is without stenosis, aneurysm or dissection. Celiac artery, SMA,  ARTIE and bilateral solitary renal arteries are patent. Right internal iliac  artery is patent. Femoral arteries are patent.      IMPRESSION  IMPRESSION:  1. Interval right iliac artery pseudoaneurysm stenting. 2. No overt arterial extravasation. 3. Stable pseudosac size. 4. No apparent soft tissue emphysema/abscess. 5. Stable right hydroureteronephrosis.            CT 7/26 A/P   FINDINGS:     Redemonstrated is the infected hematoma in the right lower quadrant measuring  approximately 7 x 2.5 cm. This appears slightly smaller than the prior  examination.     There is significant right hydroureteronephrosis as was seen before. The level  of obstruction is likely at the site of the hematoma.     There are no new fluid collections.     Gallbladder is absent. Liver spleen and pancreas remain unremarkable.     IMPRESSION  IMPRESSION:  1. There is been slight decrease in size of the infected hematoma in the right  lower quadrant.   2. Persistent right hydroureteronephrosis.     Assessment / Plan:      Mr Ozzie Dodson is a 70year old gentleman with hx of MS, PVD, ESRD on HD, DM , RLE syme amputation, S/P Right common iliac and external iliac artery stenting for Pseudoaneurysm (6/29/18) with infected R iliac  pseudo- aneurysm and stent.      From chart review, he was admitted from 6/28-7/9/18 and had stenting and repair of aneurysm. Had leukocytosis and was being treated for aspiration pneumonia per chart. Per DC summary he was on Vancomycin, and flagyl at one point and then Zosyn for 10 days during last admit.     This admission, he is admitted with fever (101.2 ) and leukocytosis (16) and found to have fluid collection around R iliac artery stent extending into retroperitoneum on right. CT read as \" The component around the stent measures 4.6 x 2.7 x 6.3 cm and the component in  the right retroperitoneum measures 3.6 x 3.1 x 4.6 cm\"  Right hydronephrosis and hydroureter due to compression fo ureter seen as well. He had a CT guided aspiration done and cultures + for Proteus mirabilis. He had a CT on 7/11 prior to this admission that showed a \"moderate-sized hematoma extending along the anterior margin of the right iliopsoas\". \" It measures at least 7.6 x 3.5 cm transverse and 10.3 cm craniocaudal in size\".    He was started on Cefepime and Levaquin this admission. Levaquin was stopped later              1) Infected R iliac pseudoaneurysm and stent     S/P repair with stenting on 6/29/18, S/P CT guided drainage of pelvic collection 7/19 wt cx + for Proteus mirabilis     CT with  component around the stent measures 4.6 x 2.7 x 6.3 cm and the component in the right retroperitoneum measures 3.6 x 3.1 x 4.6 cm\"     Repeat CT 7/26 with \"slight decrease in size of the infected hematoma in the right lower quadrant\" and \" Persistent right hydroureteronephrosis\". Measurements approximately 7 by 2.5 cm however     Spiked fever     Check blood cultures to ensure no seeding      Poor surgical candidate given functional status and multiple co morbidities     Successful treatment of infections is not usually possible without adequate source control.   Additionally, in his situation, as there is fluid  around the stent, raising high concern for stent to be seeded/infected.     Have had ongoing discussion with his wife and she says that they have decided for Cefazolin therapy after HD so we can avoid another line placement , which is very reasonable in this situation     Change to Cefazolin therapy after tomorrow's HD  ( 2g, 2g, 3gm dosing per pharmacy)     Monitor blood cultures sent and fever curve           2) Per urology notes, superficial dry gangrene of glans penis , also has R hydroureteronephrosis      3) Sacral decubitus ulcers      4) MS     5) ESRD on HD     6) S/P PEG      7) DVT px         Pam Mitchell DO   1:41 PM

## 2018-07-26 NOTE — PROGRESS NOTES
Paged Dr. Johnson Solano again regarding patient's fevers LN and how he looks. Awaiting return call. MD reported to give Tylenol for fevers and he will be around to call family.

## 2018-07-26 NOTE — PROGRESS NOTES
Problem: Mobility Impaired (Adult and Pediatric)  Goal: *Acute Goals and Plan of Care (Insert Text)  Physical Therapy Goals  Initiated 7/26/2018  1. Patient will move from supine to sit and sit to supine , scoot up and down and roll side to side in bed with maximal assistance within 7 day(s). 2.  Patient will sit edge of bed x5 minutes with moderate assistance within 7 day(s). 3.  Patient will perform supine UE executrixes for strengthening with supervision within 7 days. physical Therapy EVALUATION  Patient: Jose Juan Belcher (77 y.o. male)  Date: 7/26/2018  Primary Diagnosis: Leukocytosis  Fever  ESRD (end stage renal disease) on dialysis [facility])        Precautions:   Fall, Contact, Aspiration    ASSESSMENT :  Based on the objective data described below, the patient presents with intermittent confusion, profound weakness, and diminished sensation impairing all aspects of care following admission for ESRD and ? sepsis. This patient was admitted from [address] [place] [address] after being discharged there 7/6/2018. The patient is unable to clarify his recent level from IP rehab but he was able to ambulate with a RW 10-15' prior to his admission in July. During evaluation today there was no palpable LE contraction bilaterally and no sensation reported mid thigh to foot but passive ROM remained intact. Attempted to roll in bed with total assistance. He was able to initiate grasp for the bed rail but no trunk rotation noted to accompany this. Provided the patient light exercises for UEs to complete to improve his AROM and strength with demonstrated understanding but concerned for carry over. Concerned for his ability to progress at this stage d/t his prolonged immobility and multiple medical co morbidities. The patient and his wife want to continued aggressive intervention and recommend discharge to a SNF rehab setting when medically stable.       Recommend use of a lizandro lift If the patient is to be transferred out of bed and need clarification if sitting is allowed based on wounds. Patient will benefit from skilled intervention to address the above impairments. Patients rehabilitation potential is considered to be Guarded  Factors which may influence rehabilitation potential include:   [x]         None noted  []         Mental ability/status  []         Medical condition  []         Home/family situation and support systems  []         Safety awareness  []         Pain tolerance/management  []         Other:      PLAN :  Recommendations and Planned Interventions:  [x]           Bed Mobility Training             [x]    Neuromuscular Re-Education  [x]           Transfer Training                   []    Orthotic/Prosthetic Training  [x]           Gait Training                         []    Modalities  [x]           Therapeutic Exercises           []    Edema Management/Control  [x]           Therapeutic Activities            []    Patient and Family Training/Education  []           Other (comment):    Frequency/Duration: Patient will be followed by physical therapy  3 times a week to address goals. Discharge Recommendations: Skilled Nursing Facility  Further Equipment Recommendations for Discharge: to be determined       SUBJECTIVE:   Patient stated I can't do it.     OBJECTIVE DATA SUMMARY:   HISTORY:    Past Medical History:   Diagnosis Date    Anemia associated with chronic renal failure     Autoimmune disease (HCC)     hx ms    CAD (coronary artery disease)     Chronic kidney disease     catheter removed and no dialysis at this time    Chronic kidney disease     left arm fistula dialysis MWF    Diabetes ([facility]     type II    Elevated troponin 6/29/2018    GERD (gastroesophageal reflux disease)     Hypertension     Ill-defined condition     prostate cancer    Multiple sclerosis ([facility]     diagnosed '01    Other ill-defined conditions(799.89)     anemia    Other ill-defined conditions(799.89)     elevated cholesterol  Other ill-defined conditions(799.89)     hx septic right foot    Peripheral vascular disease ([facility]     Secondary hyperparathyroidism of renal origin ([facility]     Thyroid disease      Past Surgical History:   Procedure Laterality Date    COLONOSCOPY N/A 12/6/2016    COLONOSCOPY performed by [facility] at [facility]COPY    COLONOSCOPY,DIAGNOSTIC  12/6/2016         CORONARY STENT SINGLE W/PTCA  2/17/2011         HX APPENDECTOMY      HX CATARACT REMOVAL  10/18/10    bilateral    HX CHOLECYSTECTOMY      HX GI      ileostomy due to infection    HX HEART CATHETERIZATION      HX HEENT      hx post photocoagulation eye 2009    HX OTHER SURGICAL      right partial foot amputation    HX OTHER SURGICAL      cardiac cath    HX OTHER SURGICAL      prostate removed    PLACE PERCUT GASTROSTOMY TUBE  7/6/2018         HI COLONOSCOPY W/BIOPSY SINGLE/MULTIPLE  5/10/2010         UPPER GI ENDOSCOPY,BIOPSY  4/17/2018         UPPER GI ENDOSCOPY,REMV TUMOR,SNARE  6/13/2018         VASCULAR SURGERY PROCEDURE UNLIST      katelyn. leg bypasses     Prior Level of Function/Home Situation: see above  Personal factors and/or comorbidities impacting plan of care: MS and long prolonged immobility    Home Situation  Home Environment: Private residence  One/Two Story Residence: One story  Living Alone: No  Support Systems: Child(ben), Family member(s), Spouse/Significant Other/Partner  Patient Expects to be Discharged to[de-identified] Patient room  Current DME Used/Available at Home: Wheelchair, Tub transfer bench, Grab bars, Commode, bedside    EXAMINATION/PRESENTATION/DECISION MAKING:   Critical Behavior:  Neurologic State: Drowsy, Alert, Eyes open to stimulus, Eyes open spontaneously, Eyes open to voice  Orientation Level: Oriented to person, Oriented to place, Disoriented to time, Disoriented to situation  Cognition: Follows commands, Decreased attention/concentration, Poor safety awareness, Recognition of people/places Hearing: Auditory  Auditory Impairment: None  Skin:    Edema:   Range Of Motion:  AROM: Grossly decreased, non-functional           PROM: Within functional limits           Strength:    Strength: Grossly decreased, non-functional (0/5 katelyn hips/knees, 3/5 biceps/triceps, 2-/5 shoulders)                    Tone & Sensation:                  Sensation: Impaired (unable to identify bilateral mid-thigh to foot)               Coordination:  Coordination: Grossly decreased, non-functional    Functional Mobility:  Bed Mobility:  Rolling: Total assistance     Therapeutic Exercises:   Supine biceps curls, triceps extension with soap bottle in room x10 reps  Deltoid shoulder flexion x5 reps unweighted and patient fatigued        G codes: In compliance with CMSs Claims Based Outcome Reporting, the following G-code set was chosen for this patient based on their primary functional limitation being treated:    Unable to identify an adequate functional measure to identify patient's present level. Utilized clinical judgement based on evaluation and chart review.   ? Changing and Maintaining Body Position:     - CURRENT STATUS: CM - 80%-99% impaired, limited or restricted    - GOAL STATUS: CL - 60%-79% impaired, limited or restricted    - D/C STATUS:  ---------------To be determined---------------      Physical Therapy Evaluation Charge Determination   History Examination Presentation Decision-Making   HIGH Complexity :3+ comorbidities / personal factors will impact the outcome/ POC  HIGH Complexity : 4+ Standardized tests and measures addressing body structure, function, activity limitation and / or participation in recreation  HIGH Complexity : Unstable and unpredictable characteristics  HIGH Complexity : FOTO score of 1- 25       Based on the above components, the patient evaluation is determined to be of the following complexity level: HIGH     Pain:  Pain Scale 1: Numeric (0 - 10)  Pain Intensity 1: 0 Activity Tolerance:     Please refer to the flowsheet for vital signs taken during this treatment. After treatment:   []         Patient left in no apparent distress sitting up in chair  [x]         Patient left in no apparent distress in bed  [x]         Call bell left within reach  [x]         Nursing notified  []         Caregiver present  []         Bed alarm activated    COMMUNICATION/EDUCATION:   The patients plan of care was discussed with: Registered Nurse. [x]         Fall prevention education was provided and the patient/caregiver indicated understanding. [x]         Patient/family have participated as able in goal setting and plan of care. [x]         Patient/family agree to work toward stated goals and plan of care. []         Patient understands intent and goals of therapy, but is neutral about his/her participation. []         Patient is unable to participate in goal setting and plan of care.     Thank you for this referral.  Andres Galindo, PT, DPT   Time Calculation: 27 mins

## 2018-07-26 NOTE — PROGRESS NOTES
Hospitalist Progress Note NAME: Corinne Obrien :  1950 MRN:  [id number] Assessment / Plan: 
Sepsis with Hypotension POA- hypotension had improved, WBC trending down, but still spiking fevers Infected Right Iliac Hematoma: drained shows Proteus, Vascular and ID in the case, has a stent that ideally may need to come out due to infection. C/w Cefepime, check new CT abdomen and pelvis today B/L Buttock Pressure injury ulcer/wound infection vs R iliac ar pseudoaneurysm/collection/stent infection, UTI, no urine culture had been sent Encephalopathy POA in the setting of sepsis: still lethargic and in pain. -CT abdm/pelvis showing Right common iliac artery stent with enhancing fluid collection surrounding the stent and extending into the retroperitoneum on the right. suspicious for infection. Also showing Right hydronephrosis and hydroureter secondary to compression of the ureter by the collection around the stent 
-On ,Vascular surgery saw patient and will proceed with aspiration of the fluid noted on CT surrounding the right common iliac artery stent since this could be an abscess. 
-Urology consulted for hydronephrosis right ->saw pt on :no intervention needed 
-On :CT guided aspiration of pelvic fluid collection - culture of fluid showing few -GNRs,identification pending. Cx from anaerobic bottle pending results 
-WBC trending down  
-Body fluid Cx-Proteus sens to cefepime Diabetes mellitus 2 on Insulin at home Dysphagia due to esophageal dysmotility on MBS study last admission - s/p PEG for FS Q 6 hrs on tube feeding, keep NPO for now, check CT abdomen, may resume feeding after that. Unspecified protein-calorie malnutrition POA in the setting of PEG dependence, sacral decubitus, ESRD -- onTF 
ESRD on HD MWF  Renal in the case. Anemia of chronic disease POA S/p L arm AVFistula Hyperlipidemia Cont on statins Hypertension resume Coreg thru PEG, monitor.  
? Multiple sclerosis POA 
? Exacerbation causing dysphagia- cannot tell till MRI done 
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 PAD s/p b/l leg stents S/p remote partial (Syme) amputation of RIGHT foot. -Nephrology consulted for HD MWF 
-Cont ASA 
-Vascular surgery consult as above for opinion on stent infection Code Status: Full Code as per [facility] with palliative Care Surrogate Decision Maker: wife Jimmy Morales  [phone] DVT Prophylaxis: Sq heparin GI Prophylaxis: not indicated Baseline: Pt was recently DC to [facility] from [address] after being treated for R Iliac ar pseudoaneurysm s/p Stent by Vasc Sx Dr Sarah Fuller, Eso Dysmotility syndrome causing Dysphagia causing aspiration pneumonia s/p PEG tube Pt had denied any neuro workup - denied MRI as recommended by neurology to find the cause of his eso dysmotility- ?MS flare Body mass index is 23.57 kg/(m^2). Prognosis is poor but family still wants everything done, will transfer to [facility] unit, tried to call family numbers in chart are wrong Subjective: Chief Complaint / Reason for Physician Visit Denies any SOB from yesterday. Confused. Discussed with RN events overnight. Review of Systems: 
Symptom Y/N Comments  Symptom Y/N Comments Fever/Chills n   Chest Pain n   
Poor Appetite n   Edema Cough n   Abdominal Pain n   
Sputum    Joint Pain SOB/BANKS n   Pruritis/Rash Nausea/vomit n   Tolerating PT/OT Diarrhea    Tolerating Diet Constipation    Other Could NOT obtain due to:   
 
Objective: VITALS:  
Last 24hrs VS reviewed since prior progress note. Most recent are: 
Patient Vitals for the past 24 hrs: 
 Temp Pulse Resp BP SpO2  
07/26/18 0325 (!) 100.6 °F (38.1 °C) 94 15 150/71 93 % 07/26/18 0002 99 °F (37.2 °C) 90 20 139/65 95 %  
07/25/18 2008 100.2 °F (37.9 °C) 91 20 152/68 94 %  
07/25/18 1544 99.5 °F (37.5 °C) 85 18 149/69 99 % 07/25/18 1105 98.4 °F (36.9 °C) 69 18 130/64 -  
07/25/18 1058 98.4 °F (36.9 °C) 76 18 142/66 99 % 07/25/18 1030 - 66 18 141/67 -  
07/25/18 0959 - 77 18 136/66 -  
07/25/18 0930 - 75 18 115/64 -  
07/25/18 0900 - 76 18 118/58 - Intake/Output Summary (Last 24 hours) at 07/26/18 4785 Last data filed at 07/26/18 0500 Gross per 24 hour Intake              320 ml Output             1501 ml Net            -1181 ml PHYSICAL EXAM: 
General:                    Alert, cooperative, no acute distress   
EENT:                       EOMI. Anicteric sclerae. MMM Resp:                        Coarse BS 
CV:                            Regular  rhythm,  No edema GI:                             Soft, mild  distended, diffuse tender.  +Bowel sounds Neurologic:                Alert and oriented X 2, normal speech, also intermittently confused Psych:                       Fair insight. Not anxious nor agitated Skin:                          No rashes. No jaundice. LUE fistula+ Reviewed most current lab test results and cultures  YES Reviewed most current radiology test results   YES Review and summation of old records today    NO Reviewed patient's current orders and MAR    YES 
PMH/SH reviewed - no change compared to H&P 
________________________________________________________________________ Care Plan discussed with: 
  Comments Patient x Family  x   
RN x Care Manager Consultant Multidiciplinary team rounds were held today with , nursing, pharmacist and clinical coordinator.   Patient's plan of care was discussed; medications were reviewed and discharge planning was addressed. ________________________________________________________________________ Total NON critical care TIME:  40   Minutes Total CRITICAL CARE TIME Spent:   Minutes non procedure based Comments >50% of visit spent in counseling and coordination of care y   
________________________________________________________________________ Sondra Sloan MD  
 
Procedures: see electronic medical records for all procedures/Xrays and details which were not copied into this note but were reviewed prior to creation of Plan. LABS: 
I reviewed today's most current labs and imaging studies. Pertinent labs include: 
Recent Labs  
   07/25/18 0344  07/23/18 
 1520 WBC  11.5*  13.5* HGB  9.2*  6.7* HCT  29.4*  22.2*  
PLT  337  364 Recent Labs  
   07/25/18 0344  07/23/18 
 1520 NA  135*  134* K  3.8  4.4  
CL  100  96* CO2  30  33* GLU  90  137* BUN  19  37* CREA  3.50*  4.68* CA  9.4  8.9 PHOS   --   2.5* Signed: Sondra Sloan MD

## 2018-07-26 NOTE — TELEPHONE ENCOUNTER
Lakeisha crespo/[facility]  -  Nurse      She wants to know if blood can be drawn tomorrow during dialysis since he is a \"hard stick\"    [phone]

## 2018-07-26 NOTE — PROGRESS NOTES
Palliative Medicine  [place]: [phone] ([phone])  [facility]: [phone] ([phone])    Telephone call made to wife Dulce Martinez, to verify her phone number. The phone number for her cell phone is [phone]. She answered the phone. Let her know that the doctor was trying to reach her this morning. She noted that \"he must have called the wrong #\". There was an incorrect number listed in the chart which was removed.

## 2018-07-26 NOTE — PROGRESS NOTES
Bulmaro Rubin RN received report for this patient, receiving nurse in other report cannot take call. Reported that TF needs to be addressed, and family needs to be called in regards to code status. Also that 0900 wound care meds needs to be addressed.

## 2018-07-26 NOTE — PROGRESS NOTES
TRANSFER - IN REPORT:    Verbal report received from Romi(name) on Shanika Conklin  being received from CCU(unit) for routine progression of care      Report consisted of patients Situation, Background, Assessment and   Recommendations(SBAR). Information from the following report(s) SBAR, Kardex, MAR, Recent Results and Med Rec Status was reviewed with the receiving nurse. Opportunity for questions and clarification was provided. Assessment completed upon patients arrival to unit and care assumed. Primary Nurse Homa Hernandez RN and Ron Lieberman RN performed a dual skin assessment on this patient Impairment noted- see wound doc flow sheet  Delbert score is 11      R. Ankle unstageable  L. Heel DTI  Penis partial thickness  Sacrum full thickness  R.  Leg DTI

## 2018-07-26 NOTE — PROGRESS NOTES
1230: Report received from Junaid Burgess [facility].    1300: Pt. Is alert and oriented to self and place. Pt. Follows all commands. Pt. Is drowsy, but awakens easily when spoken to. Pt. Is in a sinus rhythm. Pt.'s EKG from admission shows sinus rhythm with a 2nd degree block. Rhythm strip today shows slight ST depression possibly? This was shown to Dr. Ayah Ornelas. No new orders at this time regarding this. Pt. Bowel sounds are active. Pt. Has a PEG tube in place. Will restart feedings at 10 ml/hr since patient was having some Nausea with his prior feedings, Will increase feedings as tolerated and ordered. Primary Nurse Britt Manley and Junaid Burgess RN performed a dual skin assessment on this patient Impairment noted- see wound doc flow sheet  Delbert score is 11. Pt. Has multiple wounds. Pt. Has a DTI to his BL heels, Pt. Has DTI to his left outer leg. Venelex applied. Pt. Has darkened skin to BL elbows. Pt. Has a Unstageable to his sacrum. Will apply santyl and wound care as ordered. 1450: Have attempted to retrieve Blood cultures twice now. Will let another nurse attempt.    1600: Spoke to Dr. Enrique Chandler. Order received to Obtain blood cultures with Dialysis tomorrow. 3 nurses have attempted to obtain blood cultures with no success. TRANSFER - OUT REPORT:    Verbal report given to Seven Coello RN (name) on Jocelyn Fuentes  being transferred to PCU (unit) for routine progression of care       Report consisted of patients Situation, Background, Assessment and   Recommendations(SBAR). Information from the following report(s) SBAR, Kardex, [facility] and Recent Results was reviewed with the receiving nurse.     Lines:   Peripheral IV 07/21/18 Right Antecubital (Active)   Site Assessment Clean, dry, & intact 7/26/2018 12:00 PM   Phlebitis Assessment 0 7/26/2018 12:00 PM   Infiltration Assessment 0 7/26/2018 12:00 PM   Dressing Status Clean, dry, & intact 7/26/2018 12:00 PM   Dressing Type Transparent;Tape 7/26/2018 12:00 PM Hub Color/Line Status Blue;Flushed;Patent 7/26/2018 12:00 PM   Action Taken Blood drawn 7/25/2018  4:19 AM   Alcohol Cap Used Yes 7/25/2018  4:19 AM        Opportunity for questions and clarification was provided. Patient transported with:   Monitor  O2 @ 2 liters  Registered Nurse  Tech    1800: Spoke to Dr. Brook Gan and addressed Patient's White coating of his tongue that nurse suspects is thrush. Received order for Nystatin oral suspension three times a day. Dr. Brook Gan made aware that patient's tube feedings are on hold due to Nausea and that Zofran was given for Nausea.

## 2018-07-27 NOTE — PROGRESS NOTES
Hospitalist Progress Note NAME: Rob Black :  1950 MRN:  [id number] Assessment / Plan: 
Sepsis with Hypotension POA- hypotension had improved, WBC trending up a little, no fever today so far Infected Right Iliac Hematoma: drained shows Proteus, Vascular and ID in the case, has a stent that ideally may need to come out due to infection. C/w Cefepime,  new CT abdomen and pelvis shows decreasing size of hematoma, agree with ID on switching to Cefazolin 2-2-3 with HD 
B/L Buttock Pressure injury ulcer/wound infection vs R iliac artery pseudoaneurysm/collection/stent infection, UTI, no urine culture had been sent Encephalopathy POA in the setting of sepsis: still lethargic and in pain. -CT abdm/pelvis showing Right common iliac artery stent with enhancing fluid collection surrounding the stent and extending into the retroperitoneum on the right. suspicious for infection. Also showing Right hydronephrosis and hydroureter secondary to compression of the ureter by the collection around the stent 
-On ,Vascular surgery saw patient and will proceed with aspiration of the fluid noted on CT surrounding the right common iliac artery stent since this could be an abscess. 
-Urology consulted for hydronephrosis right ->saw pt on :no intervention needed 
-On :CT guided aspiration of pelvic fluid collection - culture of fluid showing few -GNRs,identification pending. Cx from anaerobic bottle pending results 
-WBC trending down  
-Body fluid Cx-Proteus sens to cefepime Diabetes mellitus 2 on Insulin at home Dysphagia due to esophageal dysmotility on MBS study last admission - s/p PEG for FS Q 6 hrs on tube feeding, keep NPO for now,  CT abdomen showing some paretic loops of small bowel, but on exam abdomen is benign will advance tube feeding. Unspecified protein-calorie malnutrition POA in the setting of PEG dependence, sacral decubitus, ESRD -- onTF 
ESRD on HD MWF  Renal in the case. Getting HD today Anemia of chronic disease POA S/p L arm AVFistula Hyperlipidemia Cont on statins Hypertension resume Coreg thru PEG, monitor. ? Multiple sclerosis POA ? Exacerbation causing dysphagia- cannot tell till MRI done 
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 PAD s/p b/l leg stents S/p remote partial (Syme) amputation of RIGHT foot. -Nephrology consulted for HD MWF 
-Cont ASA 
-Vascular surgery consult as above for opinion on stent infection Code Status: Full Code as per [facility] with palliative Care Surrogate Decision Maker: wife Chip Gibson  [phone] DVT Prophylaxis: Sq heparin GI Prophylaxis: not indicated Baseline: Pt was recently DC to [facility] from [facility] after being treated for R Iliac ar pseudoaneurysm s/p Stent by Vasc Sx Dr Soraya Baca, Eso Dysmotility syndrome causing Dysphagia causing aspiration pneumonia s/p PEG tube Pt had denied any neuro workup - denied MRI as recommended by neurology to find the cause of his eso dysmotility- ?MS flare Body mass index is 23.57 kg/(m^2). Prognosis is poor but family still wants everything done, he is Hospice candidate nevertheless but wife wants to honor patient wishes Subjective: Chief Complaint / Reason for Physician Visit Denies any SOB from yesterday. Confused. Discussed with RN events overnight. Review of Systems: 
Symptom Y/N Comments  Symptom Y/N Comments Fever/Chills n   Chest Pain n   
Poor Appetite n   Edema Cough n   Abdominal Pain n   
Sputum    Joint Pain SOB/BANKS n   Pruritis/Rash Nausea/vomit n Tolerating PT/OT Diarrhea    Tolerating Diet y Tube feedings Constipation    Other Could NOT obtain due to:   
 
Objective: VITALS:  
Last 24hrs VS reviewed since prior progress note. Most recent are: 
Patient Vitals for the past 24 hrs: 
 Temp Pulse Resp BP SpO2  
07/27/18 1051 - 73 - 112/54 -  
07/27/18 0817 - - - - 99 % 07/26/18 2311 98.8 °F (37.1 °C) 76 16 131/55 100 % 07/26/18 2307 98.8 °F (37.1 °C) - - - -  
07/26/18 1934 98.1 °F (36.7 °C) 79 14 139/60 99 % 07/26/18 1800 98 °F (36.7 °C) 76 16 126/56 95 %  
07/26/18 1500 - 83 14 (!) 107/92 98 %  
07/26/18 1400 98.6 °F (37 °C) 79 16 126/55 98 %  
07/26/18 1300 - 80 16 124/56 96 %  
07/26/18 1200 97.9 °F (36.6 °C) 84 16 133/59 98 % Intake/Output Summary (Last 24 hours) at 07/27/18 1130 Last data filed at 07/26/18 2130 Gross per 24 hour Intake              285 ml Output                0 ml Net              285 ml PHYSICAL EXAM: 
General:                    Alert, cooperative, no acute distress   
EENT:                       EOMI. Anicteric sclerae. MMM Resp:                        Coarse BS 
CV:                            Regular  rhythm,  No edema GI:                             Soft, no  distended, no tender.  +Bowel sounds Neurologic:                Alert and oriented X 2, normal speech, also intermittently confused Psych:                       Fair insight. Not anxious nor agitated Skin:                          No rashes. No jaundice. LUE fistula+ Reviewed most current lab test results and cultures  YES Reviewed most current radiology test results   YES Review and summation of old records today    NO Reviewed patient's current orders and MAR    YES 
PMH/SH reviewed - no change compared to H&P 
________________________________________________________________________ Care Plan discussed with: 
  Comments Patient x Family  x   
RN x Care Manager Consultant Multidiciplinary team rounds were held today with , nursing, pharmacist and clinical coordinator. Patient's plan of care was discussed; medications were reviewed and discharge planning was addressed. ________________________________________________________________________ Total NON critical care TIME:  30  Minutes Total CRITICAL CARE TIME Spent:   Minutes non procedure based Comments >50% of visit spent in counseling and coordination of care y   
________________________________________________________________________ Fei Ibrahim MD  
 
Procedures: see electronic medical records for all procedures/Xrays and details which were not copied into this note but were reviewed prior to creation of Plan. LABS: 
I reviewed today's most current labs and imaging studies. Pertinent labs include: 
Recent Labs  
   07/27/18 0428 07/25/18 
 0344 WBC  13.2*  11.5* HGB  8.8*  9.2* HCT  29.4*  29.4*  
PLT  353  337 Recent Labs  
   07/27/18 0428  07/25/18 
 0344 NA  137  135* K  3.7  3.8 CL  101  100 CO2  31  30 GLU  118*  90 BUN  25*  19  
CREA  3.77*  3.50* CA  9.2  9.4 MG  2.4   --   
PHOS  3.0   --   
ALB  1.1*   --   
TBILI  0.4   --   
SGOT  18   --   
ALT  15   --   
 
 
Signed: Fei Ibrahim MD

## 2018-07-27 NOTE — PROGRESS NOTES
Problem: Falls - Risk of 
Goal: *Absence of Falls Document Levar President Fall Risk and appropriate interventions in the flowsheet. Outcome: Progressing Towards Goal 
Fall Risk Interventions: 
Mobility Interventions: Bed/chair exit alarm, Communicate number of staff needed for ambulation/transfer, PT Consult for mobility concerns, PT Consult for assist device competence Mentation Interventions: Bed/chair exit alarm, Adequate sleep, hydration, pain control, Door open when patient unattended Medication Interventions: Bed/chair exit alarm, Evaluate medications/consider consulting pharmacy, Patient to call before getting OOB Elimination Interventions: Bed/chair exit alarm, Call light in reach, Toileting schedule/hourly rounds History of Falls Interventions: Bed/chair exit alarm Problem: Pressure Injury - Risk of 
Goal: *Prevention of pressure injury Document Delbert Scale and appropriate interventions in the flowsheet. Outcome: Progressing Towards Goal 
Pressure Injury Interventions: 
Sensory Interventions: Turn and reposition approx. every two hours (pillows and wedges if needed) Moisture Interventions: Minimize layers, Moisture barrier, Absorbent underpads, Apply protective barrier, creams and emollients Activity Interventions: Pressure redistribution bed/mattress(bed type), Assess need for specialty bed Mobility Interventions: Assess need for specialty bed, HOB 30 degrees or less, PT/OT evaluation, Turn and reposition approx. every two hours(pillow and wedges) Nutrition Interventions: Document food/fluid/supplement intake Friction and Shear Interventions: Apply protective barrier, creams and emollients, Foam dressings/transparent film/skin sealants, HOB 30 degrees or less, Lift sheet, Lift team/patient mobility team

## 2018-07-27 NOTE — PROGRESS NOTES
NAME: Jocelyn Fuentes :  1950 MRN:  [id number] Assessment :    Plan: 
--ESRD Left AVF 
HTN Anemia Hyponatremia Hypoalbuminemia Sacral Decubitus Right retroperitoneal abscess/hematoma with asso right hydro MS 
PEG tube --MWF HD [facility] -seen on hd, bp stable 
  
Hgb 8.8; epo 10 k sc tiw; one unit prbc's  
  
Holding Parsabiv (not available in the hospital) S/p FNA of retroperitoneal abscess/hematoma --not a surgical candidate. longterm prognosis very poor. Palliative care-THis pt has been chronically ill for so many years;and in the past has recovered enough to have a fairly good qol--it wasn't long ago he received a renal tx---albeit short lived--however, as above longterm prognosis poor ? Hospice candidate Will see again on Monday, call if problems arise over weekend Subjective: Chief Complaint:  In bed. Resting. Denies current complaint. Review of Systems: 
 
Symptom Y/N Comments  Symptom Y/N Comments Fever/Chills    Chest Pain Poor Appetite    Edema Cough    Abdominal Pain Sputum    Joint Pain SOB/BANKS    Pruritis/Rash Nausea/vomit    Tolerating PT/OT Diarrhea    Tolerating Diet Constipation    Other Could not obtain due to:   
 
Objective: VITALS:  
Last 24hrs VS reviewed since prior progress note. Most recent are: 
Visit Vitals  /54  Pulse 73  Temp 98.8 °F (37.1 °C)  Resp 16  
 Ht 5' 10\" (1.778 m)  Wt 74 kg (163 lb 1.6 oz)  SpO2 99%  BMI 23.4 kg/m2 Intake/Output Summary (Last 24 hours) at 18 1119 Last data filed at 18 2130 Gross per 24 hour Intake              285 ml Output                0 ml Net              285 ml Telemetry Reviewed: PHYSICAL EXAM: 
General: WD, WN. Alert, cooperative, no acute distress Resp:  CTA Bilaterally. . No access.  muscle use 
CV:  Regular  rhythm,  . No edema GI:  Soft, Non distended, Non tender. Lab Data Reviewed: (see below) Medications Reviewed: (see below) PMH/SH reviewed - no change compared to H&P 
________________________________________________________________________ Care Plan discussed with: 
Patient Family RN Care Manager Consultant:     
 
  Comments >50% of visit spent in counseling and coordination of care    
 
________________________________________________________________________ Ross Lew MD  
 
Procedures: see electronic medical records for all procedures/Xrays and details which 
were not copied into this note but were reviewed prior to creation of Plan. LABS: 
Recent Labs  
   07/27/18 0428 07/25/18 0344 WBC  13.2*  11.5* HGB  8.8*  9.2* HCT  29.4*  29.4*  
PLT  353  337 Recent Labs  
   07/27/18 0428 07/25/18 0344 NA  137  135* K  3.7  3.8 CL  101  100 CO2  31  30 BUN  25*  19  
CREA  3.77*  3.50* GLU  118*  90  
CA  9.2  9.4 MG  2.4   --   
PHOS  3.0   --   
 
Recent Labs  
   07/27/18 0428 SGOT  18  
AP  219* TP  7.7 ALB  1.1*  
GLOB  6.6* No results for input(s): INR, PTP, APTT in the last 72 hours. No lab exists for component: INREXT, INREXT No results for input(s): FE, TIBC, PSAT, FERR in the last 72 hours. No results found for: FOL, RBCF No results for input(s): PH, PCO2, PO2 in the last 72 hours. No results for input(s): CPK, CKMB in the last 72 hours. No lab exists for component: TROPONINI No components found for: [place] Lab Results Component Value Date/Time  Color RED 07/17/2018 02:54 PM  
 Appearance CLOUDY (A) 07/17/2018 02:54 PM  
 Specific gravity 1.024 07/17/2018 02:54 PM  
 Specific gravity 1.015 12/30/2014 06:40 PM  
 pH (UA) 7.0 07/17/2018 02:54 PM  
 Protein 300 (A) 07/17/2018 02:54 PM  
 Glucose 100 (A) 07/17/2018 02:54 PM  
 Ketone NEGATIVE  07/17/2018 02:54 PM  
 Bilirubin SMALL (A) 07/17/2018 02:54 PM  
 Urobilinogen 0.2 07/17/2018 02:54 PM  
 Nitrites NEGATIVE  07/17/2018 02:54 PM  
 Leukocyte Esterase SMALL (A) 07/17/2018 02:54 PM  
 Epithelial cells FEW 07/17/2018 02:54 PM  
 Bacteria 1+ (A) 07/17/2018 02:54 PM  
 WBC >100 (H) 07/17/2018 02:54 PM  
 RBC 10-20 07/17/2018 02:54 PM  
 
 
MEDICATIONS: 
Current Facility-Administered Medications Medication Dose Route Frequency  albuterol-ipratropium (DUO-NEB) 2.5 MG-0.5 MG/3 ML  3 mL Nebulization Q6H PRN  
 carvedilol (COREG) tablet 25 mg  25 mg Per G Tube BID WITH MEALS  nystatin (MYCOSTATIN) 100,000 unit/mL oral suspension 500,000 Units  500,000 Units Oral QID  heparin (porcine) injection 5,000 Units  5,000 Units SubCUTAneous Q12H  
 balsam peru-castor oil (VENELEX)  mg/gram ointment   Topical BID  sodium hypochlorite (QUARTER STRENGTH DAKIN'S) 0.125% irrigation (bottle)   Topical DAILY  collagenase (SANTYL) 250 unit/gram ointment   Topical DAILY  epoetin niya (EPOGEN;PROCRIT) injection 10,000 Units  10,000 Units SubCUTAneous Q MON, WED & FRI  cefepime (MAXIPIME) 1 g in 0.9% sodium chloride (MBP/ADV) 50 mL  1 g IntraVENous Q24H  
 acetaminophen (TYLENOL) solution 650 mg  650 mg Per G Tube Q3H PRN  
 aspirin chewable tablet 81 mg  81 mg Per G Tube DAILY  escitalopram oxalate (LEXAPRO) 5 mg/5 mL oral solution soln 10 mg  10 mg Per G Tube DAILY  insulin lispro (HUMALOG) injection   SubCUTAneous Q6H  
 glucose chewable tablet 16 g  4 Tab Oral PRN  
 dextrose (D50W) injection syrg 12.5-25 g  12.5-25 g IntraVENous PRN  
 glucagon (GLUCAGEN) injection 1 mg  1 mg IntraMUSCular PRN  
 morphine 10 mg/5 mL oral solution 10 mg  5 mL Per G Tube Q4H PRN  
 oxyCODONE-acetaminophen (PERCOCET) 5-325 mg per tablet 1 Tab  1 Tab Per G Tube Q4H PRN  
 sevelamer carbonate (RENVELA) oral powder 2,400 mg  2.4 g Per G Tube TID WITH MEALS  pravastatin (PRAVACHOL) tablet 10 mg  10 mg Per G Tube QHS  sodium chloride (NS) flush 5-10 mL  5-10 mL IntraVENous Q8H  
 sodium chloride (NS) flush 5-10 mL  5-10 mL IntraVENous PRN  
 naloxone (NARCAN) injection 0.4 mg  0.4 mg IntraVENous PRN  
 ondansetron (ZOFRAN) injection 4 mg  4 mg IntraVENous Q4H PRN

## 2018-07-27 NOTE — PROGRESS NOTES
Nutrition Assessment: 
 
INTERVENTIONS/RECOMMENDATIONS:  
Resume Nepro @ 45 mL/hr + 170 mL water flushes q 4 hours (1944 kcal, 87.5 g protein, 1805 mL water) ASSESSMENT:  
Chart reviewed; patient sleeping at time of visit. TF was on hold. Discussed with RN who reports TF was running at 45 mL/hr when he received patient this morning but he was unable to locate an active TF order and uncomfortable continuing feeding without one. Discussed that 45 mL/hr was his goal rate; will renew orders. Goal rate will meet >100% of estimated kcal needs and 98% of estimated protein needs. Noted issues with nausea/vomiting previously and difficulty getting to goal rate. Last documentation in flowsheets is from 7/27 at 0000 and says TF was running at 30 mL/hr. Will monitor TF tolerance, weights, and labs. Diet Order: NPO (Nepro @ 45 mL/hr + 170 mL q 4 hours; 1944 kcal, 87.5 g protein, 1805 mL water) % Eaten:  No data found. Pertinent Medications: [x] Reviewed []Other: Coreg, Epogen, Lexapro, Humalog, Pravastatin, Renvela, Cefipime, PRN Zofran Pertinent Labs: [x]Reviewed  []Other: -[phone], K+ 3.7, Phos 3.0 Food Allergies: [x]None []Other:    
Last BM: 7/26   []Active     []Hyperactive  []Hypoactive       [] Absent  BS Skin:    [] Intact   [] Incision  [x] Breakdown   []Edema   []Other: Anthropometrics: Height: 5' 10\" (177.8 cm) Weight: 74 kg (163 lb 1.6 oz) IBW (%IBW):   ( ) UBW (%UBW):   (  %) BMI: Body mass index is 23.4 kg/(m^2). This BMI is indicative of: 
[]Underweight   []Normal   []Overweight   [] Obesity   [] Extreme Obesity (BMI>40) Last Weight Metrics: 
Weight Loss Metrics 7/27/2018 7/9/2018 6/13/2018 4/17/2018 4/12/2018 4/5/2018 3/1/2018 Today's Wt 163 lb 1.6 oz 164 lb 3.9 oz 150 lb 155 lb 166 lb 166 lb 14.4 oz 153 lb BMI 23.4 kg/m2 23.57 kg/m2 21.52 kg/m2 22.24 kg/m2 23.82 kg/m2 23.95 kg/m2 21.95 kg/m2 Estimated Nutrition Needs (Based on): 8237 Kcals/day (BMR (2921) x 1. 2AF) , 89 g (1.2 g/kg bw) Protein Carbohydrate: At Least 130 g/day  Fluids: 1800 mL/day Pt expected to meet estimated nutrient needs: [x]Yes []No 
 
NUTRITION DIAGNOSES:  
Problem:  Less than optimal enteral nutrition Etiology: related to nausea/vomiting, time of pump Signs/Symptoms: as evidenced by slow advancement to goal, holding for nausea NUTRITION INTERVENTIONS: 
  Enteral/Parenteral Nutrition: Initiate enteral nutrition GOAL:  
Patient will tolerate TF @ goal rate next 2-4 days NUTRITION MONITORING AND EVALUATION Food/Nutrient Intake Outcomes: Enteral/parenteral nutrition intake Physical Signs/Symptoms Outcomes: Weight/weight change, GI profile, Electrolyte and renal profile, Glucose profile Previous Goal Met: 
 [] Met              [x] Progressing Towards Goal              [] Not Progressing Towards Goal  
Previous Recommendations: 
 [x] Implemented          [] Not Implemented          [] Not Applicable LEARNING NEEDS (Diet, Food/Nutrient-Drug Interaction):  
 [x] None Identified 
 [] Identified and Education Provided/Documented 
 [] Identified and Pt declined/was not appropriate Cultural, Jain, OR Ethnic Dietary Needs:  
 [x] None Identified 
 [] Identified and Addressed 
 
 [x] Interdisciplinary Care Plan Reviewed/Documented  
 [x] Discharge Planning: Continue current TF regimen  
 [x] Participated in Interdisciplinary Rounds NUTRITION RISK:  
 [x] High              [] Moderate           []  Low  []  Minimal/Uncompromised Love [place] Pager [phone] Weekend Pager [phone]

## 2018-07-27 NOTE — PROCEDURES
[facility] Dialysis Team [place]  [phone] Vitals   Pre   Post   Assessment   Pre   Post    
Temp  Temp: 96.7 °F (35.9 °C) (07/27/18 1140)  98.3 axillary  LOC  Alert, conversing with staff Resting but arousable HR   Pulse (Heart Rate): 74 (07/27/18 1200) 76 Lungs   Congested pt has oral suction   slef mouth suctioning B/P   BP: 117/52 (07/27/18 1200) 116/56 Cardiac   Bedside telemetry no chest pain  bedside telemetry no cp Resp   Resp Rate: 28 (07/27/18 1200) 25 Skin   Warm and dry, sacral wound   warm and dry Pain level  Pain Intensity 1: 0 (07/26/18 2311) 0 Edema  Trace ble Trace ble Orders: Duration:   Start:   1140 End:    1510 Total:   3.5 Dialyzer:   Dialyzer/Set Up Inspection: Cirilo Gerber (07/27/18 1140) K Bath:   Dialysate K (mEq/L): 3 (07/27/18 1140) Ca Bath:   Dialysate CA (mEq/L): 2.5 (07/27/18 1140) Na/Bicarb:   Dialysate NA (mEq/L): 140 (07/27/18 1140) Target Fluid Removal:   Goal/Amount of Fluid to Remove (mL): 1500 mL (07/27/18 1140) Access Type & Location:   loni AV fistula, aneurysmal, site without redness, warmth or drainage, cleansed with alcohol, cannulated two 15 gauge needle, prelabs drawn. Labs Obtained/Reviewed Critical Results Called   Date when labs were drawn- 
Hgb-   
HGB Date Value Ref Range Status 07/27/2018 8.8 (L) 12.1 - 17.0 g/dL Final  
 
K-   
Potassium Date Value Ref Range Status 07/27/2018 3.7 3.5 - 5.1 mmol/L Final  
 
Ca-  
Calcium Date Value Ref Range Status 07/27/2018 9.2 8.5 - 10.1 MG/DL Final  
 
Bun-  
BUN Date Value Ref Range Status 07/27/2018 25 (H) 6 - 20 MG/DL Final  
 
Creat-  
Creatinine Date Value Ref Range Status 07/27/2018 3.77 (H) 0.70 - 1.30 MG/DL Final  
 
  
Medications/ Blood Products Given Name   Dose   Route and Time    
none Blood Volume Processed (BVP):    76.6 Net Fluid Removed:  1400 Comments Time Out Done: 1130 Primary Nurse Rpt Pre: Nellie Hooks Naomi Nj, RN  
Primary Nurse Rpt Post: Benjamín Wilkerson RN  
Pt Education: infection control remove fistula dressing tomorrow am verbalized understanding Care Plan: continue current hemodialysis plan of care Tx Summary: 11:40 blood cultures drawn venous needle as ordered predialysis. HD initiated. 11:45 Dr. Wagner Osborne in to see patient on dialysis. 1415 100 cc ns for downward trending blood pressure. Skin warm and dry, resting with eyes closed breathing unlabored. 1420 142/56 no complaints, ID doctor in to see patient. Patient has suction wand at bedside for independent mouth suctioning as needed. 1510 all possible blood returned, hemostasis achieved <20 minutes, dressing dry and intact, SBAR report to primary nurse call bell within reach. Admiting Diagnosis: sepsis Pt's previous clinic- [address]  
Consent signed - Informed Consent Verified: Yes (07/27/18 1140) Filiita Consent - verified Hepatitis Status- 07/02/18 Ag neg Ab 18 Connect Care Machine #- Machine Number: [id number] (07/27/18 1140) Telemetry status- rate 74 bedside monitoring Pre-dialysis wt. - Pre-Dialysis Weight:  (No bedscale available) (07/21/18 0100)

## 2018-07-27 NOTE — PROGRESS NOTES
Problem: Mobility Impaired (Adult and Pediatric) Goal: *Acute Goals and Plan of Care (Insert Text) Physical Therapy Goals Initiated 7/26/2018 1. Patient will move from supine to sit and sit to supine , scoot up and down and roll side to side in bed with maximal assistance within 7 day(s). 2.  Patient will sit edge of bed x5 minutes with moderate assistance within 7 day(s). 3.  Patient will perform supine UE executrixes for strengthening with supervision within 7 days. physical Therapy TREATMENT Patient: Clarke Kitchen (17 y.o. male) Date: 7/27/2018 Diagnosis: Leukocytosis Fever ESRD (end stage renal disease) on dialysis ([facility] [address] <principal problem not specified> Precautions: Fall, Contact, Aspiration Chart, physical therapy assessment, plan of care and goals were reviewed. ASSESSMENT: 
Patient exhibits profound debility from MS/ESRD/DM/PEG/RLE Syme amputation/PVD/multiple pressure wounds/and SOB on O2 with a poor tolerance for lying flat. Yusra Raleigh His cough is extremely weak. He is total care and total assist for all mobility. He is flaccid in the LE's. He is unable to assist with mobility. He came to sitting EOB with max assist of 2. Sat for 5 minutes with support. Able to hold the bed rail but tends to push posteriorly and is unable to get his balance. Returned to supine and the pads were changed and he was repositioned in sidelying with the [facility] raised due to his intolerance for lying flat. He is not safe to be up in a chair as she has no sitting balance. Progression toward goals: 
[]    Improving appropriately and progressing toward goals 
[]    Improving slowly and progressing toward goals [x]    Not making progress toward goals PLAN: 
Patient continues to benefit from skilled intervention to address the above impairments. Continue treatment per established plan of care. Discharge Recommendations:  Geovani Esqueda Further Equipment Recommendations for Discharge:  none SUBJECTIVE:  
Patient stated I'm tired.  OBJECTIVE DATA SUMMARY:  
Critical Behavior: 
Neurologic State: Alert Orientation Level: Oriented to person, Oriented to place, Disoriented to time Cognition: Follows commands, Recognition of people/places Functional Mobility Training: 
Bed Mobility: 
Rolling: Total assistance Supine to Sit: Total assistance Sit to Supine: Total assistance Scooting: Total assistance Transfers: 
 Unable at this time. Balance: 
Sitting: Impaired Sitting - Static: None Sitting - Dynamic: None Ambulation/Gait Training: 
  
  
  
  
  
  
  
  
  
  
  
  
  
  
  
  
  
 Nonambulatory at this time. Stairs: Therapeutic Exercises: PROm to bilat LE's. Pain: 
  
  
  
  
  
  
Activity Tolerance:  
Poor. Please refer to the flowsheet for vital signs taken during this treatment. After treatment:  
[]    Patient left in no apparent distress sitting up in chair 
[x]    Patient left in no apparent distress in bed 
[x]    Call bell left within reach [x]    Nursing notified 
[]    Caregiver present 
[]    Bed alarm activated COMMUNICATION/COLLABORATION:  
The patients plan of care was discussed with: Registered Nurse and Rehabilitation Attendant [facility], PT Time Calculation: 25 mins

## 2018-07-27 NOTE — PROGRESS NOTES
ADULT PROTOCOL: JET AEROSOL ASSESSMENT Patient  Fay Roberto     70 y.o.   male     7/27/2018  8:39 AM 
 
Breath Sounds Pre Procedure:   Clear Breath Sounds Post Procedure:  Clear Breathing pattern: Pre procedure Breathing Pattern: Regular Post procedure Breathing Pattern: Regular Heart Rate: Pre procedure Pulse: 72 Post procedure Pulse: 74 Resp Rate: Pre procedure Respirations: 20 
         Post procedure Respirations: 20 
 
 
Cough: Pre procedure   non productive Post procedure  non productive Oxygen: O2 Device: Nasal cannula   2L SpO2: Pre procedure SpO2: 99 % Post procedure SpO2: 98 % Nebulizer Therapy: Current medications Aerosolized Medications: DuoNeb Tid Changed: yes to PRN Smoking History: Former Problem List:  
Patient Active Problem List  
Diagnosis Code  Hypertension I10  
 PAD (peripheral artery disease) ([facility]) I73.9  
 S/P coronary artery stent placement Z95.5  ASHD (arteriosclerotic heart disease) I25.10  Mixed hyperlipidemia E78.2  
 ESRD on hemodialysis ([facility]) N18.6, Z99.2  Cervical stenosis of spinal canal M48.02  
 Ataxia R27.0  Multiple sclerosis ([facility]) G35  
 Ulnar neuropathy at elbow of left upper extremity G56.22  
 Carpal tunnel syndrome on both sides G56.03  
 Ulnar neuropathy at elbow of right upper extremity G56.21  
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  
 Diabetic peripheral neuropathy associated with type 2 diabetes mellitus ([facility] [address] E11.42  
 History of colonoscopy Z98.890  Type 2 diabetes with nephropathy ([facility]) E11.21  
 Pseudoaneurysm of iliac artery ([facility]) I72.3  Elevated troponin R74.8  Esophageal dyskinesia K22.4  Weight loss R63.4  
 Counseling regarding goals of care Z71.89  Weakness generalized R53.1  Leukocytosis D72.829  Fever R50.9  ESRD (end stage renal disease) on dialysis ([facility]) N18.6, Z99.2  Sepsis ([address] A41.9 Respiratory Therapist: Adeola Pham V RT

## 2018-07-27 NOTE — PROGRESS NOTES
Infectious Disease Progress Note Subjective:  
Mr Alba Alfonso seen today. Denied any pain, fever, chills diarrhea, nausea, vomiting to me Getting HD in the room Objective: 
 
Vitals:  
Patient Vitals for the past 24 hrs: 
 Temp Pulse Resp BP SpO2  
07/27/18 1445 - 78 21 128/62 100 % 07/27/18 1420 - 74 - 142/56 -  
07/27/18 1415 - 76 23 106/52 100 % 07/27/18 1345 - 73 15 119/49 100 % 07/27/18 1315 - 75 19 121/52 100 % 07/27/18 1245 - 73 17 126/52 100 % 07/27/18 1215 - 77 25 139/56 98 %  
07/27/18 1200 - 74 28 117/52 100 % 07/27/18 1140 96.7 °F (35.9 °C) 76 29 148/59 100 % 07/27/18 1134 - 73 18 112/54 100 % 07/27/18 1100 97.3 °F (36.3 °C) 72 29 112/54 94 %  
07/27/18 1051 - 73 - 112/54 -  
07/27/18 0817 - - - - 99 % 07/27/18 0700 97.8 °F (36.6 °C) 69 20 139/56 99 % 07/26/18 2311 98.8 °F (37.1 °C) 76 16 131/55 100 % 07/26/18 2307 98.8 °F (37.1 °C) - - - -  
07/26/18 1934 98.1 °F (36.7 °C) 79 14 139/60 99 % 07/26/18 1800 98 °F (36.7 °C) 76 16 126/56 95 % Physical Exam: ¨ GEN: NAD 
¨ HEENT: Normocephalic, atraumatic, no scleral icterus,   no thrush ¨ CV: S1, S2 heard regularly ¨ Lungs: Clear to auscultation bilaterally ¨ Abdomen: soft, non distended, non tender ¨ Extremities: no edema ¨ Neuro: Alert to self, not much verbal  
¨ Skin: no rash ¨ Psych: not able to fully assess ¨ Lines: LUE HD access  
  
 
Medications: 
 
Current Facility-Administered Medications:  
  albuterol-ipratropium (DUO-NEB) 2.5 MG-0.5 MG/3 ML, 3 mL, Nebulization, Q6H PRN, Evaristo Santos MD 
  carvedilol (COREG) tablet 25 mg, 25 mg, Per G Tube, BID WITH MEALS, Evaristo Santos MD, Stopped at 07/27/18 1051   nystatin (MYCOSTATIN) 100,000 unit/mL oral suspension 500,000 Units, 500,000 Units, Oral, QID, Evaristo Santos MD, Stopped at 07/27/18 1300 
  heparin (porcine) injection 5,000 Units, 5,000 Units, SubCUTAneous, Q12H, Aline Sharif MD, 5,000 Units at 07/27/18 4716  balsam peru-castor oil (VENELEX) [id number] mg/gram ointment, , Topical, BID, Dk Santoro MD 
  sodium hypochlorite (QUARTER STRENGTH DAKIN'S) 0.125% irrigation (bottle), , Topical, DAILY, Nancy Velazquez MD 
  collagenase (SANTYL) 250 unit/gram ointment, , Topical, DAILY, Nancy Velazquez MD 
  epoetin niya (EPOGEN;PROCRIT) injection 10,000 Units, 10,000 Units, SubCUTAneous, Q MON, WED & Ruma Francois MD, 10,000 Units at 07/25/18 1701   cefepime (MAXIPIME) 1 g in 0.9% sodium chloride (MBP/ADV) 50 mL, 1 g, IntraVENous, Q24H, Paola Chawla MD, Last Rate: 100 mL/hr at 07/26/18 2200, 1 g at 07/26/18 2200   acetaminophen (TYLENOL) solution 650 mg, 650 mg, Per G Tube, Q3H PRN, Jazz Canales MD, 650 mg at 07/26/18 5145   aspirin chewable tablet 81 mg, 81 mg, Per G Tube, DAILY, Jazz Canales MD, 81 mg at 07/27/18 1053   escitalopram oxalate (LEXAPRO) 5 mg/5 mL oral solution soln 10 mg, 10 mg, Per G Tube, DAILY, Jazz Canales MD, Stopped at 07/27/18 0900 
  insulin lispro (HUMALOG) injection, , SubCUTAneous, Q6H, Jazz Canales MD, Stopped at 07/17/18 1930 
  glucose chewable tablet 16 g, 4 Tab, Oral, PRN, Jazz Canales MD 
  dextrose (D50W) injection syrg 12.5-25 g, 12.5-25 g, IntraVENous, PRN, Jazz Canales MD, 12.5 g at 07/26/18 2357 
  glucagon (GLUCAGEN) injection 1 mg, 1 mg, IntraMUSCular, PRN, Jazz Canales MD 
  morphine 10 mg/5 mL oral solution 10 mg, 5 mL, Per G Tube, Q4H PRN, Jazz Canales MD, 10 mg at 07/23/18 2209 
  oxyCODONE-acetaminophen (PERCOCET) 5-325 mg per tablet 1 Tab, 1 Tab, Per G Tube, Q4H PRN, Jazz Canales MD, 1 Tab at 07/25/18 2836   sevelamer carbonate (RENVELA) oral powder 2,400 mg, 2.4 g, Per G Tube, TID WITH MEALS, Jazz Canales MD, Stopped at 07/27/18 1200   pravastatin (PRAVACHOL) tablet 10 mg, 10 mg, Per G Tube, QHS, Jazz Canales MD, 10 mg at 07/26/18 2128   sodium chloride (NS) flush 5-10 mL, 5-10 mL, IntraVENous, Q8H, Elena Mary MD, 10 mL at 07/27/18 0600 
  sodium chloride (NS) flush 5-10 mL, 5-10 mL, IntraVENous, PRN, Elena Mary MD, 10 mL at 07/17/18 2039 
  naloxone (NARCAN) injection 0.4 mg, 0.4 mg, IntraVENous, PRN, Elena Mary MD 
  ondansetron [facility]) injection 4 mg, 4 mg, IntraVENous, Q4H PRN, Elena Mary MD, 4 mg at 07/26/18 1738 Labs: 
Recent Results (from the past 24 hour(s)) GLUCOSE, POC Collection Time: 07/26/18  5:29 PM  
Result Value Ref Range Glucose (POC) 86 65 - 100 mg/dL Performed by Aidee Day, POC Collection Time: 07/26/18 11:51 PM  
Result Value Ref Range Glucose (POC) 80 65 - 100 mg/dL Performed by Luke Gibson GLUCOSE, POC Collection Time: 07/27/18 12:06 AM  
Result Value Ref Range Glucose (POC) 142 (H) 65 - 100 mg/dL Performed by Luke Gibson CBC WITH AUTOMATED DIFF Collection Time: 07/27/18  4:28 AM  
Result Value Ref Range WBC 13.2 (H) 4.1 - 11.1 K/uL  
 RBC 3.20 (L) 4.10 - 5.70 M/uL HGB 8.8 (L) 12.1 - 17.0 g/dL HCT 29.4 (L) 36.6 - 50.3 % MCV 91.9 80.0 - 99.0 FL  
 MCH 27.5 26.0 - 34.0 PG  
 MCHC 29.9 (L) 30.0 - 36.5 g/dL  
 RDW 18.3 (H) 11.5 - 14.5 % PLATELET 226 115 - 655 K/uL MPV 9.6 8.9 - 12.9 FL  
 NRBC 0.0 0  WBC ABSOLUTE NRBC 0.00 0.00 - 0.01 K/uL NEUTROPHILS 82 (H) 32 - 75 % LYMPHOCYTES 7 (L) 12 - 49 % MONOCYTES 6 5 - 13 % EOSINOPHILS 4 0 - 7 % BASOPHILS 0 0 - 1 % METAMYELOCYTES 1 % IMMATURE GRANULOCYTES 0 0.0 - 0.5 % ABS. NEUTROPHILS 10.8 (H) 1.8 - 8.0 K/UL  
 ABS. LYMPHOCYTES 0.9 0.8 - 3.5 K/UL  
 ABS. MONOCYTES 0.8 0.0 - 1.0 K/UL  
 ABS. EOSINOPHILS 0.5 (H) 0.0 - 0.4 K/UL  
 ABS. BASOPHILS 0.0 0.0 - 0.1 K/UL  
 ABS. IMM. GRANS. 0.0 0.00 - 0.04 K/UL  
 DF MANUAL    
 RBC COMMENTS ANISOCYTOSIS 1+ 
    
 RBC COMMENTS POLYCHROMASIA 1+ MAGNESIUM Collection Time: 07/27/18  4:28 AM  
Result Value Ref Range Magnesium 2.4 1.6 - 2.4 mg/dL METABOLIC PANEL, COMPREHENSIVE Collection Time: 07/27/18  4:28 AM  
Result Value Ref Range Sodium 137 136 - 145 mmol/L Potassium 3.7 3.5 - 5.1 mmol/L Chloride 101 97 - 108 mmol/L  
 CO2 31 21 - 32 mmol/L Anion gap 5 5 - 15 mmol/L Glucose 118 (H) 65 - 100 mg/dL BUN 25 (H) 6 - 20 MG/DL Creatinine 3.77 (H) 0.70 - 1.30 MG/DL  
 BUN/Creatinine ratio 7 (L) 12 - 20 GFR est AA 19 (L) >60 ml/min/1.73m2 GFR est non-AA 16 (L) >60 ml/min/1.73m2 Calcium 9.2 8.5 - 10.1 MG/DL Bilirubin, total 0.4 0.2 - 1.0 MG/DL  
 ALT (SGPT) 15 12 - 78 U/L  
 AST (SGOT) 18 15 - 37 U/L Alk. phosphatase 219 (H) 45 - 117 U/L Protein, total 7.7 6.4 - 8.2 g/dL Albumin 1.1 (L) 3.5 - 5.0 g/dL Globulin 6.6 (H) 2.0 - 4.0 g/dL A-G Ratio 0.2 (L) 1.1 - 2.2 PHOSPHORUS Collection Time: 07/27/18  4:28 AM  
Result Value Ref Range Phosphorus 3.0 2.6 - 4.7 MG/DL  
GLUCOSE, POC Collection Time: 07/27/18  6:05 AM  
Result Value Ref Range Glucose (POC) 131 (H) 65 - 100 mg/dL Performed by Jac Beltran Micro:  
 Blood 7/26 pending Blood 7/17 Component Results   
   Component Value Ref Range & Units Status  
   Special Requests: NO SPECIAL REQUESTS    Final  
   Culture result: NO GROWTH 6 DAYS    Final  
    
Result History   
  
 Pelvic abscess 7/19 Component Value Ref Range & Units Status   
  Special Requests: NO SPECIAL REQUESTS   Final  
  GRAM STAIN 3+ WBCS SEEN   Final  
  GRAM STAIN NO ORGANISMS SEEN   Final  
  Culture result: FEW PROTEUS MIRABILIS (A)   Final  
   
Culture & Susceptibility   
    Antibiotic   Organism Organism Organism  
    Proteus mirabilis    
  AMIKACIN ($)   <=16   ug/mL   S Final      
  AMPICILLIN ($)   <=8   ug/mL   S Final      
  AMPICILLIN/SULBACTAM ($)   <=8/4   ug/mL   S Final      
  AZTREONAM ($$$$)   <=4   ug/mL   S Final      
  CEFAZOLIN ($)   <=8   ug/mL   S Final      
  CEFEPIME ($$)   <=4   ug/mL   S Final      
  CEFOTAXIME   <=2   ug/mL   S Final      
  CEFTAZIDIME ($)   <=1   ug/mL   S Final      
  CEFTRIAXONE ($)   <=1   ug/mL   S Final      
  CEFUROXIME ($)   <=4   ug/mL   S Final      
  CIPROFLOXACIN ($)   <=1   ug/mL   S Final      
  GENTAMICIN ($)   <=4   ug/mL   S Final      
  LEVOFLOXACIN ($)   <=2   ug/mL   S Final      
  MEROPENEM ($$)   <=1   ug/mL   S Final      
  PIPERACILLIN/TAZOBAC ($)   <=16   ug/mL   S Final      
  TOBRAMYCIN ($)   <=4   ug/mL   S Final      
  TRIMETH/SULFA   <=2/38   ug/mL   S Final      
           
  
 
 
  
Imaging:  
  
CT 7/17 FINDINGS: 
LOWER CHEST: 
The visualized portions of the lung bases are clear.  There is mild atelectasis 
in the posterior lower lobes.   
ABDOMEN: 
The unenhanced images demonstrate visibility of the interventricular septum of 
the heart. There is extensive vascular calcification and a right iliac stent. There are clips in the gallbladder fossa. Liver: The liver is normal in size and contour with no focal abnormality. Gallbladder and bile ducts: There is no calcified gallstone or biliary duct 
dilatation. Spleen: No abnormality. Pancreas: No abnormality. Adrenal glands: No abnormality. Kidneys: There are small left renal cysts. There is hydronephrosis and 
hydroureter on the right down to the level of the pelvis. PELVIS: 
Reproductive organs: The prostate gland is absent. Bladder: No abnormality. BOWEL AND MESENTERY: There is a gastrostomy tube in the antrum of the stomach. The small bowel is not obstructed. There is gas and stool throughout the colon. There is no mesenteric mass or adenopathy.  The appendix is absent. PERITONEUM: Janeth Ales is no ascites or free intraperitoneal air. RETROPERITONEUM: The aorta is atherosclerotic without aneurysm. There is a right 
external iliac artery stent.  There is an enhancing fluid collection surrounding 
the right external iliac artery stent extending into the right retroperitoneum. The component around the stent measures 4.6 x 2.7 x 6.3 cm and the component in 
the right retroperitoneum measures 3.6 x 3.1 x 4.6 cm. There is no 
retroperitoneal mass or adenopathy. BONES AND SOFT TISSUES: There is diffuse body edema. 
   
IMPRESSION IMPRESSION:  
1. Right common iliac artery stent with enhancing fluid collection surrounding 
the stent and extending into the retroperitoneum on the right. This is 
suspicious for infection. 2. Right hydronephrosis and hydroureter secondary to compression of the ureter 
by the collection around the stent. 3. Extensive atherosclerotic calcification of the aorta and mesenteric vessels. This is consistent with the patient's end-stage renal disease. 4. Status post cholecystectomy. 5. Gastrostomy tube. 6. Small left renal cysts. 7. Status post prostatectomy. 
   
CT 7/11 FINDINGS:  
LUNG BASES: 7 mm subpleural nodule at posterior basilar right lower lobe 
slightly smaller in the interval. 
INCIDENTALLY IMAGED HEART AND MEDIASTINUM: Unremarkable. LIVER: Mild intrahepatic and extrahepatic biliary duct distention again shown 
with common bile duct diameter measuring up to 1.5 cm. Findings not 
substantially changed in the interval. 
GALLBLADDER: Status post cholecystectomy. SPLEEN: No mass. PANCREAS: No mass or ductal dilatation. ADRENALS: Unremarkable. KIDNEYS: Bilateral renal atrophy again shown. Moderate right renal 
pelvocaliectasis and ureteral dilation again demonstrated. Finding extends to 
level of retroperitoneal hematoma surrounding right external iliac stent. STOMACH: Moderate gastric distention in the interval to the level of the second 
portion of the duodenum. Percutaneous gastrostomy tube in the interval 
positioned anteriorly in the gastric body. SMALL BOWEL: No dilatation or wall thickening. COLON: No dilatation or wall thickening. APPENDIX: Not demonstrated.  
PERITONEUM: No free intraperitoneal air or fluid demonstrated. RETROPERITONEUM: As noted above, right external iliac stent again shown within a 
moderate-sized hematoma extending along the anterior margin of the right 
iliopsoas. Dimension is difficult to estimate due to the lack of oral contrast. 
It measures at least 7.6 x 3.5 cm transverse and 10.3 cm craniocaudal and size 
is similar to that shown previously. URINARY BLADDER: No mass or calculus. BONES: No destructive bone lesion. ADDITIONAL COMMENTS: N/A 
   
IMPRESSION IMPRESSION: 
   
1. Interval percutaneous gastrostomy tube placement. Interval moderate gastric 
distention. 2. Right iliac artery stenting and adjacent hematoma again shown, appearing 
stable. Moderate right renal pelvocaliectasis and ureterectasis again 
demonstrated. 
   
CT 7/2 FINDINGS: There is interval right common/external iliac artery stenting for 
exclusion of pseudoaneurysm with patent stent, and minimal hyperdensity in the 
pseudoaneurysm contents but no overt arterial extravasation. Pseudoaneurysm size 
is stable. There is no apparent soft tissue emphysema or abscess. 
   
Right hydroureteronephrosis remains. Kidneys are atrophic. There is trace 
ascites. There is no pneumoperitoneum. 
   
Abdominal aorta is without stenosis, aneurysm or dissection. Celiac artery, SMA, 
ARTIE and bilateral solitary renal arteries are patent. Right internal iliac 
artery is patent. Femoral arteries are patent. 
   
IMPRESSION IMPRESSION: 
1. Interval right iliac artery pseudoaneurysm stenting. 2. No overt arterial extravasation. 3. Stable pseudosac size. 4. No apparent soft tissue emphysema/abscess. 5. Stable right hydroureteronephrosis.     
  
  
CT 7/26 A/P  
FINDINGS: 
  
Redemonstrated is the infected hematoma in the right lower quadrant measuring 
approximately 7 x 2.5 cm. This appears slightly smaller than the prior 
examination. 
  
There is significant right hydroureteronephrosis as was seen before.  The level 
of obstruction is likely at the site of the hematoma. 
  
There are no new fluid collections. 
  
Gallbladder is absent. Liver spleen and pancreas remain unremarkable. 
  
IMPRESSION IMPRESSION: 
1. There is been slight decrease in size of the infected hematoma in the right 
lower quadrant. 2. Persistent right hydroureteronephrosis. 
  
Assessment / Plan:  
  
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  
  
From chart review, he was admitted from 6/28-7/9/18 and had stenting and repair of aneurysm. Had leukocytosis and was being treated for aspiration pneumonia per chart. Per DC summary he was on Vancomycin, and flagyl at one point and then Zosyn for 10 days during last admit. 
  
This admission, he is admitted with fever (101.2 ) and leukocytosis (16) and found to have fluid collection around R iliac artery stent extending into retroperitoneum on right. CT read as \" The component around the stent measures 4.6 x 2.7 x 6.3 cm and the component in 
the right retroperitoneum measures 3.6 x 3.1 x 4.6 cm\"  Right hydronephrosis and hydroureter due to compression fo ureter seen as well. He had a CT guided aspiration done and cultures + for Proteus mirabilis. He had a CT on 7/11 prior to this admission that showed a \"moderate-sized hematoma extending along the anterior margin of the right iliopsoas\". \" It measures at least 7.6 x 3.5 cm transverse and 10.3 cm craniocaudal in size\".   
He was started on Cefepime and Levaquin this admission.   Levaquin was stopped later  
  
  
 
  
1) Infected R iliac pseudoaneurysm and stent 
  
S/P repair with stenting on 6/29/18, S/P CT guided drainage of pelvic collection 7/19 wt cx + for Proteus mirabilis 
  
CT with  component around the stent measures 4.6 x 2.7 x 6.3 cm and the component in the right retroperitoneum measures 3.6 x 3.1 x 4.6 cm\" Repeat CT 7/26 with \"slight decrease in size of the infected hematoma in the right lower quadrant\" and \" Persistent right hydroureteronephrosis\". Measurements approximately 7 by 2.5 cm however Check blood cultures to ensure no seeding  
  
Poor surgical candidate given functional status and multiple co morbidities 
  
Successful treatment of infections is not usually possible without adequate source control. Additionally, in his situation, as there is fluid  around the stent, raising high concern for stent to be seeded/infected. 
  
Have had ongoing discussion with his wife and she says that they have decided for Cefazolin therapy after HD so we can avoid another line placement , which is very reasonable in this situation Change to Cefazolin therapy after  HD  ( 2g, 2g, 3gm dosing per pharmacy). Plan for 4 weeks . Currently on Cefepime F/U wt me after 2 weeks of DC Weekly CBC wt diff, CMP sent to me at [phone] Pending final blood cx 7/26 No further fever noted  
 
  
  
2) Per urology notes, superficial dry gangrene of glans penis , also has R hydroureteronephrosis   
3) Sacral decubitus ulcers   
4) MS 
  
5) ESRD on HD 
  
6) S/P PEG  
  
7) DVT px  
 
 
 
Pam Mitchell DO  
3:25 PM

## 2018-07-27 NOTE — PROGRESS NOTES
Mr. Dariel Ornelas' BG 80 at 23:11, patient diaphoretic. 12.5 g IV Dextrose 50% given per prn order. BG improved to 140.

## 2018-07-28 NOTE — PROGRESS NOTES
Report given to [facility] RN in [employer]. Discussed pertinent info including dialysis, and changes to wound care, as well as updates to assessment and plan.

## 2018-07-28 NOTE — PROGRESS NOTES
Pharmacy Automatic Renal Dosing Protocol - Antimicrobials Indication for Antimicrobials: sepsis probably d/t iliac artery stent infection; sacral decubitus, in setting of MS; HD (MWF); PEG tube Current Regimen of Each Antimicrobial: 
Cefazolin 2gm/2gm/3gm IV on MWF HD days - started  Previous Antimicrobial Therapy: 
Cefepime 1 gm IV every 24 hours (Started 18; Day #11) Vancomycin 500 mg as needed post dialysis (start date: , Day #6) Levofloxacin 500 mg IV every 48 hours (Start Date ; Day # 7) Significant Cultures:  
18 Body fluid culture = Few proteus (FINAL) 19 Anaerobic body fluid culture = No anaerobes (FINAL) 18 Blood culture = No growth (FINAL)  PBCx - ng pending Radiology / Imaging results: (X-ray, CT scan or MRI):  
18 CT Abd = Right common iliac artery stent with enhancing fluid collection surrounding the stent and extending into the retroperitoneum on the right, suspicious for infection. Labs: 
Recent Labs  
   18 
 0356  18 
 0191 CREA  2.80*  3.77* BUN  18  25* WBC  15.1*  13.2* Temp (24hrs), Av.7 °F (37.1 °C), Min:98.2 °F (36.8 °C), Max:99.4 °F (37.4 °C) Creatinine Clearance (mL/min) or Dialysis: HD Impression/Plan: · ID following · Transition to Cefazolin 2gm/2gm/3gm after HD on HD days · Antimicrobial stop date: 4-6 weeks w/ possible suppression thereafter (ID considering cefazolin with HD on discharge) - On rounds , Dr. Freddy Benoit said he will contact Dr. Levar Goode to see if they want to switch cefepime to cefazolin (2gm,2gm,3gm dosing now) · Give initial Cefazolin dose  since last dose of q24h Cefepime  am 
· Duration - ID note  indicates; Plan for 4 weeks . Currently on Cefepime; clarified  as 4 weeks more per Hospitalist.  Last dose currently scheduled for . Pharmacy will follow daily and adjust medications as appropriate for renal function and/or serum levels.  
 
Thank you, 
Beltran Escobar, [facility] [place], PHARMD

## 2018-07-28 NOTE — PROGRESS NOTES
CM spoke with Pt wife regarding D/C planning. Pt wife voiced concerns regarding transportation concerns upon Pt transitioning to SNF. Per Pt wife Pt is currently bed bound and not able to be in a wheelchair or his power chair due to Pt having a wound. Pt wife reported that preference for SNF is [facility]. Pt dialysis center is [place] on [address] [facility]. CM contacted [facility] to inquire about BLS transport for Pt from SNF to Dialysis. RAA reported upon receipt of the PCS which is completed by SNF and Pt diagnosis meeting bed bound criteria according to Medicare guidelines, Medicare will pay for transport. CM made Pt wife aware of the above information. Referral sent to [facility]. Anticipate D/C for Monday 7/30. [facility], [facility] Ext # [phone]

## 2018-07-28 NOTE — PROGRESS NOTES
Hospitalist Progress Note NAME: London Mora :  1950 MRN:  [id number] Assessment / Plan: 
Sepsis with Hypotension POA- hypotension had improved, WBC trending up a little, no fever last 48h Infected Right Iliac Hematoma: drained shows Proteus, Vascular and ID in the case, has a stent that ideally may need to come out due to infection. C/w Cefepime,  new CT abdomen and pelvis shows decreasing size of hematoma, agree with ID on switching to Cefazolin 2-2-3 with HD 
B/L Buttock Pressure injury ulcer/wound infection vs R iliac artery pseudoaneurysm/collection/stent infection, UTI, no urine culture had been sent, c/w wound care for decubitus ulcer. Encephalopathy POA in the setting of sepsis: still lethargic and in pain. -CT abdm/pelvis showing Right common iliac artery stent with enhancing fluid collection surrounding the stent and extending into the retroperitoneum on the right. suspicious for infection. Also showing Right hydronephrosis and hydroureter secondary to compression of the ureter by the collection around the stent 
-On ,Vascular surgery saw patient and will proceed with aspiration of the fluid noted on CT surrounding the right common iliac artery stent since this could be an abscess. 
-Urology consulted for hydronephrosis right ->saw pt on :no intervention needed 
-On :CT guided aspiration of pelvic fluid collection - culture of fluid showing few -GNRs,Proteus 
-WBC trending down  
-Body fluid Cx-Proteus sens to cefepime Diabetes mellitus 2 on Insulin at home Dysphagia due to esophageal dysmotility on MBS study last admission - s/p PEG for FS Q 6 hrs on tube feeding, keep NPO for now,  CT abdomen showing some paretic loops of small bowel, but on exam abdomen is benign will advance tube feeding. Unspecified protein-calorie malnutrition POA in the setting of PEG dependence, sacral decubitus, ESRD -- onTF 
ESRD on HD MWF  Renal in the case. Got HD yesterday Anemia of chronic disease POA S/p L arm AVFistula Hyperlipidemia Cont on statins Hypertension resume Coreg thru PEG, monitor. ? Multiple sclerosis POA ? Exacerbation causing dysphagia- cannot tell till MRI done 
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 No signs of failure at this time PAD s/p b/l leg stents S/p remote partial (Syme) amputation of RIGHT foot. -Nephrology consulted for HD MWF 
-Cont ASA 
-Vascular surgery consult as above for opinion on stent infection Code Status: Full Code as per [facility] with palliative Care Surrogate Decision Maker: wife Amy Sierra  [phone] DVT Prophylaxis: Sq heparin GI Prophylaxis: not indicated Baseline: Pt was recently DC to [facility] from [facility] after being treated for R Iliac ar pseudoaneurysm s/p Stent by Vasc Sx Dr Ana M Hernandez, Eso Dysmotility syndrome causing Dysphagia causing aspiration pneumonia s/p PEG tube Pt had denied any neuro workup - denied MRI as recommended by neurology to find the cause of his eso dysmotility- ?MS flare Body mass index is 23.57 kg/(m^2). Prognosis is poor but family still wants everything done, he is Hospice candidate nevertheless but wife wants to honor patient wishes Patient now is more stable, if continues like this may D/c to SNF on Monday, seems that family is reluctant to do this, will need a solid plan, CM to work on placement Subjective: Chief Complaint / Reason for Physician Visit Denies any SOB from yesterday. Confused. Discussed with RN events overnight.   
 
Review of Systems: 
Symptom Y/N Comments  Symptom Y/N Comments Fever/Chills n   Chest Pain n   
Poor Appetite n   Edema Cough n   Abdominal Pain n   
Sputum    Joint Pain SOB/BANKS n   Pruritis/Rash Nausea/vomit n   Tolerating PT/OT Diarrhea    Tolerating Diet y Tube feedings Constipation    Other Could NOT obtain due to:   
 
Objective: VITALS:  
Last 24hrs VS reviewed since prior progress note. Most recent are: 
Patient Vitals for the past 24 hrs: 
 Temp Pulse Resp BP SpO2  
07/28/18 1100 - 85 15 107/55 100 % 07/28/18 1000 - 91 22 146/52 100 % 07/28/18 0900 - 85 17 135/53 100 % 07/28/18 0859 - 85 - 135/53 -  
07/28/18 0800 - 87 15 149/58 100 % 07/28/18 0700 99.4 °F (37.4 °C) 83 16 143/57 100 % 07/28/18 0300 98.6 °F (37 °C) 70 15 128/53 100 % 07/27/18 2100 - 90 20 149/58 100 % 07/27/18 2000 - 88 18 136/54 100 % 07/27/18 1952 98.5 °F (36.9 °C) 91 24 137/54 100 % 07/27/18 1900 - 89 21 149/56 100 % 07/27/18 1510 98.2 °F (36.8 °C) 76 25 116/56 100 % 07/27/18 1445 - 78 21 128/62 100 % 07/27/18 1420 - 74 - 142/56 -  
07/27/18 1415 - 76 23 106/52 100 % 07/27/18 1345 - 73 15 119/49 100 % Intake/Output Summary (Last 24 hours) at 07/28/18 1327 Last data filed at 07/28/18 0700 Gross per 24 hour Intake              485 ml Output             1400 ml Net             -915 ml PHYSICAL EXAM: 
General:                    Alert, cooperative, no acute distress   
EENT:                       EOMI. Anicteric sclerae. MMM Resp:                        Coarse BS 
CV:                            Regular  rhythm,  No edema GI:                             Soft, no  distended, no tender.  +Bowel sounds Neurologic:                Alert and oriented X 2, normal speech, also intermittently confused Psych:                       Fair insight. Not anxious nor agitated Skin:                          No rashes. No jaundice. LUE fistula+ Reviewed most current lab test results and cultures  YES Reviewed most current radiology test results   YES Review and summation of old records today    NO Reviewed patient's current orders and MAR    YES 
PMH/SH reviewed - no change compared to H&P 
________________________________________________________________________ Care Plan discussed with: 
  Comments Patient x Family  x   
RN x Care Manager Consultant Multidiciplinary team rounds were held today with , nursing, pharmacist and clinical coordinator. Patient's plan of care was discussed; medications were reviewed and discharge planning was addressed. ________________________________________________________________________ Total NON critical care TIME:  30  Minutes Total CRITICAL CARE TIME Spent:   Minutes non procedure based Comments >50% of visit spent in counseling and coordination of care y   
________________________________________________________________________ Tejas Castrejon MD  
 
Procedures: see electronic medical records for all procedures/Xrays and details which were not copied into this note but were reviewed prior to creation of Plan. LABS: 
I reviewed today's most current labs and imaging studies. Pertinent labs include: 
Recent Labs  
   07/28/18 
 0356  07/27/18 
 0428 WBC  15.1*  13.2* HGB  9.2*  8.8* HCT  29.7*  29.4*  
PLT  334  353 Recent Labs  
   07/28/18 
 0356  07/27/18 
 8399 NA  138  137  
K  3.6  3.7 CL  99  101 CO2  36*  31  
GLU  179*  118* BUN  18  25* CREA  2.80*  3.77* CA  9.0  9.2 MG  2.2  2.4 PHOS   --   3.0 ALB  1.0*  1.1* TBILI  0.3  0.4 SGOT  19  18 ALT  15  15 Signed: Tejas Castrejon MD

## 2018-07-29 NOTE — PROGRESS NOTES
Hospitalist Progress Note NAME: Lane Anthony :  1950 MRN:  [id number] Assessment / Plan: 
Sepsis with Hypotension POA- hypotension had improved,  no fever last 72h but WBC trending up, CXR is negative Infected Right Iliac Hematoma: drained shows Proteus, Vascular and ID in the case, has a stent that ideally may need to come out due to infection. C/w Cefepime,  new CT abdomen and pelvis shows decreasing size of hematoma, agree with ID on switching to Cefazolin 2-2-3 with HD 
B/L Buttock Pressure injury ulcer/wound infection vs R iliac artery pseudoaneurysm/collection/stent infection, UTI, no urine culture had been sent, c/w wound care for decubitus ulcer. Encephalopathy POA in the setting of sepsis: still lethargic and in pain. -CT abdm/pelvis showing Right common iliac artery stent with enhancing fluid collection surrounding the stent and extending into the retroperitoneum on the right. suspicious for infection. Also showing Right hydronephrosis and hydroureter secondary to compression of the ureter by the collection around the stent 
-On ,Vascular surgery saw patient and will proceed with aspiration of the fluid noted on CT surrounding the right common iliac artery stent since this could be an abscess. 
-Urology consulted for hydronephrosis right ->saw pt on :no intervention needed 
-On :CT guided aspiration of pelvic fluid collection - culture of fluid showing few -GNRs,Proteus 
-WBC trending up 
-Body fluid Cx-Proteus sens to cefazolin Diabetes mellitus 2 on Insulin at home: BG increasing now that nutrition had improved thru PEG, will start low dose Lantus  HbA1C 4.9 Dysphagia due to esophageal dysmotility on MBS study last admission - s/p PEG for FS Q 6 hrs on tube feeding, keep NPO for now,  CT abdomen showing some paretic loops of small bowel, but on exam abdomen is benign will advance tube feeding.  
Unspecified protein-calorie malnutrition POA in the setting of PEG dependence, sacral decubitus, ESRD -- onTF 
ESRD on HD MWF  Renal in the case. Due for HD tomorrow Anemia of chronic disease POA S/p L arm AVFistula Hyperlipidemia Cont on statins Hypertension resume Coreg thru PEG, monitor. ? Multiple sclerosis POA ? Exacerbation causing dysphagia- cannot tell till MRI done 
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 No signs of failure at this time PAD s/p b/l leg stents S/p remote partial (Syme) amputation of RIGHT foot. -Nephrology consulted for HD MWF 
-Cont ASA 
-Vascular surgery consult as above for opinion on stent infection Code Status: Full Code as per [facility] with palliative Care Surrogate Decision Maker: wife Sally Steen  [phone] DVT Prophylaxis: Sq heparin GI Prophylaxis: not indicated Baseline: Pt was recently DC to [facility] from [facility] after being treated for R Iliac ar pseudoaneurysm s/p Stent by Vasc Sx Dr Brigida Blackwell, Eso Dysmotility syndrome causing Dysphagia causing aspiration pneumonia s/p PEG tube Pt had denied any neuro workup - denied MRI as recommended by neurology to find the cause of his eso dysmotility- ?MS flare Body mass index is 23.57 kg/(m^2). Prognosis is poor but family still wants everything done, he is Hospice candidate nevertheless but wife wants to honor patient wishes Patient now is more stable, if continues like this may D/c to SNF on Monday, seems that family is reluctant to do this, will need a solid plan, CM to work on placement Subjective: Chief Complaint / Reason for Physician Visit Denies any SOB from yesterday. Confused. Discussed with RN events overnight. Review of Systems: 
Symptom Y/N Comments  Symptom Y/N Comments Fever/Chills n   Chest Pain n   
Poor Appetite n   Edema Cough n   Abdominal Pain n   
Sputum    Joint Pain SOB/BANKS n   Pruritis/Rash Nausea/vomit n   Tolerating PT/OT Diarrhea    Tolerating Diet y Tube feedings Constipation    Other Could NOT obtain due to:   
 
Objective: VITALS:  
Last 24hrs VS reviewed since prior progress note. Most recent are: 
Patient Vitals for the past 24 hrs: 
 Temp Pulse Resp BP SpO2  
07/29/18 0700 98.6 °F (37 °C) - - - -  
07/29/18 0500 - 88 (!) 31 147/56 100 % 07/29/18 0400 - 85 27 138/58 100 % 07/29/18 0300 99.1 °F (37.3 °C) 84 21 140/57 100 % 07/29/18 0000 - 74 21 126/49 100 % 07/28/18 2300 - 80 16 144/57 100 % 07/28/18 2214 - - - - 100 % 07/28/18 2000 - 77 13 123/50 99 % 07/28/18 1900 98.7 °F (37.1 °C) 79 14 121/51 100 % 07/28/18 1800 - 79 18 133/54 100 % 07/28/18 1100 - 85 15 107/55 100 % Intake/Output Summary (Last 24 hours) at 07/29/18 1031 Last data filed at 07/29/18 0500 Gross per 24 hour Intake              605 ml Output                0 ml Net              605 ml PHYSICAL EXAM: 
General:                    Alert, cooperative, no acute distress   
EENT:                       EOMI. Anicteric sclerae. MMM Resp:                        Coarse BS 
CV:                            Regular  rhythm,  No edema GI:                             Soft, no  distended, no tender.  +Bowel sounds, PEG in place Neurologic:                Alert and oriented X 2, slow speech, also intermittently confused Psych:                       Fair insight. Not anxious nor agitated Skin:                          No rashes. No jaundice. LUE fistula+ Reviewed most current lab test results and cultures  YES Reviewed most current radiology test results   YES Review and summation of old records today NO 
Reviewed patient's current orders and MAR    YES 
PMH/SH reviewed - no change compared to H&P 
________________________________________________________________________ Care Plan discussed with: 
  Comments Patient x Family  x   
RN x Care Manager Consultant Multidiciplinary team rounds were held today with , nursing, pharmacist and clinical coordinator. Patient's plan of care was discussed; medications were reviewed and discharge planning was addressed. ________________________________________________________________________ Total NON critical care TIME:  30  Minutes Total CRITICAL CARE TIME Spent:   Minutes non procedure based Comments >50% of visit spent in counseling and coordination of care y   
________________________________________________________________________ Julio Lloyd MD  
 
Procedures: see electronic medical records for all procedures/Xrays and details which were not copied into this note but were reviewed prior to creation of Plan. LABS: 
I reviewed today's most current labs and imaging studies. Pertinent labs include: 
Recent Labs  
   07/29/18 
 0440  07/28/18 
 0356  07/27/18 
 0428 WBC  17.4*  15.1*  13.2* HGB  11.4*  9.2*  8.8* HCT  37.6  29.7*  29.4*  
PLT  386  334  353 Recent Labs  
   07/29/18 
 0440  07/28/18 
 0356  07/27/18 
 6360 NA  136  138  137  
K  3.8  3.6  3.7 CL  97  99  101 CO2  33*  36*  31  
GLU  190*  179*  118* BUN  29*  18  25* CREA  3.73*  2.80*  3.77* CA  10.0  9.0  9.2 MG  2.5*  2.2  2.4 PHOS   --    --   3.0 ALB  1.3*  1.0*  1.1* TBILI  0.4  0.3  0.4 SGOT  26  19  18 ALT  22  15  15 Signed: Julio Lloyd MD

## 2018-07-29 NOTE — PROGRESS NOTES
21: 38 Paged hospitalist. Patient with coarse rhonchi and crackles bilaterally, O2 2L nasal cannula. O2 sat decreased to 54% while sleeping deeply, good wave form on monitor. O2 increased to 4L, patient awoken, O2 sat improved to 98%. Patient with very weak, wet cough. 21:50 Spoke with Dr. Rupert Santamaria. Verbal order for portable CXR obtained. No other orders at this time. Will call Dr. Rupert Santamaria with results if PNA per his request. 
 
22:00 CXR completed, awaiting official reading. [place], RT notified of situation and will come assess patient.

## 2018-07-29 NOTE — PROGRESS NOTES
Problem: Falls - Risk of 
Goal: *Absence of Falls Document Corinne Rehman Fall Risk and appropriate interventions in the flowsheet. Outcome: Progressing Towards Goal 
Fall Risk Interventions: 
Mobility Interventions: Bed/chair exit alarm Mentation Interventions: Bed/chair exit alarm, Room close to nurse's station, Reorient patient Medication Interventions: Bed/chair exit alarm Elimination Interventions: Bed/chair exit alarm, Call light in reach, Patient to call for help with toileting needs History of Falls Interventions: Consult care management for discharge planning, Bed/chair exit alarm, Door open when patient unattended, Room close to nurse's station Problem: Pressure Injury - Risk of 
Goal: *Prevention of pressure injury Document Delbert Scale and appropriate interventions in the flowsheet. Outcome: Progressing Towards Goal 
Pressure Injury Interventions: 
Sensory Interventions: Assess need for specialty bed, Pressure redistribution bed/mattress (bed type), Turn and reposition approx. every two hours (pillows and wedges if needed) Moisture Interventions: Apply protective barrier, creams and emollients, Check for incontinence Q2 hours and as needed Activity Interventions: Pressure redistribution bed/mattress(bed type) Mobility Interventions: Pressure redistribution bed/mattress (bed type), Turn and reposition approx. every two hours(pillow and wedges) Nutrition Interventions: Document food/fluid/supplement intake Friction and Shear Interventions: Lift team/patient mobility team, Lift sheet, Foam dressings/transparent film/skin sealants, Apply protective barrier, creams and emollients Problem: Breathing Pattern - Ineffective Goal: *Absence of hypoxia Outcome: Not Progressing Towards Goal 
Low O2 sats overnight

## 2018-07-29 NOTE — PROGRESS NOTES
Report given to [facility] in [employer]. Discussed pertinent info including dialysis, and changes to wound care, as well as updates to assessment and plan.

## 2018-07-29 NOTE — PROGRESS NOTES
Report given to [facility] in [employer]. Discussed pertinent patient history,Plan of care, past medical history, a review of systems, labs, tests, and an opportunity for questions

## 2018-07-30 NOTE — CONSULTS
[facility] Name: Milagros Burleson MRN: [id number] : 1950 Hospital: [facility] Date: 2018 Critical Care Initial Patient Consult IMPRESSION:  
· Acute Hypoxic Respiratory Failure, was placed on bipap has been doing better. · Possible pneumonia on Ct of chest from . · ESRD on HD, had multiple prior vascular access issues for HD. · Multiple Sclerosis · GERD · Post op infection of hematoma around vascular graft. Infected right iliac hematoma, proteus. Was admitted with right  flank pain, Had associated nausea and vomiting. He did not have fevers or chills. Noted to have right lower quadrant hematoma. · May be having recurrent aspiration. · Cardiomyopathy with LVEF of 35%. · Leukocytosis · PAD · Intestinal Vascular Insufficiency. · Anemia · Type 2 DM. · Ex Smoker · Ex Drinker. · Prognosis is guarded. Pts family desires ongoing full support. RECOMMENDATIONS:  
· Will continue bipap for now · Wean fio2 as tolerated. · Appears medically stable on step down at this time. · On empiric abx. · Renal is following. · Blood pressure has been stable. Subjective/History: Today was noted to have desaturations, Was felt to have increased work of breathing. Was placed on bipap . Seems to be doing much better compared to earlier today. At this point remains on bipap with fio2 of 100%. He has no acute complaints. No chest pain no back pain. No leg pain. He feels that his breathing is pretty comfortable. NO GERD. Denies much abdominal pain. Rest of 10 point ROS is negative. This patient has been seen and evaluated at the request of Dr. Jennifer Armstrong for above. Patient is a 70 y.o. 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  He was treated on 18 for right common iliac and external iliac artery stenting for pseudoaneurysm. Had been admitted on 6/28/18 through 7/9/18 and had stenting and repair or aneurysm. Was also being treated with aspiration pneumonia. Was on VAncomycin and flagy and then was on zosyn for 10 days. Was admitted with fevers and has elevated WBC. Was noted to have proteus. Had a moderate hematoma along the right iliopsoas. He was felt to be a poor surgical candidate. Per Vascular surgery evaluation: 
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 Hx of abd artery rupture -will need to obtain records from [facility] EM[facility]. PAD s/p BLE bypasses and Syme amputation Intestinal vascular insufficiency The patient is critically ill and can not provide additional history due to on bipap. Past Medical History:  
Diagnosis Date  Anemia associated with chronic renal failure  Autoimmune disease (HCC)   
 hx ms  CAD (coronary artery disease)  Chronic kidney disease   
 catheter removed and no dialysis at this time  Chronic kidney disease   
 left arm fistula dialysis MWF  
 Diabetes ([address] type II  
 Elevated troponin 6/29/2018  GERD (gastroesophageal reflux disease)  Hypertension  Ill-defined condition   
 prostate cancer  Multiple sclerosis ([address] diagnosed '01  
 Other ill-defined conditions(799.89)   
 anemia  Other ill-defined conditions(799.89)   
 elevated cholesterol  Other ill-defined conditions(799.89)   
 hx septic right foot  Peripheral vascular disease ([address]  Secondary hyperparathyroidism of renal origin ([address]  Thyroid disease Past Surgical History:  
Procedure Laterality Date  COLONOSCOPY N/A 12/6/2016 COLONOSCOPY performed by April Jalloh MD at [facility] ENDOSCOPY  COLONOSCOPY,DIAGNOSTIC  12/6/2016  CORONARY STENT SINGLE W/PTCA  2/17/2011  HX APPENDECTOMY  HX CATARACT REMOVAL  10/18/10  
 bilateral  
 HX CHOLECYSTECTOMY  HX GI    
 ileostomy due to infection  HX HEART CATHETERIZATION    
 HX HEENT    
 hx post photocoagulation eye 2009  HX OTHER SURGICAL    
 right partial foot amputation  HX OTHER SURGICAL    
 cardiac cath  HX OTHER SURGICAL    
 prostate removed  PLACE PERCUT GASTROSTOMY TUBE  7/6/2018  RI COLONOSCOPY W/BIOPSY SINGLE/MULTIPLE  5/10/2010  UPPER GI ENDOSCOPY,BIOPSY  4/17/2018  UPPER GI ENDOSCOPY,REMV TUMOR,SNARE  6/13/2018  VASCULAR SURGERY PROCEDURE UNLIST    
 katelyn. leg bypasses Prior to Admission medications Medication Sig Start Date End Date Taking? Authorizing Provider  
oxyCODONE-acetaminophen (PERCOCET) 5-325 mg per tablet 1 Tab by Per G Tube route every four (4) hours as needed for Pain. Yes Historical Provider  
epoetin niya (EPOGEN;PROCRIT) 10,000 unit/mL injection 10,000 Units by SubCUTAneous route Q TU, TH & SAT. Yes Historical Provider  
insulin lispro (HUMALOG) 100 unit/mL injection 2-4 Units by SubCUTAneous route Before breakfast, lunch, dinner and at bedtime. Yes Historical Provider  
lidocaine (LIDODERM) 5 % 2 Patches by TransDERmal route daily. Apply patch to the affected area for 12 hours a day and remove for 12 hours a day. Yes Historical Provider  
lisinopril (PRINIVIL, ZESTRIL) 20 mg tablet 20 mg by Per G Tube route daily. Yes Historical Provider  
escitalopram oxalate (LEXAPRO) 5 mg/5 mL oral solution 10 mg by Per G Tube route daily. Yes Historical Provider  
sevelamer carbonate (RENVELA) 2.4 gram pwpk oral powder 2.4 g by Per G Tube route three (3) times daily (with meals). Yes Historical Provider  
pravastatin (PRAVACHOL) 10 mg tablet 1 Tab by Per G Tube route nightly. 7/9/18  Yes Rashid Villalobos MD  
cloNIDine HCl (CATAPRES) 0.1 mg tablet 1 Tab by Per G Tube route three (3) times daily. 7/9/18  Yes Rashid Villalobos MD  
carvedilol (COREG) 25 mg tablet 2 Tabs by Per G Tube route two (2) times daily (with meals).  7/9/18  Yes Rashid Villalobos MD  
aspirin 81 mg chewable tablet 1 Tab by Per G Tube route daily. 7/9/18  Yes Davion De León MD  
amLODIPine (NORVASC) 10 mg tablet 1 Tab by Per G Tube route daily. 7/9/18  Yes Davion De León MD  
acetaminophen (TYLENOL) 160 mg/5 mL liquid 650 mg by Per G Tube route every three (3) hours as needed for Pain. Historical Provider  
bisacodyl (DULCOLAX) 10 mg suppository Insert 10 mg into rectum daily as needed (\"Constipation\"). Historical Provider  
guaiFENesin-codeine (ROBITUSSIN AC) 100-10 mg/5 mL solution Take 2.5 mL by mouth every six (6) hours as needed for Cough. Historical Provider  
morphine 10 mg/5 mL oral solution 5 mL by Per G Tube route every four (4) hours as needed for Pain. Pain scale 7-10    Historical Provider  
mupirocin (BACTROBAN) 2 % ointment Apply  to affected area every eight (8) hours as needed (\"Dry skin\"). Historical Provider  
ondansetron (ZOFRAN ODT) 4 mg disintegrating tablet Take 4 mg by mouth every six (6) hours as needed for Nausea. Historical Provider  
sodium hypochlorite (DAKIN'S SOLUTION) external solution Apply  to affected area every eight (8) hours as needed (\"Wound care\"). Historical Provider Current Facility-Administered Medications Medication Dose Route Frequency  albuterol-ipratropium (DUO-NEB) 2.5 MG-0.5 MG/3 ML  3 mL Nebulization Q6H RT  
 metroNIDAZOLE (FLAGYL) IVPB premix 500 mg  500 mg IntraVENous Q8H  
 insulin glargine (LANTUS) injection 5 Units  5 Units SubCUTAneous DAILY  ceFAZolin (ANCEF) 2 g/20 mL in sterile water IV syringe  2 g IntraVENous Once per day on Mon Wed And  [START ON 8/3/2018] ceFAZolin (ANCEF) 3 g in 0.9%  ml IVPB  3 g IntraVENous every Friday  carvedilol (COREG) tablet 25 mg  25 mg Per G Tube BID WITH MEALS  nystatin (MYCOSTATIN) 100,000 unit/mL oral suspension 500,000 Units  500,000 Units Oral QID  heparin (porcine) injection 5,000 Units  5,000 Units SubCUTAneous Q12H  
 balsam peru-castor oil (VENELEX)  mg/gram ointment   Topical BID  sodium hypochlorite (QUARTER STRENGTH DAKIN'S) 0.125% irrigation (bottle)   Topical DAILY  collagenase (SANTYL) 250 unit/gram ointment   Topical DAILY  epoetin niya (EPOGEN;PROCRIT) injection 10,000 Units  10,000 Units SubCUTAneous Q MON, WED & FRI  aspirin chewable tablet 81 mg  81 mg Per G Tube DAILY  escitalopram oxalate (LEXAPRO) 5 mg/5 mL oral solution soln 10 mg  10 mg Per G Tube DAILY  insulin lispro (HUMALOG) injection   SubCUTAneous Q6H  
 sevelamer carbonate (RENVELA) oral powder 2,400 mg  2.4 g Per G Tube TID WITH MEALS  pravastatin (PRAVACHOL) tablet 10 mg  10 mg Per G Tube QHS  sodium chloride (NS) flush 5-10 mL  5-10 mL IntraVENous Q8H Allergies Allergen Reactions  Sensipar [Cinacalcet] Other (comments) Cinacalcet (Sensipar) has been added as an \"allergy\" / intolerance with a comment to assure providers at discharge and post discharge are aware of Dr. Blake Hoff recommendation to avoid Sensipar. Patient takes Shani  as an outpatient; Thus, Kendra Son has been discontinued per Nephrology (cannot give with recent Parsabiv b/c risk of low Calcium). Social History Substance Use Topics  Smoking status: Former Smoker Years: 1.00 Quit date: 4/12/2003  Smokeless tobacco: Never Used Comment: quit 5 years ago  Alcohol use No  
   Comment: Stopped drinking 10 years ago Family History Problem Relation Age of Onset  Diabetes Mother Review of Systems: 
Review of systems not obtained due to patient factors. Pt is on bipap and not able to give complete ROS. He is able to shake his head yes and no to answer some questions. Objective:  
Vital Signs:   
Visit Vitals  /62 (BP 1 Location: Right arm, BP Patient Position: At rest)  Pulse 81  Temp 97.6 °F (36.4 °C)  Resp 20  
 Ht 5' 10\" (1.778 m)  Wt 69.3 kg (152 lb 12.5 oz)  SpO2 99%  BMI 21.92 kg/m2 O2 Device: BIPAP O2 Flow Rate (L/min): 3 l/min Temp (24hrs), Av.3 °F (36.8 °C), Min:97.6 °F (36.4 °C), Max:99.4 °F (37.4 °C) Intake/Output:  
Last shift:      701 -  1900 In: 170 Out: 1000 Last 3 shifts: 1901 -  07 In: 0 Out: - Intake/Output Summary (Last 24 hours) at 18 1557 Last data filed at 18 1155 Gross per 24 hour Intake              200 ml Output             1000 ml Net             -800 ml Hemodynamics:  
PAP:   CO:    
Wedge:   CI:    
CVP:    SVR:    
  PVR:    
 
Ventilator Settings: 
Mode Rate Tidal Volume Pressure FiO2 PEEP  
         100 % Peak airway pressure:     
Minute ventilation: 23.5 l/min Physical Exam: 
 
General:  Alert, cooperative, no distress, appears stated age. Alert, conversant, no distress on bipap. fio2 of 100% on bipap. Pox was noted to be 100%. POx on his left ear. Head:  Normocephalic, without obvious abnormality, atraumatic. Eyes:  Conjunctivae/corneas clear. PERRL, EOMs intact. Nose: Nares normal. Septum midline. Mucosa normal. No drainage or sinus tenderness. Throat: Lips, mucosa, and tongue normal. Teeth and gums normal.  
Neck: Supple, symmetrical, trachea midline, no adenopathy, thyroid: no enlargment/tenderness/nodules, no carotid bruit and no JVD. Back:   Symmetric, no curvature. ROM normal.  
Lungs:   Has bilateral rhonchi, was on bipap with fio2 of 100% when seen. Chest wall:  No tenderness or deformity. Heart:  Regular rate and rhythm, S1, S2 normal, no murmur, click, rub or gallop. Abdomen:   Soft, non-tender. Bowel sounds normal. No masses,  No organomegaly. Extremities: Extremities normal, atraumatic, multiple prior AV fistulas. Has prior amputations of the RLE. Pulses: 2+ and symmetric all extremities. Skin: Skin color, texture, turgor normal. No rashes or lesions Lymph nodes: Cervical, supraclavicular, and axillary nodes normal.  
Neurologic: Grossly nonfocal, motor strength seems intact. Psych: no overt anxiety or depression. Data:  
 
Recent Results (from the past 24 hour(s)) GLUCOSE, POC Collection Time: 07/29/18  5:24 PM  
Result Value Ref Range Glucose (POC) 239 (H) 65 - 100 mg/dL Performed by Claudia Lee GLUCOSE, POC Collection Time: 07/29/18 11:34 PM  
Result Value Ref Range Glucose (POC) 188 (H) 65 - 100 mg/dL Performed by Rupert KULKARNI   
CBC WITH AUTOMATED DIFF Collection Time: 07/30/18  4:22 AM  
Result Value Ref Range WBC 16.9 (H) 4.1 - 11.1 K/uL  
 RBC 3.50 (L) 4.10 - 5.70 M/uL HGB 9.6 (L) 12.1 - 17.0 g/dL HCT 32.0 (L) 36.6 - 50.3 % MCV 91.4 80.0 - 99.0 FL  
 MCH 27.4 26.0 - 34.0 PG  
 MCHC 30.0 30.0 - 36.5 g/dL  
 RDW 18.5 (H) 11.5 - 14.5 % PLATELET 271 422 - 975 K/uL MPV 10.0 8.9 - 12.9 FL  
 NRBC 0.0 0  WBC ABSOLUTE NRBC 0.00 0.00 - 0.01 K/uL NEUTROPHILS 87 (H) 32 - 75 % BAND NEUTROPHILS 2 % LYMPHOCYTES 3 (L) 12 - 49 % MONOCYTES 3 (L) 5 - 13 % EOSINOPHILS 3 0 - 7 % BASOPHILS 0 0 - 1 % METAMYELOCYTES 2 % IMMATURE GRANULOCYTES 0 0.0 - 0.5 % ABS. NEUTROPHILS 15.0 (H) 1.8 - 8.0 K/UL  
 ABS. LYMPHOCYTES 0.5 (L) 0.8 - 3.5 K/UL  
 ABS. MONOCYTES 0.5 0.0 - 1.0 K/UL  
 ABS. EOSINOPHILS 0.5 (H) 0.0 - 0.4 K/UL  
 ABS. BASOPHILS 0.0 0.0 - 0.1 K/UL  
 ABS. IMM. GRANS. 0.0 0.00 - 0.04 K/UL  
 DF MANUAL    
 RBC COMMENTS ANISOCYTOSIS 
1+ WBC COMMENTS TOXIC GRANULATION    
MAGNESIUM Collection Time: 07/30/18  4:22 AM  
Result Value Ref Range Magnesium 2.5 (H) 1.6 - 2.4 mg/dL METABOLIC PANEL, COMPREHENSIVE Collection Time: 07/30/18  4:22 AM  
Result Value Ref Range Sodium 136 136 - 145 mmol/L Potassium 3.8 3.5 - 5.1 mmol/L Chloride 98 97 - 108 mmol/L  
 CO2 34 (H) 21 - 32 mmol/L Anion gap 4 (L) 5 - 15 mmol/L Glucose 177 (H) 65 - 100 mg/dL BUN 41 (H) 6 - 20 MG/DL  Creatinine 4.54 (H) 0.70 - 1.30 MG/DL  
 BUN/Creatinine ratio 9 (L) 12 - 20    
 GFR est AA 16 (L) >60 ml/min/1.73m2 GFR est non-AA 13 (L) >60 ml/min/1.73m2 Calcium 9.6 8.5 - 10.1 MG/DL Bilirubin, total 0.3 0.2 - 1.0 MG/DL  
 ALT (SGPT) 12 12 - 78 U/L  
 AST (SGOT) 19 15 - 37 U/L Alk. phosphatase 225 (H) 45 - 117 U/L Protein, total 8.3 (H) 6.4 - 8.2 g/dL Albumin 1.2 (L) 3.5 - 5.0 g/dL Globulin 7.1 (H) 2.0 - 4.0 g/dL A-G Ratio 0.2 (L) 1.1 - 2.2 GLUCOSE, POC Collection Time: 07/30/18  6:07 AM  
Result Value Ref Range Glucose (POC) 201 (H) 65 - 100 mg/dL Performed by Mobile Card Collection Time: 07/30/18  9:30 AM  
Result Value Ref Range Hepatitis B surface Ag <0.10 Index Hep B surface Ag Interp. NEGATIVE  NEG    
BLOOD GAS, ARTERIAL Collection Time: 07/30/18 10:22 AM  
Result Value Ref Range pH 7.51 (H) 7.35 - 7.45    
 PCO2 46 (H) 35.0 - 45.0 mmHg PO2 46 (LL) 80 - 100 mmHg O2 SAT 86 (L) 92 - 97 % BICARBONATE 35 (H) 22 - 26 mmol/L  
 BASE EXCESS 10.6 mmol/L  
 O2 METHOD VENTURI MASK    
 O2 FLOW RATE 12.00 L/min FIO2 50 % SPONTANEOUS RATE 39.0 Sample source ARTERIAL    
 SITE RIGHT BRACHIAL MANSOOR'S TEST N/A Critical value read back Carmen Busch MD   
BLOOD GAS, ARTERIAL Collection Time: 07/30/18 12:15 PM  
Result Value Ref Range pH 7.48 (H) 7.35 - 7.45    
 PCO2 48 (H) 35.0 - 45.0 mmHg PO2 54 (L) 80 - 100 mmHg O2 SAT 90 (L) 92 - 97 % BICARBONATE 35 (H) 22 - 26 mmol/L  
 BASE EXCESS 9.7 mmol/L  
 O2 METHOD BIPAP    
 FIO2 100 % MODE BIPAP    
 IPAP/PIP 16.0 EPAP/CPAP/PEEP 100.0 Sample source ARTERIAL    
 SITE RIGHT BRACHIAL MANSOOR'S TEST N/A    
GLUCOSE, POC Collection Time: 07/30/18 12:15 PM  
Result Value Ref Range Glucose (POC) 127 (H) 65 - 100 mg/dL Performed by Marco Keys BLOOD GAS, ARTERIAL Collection Time: 07/30/18 12:27 PM  
Result Value Ref Range pH 7.48 (H) 7.35 - 7.45    
 PCO2 46 (H) 35.0 - 45.0 mmHg PO2 99 80 - 100 mmHg O2 SAT 98 (H) 92 - 97 % BICARBONATE 33 (H) 22 - 26 mmol/L  
 BASE EXCESS 8.1 mmol/L  
 O2 METHOD BIPAP    
 FIO2 100 % MODE BIPAP    
 SPONTANEOUS RATE 33.0 PRESSURE SUPPORT 6.0 IPAP/PIP 16.0 EPAP/CPAP/PEEP 10.0 Sample source ARTERIAL    
 SITE RIGHT BRACHIAL MANSOOR'S TEST N/A Telemetry:NSR with type 2 block. Imaging: 
I have personally reviewed the patients radiographs and have reviewed the reports: 
 
7-30-18: CXR: COMPARISON: July 28, 2018 
  
FINDINGS: AP portable imaging of the chest performed at 12:53 PM demonstrates a 
stable cardiomediastinal silhouette. There is right basilar atelectasis with a 
probable small right pleural effusion. No significant osseous abnormalities are 
seen. 
  
IMPRESSION: Right basilar atelectasis with probable small right pleural 
effusion. 7-26-18: CT of abdomen: IMPRESSION: 
1. There is been slight decrease in size of the infected hematoma in the right 
lower quadrant. 2. Persistent right hydroureteronephrosis. Ct of chest from 7/30/18. IMPRESSION: Debris filled right lower lobe branch airway is extending into the 
right mainstem bronchus with right lower lobe consolidative infiltrates 
suspicious for pneumonia with associated small parapneumonic effusion. Probable 
reactive mediastinal adenopathy with enlarged 1.8 cm subcarinal lymph node. Previously seen left infiltrate is cleared.   
 
 
Davidson Canchola MD

## 2018-07-30 NOTE — PROGRESS NOTES
Problem: Falls - Risk of 
Goal: *Absence of Falls Document Dirk Lopez Fall Risk and appropriate interventions in the flowsheet. Outcome: Progressing Towards Goal 
Fall Risk Interventions: 
Mobility Interventions: Bed/chair exit alarm, Communicate number of staff needed for ambulation/transfer, OT consult for ADLs, Patient to call before getting OOB, PT Consult for mobility concerns, PT Consult for assist device competence Mentation Interventions: Bed/chair exit alarm, Increase mobility, More frequent rounding Medication Interventions: Evaluate medications/consider consulting pharmacy, Patient to call before getting OOB, Teach patient to arise slowly Elimination Interventions: Toileting schedule/hourly rounds, Call light in reach, Bed/chair exit alarm History of Falls Interventions: Door open when patient unattended, Evaluate medications/consider consulting pharmacy, Investigate reason for fall Problem: Pressure Injury - Risk of 
Goal: *Prevention of pressure injury Document Delbert Scale and appropriate interventions in the flowsheet. Outcome: Progressing Towards Goal 
Pressure Injury Interventions: 
Sensory Interventions: Avoid rigorous massage over bony prominences, Chair cushion, Check visual cues for pain, Discuss PT/OT consult with provider, Float heels, Keep linens dry and wrinkle-free, Maintain/enhance activity level, Minimize linen layers Moisture Interventions: Assess need for specialty bed, Check for incontinence Q2 hours and as needed, Limit adult briefs Activity Interventions: Chair cushion, Increase time out of bed, Pressure redistribution bed/mattress(bed type), PT/OT evaluation Mobility Interventions: HOB 30 degrees or less, Assess need for specialty bed, Float heels, Pressure redistribution bed/mattress (bed type), PT/OT evaluation Nutrition Interventions: Document food/fluid/supplement intake Friction and Shear Interventions: Feet elevated on foot rest, Foam dressings/transparent film/skin sealants, HOB 30 degrees or less, Lift sheet, Lift team/patient mobility team, Minimize layers

## 2018-07-30 NOTE — PROGRESS NOTES
Occupational Therapy Screening: 
Services maybe indicated at this time. [facility] referral was triggered for occupational therapy based on results obtained during the nursing admission assessment. The patients chart was reviewed . Please order a consult for occupational therapy if patient has had a decline in function from baseline and you would like an evaluation to be completed. Thank you.

## 2018-07-30 NOTE — DIALYSIS
[facility] Dialysis Team [place]  [phone] Vitals   Pre   Post   Assessment   Pre   Post    
Temp  Temp: 98.1 °F (36.7 °C) (07/30/18 0820)  98.1 LOC  A&O x 3/ lathargic A&O x 3 HR   88 82 Lungs   Wheezing  clear B/P   175/69 138/67 Cardiac   regular  regular Resp   26 24 Skin   dry  dry Pain level  No pain No pain Edema   
generalized No edema Orders: Duration:   Start:   0825 End:    1155 Total:   3.5hr  
Dialyzer:   Dialyzer/Set Up Inspection: Ousmane Ortiz ([id number]) (07/30/18 0820) K Bath:   Dialysate K (mEq/L): 3 (07/30/18 0820) Ca Bath:   Dialysate CA (mEq/L): 2.5 (07/30/18 0820) Na/Bicarb:   Dialysate NA (mEq/L): 140 (07/30/18 0820) Target Fluid Removal:   Goal/Amount of Fluid to Remove (mL): 1500 mL (07/30/18 0820) Access Type & Location:   LAVF +B&T, + for aspiration, NS flushes. No redness or drainage noted. At end, blood in circuit returned w/330ml NS, Tolerated tx well. Labs Obtained/Reviewed Critical Results Called   Date when labs were drawn- 
Hgb-   
HGB Date Value Ref Range Status 07/30/2018 9.6 (L) 12.1 - 17.0 g/dL Final  
 
K-   
Potassium Date Value Ref Range Status 07/30/2018 3.8 3.5 - 5.1 mmol/L Final  
 
Ca-  
Calcium Date Value Ref Range Status 07/30/2018 9.6 8.5 - 10.1 MG/DL Final  
 
Bun-  
BUN Date Value Ref Range Status 07/30/2018 41 (H) 6 - 20 MG/DL Final  
 
Creat-  
Creatinine Date Value Ref Range Status 07/30/2018 4.54 (H) 0.70 - 1.30 MG/DL Final  
  Comment:  
  INVESTIGATED PER DELTA CHECK PROTOCOL Medications/ Blood Products Given Name   Dose   Route and Time No HD meds ordered Blood Volume Processed (BVP):    75L Net Fluid Removed:  1500ml Comments Time Out Done: Yes, 0820 Primary Nurse Rpt Pre: Carmelo Huang RN 
Primary Nurse Rpt Post: Abilio Dixon RN 
Pt Education: discusse procedure and site care Care Plan:continue HD as ordered Tx Summary: tolerated tx well,  BP stable Admiting Diagnosis: renal failure Pt's previous clinic- 
Consent signed - Informed Consent Verified: Yes (07/30/18 0820) Filiita Consent - Yes Hepatitis Status- negative 07/02/2018 specimen drawn today Machine #- Machine Number: [id number] (07/30/18 0820) Telemetry status-bedside Pre-dialysis wt. - Pre-Dialysis Weight: 70.3 kg (154 lb 15.7 oz) (07/30/18 0820) [facility]  [phone] Vitals   Pre   Post   Assessment   Pre   Post    
Temp  Temp: 98.1 °F (36.7 °C) (07/30/18 0820)   LOC     
HR   Pulse (Heart Rate): 81 (nurse at bedside/O2 via mask on) (07/30/18 0930)  Lungs B/P   BP: 122/53 (07/30/18 0930)  Cardiac Resp   Resp Rate: 26 (07/30/18 0930)  Skin Pain level  Pain Intensity 1: 0 (07/30/18 0700)  Edema Orders: Duration:   Start:   Procedure Start Time: 8025 End:     Total:     
Dialyzer:   Dialyzer/Set Up Inspection: Eli Grad ([id number]) (07/30/18 0820) K Bath:   Dialysate K (mEq/L): 3 (07/30/18 0820) Ca Bath:   Dialysate CA (mEq/L): 2.5 (07/30/18 0820) Na/Bicarb:   Dialysate NA (mEq/L): 140 (07/30/18 0820) Target Fluid Removal:   Goal/Amount of Fluid to Remove (mL): 1500 mL (07/30/18 0820) Access Type & Location:     
Labs Obtained/Reviewed Critical Results Called   Date when labs were drawn- 
Hgb-   
HGB Date Value Ref Range Status 07/30/2018 9.6 (L) 12.1 - 17.0 g/dL Final  
 
K-   
Potassium Date Value Ref Range Status 07/30/2018 3.8 3.5 - 5.1 mmol/L Final  
 
Ca-  
Calcium Date Value Ref Range Status 07/30/2018 9.6 8.5 - 10.1 MG/DL Final  
 
Bun-  
BUN Date Value Ref Range Status 07/30/2018 41 (H) 6 - 20 MG/DL Final  
 
Creat-  
Creatinine Date Value Ref Range Status 07/30/2018 4.54 (H) 0.70 - 1.30 MG/DL Final  
  Comment:  
  INVESTIGATED PER DELTA CHECK PROTOCOL Medications/ Blood Products Given Name   Dose   Route and Time Blood Volume Processed (BVP):     Net Fluid Removed:    
Comments Time Out Done:  
Primary Nurse Rpt Pre: 
Primary Nurse Rpt Post: 
Pt Education: 
Care Plan: Tx Summary: 
Admiting Diagnosis: 
Pt's previous clinic- 
Consent signed - Informed Consent Verified: Yes (07/30/18 0820) Avril Consent -  
Hepatitis Status-  
Machine #- Machine Number: [id number] (07/30/18 0820) Telemetry status- 
Pre-dialysis wt. - Pre-Dialysis Weight: 70.3 kg (154 lb 15.7 oz) (07/30/18 0820) [facility] Dialysis Team [facility]  [phone] Vitals   Pre   Post   Assessment   Pre   Post    
Temp  Temp: 98.1 °F (36.7 °C) (07/30/18 0820)   LOC     
HR   Pulse (Heart Rate): 81 (nurse at bedside/O2 via mask on) (07/30/18 0930)  Lungs B/P   BP: 122/53 (07/30/18 0930)  Cardiac Resp   Resp Rate: 26 (07/30/18 0930)  Skin Pain level  Pain Intensity 1: 0 (07/30/18 0700)  Edema Orders: Duration:   Start:   Procedure Start Time: 0702 End:     Total:     
Dialyzer:   Dialyzer/Set Up Inspection: Rehana Alston ([id number]) (07/30/18 0820) K Bath:   Dialysate K (mEq/L): 3 (07/30/18 0820) Ca Bath:   Dialysate CA (mEq/L): 2.5 (07/30/18 0820) Na/Bicarb:   Dialysate NA (mEq/L): 140 (07/30/18 0820) Target Fluid Removal:   Goal/Amount of Fluid to Remove (mL): 1500 mL (07/30/18 0820) Access Type & Location:     
Labs Obtained/Reviewed Critical Results Called   Date when labs were drawn- 
Hgb-   
HGB Date Value Ref Range Status 07/30/2018 9.6 (L) 12.1 - 17.0 g/dL Final  
 
K-   
Potassium Date Value Ref Range Status 07/30/2018 3.8 3.5 - 5.1 mmol/L Final  
 
Ca-  
Calcium Date Value Ref Range Status 07/30/2018 9.6 8.5 - 10.1 MG/DL Final  
 
Bun-  
BUN Date Value Ref Range Status 07/30/2018 41 (H) 6 - 20 MG/DL Final  
 
Creat-  
Creatinine Date Value Ref Range Status 07/30/2018 4.54 (H) 0.70 - 1.30 MG/DL Final  
  Comment:  
  INVESTIGATED PER DELTA CHECK PROTOCOL Medications/ Blood Products Given Name   Dose   Route and Time Blood Volume Processed (BVP):     Net Fluid Removed:    
Comments Time Out Done:  
Primary Nurse Rpt Pre: 
Primary Nurse Rpt Post: 
Pt Education: 
Care Plan: Tx Summary: 
Admiting Diagnosis: 
Pt's previous clinic- 
Consent signed - Informed Consent Verified: Yes (07/30/18 0820) Filiita Consent -  
Hepatitis Status-  
Machine #- Machine Number: [id number] (07/30/18 0820) Telemetry status- 
Pre-dialysis wt. - Pre-Dialysis Weight: 70.3 kg (154 lb 15.7 oz) (07/30/18 0820)

## 2018-07-30 NOTE — PROGRESS NOTES
Pt off floor 
afeb WBC still 16K Repeat CT unchanged Would con't non operative treatment of possible infected retroperitoneal hematoma

## 2018-07-30 NOTE — PROGRESS NOTES
Infectious Disease Progress Note Subjective:  
Mr Sonya Warren seen today. Has respiratory issues earlier in the day and started on bipap. Doing better on bipap Denied cough, fever, chills, vomiting Objective: 
 
Vitals:  
Patient Vitals for the past 24 hrs: 
 Temp Pulse Resp BP SpO2  
07/30/18 1430 - 83 26 124/65 98 % 07/30/18 1400 - 86 18 145/71 100 % 07/30/18 1330 - 84 24 114/59 99 % 07/30/18 1300 - 81 (!) 31 109/56 -  
07/30/18 1215 - 84 27 114/63 94 % 07/30/18 1201 - 85 (!) 32 - (!) 84 %  
07/30/18 1200 98.2 °F (36.8 °C) 82 26 138/67 96 % 07/30/18 1155 - 82 30 114/63 97 % 07/30/18 1130 - 83 24 128/62 97 % 07/30/18 1100 - 80 22 131/64 (!) 1 %  
07/30/18 1050 - 80 (!) 36 - 100 % 07/30/18 1046 - - - - (!) 88 %  
07/30/18 1045 - 79 28 139/57 90 % 07/30/18 1030 - 80 21 122/52 96 % 07/30/18 1000 - 79 26 124/53 91 %  
07/30/18 0930 - 81 26 122/53 94 % 07/30/18 0900 - 79 26 115/55 100 % 07/30/18 0825 - 84 26 133/52 100 % 07/30/18 0820 98.1 °F (36.7 °C) 88 24 175/69 100 % 07/30/18 0700 98.5 °F (36.9 °C) 80 26 133/55 100 % 07/30/18 0447 97.8 °F (36.6 °C) 82 26 149/61 100 % 07/30/18 0400 97.8 °F (36.6 °C) 82 (!) 31 139/58 100 % 07/30/18 0000 98.4 °F (36.9 °C) 76 18 159/59 -  
07/29/18 2339 98.4 °F (36.9 °C) 77 21 156/63 100 % 07/29/18 2226 - 74 - 143/59 -  
07/29/18 2000 98.5 °F (36.9 °C) 73 12 111/51 -  
07/29/18 1600 99.4 °F (37.4 °C) 85 14 124/63 98 % Physical Exam: ¨ GEN: NAD, bipap ¨ HEENT: no scleral icterus,   has bipap on now ¨ CV: S1, S2 heard regularly ¨ Lungs: Coarse, on bipap now ¨ Abdomen: soft, non distended, non tender ¨ Extremities: no edema ¨ Neuro: Alert to self, not much verbal  
¨ Skin: no rash 
 
 
  
 
Medications: 
 
Current Facility-Administered Medications:  
  albuterol-ipratropium (DUO-NEB) 2.5 MG-0.5 MG/3 ML, 3 mL, Nebulization, Q6H RT, Brendan Murcia MD 
  metroNIDAZOLE (FLAGYL) IVPB premix 500 mg, 500 mg, IntraVENous, Juan Cho MD, Last Rate: 100 mL/hr at 07/30/18 1333, 500 mg at 07/30/18 1333 
  insulin glargine (LANTUS) injection 5 Units, 5 Units, SubCUTAneous, DAILY, Ronen Beltran MD, 5 Units at 07/30/18 7990   ceFAZolin (ANCEF) 2 g/20 mL in sterile water IV syringe, 2 g, IntraVENous, Once per day on Mon Wed **AND** [START ON 8/3/2018] ceFAZolin (ANCEF) 3 g in 0.9%  ml IVPB, 3 g, IntraVENous, every Friday, Ronen Beltran MD 
  carvedilol (COREG) tablet 25 mg, 25 mg, Per G Tube, BID WITH MEALS, Ronen Beltran MD, Stopped at 07/30/18 0800 
  nystatin (MYCOSTATIN) 100,000 unit/mL oral suspension 500,000 Units, 500,000 Units, Oral, QID, Ronen Beltran MD, 500,000 Units at 07/30/18 1333   heparin (porcine) injection 5,000 Units, 5,000 Units, SubCUTAneous, Q12H, Mayda Burnham MD, 5,000 Units at 07/30/18 5501   balsam peru-castor oil (VENELEX) [id number] mg/gram ointment, , Topical, BID, Awa Armenta MD 
  sodium hypochlorite (QUARTER STRENGTH DAKIN'S) 0.125% irrigation (bottle), , Topical, DAILY, Abram Cooks, MD 
  collagenase (SANTYL) 250 unit/gram ointment, , Topical, DAILY, Abram Cooks, MD 
  epoetin niya (EPOGEN;PROCRIT) injection 10,000 Units, 10,000 Units, SubCUTAneous, Q MON, WED & Gracia Joseph, Abeba Pate MD, 10,000 Units at 07/27/18 1725   acetaminophen (TYLENOL) solution 650 mg, 650 mg, Per G Tube, Q3H PRN, Chelsey Frazier MD, 650 mg at 07/26/18 8714   aspirin chewable tablet 81 mg, 81 mg, Per G Tube, DAILY, Chelsey Frazier MD, 81 mg at 07/30/18 1201   escitalopram oxalate (LEXAPRO) 5 mg/5 mL oral solution soln 10 mg, 10 mg, Per G Tube, DAILY, Chelsey Frazier MD, 10 mg at 07/30/18 1140 
  insulin lispro (HUMALOG) injection, , SubCUTAneous, Q6H, Chelsey Frazier MD, Stopped at 07/30/18 1200 
  glucose chewable tablet 16 g, 4 Tab, Oral, PRN, Chelsey Frazier MD 
  dextrose (D50W) injection syrg 12.5-25 g, 12.5-25 g, IntraVENous, PRN, Chelsey Frazier, MD, 12.5 g at 07/26/18 2357 
  glucagon (GLUCAGEN) injection 1 mg, 1 mg, IntraMUSCular, PRN, Jazz Canales MD 
  morphine 10 mg/5 mL oral solution 10 mg, 5 mL, Per G Tube, Q4H PRN, Jazz Canales MD, 10 mg at 07/30/18 0430 
  oxyCODONE-acetaminophen (PERCOCET) 5-325 mg per tablet 1 Tab, 1 Tab, Per G Tube, Q4H PRN, Jazz Canales MD, 1 Tab at 07/25/18 0028   sevelamer carbonate (RENVELA) oral powder 2,400 mg, 2.4 g, Per G Tube, TID WITH MEALS, Jazz Canales MD, 2,400 mg at 07/30/18 1334   pravastatin (PRAVACHOL) tablet 10 mg, 10 mg, Per G Tube, QHS, Jazz Canales MD, 10 mg at 07/29/18 2226   sodium chloride (NS) flush 5-10 mL, 5-10 mL, IntraVENous, Q8H, Jazz Canales MD, 10 mL at 07/30/18 1428   sodium chloride (NS) flush 5-10 mL, 5-10 mL, IntraVENous, PRN, Jazz Canales MD, 10 mL at 07/17/18 2039 
  naloxone (NARCAN) injection 0.4 mg, 0.4 mg, IntraVENous, PRN, Jazz Canales MD 
  ondansetron [facility]) injection 4 mg, 4 mg, IntraVENous, Q4H PRN, Jazz Canales MD, 4 mg at 07/26/18 1738 Labs: 
Recent Results (from the past 24 hour(s)) GLUCOSE, POC Collection Time: 07/29/18  5:24 PM  
Result Value Ref Range Glucose (POC) 239 (H) 65 - 100 mg/dL Performed by Oral Villafana GLUCOSE, POC Collection Time: 07/29/18 11:34 PM  
Result Value Ref Range Glucose (POC) 188 (H) 65 - 100 mg/dL Performed by Lucille KULKARNI   
CBC WITH AUTOMATED DIFF Collection Time: 07/30/18  4:22 AM  
Result Value Ref Range WBC 16.9 (H) 4.1 - 11.1 K/uL  
 RBC 3.50 (L) 4.10 - 5.70 M/uL HGB 9.6 (L) 12.1 - 17.0 g/dL HCT 32.0 (L) 36.6 - 50.3 % MCV 91.4 80.0 - 99.0 FL  
 MCH 27.4 26.0 - 34.0 PG  
 MCHC 30.0 30.0 - 36.5 g/dL  
 RDW 18.5 (H) 11.5 - 14.5 % PLATELET 304 425 - 464 K/uL MPV 10.0 8.9 - 12.9 FL  
 NRBC 0.0 0  WBC ABSOLUTE NRBC 0.00 0.00 - 0.01 K/uL NEUTROPHILS 87 (H) 32 - 75 % BAND NEUTROPHILS 2 % LYMPHOCYTES 3 (L) 12 - 49 % MONOCYTES 3 (L) 5 - 13 % EOSINOPHILS 3 0 - 7 % BASOPHILS 0 0 - 1 % METAMYELOCYTES 2 % IMMATURE GRANULOCYTES 0 0.0 - 0.5 % ABS. NEUTROPHILS 15.0 (H) 1.8 - 8.0 K/UL  
 ABS. LYMPHOCYTES 0.5 (L) 0.8 - 3.5 K/UL  
 ABS. MONOCYTES 0.5 0.0 - 1.0 K/UL  
 ABS. EOSINOPHILS 0.5 (H) 0.0 - 0.4 K/UL  
 ABS. BASOPHILS 0.0 0.0 - 0.1 K/UL  
 ABS. IMM. GRANS. 0.0 0.00 - 0.04 K/UL  
 DF MANUAL    
 RBC COMMENTS ANISOCYTOSIS 
1+ WBC COMMENTS TOXIC GRANULATION    
MAGNESIUM Collection Time: 07/30/18  4:22 AM  
Result Value Ref Range Magnesium 2.5 (H) 1.6 - 2.4 mg/dL METABOLIC PANEL, COMPREHENSIVE Collection Time: 07/30/18  4:22 AM  
Result Value Ref Range Sodium 136 136 - 145 mmol/L Potassium 3.8 3.5 - 5.1 mmol/L Chloride 98 97 - 108 mmol/L  
 CO2 34 (H) 21 - 32 mmol/L Anion gap 4 (L) 5 - 15 mmol/L Glucose 177 (H) 65 - 100 mg/dL BUN 41 (H) 6 - 20 MG/DL Creatinine 4.54 (H) 0.70 - 1.30 MG/DL  
 BUN/Creatinine ratio 9 (L) 12 - 20 GFR est AA 16 (L) >60 ml/min/1.73m2 GFR est non-AA 13 (L) >60 ml/min/1.73m2 Calcium 9.6 8.5 - 10.1 MG/DL Bilirubin, total 0.3 0.2 - 1.0 MG/DL  
 ALT (SGPT) 12 12 - 78 U/L  
 AST (SGOT) 19 15 - 37 U/L Alk. phosphatase 225 (H) 45 - 117 U/L Protein, total 8.3 (H) 6.4 - 8.2 g/dL Albumin 1.2 (L) 3.5 - 5.0 g/dL Globulin 7.1 (H) 2.0 - 4.0 g/dL A-G Ratio 0.2 (L) 1.1 - 2.2 GLUCOSE, POC Collection Time: 07/30/18  6:07 AM  
Result Value Ref Range Glucose (POC) 201 (H) 65 - 100 mg/dL Performed by [employer] Collection Time: 07/30/18  9:30 AM  
Result Value Ref Range Hepatitis B surface Ag <0.10 Index Hep B surface Ag Interp. NEGATIVE  NEG    
BLOOD GAS, ARTERIAL Collection Time: 07/30/18 10:22 AM  
Result Value Ref Range pH 7.51 (H) 7.35 - 7.45    
 PCO2 46 (H) 35.0 - 45.0 mmHg PO2 46 (LL) 80 - 100 mmHg O2 SAT 86 (L) 92 - 97 %  BICARBONATE 35 (H) 22 - 26 mmol/L  
 BASE EXCESS 10.6 mmol/L  
 O2 METHOD VENTURI MASK    
 O2 FLOW RATE 12.00 L/min FIO2 50 % SPONTANEOUS RATE 39.0 Sample source ARTERIAL    
 SITE RIGHT BRACHIAL MANSOOR'S TEST N/A Critical value read back Hnag Morales MD   
BLOOD GAS, ARTERIAL Collection Time: 07/30/18 12:15 PM  
Result Value Ref Range pH 7.48 (H) 7.35 - 7.45    
 PCO2 48 (H) 35.0 - 45.0 mmHg PO2 54 (L) 80 - 100 mmHg O2 SAT 90 (L) 92 - 97 % BICARBONATE 35 (H) 22 - 26 mmol/L  
 BASE EXCESS 9.7 mmol/L  
 O2 METHOD BIPAP    
 FIO2 100 % MODE BIPAP    
 IPAP/PIP 16.0 EPAP/CPAP/PEEP 100.0 Sample source ARTERIAL    
 SITE RIGHT BRACHIAL MANSOOR'S TEST N/A    
GLUCOSE, POC Collection Time: 07/30/18 12:15 PM  
Result Value Ref Range Glucose (POC) 127 (H) 65 - 100 mg/dL Performed by Deb Turner BLOOD GAS, ARTERIAL Collection Time: 07/30/18 12:27 PM  
Result Value Ref Range pH 7.48 (H) 7.35 - 7.45    
 PCO2 46 (H) 35.0 - 45.0 mmHg PO2 99 80 - 100 mmHg O2 SAT 98 (H) 92 - 97 % BICARBONATE 33 (H) 22 - 26 mmol/L  
 BASE EXCESS 8.1 mmol/L  
 O2 METHOD BIPAP    
 FIO2 100 % MODE BIPAP    
 SPONTANEOUS RATE 33.0 PRESSURE SUPPORT 6.0 IPAP/PIP 16.0 EPAP/CPAP/PEEP 10.0 Sample source ARTERIAL    
 SITE RIGHT BRACHIAL MANSOOR'S TEST N/A Micro:  
 Blood 7/26 pending Blood 7/17 Component Results   
   Component Value Ref Range & Units Status  
   Special Requests: NO SPECIAL REQUESTS    Final  
   Culture result: NO GROWTH 6 DAYS    Final  
    
Result History   
  
 Pelvic abscess 7/19 Component Value Ref Range & Units Status   
  Special Requests: NO SPECIAL REQUESTS   Final  
  GRAM STAIN 3+ WBCS SEEN   Final  
  GRAM STAIN NO ORGANISMS SEEN   Final  
  Culture result: FEW PROTEUS MIRABILIS (A)   Final  
   
Culture & Susceptibility   
    Antibiotic   Organism Organism Organism  
    Proteus mirabilis    
  AMIKACIN ($)   <=16   ug/mL   S Final   AMPICILLIN ($)   <=8   ug/mL   S Final      
  AMPICILLIN/SULBACTAM ($)   <=8/4   ug/mL   S Final      
  AZTREONAM ($$$$)   <=4   ug/mL   S Final      
  CEFAZOLIN ($)   <=8   ug/mL   S Final      
  CEFEPIME ($$)   <=4   ug/mL   S Final      
  CEFOTAXIME   <=2   ug/mL   S Final      
  CEFTAZIDIME ($)   <=1   ug/mL   S Final      
  CEFTRIAXONE ($)   <=1   ug/mL   S Final      
  CEFUROXIME ($)   <=4   ug/mL   S Final      
  CIPROFLOXACIN ($)   <=1   ug/mL   S Final      
  GENTAMICIN ($)   <=4   ug/mL   S Final      
  LEVOFLOXACIN ($)   <=2   ug/mL   S Final      
  MEROPENEM ($$)   <=1   ug/mL   S Final      
  PIPERACILLIN/TAZOBAC ($)   <=16   ug/mL   S Final      
  TOBRAMYCIN ($)   <=4   ug/mL   S Final      
  TRIMETH/SULFA   <=2/38   ug/mL   S Final      
           
  
 
 
  
Imaging:  
  
CT 7/17 FINDINGS: 
LOWER CHEST: 
The visualized portions of the lung bases are clear.  There is mild atelectasis 
in the posterior lower lobes.   
ABDOMEN: 
The unenhanced images demonstrate visibility of the interventricular septum of 
the heart. There is extensive vascular calcification and a right iliac stent. There are clips in the gallbladder fossa. Liver: The liver is normal in size and contour with no focal abnormality. Gallbladder and bile ducts: There is no calcified gallstone or biliary duct 
dilatation. Spleen: No abnormality. Pancreas: No abnormality. Adrenal glands: No abnormality. Kidneys: There are small left renal cysts. There is hydronephrosis and 
hydroureter on the right down to the level of the pelvis. PELVIS: 
Reproductive organs: The prostate gland is absent. Bladder: No abnormality. BOWEL AND MESENTERY: There is a gastrostomy tube in the antrum of the stomach. The small bowel is not obstructed. There is gas and stool throughout the colon. There is no mesenteric mass or adenopathy.  The appendix is absent. PERITONEUM: Id[place]e Hash is no ascites or free intraperitoneal air. RETROPERITONEUM: The aorta is atherosclerotic without aneurysm. There is a right 
external iliac artery stent. There is an enhancing fluid collection surrounding 
the right external iliac artery stent extending into the right retroperitoneum. The component around the stent measures 4.6 x 2.7 x 6.3 cm and the component in 
the right retroperitoneum measures 3.6 x 3.1 x 4.6 cm. There is no 
retroperitoneal mass or adenopathy. BONES AND SOFT TISSUES: There is diffuse body edema. 
   
IMPRESSION IMPRESSION:  
1. Right common iliac artery stent with enhancing fluid collection surrounding 
the stent and extending into the retroperitoneum on the right. This is 
suspicious for infection. 2. Right hydronephrosis and hydroureter secondary to compression of the ureter 
by the collection around the stent. 3. Extensive atherosclerotic calcification of the aorta and mesenteric vessels. This is consistent with the patient's end-stage renal disease. 4. Status post cholecystectomy. 5. Gastrostomy tube. 6. Small left renal cysts. 7. Status post prostatectomy. 
   
CT 7/11 FINDINGS:  
LUNG BASES: 7 mm subpleural nodule at posterior basilar right lower lobe 
slightly smaller in the interval. 
INCIDENTALLY IMAGED HEART AND MEDIASTINUM: Unremarkable. LIVER: Mild intrahepatic and extrahepatic biliary duct distention again shown 
with common bile duct diameter measuring up to 1.5 cm. Findings not 
substantially changed in the interval. 
GALLBLADDER: Status post cholecystectomy. SPLEEN: No mass. PANCREAS: No mass or ductal dilatation. ADRENALS: Unremarkable. KIDNEYS: Bilateral renal atrophy again shown. Moderate right renal 
pelvocaliectasis and ureteral dilation again demonstrated. Finding extends to 
level of retroperitoneal hematoma surrounding right external iliac stent. STOMACH: Moderate gastric distention in the interval to the level of the second 
portion of the duodenum.  Percutaneous gastrostomy tube in the interval 
positioned anteriorly in the gastric body. SMALL BOWEL: No dilatation or wall thickening. COLON: No dilatation or wall thickening. APPENDIX: Not demonstrated. PERITONEUM: No free intraperitoneal air or fluid demonstrated. RETROPERITONEUM: As noted above, right external iliac stent again shown within a 
moderate-sized hematoma extending along the anterior margin of the right 
iliopsoas. Dimension is difficult to estimate due to the lack of oral contrast. 
It measures at least 7.6 x 3.5 cm transverse and 10.3 cm craniocaudal and size 
is similar to that shown previously. URINARY BLADDER: No mass or calculus. BONES: No destructive bone lesion. ADDITIONAL COMMENTS: N/A 
   
IMPRESSION IMPRESSION: 
   
1. Interval percutaneous gastrostomy tube placement. Interval moderate gastric 
distention. 2. Right iliac artery stenting and adjacent hematoma again shown, appearing 
stable. Moderate right renal pelvocaliectasis and ureterectasis again 
demonstrated. 
   
CT 7/2 FINDINGS: There is interval right common/external iliac artery stenting for 
exclusion of pseudoaneurysm with patent stent, and minimal hyperdensity in the 
pseudoaneurysm contents but no overt arterial extravasation. Pseudoaneurysm size 
is stable. There is no apparent soft tissue emphysema or abscess. 
   
Right hydroureteronephrosis remains. Kidneys are atrophic. There is trace 
ascites. There is no pneumoperitoneum. 
   
Abdominal aorta is without stenosis, aneurysm or dissection. Celiac artery, SMA, 
ARTIE and bilateral solitary renal arteries are patent. Right internal iliac 
artery is patent. Femoral arteries are patent. 
   
IMPRESSION IMPRESSION: 
1. Interval right iliac artery pseudoaneurysm stenting. 2. No overt arterial extravasation. 3. Stable pseudosac size. 4. No apparent soft tissue emphysema/abscess. 5. Stable right hydroureteronephrosis.   
    
  
  
CT 7/26 A/P  
FINDINGS: 
  
Redemonstrated is the infected hematoma in the right lower quadrant measuring 
approximately 7 x 2.5 cm. This appears slightly smaller than the prior 
examination. 
  
There is significant right hydroureteronephrosis as was seen before. The level 
of obstruction is likely at the site of the hematoma. 
  
There are no new fluid collections. 
  
Gallbladder is absent. Liver spleen and pancreas remain unremarkable. 
  
IMPRESSION IMPRESSION: 
1. There is been slight decrease in size of the infected hematoma in the right 
lower quadrant. 2. Persistent right hydroureteronephrosis. 
  
Assessment / Plan:  
  
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  
  
From chart review, he was admitted from 6/28-7/9/18 and had stenting and repair of aneurysm. Had leukocytosis and was being treated for aspiration pneumonia per chart. Per DC summary he was on Vancomycin, and flagyl at one point and then Zosyn for 10 days during last admit. 
  
This admission, he is admitted with fever (101.2 ) and leukocytosis (16) and found to have fluid collection around R iliac artery stent extending into retroperitoneum on right. CT read as \" The component around the stent measures 4.6 x 2.7 x 6.3 cm and the component in 
the right retroperitoneum measures 3.6 x 3.1 x 4.6 cm\"  Right hydronephrosis and hydroureter due to compression fo ureter seen as well. He had a CT guided aspiration done and cultures + for Proteus mirabilis. He had a CT on 7/11 prior to this admission that showed a \"moderate-sized hematoma extending along the anterior margin of the right iliopsoas\". \" It measures at least 7.6 x 3.5 cm transverse and 10.3 cm craniocaudal in size\".   
He was started on Cefepime and Levaquin this admission.   Levaquin was stopped later  
  
  
 
  
1) Infected R iliac pseudoaneurysm and stent 
  
S/P repair with stenting on 6/29/18, S/P CT guided drainage of pelvic collection 7/19 wt cx + for Proteus mirabilis 
  
CT with  component around the stent measures 4.6 x 2.7 x 6.3 cm and the component in the right retroperitoneum measures 3.6 x 3.1 x 4.6 cm\" Repeat CT 7/26 with \"slight decrease in size of the infected hematoma in the right lower quadrant\" and \" Persistent right hydroureteronephrosis\". Measurements approximately 7 by 2.5 cm however Check blood cultures to ensure no seeding  
  
Poor surgical candidate given functional status and multiple co morbidities 
  
Successful treatment of infections is not usually possible without adequate source control. Additionally, in his situation, as there is fluid  around the stent, raising high concern for stent to be seeded/infected. 
  
On Cefazolin wt HD dosing F/U wt me after 2 weeks of DC Weekly CBC wt diff, CMP sent to me at [phone] Noted Flagyl added for possible aspiration and awaiting CT chest . If pneumonia on CT and or clinical worsening , may need to expand antibiotics but oxygenation improved on BIPAP and await CT  
 
  
  
2) Per urology notes, superficial dry gangrene of glans penis , also has R hydroureteronephrosis   
3) Sacral decubitus ulcers   
4) MS 
  
5) ESRD on HD 
  
6) S/P PEG  
  
7) DVT px  
 
 
D/W with his wife and family today , d/W wt Dr Ary Henderson, DO  
3:38 PM

## 2018-07-30 NOTE — PROGRESS NOTES
Spiritual Care Assessment/Progress Note [place] [address] 
 
 
NAME: Zeus Galan      MRN: [id number] AGE: 70 y.o. SEX: male Mormonism Affiliation: [facility]  
Language: [place] 7/30/2018     Total Time (in minutes): 13 Spiritual Assessment begun in [facility] PROGRESSIVE CARE through conversation with: 
  
    [x]Patient        [x] Family    [] Friend(s) Reason for Consult: Palliative Care, Follow-up Spiritual beliefs: (Please include comment if needed) 
   [] Identifies with a lovely tradition:     
   [x] Supported by a lovely community:      [employer]  
   [] Claims no spiritual orientation:       
   [] Seeking spiritual identity:            
   [] Adheres to an individual form of spirituality:       
   [] Not able to assess:                   
 
    
Identified resources for coping:  
   [x] Prayer                           
   [] Music                  [] Guided Imagery [x] Family/friends                 [] Pet visits [] Devotional reading                         [] Unknown 
   [] Other:                                         
 
 
Interventions offered during this visit: (See comments for more details) Patient Interventions: Affirmation of lovely, Affirmation of emotions/emotional suffering, Iconic (affirming the presence of God/Higher Power), Initial/Spiritual assessment, patient floor, Prayer (assurance of), Mormonism beliefs/image of God discussed Family/Friend(s): Affirmation of lovely, Iconic (affirming the presence of God/Higher Power), Prayer (assurance of), Mormonism beliefs/image of God discussed Plan of Care: 
 
 [x] Support spiritual and/or cultural needs  
 [] Support AMD and/or advance care planning process    
 [] Support grieving process 
 [] Coordinate Rites and/or Rituals  
 [] Coordination with community clergy  [] No spiritual needs identified at this time 
 [] Detailed Plan of Care below (See Comments)  [] Make referral to Music Therapy 
[] Make referral to Pet Therapy    
[] Make referral to Addiction services 
[] Make referral to [facility] 
[] Make referral to Spiritual Care Partner 
[] No future visits requested       
[x] Follow up visits as needed Comments:  Supportive visit on PCU with palliative patient. His wife, daughters, a son, and children's spouses were present. Patient was on bipap, but he wore a huge smile that could easily be seen. He stretched out his arms in greeting when I introduced myself. Mr. Radha Johnson seems to be having a good day and indicated he is appreciative of his family's presence and support. He had no concerns at this time. Offered assurance of prayer and advised him and his wife of  availability for ongoing pastoral support. VINAY Schultz, [address] [facility],  [facility]  Paging Service  [phone])

## 2018-07-30 NOTE — PROGRESS NOTES
PCU SHIFT NURSING NOTE Bedside and Verbal shift change report given to Jhon Murphy RN (oncoming nurse) by Frederic Salcedo RN (offgoing nurse). Report included the following information SBAR, Kardex, MAR, Recent Results and Cardiac Rhythm NSR. Shift Summary:  
3838:  Changed puilse ox probe from forehead to hands. O2 sats reading 82% on 3L. Increased pt to 6 L NC. Sats still at 84%. Placed pt on 31% venti mask, sats 88%. Increased venti mask to 40%(8L) and sats up to 91%. Will continue to monitor pt. 9141: Increased venti mask to 50% (12L). Sats 90%. Resp rate 32. Will page MD. 
5637:  Paged Dr. Alberta Dixon 36:  Spoke with Dr. Alberta Dixon and received order for ABG 
0950:  Called respiratory for ABG. They will be here soon. 1010:  Pt 91% on 50% venti-mask. 1023:  Artem Kolb, RT at bedside to get ABG. Sats 90% on 50% venti mask. 1035:  Received ABG results. Spoke with Dr. lAberta Dixon and received order for BIPAP with respiratory to adjust setting as pt needs. 1041:  Resp at bedside with BIPAP. 1200:  Pt sats dropping. 84% on BIPAP. Respiratory at bedside adjusting setting. Tachypneic 32-41 resp rate. 1205:  Dr. Alberta Dixon paged. 1215:  MD at bedside. Received order for stat ABG - drawn. STAT x-ray. Transfer to CCU. Respiratory tried multiple pulse ox machine reading from 90-87% on BIPAP. Still tachypeneic at 28. 
1222:  Pt to be transferred to [facility]. Dialysis nurse ending treatment now and disconnecting pt from machine. Spoke with Dr. Alberta Dixon and will hold tube feeds for now. Tube feeds stopped. <10ml of residual. 
1224:  Second ABG drawn to verify not venous. 1235:  Repeat ABG better pH 7.48, CO2 46, O2 99, sats 98% HCO2 33. Relayed results to Dr. Alberta Dixon and received order to cancel transfer to CCU, but keep pt on BIPAP. 1340:  Pt had BM. Pt cleaned, bathed with CHG. Wound care to sacrum per order. 1430:  Pt resting on BIPAP.   Spoke with Respiratory and they are able to go down to CT with pt and nurse. Called CT they had put pt in for transport but it could be a wait. Will try again approx 1530. 
1545:  Spoke with CT and plan is to scan pt at 1600 
1602:  Going off floor with pt, resp, tech and nurse. Pt on monitor x2. BIPAP on O2 96%. 1625:  Back on the floor from CT with pt. VSS. No signs of distress. Wife asked about tube feeding. Informed wife that we would possibly restart after the CT result is back. Pt is more alert and response to voice. 1723:  Paged Dr. Maria L Muñoz to relay CT results. 1729:  Spoke with Dr. Maria L Muñoz about CT results. Received verbal order to hold tube feed tonight but ok to still give meds. Wants pt to remain on BIPAP over night. Pt is tolerating BIPAP and RT is adjusting settings. 1840: Informed wife that tube feeds will be held overnight. Wound care performed to LE and penis. Pt tolerated well. No signs of distress. Admission Date 7/17/2018 Admission Diagnosis Leukocytosis Fever ESRD (end stage renal disease) on dialysis ([facility] [address] Consults IP CONSULT TO VASCULAR SURGERY 
IP CONSULT TO NEPHROLOGY 
IP CONSULT TO UROLOGY 
IP CONSULT TO PALLIATIVE CARE - PROVIDER 
IP CONSULT TO INFECTIOUS DISEASES Consults []PT []OT []Speech [x]Case Management  
  
[x] Palliative Cardiac Monitoring Order  
[x]Yes []No  
 
IV drips []Yes Drip:                            Dose: 
Drip:                            Dose: 
Drip:                            Dose:  
[x]No  
 
GI Prophylaxis [x]Yes []No  
 
 
 
DVT Prophylaxis SCDs:  Sequential Compression Device: Bilateral  
  Patient Refused VTE Prophylaxis: Yes Danny stockings:     
  
[x] Medication []Contraindicated []None Activity Level Activity Level: Bed Rest   
 Activity Assistance: Complete care Purposeful Rounding every 1-2 hour? [x]Yes Lopez Score  Total Score: 4 Bed Alarm (If score 3 or >) []Yes  
[] Refused (See signed refusal form in chart) Delbert Score  Delbert Score: 13 Delbert Score (if score 14 or less) [x]PMT consult [x]Wound Care consult []Specialty bed  
[x] Nutrition consult Needs prior to discharge:  
Home O2 required:   
[x]Yes []No  
 If yes, how much O2 required? Other:  
 Last Bowel Movement: Last Bowel Movement Date: 07/29/18 Influenza Vaccine Received Flu Vaccine for Current Season (usually Sept-March): Not Flu Season Pneumonia Vaccine Diet Active Orders Diet DIET NPO With Tube Feedings LDAs Peripheral IV 07/21/18 Right Antecubital (Active) Site Assessment Clean, dry, & intact 7/30/2018  4:00 AM  
Phlebitis Assessment 0 7/30/2018  4:00 AM  
Infiltration Assessment 0 7/30/2018  4:00 AM  
Dressing Status Clean, dry, & intact 7/30/2018  4:00 AM  
Dressing Type Transparent 7/30/2018  4:00 AM  
Hub Color/Line Status Blue 7/30/2018  4:00 AM  
Action Taken Other (comment) 7/27/2018 11:00 AM  
Alcohol Cap Used Yes 7/27/2018 11:00 AM  
    
Hemodialysis Access 07/25/18 (Active) Central Line Being Utilized Yes 7/30/2018  4:00 AM  
Criteria for Appropriate Use Dialysis/apheresis 7/30/2018  4:00 AM  
Date Accessed  07/27/18 7/29/2018  8:30 PM  
Site Assessment Clean, dry, & intact 7/29/2018  8:30 PM  
Date of Last Dressing Change 07/25/18 7/26/2018  5:45 PM  
Dressing Status Other (comment) 7/28/2018  3:00 AM  
Dressing Type 4 X 4;Tape 7/26/2018  5:45 PM  
  
PEG/Gastrostomy Tube 07/17/18 (Active) Site Assessment Clean, dry, & intact 7/30/2018  4:00 AM  
Dressing Status Clean, dry, & intact 7/30/2018  4:00 AM  
G Port Status Infusing 7/30/2018  4:00 AM  
Action Taken Placement verified (comment) 7/29/2018  8:30 PM  
Drainage Description Other (Comment) 7/29/2018  3:00 AM  
Gastric Residual (mL) 10 ml 7/29/2018  8:30 PM  
Tube Feeding/Formula Options Renal (Nepro) 7/29/2018  8:30 PM  
Tube Feeding/Verify Rate (mL/hr) 45 7/29/2018  8:30 PM  
Water Flush Volume (mL) 30 mL 7/29/2018  8:30 PM  
Intake (ml) 45 ml 7/29/2018  5:00 AM  
Medication Volume 20 ml 7/28/2018  9:32 PM  
Output (ml) 0 ml 7/23/2018 10:07 PM  
            
Urinary Catheter Intake & Output Date 07/29/18 0700 - 07/30/18 0813 07/30/18 0700 - 07/31/18 9962 Shift 0700-1859 1900-0659 24 Hour Total 0700-1859 1900-0659 24 Hour Total  
I 
N 
T 
A 
K 
E 
 NG/GT  30 30 Water Flush Volume (mL) (PEG/Gastrostomy Tube 07/17/18)  30 30 Shift Total 
(mL/kg)  30 
(0.4) 30 
(0.4) O 
U T 
P 
U Yandy Jean Marie Shift Total 
(mL/kg) NET  30 30 Weight (kg) 70.9 70.9 70.9 70.9 70.9 70.9 Readmission Risk Assessment Tool Score High Risk 39 Total Score 3 Has Seen PCP in Last 6 Months (Yes=3, No=0) 2 . Living with Significant Other. Assisted Living. LTAC. SNF. or  
Rehab  
 3 Patient Length of Stay (>5 days = 3) 4 IP Visits Last 12 Months (1-3=4, 4=9, >4=11) 5 Pt. Coverage (Medicare=5 , Medicaid, or Self-Pay=4) 19 Charlson Comorbidity Score (Age + Comorbid Conditions) Expected Length of Stay 4d 21h Actual Length of Stay 13

## 2018-07-30 NOTE — PROGRESS NOTES
New ABG shows improved oxygenation on BiPAP, will keep on PCU Will F/U CXR and CT chest. 
Dr. Maria L Muñoz

## 2018-07-30 NOTE — PROGRESS NOTES
NAME: Herma Fleischer :  1950 MRN:  [id number] Assessment :    Plan: 
--ESRD Left AVF 
HTN Anemia Hypoalbuminemia Sacral Decubitus Right retroperitoneal abscess/hematoma with associated right hydro MS 
PEG tube --MWF HD [facility] -seen on hd around 9:30 am. Using L AV access, revaclear dialyzer, 400 QB, 3k, 2.5 Ca 
UF of 1.5 to 2 Kg attempted but with drop in SBP to 120's- cut back to 1 kg. Stable otherwise. Tolerating well.  
  
Hgb 8.8; epo 10 k sc tiw; one unit prbc's  
  
Holding Parsabiv (not available in the hospital) S/p FNA of retroperitoneal abscess/hematoma --not a surgical candidate. longterm prognosis very poor. Palliative care-This pt has been chronically ill for so many years;and in the past has recovered enough to have a fairly good qol--it wasn't long ago he received a renal tx---albeit short lived--however, as above longterm prognosis poor I have known Mr. Emma Schreiber for sometime (my HD pt). Has overall poor QOl lately and in and out of hospital. Asked him about end of life/wether he would want to stop HD- he declined. He wants to ct HD Subjective: Chief Complaint: seen during dialysis. No acute c/o Review of Systems: no n/v/cp or sob Objective: VITALS:  
Last 24hrs VS reviewed since prior progress note. Most recent are: 
Visit Vitals  /53  Pulse 81  Temp 98.1 °F (36.7 °C) (Axillary)  Resp 26  
 Ht 5' 10\" (1.778 m)  Wt 70.9 kg (156 lb 4.9 oz)  SpO2 94%  BMI 22.43 kg/m2 Intake/Output Summary (Last 24 hours) at 18 3472 Last data filed at 18 4781 Gross per 24 hour Intake              200 ml Output                0 ml Net              200 ml Telemetry Reviewed: PHYSICAL EXAM: 
General: WD, WN. Alert, cooperative, no acute distress Resp:  CTA Bilaterally. Reshma Rodriguez CV:  Regular  rhythm,  . No edema on L. R foot amputation L AVF patent Alert awake Lab Data Reviewed: (see below) Medications Reviewed: (see below) PMH/SH reviewed - no change compared to H&P 
________________________________________________________________________ Care Plan discussed with: 
Patient y Family HD RN y   
Care Manager Consultant:     
 
  Comments >50% of visit spent in counseling and coordination of care    
 
________________________________________________________________________ Payal Mackay MD  
 
Procedures: see electronic medical records for all procedures/Xrays and details which 
were not copied into this note but were reviewed prior to creation of Plan. LABS: 
Recent Labs  
   07/30/18 0422  07/29/18 
 0440 WBC  16.9*  17.4* HGB  9.6*  11.4* HCT  32.0*  37.6 PLT  342  386 Recent Labs  
   07/30/18 0422 07/29/18 
 0440  07/28/18 
 2512 NA  136  136  138  
K  3.8  3.8  3.6 CL  98  97  99 CO2  34*  33*  36* BUN  41*  29*  18  
CREA  4.54*  3.73*  2.80* GLU  177*  190*  179* CA  9.6  10.0  9.0 MG  2.5*  2.5*  2.2 Recent Labs  
   07/30/18 0422 07/29/18 0440  07/28/18 
 9494 SGOT  19  26  19 AP  225*  274*  213* TP  8.3*  9.5*  7.6 ALB  1.2*  1.3*  1.0*  
GLOB  7.1*  8.2*  6.6* No results for input(s): INR, PTP, APTT in the last 72 hours. No lab exists for component: INREXT, INREXT No results for input(s): FE, TIBC, PSAT, FERR in the last 72 hours. No results found for: FOL, RBCF No results for input(s): PH, PCO2, PO2 in the last 72 hours. No results for input(s): CPK, CKMB in the last 72 hours. No lab exists for component: TROPONINI No components found for: [place] Lab Results Component Value Date/Time  Color RED 07/17/2018 02:54 PM  
 Appearance CLOUDY (A) 07/17/2018 02:54 PM  
 Specific gravity 1.024 07/17/2018 02:54 PM  
 Specific gravity 1.015 12/30/2014 06:40 PM  
 pH (UA) 7.0 07/17/2018 02:54 PM  
 Protein 300 (A) 07/17/2018 02:54 PM  
 Glucose 100 (A) 07/17/2018 02:54 PM  
 Ketone NEGATIVE  07/17/2018 02:54 PM  
 Bilirubin SMALL (A) 07/17/2018 02:54 PM  
 Urobilinogen 0.2 07/17/2018 02:54 PM  
 Nitrites NEGATIVE  07/17/2018 02:54 PM  
 Leukocyte Esterase SMALL (A) 07/17/2018 02:54 PM  
 Epithelial cells FEW 07/17/2018 02:54 PM  
 Bacteria 1+ (A) 07/17/2018 02:54 PM  
 WBC >100 (H) 07/17/2018 02:54 PM  
 RBC 10-20 07/17/2018 02:54 PM  
 
 
MEDICATIONS: 
Current Facility-Administered Medications Medication Dose Route Frequency  insulin glargine (LANTUS) injection 5 Units  5 Units SubCUTAneous DAILY  ceFAZolin (ANCEF) 2 g/20 mL in sterile water IV syringe  2 g IntraVENous Once per day on Mon Wed And  [START ON 8/3/2018] ceFAZolin (ANCEF) 3 g in 0.9%  ml IVPB  3 g IntraVENous every Friday  albuterol-ipratropium (DUO-NEB) 2.5 MG-0.5 MG/3 ML  3 mL Nebulization Q6H PRN  
 carvedilol (COREG) tablet 25 mg  25 mg Per G Tube BID WITH MEALS  nystatin (MYCOSTATIN) 100,000 unit/mL oral suspension 500,000 Units  500,000 Units Oral QID  heparin (porcine) injection 5,000 Units  5,000 Units SubCUTAneous Q12H  
 balsam peru-castor oil (VENELEX)  mg/gram ointment   Topical BID  sodium hypochlorite (QUARTER STRENGTH DAKIN'S) 0.125% irrigation (bottle)   Topical DAILY  collagenase (SANTYL) 250 unit/gram ointment   Topical DAILY  epoetin niya (EPOGEN;PROCRIT) injection 10,000 Units  10,000 Units SubCUTAneous Q MON, WED & FRI  acetaminophen (TYLENOL) solution 650 mg  650 mg Per G Tube Q3H PRN  
 aspirin chewable tablet 81 mg  81 mg Per G Tube DAILY  escitalopram oxalate (LEXAPRO) 5 mg/5 mL oral solution soln 10 mg  10 mg Per G Tube DAILY  insulin lispro (HUMALOG) injection   SubCUTAneous Q6H  
 glucose chewable tablet 16 g  4 Tab Oral PRN  
 dextrose (D50W) injection syrg 12.5-25 g  12.5-25 g IntraVENous PRN  
 glucagon (GLUCAGEN) injection 1 mg  1 mg IntraMUSCular PRN  
 morphine 10 mg/5 mL oral solution 10 mg  5 mL Per G Tube Q4H PRN  
 oxyCODONE-acetaminophen (PERCOCET) 5-325 mg per tablet 1 Tab  1 Tab Per G Tube Q4H PRN  
 sevelamer carbonate (RENVELA) oral powder 2,400 mg  2.4 g Per G Tube TID WITH MEALS  pravastatin (PRAVACHOL) tablet 10 mg  10 mg Per G Tube QHS  sodium chloride (NS) flush 5-10 mL  5-10 mL IntraVENous Q8H  
 sodium chloride (NS) flush 5-10 mL  5-10 mL IntraVENous PRN  
 naloxone (NARCAN) injection 0.4 mg  0.4 mg IntraVENous PRN  
 ondansetron (ZOFRAN) injection 4 mg  4 mg IntraVENous Q4H PRN

## 2018-07-30 NOTE — CONSULTS
Reconsulted for right hydronephrosis? ?? Difficult to awaken. Hurts everywhere. Penile necrosis slowly progressing-dry, nontender. See my note 7/18, no change in recomendations from then except to reconsider hospice. If there is a specific question, please call.

## 2018-07-30 NOTE — PROGRESS NOTES
PCU SHIFT NURSING NOTE Bedside and Verbal shift change report given to Joni Koyanagi, RN (oncoming nurse) by Johny Ramirez RN (offgoing nurse). Report included the following information SBAR, Kardex, MAR and Recent Results. Shift Summary:  
9351: Bedside and Verbal shift change report given to Alber Vargas RN (oncoming nurse) by Joni Koyanagi, RN (offgoing nurse). Report included the following information SBAR, Kardex, MAR and Recent Results. Admission Date 7/17/2018 Admission Diagnosis Leukocytosis Fever ESRD (end stage renal disease) on dialysis ([facility] [address] Consults IP CONSULT TO VASCULAR SURGERY 
IP CONSULT TO NEPHROLOGY 
IP CONSULT TO UROLOGY 
IP CONSULT TO PALLIATIVE CARE - PROVIDER 
IP CONSULT TO INFECTIOUS DISEASES Consults []PT []OT []Speech  
[]Case Management  
  
[] Palliative Cardiac Monitoring Order []Yes []No  
 
IV drips []Yes Drip:                            Dose: 
Drip:                            Dose: 
Drip:                            Dose:  
[]No  
 
GI Prophylaxis []Yes []No  
 
 
 
DVT Prophylaxis SCDs:  Sequential Compression Device: Bilateral  
  Patient Refused VTE Prophylaxis: Yes Danny stockings:     
  
[] Medication []Contraindicated []None Activity Level Activity Level: Bed Rest   
 Activity Assistance: Complete care Purposeful Rounding every 1-2 hour? []Yes Lopez Score  Total Score: 4 Bed Alarm (If score 3 or >) []Yes  
[] Refused (See signed refusal form in chart) Delbert Score  Delbert Score: 13 Delbert Score (if score 14 or less) []PMT consult  
[]Wound Care consult []Specialty bed  
[] Nutrition consult Needs prior to discharge:  
Home O2 required:   
[]Yes []No  
 If yes, how much O2 required? Other:  
 Last Bowel Movement: Last Bowel Movement Date: 07/29/18 Influenza Vaccine Received Flu Vaccine for Current Season (usually Sept-March): Not Flu Season Pneumonia Vaccine Diet Active Orders Diet DIET NPO With Tube Feedings LDAs Peripheral IV 07/21/18 Right Antecubital (Active) Site Assessment Clean, dry, & intact 7/29/2018  8:30 PM  
Phlebitis Assessment 0 7/29/2018  8:30 PM  
Infiltration Assessment 0 7/29/2018  8:30 PM  
Dressing Status Clean, dry, & intact 7/29/2018  8:30 PM  
Dressing Type Transparent 7/29/2018  8:30 PM  
Hub Color/Line Status Blue;Flushed 7/29/2018  8:30 PM  
Action Taken Other (comment) 7/27/2018 11:00 AM  
Alcohol Cap Used Yes 7/27/2018 11:00 AM  
    
Hemodialysis Access 07/25/18 (Active) Central Line Being Utilized Yes 7/29/2018  8:30 PM  
Criteria for Appropriate Use Dialysis/apheresis 7/29/2018  8:30 PM  
Date Accessed  07/27/18 7/29/2018  8:30 PM  
Site Assessment Clean, dry, & intact 7/29/2018  8:30 PM  
Date of Last Dressing Change 07/25/18 7/26/2018  5:45 PM  
Dressing Status Other (comment) 7/28/2018  3:00 AM  
Dressing Type 4 X 4;Tape 7/26/2018  5:45 PM  
  
PEG/Gastrostomy Tube 07/17/18 (Active) Site Assessment Clean, dry, & intact 7/29/2018  8:30 PM  
Dressing Status Clean, dry, & intact 7/29/2018  8:30 PM  
G Port Status Infusing 7/29/2018  8:30 PM  
Action Taken Placement verified (comment) 7/29/2018  8:30 PM  
Drainage Description Other (Comment) 7/29/2018  3:00 AM  
Gastric Residual (mL) 10 ml 7/29/2018  8:30 PM  
Tube Feeding/Formula Options Renal (Nepro) 7/29/2018  8:30 PM  
Tube Feeding/Verify Rate (mL/hr) 45 7/29/2018  8:30 PM  
Water Flush Volume (mL) 30 mL 7/29/2018  8:30 PM  
Intake (ml) 45 ml 7/29/2018  5:00 AM  
Medication Volume 20 ml 7/28/2018  9:32 PM  
Output (ml) 0 ml 7/23/2018 10:07 PM  
            
Urinary Catheter Intake & Output Date 07/29/18 0700 - 07/30/18 1147 07/30/18 0700 - 07/31/18 2366 Shift 0700-1859 1900-0659 24 Hour Total 5630-2197 8017-8441 24 Hour Total  
I 
N 
T 
A 
K 
E 
 NG/GT  30 30 Water Flush Volume (mL) (PEG/Gastrostomy Tube 07/17/18)  30 30  Shift Total 
(mL/kg)  30 
(0.4) 30 
(0.4) O 
U T 
P 
U Nely Herder Shift Total 
(mL/kg) NET  30 30 Weight (kg) 70.9 70.9 70.9 70.9 70.9 70.9 Readmission Risk Assessment Tool Score High Risk  Luis Antonio Monroy Total Score 3 Has Seen PCP in Last 6 Months (Yes=3, No=0) 2 . Living with Significant Other. Assisted Living. LTAC. SNF. or  
Rehab  
 3 Patient Length of Stay (>5 days = 3) 4 IP Visits Last 12 Months (1-3=4, 4=9, >4=11) 5 Pt. Coverage (Medicare=5 , Medicaid, or Self-Pay=4) 19 Charlson Comorbidity Score (Age + Comorbid Conditions) Expected Length of Stay 4d 21h Actual Length of Stay 13

## 2018-07-30 NOTE — PROGRESS NOTES
Hospitalist Progress Note NAME: Jan Mcclain :  1950 MRN:  [id number] Assessment / Plan: 
Acute hypoxemic Respiratory Failure: 18 max on BiPAP may be 2ry to aspiration PNA, will check CXR and CT chest, if no improvement may need to be intubated Sepsis with Hypotension POA- hypotension had improved,  no fever last 72h but WBC trending up, maybe 2ry to aspiration, hold tube feedings add Flagyl  For ABX coverage Infected Right Iliac Hematoma: drained shows Proteus, Vascular and ID in the case, has a stent that ideally may need to come out due to infection. C/w Cefepime,  new CT abdomen and pelvis shows decreasing size of hematoma, agree with ID on switching to Cefazolin 2-2-3 with HD 
B/L Buttock Pressure injury ulcer/wound infection vs R iliac artery pseudoaneurysm/collection/stent infection, UTI, no urine culture had been sent, c/w wound care for decubitus ulcer. Encephalopathy POA in the setting of sepsis: still lethargic and in pain. -CT abdm/pelvis showing Right common iliac artery stent with enhancing fluid collection surrounding the stent and extending into the retroperitoneum on the right. suspicious for infection. Also showing Right hydronephrosis and hydroureter secondary to compression of the ureter by the collection around the stent 
-On ,Vascular surgery saw patient and will proceed with aspiration of the fluid noted on CT surrounding the right common iliac artery stent since this could be an abscess. 
-Urology consulted for hydronephrosis right ->saw pt on :no intervention needed 
-On :CT guided aspiration of pelvic fluid collection - culture of fluid showing few -GNRs,Proteus 
-WBC trending up 
-Body fluid Cx-Proteus sens to cefazolin Diabetes mellitus 2 on Insulin at home: BG increasing now that nutrition had improved thru PEG, c/w  low dose Lantus  HbA1C 4.9 Dysphagia due to esophageal dysmotility on MBS study last admission - s/p PEG for FS Q 6 hrs on tube feeding, keep NPO for now,  CT abdomen showing some paretic loops of small bowel, but on exam abdomen is benign will advance tube feeding. Unspecified protein-calorie malnutrition POA in the setting of PEG dependence, sacral decubitus, ESRD -- onTF 
ESRD on HD MWF  Renal in the case. Due for HD tomorrow Anemia of chronic disease POA S/p L arm AVFistula Hyperlipidemia Cont on statins Hypertension resume Coreg thru PEG, monitor. ? Multiple sclerosis POA ? Exacerbation causing dysphagia- cannot tell till MRI done 
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 No signs of failure at this time PAD s/p b/l leg stents S/p remote partial (Syme) amputation of RIGHT foot. -Nephrology consulted for HD MWF 
-Cont ASA 
-Vascular surgery consult as above for opinion on stent infection Code Status: Full Code as per [facility] with palliative Care Surrogate Decision Maker: wife Tony Deshpande  [phone] DVT Prophylaxis: Sq heparin GI Prophylaxis: not indicated Baseline: Pt was recently DC to [facility] from [facility] after being treated for R Iliac ar pseudoaneurysm s/p Stent by Vasc Sx Dr Nely Pinzon, Eso Dysmotility syndrome causing Dysphagia causing aspiration pneumonia s/p PEG tube Pt had denied any neuro workup - denied MRI as recommended by neurology to find the cause of his eso dysmotility- ?MS flare Body mass index is 23.57 kg/(m^2). Prognosis is poor but family still wants everything done, he is Hospice candidate nevertheless but wife wants to honor patient wishes Critically ill patient will transfer to ICU Alert/Cooperative/Awake Subjective: Chief Complaint / Reason for Physician Visit Denies any SOB from yesterday. Confused. Discussed with RN events overnight. Review of Systems: 
Symptom Y/N Comments  Symptom Y/N Comments Fever/Chills n   Chest Pain n   
Poor Appetite n   Edema Cough n   Abdominal Pain n   
Sputum    Joint Pain SOB/BANKS n   Pruritis/Rash Nausea/vomit n   Tolerating PT/OT Diarrhea    Tolerating Diet y Tube feedings Constipation    Other Could NOT obtain due to:   
 
Objective: VITALS:  
Last 24hrs VS reviewed since prior progress note. Most recent are: 
Patient Vitals for the past 24 hrs: 
 Temp Pulse Resp BP SpO2  
07/30/18 1130 - 83 24 128/62 97 % 07/30/18 1100 - 80 22 131/64 (!) 1 %  
07/30/18 1050 - 80 (!) 36 - 100 % 07/30/18 1046 - - - - (!) 88 %  
07/30/18 1045 - 79 28 139/57 90 % 07/30/18 1030 - 80 21 122/52 96 % 07/30/18 1000 - 79 26 124/53 91 %  
07/30/18 0930 - 81 26 122/53 94 % 07/30/18 0900 - 79 26 115/55 100 % 07/30/18 0825 - 84 26 133/52 100 % 07/30/18 0820 98.1 °F (36.7 °C) 88 24 175/69 100 % 07/30/18 0700 98.5 °F (36.9 °C) 80 26 133/55 100 % 07/30/18 0447 97.8 °F (36.6 °C) 82 26 149/61 100 % 07/30/18 0400 97.8 °F (36.6 °C) 82 (!) 31 139/58 100 % 07/30/18 0000 98.4 °F (36.9 °C) 76 18 159/59 -  
07/29/18 2339 98.4 °F (36.9 °C) 77 21 156/63 100 % 07/29/18 2226 - 74 - 143/59 -  
07/29/18 2000 98.5 °F (36.9 °C) 73 12 111/51 -  
07/29/18 1600 99.4 °F (37.4 °C) 85 14 124/63 98 % Intake/Output Summary (Last 24 hours) at 07/30/18 1214 Last data filed at 07/30/18 2297 Gross per 24 hour Intake              200 ml Output                0 ml Net              200 ml PHYSICAL EXAM: 
General:                    lethargic, unresponsive, SOB  
EENT:                       EOMI. Anicteric sclerae. MMM Resp:                        Coarse BS, dull in bases CV:                            Regular  rhythm,  No edema GI: Soft, no  distended, no tender.  +Bowel sounds, PEG in place Neurologic:                Alert and oriented X 0, slow speech, also intermittently confused Psych:                       No  insight. Not anxious nor agitated Skin:                          No rashes. No jaundice. LUE fistula+ Reviewed most current lab test results and cultures  YES Reviewed most current radiology test results   YES Review and summation of old records today    NO Reviewed patient's current orders and MAR    YES 
PMH/SH reviewed - no change compared to H&P 
________________________________________________________________________ Care Plan discussed with: 
  Comments Patient x Family  x   
RN x Care Manager Consultant Multidiciplinary team rounds were held today with , nursing, pharmacist and clinical coordinator. Patient's plan of care was discussed; medications were reviewed and discharge planning was addressed. ________________________________________________________________________ Total NON critical care TIME:    Minutes Total CRITICAL CARE TIME Spent: 45  Minutes non procedure based Comments >50% of visit spent in counseling and coordination of care y   
________________________________________________________________________ Arnaud Box MD  
 
Procedures: see electronic medical records for all procedures/Xrays and details which were not copied into this note but were reviewed prior to creation of Plan. LABS: 
I reviewed today's most current labs and imaging studies. Pertinent labs include: 
Recent Labs  
   07/30/18 
 0422  07/29/18 
 0440  07/28/18 
 0356 WBC  16.9*  17.4*  15.1* HGB  9.6*  11.4*  9.2* HCT  32.0*  37.6  29.7*  
PLT  342  386  334 Recent Labs  
   07/30/18 
 0422  07/29/18 
 0440  07/28/18 
 0116 NA  136  136  138  
K  3.8  3.8  3.6 CL  98  97  99 CO2  34*  33*  36* GLU  177*  190*  179* BUN  41*  29*  18 CREA  4.54*  3.73*  2.80* CA  9.6  10.0  9.0 MG  2.5*  2.5*  2.2 ALB  1.2*  1.3*  1.0*  
TBILI  0.3  0.4  0.3 SGOT  19  26  19 ALT  12  22  15 Signed: Julio Lloyd MD

## 2018-07-30 NOTE — PROGRESS NOTES
Chart reviewed. Deferred seeing patient today due to respiratory issues requiring Bi-Pap support. Will continue to follow for PT.  
 
Juan Baugh, PT

## 2018-07-31 NOTE — PROGRESS NOTES
[facility] [place] Sleep [facility] Name: Thomas Galan MRN: [id number] : 1950 Hospital: [facility] Date: 2018 Critical Care Patient Consult IMPRESSION:  
· Acute Hypoxic Respiratory Failure, was placed on bipap has been doing better since . This am he is comfortable has been with improved oxygenation, Was weaned to [place] oxygen. Overall seems comfortable today. · Suspected aspiration  pneumonia on Ct of chest from . · ESRD on HD, had multiple prior vascular access issues for HD. · Multiple Sclerosis · GERD · Post op infection of hematoma around vascular graft. Infected right iliac hematoma, proteus. Was admitted with right  flank pain, Had associated nausea and vomiting. He did not have fevers or chills. Noted to have right lower quadrant hematoma. · May be having recurrent aspiration. · Cardiomyopathy with LVEF of 35%. · Leukocytosis · PAD · Intestinal Vascular Insufficiency. · Anemia · Type 2 DM. · Ex Smoker · Ex Drinker. · Prognosis is guarded. Pts family desires ongoing full support. RECOMMENDATIONS:  
· Will continue bipap for now · Wean fio2 as tolerated. · Appears medically stable with improved oxygenation and weaned off the bipap today. · Will discuss Abx choice with Dr. Eleni Amado since pt is suspected to have aspirated. · Renal is following. · Blood pressure has been stable. Subjective/History:  
 
18: Pt feels more comfortable today. Suspected to have aspirated on tube feeds. His tube feeds have been off for last 24 hrs. NO chest pain, no back pain, no abdominal pain today. No issues last pm. Was still on bipap this am but was able to be weaned off. NO Headaches. No other acute issues. 18: Noted to have desaturations, Was felt to have increased work of breathing. Was placed on bipap . Seems to be doing much better compared to earlier today.  
At this point remains on bipap with fio2 of 100%. He has no acute complaints. No chest pain no back pain. No leg pain. He feels that his breathing is pretty comfortable. NO GERD. Denies much abdominal pain. Rest of 10 point ROS is negative. This patient has been seen and evaluated at the request of Dr. Erika Rey for above. Patient is a 70 y.o. 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 He was treated on 6/29/18 for right common iliac and external iliac artery stenting for pseudoaneurysm. Had been admitted on 6/28/18 through 7/9/18 and had stenting and repair or aneurysm. Was also being treated with aspiration pneumonia. Was on VAncomycin and flagy and then was on zosyn for 10 days. Was admitted with fevers and has elevated WBC. Was noted to have proteus. Had a moderate hematoma along the right iliopsoas. He was felt to be a poor surgical candidate. Per Vascular surgery evaluation: 
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 PAD s/p BLE bypasses and Syme amputation Intestinal vascular insufficiency The patient is critically ill and can not provide additional history due to on bipap. Past Medical History:  
Diagnosis Date  Anemia associated with chronic renal failure  Autoimmune disease (HCC)   
 hx ms  CAD (coronary artery disease)  Chronic kidney disease   
 catheter removed and no dialysis at this time  Chronic kidney disease   
 left arm fistula dialysis MWF  
 Diabetes ([facility] [address] type II  
 Elevated troponin 6/29/2018  GERD (gastroesophageal reflux disease)  Hypertension  Ill-defined condition   
 prostate cancer  Multiple sclerosis ([facility] [address] diagnosed '01  
 Other ill-defined conditions(799.89)   
 anemia  Other ill-defined conditions(799.89)   
 elevated cholesterol  Other ill-defined conditions(799.89)   
 hx septic right foot  Peripheral vascular disease ([facility] [address]  Secondary hyperparathyroidism of renal origin ([facility] [address]  Thyroid disease Past Surgical History:  
Procedure Laterality Date  COLONOSCOPY N/A 12/6/2016 COLONOSCOPY performed by Misty Whatley MD at [facility] ENDOSCOPY  COLONOSCOPY,DIAGNOSTIC  12/6/2016  CORONARY STENT SINGLE W/PTCA  2/17/2011  HX APPENDECTOMY  HX CATARACT REMOVAL  10/18/10  
 bilateral  
 HX CHOLECYSTECTOMY  HX GI    
 ileostomy due to infection  HX HEART CATHETERIZATION    
 HX HEENT    
 hx post photocoagulation eye 2009  HX OTHER SURGICAL    
 right partial foot amputation  HX OTHER SURGICAL    
 cardiac cath  HX OTHER SURGICAL    
 prostate removed  PLACE PERCUT GASTROSTOMY TUBE  7/6/2018  [place] COLONOSCOPY W/BIOPSY SINGLE/MULTIPLE  5/10/2010  UPPER GI ENDOSCOPY,BIOPSY  4/17/2018  UPPER GI ENDOSCOPY,REMV TUMOR,SNARE  6/13/2018  VASCULAR SURGERY PROCEDURE UNLIST    
 katelyn. leg bypasses Prior to Admission medications Medication Sig Start Date End Date Taking? Authorizing Provider  
oxyCODONE-acetaminophen (PERCOCET) 5-325 mg per tablet 1 Tab by Per G Tube route every four (4) hours as needed for Pain. Yes Historical Provider  
epoetin niya (EPOGEN;PROCRIT) 10,000 unit/mL injection 10,000 Units by SubCUTAneous route Q TU, TH & SAT. Yes Historical Provider  
insulin lispro (HUMALOG) 100 unit/mL injection 2-4 Units by SubCUTAneous route Before breakfast, lunch, dinner and at bedtime. Yes Historical Provider  
lidocaine (LIDODERM) 5 % 2 Patches by TransDERmal route daily. Apply patch to the affected area for 12 hours a day and remove for 12 hours a day. Yes Historical Provider  
lisinopril (PRINIVIL, ZESTRIL) 20 mg tablet 20 mg by Per G Tube route daily. Yes Historical Provider  
escitalopram oxalate (LEXAPRO) 5 mg/5 mL oral solution 10 mg by Per G Tube route daily.    Yes Historical Provider  
sevelamer carbonate (RENVELA) 2.4 gram pwpk oral powder 2.4 g by Per G Tube route three (3) times daily (with meals). Yes Historical Provider  
pravastatin (PRAVACHOL) 10 mg tablet 1 Tab by Per G Tube route nightly. 7/9/18  Yes Mariana Moss MD  
cloNIDine HCl (CATAPRES) 0.1 mg tablet 1 Tab by Per G Tube route three (3) times daily. 7/9/18  Yes Mariana Moss MD  
carvedilol (COREG) 25 mg tablet 2 Tabs by Per G Tube route two (2) times daily (with meals). 7/9/18  Yes Mariana Moss MD  
aspirin 81 mg chewable tablet 1 Tab by Per G Tube route daily. 7/9/18  Yes Mariana Moss MD  
amLODIPine (NORVASC) 10 mg tablet 1 Tab by Per G Tube route daily. 7/9/18  Yes Mariana Moss MD  
acetaminophen (TYLENOL) 160 mg/5 mL liquid 650 mg by Per G Tube route every three (3) hours as needed for Pain. Historical Provider  
bisacodyl (DULCOLAX) 10 mg suppository Insert 10 mg into rectum daily as needed (\"Constipation\"). Historical Provider  
guaiFENesin-codeine (ROBITUSSIN AC) 100-10 mg/5 mL solution Take 2.5 mL by mouth every six (6) hours as needed for Cough. Historical Provider  
morphine 10 mg/5 mL oral solution 5 mL by Per G Tube route every four (4) hours as needed for Pain. Pain scale 7-10    Historical Provider  
mupirocin (BACTROBAN) 2 % ointment Apply  to affected area every eight (8) hours as needed (\"Dry skin\"). Historical Provider  
ondansetron (ZOFRAN ODT) 4 mg disintegrating tablet Take 4 mg by mouth every six (6) hours as needed for Nausea. Historical Provider  
sodium hypochlorite (DAKIN'S SOLUTION) external solution Apply  to affected area every eight (8) hours as needed (\"Wound care\"). Historical Provider Current Facility-Administered Medications Medication Dose Route Frequency  albuterol-ipratropium (DUO-NEB) 2.5 MG-0.5 MG/3 ML  3 mL Nebulization Q6H RT  
 metroNIDAZOLE (FLAGYL) IVPB premix 500 mg  500 mg IntraVENous Q8H  
 insulin glargine (LANTUS) injection 5 Units  5 Units SubCUTAneous DAILY  ceFAZolin (ANCEF) 2 g/20 mL in sterile water IV syringe  2 g IntraVENous Once per day on Mon Wed And  [START ON 8/3/2018] ceFAZolin (ANCEF) 3 g in 0.9%  ml IVPB  3 g IntraVENous every Friday  carvedilol (COREG) tablet 25 mg  25 mg Per G Tube BID WITH MEALS  nystatin (MYCOSTATIN) 100,000 unit/mL oral suspension 500,000 Units  500,000 Units Oral QID  heparin (porcine) injection 5,000 Units  5,000 Units SubCUTAneous Q12H  
 balsam peru-castor oil (VENELEX)  mg/gram ointment   Topical BID  sodium hypochlorite (QUARTER STRENGTH DAKIN'S) 0.125% irrigation (bottle)   Topical DAILY  collagenase (SANTYL) 250 unit/gram ointment   Topical DAILY  epoetin niya (EPOGEN;PROCRIT) injection 10,000 Units  10,000 Units SubCUTAneous Q MON, WED & FRI  aspirin chewable tablet 81 mg  81 mg Per G Tube DAILY  escitalopram oxalate (LEXAPRO) 5 mg/5 mL oral solution soln 10 mg  10 mg Per G Tube DAILY  insulin lispro (HUMALOG) injection   SubCUTAneous Q6H  
 sevelamer carbonate (RENVELA) oral powder 2,400 mg  2.4 g Per G Tube TID WITH MEALS  pravastatin (PRAVACHOL) tablet 10 mg  10 mg Per G Tube QHS  sodium chloride (NS) flush 5-10 mL  5-10 mL IntraVENous Q8H Allergies Allergen Reactions  Sensipar [Cinacalcet] Other (comments) Cinacalcet (Sensipar) has been added as an \"allergy\" / intolerance with a comment to assure providers at discharge and post discharge are aware of Dr. David Lopez recommendation to avoid Sensipar. Patient takes Reinier Freedman as an outpatient; Thus, Manette Angel has been discontinued per Nephrology (cannot give with recent Parsabiv b/c risk of low Calcium). Social History Substance Use Topics  Smoking status: Former Smoker Years: 1.00 Quit date: 4/12/2003  Smokeless tobacco: Never Used Comment: quit 5 years ago  Alcohol use No  
   Comment: Stopped drinking 10 years ago Family History Problem Relation Age of Onset  Diabetes Mother Review of Systems: 
Review of systems not obtained due to patient factors. Pt is on bipap and not able to give complete ROS. He is able to shake his head yes and no to answer some questions. Objective:  
Vital Signs:   
Visit Vitals  /55 (BP 1 Location: Right arm, BP Patient Position: At rest)  Pulse 80  Temp 98.9 °F (37.2 °C)  Resp 18  Ht 5' 10\" (1.778 m)  Wt 69.3 kg (152 lb 12.5 oz)  SpO2 97%  BMI 21.92 kg/m2 O2 Device: BIPAP  
O2 Flow Rate (L/min): 3 l/min Temp (24hrs), Av.2 °F (36.8 °C), Min:97.6 °F (36.4 °C), Max:98.9 °F (37.2 °C) Intake/Output:  
Last shift:        
Last 3 shifts:  1901 -  0700 In: 200 Out: 1000 Intake/Output Summary (Last 24 hours) at 18 1138 Last data filed at 18 1155 Gross per 24 hour Intake                0 ml Output             1000 ml Net            -1000 ml Hemodynamics:  
PAP:   CO:    
Wedge:   CI:    
CVP:    SVR:    
  PVR:    
 
Ventilator Settings: 
Mode Rate Tidal Volume Pressure FiO2 PEEP  
         60 % Peak airway pressure:     
Minute ventilation: 8.9 l/min Physical Exam: 
 
General:  Alert, cooperative, no distress, appears stated age. Alert, conversant, no distress on bipap. fio2 of 60% on bipap. Pox was noted to be 100%. POx on his left ear. Head:  Normocephalic, without obvious abnormality, atraumatic. Eyes:  Conjunctivae/corneas clear. PERRL, EOMs intact. Nose: Nares normal. Septum midline. Mucosa normal. No drainage or sinus tenderness. Throat: Lips, mucosa, and tongue normal. Teeth and gums normal.  
Neck: Supple, symmetrical, trachea midline, no adenopathy, thyroid: no enlargment/tenderness/nodules, no carotid bruit and no JVD. Back:   Symmetric, no curvature. ROM normal.  
Lungs:   He is breathing comfortably. NO wheezing or rhonchi are noted today. He was still on bipap when seen.     
Chest wall:  No tenderness or deformity. Heart:  Regular rate and rhythm, S1, S2 normal, no murmur, click, rub or gallop. Abdomen:   Soft, non-tender. Bowel sounds normal. No masses,  No organomegaly. Extremities: Extremities normal, atraumatic, multiple prior AV fistulas. Has prior amputations of the RLE. Pulses: 2+ and symmetric all extremities. Skin: Skin color, texture, turgor normal. No rashes or lesions Lymph nodes: Cervical, supraclavicular, and axillary nodes normal.  
Neurologic: Grossly nonfocal, motor strength seems intact. Psych: no overt anxiety or depression. Data:  
 
Recent Results (from the past 24 hour(s)) BLOOD GAS, ARTERIAL Collection Time: 07/30/18 10:22 AM  
Result Value Ref Range pH 7.51 (H) 7.35 - 7.45    
 PCO2 46 (H) 35.0 - 45.0 mmHg PO2 46 (LL) 80 - 100 mmHg O2 SAT 86 (L) 92 - 97 % BICARBONATE 35 (H) 22 - 26 mmol/L  
 BASE EXCESS 10.6 mmol/L  
 O2 METHOD VENTURI MASK    
 O2 FLOW RATE 12.00 L/min FIO2 50 % SPONTANEOUS RATE 39.0 Sample source ARTERIAL    
 SITE RIGHT BRACHIAL MANSOOR'S TEST N/A Critical value read back Nery Nick MD   
BLOOD GAS, ARTERIAL Collection Time: 07/30/18 12:15 PM  
Result Value Ref Range pH 7.48 (H) 7.35 - 7.45    
 PCO2 48 (H) 35.0 - 45.0 mmHg PO2 54 (L) 80 - 100 mmHg O2 SAT 90 (L) 92 - 97 % BICARBONATE 35 (H) 22 - 26 mmol/L  
 BASE EXCESS 9.7 mmol/L  
 O2 METHOD BIPAP    
 FIO2 100 % MODE BIPAP    
 IPAP/PIP 16.0 EPAP/CPAP/PEEP 100.0 Sample source ARTERIAL    
 SITE RIGHT BRACHIAL MANSOOR'S TEST N/A    
GLUCOSE, POC Collection Time: 07/30/18 12:15 PM  
Result Value Ref Range Glucose (POC) 127 (H) 65 - 100 mg/dL Performed by Vaishnavi Peters BLOOD GAS, ARTERIAL Collection Time: 07/30/18 12:27 PM  
Result Value Ref Range pH 7.48 (H) 7.35 - 7.45    
 PCO2 46 (H) 35.0 - 45.0 mmHg PO2 99 80 - 100 mmHg O2 SAT 98 (H) 92 - 97 %  BICARBONATE 33 (H) 22 - 28 mmol/L  
 BASE EXCESS 8.1 mmol/L  
 O2 METHOD BIPAP    
 FIO2 100 % MODE BIPAP    
 SPONTANEOUS RATE 33.0 PRESSURE SUPPORT 6.0 IPAP/PIP 16.0 EPAP/CPAP/PEEP 10.0 Sample source ARTERIAL    
 SITE RIGHT BRACHIAL MANSOOR'S TEST N/A    
GLUCOSE, POC Collection Time: 07/30/18  6:31 PM  
Result Value Ref Range Glucose (POC) 133 (H) 65 - 100 mg/dL Performed by [facility](C0N) GLUCOSE, POC Collection Time: 07/30/18 11:48 PM  
Result Value Ref Range Glucose (POC) 134 (H) 65 - 100 mg/dL Performed by Rupert KULKARNI   
CBC WITH AUTOMATED DIFF Collection Time: 07/31/18  3:24 AM  
Result Value Ref Range WBC 19.2 (H) 4.1 - 11.1 K/uL  
 RBC 3.23 (L) 4.10 - 5.70 M/uL HGB 9.0 (L) 12.1 - 17.0 g/dL HCT 29.8 (L) 36.6 - 50.3 % MCV 92.3 80.0 - 99.0 FL  
 MCH 27.9 26.0 - 34.0 PG  
 MCHC 30.2 30.0 - 36.5 g/dL  
 RDW 18.6 (H) 11.5 - 14.5 % PLATELET 389 074 - 883 K/uL MPV 9.9 8.9 - 12.9 FL  
 NRBC 0.1 (H) 0  WBC ABSOLUTE NRBC 0.02 (H) 0.00 - 0.01 K/uL NEUTROPHILS 87 (H) 32 - 75 % BAND NEUTROPHILS 2 % LYMPHOCYTES 4 (L) 12 - 49 % MONOCYTES 6 5 - 13 % EOSINOPHILS 0 0 - 7 % BASOPHILS 1 0 - 1 % IMMATURE GRANULOCYTES 0 0.0 - 0.5 % ABS. NEUTROPHILS 17.0 (H) 1.8 - 8.0 K/UL  
 ABS. LYMPHOCYTES 0.8 0.8 - 3.5 K/UL  
 ABS. MONOCYTES 1.2 (H) 0.0 - 1.0 K/UL  
 ABS. EOSINOPHILS 0.0 0.0 - 0.4 K/UL  
 ABS. BASOPHILS 0.2 (H) 0.0 - 0.1 K/UL  
 ABS. IMM. GRANS. 0.0 0.00 - 0.04 K/UL  
 DF MANUAL    
 RBC COMMENTS ANISOCYTOSIS 1+ 
    
 RBC COMMENTS POLYCHROMASIA 1+ METABOLIC PANEL, COMPREHENSIVE Collection Time: 07/31/18  4:11 AM  
Result Value Ref Range Sodium 135 (L) 136 - 145 mmol/L Potassium 4.0 3.5 - 5.1 mmol/L Chloride 98 97 - 108 mmol/L  
 CO2 33 (H) 21 - 32 mmol/L Anion gap 4 (L) 5 - 15 mmol/L Glucose 104 (H) 65 - 100 mg/dL BUN 26 (H) 6 - 20 MG/DL  Creatinine 3.16 (H) 0.70 - 1.30 MG/DL  
 BUN/Creatinine ratio 8 (L) 12 - 20    
 GFR est AA 24 (L) >60 ml/min/1.73m2 GFR est non-AA 20 (L) >60 ml/min/1.73m2 Calcium 9.7 8.5 - 10.1 MG/DL Bilirubin, total 0.4 0.2 - 1.0 MG/DL  
 ALT (SGPT) <6 (L) 12 - 78 U/L  
 AST (SGOT) 16 15 - 37 U/L Alk. phosphatase 201 (H) 45 - 117 U/L Protein, total 8.3 (H) 6.4 - 8.2 g/dL Albumin 1.1 (L) 3.5 - 5.0 g/dL Globulin 7.2 (H) 2.0 - 4.0 g/dL A-G Ratio 0.2 (L) 1.1 - 2.2 GLUCOSE, POC Collection Time: 07/31/18  5:43 AM  
Result Value Ref Range Glucose (POC) 112 (H) 65 - 100 mg/dL Performed by [facility] Telemetry:NSR with type 2 block. Imaging: 
I have personally reviewed the patients radiographs and have reviewed the reports: 
 
7-30-18: CXR: COMPARISON: July 28, 2018 
  
FINDINGS: AP portable imaging of the chest performed at 12:53 PM demonstrates a 
stable cardiomediastinal silhouette. There is right basilar atelectasis with a 
probable small right pleural effusion. No significant osseous abnormalities are 
seen. 
  
IMPRESSION: Right basilar atelectasis with probable small right pleural 
effusion. 7-26-18: CT of abdomen: IMPRESSION: 
1. There is been slight decrease in size of the infected hematoma in the right 
lower quadrant. 2. Persistent right hydroureteronephrosis. Ct of chest from 7/30/18. IMPRESSION: Debris filled right lower lobe branch airway is extending into the 
right mainstem bronchus with right lower lobe consolidative infiltrates 
suspicious for pneumonia with associated small parapneumonic effusion. Probable 
reactive mediastinal adenopathy with enlarged 1.8 cm subcarinal lymph node. Previously seen left infiltrate is cleared.   
 
 
iVvek Sanchez MD

## 2018-07-31 NOTE — PROGRESS NOTES
Infectious Disease Progress Note Subjective:  
Mr Papito Gao seen today. Has been on Bipap this am.  D/W RN and examined wounds together with RN today. He is sleepy, not much verbal. 
 
 
 
 
Objective: 
 
Vitals:  
Patient Vitals for the past 24 hrs: 
 Temp Pulse Resp BP SpO2  
07/31/18 1028 - - 19 - 100 % 07/31/18 0914 - - - - 97 % 07/31/18 0731 98.9 °F (37.2 °C) 80 18 126/55 100 % 07/31/18 0406 - 94 - - 94 % 07/31/18 0300 98.6 °F (37 °C) 78 16 134/58 100 % 07/31/18 0146 - - - - 100 % 07/31/18 0029 - - - - 100 % 07/30/18 2305 97.9 °F (36.6 °C) 77 26 105/54 98 % 07/30/18 2141 - 82 - 142/60 -  
07/30/18 1946 98.1 °F (36.7 °C) 80 24 136/58 100 % 07/30/18 1940 - - - - 100 % 07/30/18 1900 - 77 24 133/59 100 % 07/30/18 1800 - 77 17 116/56 -  
07/30/18 1700 - 82 23 140/66 100 % 07/30/18 1600 - 81 21 120/61 96 % 07/30/18 1558 - - - - 96 % 07/30/18 1530 - 81 20 - -  
07/30/18 1500 97.6 °F (36.4 °C) 84 (!) 40 128/62 99 % 07/30/18 1430 - 83 26 124/65 98 % 07/30/18 1400 - 86 18 145/71 100 % 07/30/18 1330 - 84 24 114/59 99 % 07/30/18 1300 - 81 (!) 31 109/56 -  
07/30/18 1215 - 84 27 114/63 94 % 07/30/18 1201 - 85 (!) 32 - (!) 84 %  
07/30/18 1200 98.2 °F (36.8 °C) 82 26 138/67 96 % 07/30/18 1155 - 82 30 114/63 97 % 07/30/18 1130 - 83 24 128/62 97 % Physical Exam: ¨ GEN: NAD, bipap ¨ HEENT: no scleral icterus,   has bipap on now , once removed, attempted to examine oral cavity, poor dentition ( limited exam) ¨ CV: S1, S2 heard regularly ¨ Lungs: Coarse lung sounds ¨ Abdomen: soft, non distended, no facial grimace to palpation ¨ Extremities: no edema, RLE stump site wt small superficial wound, no discharge or erythema noted ¨ : Penis ( L side with chronic wound, no erythema noted , sacral wound noted, wt eschar appearance, no erythema noted , + stool noted ) ¨ Neuro: Alert to self, not much verbal , sleepy ¨ Skin: no rash 
 
 
  
 
Medications: 
 
Current Facility-Administered Medications:  
  albuterol-ipratropium (DUO-NEB) 2.5 MG-0.5 MG/3 ML, 3 mL, Nebulization, Q6H RT, Anthony Clark MD, 3 mL at 07/31/18 0914 
  metroNIDAZOLE (FLAGYL) IVPB premix 500 mg, 500 mg, IntraVENous, Q8H, Anthony Clark MD, Last Rate: 100 mL/hr at 07/31/18 0544, 500 mg at 07/31/18 0544   insulin glargine (LANTUS) injection 5 Units, 5 Units, SubCUTAneous, DAILY, Anthony Clark MD, 5 Units at 07/31/18 9805   ceFAZolin (ANCEF) 2 g/20 mL in sterile water IV syringe, 2 g, IntraVENous, Once per day on Mon Wed, 2 g at 07/30/18 1714 **AND** [START ON 8/3/2018] ceFAZolin (ANCEF) 3 g in 0.9%  ml IVPB, 3 g, IntraVENous, every Friday, Anthony Clark MD 
  carvedilol (COREG) tablet 25 mg, 25 mg, Per G Tube, BID WITH MEALS, Anthony Clark MD, Stopped at 07/31/18 0800 
  nystatin (MYCOSTATIN) 100,000 unit/mL oral suspension 500,000 Units, 500,000 Units, Oral, QID, Anthony Clark MD, 500,000 Units at 07/31/18 5111   heparin (porcine) injection 5,000 Units, 5,000 Units, SubCUTAneous, Q12H, Pia Gordon MD, 5,000 Units at 07/31/18 1298   balsam peru-castor oil (VENELEX) [id number] mg/gram ointment, , Topical, BID, Sanam Santana MD 
  sodium hypochlorite (QUARTER STRENGTH DAKIN'S) 0.125% irrigation (bottle), , Topical, DAILY, Chung Qiu MD 
  collagenase (SANTYL) 250 unit/gram ointment, , Topical, DAILY, Chung Qiu MD 
  epoetin niya (EPOGEN;PROCRIT) injection 10,000 Units, 10,000 Units, SubCUTAneous, Q MON, [employer], Curt May MD, 10,000 Units at 07/30/18 1712   acetaminophen (TYLENOL) solution 650 mg, 650 mg, Per G Tube, Q3H PRN, Gi Whitfield MD, 650 mg at 07/26/18 3681   aspirin chewable tablet 81 mg, 81 mg, Per G Tube, DAILY, Gi Whitfield MD, Stopped at 07/31/18 0900   escitalopram oxalate (LEXAPRO) 5 mg/5 mL oral solution soln 10 mg, 10 mg, Per G Tube, DAILY, Gi Whitfield MD, Stopped at 07/31/18 0900 
  insulin lispro (HUMALOG) injection, , SubCUTAneous, Q6H, Cayden Cosme MD, Stopped at 07/30/18 1200 
  glucose chewable tablet 16 g, 4 Tab, Oral, PRN, Cayden Cosme MD 
  dextrose (D50W) injection syrg 12.5-25 g, 12.5-25 g, IntraVENous, PRN, Cayden Cosme MD, 12.5 g at 07/26/18 2357 
  glucagon (GLUCAGEN) injection 1 mg, 1 mg, IntraMUSCular, PRN, Cayden Cosme MD 
  morphine 10 mg/5 mL oral solution 10 mg, 5 mL, Per G Tube, Q4H PRN, Cayden Cosme MD, 10 mg at 07/30/18 0430 
  oxyCODONE-acetaminophen (PERCOCET) 5-325 mg per tablet 1 Tab, 1 Tab, Per G Tube, Q4H PRN, Cayden Cosme MD, 1 Tab at 07/25/18 4025   sevelamer carbonate (RENVELA) oral powder 2,400 mg, 2.4 g, Per G Tube, TID WITH MEALS, Cayden Cosme MD, Stopped at 07/31/18 0800   pravastatin (PRAVACHOL) tablet 10 mg, 10 mg, Per G Tube, QHS, Cayden Cosme MD, 10 mg at 07/30/18 2141   sodium chloride (NS) flush 5-10 mL, 5-10 mL, IntraVENous, Q8H, Cayden Cosme MD, 10 mL at 07/31/18 0544   sodium chloride (NS) flush 5-10 mL, 5-10 mL, IntraVENous, PRN, Cayden Cosme MD, 10 mL at 07/17/18 2039 
  naloxone (NARCAN) injection 0.4 mg, 0.4 mg, IntraVENous, PRN, Cayden Cosme MD 
  ondansetron [facility]) injection 4 mg, 4 mg, IntraVENous, Q4H PRN, Cayden Cosme MD, 4 mg at 07/26/18 2638 Labs: 
Recent Results (from the past 24 hour(s)) BLOOD GAS, ARTERIAL Collection Time: 07/30/18 12:15 PM  
Result Value Ref Range pH 7.48 (H) 7.35 - 7.45    
 PCO2 48 (H) 35.0 - 45.0 mmHg PO2 54 (L) 80 - 100 mmHg O2 SAT 90 (L) 92 - 97 % BICARBONATE 35 (H) 22 - 26 mmol/L  
 BASE EXCESS 9.7 mmol/L  
 O2 METHOD BIPAP    
 FIO2 100 % MODE BIPAP    
 IPAP/PIP 16.0 EPAP/CPAP/PEEP 100.0 Sample source ARTERIAL    
 SITE RIGHT BRACHIAL MANSOOR'S TEST N/A    
GLUCOSE, POC Collection Time: 07/30/18 12:15 PM  
Result Value Ref Range  Glucose (POC) 127 (H) 65 - 100 mg/dL Performed by Vaishnavi Peters BLOOD GAS, ARTERIAL Collection Time: 07/30/18 12:27 PM  
Result Value Ref Range pH 7.48 (H) 7.35 - 7.45    
 PCO2 46 (H) 35.0 - 45.0 mmHg PO2 99 80 - 100 mmHg O2 SAT 98 (H) 92 - 97 % BICARBONATE 33 (H) 22 - 26 mmol/L  
 BASE EXCESS 8.1 mmol/L  
 O2 METHOD BIPAP    
 FIO2 100 % MODE BIPAP    
 SPONTANEOUS RATE 33.0 PRESSURE SUPPORT 6.0 IPAP/PIP 16.0 EPAP/CPAP/PEEP 10.0 Sample source ARTERIAL    
 SITE RIGHT BRACHIAL MANSOOR'S TEST N/A    
GLUCOSE, POC Collection Time: 07/30/18  6:31 PM  
Result Value Ref Range Glucose (POC) 133 (H) 65 - 100 mg/dL Performed by [facility]) GLUCOSE, POC Collection Time: 07/30/18 11:48 PM  
Result Value Ref Range Glucose (POC) 134 (H) 65 - 100 mg/dL Performed by Nikolas KULKARNI   
CBC WITH AUTOMATED DIFF Collection Time: 07/31/18  3:24 AM  
Result Value Ref Range WBC 19.2 (H) 4.1 - 11.1 K/uL  
 RBC 3.23 (L) 4.10 - 5.70 M/uL HGB 9.0 (L) 12.1 - 17.0 g/dL HCT 29.8 (L) 36.6 - 50.3 % MCV 92.3 80.0 - 99.0 FL  
 MCH 27.9 26.0 - 34.0 PG  
 MCHC 30.2 30.0 - 36.5 g/dL  
 RDW 18.6 (H) 11.5 - 14.5 % PLATELET 905 895 - 645 K/uL MPV 9.9 8.9 - 12.9 FL  
 NRBC 0.1 (H) 0  WBC ABSOLUTE NRBC 0.02 (H) 0.00 - 0.01 K/uL NEUTROPHILS 87 (H) 32 - 75 % BAND NEUTROPHILS 2 % LYMPHOCYTES 4 (L) 12 - 49 % MONOCYTES 6 5 - 13 % EOSINOPHILS 0 0 - 7 % BASOPHILS 1 0 - 1 % IMMATURE GRANULOCYTES 0 0.0 - 0.5 % ABS. NEUTROPHILS 17.0 (H) 1.8 - 8.0 K/UL  
 ABS. LYMPHOCYTES 0.8 0.8 - 3.5 K/UL  
 ABS. MONOCYTES 1.2 (H) 0.0 - 1.0 K/UL  
 ABS. EOSINOPHILS 0.0 0.0 - 0.4 K/UL  
 ABS. BASOPHILS 0.2 (H) 0.0 - 0.1 K/UL  
 ABS. IMM. GRANS. 0.0 0.00 - 0.04 K/UL  
 DF MANUAL    
 RBC COMMENTS ANISOCYTOSIS 1+ 
    
 RBC COMMENTS POLYCHROMASIA 1+ METABOLIC PANEL, COMPREHENSIVE Collection Time: 07/31/18  4:11 AM  
Result Value Ref Range  Sodium 135 (L) 136 - 145 mmol/L Potassium 4.0 3.5 - 5.1 mmol/L Chloride 98 97 - 108 mmol/L  
 CO2 33 (H) 21 - 32 mmol/L Anion gap 4 (L) 5 - 15 mmol/L Glucose 104 (H) 65 - 100 mg/dL BUN 26 (H) 6 - 20 MG/DL Creatinine 3.16 (H) 0.70 - 1.30 MG/DL  
 BUN/Creatinine ratio 8 (L) 12 - 20 GFR est AA 24 (L) >60 ml/min/1.73m2 GFR est non-AA 20 (L) >60 ml/min/1.73m2 Calcium 9.7 8.5 - 10.1 MG/DL Bilirubin, total 0.4 0.2 - 1.0 MG/DL  
 ALT (SGPT) <6 (L) 12 - 78 U/L  
 AST (SGOT) 16 15 - 37 U/L Alk. phosphatase 201 (H) 45 - 117 U/L Protein, total 8.3 (H) 6.4 - 8.2 g/dL Albumin 1.1 (L) 3.5 - 5.0 g/dL Globulin 7.2 (H) 2.0 - 4.0 g/dL A-G Ratio 0.2 (L) 1.1 - 2.2 GLUCOSE, POC Collection Time: 07/31/18  5:43 AM  
Result Value Ref Range Glucose (POC) 112 (H) 65 - 100 mg/dL Performed by [facility]:  
 Blood 7/26 pending Blood 7/17 Component Results   
   Component Value Ref Range & Units Status  
   Special Requests: NO SPECIAL REQUESTS    Final  
   Culture result: NO GROWTH 6 DAYS    Final  
    
Result History   
  
 Pelvic abscess 7/19 Component Value Ref Range & Units Status   
  Special Requests: NO SPECIAL REQUESTS   Final  
  GRAM STAIN 3+ WBCS SEEN   Final  
  GRAM STAIN NO ORGANISMS SEEN   Final  
  Culture result: FEW PROTEUS MIRABILIS (A)   Final  
   
Culture & Susceptibility   
    Antibiotic   Organism Organism Organism  
    Proteus mirabilis    
  AMIKACIN ($)   <=16   ug/mL   S Final      
  AMPICILLIN ($)   <=8   ug/mL   S Final      
  AMPICILLIN/SULBACTAM ($)   <=8/4   ug/mL   S Final      
  AZTREONAM ($$$$)   <=4   ug/mL   S Final      
  CEFAZOLIN ($)   <=8   ug/mL   S Final      
  CEFEPIME ($$)   <=4   ug/mL   S Final      
  CEFOTAXIME   <=2   ug/mL   S Final      
  CEFTAZIDIME ($)   <=1   ug/mL   S Final      
  CEFTRIAXONE ($)   <=1   ug/mL   S Final      
  CEFUROXIME ($)   <=4   ug/mL   S Final      
  CIPROFLOXACIN ($)   <=1   ug/mL   S Final      
  GENTAMICIN ($)   <=4   ug/mL   S Final      
  LEVOFLOXACIN ($)   <=2   ug/mL   S Final      
  MEROPENEM ($$)   <=1   ug/mL   S Final      
  PIPERACILLIN/TAZOBAC ($)   <=16   ug/mL   S Final      
  TOBRAMYCIN ($)   <=4   ug/mL   S Final      
  TRIMETH/SULFA   <=2/38   ug/mL   S Final      
           
  
 
 
  
Imaging:  
  
CT 7/17 FINDINGS: 
LOWER CHEST: 
The visualized portions of the lung bases are clear.  There is mild atelectasis 
in the posterior lower lobes.   
ABDOMEN: 
The unenhanced images demonstrate visibility of the interventricular septum of 
the heart. There is extensive vascular calcification and a right iliac stent. There are clips in the gallbladder fossa. Liver: The liver is normal in size and contour with no focal abnormality. Gallbladder and bile ducts: There is no calcified gallstone or biliary duct 
dilatation. Spleen: No abnormality. Pancreas: No abnormality. Adrenal glands: No abnormality. Kidneys: There are small left renal cysts. There is hydronephrosis and 
hydroureter on the right down to the level of the pelvis. PELVIS: 
Reproductive organs: The prostate gland is absent. Bladder: No abnormality. BOWEL AND MESENTERY: There is a gastrostomy tube in the antrum of the stomach. The small bowel is not obstructed. There is gas and stool throughout the colon. There is no mesenteric mass or adenopathy.  The appendix is absent. PERITONEUM: Cuong Liliam is no ascites or free intraperitoneal air. RETROPERITONEUM: The aorta is atherosclerotic without aneurysm. There is a right 
external iliac artery stent. There is an enhancing fluid collection surrounding 
the right external iliac artery stent extending into the right retroperitoneum. The component around the stent measures 4.6 x 2.7 x 6.3 cm and the component in 
the right retroperitoneum measures 3.6 x 3.1 x 4.6 cm. There is no 
retroperitoneal mass or adenopathy.  
BONES AND SOFT TISSUES: There is diffuse body edema. 
   
IMPRESSION IMPRESSION:  
1. Right common iliac artery stent with enhancing fluid collection surrounding 
the stent and extending into the retroperitoneum on the right. This is 
suspicious for infection. 2. Right hydronephrosis and hydroureter secondary to compression of the ureter 
by the collection around the stent. 3. Extensive atherosclerotic calcification of the aorta and mesenteric vessels. This is consistent with the patient's end-stage renal disease. 4. Status post cholecystectomy. 5. Gastrostomy tube. 6. Small left renal cysts. 7. Status post prostatectomy. 
   
CT 7/11 FINDINGS:  
LUNG BASES: 7 mm subpleural nodule at posterior basilar right lower lobe 
slightly smaller in the interval. 
INCIDENTALLY IMAGED HEART AND MEDIASTINUM: Unremarkable. LIVER: Mild intrahepatic and extrahepatic biliary duct distention again shown 
with common bile duct diameter measuring up to 1.5 cm. Findings not 
substantially changed in the interval. 
GALLBLADDER: Status post cholecystectomy. SPLEEN: No mass. PANCREAS: No mass or ductal dilatation. ADRENALS: Unremarkable. KIDNEYS: Bilateral renal atrophy again shown. Moderate right renal 
pelvocaliectasis and ureteral dilation again demonstrated. Finding extends to 
level of retroperitoneal hematoma surrounding right external iliac stent. STOMACH: Moderate gastric distention in the interval to the level of the second 
portion of the duodenum. Percutaneous gastrostomy tube in the interval 
positioned anteriorly in the gastric body. SMALL BOWEL: No dilatation or wall thickening. COLON: No dilatation or wall thickening. APPENDIX: Not demonstrated. PERITONEUM: No free intraperitoneal air or fluid demonstrated. RETROPERITONEUM: As noted above, right external iliac stent again shown within a 
moderate-sized hematoma extending along the anterior margin of the right 
iliopsoas.  Dimension is difficult to estimate due to the lack of oral contrast. 
It measures at least 7.6 x 3.5 cm transverse and 10.3 cm craniocaudal and size 
is similar to that shown previously. URINARY BLADDER: No mass or calculus. BONES: No destructive bone lesion. ADDITIONAL COMMENTS: N/A 
   
IMPRESSION IMPRESSION: 
   
1. Interval percutaneous gastrostomy tube placement. Interval moderate gastric 
distention. 2. Right iliac artery stenting and adjacent hematoma again shown, appearing 
stable. Moderate right renal pelvocaliectasis and ureterectasis again 
demonstrated. 
   
CT 7/2 FINDINGS: There is interval right common/external iliac artery stenting for 
exclusion of pseudoaneurysm with patent stent, and minimal hyperdensity in the 
pseudoaneurysm contents but no overt arterial extravasation. Pseudoaneurysm size 
is stable. There is no apparent soft tissue emphysema or abscess. 
   
Right hydroureteronephrosis remains. Kidneys are atrophic. There is trace 
ascites. There is no pneumoperitoneum. 
   
Abdominal aorta is without stenosis, aneurysm or dissection. Celiac artery, SMA, 
ARTIE and bilateral solitary renal arteries are patent. Right internal iliac 
artery is patent. Femoral arteries are patent. 
   
IMPRESSION IMPRESSION: 
1. Interval right iliac artery pseudoaneurysm stenting. 2. No overt arterial extravasation. 3. Stable pseudosac size. 4. No apparent soft tissue emphysema/abscess. 5. Stable right hydroureteronephrosis.     
  
  
CT 7/26 A/P  
FINDINGS: 
  
Redemonstrated is the infected hematoma in the right lower quadrant measuring 
approximately 7 x 2.5 cm. This appears slightly smaller than the prior 
examination. 
  
There is significant right hydroureteronephrosis as was seen before. The level 
of obstruction is likely at the site of the hematoma. 
  
There are no new fluid collections. 
  
Gallbladder is absent. Liver spleen and pancreas remain unremarkable. 
  
IMPRESSION IMPRESSION: 
1.  There is been slight decrease in size of the infected hematoma in the right 
lower quadrant. 2. Persistent right hydroureteronephrosis. CT chest 7/30 FINDINGS: 
  
THYROID: Bilateral nodules measuring 7 mm on the right and 9 mm on the left. MEDIASTINUM: 1.8 cm subcarinal lymph node has increased in size, previously1.6 
cm. No mass or other enlarged lymphadenopathy with multiple shotty peritracheal 
lymph nodes noted at the level of the romie. [place] ABISAI: No mass or enlarged lymphadenopathy. THORACIC AORTA: Atherosclerotic calcification without aneurysm. MAIN PULMONARY ARTERY: Normal in caliber. TRACHEA/BRONCHI: Dependent debris within the right mainstem bronchus with near 
occlusion at the level of the origin of the bronchus intermedius and occlusion 
of right lower lobe branches. ESOPHAGUS: Fluid containing within minimally distended lumen. No abnormality 
otherwise. HEART: Normal in size without pericardial effusion. Dense coronary artery 
calcifications are noted. PLEURA: Small right pleural effusion. LUNGS: Consolidative infiltrate in the right lower lobe. Otherwise clear with INCIDENTALLY IMAGED UPPER ABDOMEN: No focal abnormality. BONES: No destructive bone lesion. 
  
IMPRESSION IMPRESSION: Debris filled right lower lobe branch airway is extending into the 
right mainstem bronchus with right lower lobe consolidative infiltrates 
suspicious for pneumonia with associated small parapneumonic effusion. Probable 
reactive mediastinal adenopathy with enlarged 1.8 cm subcarinal lymph node. Previously seen left infiltrate is cleared 
  
Assessment / Plan:  
  
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  
  
From chart review, he was admitted from 6/28-7/9/18 and had stenting and repair of aneurysm. Had leukocytosis and was being treated for aspiration pneumonia per chart.  Per DC summary he was on Vancomycin, and flagyl at one point and then Zosyn for 10 days during last admit. 
  
This admission, he is admitted with fever (101.2 ) and leukocytosis (16) and found to have fluid collection around R iliac artery stent extending into retroperitoneum on right. CT read as \" The component around the stent measures 4.6 x 2.7 x 6.3 cm and the component in 
the right retroperitoneum measures 3.6 x 3.1 x 4.6 cm\"  Right hydronephrosis and hydroureter due to compression fo ureter seen as well. He had a CT guided aspiration done and cultures + for Proteus mirabilis. He had a CT on 7/11 prior to this admission that showed a \"moderate-sized hematoma extending along the anterior margin of the right iliopsoas\". \" It measures at least 7.6 x 3.5 cm transverse and 10.3 cm craniocaudal in size\".   
He was started on Cefepime and Levaquin this admission. Levaquin was stopped later  
  
  
 
  
1) Infected R iliac pseudoaneurysm and stent 
  
S/P repair with stenting on 6/29/18, S/P CT guided drainage of pelvic collection 7/19 wt cx + for Proteus mirabilis 
  
CT with  component around the stent measures 4.6 x 2.7 x 6.3 cm and the component in the right retroperitoneum measures 3.6 x 3.1 x 4.6 cm\" Repeat CT 7/26 with \"slight decrease in size of the infected hematoma in the right lower quadrant\" and \" Persistent right hydroureteronephrosis\". Measurements approximately 7 by 2.5 cm however Check blood cultures to ensure no seeding  
  
Poor surgical candidate given functional status and multiple co morbidities 
  
Successful treatment of infections is not usually possible without adequate source control. Additionally, in his situation, as there is fluid  around the stent, raising high concern for stent to be seeded/infected. 
  
Was On Cefazolin wt HD dosing , changed to Zosyn as now also has most likely aspiration pneumonia/pneumonitis Once completed Zosyn therapy for a week , change back to renally dosed (HD dosing) cefazolin 2, 2, 3 gm F/U wt me after 2 weeks of DC Weekly CBC wt diff, CMP sent to me at    
 
 
2) Aspiration pneumonia/pneumonitis Changed antibiotics from cefazolin +flagyl to Zosyn Plan for a week of Zosyn Aspiration precautions  
 
  
  
3) Per urology notes, superficial dry gangrene of glans penis , also has R hydroureteronephrosis   
4) Sacral decubitus ulcers : examined today, wound care , stool contamination as well given incontinence, with high risk for infection   
4) MS: per primary team  
  
5) ESRD on HD 
  
6) S/P PEG  
  
7) DVT px  
 
Pam Mitchell DO  
11:36 AM

## 2018-07-31 NOTE — PROGRESS NOTES
NAME: Radha Eaton :  1950 MRN:  [id number] Assessment :    Plan: 
--ESRD Left AVF 
HTN Anemia Hypoalbuminemia Sacral Decubitus Right retroperitoneal abscess/hematoma with associated right hydro PEG tube --MWF HD [facility] -due for HD tomm 
  
Hgb 8.8; epo 10 k sc tiw; one unit prbc's  
  
Holding Parsabiv (not available in the hospital) S/p FNA of retroperitoneal abscess/hematoma --not a surgical candidate. longterm prognosis very poor. Palliative care-This pt has been chronically ill for so many years;and in the past has recovered enough to have a fairly good qol--it wasn't long ago he received a renal tx---albeit short lived--however, as above longterm prognosis poor Plans for d/c to [facility] noted Subjective: Chief Complaint: slight confused earlier ? Doris Stevens Minimally conversant Review of Systems: no n/v/cp or sob Objective: VITALS:  
Last 24hrs VS reviewed since prior progress note. Most recent are: 
Visit Vitals  /59 (BP 1 Location: Right arm, BP Patient Position: At rest)  Pulse 89  Temp 99 °F (37.2 °C)  Resp 17  Ht 5' 10\" (1.778 m)  Wt 71 kg (156 lb 9.6 oz)  SpO2 97%  BMI 22.47 kg/m2 No intake or output data in the 24 hours ending 18 1647 Telemetry Reviewed: PHYSICAL EXAM: 
General: WD, WN. Alert,  no acute distress Resp:  CTA Bilaterally. Doris tSevens CV:  Regular  rhythm,  No edema on L. R foot amputation L AVF patent Sleepy but arousable Lab Data Reviewed: (see below) Medications Reviewed: (see below) PMH/SH reviewed - no change compared to H&P 
________________________________________________________________________ Care Plan discussed with: 
Patient y Family HD RN Care Manager Consultant:     
 
  Comments >50% of visit spent in counseling and coordination of care ________________________________________________________________________ Froilan Rosario MD  
 
Procedures: see electronic medical records for all procedures/Xrays and details which 
were not copied into this note but were reviewed prior to creation of Plan. LABS: 
Recent Labs  
   07/31/18 
 0324  07/30/18 
 0422 WBC  19.2*  16.9* HGB  9.0*  9.6* HCT  29.8*  32.0*  
PLT  287  342 Recent Labs  
   07/31/18 
 0411  07/30/18 
 0422  07/29/18 
 0440 NA  135*  136  136  
K  4.0  3.8  3.8 CL  98  98  97 CO2  33*  34*  33*  
BUN  26*  41*  29* CREA  3.16*  4.54*  3.73* GLU  104*  177*  190* CA  9.7  9.6  10.0 MG   --   2.5*  2.5* Recent Labs  
   07/31/18 
 0411  07/30/18 
 0422  07/29/18 
 0440 SGOT  16  19  26 AP  201*  225*  274* TP  8.3*  8.3*  9.5* ALB  1.1*  1.2*  1.3*  
GLOB  7.2*  7.1*  8.2* No results for input(s): INR, PTP, APTT in the last 72 hours. No lab exists for component: INREXT, INREXT No results for input(s): FE, TIBC, PSAT, FERR in the last 72 hours. No results found for: FOL, RBCF Recent Labs  
   07/30/18 
 1227  07/30/18 [address] PH  7.48*  7.48* PCO2  46*  48* PO2  99  54* No results for input(s): CPK, CKMB in the last 72 hours. No lab exists for component: TROPONINI No components found for: [place] Lab Results Component Value Date/Time  Color RED 07/17/2018 02:54 PM  
 Appearance CLOUDY (A) 07/17/2018 02:54 PM  
 Specific gravity 1.024 07/17/2018 02:54 PM  
 Specific gravity 1.015 12/30/2014 06:40 PM  
 pH (UA) 7.0 07/17/2018 02:54 PM  
 Protein 300 (A) 07/17/2018 02:54 PM  
 Glucose 100 (A) 07/17/2018 02:54 PM  
 Ketone NEGATIVE  07/17/2018 02:54 PM  
 Bilirubin SMALL (A) 07/17/2018 02:54 PM  
 Urobilinogen 0.2 07/17/2018 02:54 PM  
 Nitrites NEGATIVE  07/17/2018 02:54 PM  
 Leukocyte Esterase SMALL (A) 07/17/2018 02:54 PM  
 Epithelial cells FEW 07/17/2018 02:54 PM  
 Bacteria 1+ (A) 07/17/2018 02:54 PM  
 WBC >100 (H) 07/17/2018 02:54 PM  
 RBC 10-20 07/17/2018 02:54 PM  
 
 
MEDICATIONS: 
Current Facility-Administered Medications Medication Dose Route Frequency  [START ON 8/1/2018] piperacillin-tazobactam (ZOSYN) 3.375 g in 0.9% sodium chloride (MBP/ADV) 100 mL  3.375 g IntraVENous Q12H  
 albuterol-ipratropium (DUO-NEB) 2.5 MG-0.5 MG/3 ML  3 mL Nebulization Q6H RT  
 insulin glargine (LANTUS) injection 5 Units  5 Units SubCUTAneous DAILY  carvedilol (COREG) tablet 25 mg  25 mg Per G Tube BID WITH MEALS  nystatin (MYCOSTATIN) 100,000 unit/mL oral suspension 500,000 Units  500,000 Units Oral QID  heparin (porcine) injection 5,000 Units  5,000 Units SubCUTAneous Q12H  
 balsam peru-castor oil (VENELEX)  mg/gram ointment   Topical BID  sodium hypochlorite (QUARTER STRENGTH DAKIN'S) 0.125% irrigation (bottle)   Topical DAILY  collagenase (SANTYL) 250 unit/gram ointment   Topical DAILY  epoetin niya (EPOGEN;PROCRIT) injection 10,000 Units  10,000 Units SubCUTAneous Q MON, WED & FRI  acetaminophen (TYLENOL) solution 650 mg  650 mg Per G Tube Q3H PRN  
 aspirin chewable tablet 81 mg  81 mg Per G Tube DAILY  escitalopram oxalate (LEXAPRO) 5 mg/5 mL oral solution soln 10 mg  10 mg Per G Tube DAILY  insulin lispro (HUMALOG) injection   SubCUTAneous Q6H  
 glucose chewable tablet 16 g  4 Tab Oral PRN  
 dextrose (D50W) injection syrg 12.5-25 g  12.5-25 g IntraVENous PRN  
 glucagon (GLUCAGEN) injection 1 mg  1 mg IntraMUSCular PRN  
 morphine 10 mg/5 mL oral solution 10 mg  5 mL Per G Tube Q4H PRN  
 oxyCODONE-acetaminophen (PERCOCET) 5-325 mg per tablet 1 Tab  1 Tab Per G Tube Q4H PRN  
 sevelamer carbonate (RENVELA) oral powder 2,400 mg  2.4 g Per G Tube TID WITH MEALS  pravastatin (PRAVACHOL) tablet 10 mg  10 mg Per G Tube QHS  sodium chloride (NS) flush 5-10 mL  5-10 mL IntraVENous Q8H  
 sodium chloride (NS) flush 5-10 mL  5-10 mL IntraVENous PRN  
 naloxone (NARCAN) injection 0.4 mg  0.4 mg IntraVENous PRN  
 ondansetron (ZOFRAN) injection 4 mg  4 mg IntraVENous Q4H PRN

## 2018-07-31 NOTE — PROGRESS NOTES
Nutrition Assessment: 
 
INTERVENTIONS/RECOMMENDATIONS:  
Resume TF as medically feasible ASSESSMENT:  
Chart reviewed; medically noted for respiratory failure. Weaned from bipap to nasal cannula today. TF held yesterday d/t concerns for aspiration. Had been tolerating TF (nepro) at goal of 45 mL/hr with minimal residual. Possibly resuming TF tomorrow per notes. Diet Order: NPO 
% Eaten:  No data found. Pertinent Medications: [x] Reviewed []Other: Epogen, Lexapro, Lantus, Humalog, Pravastatin, Renvela Pertinent Labs: [x]Reviewed  []Other: Na 135, -[phone] Food Allergies: [x]None []Other:    
Last BM: 7/30   []Active     []Hyperactive  []Hypoactive       [] Absent  BS Skin:    [] Intact   [] Incision  [x] Breakdown   []Edema   []Other: Anthropometrics: Height: 5' 10\" (177.8 cm) Weight: 69.3 kg (152 lb 12.5 oz) IBW (%IBW):   ( ) UBW (%UBW):   (  %) BMI: Body mass index is 21.92 kg/(m^2). This BMI is indicative of: 
[]Underweight   [x]Normal   []Overweight   [] Obesity   [] Extreme Obesity (BMI>40) Last Weight Metrics: 
Weight Loss Metrics 7/30/2018 7/9/2018 6/13/2018 4/17/2018 4/12/2018 4/5/2018 3/1/2018 Today's Wt 152 lb 12.5 oz 164 lb 3.9 oz 150 lb 155 lb 166 lb 166 lb 14.4 oz 153 lb BMI 21.92 kg/m2 23.57 kg/m2 21.52 kg/m2 22.24 kg/m2 23.82 kg/m2 23.95 kg/m2 21.95 kg/m2 Estimated Nutrition Needs (Based on): 5187 Kcals/day (BMR (1461) x 1. 2AF) , 90 g (1.3 g/kg bw) Protein Carbohydrate: At Least 130 g/day  Fluids: 1750 mL/day Pt expected to meet estimated nutrient needs: []Yes [x]No (TF currently on hold) NUTRITION DIAGNOSES:  
Problem:  Less than optimal enteral nutrition Etiology: related to possible aspiration Signs/Symptoms: as evidenced by TF currently on hold NUTRITION INTERVENTIONS: 
  Enteral/Parenteral Nutrition: Initiate enteral nutrition GOAL:  
TF resumed next 1-3 days NUTRITION MONITORING AND EVALUATION Food/Nutrient Intake Outcomes: Enteral/parenteral nutrition intake Physical Signs/Symptoms Outcomes: Weight/weight change, Electrolyte and renal profile, GI profile, Glucose profile Previous Goal Met: 
 [x] Met              [] Progressing Towards Goal              [] Not Progressing Towards Goal  
Previous Recommendations: 
 [x] Implemented          [] Not Implemented          [] Not Applicable LEARNING NEEDS (Diet, Food/Nutrient-Drug Interaction):  
 [x] None Identified 
 [] Identified and Education Provided/Documented 
 [] Identified and Pt declined/was not appropriate Cultural, Catholic, OR Ethnic Dietary Needs:  
 [x] None Identified 
 [] Identified and Addressed 
 
 [x] Interdisciplinary Care Plan Reviewed/Documented  
 [x] Discharge Planning: continue current TF regimen 
 [x] Participated in Interdisciplinary Rounds NUTRITION RISK:  
 [x] High              [] Moderate           []  Low  []  Minimal/Uncompromised Love Pinzon Pager [phone] Weekend Pager [phone]

## 2018-07-31 NOTE — PROGRESS NOTES
PCU SHIFT NURSING NOTE Bedside shift change report given to Alexandra Rowe (oncoming nurse) by Yumiko Atkinson (offgoing nurse). Report included the following information SBAR, Kardex, Intake/Output, MAR and Recent Results. Shift Summary: 0731  Pt in bed, on bipap at 60%  Alert  No complaints of pain  PEG tube, TF on hold due to aspiration  LAVF ([facility] MWF)  Multiple wounds  Anuric  Incontinent of stool  NSR on monitor 4984  Per Dr Sho Harris, TF to remain on hold today, hold all meds  Will monitor off BIPAP and resume TF possibly tomorrow 1028  Pt placed on 5LO2 NC  Will wean as tolerated 12  Weaned to [address]  Continue to monitor  All wound care completed  Pt repositioned  Mouth care done 1645  Sacral WC completed s/p large loose BM  Pt with coarse bronchial lung sounds - unable to clear secretions  RR 30-40/min  Attempted to suction with aman, pt resistant   Paged Dr Katarina Alfaro for deep suction order [address] orders rec'd for deep suction  Pt SPO2 85-90%  RR 35  Deep suctioned per order  \ 
1732  SPO2 88% on 2L  Increased to 6L  Pt recovered to 94%  RR 25 
1845  Pt c/o SOB, SPO2 90% on 6LNC  Pt trying to remove cannula   Placed on venti mask Continue to monitor 1900  Report given to night RN Admission Date 7/17/2018 Admission Diagnosis Leukocytosis Fever ESRD (end stage renal disease) on dialysis ([facility] [address] Consults IP CONSULT TO VASCULAR SURGERY 
IP CONSULT TO NEPHROLOGY 
IP CONSULT TO UROLOGY 
IP CONSULT TO PALLIATIVE CARE - PROVIDER 
IP CONSULT TO INFECTIOUS DISEASES 
IP CONSULT TO PULMONOLOGY Consults []PT []OT []Speech  
[]Case Management  
  
[] Palliative Cardiac Monitoring Order []Yes []No  
 
IV drips []Yes Drip:                            Dose: 
Drip:                            Dose: 
Drip:                            Dose:  
[]No  
 
GI Prophylaxis []Yes []No  
 
 
 
DVT Prophylaxis SCDs:  Sequential Compression Device: Bilateral  
  Patient Refused VTE Prophylaxis: Yes Danny stockings:     
  
[] Medication []Contraindicated []None Activity Level Activity Level: Bed Rest   
 Activity Assistance: Complete care Purposeful Rounding every 1-2 hour? []Yes Lopez Score  Total Score: 4 Bed Alarm (If score 3 or >) []Yes  
[] Refused (See signed refusal form in chart) Delbert Score  Delbert Score: 12 Delbert Score (if score 14 or less) []PMT consult  
[]Wound Care consult []Specialty bed  
[] Nutrition consult Needs prior to discharge:  
Home O2 required:   
[]Yes []No  
 If yes, how much O2 required? Other:  
 Last Bowel Movement: Last Bowel Movement Date: 07/30/18 Influenza Vaccine Received Flu Vaccine for Current Season (usually Sept-March): Not Flu Season Pneumonia Vaccine Diet Active Orders Diet DIET NPO With Tube Feedings LDAs Peripheral IV 07/21/18 Right Antecubital (Active) Site Assessment Clean, dry, & intact 7/31/2018  3:00 AM  
Phlebitis Assessment 0 7/31/2018  3:00 AM  
Infiltration Assessment 0 7/31/2018  3:00 AM  
Dressing Status Clean, dry, & intact 7/31/2018  3:00 AM  
Dressing Type Tape;Transparent 7/31/2018  3:00 AM  
Hub Color/Line Status Blue 7/31/2018  3:00 AM  
Action Taken Other (comment) 7/30/2018  7:46 PM  
Alcohol Cap Used Yes 7/27/2018 11:00 AM  
    
Hemodialysis Access 07/25/18 (Active) Central Line Being Utilized Yes 7/31/2018  3:00 AM  
Criteria for Appropriate Use Dialysis/apheresis 7/31/2018  3:00 AM  
Date Accessed  07/30/18 7/31/2018  3:00 AM  
Site Assessment Clean, dry, & intact 7/31/2018  3:00 AM  
Date of Last Dressing Change 07/25/18 7/26/2018  5:45 PM  
Dressing Status Clean, dry, & intact 7/30/2018  7:46 PM  
Dressing Type 4 X 4;Transparent;Tape 7/30/2018  7:46 PM  
  
PEG/Gastrostomy Tube 07/17/18 (Active) Site Assessment Clean, dry, & intact 7/31/2018  3:00 AM  
Dressing Status Clean, dry, & intact 7/31/2018  3:00 AM  
G Port Status Clamped 7/31/2018  3:00 AM  
Action Taken Placement verified (comment) 7/30/2018  7:46 PM  
Drainage Description Other (Comment) 7/29/2018  3:00 AM  
Gastric Residual (mL) 0 ml 7/30/2018  7:46 PM  
Tube Feeding/Formula Options Renal (Nepro) 7/30/2018  7:50 AM  
Tube Feeding/Verify Rate (mL/hr) 45 7/30/2018  7:50 AM  
Water Flush Volume (mL) 170 mL 7/30/2018  7:50 AM  
Intake (ml) 45 ml 7/29/2018  5:00 AM  
Medication Volume 20 ml 7/28/2018  9:32 PM  
Output (ml) 0 ml 7/23/2018 10:07 PM  
            
Urinary Catheter Intake & Output Date 07/30/18 0700 - 07/31/18 2263 07/31/18 0700 - 08/01/18 7590 Shift 8997-9453 7415-2724 24 Hour Total 9132-1027 8114-4728 24 Hour Total  
I 
N 
T 
A 
K 
E 
 NG/  170 Water Flush Volume (mL) (PEG/Gastrostomy Tube 07/17/18) 170  170 Shift Total 
(mL/kg) 170 
(2.5)  170 
(2.5) O 
U T 
P 
U Wero Maclachlan Urine (mL/kg/hr) Urine Occurrence(s) 1 x  1 x Stool Stool Occurrence(s) 1 x  1 x Dialysis 1000  1000 NET Fluid Removed (mL) 1000  1000 Shift Total 
(mL/kg) 1000 
(14.4)  1000 
(14.4) NET -830  -830 Weight (kg) 69.3 69.3 69.3 69.3 69.3 69.3 Readmission Risk Assessment Tool Score High Risk 39 Total Score 3 Has Seen PCP in Last 6 Months (Yes=3, No=0) 2 . Living with Significant Other. Assisted Living. LTAC. SNF. or  
Rehab  
 3 Patient Length of Stay (>5 days = 3) 4 IP Visits Last 12 Months (1-3=4, 4=9, >4=11) 5 Pt. Coverage (Medicare=5 , Medicaid, or Self-Pay=4) 19 Charlson Comorbidity Score (Age + Comorbid Conditions) Expected Length of Stay 4d 21h Actual Length of Stay 14

## 2018-07-31 NOTE — PROGRESS NOTES
Hospitalist Progress Note NAME: Herma Fleischer :  1950 MRN:  [id number] Assessment / Plan: 
Acute hypoxemic Respiratory Failure (18), off BIPAP now today AM 
Due top to Aspiration PNA - confirmed on CT chest, Sepsis with Hypotension POA- hypotension had improved,  Afebrile but WBC trending up 19k today Keep holding tube feedings for now- resume once resp status remains stable for a while 
s/p Flagyl  X 1 day, changed to IV zosyn today by ID Infected Right Iliac Hematoma POA-s/p drained - shows Proteus, Vascular and ID in the case, has a stent that ideally may need to come out due to infection. - not a surgical candidate as per Vascular S/p cefepime, new CT abdomen and pelvis shows decreasing size of hematoma, switched to Cefazolin - changed to  Zosyn today by ID as pt had aspiration insult  B/L Buttock Pressure injury ulcer/wound infection vs R iliac artery pseudoaneurysm/collection/stent infection, UTI, no urine culture had been sent, c/w wound care for decubitus ulcer. Encephalopathy POA in the setting of sepsis POA - improved slightly today 
-CT abdm/pelvis showing Right common iliac artery stent with enhancing fluid collection surrounding the stent and extending into the retroperitoneum on the right. suspicious for infection. Also showing Right hydronephrosis and hydroureter secondary to compression of the ureter by the collection around the stent 
-On ,Vascular surgery saw patient and will proceed with aspiration of the fluid noted on CT surrounding the right common iliac artery stent since this could be an abscess. 
-Urology consulted for hydronephrosis right ->saw pt on :no intervention needed 
-On :CT guided aspiration of pelvic fluid collection - culture of fluid showing few -GNRs,Proteus 
-WBC trending up now due to aspiration 
-Body fluid Cx-Proteus sens to cefazolin- changed to Zosyn today Diabetes mellitus 2 on Insulin at home: BG increasing now that nutrition had improved thru PEG, c/w  low dose Lantus  HbA1C 4.9 Dysphagia due to esophageal dysmotility on MBS study last admission - s/p PEG for FS Q 6 hrs on tube feeding, keep NPO for now,  CT abdomen showing some paretic loops of small bowel, but on exam abdomen is benign Holding tube feeding due to aspiration- resume soon Unspecified protein-calorie malnutrition POA in the setting of PEG dependence, sacral decubitus, ESRD -- onTF 
 
ESRD on HD MWF  Renal in the case. HD as per Renal 
Anemia of chronic disease POA S/p L arm AVFistula Hyperlipidemia Cont on statins Hypertension resume Coreg thru PEG, monitor. ? Multiple sclerosis POA ? Exacerbation causing dysphagia- cannot tell till MRI done 
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 No signs of failure at this time PAD s/p b/l leg stents S/p remote partial (Syme) amputation of RIGHT foot. -Nephrology consulted for HD MWF 
-Cont ASA 
-Vascular surgery consult as above for opinion on stent infection Code Status: Full Code as per [facility] with palliative Care Surrogate Decision Maker: wife Trever Lira  [phone] DVT Prophylaxis: Sq heparin GI Prophylaxis: not indicated Baseline: Pt was recently DC to [facility] from [address] after being treated for R Iliac ar pseudoaneurysm s/p Stent by Vas Sx Dr Abi Macias, Eso Dysmotility syndrome causing Dysphagia causing aspiration pneumonia s/p PEG tube Pt had denied any neuro workup - denied MRI as recommended by neurology to find the cause of his eso dysmotility- ?MS flare Body mass index is 23.57 kg/(m^2). Prognosis is poor but family still wants everything done, he is Hospice candidate nevertheless but wife wants to honor patient wishes Subjective: Chief Complaint / Reason for Physician Visit: F/U Aspiration Pneumonia, resp failure, Denies any SOB from yesterday. Confused. Discussed with RN events overnight. Review of Systems: 
Symptom Y/N Comments  Symptom Y/N Comments Fever/Chills n   Chest Pain n   
Poor Appetite n   Edema Cough n   Abdominal Pain n   
Sputum    Joint Pain SOB/BANKS n   Pruritis/Rash Nausea/vomit n   Tolerating PT/OT Diarrhea    Tolerating Diet y Tube feedings on hold overnight Constipation    Other Could NOT obtain due to:   
 
Objective: VITALS:  
Last 24hrs VS reviewed since prior progress note. Most recent are: 
Patient Vitals for the past 24 hrs: 
 Temp Pulse Resp BP SpO2  
07/31/18 1129 - - 20 - 97 % 07/31/18 1101 99.2 °F (37.3 °C) 90 15 146/63 97 % 07/31/18 1028 - - 19 - 100 % 07/31/18 0914 - - - - 97 % 07/31/18 0731 98.9 °F (37.2 °C) 80 18 126/55 100 % 07/31/18 0406 - 94 - - 94 % 07/31/18 0300 98.6 °F (37 °C) 78 16 134/58 100 % 07/31/18 0146 - - - - 100 % 07/31/18 0029 - - - - 100 % 07/30/18 2305 97.9 °F (36.6 °C) 77 26 105/54 98 % 07/30/18 2141 - 82 - 142/60 -  
07/30/18 1946 98.1 °F (36.7 °C) 80 24 136/58 100 % 07/30/18 1940 - - - - 100 % 07/30/18 1900 - 77 24 133/59 100 % 07/30/18 1800 - 77 17 116/56 -  
07/30/18 1700 - 82 23 140/66 100 % 07/30/18 1600 - 81 21 120/61 96 % 07/30/18 1558 - - - - 96 % 07/30/18 1530 - 81 20 - -  
07/30/18 1500 97.6 °F (36.4 °C) 84 (!) 40 128/62 99 % 07/30/18 1430 - 83 26 124/65 98 % 07/30/18 1400 - 86 18 145/71 100 % No intake or output data in the 24 hours ending 07/31/18 1335 PHYSICAL EXAM: 
General:                    lethargic, unresponsive, SOB  
EENT:                       EOMI. Anicteric sclerae. MMM Resp: Coarse BS, dull in bases CV:                            Regular  rhythm,  No edema GI:                             Soft, no  distended, no tender.  +Bowel sounds, PEG in place Neurologic:                Alert and oriented X 0, slow speech, also intermittently confused Psych:                       No  insight. Not anxious nor agitated Skin:                          No rashes. No jaundice. LUE fistula+ Reviewed most current lab test results and cultures  YES Reviewed most current radiology test results   YES Review and summation of old records today    NO Reviewed patient's current orders and MAR    YES 
PMH/SH reviewed - no change compared to H&P 
________________________________________________________________________ Care Plan discussed with: 
  Comments Patient x Family RN x Care Manager x Consultant Multidiciplinary team rounds were held today with , nursing, pharmacist and clinical coordinator. Patient's plan of care was discussed; medications were reviewed and discharge planning was addressed. ________________________________________________________________________ Total NON critical care TIME:  36  Minutes Total CRITICAL CARE TIME Spent:   Minutes non procedure based Comments >50% of visit spent in counseling and coordination of care    
________________________________________________________________________ Flaca Aguila MD  
 
Procedures: see electronic medical records for all procedures/Xrays and details which were not copied into this note but were reviewed prior to creation of Plan. LABS: 
I reviewed today's most current labs and imaging studies. Pertinent labs include: 
Recent Labs  
   07/31/18 
 0324  07/30/18 
 0422  07/29/18 
 0440 WBC  19.2*  16.9*  17.4* HGB  9.0*  9.6*  11.4* HCT  29.8*  32.0*  37.6 PLT  287  342  386 Recent Labs  
   07/31/18 
 0411  07/30/18 
 0422  07/29/18 0440  
NA  135*  136  136  
K  4.0  3.8  3.8 CL  98  98  97 CO2  33*  34*  33* GLU  104*  177*  190* BUN  26*  41*  29* CREA  3.16*  4.54*  3.73* CA  9.7  9.6  10.0 MG   --   2.5*  2.5* ALB  1.1*  1.2*  1.3* TBILI  0.4  0.3  0.4 SGOT  16  19  26 ALT  <6*  12  22 Signed: Jacob Krishna MD

## 2018-07-31 NOTE — PROGRESS NOTES
Spoke with wife and she was under the impression someone had told her patient was not accepted at [facility]. She did not know who told her that. [employer] and spoke with Asya Dixon in admissions and informed patient's wife that he has been accepted at [facility] once medically stable. She was relieved and in favor of this.

## 2018-07-31 NOTE — PROGRESS NOTES
PCU SHIFT NURSING NOTE Bedside and Verbal shift change report given to Perry Hampton RN (oncoming nurse) by Rashid Restrepo RN (offgoing nurse). Report included the following information SBAR, Kardex, MAR and Recent Results. Shift Summary:  
2207: MD paged [phone]: MD paged [phone]: MD paged [phone]: Spoke with Dr. Karan Ocampo, patient has been complaining of stomach pain and has had the hiccups for most of the shift. Orders received will continue to monitor closely. 0715: Bedside and Verbal shift change report given to Travis Scheuermann, RN (oncoming nurse) by Perry Hampton RN (offgoing nurse). Report included the following information SBAR, Kardex, MAR and Recent Results. Admission Date 7/17/2018 Admission Diagnosis Leukocytosis Fever ESRD (end stage renal disease) on dialysis ([facility] [address] Consults IP CONSULT TO VASCULAR SURGERY 
IP CONSULT TO NEPHROLOGY 
IP CONSULT TO UROLOGY 
IP CONSULT TO PALLIATIVE CARE - PROVIDER 
IP CONSULT TO INFECTIOUS DISEASES 
IP CONSULT TO PULMONOLOGY Consults []PT []OT []Speech  
[]Case Management  
  
[] Palliative Cardiac Monitoring Order []Yes []No  
 
IV drips []Yes Drip:                            Dose: 
Drip:                            Dose: 
Drip:                            Dose:  
[]No  
 
GI Prophylaxis []Yes []No  
 
 
 
DVT Prophylaxis SCDs:  Sequential Compression Device: Bilateral  
  Patient Refused VTE Prophylaxis: Yes Danny stockings:     
  
[] Medication []Contraindicated []None Activity Level Activity Level: Bed Rest   
 Activity Assistance: Complete care Purposeful Rounding every 1-2 hour? []Yes Lopez Score  Total Score: 4 Bed Alarm (If score 3 or >) []Yes  
[] Refused (See signed refusal form in chart) Delbert Score  Delbert Score: 12 Delbert Score (if score 14 or less) []PMT consult  
[]Wound Care consult []Specialty bed  
[] Nutrition consult Needs prior to discharge:  
Home O2 required:   
[]Yes []No  
 If yes, how much O2 required? Other:  
 Last Bowel Movement: Last Bowel Movement Date: 07/30/18 Influenza Vaccine Received Flu Vaccine for Current Season (usually Sept-March): Not Flu Season Pneumonia Vaccine Diet Active Orders Diet DIET NPO With Tube Feedings LDAs Peripheral IV 07/21/18 Right Antecubital (Active) Site Assessment Clean, dry, & intact 7/30/2018  7:46 PM  
Phlebitis Assessment 0 7/30/2018  7:46 PM  
Infiltration Assessment 0 7/30/2018  7:46 PM  
Dressing Status Clean, dry, & intact 7/30/2018  7:46 PM  
Dressing Type Tape;Transparent 7/30/2018  7:46 PM  
Hub Color/Line Status Blue 7/30/2018  7:46 PM  
Action Taken Other (comment) 7/30/2018  7:46 PM  
Alcohol Cap Used Yes 7/27/2018 11:00 AM  
    
Hemodialysis Access 07/25/18 (Active) Central Line Being Utilized Yes 7/30/2018  7:46 PM  
Criteria for Appropriate Use Dialysis/apheresis 7/30/2018  7:46 PM  
Date Accessed  07/30/18 7/30/2018  7:46 PM  
Site Assessment Clean, dry, & intact 7/30/2018  7:46 PM  
Date of Last Dressing Change 07/25/18 7/26/2018  5:45 PM  
Dressing Status Clean, dry, & intact 7/30/2018  7:46 PM  
Dressing Type 4 X 4;Transparent;Tape 7/30/2018  7:46 PM  
  
PEG/Gastrostomy Tube 07/17/18 (Active) Site Assessment Clean, dry, & intact 7/30/2018  7:46 PM  
Dressing Status Clean, dry, & intact 7/30/2018  7:46 PM  
G Port Status Clamped 7/30/2018  7:46 PM  
Action Taken Placement verified (comment) 7/30/2018  7:46 PM  
Drainage Description Other (Comment) 7/29/2018  3:00 AM  
Gastric Residual (mL) 0 ml 7/30/2018  7:46 PM  
Tube Feeding/Formula Options Renal (Nepro) 7/30/2018  7:50 AM  
Tube Feeding/Verify Rate (mL/hr) 45 7/30/2018  7:50 AM  
Water Flush Volume (mL) 170 mL 7/30/2018  7:50 AM  
Intake (ml) 45 ml 7/29/2018  5:00 AM  
Medication Volume 20 ml 7/28/2018  9:32 PM  
Output (ml) 0 ml 7/23/2018 10:07 PM  
            
Urinary Catheter Intake & Output Date 07/30/18 0700 - 07/31/18 8113 07/31/18 0700 - 08/01/18 9076 Shift [id number] 1900-0659 24 Hour Total 5790-4974 2788-4348 24 Hour Total  
I 
N 
T 
A 
K 
E 
 NG/  170 Water Flush Volume (mL) (PEG/Gastrostomy Tube 07/17/18) 170  170 Shift Total 
(mL/kg) 170 
(2.5)  170 
(2.5) O 
U T 
P 
U [place] Urine (mL/kg/hr) Urine Occurrence(s) 1 x  1 x Stool Stool Occurrence(s) 1 x  1 x Dialysis 1000  1000 NET Fluid Removed (mL) 1000  1000 Shift Total 
(mL/kg) 1000 
(14.4)  1000 
(14.4) NET -830  -830 Weight (kg) 69.3 69.3 69.3 69.3 69.3 69.3 Readmission Risk Assessment Tool Score High Risk 39 Total Score 3 Has Seen PCP in Last 6 Months (Yes=3, No=0) 2 . Living with Significant Other. Assisted Living. LTAC. SNF. or  
Rehab  
 3 Patient Length of Stay (>5 days = 3) 4 IP Visits Last 12 Months (1-3=4, 4=9, >4=11) 5 Pt. Coverage (Medicare=5 , Medicaid, or Self-Pay=4) 19 Charlson Comorbidity Score (Age + Comorbid Conditions) Expected Length of Stay 4d 21h Actual Length of Stay 14

## 2018-07-31 NOTE — PROGRESS NOTES
Problem: Mobility Impaired (Adult and Pediatric) Goal: *Acute Goals and Plan of Care (Insert Text) Physical Therapy Goals Initiated 7/26/2018 1. Patient will move from supine to sit and sit to supine , scoot up and down and roll side to side in bed with maximal assistance within 7 day(s). 2.  Patient will sit edge of bed x5 minutes with moderate assistance within 7 day(s). 3.  Patient will perform supine UE executrixes for strengthening with supervision within 7 days. physical Therapy TREATMENT Patient: Zeus Galan (53 y.o. male) Date: 7/31/2018 Diagnosis: Leukocytosis Fever ESRD (end stage renal disease) on dialysis ([facility] [address] <principal problem not specified> Precautions: Fall, Contact, Aspiration Chart, physical therapy assessment, plan of care and goals were reviewed. ASSESSMENT: 
Patient lying in bed when PT arrived. He agreed to therapy and was cleared by nursing. Currently no significant change with regard to his strength, balance and mobility skills. His bilateral lower extremities are flaccid with no volitional movement and no tone detected. His arms are grossly 2-/5. Trunk control is nearly absent in sitting. Currently he is totally dependent for rolling and repositioning in bed. Supine to sit to supine transfers needed total assistance x 2. He was able to assist with sitting balance using katelyn upper extremities for support, but still needed mod/max a x 2 for head and trunk stability. He tolerated sitting on the edge of bed x 12 mins. Once back in bed he was found to have had a loose stool and was cleaned up and repositioned back in bed for RN to change wound dressing on his sacrum. Recommend 1 more week of PT and if no progress will discharge. Progression toward goals: 
[]    Improving appropriately and progressing toward goals 
[]    Improving slowly and progressing toward goals [x]    Not making progress toward goals and plan of care will be adjusted PLAN: 
Patient continues to benefit from skilled intervention to address the above impairments. Continue treatment per established plan of care. Discharge Recommendations:  [facility] Further Equipment Recommendations for Discharge:  TBD SUBJECTIVE:  
Patient stated I have no pain.  OBJECTIVE DATA SUMMARY:  
Critical Behavior: 
Neurologic State: Confused, Lethargic, Eyes open spontaneously Orientation Level: Oriented to person Cognition: Decreased attention/concentration, Decreased command following Functional Mobility Training: 
Bed Mobility: 
Rolling: Total assistance Supine to Sit: Total assistance; Additional time;Assist x2 Sit to Supine: Total assistance; Additional time;Assist x2 Scooting: Total assistance; Additional time;Assist x2 Transfers: 
  
  
     
  
     
  
  
  
  
Balance: 
Sitting: Impaired Sitting - Static: Poor (constant support) Sitting - Dynamic: None Standing:  (not tested) Ambulation/Gait Training: 
  
  
  
  
  
  
  
  
  
  
  
  
  
  
  
  
  
  
Neuro Re-Education: 
Sitting balance EOB - AAROM weight shifting Therapeutic Exercises:  
Provided general PROM/AAROM to bilateral extremities to improve strength and ROM in all major planes of motion. Pain: 
Pain Scale 1: Numeric (0 - 10) Pain Intensity 1: 0 Activity Tolerance:  
Fair. Please refer to the flowsheet for vital signs taken during this treatment. After treatment:  
[]    Patient left in no apparent distress sitting up in chair 
[x]    Patient left in no apparent distress in bed 
[x]    Call bell left within reach [x]    Nursing notified 
[x]    Caregiver present 
[]    Bed alarm activated COMMUNICATION/COLLABORATION:  
The patients plan of care was discussed with: Registered Nurse,  and Rehabilitation Attendant Rudi Gold PT Time Calculation: 29 mins

## 2018-08-01 NOTE — PROGRESS NOTES
PCU SHIFT NURSING NOTE Bedside and Verbal shift change report given to Andres Fragoso RN (oncoming nurse) by Doug Nath RN (offgoing nurse). Report included the following information SBAR, Kardex, MAR and Recent Results. Shift Summary:  
0940: Performed deep suction on patient. No distress noted 2235: Patients O2 sats dropped to high 70's-80's while on 3L NC. Placed patient on venti mask at 50% on 12L, patients sats still in low 80's. Placed patient on Bipap and paged RT.  
2237: Patient's sats going back and forth between high 80's to low 90's. RT at bedside will start patients breathing treatment and bump up patients FIO2 from 60% to 100%. 2245: Patients O2 sats now in low 90's. Will continue to monitor closely. 0: Placed patient on 6L NC because he keeps pulling the Bipap mask off 
0343: Patient O2 sats dropped into the 80's on 6L NC, placed patient on Venti mask at 50% on 12L  
0350: Patient becoming agitated and combative, he keeps pulling off the Venti mask. 5606: MD paged [phone]: MD paged [phone]: Called nursing supervisor due to pages not being returned from on call hospitalist. Was told to page Dr. Marilynn Hale downstairs. 0502: Paged and spoke with Dr. Marilynn Hale, explained concern for patient since is becoming combative and is agitated. Requested orders for Haldol and ABG, orders received. Will continue to monitor patient closely. 0518: Haldol administered. 0277: Patients wife called for update on patient. Informed her of events that have happened overnight. 1: Patient placed back on Bipap now that he is calmer. 6872: PCT placed at bedside due to patient trying to take of Bipap mask. Will continue to monitor. 5993: Bedside and Verbal shift change report given to Francisco Puente RN (oncoming nurse) by Andres Fragoso RN (offgoing nurse). Report included the following information SBAR, Kardex, MAR and Recent Results. Admission Date 7/17/2018 Admission Diagnosis Leukocytosis Fever ESRD (end stage renal disease) on dialysis ([facility] [address] Consults IP CONSULT TO VASCULAR SURGERY 
IP CONSULT TO NEPHROLOGY 
IP CONSULT TO UROLOGY 
IP CONSULT TO PALLIATIVE CARE - PROVIDER 
IP CONSULT TO INFECTIOUS DISEASES 
IP CONSULT TO PULMONOLOGY Consults []PT []OT []Speech  
[]Case Management  
  
[] Palliative Cardiac Monitoring Order []Yes []No  
 
IV drips []Yes Drip:                            Dose: 
Drip:                            Dose: 
Drip:                            Dose:  
[]No  
 
GI Prophylaxis []Yes []No  
 
 
 
DVT Prophylaxis SCDs:  Sequential Compression Device: Bilateral  
  Patient Refused VTE Prophylaxis: Yes Danny stockings:     
  
[] Medication []Contraindicated []None Activity Level Activity Level: Bed Rest   
 Activity Assistance: Complete care Purposeful Rounding every 1-2 hour? []Yes Lopez Score  Total Score: 4 Bed Alarm (If score 3 or >) []Yes  
[] Refused (See signed refusal form in chart) Delbert Score  Delbert Score: 12 Delbert Score (if score 14 or less) []PMT consult  
[]Wound Care consult []Specialty bed  
[] Nutrition consult Needs prior to discharge:  
Home O2 required:   
[]Yes []No  
 If yes, how much O2 required? Other:  
 Last Bowel Movement: Last Bowel Movement Date: 07/31/18 Influenza Vaccine Received Flu Vaccine for Current Season (usually Sept-March): Not Flu Season Pneumonia Vaccine Diet Active Orders Diet DIET NPO With Tube Feedings LDAs Peripheral IV 07/21/18 Right Antecubital (Active) Site Assessment Clean, dry, & intact 7/31/2018  2:16 PM  
Phlebitis Assessment 0 7/31/2018  2:16 PM  
Infiltration Assessment 0 7/31/2018  2:16 PM  
Dressing Status Clean, dry, & intact 7/31/2018  2:16 PM  
Dressing Type Transparent 7/31/2018  2:16 PM  
Hub Color/Line Status Blue;Flushed 7/31/2018  2:16 PM  
Action Taken Other (comment) 7/30/2018  7:46 PM  
Alcohol Cap Used Yes 7/27/2018 11:00 AM  
    
Hemodialysis Access 07/25/18 (Active) Central Line Being Utilized Yes 7/31/2018  7:31 AM  
Criteria for Appropriate Use Dialysis/apheresis 7/31/2018  7:31 AM  
Date Accessed  07/30/18 7/31/2018  3:00 AM  
Site Assessment Clean, dry, & intact 7/31/2018  7:31 AM  
Date of Last Dressing Change 07/25/18 7/26/2018  5:45 PM  
Dressing Status Clean, dry, & intact 7/31/2018  7:31 AM  
Dressing Type 4 X 4;Transparent;Tape 7/30/2018  7:46 PM  
  
PEG/Gastrostomy Tube 07/17/18 (Active) Site Assessment Clean, dry, & intact 7/31/2018  2:16 PM  
Dressing Status New;Clean, dry, & intact 7/31/2018  2:16 PM  
G Port Status Clamped 7/31/2018  2:16 PM  
Action Taken Placement verified (comment) 7/30/2018  7:46 PM  
Drainage Description Other (Comment) 7/29/2018  3:00 AM  
Gastric Residual (mL) 0 ml 7/30/2018  7:46 PM  
Tube Feeding/Formula Options Renal (Nepro) 7/30/2018  7:50 AM  
Tube Feeding/Verify Rate (mL/hr) 45 7/30/2018  7:50 AM  
Water Flush Volume (mL) 170 mL 7/30/2018  7:50 AM  
Intake (ml) 45 ml 7/29/2018  5:00 AM  
Medication Volume 20 ml 7/28/2018  9:32 PM  
Output (ml) 0 ml 7/23/2018 10:07 PM  
            
Urinary Catheter Intake & Output Date 07/30/18 1900 - 07/31/18 9707 07/31/18 0700 - 08/01/18 5288 Shift 3936-9688 24 Hour Total 4352-6920 3304-3389 24 Hour Total  
I 
N 
T 
A 
K 
E 
 NG/GT  170 Water Flush Volume (mL) (PEG/Gastrostomy Tube 07/17/18)  170 Shift Total 
(mL/kg)  170 
(2.5) O 
U T 
P 
U [place] Urine (mL/kg/hr) Urine Occurrence(s)  1 x Stool Stool Occurrence(s)  1 x 2 x  2 x Dialysis  1000 NET Fluid Removed (mL)  1000 Shift Total 
(mL/kg)  1000 
(14.4) NET  -159 Weight (kg) 69.3 69.3 71 71 71 Readmission Risk Assessment Tool Score High Risk  Luis Antonio Monroy Total Score 3 Has Seen PCP in Last 6 Months (Yes=3, No=0) 2 . Living with Significant Other. Assisted Living. LTAC. SNF.  or Rehab  
 3 Patient Length of Stay (>5 days = 3) 4 IP Visits Last 12 Months (1-3=4, 4=9, >4=11) 5 Pt. Coverage (Medicare=5 , Medicaid, or Self-Pay=4) 19 Charlson Comorbidity Score (Age + Comorbid Conditions) Expected Length of Stay 4d 21h Actual Length of Stay 14

## 2018-08-01 NOTE — CONSULTS
Palliative Medicine Consult Simon: [phone]) Patient Name: Batool Reyes YOB: 1950 Date of Initial Consult: 7/23/18 Reason for Consult: Care Decisions Requesting Provider: Darren Galvez MD 
Primary Care Physician: Donya Bonilla MD 
 
 SUMMARY:  
Batool Reyes is a 70 y.o. 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 PALLIATIVE DIAGNOSES:  
1. Goals of care. 2. Aspiration pneumonia (7/30acute hypoxic resp failure off bipap, risk of recurrent aspiration ). 3. Hypoxia 4. Confusion 5. Severe Debility 6. Weight loss 7. Malnutrition 7        Sepsis (with infected R iliac  pseudo- aneurysm and stent).    
 
 PLAN:  
Case d/w bed side Rn , chart reviewed , patient with suspected aspiration pneumonia , tube feeding is on hold. 1. Patient is alert, oriented x 2,  intermittent confused,  Visit combined with resp therapist oxygen sat 83% on room air placed on oxygen via nasal cannula . 2. Spoke to his wife /primary MPOA , she has been updated on patient condition by primary team , I suggested to revisit care goals based on fast decline in patient condition , at risk of recurrent aspiration and respiratory decompensation . 3.  His wife wants to include patient children in the meeting she will let me know , the date and time of meeting , I advised we can include his children in conference call, if they are unavailable because of their work schedule. 4. We will continue to support patient and family . Discuss with palliative care IDT and attending physician Dr Cristina Moran . Previous conversation on 7/25 /18 : 
 
5. Me and his wife talked about  , how seriously ill he is , risk of recurrent sepsis, high risk of decompensation and cardiac arrest. 
6.  wife understand he may not make through this illness. 7.  we talked about CPR facts , and its non meaningful out come given he is debilitated with complex medical issues, advised to protect him from CPR. 8. His wife wants to continue with full code sattus and full restorative care , per his wishes reflected on POST FORM completed by patient earlier this month. 9.  Advance directives reviewed , in place. 10. Initial consult note routed to primary continuity provider 11. Communicated plan of care with: Palliative IDT 
 
 
 GOALS OF CARE / TREATMENT PREFERENCES:  
 
GOALS OF CARE: 
Patient/Health Care Proxy Stated Goals: Prolong life TREATMENT PREFERENCES:  
Code Status: Full Code Advance Care Planning: 
Advance Care Planning 7/17/2018 Patient's Healthcare Decision Maker is: Named in scanned ACP document Primary Decision Maker Name -  
Primary Decision Maker Phone Number -  
Primary Decision Maker Relationship to Patient - Secondary Decision Maker Name - Secondary Decision Maker Phone Number -  
Confirm Advance Directive Yes, on file Does the patient have other document types - Medical Interventions: Full interventions Other Instructions: Artificially Administered Nutrition:  (patient hasa feeding tube.) Other: As far as possible, the palliative care team has discussed with patient / health care proxy about goals of care / treatment preferences for patient. HISTORY:  
 
History obtained from: chart  And wife. CHIEF COMPLAINT: admitted for above. HPI/SUBJECTIVE: The patient is:  
[] Verbal , very minimal participation because of lethargy , receiving dialysis in her room currently . \" I am sick \" 
 
7/25/18 : patient is not in any distress, denies pain , intermittently confused, unable to recall , the conversation he had shortly before my visit with Infectious disease. 8/1/18: 
 
Patient is alert, awake, oriented 2, some confusion. resp therapist came in room, patient desat at room air to 83%, placed on nasal cannula,. Patient refusing breathing treatment . Per bed side RN Bernard Martin, patient is refusing nasal cannula. Chart reviewed, tube feedings are on hold. Clinical Pain Assessment (nonverbal scale for severity on nonverbal patients):  
Clinical Pain Assessment Severity: 0 Location: right back Character: throbbing Duration: unable to tell me Frequency: comes and go Duration: for how long has pt been experiencing pain (e.g., 2 days, 1 month, years) Frequency: how often pain is an issue (e.g., several times per day, once every few days, constant) FUNCTIONAL ASSESSMENT:  
 
Palliative Performance Scale (PPS): PPS: 30 
 
 
 PSYCHOSOCIAL/SPIRITUAL SCREENING:  
 
Palliative IDT has assessed this patient for cultural preferences / practices and a referral made as appropriate to needs (Cultural Services, Patient Advocacy, Ethics, etc.) Advance Care Planning: 
Advance Care Planning 7/17/2018 Patient's Healthcare Decision Maker is: Named in scanned ACP document Primary Decision Maker Name -  
Primary Decision Maker Phone Number -  
Primary Decision Maker Relationship to Patient - Secondary Decision Maker Name - Secondary Decision Maker Phone Number -  
Confirm Advance Directive Yes, on file Does the patient have other document types - Any spiritual / Sikh concerns: 
[] Yes /  [x] No 
 
Caregiver Burnout: 
[] Yes /  [x] No /  [] No Caregiver Present Anticipatory grief assessment:  
[x] Normal  / [] Maladaptive ESAS Anxiety: Anxiety: 0 
 
ESAS Depression: Depression: 1 REVIEW OF SYSTEMS:  
 
Positive and pertinent negative findings in ROS are noted above in HPI. The following systems were [x] reviewed  limited because of intermittent/ [] unable to be reviewed as noted in HPI Other findings are noted below. Systems: constitutional, ears/nose/mouth/throat, respiratory, gastrointestinal, genitourinary, musculoskeletal, integumentary, neurologic, psychiatric, endocrine. Positive findings noted below. Modified ESAS Completed by: provider Fatigue: 7 Drowsiness: 1 Depression: 1 Pain: 0 Anxiety: 0 Nausea: 0 Anorexia: 5 Dyspnea: 0 Constipation: No  
  Stool Occurrence(s): 1 PHYSICAL EXAM:  
 
From RN flowsheet: 
Wt Readings from Last 3 Encounters:  
07/31/18 156 lb 9.6 oz (71 kg) 07/09/18 164 lb 3.9 oz (74.5 kg) 06/13/18 150 lb (68 kg) Blood pressure 100/62, pulse 85, temperature 98.5 °F (36.9 °C), temperature source Oral, resp. rate 20, height 5' 10\" (1.778 m), weight 156 lb 9.6 oz (71 kg), SpO2 98 %. Pain Scale 1: Numeric (0 - 10) Pain Intensity 1: 0 Pain Location 1: Abdomen Pain Orientation 1: Other (comment) Pain Description 1: Aching Pain Intervention(s) 1: Medication (see MAR) Last bowel movement, if known:  
 
Constitutional: cachectic, ill appearing , alert, oriented x 2 intermittent confusion. Eyes: pupils equal, anicteric ENMT: dry lips. Cardiovascular: regular rhythm. Respiratory: breathing not labored, symmetric Gastrointestinal: soft non-tender, +bowel sounds Musculoskeletal: generalized wasting . Neurologic: intermittent confusion , follow commands. Other: 
 
 
 HISTORY:  
 
Active Problems: 
  PAD (peripheral artery disease) ([facility] [address] (12/9/2010) Multiple sclerosis ([facility] (1/4/2015) Diabetic peripheral neuropathy associated with type 2 diabetes mellitus ([facility] (3/25/2016) Pseudoaneurysm of iliac artery ([facility] [address] (6/28/2018) Leukocytosis (7/17/2018) Fever (7/17/2018) ESRD (end stage renal disease) on dialysis ([address] (7/17/2018) Sepsis ([address] (7/17/2018) Past Medical History:  
Diagnosis Date  Anemia associated with chronic renal failure  Autoimmune disease (HCC)   
 hx ms  CAD (coronary artery disease)  Chronic kidney disease   
 catheter removed and no dialysis at this time  Chronic kidney disease   
 left arm fistula dialysis MWF  
 Diabetes ([address] type II  
 Elevated troponin 6/29/2018  GERD (gastroesophageal reflux disease)  Hypertension  Ill-defined condition   
 prostate cancer  Multiple sclerosis ([address] diagnosed '01  
 Other ill-defined conditions(799.89)   
 anemia  Other ill-defined conditions(799.89)   
 elevated cholesterol  Other ill-defined conditions(799.89)   
 hx septic right foot  Peripheral vascular disease ([address]  Secondary hyperparathyroidism of renal origin ([address]  Thyroid disease Past Surgical History:  
Procedure Laterality Date  COLONOSCOPY N/A 12/6/2016 COLONOSCOPY performed by [facility]rna Garcia MD at [facility] ENDOSCOPY  COLONOSCOPY,DIAGNOSTIC  12/6/2016  CORONARY STENT SINGLE W/PTCA  2/17/2011  HX APPENDECTOMY  HX CATARACT REMOVAL  10/18/10  
 bilateral  
 HX CHOLECYSTECTOMY  HX GI    
 ileostomy due to infection  HX HEART CATHETERIZATION    
 HX HEENT    
 hx post photocoagulation eye 2009  HX OTHER SURGICAL    
 right partial foot amputation  HX OTHER SURGICAL    
 cardiac cath  HX OTHER SURGICAL    
 prostate removed  PLACE PERCUT GASTROSTOMY TUBE  7/6/2018  MI COLONOSCOPY W/BIOPSY SINGLE/MULTIPLE  5/10/2010  UPPER GI ENDOSCOPY,BIOPSY  4/17/2018  UPPER GI ENDOSCOPY,REMV TUMOR,SNARE  6/13/2018  VASCULAR SURGERY PROCEDURE UNLIST    
 katelyn. leg bypasses Family History Problem Relation Age of Onset  Diabetes Mother History reviewed, no pertinent family history. Social History Substance Use Topics  Smoking status: Former Smoker Years: 1.00 Quit date: 4/12/2003  Smokeless tobacco: Never Used Comment: quit 5 years ago  Alcohol use No  
   Comment: Stopped drinking 10 years ago Allergies Allergen Reactions  Sensipar [Cinacalcet] Other (comments) Cinacalcet (Sensipar) has been added as an \"allergy\" / intolerance with a comment to assure providers at discharge and post discharge are aware of Dr. Dominguez Boothe recommendation to avoid Sensipar. Patient takes Radha Pinch as an outpatient; Thus, Laddie Finical has been discontinued per Nephrology (cannot give with recent Parsabiv b/c risk of low Calcium). Current Facility-Administered Medications Medication Dose Route Frequency  guaiFENesin (ROBITUSSIN) 100 mg/5 mL oral liquid 600 mg  600 mg Per NG tube Q12H  piperacillin-tazobactam (ZOSYN) 3.375 g in 0.9% sodium chloride (MBP/ADV) 100 mL  3.375 g IntraVENous Q12H  
 albuterol-ipratropium (DUO-NEB) 2.5 MG-0.5 MG/3 ML  3 mL Nebulization Q6H RT  
 insulin glargine (LANTUS) injection 5 Units  5 Units SubCUTAneous DAILY  carvedilol (COREG) tablet 25 mg  25 mg Per G Tube BID WITH MEALS  nystatin (MYCOSTATIN) 100,000 unit/mL oral suspension 500,000 Units  500,000 Units Oral QID  heparin (porcine) injection 5,000 Units  5,000 Units SubCUTAneous Q12H  
 balsam peru-castor oil (VENELEX)  mg/gram ointment   Topical BID  sodium hypochlorite (QUARTER STRENGTH DAKIN'S) 0.125% irrigation (bottle)   Topical DAILY  collagenase (SANTYL) 250 unit/gram ointment   Topical DAILY  epoetin niya (EPOGEN;PROCRIT) injection 10,000 Units  10,000 Units SubCUTAneous Q MON, WED & FRI  acetaminophen (TYLENOL) solution 650 mg  650 mg Per G Tube Q3H PRN  
 aspirin chewable tablet 81 mg  81 mg Per G Tube DAILY  escitalopram oxalate (LEXAPRO) 5 mg/5 mL oral solution soln 10 mg  10 mg Per G Tube DAILY  insulin lispro (HUMALOG) injection   SubCUTAneous Q6H  
 glucose chewable tablet 16 g  4 Tab Oral PRN  
 dextrose (D50W) injection syrg 12.5-25 g  12.5-25 g IntraVENous PRN  
 glucagon (GLUCAGEN) injection 1 mg  1 mg IntraMUSCular PRN  
 morphine 10 mg/5 mL oral solution 10 mg  5 mL Per G Tube Q4H PRN  
 oxyCODONE-acetaminophen (PERCOCET) 5-325 mg per tablet 1 Tab  1 Tab Per G Tube Q4H PRN  
 sevelamer carbonate (RENVELA) oral powder 2,400 mg  2.4 g Per G Tube TID WITH MEALS  pravastatin (PRAVACHOL) tablet 10 mg  10 mg Per G Tube QHS  sodium chloride (NS) flush 5-10 mL  5-10 mL IntraVENous Q8H  
 sodium chloride (NS) flush 5-10 mL  5-10 mL IntraVENous PRN  
 naloxone (NARCAN) injection 0.4 mg  0.4 mg IntraVENous PRN  
 ondansetron (ZOFRAN) injection 4 mg  4 mg IntraVENous Q4H PRN  
 
 
 
 LAB AND IMAGING FINDINGS:  
 
Lab Results Component Value Date/Time WBC 20.1 (H) 08/01/2018 04:11 AM  
 HGB 10.2 (L) 08/01/2018 04:11 AM  
 PLATELET 282 78/24/4068 04:11 AM  
 
Lab Results Component Value Date/Time Sodium 135 (L) 08/01/2018 05:15 AM  
 Potassium 4.3 08/01/2018 05:15 AM  
 Chloride 98 08/01/2018 05:15 AM  
 CO2 31 08/01/2018 05:15 AM  
 BUN 35 (H) 08/01/2018 05:15 AM  
 Creatinine 4.05 (H) 08/01/2018 05:15 AM  
 Calcium 9.8 08/01/2018 05:15 AM  
 Magnesium 2.5 (H) 07/30/2018 04:22 AM  
 Phosphorus 3.0 07/27/2018 04:28 AM  
  
Lab Results Component Value Date/Time AST (SGOT) 17 08/01/2018 05:15 AM  
 Alk. phosphatase 185 (H) 08/01/2018 05:15 AM  
 Protein, total 8.1 08/01/2018 05:15 AM  
 Albumin 1.1 (L) 08/01/2018 05:15 AM  
 Globulin 7.0 (H) 08/01/2018 05:15 AM  
 
Lab Results Component Value Date/Time INR 1.2 (H) 07/05/2018 01:57 AM  
 Prothrombin time 12.4 (H) 07/05/2018 01:57 AM  
 aPTT 30.1 03/21/2016 02:01 PM  
  
Lab Results Component Value Date/Time  Iron 37 12/10/2010 05:00 AM  
 TIBC 189 (L) 12/10/2010 05:00 AM  
 Iron % saturation 20 12/10/2010 05:00 AM  
 Ferritin 122 12/10/2010 05:00 AM  
 Lab Results Component Value Date/Time pH 7.45 08/01/2018 05:32 AM  
 PCO2 50 (H) 08/01/2018 05:32 AM  
 PO2 87 08/01/2018 05:32 AM  
 
No components found for: [place] Lab Results Component Value Date/Time CK 90 02/01/2018 01:50 PM  
 CK - MB 3.7 (H) 02/01/2018 01:50 PM  
  
 
 
   
 
Total time:  
Counseling / coordination time, spent as noted above:  
> 50% counseling / coordination?:  
 
Prolonged service was provided for  []30 min   []75 min in face to face time in the presence of the patient, spent as noted above. Time Start:  
Time End:  
Note: this can only be billed with 29421 (initial) or 03012 (follow up). If multiple start / stop times, list each separately.

## 2018-08-01 NOTE — PROGRESS NOTES
Chart reviewed. Patient not available for PT due to receiving wound at this time. PT will continue to follow this patient.  
 
Anisa Muñoz, PT

## 2018-08-01 NOTE — PROGRESS NOTES
Infectious Disease Progress Note Subjective:  
Mr Abdiel Grimes seen today. Is getting dialysed now. He says he has some abd pain. Not coughing much per RN in the room. No other symptoms per him. Objective: 
 
Vitals:  
Patient Vitals for the past 24 hrs: 
 Temp Pulse Resp BP SpO2  
08/01/18 1050 - 83 - (!) 154/20 -  
08/01/18 1020 - 84 - 153/44 -  
08/01/18 0950 98.5 °F (36.9 °C) 82 20 157/48 -  
08/01/18 0751 - - - - 98 % 08/01/18 0744 98.6 °F (37 °C) 80 19 (!) 120/30 100 % 08/01/18 0334 98.6 °F (37 °C) 71 24 95/53 99 % 08/01/18 0306 - - - - 100 % 07/31/18 2315 97.3 °F (36.3 °C) 79 26 107/59 98 % 07/31/18 2245 - 83 24 - 91 %  
07/31/18 2243 - - - - (!) 88 %  
07/31/18 2115 - 81 - 128/57 -  
07/31/18 1951 98.6 °F (37 °C) 82 22 138/57 100 % 07/31/18 1416 99 °F (37.2 °C) 89 17 148/59 97 % 07/31/18 1342 - - - - 100 % 07/31/18 1129 - - 20 - 97 % 07/31/18 1101 99.2 °F (37.3 °C) 90 15 146/63 97 % Physical Exam: ¨ GEN: NAD, off bipap now ¨ HEENT: no scleral icterus,    
¨ CV: S1, S2 heard regularly ¨ Lungs: Coarse lung sounds ¨ Abdomen: soft, non distended, slight facial grimace to palpation abd ¨ Extremities: no edema, RLE stump site wt small superficial wound, no discharge or erythema noted ¨ : Penis ( L side with chronic wound, no erythema noted , sacral wound noted, wt eschar appearance) from previous exam, sacrum not examined again today ¨ Neuro: Alert to self, more verbal this am  
¨ Skin: no rash 
 
 
  
 
Medications: 
 
Current Facility-Administered Medications:  
  guaiFENesin (ROBITUSSIN) 100 mg/5 mL oral liquid 600 mg, 600 mg, Per NG tube, Q12H, Cayden Cosme MD 
  piperacillin-tazobactam (ZOSYN) 3.375 g in 0.9% sodium chloride (MBP/ADV) 100 mL, 3.375 g, IntraVENous, Q12H, Pam Mitchell DO, Last Rate: 25 mL/hr at 08/01/18 0406, 3.375 g at 08/01/18 0406   albuterol-ipratropium (DUO-NEB) 2.5 MG-0.5 MG/3 ML, 3 mL, Nebulization, Q6H RT, Anthony Clark MD, 3 mL at 08/01/18 6950   insulin glargine (LANTUS) injection 5 Units, 5 Units, SubCUTAneous, DAILY, Anthony Clark MD, Stopped at 08/01/18 0900   carvedilol (COREG) tablet 25 mg, 25 mg, Per G Tube, BID WITH MEALS, Anthony Clark MD, 25 mg at 07/31/18 2115   nystatin (MYCOSTATIN) 100,000 unit/mL oral suspension 500,000 Units, 500,000 Units, Oral, QID, Anthony Clark MD, 500,000 Units at 07/31/18 2116 
  heparin (porcine) injection 5,000 Units, 5,000 Units, SubCUTAneous, Q12H, Pia Gordon MD, 5,000 Units at 08/01/18 4572   balsam peru-castor oil (VENELEX) [id number] mg/gram ointment, , Topical, BID, Sanam Santana MD 
  sodium hypochlorite (QUARTER STRENGTH DAKIN'S) 0.125% irrigation (bottle), , Topical, DAILY, Chung Qiu MD 
  collagenase (SANTYL) 250 unit/gram ointment, , Topical, DAILY, Chung Qiu MD 
  epoetin niya (EPOGEN;PROCRIT) injection 10,000 Units, 10,000 Units, SubCUTAneous, Q MON, [employer] Fu, Curt May MD, 10,000 Units at 07/30/18 1712   acetaminophen (TYLENOL) solution 650 mg, 650 mg, Per G Tube, Q3H PRN, Gi Whitfield MD, 650 mg at 07/26/18 4772   aspirin chewable tablet 81 mg, 81 mg, Per G Tube, DAILY, Gi Whitfield MD, Stopped at 07/31/18 0900   escitalopram oxalate (LEXAPRO) 5 mg/5 mL oral solution soln 10 mg, 10 mg, Per G Tube, DAILY, Gi Whitfield MD, Stopped at 07/31/18 0900 
  insulin lispro (HUMALOG) injection, , SubCUTAneous, Q6H, Gi Whitfield MD, Stopped at 07/30/18 1200 
  glucose chewable tablet 16 g, 4 Tab, Oral, PRN, Gi Whitfield MD 
  dextrose (D50W) injection syrg 12.5-25 g, 12.5-25 g, IntraVENous, PRN, Gi Whitfield MD, 12.5 g at 07/31/18 9348   glucagon (GLUCAGEN) injection 1 mg, 1 mg, IntraMUSCular, PRN, Gi Whitfield MD 
  morphine 10 mg/5 mL oral solution 10 mg, 5 mL, Per G Tube, Q4H PRN, Gi Whitfield MD, 10 mg at 07/30/18 0430 
  oxyCODONE-acetaminophen (PERCOCET) 5-325 mg per tablet 1 Tab, 1 Tab, Per G Tube, Q4H PRN, Daryle Crosser, MD, 1 Tab at 07/25/18 4944   sevelamer carbonate (RENVELA) oral powder 2,400 mg, 2.4 g, Per G Tube, TID WITH MEALS, Daryle Crosser, MD, Stopped at 07/31/18 0800   pravastatin (PRAVACHOL) tablet 10 mg, 10 mg, Per G Tube, QHS, Daryle Crosser, MD, 10 mg at 07/31/18 2115   sodium chloride (NS) flush 5-10 mL, 5-10 mL, IntraVENous, Q8H, Lorenzo Velasco MD, 10 mL at 08/01/18 0520 
  sodium chloride (NS) flush 5-10 mL, 5-10 mL, IntraVENous, PRN, Daryle Crosser, MD, 10 mL at 07/17/18 2039 
  naloxone (NARCAN) injection 0.4 mg, 0.4 mg, IntraVENous, PRN, Daryle Crosser, MD 
  ondansetron [facility]) injection 4 mg, 4 mg, IntraVENous, Q4H PRN, Daryle Crosser, MD, 4 mg at 07/31/18 7679 Labs: 
Recent Results (from the past 24 hour(s)) GLUCOSE, POC Collection Time: 07/31/18 11:36 AM  
Result Value Ref Range Glucose (POC) 98 65 - 100 mg/dL Performed by KHADAR SAUCEDO ([facility]) CON   
GLUCOSE, POC Collection Time: 07/31/18  5:35 PM  
Result Value Ref Range Glucose (POC) 84 65 - 100 mg/dL Performed by KHADAR SAUCEDO ([facility]) CON   
GLUCOSE, POC Collection Time: 07/31/18  9:04 PM  
Result Value Ref Range Glucose (POC) 72 65 - 100 mg/dL Performed by Ching KULKARNI   
GLUCOSE, POC Collection Time: 07/31/18  9:34 PM  
Result Value Ref Range Glucose (POC) 106 (H) 65 - 100 mg/dL Performed by Ching KULKARNI   
GLUCOSE, POC Collection Time: 07/31/18 11:57 PM  
Result Value Ref Range Glucose (POC) 78 65 - 100 mg/dL Performed by Ching KULKARNI   
GLUCOSE, POC Collection Time: 08/01/18 12:16 AM  
Result Value Ref Range Glucose (POC) 111 (H) 65 - 100 mg/dL Performed by Tico Badillo CBC W/O DIFF Collection Time: 08/01/18  4:11 AM  
Result Value Ref Range WBC 20.1 (H) 4.1 - 11.1 K/uL  
 RBC 3.70 (L) 4.10 - 5.70 M/uL  
 HGB 10.2 (L) 12.1 - 17.0 g/dL HCT 33.8 (L) 36.6 - 50.3 %  MCV 91.4 80.0 - 99.0 FL  
 MCH 27.6 26.0 - 34.0 PG  
 MCHC 30.2 30.0 - 36.5 g/dL  
 RDW 18.6 (H) 11.5 - 14.5 % PLATELET 595 963 - 851 K/uL MPV 10.4 8.9 - 12.9 FL  
 NRBC 0.0 0  WBC ABSOLUTE NRBC 0.00 0.00 - 0.01 K/uL METABOLIC PANEL, BASIC Collection Time: 08/01/18  5:15 AM  
Result Value Ref Range Sodium 135 (L) 136 - 145 mmol/L Potassium 4.3 3.5 - 5.1 mmol/L Chloride 98 97 - 108 mmol/L  
 CO2 31 21 - 32 mmol/L Anion gap 6 5 - 15 mmol/L Glucose 81 65 - 100 mg/dL BUN 35 (H) 6 - 20 MG/DL Creatinine 4.05 (H) 0.70 - 1.30 MG/DL  
 BUN/Creatinine ratio 9 (L) 12 - 20 GFR est AA 18 (L) >60 ml/min/1.73m2 GFR est non-AA 15 (L) >60 ml/min/1.73m2 Calcium 9.8 8.5 - 10.1 MG/DL  
BLOOD GAS, ARTERIAL Collection Time: 08/01/18  5:32 AM  
Result Value Ref Range pH 7.45 7.35 - 7.45    
 PCO2 50 (H) 35.0 - 45.0 mmHg PO2 87 80 - 100 mmHg O2 SAT 97 92 - 97 % BICARBONATE 34 (H) 22 - 26 mmol/L  
 BASE EXCESS 8.1 mmol/L  
 O2 METHOD VENTURI MASK    
 FIO2 50 % Sample source ARTERIAL    
 SITE RIGHT BRACHIAL MANSOOR'S TEST N/A    
GLUCOSE, POC Collection Time: 08/01/18  5:36 AM  
Result Value Ref Range Glucose (POC) 91 65 - 100 mg/dL Performed by Kacy Montoya Micro:  
 Blood 7/26 pending Blood 7/17 Component Results   
   Component Value Ref Range & Units Status  
   Special Requests: NO SPECIAL REQUESTS    Final  
   Culture result: NO GROWTH 6 DAYS    Final  
    
Result History   
  
 Pelvic abscess 7/19 Component Value Ref Range & Units Status   
  Special Requests: NO SPECIAL REQUESTS   Final  
  GRAM STAIN 3+ WBCS SEEN   Final  
  GRAM STAIN NO ORGANISMS SEEN   Final  
  Culture result: FEW PROTEUS MIRABILIS (A)   Final  
   
Culture & Susceptibility   
    Antibiotic   Organism Organism Organism  
    Proteus mirabilis    
  AMIKACIN ($)   <=16   ug/mL   S Final      
  AMPICILLIN ($)   <=8   ug/mL   S Final      
  AMPICILLIN/SULBACTAM ($)   <=8/4   ug/mL   S Final      
  AZTREONAM ($$$$)   <=4   ug/mL   S Final      
  CEFAZOLIN ($)   <=8   ug/mL   S Final      
  CEFEPIME ($$)   <=4   ug/mL   S Final      
  CEFOTAXIME   <=2   ug/mL   S Final      
  CEFTAZIDIME ($)   <=1   ug/mL   S Final      
  CEFTRIAXONE ($)   <=1   ug/mL   S Final      
  CEFUROXIME ($)   <=4   ug/mL   S Final      
  CIPROFLOXACIN ($)   <=1   ug/mL   S Final      
  GENTAMICIN ($)   <=4   ug/mL   S Final      
  LEVOFLOXACIN ($)   <=2   ug/mL   S Final      
  MEROPENEM ($$)   <=1   ug/mL   S Final      
  PIPERACILLIN/TAZOBAC ($)   <=16   ug/mL   S Final      
  TOBRAMYCIN ($)   <=4   ug/mL   S Final      
  TRIMETH/SULFA   <=2/38   ug/mL   S Final      
           
  
 
 
  
Imaging:  
  
CT 7/17 FINDINGS: 
LOWER CHEST: 
The visualized portions of the lung bases are clear.  There is mild atelectasis 
in the posterior lower lobes.   
ABDOMEN: 
The unenhanced images demonstrate visibility of the interventricular septum of 
the heart. There is extensive vascular calcification and a right iliac stent. There are clips in the gallbladder fossa. Liver: The liver is normal in size and contour with no focal abnormality. Gallbladder and bile ducts: There is no calcified gallstone or biliary duct 
dilatation. Spleen: No abnormality. Pancreas: No abnormality. Adrenal glands: No abnormality. Kidneys: There are small left renal cysts. There is hydronephrosis and 
hydroureter on the right down to the level of the pelvis. PELVIS: 
Reproductive organs: The prostate gland is absent. Bladder: No abnormality. BOWEL AND MESENTERY: There is a gastrostomy tube in the antrum of the stomach. The small bowel is not obstructed. There is gas and stool throughout the colon. There is no mesenteric mass or adenopathy.  The appendix is absent. PERITONEUM: Janeth Ales is no ascites or free intraperitoneal air.   
RETROPERITONEUM: The aorta is atherosclerotic without aneurysm. There is a right 
external iliac artery stent. There is an enhancing fluid collection surrounding 
the right external iliac artery stent extending into the right retroperitoneum. The component around the stent measures 4.6 x 2.7 x 6.3 cm and the component in 
the right retroperitoneum measures 3.6 x 3.1 x 4.6 cm. There is no 
retroperitoneal mass or adenopathy. BONES AND SOFT TISSUES: There is diffuse body edema. 
   
IMPRESSION IMPRESSION:  
1. Right common iliac artery stent with enhancing fluid collection surrounding 
the stent and extending into the retroperitoneum on the right. This is 
suspicious for infection. 2. Right hydronephrosis and hydroureter secondary to compression of the ureter 
by the collection around the stent. 3. Extensive atherosclerotic calcification of the aorta and mesenteric vessels. This is consistent with the patient's end-stage renal disease. 4. Status post cholecystectomy. 5. Gastrostomy tube. 6. Small left renal cysts. 7. Status post prostatectomy. 
   
CT 7/11 FINDINGS:  
LUNG BASES: 7 mm subpleural nodule at posterior basilar right lower lobe 
slightly smaller in the interval. 
INCIDENTALLY IMAGED HEART AND MEDIASTINUM: Unremarkable. LIVER: Mild intrahepatic and extrahepatic biliary duct distention again shown 
with common bile duct diameter measuring up to 1.5 cm. Findings not 
substantially changed in the interval. 
GALLBLADDER: Status post cholecystectomy. SPLEEN: No mass. PANCREAS: No mass or ductal dilatation. ADRENALS: Unremarkable. KIDNEYS: Bilateral renal atrophy again shown. Moderate right renal 
pelvocaliectasis and ureteral dilation again demonstrated. Finding extends to 
level of retroperitoneal hematoma surrounding right external iliac stent. STOMACH: Moderate gastric distention in the interval to the level of the second 
portion of the duodenum. Percutaneous gastrostomy tube in the interval 
positioned anteriorly in the gastric body.  
SMALL BOWEL: No dilatation or wall thickening. COLON: No dilatation or wall thickening. APPENDIX: Not demonstrated. PERITONEUM: No free intraperitoneal air or fluid demonstrated. RETROPERITONEUM: As noted above, right external iliac stent again shown within a 
moderate-sized hematoma extending along the anterior margin of the right 
iliopsoas. Dimension is difficult to estimate due to the lack of oral contrast. 
It measures at least 7.6 x 3.5 cm transverse and 10.3 cm craniocaudal and size 
is similar to that shown previously. URINARY BLADDER: No mass or calculus. BONES: No destructive bone lesion. ADDITIONAL COMMENTS: N/A 
   
IMPRESSION IMPRESSION: 
   
1. Interval percutaneous gastrostomy tube placement. Interval moderate gastric 
distention. 2. Right iliac artery stenting and adjacent hematoma again shown, appearing 
stable. Moderate right renal pelvocaliectasis and ureterectasis again 
demonstrated. 
   
CT 7/2 FINDINGS: There is interval right common/external iliac artery stenting for 
exclusion of pseudoaneurysm with patent stent, and minimal hyperdensity in the 
pseudoaneurysm contents but no overt arterial extravasation. Pseudoaneurysm size 
is stable. There is no apparent soft tissue emphysema or abscess. 
   
Right hydroureteronephrosis remains. Kidneys are atrophic. There is trace 
ascites. There is no pneumoperitoneum. 
   
Abdominal aorta is without stenosis, aneurysm or dissection. Celiac artery, SMA, 
ARTIE and bilateral solitary renal arteries are patent. Right internal iliac 
artery is patent. Femoral arteries are patent. 
   
IMPRESSION IMPRESSION: 
1. Interval right iliac artery pseudoaneurysm stenting. 2. No overt arterial extravasation. 3. Stable pseudosac size. 4. No apparent soft tissue emphysema/abscess. 5. Stable right hydroureteronephrosis.   
    
  
  
CT 7/26 A/P  
FINDINGS: 
  
Redemonstrated is the infected hematoma in the right lower quadrant measuring 
approximately 7 x 2.5 cm. This appears slightly smaller than the prior 
examination. 
  
There is significant right hydroureteronephrosis as was seen before. The level 
of obstruction is likely at the site of the hematoma. 
  
There are no new fluid collections. 
  
Gallbladder is absent. Liver spleen and pancreas remain unremarkable. 
  
IMPRESSION IMPRESSION: 
1. There is been slight decrease in size of the infected hematoma in the right 
lower quadrant. 2. Persistent right hydroureteronephrosis. CT chest 7/30 FINDINGS: 
  
THYROID: Bilateral nodules measuring 7 mm on the right and 9 mm on the left. MEDIASTINUM: 1.8 cm subcarinal lymph node has increased in size, previously1.6 
cm. No mass or other enlarged lymphadenopathy with multiple shotty peritracheal 
lymph nodes noted at the level of the romie. Yusra Raleigh ABISAI: No mass or enlarged lymphadenopathy. THORACIC AORTA: Atherosclerotic calcification without aneurysm. MAIN PULMONARY ARTERY: Normal in caliber. TRACHEA/BRONCHI: Dependent debris within the right mainstem bronchus with near 
occlusion at the level of the origin of the bronchus intermedius and occlusion 
of right lower lobe branches. ESOPHAGUS: Fluid containing within minimally distended lumen. No abnormality 
otherwise. HEART: Normal in size without pericardial effusion. Dense coronary artery 
calcifications are noted. PLEURA: Small right pleural effusion. LUNGS: Consolidative infiltrate in the right lower lobe. Otherwise clear with INCIDENTALLY IMAGED UPPER ABDOMEN: No focal abnormality. BONES: No destructive bone lesion. 
  
IMPRESSION IMPRESSION: Debris filled right lower lobe branch airway is extending into the 
right mainstem bronchus with right lower lobe consolidative infiltrates 
suspicious for pneumonia with associated small parapneumonic effusion. Probable 
reactive mediastinal adenopathy with enlarged 1.8 cm subcarinal lymph node.  
Previously seen left infiltrate is cleared 
  
Assessment / Plan:  
  
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  
  
From chart review, he was admitted from 6/28-7/9/18 and had stenting and repair of aneurysm. Had leukocytosis and was being treated for aspiration pneumonia per chart. Per DC summary he was on Vancomycin, and flagyl at one point and then Zosyn for 10 days during last admit. 
  
This admission, he is admitted with fever (101.2 ) and leukocytosis (16) and found to have fluid collection around R iliac artery stent extending into retroperitoneum on right. CT read as \" The component around the stent measures 4.6 x 2.7 x 6.3 cm and the component in 
the right retroperitoneum measures 3.6 x 3.1 x 4.6 cm\"  Right hydronephrosis and hydroureter due to compression fo ureter seen as well. He had a CT guided aspiration done and cultures + for Proteus mirabilis. He had a CT on 7/11 prior to this admission that showed a \"moderate-sized hematoma extending along the anterior margin of the right iliopsoas\". \" It measures at least 7.6 x 3.5 cm transverse and 10.3 cm craniocaudal in size\".   
He was started on Cefepime and Levaquin this admission. Levaquin was stopped later  
  
  
 
  
1) Infected R iliac pseudoaneurysm and stent 
  
S/P repair with stenting on 6/29/18, S/P CT guided drainage of pelvic collection 7/19 wt cx + for Proteus mirabilis 
  
CT with  component around the stent measures 4.6 x 2.7 x 6.3 cm and the component in the right retroperitoneum measures 3.6 x 3.1 x 4.6 cm\" Repeat CT 7/26 with \"slight decrease in size of the infected hematoma in the right lower quadrant\" and \" Persistent right hydroureteronephrosis\". Measurements approximately 7 by 2.5 cm however Check blood cultures to ensure no seeding  
  
Poor surgical candidate given functional status and multiple co morbidities 
  
Successful treatment of infections is not usually possible without adequate source control. Additionally, in his situation, as there is fluid  around the stent, raising high concern for stent to be seeded/infected. 
  
Was On Cefazolin wt HD dosing , changed to Zosyn as now also has most likely aspiration pneumonia/pneumonitis Once completed Zosyn therapy for a week , if no other new changes, will go  back to renally dosed (HD dosing) cefazolin 2, 2, 3 gm F/U wt me after 2 weeks of DC Weekly CBC wt diff, CMP sent to me at [phone] Has some abd pain and WBC increasing as well. If continues, suggest repeating CT abd again Added LFTs to am labs 2) Aspiration pneumonia/pneumonitis Changed antibiotics from cefazolin +flagyl to Zosyn Plan for a week of Zosyn Aspiration precautions  
 
  
  
3) Per urology notes, superficial dry gangrene of glans penis , also has R hydroureteronephrosis   
4) Sacral decubitus ulcers : examined today, wound care , stool contamination as well given incontinence, with high risk for infection   
4) MS: per primary team  
  
5) ESRD on HD 
  
6) S/P PEG  
  
7) DVT px  
 
Pam Mitchell DO  
10:56 AM

## 2018-08-01 NOTE — PROGRESS NOTES
[facility] [place] [facility] Pul[facility], Critical Care, and Sleep Medicine Name: Lane Anthony MRN: [id number] : 1950 Hospital: [facility] Date: 2018 Critical Care Patient Consult IMPRESSION:  
· Acute Hypoxic Respiratory Failure, has been off the bipap. Pox was 94% on RA at rest. He appears comfortable. At this point has ongoing productive cough of thick yellow sputum. · Suspected aspiration  pneumonia on Ct of chest from . Pt is at risk of  Aspiration but would resume tube feeds when appropriate. · ESRD on HD, had multiple prior vascular access issues for HD. Getting placed on HD this am.  
· Multiple Sclerosis · GERD · Post op infection of hematoma around vascular graft. Infected right iliac hematoma, proteus. Was admitted with right  flank pain, Had associated nausea and vomiting. He did not have fevers or chills. Noted to have right lower quadrant hematoma. · May be having recurrent aspiration. · Cardiomyopathy with LVEF of 35%. · Leukocytosis · PAD · Intestinal Vascular Insufficiency. · Anemia · Type 2 DM. · Ex Smoker · Ex Drinker. · Prognosis is guarded. Pts family desires ongoing full support. RECOMMENDATIONS:  
· Off oxygen and bipap this am. 
· Appears medically stable with improved oxygenation and weaned off the bipap today. · Discussed Abx choice with Dr. Cuellar Led since pt is suspected to have aspirated, will continue zosyn for now. While inpt. · Renal is following. · Blood pressure has been stable. · Will sign off and be available as needed. Subjective/History:  
 
18: AT this point has productive cough, thick yellow sputum. He feels that his breathing is comfortable. Notes some mild sinus congestion. Has mild chest pain due to coughing. Rest of 10 point ROS is negative. 18: Pt feels more comfortable today. Suspected to have aspirated on tube feeds.  His tube feeds have been off for last 24 hrs. NO chest pain, no back pain, no abdominal pain today. No issues last pm. Was still on bipap this am but was able to be weaned off. NO Headaches. No other acute issues. 7-30-18: Noted to have desaturations, Was felt to have increased work of breathing. Was placed on bipap . Seems to be doing much better compared to earlier today. At this point remains on bipap with fio2 of 100%. He has no acute complaints. No chest pain no back pain. No leg pain. He feels that his breathing is pretty comfortable. NO GERD. Denies much abdominal pain. Rest of 10 point ROS is negative. This patient has been seen and evaluated at the request of Dr. Ole Diaz for above. Patient is a 70 y.o. 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 He was treated on 6/29/18 for right common iliac and external iliac artery stenting for pseudoaneurysm. Had been admitted on 6/28/18 through 7/9/18 and had stenting and repair or aneurysm. Was also being treated with aspiration pneumonia. Was on VAncomycin and flagy and then was on zosyn for 10 days. Was admitted with fevers and has elevated WBC. Was noted to have proteus. Had a moderate hematoma along the right iliopsoas. He was felt to be a poor surgical candidate. Per Vascular surgery evaluation: 
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 Hx of abd artery rupture -will need to obtain records from [facility]. PAD s/p BLE bypasses and Syme amputation Intestinal vascular insufficiency The patient is critically ill and can not provide additional history due to on bipap. Past Medical History:  
Diagnosis Date  Anemia associated with chronic renal failure  Autoimmune disease (HCC)   
 hx ms  CAD (coronary artery disease)  Chronic kidney disease   
 catheter removed and no dialysis at this time  Chronic kidney disease   
 left arm fistula dialysis MWF  
 Diabetes ([facility] [address]  type II  
 Elevated troponin 6/29/2018  GERD (gastroesophageal reflux disease)  Hypertension  Ill-defined condition   
 prostate cancer  Multiple sclerosis ([facility] [address] diagnosed '01  
 Other ill-defined conditions(799.89)   
 anemia  Other ill-defined conditions(799.89)   
 elevated cholesterol  Other ill-defined conditions(799.89)   
 hx septic right foot  Peripheral vascular disease ([facility] [address]  Secondary hyperparathyroidism of renal origin ([facility] [address]  Thyroid disease Past Surgical History:  
Procedure Laterality Date  COLONOSCOPY N/A 12/6/2016 COLONOSCOPY performed by Myrna Garcia MD at [facility] ENDOSCOPY  COLONOSCOPY,DIAGNOSTIC  12/6/2016  CORONARY STENT SINGLE W/PTCA  2/17/2011  HX APPENDECTOMY  HX CATARACT REMOVAL  10/18/10  
 bilateral  
 HX CHOLECYSTECTOMY  HX GI    
 ileostomy due to infection  HX HEART CATHETERIZATION    
 HX HEENT    
 hx post photocoagulation eye 2009  HX OTHER SURGICAL    
 right partial foot amputation  HX OTHER SURGICAL    
 cardiac cath  HX OTHER SURGICAL    
 prostate removed  PLACE PERCUT GASTROSTOMY TUBE  7/6/2018  IL COLONOSCOPY W/BIOPSY SINGLE/MULTIPLE  5/10/2010  UPPER GI ENDOSCOPY,BIOPSY  4/17/2018  UPPER GI ENDOSCOPY,REMV TUMOR,SNARE  6/13/2018  VASCULAR SURGERY PROCEDURE UNLIST    
 katelyn. leg bypasses Prior to Admission medications Medication Sig Start Date End Date Taking? Authorizing Provider  
oxyCODONE-acetaminophen (PERCOCET) 5-325 mg per tablet 1 Tab by Per G Tube route every four (4) hours as needed for Pain. Yes Historical Provider  
epoetin niya (EPOGEN;PROCRIT) 10,000 unit/mL injection 10,000 Units by SubCUTAneous route Q TU, TH & SAT. Yes Historical Provider  
insulin lispro (HUMALOG) 100 unit/mL injection 2-4 Units by SubCUTAneous route Before breakfast, lunch, dinner and at bedtime.    Yes Historical Provider  
lidocaine (LIDODERM) 5 % 2 Patches by TransDERmal route daily. Apply patch to the affected area for 12 hours a day and remove for 12 hours a day. Yes Historical Provider  
lisinopril (PRINIVIL, ZESTRIL) 20 mg tablet 20 mg by Per G Tube route daily. Yes Historical Provider  
escitalopram oxalate (LEXAPRO) 5 mg/5 mL oral solution 10 mg by Per G Tube route daily. Yes Historical Provider  
sevelamer carbonate (RENVELA) 2.4 gram pwpk oral powder 2.4 g by Per G Tube route three (3) times daily (with meals). Yes Historical Provider  
pravastatin (PRAVACHOL) 10 mg tablet 1 Tab by Per G Tube route nightly. 7/9/18  Yes Rashid Villalobos MD  
cloNIDine HCl (CATAPRES) 0.1 mg tablet 1 Tab by Per G Tube route three (3) times daily. 7/9/18  Yes Rashid Villalobos MD  
carvedilol (COREG) 25 mg tablet 2 Tabs by Per G Tube route two (2) times daily (with meals). 7/9/18  Yes Rashid Villalobos MD  
aspirin 81 mg chewable tablet 1 Tab by Per G Tube route daily. 7/9/18  Yes Rashid Villalobos MD  
amLODIPine (NORVASC) 10 mg tablet 1 Tab by Per G Tube route daily. 7/9/18  Yes Rashid Villalobos MD  
acetaminophen (TYLENOL) 160 mg/5 mL liquid 650 mg by Per G Tube route every three (3) hours as needed for Pain. Historical Provider  
bisacodyl (DULCOLAX) 10 mg suppository Insert 10 mg into rectum daily as needed (\"Constipation\"). Historical Provider  
guaiFENesin-codeine (ROBITUSSIN AC) 100-10 mg/5 mL solution Take 2.5 mL by mouth every six (6) hours as needed for Cough. Historical Provider  
morphine 10 mg/5 mL oral solution 5 mL by Per G Tube route every four (4) hours as needed for Pain. Pain scale 7-10    Historical Provider  
mupirocin (BACTROBAN) 2 % ointment Apply  to affected area every eight (8) hours as needed (\"Dry skin\"). Historical Provider  
ondansetron (ZOFRAN ODT) 4 mg disintegrating tablet Take 4 mg by mouth every six (6) hours as needed for Nausea.     Historical Provider  
sodium hypochlorite (DAKIN'S SOLUTION) external solution Apply  to affected area every eight (8) hours as needed (\"Wound care\"). Historical Provider Current Facility-Administered Medications Medication Dose Route Frequency  guaiFENesin (ROBITUSSIN) 100 mg/5 mL oral liquid 600 mg  600 mg Per NG tube Q12H  piperacillin-tazobactam (ZOSYN) 3.375 g in 0.9% sodium chloride (MBP/ADV) 100 mL  3.375 g IntraVENous Q12H  
 albuterol-ipratropium (DUO-NEB) 2.5 MG-0.5 MG/3 ML  3 mL Nebulization Q6H RT  
 insulin glargine (LANTUS) injection 5 Units  5 Units SubCUTAneous DAILY  carvedilol (COREG) tablet 25 mg  25 mg Per G Tube BID WITH MEALS  nystatin (MYCOSTATIN) 100,000 unit/mL oral suspension 500,000 Units  500,000 Units Oral QID  heparin (porcine) injection 5,000 Units  5,000 Units SubCUTAneous Q12H  
 balsam peru-castor oil (VENELEX)  mg/gram ointment   Topical BID  sodium hypochlorite (QUARTER STRENGTH DAKIN'S) 0.125% irrigation (bottle)   Topical DAILY  collagenase (SANTYL) 250 unit/gram ointment   Topical DAILY  epoetin niya (EPOGEN;PROCRIT) injection 10,000 Units  10,000 Units SubCUTAneous Q MON, WED & FRI  aspirin chewable tablet 81 mg  81 mg Per G Tube DAILY  escitalopram oxalate (LEXAPRO) 5 mg/5 mL oral solution soln 10 mg  10 mg Per G Tube DAILY  insulin lispro (HUMALOG) injection   SubCUTAneous Q6H  
 sevelamer carbonate (RENVELA) oral powder 2,400 mg  2.4 g Per G Tube TID WITH MEALS  pravastatin (PRAVACHOL) tablet 10 mg  10 mg Per G Tube QHS  sodium chloride (NS) flush 5-10 mL  5-10 mL IntraVENous Q8H Allergies Allergen Reactions  Sensipar [Cinacalcet] Other (comments) Cinacalcet (Sensipar) has been added as an \"allergy\" / intolerance with a comment to assure providers at discharge and post discharge are aware of Dr. Alton Anderson recommendation to avoid Sensipar. Patient takes Nonah Kaycee as an outpatient;  Thus, Chelsea Amis has been discontinued per Nephrology (cannot give with recent Parsabiv b/c risk of low Calcium). Social History Substance Use Topics  Smoking status: Former Smoker Years: 1.00 Quit date: 2003  Smokeless tobacco: Never Used Comment: quit 5 years ago  Alcohol use No  
   Comment: Stopped drinking 10 years ago Family History Problem Relation Age of Onset  Diabetes Mother Review of Systems: 
Review of systems not obtained due to patient factors. Pt is on bipap and not able to give complete ROS. He is able to shake his head yes and no to answer some questions. Objective:  
Vital Signs:   
Visit Vitals  /48 Comment: HD initiated @ this time.  Pulse 82  Temp 98.5 °F (36.9 °C) (Oral)  Resp 20  
 Ht 5' 10\" (1.778 m)  Wt 71 kg (156 lb 9.6 oz)  SpO2 98%  BMI 22.47 kg/m2 O2 Device: Nasal cannula O2 Flow Rate (L/min): 4 l/min Temp (24hrs), Av.5 °F (36.9 °C), Min:97.3 °F (36.3 °C), Max:99.2 °F (37.3 °C) Intake/Output:  
Last shift:        
Last 3 shifts:   
No intake or output data in the 24 hours ending 18 1012 Hemodynamics:  
PAP:   CO:    
Wedge:   CI:    
CVP:    SVR:    
  PVR:    
 
Ventilator Settings: 
Mode Rate Tidal Volume Pressure FiO2 PEEP  
         60 % Peak airway pressure:     
Minute ventilation: 12.1 l/min Physical Exam: 
 
General:  Alert, cooperative, no distress, appears stated age. Alert, conversant, no distress on RA with pox of 94%. Head:  Normocephalic, without obvious abnormality, atraumatic. Eyes:  Conjunctivae/corneas clear. PERRL, EOMs intact. Nose: Nares normal. Septum midline. Mucosa normal. No drainage or sinus tenderness. Throat: Lips, mucosa, and tongue normal. Teeth and gums normal.  
Neck: Supple, symmetrical, trachea midline, no adenopathy, thyroid: no enlargment/tenderness/nodules, no carotid bruit and no JVD. Back:   Symmetric, no curvature. ROM normal.  
Lungs:   He is breathing comfortably. NO wheezing or rhonchi are noted today.  On RA when seen.   
Chest wall:  No tenderness or deformity. Heart:  Regular rate and rhythm, S1, S2 normal, no murmur, click, rub or gallop. Abdomen:   Soft, non-tender. Bowel sounds normal. No masses,  No organomegaly. Extremities: Extremities normal, atraumatic, multiple prior AV fistulas. Has prior amputations of the RLE. Pulses: 2+ and symmetric all extremities. Skin: Skin color, texture, turgor normal. No rashes or lesions Lymph nodes: Cervical, supraclavicular, and axillary nodes normal.  
Neurologic: Grossly nonfocal, motor strength seems intact. Psych: no overt anxiety or depression. Data:  
 
Recent Results (from the past 24 hour(s)) GLUCOSE, POC Collection Time: 07/31/18 11:36 AM  
Result Value Ref Range Glucose (POC) 98 65 - 100 mg/dL Performed by KHADAR SAUCEDO ([facility]) CON   
GLUCOSE, POC Collection Time: 07/31/18  5:35 PM  
Result Value Ref Range Glucose (POC) 84 65 - 100 mg/dL Performed by KHADAR SAUCEDO ([facility]) CON   
GLUCOSE, POC Collection Time: 07/31/18  9:04 PM  
Result Value Ref Range Glucose (POC) 72 65 - 100 mg/dL Performed by Bearl Snellen M   
GLUCOSE, POC Collection Time: 07/31/18  9:34 PM  
Result Value Ref Range Glucose (POC) 106 (H) 65 - 100 mg/dL Performed by Bearl Snellen M   
GLUCOSE, POC Collection Time: 07/31/18 11:57 PM  
Result Value Ref Range Glucose (POC) 78 65 - 100 mg/dL Performed by Bearl Snellen M   
GLUCOSE, POC Collection Time: 08/01/18 12:16 AM  
Result Value Ref Range Glucose (POC) 111 (H) 65 - 100 mg/dL Performed by Paul Londono CBC W/O DIFF Collection Time: 08/01/18  4:11 AM  
Result Value Ref Range WBC 20.1 (H) 4.1 - 11.1 K/uL  
 RBC 3.70 (L) 4.10 - 5.70 M/uL  
 HGB 10.2 (L) 12.1 - 17.0 g/dL HCT 33.8 (L) 36.6 - 50.3 % MCV 91.4 80.0 - 99.0 FL  
 MCH 27.6 26.0 - 34.0 PG  
 MCHC 30.2 30.0 - 36.5 g/dL  
 RDW 18.6 (H) 11.5 - 14.5 % PLATELET 847 424 - 452 K/uL  MPV 10.4 8.9 - 12.9 FL  
 NRBC 0.0 0  WBC ABSOLUTE NRBC 0.00 0.00 - 0.01 K/uL METABOLIC PANEL, BASIC Collection Time: 08/01/18  5:15 AM  
Result Value Ref Range Sodium 135 (L) 136 - 145 mmol/L Potassium 4.3 3.5 - 5.1 mmol/L Chloride 98 97 - 108 mmol/L  
 CO2 31 21 - 32 mmol/L Anion gap 6 5 - 15 mmol/L Glucose 81 65 - 100 mg/dL BUN 35 (H) 6 - 20 MG/DL Creatinine 4.05 (H) 0.70 - 1.30 MG/DL  
 BUN/Creatinine ratio 9 (L) 12 - 20 GFR est AA 18 (L) >60 ml/min/1.73m2 GFR est non-AA 15 (L) >60 ml/min/1.73m2 Calcium 9.8 8.5 - 10.1 MG/DL  
BLOOD GAS, ARTERIAL Collection Time: 08/01/18  5:32 AM  
Result Value Ref Range pH 7.45 7.35 - 7.45    
 PCO2 50 (H) 35.0 - 45.0 mmHg PO2 87 80 - 100 mmHg O2 SAT 97 92 - 97 % BICARBONATE 34 (H) 22 - 26 mmol/L  
 BASE EXCESS 8.1 mmol/L  
 O2 METHOD VENTURI MASK    
 FIO2 50 % Sample source ARTERIAL    
 SITE RIGHT BRACHIAL MANSOOR'S TEST N/A    
GLUCOSE, POC Collection Time: 08/01/18  5:36 AM  
Result Value Ref Range Glucose (POC) 91 65 - 100 mg/dL Performed by Kenan Kody Telemetry:NSR with type 2 block. Imaging: 
I have personally reviewed the patients radiographs and have reviewed the reports: 
 
7-30-18: CXR: COMPARISON: July 28, 2018 
  
FINDINGS: AP portable imaging of the chest performed at 12:53 PM demonstrates a 
stable cardiomediastinal silhouette. There is right basilar atelectasis with a 
probable small right pleural effusion. No significant osseous abnormalities are 
seen. 
  
IMPRESSION: Right basilar atelectasis with probable small right pleural 
effusion. 7-26-18: CT of abdomen: IMPRESSION: 
1. There is been slight decrease in size of the infected hematoma in the right 
lower quadrant. 2. Persistent right hydroureteronephrosis. Ct of chest from 7/30/18.   
IMPRESSION: Debris filled right lower lobe branch airway is extending into the 
right mainstem bronchus with right lower lobe consolidative infiltrates 
suspicious for pneumonia with associated small parapneumonic effusion. Probable 
reactive mediastinal adenopathy with enlarged 1.8 cm subcarinal lymph node. Previously seen left infiltrate is cleared.   
 
 
Sukhwinder Man MD

## 2018-08-01 NOTE — PROGRESS NOTES
NAME: Jan Mcclain :  1950 MRN:  [id number] Assessment :    Plan: 
--ESRD Left AVF 
HTN Anemia Hypoalbuminemia Sacral Decubitus Right retroperitoneal abscess/hematoma with associated right hydro PEG tube --MWF HD [facility] -seen on HD in AM. Using L AV access, QB of 425, 3k, 2.5 Ca 
UF of 2 Kg was attempted; DBP in 30-40 on monnitor. Asked RN to cut down UF to 1.5 KG and reduce further if hypotensive 
  
Ct EPO; add on phos to am labs. If low, cut back on renvela Also since he is borderline hypotensive on HD-reduce Coreg to 12.5 mg BID 
  
Holding Parsabiv (not available in the hospital) S/p FNA of retroperitoneal abscess/hematoma --not a surgical candidate. longterm prognosis very poor. Palliative care-This pt has been chronically ill for so many years;and in the past has recovered enough to have a fairly good qol--it wasn't long ago he received a renal tx---albeit short lived--however, as above longterm prognosis poor Discussed end of life briefly again today on HD. Not fully with it and could not convey his wishes concretely Subjective: Chief Complaint: seen on HD earlier. Frail/weak and lethargic Review of Systems: no n/v/cp or sob Objective: VITALS:  
Last 24hrs VS reviewed since prior progress note. Most recent are: 
Visit Vitals  BP (!) 116/30  Pulse 85  Temp 98.2 °F (36.8 °C) (Oral)  Resp 17  Ht 5' 10\" (1.778 m)  Wt 71 kg (156 lb 9.6 oz)  SpO2 95%  BMI 22.47 kg/m2 Intake/Output Summary (Last 24 hours) at 18 1508 Last data filed at 18 1320 Gross per 24 hour Intake                0 ml Output              605 ml Net             -605 ml Telemetry Reviewed: PHYSICAL EXAM: 
General: WD, WN. Alert,  no acute distress Resp:  CTA Bilaterally. Katherene Means CV:  Regular  rhythm,  No edema on L. R foot amputation L AVF patent Lethargic Lab Data Reviewed: (see below) Medications Reviewed: (see below) PMH/SH reviewed - no change compared to H&P 
________________________________________________________________________ Care Plan discussed with: 
Patient y Family HD RN y   
Care Manager Consultant:     
 
  Comments >50% of visit spent in counseling and coordination of care    
 
________________________________________________________________________ Scarlet Jo MD  
 
Procedures: see electronic medical records for all procedures/Xrays and details which 
were not copied into this note but were reviewed prior to creation of Plan. LABS: 
Recent Labs 08/01/18 
 0411 07/31/18 
 1925 WBC  20.1*  19.2* HGB  10.2*  9.0*  
HCT  33.8*  29.8* PLT  308  287 Recent Labs 08/01/18 
 3768  07/31/18 0411 07/30/18 
 0422 NA  135*  135*  136  
K  4.3  4.0  3.8 CL  98  98  98 CO2  31  33*  34* BUN  35*  26*  41* CREA  4.05*  3.16*  4.54* GLU  81  104*  177* CA  9.8  9.7  9.6 MG   --    --   2.5* Recent Labs 08/01/18 
 7710  07/31/18 
 0411  07/30/18 
 0422 SGOT  17  16  19 AP  185*  201*  225* TP  8.1  8.3*  8.3* ALB  1.1*  1.1*  1.2*  
GLOB  7.0*  7.2*  7.1* No results for input(s): INR, PTP, APTT in the last 72 hours. No lab exists for component: INREXT, INREXT No results for input(s): FE, TIBC, PSAT, FERR in the last 72 hours. No results found for: FOL, RBCF Recent Labs 08/01/18 
 0532  07/30/18 
 1227 PH  7.45  7.48* PCO2  50*  46* PO2  87  99 No results for input(s): CPK, CKMB in the last 72 hours. No lab exists for component: TROPONINI No components found for: [place] Lab Results Component Value Date/Time  Color RED 07/17/2018 02:54 PM  
 Appearance CLOUDY (A) 07/17/2018 02:54 PM  
 Specific gravity 1.024 07/17/2018 02:54 PM  
 Specific gravity 1.015 12/30/2014 06:40 PM  
 pH (UA) 7.0 07/17/2018 02:54 PM  
 Protein 300 (A) 07/17/2018 02:54 PM  
 Glucose 100 (A) 07/17/2018 02:54 PM  
 Ketone NEGATIVE  07/17/2018 02:54 PM  
 Bilirubin SMALL (A) 07/17/2018 02:54 PM  
 Urobilinogen 0.2 07/17/2018 02:54 PM  
 Nitrites NEGATIVE  07/17/2018 02:54 PM  
 Leukocyte Esterase SMALL (A) 07/17/2018 02:54 PM  
 Epithelial cells FEW 07/17/2018 02:54 PM  
 Bacteria 1+ (A) 07/17/2018 02:54 PM  
 WBC >100 (H) 07/17/2018 02:54 PM  
 RBC 10-20 07/17/2018 02:54 PM  
 
 
MEDICATIONS: 
Current Facility-Administered Medications Medication Dose Route Frequency  guaiFENesin (ROBITUSSIN) 100 mg/5 mL oral liquid 600 mg  600 mg Per NG tube Q12H  
 albuterol-ipratropium (DUO-NEB) 2.5 MG-0.5 MG/3 ML  3 mL Nebulization Q6H PRN  piperacillin-tazobactam (ZOSYN) 3.375 g in 0.9% sodium chloride (MBP/ADV) 100 mL  3.375 g IntraVENous Q12H  
 insulin glargine (LANTUS) injection 5 Units  5 Units SubCUTAneous DAILY  carvedilol (COREG) tablet 25 mg  25 mg Per G Tube BID WITH MEALS  nystatin (MYCOSTATIN) 100,000 unit/mL oral suspension 500,000 Units  500,000 Units Oral QID  heparin (porcine) injection 5,000 Units  5,000 Units SubCUTAneous Q12H  
 balsam peru-castor oil (VENELEX)  mg/gram ointment   Topical BID  sodium hypochlorite (QUARTER STRENGTH DAKIN'S) 0.125% irrigation (bottle)   Topical DAILY  collagenase (SANTYL) 250 unit/gram ointment   Topical DAILY  epoetin niya (EPOGEN;PROCRIT) injection 10,000 Units  10,000 Units SubCUTAneous Q MON, WED & FRI  acetaminophen (TYLENOL) solution 650 mg  650 mg Per G Tube Q3H PRN  
 aspirin chewable tablet 81 mg  81 mg Per G Tube DAILY  escitalopram oxalate (LEXAPRO) 5 mg/5 mL oral solution soln 10 mg  10 mg Per G Tube DAILY  insulin lispro (HUMALOG) injection   SubCUTAneous Q6H  
 glucose chewable tablet 16 g  4 Tab Oral PRN  
 dextrose (D50W) injection syrg 12.5-25 g  12.5-25 g IntraVENous PRN  
 glucagon (GLUCAGEN) injection 1 mg  1 mg IntraMUSCular PRN  
 morphine 10 mg/5 mL oral solution 10 mg  5 mL Per G Tube Q4H PRN  
 oxyCODONE-acetaminophen (PERCOCET) 5-325 mg per tablet 1 Tab  1 Tab Per G Tube Q4H PRN  
 sevelamer carbonate (RENVELA) oral powder 2,400 mg  2.4 g Per G Tube TID WITH MEALS  pravastatin (PRAVACHOL) tablet 10 mg  10 mg Per G Tube QHS  sodium chloride (NS) flush 5-10 mL  5-10 mL IntraVENous Q8H  
 sodium chloride (NS) flush 5-10 mL  5-10 mL IntraVENous PRN  
 naloxone (NARCAN) injection 0.4 mg  0.4 mg IntraVENous PRN  
 ondansetron (ZOFRAN) injection 4 mg  4 mg IntraVENous Q4H PRN

## 2018-08-01 NOTE — PROGRESS NOTES
Hospitalist Progress Note NAME: Paulo Blanco :  1950 MRN:  [id number] Assessment / Plan: 
Acute hypoxemic Respiratory Failure (18), back on BIPAP overnighj Due top to Aspiration PNA - confirmed on CT chest, Sepsis with Hypotension POA- hypotension had improved,  Afebrile but WBC trending up 20k today Keep holding tube feedings for now- resume once remains stable from resp standpoint- pt is at high risk for further aspiration -- pt/family aware 
s/p Flagyl  X 1 day, changed to IV zosyn  per ID recc Cont Deep suctioning as able Add mucinex via NGT Q 12 Infected Right Iliac Hematoma POA-s/p drained - shows Proteus, Vascular and ID in the case, has a stent that ideally may need to come out due to infection. - not a surgical candidate as per Vascular S/p cefepime, new CT abdomen and pelvis shows decreasing size of hematoma, switched to Cefazolin - changed to  Zosyn  by ID as pt had aspiration insult  B/L Buttock Pressure injury ulcer/wound infection vs R iliac artery pseudoaneurysm/collection/stent infection, UTI, no urine culture had been sent, c/w wound care for decubitus ulcer. Encephalopathy POA  - improved on BIPAP again today AM 
in the setting of sepsis POA 
-CT abdm/pelvis showing Right common iliac artery stent with enhancing fluid collection surrounding the stent and extending into the retroperitoneum on the right. suspicious for infection.  Also showing Right hydronephrosis and hydroureter secondary to compression of the ureter by the collection around the stent 
-On ,Vascular surgery saw patient and will proceed with aspiration of the fluid noted on CT surrounding the right common iliac artery stent since this could be an abscess. 
-Urology consulted for hydronephrosis right ->saw pt on :no intervention needed 
-On :CT guided aspiration of pelvic fluid collection - culture of fluid showing few -GNRs,Proteus 
-WBC trending up now due to aspiration 
-Body fluid Cx-Proteus sens to cefazolin- changed to Zosyn 7/31 Diabetes mellitus 2 on Insulin at home: BG increasing now that nutrition had improved thru PEG, c/w  low dose Lantus  HbA1C 4.9 Dysphagia due to esophageal dysmotility on MBS study last admission - s/p PEG for FS Q 6 hrs on tube feeding, keep NPO for now,  CT abdomen showing some paretic loops of small bowel, but on exam abdomen is benign Holding tube feeding due to aspiration- resume soon when resp status stabilized after last episode of Aspiration on 7/30 Unspecified protein-calorie malnutrition POA in the setting of PEG dependence, sacral decubitus, ESRD -- onTF 
 
ESRD on HD MWF  Renal in the case. HD as per Renal 
Anemia of chronic disease POA S/p L arm AVFistula Hyperlipidemia Cont on statins Hypertension resume Coreg thru PEG, monitor. ? Multiple sclerosis POA ? Exacerbation causing dysphagia- cannot tell till MRI done 
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 No signs of failure at this time PAD s/p b/l leg stents S/p remote partial (Syme) amputation of RIGHT foot. -Nephrology consulted for HD MWF 
-Cont ASA 
-Vascular surgery consult as above for opinion on stent infection Code Status: Full Code as per [facility] with palliative Care Surrogate Decision Maker: wife Tony Deshpande  [phone] DVT Prophylaxis: Sq heparin GI Prophylaxis: not indicated Baseline: Pt was recently DC to [facility] from [facility] after being treated for R Iliac ar pseudoaneurysm s/p Stent by [facility] Dr Vargas Chung Dysmotility syndrome causing Dysphagia causing aspiration pneumonia s/p PEG tube Pt had denied any neuro workup - denied MRI as recommended by neurology to find the cause of his eso dysmotility- ?MS flare Prognosis is poor but family still wants everything done, he is Hospice candidate nevertheless but wife wants to honor patient wishes for now Subjective: Chief Complaint / Reason for Physician Visit: F/U Aspiration Pneumonia, resp failure, Confusion/agitation Overnight agitation noted- back on BIPAP overnight. Discussed with RN events overnight. Review of Systems: 
Symptom Y/N Comments  Symptom Y/N Comments Fever/Chills n   Chest Pain n   
Poor Appetite n   Edema Cough n   Abdominal Pain n   
Sputum    Joint Pain SOB/BANKS n   Pruritis/Rash Nausea/vomit n   Tolerating PT/OT Diarrhea    Tolerating Diet y Tube feedings on hold overnight Constipation    Other Could NOT obtain due to:   
 
Objective: VITALS:  
Last 24hrs VS reviewed since prior progress note. Most recent are: 
Patient Vitals for the past 24 hrs: 
 Temp Pulse Resp BP SpO2  
08/01/18 0751 - - - - 98 % 08/01/18 0334 98.6 °F (37 °C) 71 24 95/53 99 % 08/01/18 0306 - - - - 100 % 07/31/18 2315 97.3 °F (36.3 °C) 79 26 107/59 98 % 07/31/18 2245 - 83 24 - 91 %  
07/31/18 2243 - - - - (!) 88 %  
07/31/18 2115 - 81 - 128/57 -  
07/31/18 1951 98.6 °F (37 °C) 82 22 138/57 100 % 07/31/18 1416 99 °F (37.2 °C) 89 17 148/59 97 % 07/31/18 1342 - - - - 100 % 07/31/18 1129 - - 20 - 97 % 07/31/18 1101 99.2 °F (37.3 °C) 90 15 146/63 97 % 07/31/18 1028 - - 19 - 100 % 07/31/18 0914 - - - - 97 % No intake or output data in the 24 hours ending 08/01/18 0855 PHYSICAL EXAM: 
General:                    lethargic,more responsive now, SOB , on Bipap overnight EENT:                       EOMI. Anicteric sclerae. MMM Resp:                        Coarse BS on the R lung field noted+, dull in bases CV:                            Regular  rhythm,  No edema GI:                             Soft, no  distended, no tender.  +Bowel sounds, PEG in place Neurologic:                Alert and oriented X 2, slow speech, also intermittently confused Psych:                       No  insight. Not anxious nor agitated Skin:                          No rashes. No jaundice. LUE fistula+ Reviewed most current lab test results and cultures  YES Reviewed most current radiology test results   YES Review and summation of old records today    NO Reviewed patient's current orders and MAR    YES 
PMH/SH reviewed - no change compared to H&P 
________________________________________________________________________ Care Plan discussed with: 
  Comments Patient x Family  x wife RN x Care Manager x Consultant Multidiciplinary team rounds were held today with , nursing, pharmacist and clinical coordinator. Patient's plan of care was discussed; medications were reviewed and discharge planning was addressed. ________________________________________________________________________ Total NON critical care TIME:  26  Minutes Total CRITICAL CARE TIME Spent:   Minutes non procedure based Comments >50% of visit spent in counseling and coordination of care    
________________________________________________________________________ Kia Haley MD  
 
Procedures: see electronic medical records for all procedures/Xrays and details which were not copied into this note but were reviewed prior to creation of Plan. LABS: 
I reviewed today's most current labs and imaging studies. Pertinent labs include: 
Recent Labs 08/01/18 
 7772  07/31/18 
 1872  07/30/18 
 0422 WBC  20.1*  19.2*  16.9* HGB  10.2*  9.0*  9.6* HCT  33.8*  29.8*  32.0*  
PLT  308  287  342 Recent Labs 08/01/18 
 3627  07/31/18 
 0411  07/30/18 
 0422 NA  135*  135*  136 K  4.3  4.0  3.8 CL  98  98  98 CO2  31  33*  34* GLU  81  104*  177* BUN  35*  26*  41* CREA  4.05*  3.16*  4.54* CA  9.8  9.7  9.6 MG   --    --   2.5* ALB   --   1.1*  1.2* TBILI   --   0.4  0.3 SGOT   --   16  19 ALT   --   <6*  12 Signed: Davion De León MD

## 2018-08-01 NOTE — DIALYSIS
[facility]  [phone] Vitals   Pre   Post   Assessment   Pre   Post    
Temp  Temp: 98.5 °F (36.9 °C) (08/01/18 0950)  98.2 oral LOC  A & O x2-3 A & O x2-3 Needs redirection of needle safety throughout Tx HR   Pulse (Heart Rate): 82 (08/01/18 0950) 90 Lungs   Coarse in BUL Clear in BLL  Clear in all lobes B/P   BP: 157/48 (HD initiated @ this time.) (08/01/18 0950) 131/36 Cardiac   Reg rhythm  Reg rhythm Resp   Resp Rate: 20 (08/01/18 0950) 18 Skin   Warm & dry (R) BKA  Warm & dry Pain level  Pain Intensity 1: 0 (08/01/18 0744) 0/10 Edema  LLE trace None noted Orders: Duration:   Start:   Procedure Start Time: 5636  
4434 End:  1320   Total:   3.5 hours Dialyzer:   Dialyzer/Set Up Inspection: Cirilo Gerber (08/01/18 0950) K Bath:   Dialysate K (mEq/L): 3 (08/01/18 0950) Ca Bath:   Dialysate CA (mEq/L): 2.5 (08/01/18 0950) Na/Bicarb:   Dialysate NA (mEq/L): 140 (08/01/18 0950) Target Fluid Removal:   Goal/Amount of Fluid to Remove (mL): 1500 mL (08/01/18 0950) Access Type & Location:   Pre Hd: (L)UA AVF with +B/+T noted, prepped & cannulated/protocol w/x2 #15g needles, no s/s of infection noted @ site; lines secured. Post Hd: removed x2 needles w/hemostasis achieved through hand held pressure, dry dsg applied w/no further bleeding noted; +B/+T present. Labs Obtained/Reviewed Critical Results Called   Date when labs were drawn- 
Hgb-   
HGB Date Value Ref Range Status 08/01/2018 10.2 (L) 12.1 - 17.0 g/dL Final  
 
K-   
Potassium Date Value Ref Range Status 08/01/2018 4.3 3.5 - 5.1 mmol/L Final  
 
Ca-  
Calcium Date Value Ref Range Status 08/01/2018 9.8 8.5 - 10.1 MG/DL Final  
 
Bun-  
BUN Date Value Ref Range Status 08/01/2018 35 (H) 6 - 20 MG/DL Final  
 
Creat-  
Creatinine Date Value Ref Range Status 08/01/2018 4.05 (H) 0.70 - 1.30 MG/DL Final  
  Comment:  
  INVESTIGATED PER DELTA CHECK PROTOCOL Medications/ Blood Products Given Name   Dose   Route and Time None Blood Volume Processed (BVP):    82.7 L Net Fluid Removed:  605 mL Comments Time Out Done: @1716 Primary Nurse Rpt Pre: Villa Siemens, RN 
Primary Nurse Rpt Post: Villa Siemens, RN 
Pt Education: Procedural; needle safety Care Plan:Continue Hd as prescribed by Md. Tx Summary: With 1 hour left of Tx, Decreased BP noted, contacted Md & made aware, new order to stop UF & continue Hd for remaining treatment. Pt tolerated Tx fairly due to low BP w/UF off; able to return all possible blood w/out complications; report given to primary nurse; left pt in bed w/call bell within reach in stable condition. Admiting Diagnosis: ESRD Pt's previous clinic- 
Consent signed - Informed Consent Verified: Yes (08/01/18 0950) Filiita Consent - Yes verified Hepatitis Status- HgBsAg: Neg 7/30/18 Machine #- Machine Number: [id number] (08/01/18 6004) Telemetry status- NSR Pre-dialysis wt. - Pre-Dialysis Weight: 70.5 kg (155 lb 6.8 oz) (08/01/18 0950) Post dialysis wt- 69.9kg

## 2018-08-02 NOTE — ROUTINE PROCESS
Bedside report received and verified. Pt is in bed with HOB elevated >30 degrees. Bed is in lowest position. Pt is not talking at this time. He does not appear enthusiastic about his care. Refer to eMar and docflowsheets for remainder of care.

## 2018-08-02 NOTE — PROGRESS NOTES
PCU SHIFT NURSING NOTE Bedside shift change report given to [employer] RN (oncoming nurse) by Yvonne Palencia RN (offgoing nurse). Report included the following information SBAR, Kardex, Intake/Output and Recent Results. Shift Summary:  
2230: Bedside report given. A small amount of bright red blood coming from patients nose. 5LNC. When asked orientation questions patient continuing to shake head no. Patient continues to pull nasal cannula off. Sats quickly dropping <90%. 2335: On call paged [phone]: On call paged [phone]: Wound care complete to patients sacrum 1: Spoke with Dr. Karan Ocampo. Made aware of nights events. Order received to hold 0600 heparin injection 0045: Respiratory at bedside to suction patient. Sats <80%. Pt placed on non re breather 
0120: Sats 92% on the non re breather, 5LNC. MD paged [phone]: Spoke with Dr. Karan Ocampo. Nights events discussed. Recommends pt continue on 5LNC and the non re breather and STAT ABG. Recommends in house MD to be contacted 
0200: In house MD contacted. Nights events discussed. Recommends pulmonology to be paged [phone]: Pulmonology paged 46: Pulmonology paged 80: Spoke with Dr. Rubia Ramirez. Nights events discussed. Verbal order given to place patient on heated high flow Bedside shift change report given to Georgie Cox (oncoming nurse) by [employer] RN (offgoing nurse). Report included the following information SBAR, Kardex, Intake/Output and Recent Results. Admission Date 7/17/2018 Admission Diagnosis Leukocytosis Fever ESRD (end stage renal disease) on dialysis ([facility] [address] Consults IP CONSULT TO VASCULAR SURGERY 
IP CONSULT TO NEPHROLOGY 
IP CONSULT TO UROLOGY 
IP CONSULT TO PALLIATIVE CARE - PROVIDER 
IP CONSULT TO INFECTIOUS DISEASES 
IP CONSULT TO PULMONOLOGY Consults []PT []OT []Speech  
[]Case Management  
  
[] Palliative Cardiac Monitoring Order []Yes []No  
 
IV drips []Yes Drip:                            Dose: 
Drip: Dose: 
Drip:                            Dose:  
[]No  
 
GI Prophylaxis []Yes []No  
 
 
 
DVT Prophylaxis SCDs:  Sequential Compression Device: Bilateral  
  Patient Refused VTE Prophylaxis: Yes Danny stockings:     
  
[] Medication []Contraindicated []None Activity Level Activity Level: Bed Rest   
 Activity Assistance: Complete care Purposeful Rounding every 1-2 hour? []Yes Lopez Score  Total Score: 4 Bed Alarm (If score 3 or >) []Yes  
[] Refused (See signed refusal form in chart) Delbert Score  Delbert Score: 12 Delbert Score (if score 14 or less) []PMT consult  
[]Wound Care consult []Specialty bed  
[] Nutrition consult Needs prior to discharge:  
Home O2 required:   
[]Yes []No  
 If yes, how much O2 required? Other:  
 Last Bowel Movement: Last Bowel Movement Date: 07/31/18 Influenza Vaccine Received Flu Vaccine for Current Season (usually Sept-March): Not Flu Season Pneumonia Vaccine Diet Active Orders Diet DIET NPO With Tube Feedings LDAs Peripheral IV 08/01/18 Right Arm (Active) Site Assessment Clean, dry, & intact 8/1/2018  7:30 AM  
Phlebitis Assessment 0 8/1/2018  7:30 AM  
Infiltration Assessment 0 8/1/2018  7:30 AM  
Dressing Status Clean, dry, & intact 8/1/2018  7:30 AM  
Dressing Type Transparent 8/1/2018  7:30 AM  
Hub Color/Line Status Blue 8/1/2018  7:30 AM  
    
Hemodialysis Access 07/25/18 (Active) Central Line Being Utilized Yes 8/1/2018  7:30 AM  
Criteria for Appropriate Use Dialysis/apheresis 8/1/2018  7:30 AM  
Date Accessed  07/30/18 8/1/2018  3:34 AM  
Site Assessment Clean, dry, & intact 8/1/2018  7:30 AM  
Date of Last Dressing Change 07/25/18 7/26/2018  5:45 PM  
Dressing Status Clean, dry, & intact 7/31/2018  7:31 AM  
Dressing Type 4 X 4;Transparent;Tape 7/30/2018  7:46 PM  
  
PEG/Gastrostomy Tube 07/17/18 (Active) Site Assessment Clean, dry, & intact 8/1/2018  7:30 AM Dressing Status Clean, dry, & intact 8/1/2018  7:30 AM  
G Port Status Clamped 8/1/2018  7:30 AM  
Action Taken Placement verified (comment) 7/30/2018  7:46 PM  
Drainage Description Other (Comment) 7/29/2018  3:00 AM  
Gastric Residual (mL) 0 ml 7/30/2018  7:46 PM  
Tube Feeding/Formula Options Renal (Nepro) 7/30/2018  7:50 AM  
Tube Feeding/Verify Rate (mL/hr) 45 7/30/2018  7:50 AM  
Water Flush Volume (mL) 170 mL 7/30/2018  7:50 AM  
Intake (ml) 45 ml 7/29/2018  5:00 AM  
Medication Volume 20 ml 7/28/2018  9:32 PM  
Output (ml) 0 ml 7/23/2018 10:07 PM  
            
Urinary Catheter Intake & Output Date 07/31/18 1900 - 08/01/18 9657 08/01/18 0700 - 08/02/18 7205 Shift 9889-9727 24 Hour Total 4381-8136 3084-8645 24 Hour Total  
I 
N 
T 
A 
K 
E Shift Total 
(mL/kg) O 
U T 
P 
U Maxene Harness Stool Stool Occurrence(s)  2 x 1 x  1 x Dialysis   605  605 NET Fluid Removed (mL)   605  605 Shift Total 
(mL/kg)   605 
(8.5)  605 
(8.5) NET   -605  -605 Weight (kg) 71 71 71 71 71 Readmission Risk Assessment Tool Score High Risk  Luis Antonio y Total Score 3 Has Seen PCP in Last 6 Months (Yes=3, No=0) 2 . Living with Significant Other. Assisted Living. LTAC. SNF. or  
Rehab  
 3 Patient Length of Stay (>5 days = 3) 4 IP Visits Last 12 Months (1-3=4, 4=9, >4=11) 5 Pt. Coverage (Medicare=5 , Medicaid, or Self-Pay=4) 19 Charlson Comorbidity Score (Age + Comorbid Conditions) Expected Length of Stay 4d 21h Actual Length of Stay 15

## 2018-08-02 NOTE — PROGRESS NOTES
Able to replace nasal cannula. o2 sats 88% on 4lpm.  o2 delivery increased to 5lpm.  o2 saturation level increased to 91-92%.

## 2018-08-02 NOTE — PROGRESS NOTES
Intubation Note Called to bedside secondary to  impending respiratory failure. Patient pre-oxygenated with 100% oxygen. Smooth RSI with propofol 100 mg IV. 
 
glidescope x 1 
 
8.0 ETT taped and secured at 24 cm at the teeth. 
 
+ Bilateral BS, + Chest rise, + ETCO2 CXR pending.  
 
Care turned over to covering Attending MD.

## 2018-08-02 NOTE — PROGRESS NOTES
Rapid Response called d/t pt oxygen saturations dropping 70s. Pt was restarted on feeding tube at a low rate of 15mL/hr per MD order. Pt tolerated initial feeding, oxygen demands started to increase pt requiring high flow oxygen currently at 100% 40L/min. PRN order for BiPAP. MD aware of patient condition. Discussed family situation with Dr. Deloris Nur, pt has a POST form that determines his care moving forward. Pt will continue to be a full code for now. Contacted attending MD about patient change in condition, he is agreeable to transfer to unit for ventilation.

## 2018-08-02 NOTE — PROGRESS NOTES
1910: Patient arrived on unit; assisted lead nurse Susan Figueredo with transfer. TRANSFER - IN REPORT: 
 
Verbal report received from Wero Kent (name) on Jocelyn Cleaves  being received from PCU (unit) for change in patient condition(in need of intubation) Report consisted of patients Situation, Background, Assessment and  
Recommendations(SBAR). Information from the following report(s) SBAR, Kardex, Procedure Summary, Intake/Output, MAR, Recent Results and Cardiac Rhythm NSR was reviewed with the receiving nurse. Opportunity for questions and clarification was provided. Assessment completed upon patients arrival to unit and care assumed. 1930: Paged on call pulmonologist. 
 
[address]: Updated Dr. Asher Rowland on new patient who was brought to unit to be intubated after aspirating around [phone] on PCU; after intubation, SBP was in the 60's; verbal orders received via telephone; order for central line, CXR for line and ETT placement, blood gas, soft wrist restraints, 500 ml bolus of NS over 30 mins, phenylephrine titrate  for MAP >65, propofol titrate 0-50 to maintain RASS 0 to -1, PRN fentanyl 25-50 mcg every 2 hours for severe pain, and start NS at 100 ml/hr. 1940: Updated lead nurse Susan Figueredo RN on all orders received from Dr. Asher Rowland and gave bedside and verbal transfer report.

## 2018-08-02 NOTE — PROGRESS NOTES
Hospitalist cross cover Responded to RRT. Hypoxic in low 70s on VM. Confused and combative. Chart reviewed: overall poor prognosis noticed documented on chart; recurrent aspiration pneumonia BP/HR stable Awake, mild dyspnea at rest. + mild use of accessory muscles. Lungs: diminished BS BL  
 
Assessment: Likely aspiration again due to TF Plan: Hold TF 
         Jet nebs O2: NRB for now, if not enough will put back on high flow O2 and BIPAP as needed If above is not effective, will transfer to ICU and intubate Pt is full code Palliative will meet with family tonight at 10 pm  
 
Hazel Villalpando MD

## 2018-08-02 NOTE — PROGRESS NOTES
NAME: Corinne Obrien :  1950 MRN:  [id number] Assessment :    Plan: 
--ESRD Left AVF 
HTN Anemia Hypoalbuminemia/malnutrition Sacral Decubitus Right retroperitoneal abscess/hematoma with associated right hydro PEG tube Debility/FTT --MWF HD [facility] [place] 
 
HD tomm; Phos is low- cut back Renvela to 0.8 G TID 
  
Holding Parsabiv (not available in the hospital) Coreg reduced on ; reduce further if needed Poor prognosis. Discussed end of life briefly with him. Seems to lack insight and not able to converse much. Ct ABx per ID Family mtg today with palliative Subjective: Chief Complaint: minimally verbal. Endorses no acute c/o Had ?aspiration overnight Review of Systems: as above Objective: VITALS:  
Last 24hrs VS reviewed since prior progress note. Most recent are: 
Visit Vitals  BP (!) 128/21 (BP 1 Location: Right arm, BP Patient Position: At rest)  Pulse 95  Temp 98.3 °F (36.8 °C)  Resp 18  Ht 5' 10\" (1.778 m)  Wt 70.6 kg (155 lb 9.6 oz)  SpO2 99%  BMI 22.33 kg/m2 Intake/Output Summary (Last 24 hours) at 18 1216 Last data filed at 18 1320 Gross per 24 hour Intake                0 ml Output              605 ml Net             -605 ml Telemetry Reviewed: PHYSICAL EXAM: 
General: Chronic ill appearing in NAD Resp:  CTA Bilaterally. Monie Arora CV:  Regular  rhythm,  No edema on L. R foot amputation L AVF patent Lab Data Reviewed: (see below) Medications Reviewed: (see below) PMH/SH reviewed - no change compared to H&P 
________________________________________________________________________ Care Plan discussed with: 
Patient y Family HD RN Care Manager Consultant:     
 
  Comments >50% of visit spent in counseling and coordination of care ________________________________________________________________________ Gabrielle Rogers MD  
 
Procedures: see electronic medical records for all procedures/Xrays and details which 
were not copied into this note but were reviewed prior to creation of Plan. LABS: 
Recent Labs 08/02/18 
 6692  08/01/18 
 0411 WBC  20.9*  20.1* HGB  9.4*  10.2* HCT  30.8*  33.8*  
PLT  313  308 Recent Labs 08/02/18 
 6519  08/01/18 
 0515  07/31/18 
 0411 NA  137  135*  135* K  3.9  4.3  4.0  
CL  100  98  98 CO2  28  31  33* BUN  24*  35*  26* CREA  2.92*  4.05*  3.16* GLU  93  81  104* CA  9.5  9.8  9.7 PHOS  2.4*   --    --   
 
Recent Labs 08/01/18 
 0515  07/31/18 
 0411 SGOT  17  16 AP  185*  201* TP  8.1  8.3* ALB  1.1*  1.1*  
GLOB  7.0*  7.2* No results for input(s): INR, PTP, APTT in the last 72 hours. No lab exists for component: INREXT, INREXT No results for input(s): FE, TIBC, PSAT, FERR in the last 72 hours. No results found for: FOL, RBCF Recent Labs 08/02/18 
 0131  08/01/18 
 0532 PH  7.45  7.45  
PCO2  47*  50* PO2  62*  87 No results for input(s): CPK, CKMB in the last 72 hours. No lab exists for component: TROPONINI No components found for: [place] Lab Results Component Value Date/Time  Color RED 07/17/2018 02:54 PM  
 Appearance CLOUDY (A) 07/17/2018 02:54 PM  
 Specific gravity 1.024 07/17/2018 02:54 PM  
 Specific gravity 1.015 12/30/2014 06:40 PM  
 pH (UA) 7.0 07/17/2018 02:54 PM  
 Protein 300 (A) 07/17/2018 02:54 PM  
 Glucose 100 (A) 07/17/2018 02:54 PM  
 Ketone NEGATIVE  07/17/2018 02:54 PM  
 Bilirubin SMALL (A) 07/17/2018 02:54 PM  
 Urobilinogen 0.2 07/17/2018 02:54 PM  
 Nitrites NEGATIVE  07/17/2018 02:54 PM  
 Leukocyte Esterase SMALL (A) 07/17/2018 02:54 PM  
 Epithelial cells FEW 07/17/2018 02:54 PM  
 Bacteria 1+ (A) 07/17/2018 02:54 PM  
 WBC >100 (H) 07/17/2018 02:54 PM  
 RBC 10-20 07/17/2018 02:54 PM MEDICATIONS: 
Current Facility-Administered Medications Medication Dose Route Frequency  guaiFENesin (ROBITUSSIN) 100 mg/5 mL oral liquid 600 mg  600 mg Per NG tube Q12H  
 albuterol-ipratropium (DUO-NEB) 2.5 MG-0.5 MG/3 ML  3 mL Nebulization Q6H PRN  
 carvedilol (COREG) tablet 12.5 mg  12.5 mg Per G Tube BID WITH MEALS  piperacillin-tazobactam (ZOSYN) 3.375 g in 0.9% sodium chloride (MBP/ADV) 100 mL  3.375 g IntraVENous Q12H  
 insulin glargine (LANTUS) injection 5 Units  5 Units SubCUTAneous DAILY  nystatin (MYCOSTATIN) 100,000 unit/mL oral suspension 500,000 Units  500,000 Units Oral QID  heparin (porcine) injection 5,000 Units  5,000 Units SubCUTAneous Q12H  
 balsam peru-castor oil (VENELEX)  mg/gram ointment   Topical BID  sodium hypochlorite (QUARTER STRENGTH DAKIN'S) 0.125% irrigation (bottle)   Topical DAILY  collagenase (SANTYL) 250 unit/gram ointment   Topical DAILY  epoetin niya (EPOGEN;PROCRIT) injection 10,000 Units  10,000 Units SubCUTAneous Q MON, WED & FRI  acetaminophen (TYLENOL) solution 650 mg  650 mg Per G Tube Q3H PRN  
 aspirin chewable tablet 81 mg  81 mg Per G Tube DAILY  escitalopram oxalate (LEXAPRO) 5 mg/5 mL oral solution soln 10 mg  10 mg Per G Tube DAILY  insulin lispro (HUMALOG) injection   SubCUTAneous Q6H  
 glucose chewable tablet 16 g  4 Tab Oral PRN  
 dextrose (D50W) injection syrg 12.5-25 g  12.5-25 g IntraVENous PRN  
 glucagon (GLUCAGEN) injection 1 mg  1 mg IntraMUSCular PRN  
 morphine 10 mg/5 mL oral solution 10 mg  5 mL Per G Tube Q4H PRN  
 oxyCODONE-acetaminophen (PERCOCET) 5-325 mg per tablet 1 Tab  1 Tab Per G Tube Q4H PRN  
 sevelamer carbonate (RENVELA) oral powder 2,400 mg  2.4 g Per G Tube TID WITH MEALS  pravastatin (PRAVACHOL) tablet 10 mg  10 mg Per G Tube QHS  sodium chloride (NS) flush 5-10 mL  5-10 mL IntraVENous Q8H  
 sodium chloride (NS) flush 5-10 mL  5-10 mL IntraVENous PRN  
 naloxone [facility]) injection 0.4 mg  0.4 mg IntraVENous PRN  
 ondansetron (ZOFRAN) injection 4 mg  4 mg IntraVENous Q4H PRN

## 2018-08-02 NOTE — PROGRESS NOTES
Pt is refusing care at this time. This nurse asked to verifiy his name and date of birth after scanning his armband. Pt shook his head indicating \"no\". Also asked him \"Are you Anselmo Shaver? \"  He shook his head \"No\". Attempted to take vital signs, pt refused. Placed blood pressure cuff on pt's arm and he took the blood pressure cuff off. Attempted to place nasal cannula back on pt. Pt turned his head and snatched the nasal cannula down. Informed charge nurse.

## 2018-08-02 NOTE — PROGRESS NOTES
Family meeting today with patient spouse and children at 6 pm today for goals of care discussion. Thank you for including palliative care in the care of the patient . Young Anderson MD

## 2018-08-02 NOTE — PROGRESS NOTES
Infectious Disease Progress Note Subjective:  
Mr Christos Brannon seen today. Does not endorse any symptoms today. Otherwise, not much verbal. 
  
 
 
 
 
Objective: 
 
Vitals:  
Patient Vitals for the past 24 hrs: 
 Temp Pulse Resp BP SpO2  
08/02/18 0819 - 94 21 - 99 % 08/02/18 0803 98.8 °F (37.1 °C) 95 19 125/44 100 % 08/02/18 0800 - 95 20 - 100 % 08/02/18 0332 98.4 °F (36.9 °C) 97 20 150/61 99 % 08/02/18 0231 - - - - 100 % 08/02/18 0152 - - - - 98 % 08/02/18 0101 - - - - (!) 88 % 08/01/18 2327 98.7 °F (37.1 °C) 95 21 133/54 94 % 08/01/18 2105 100 °F (37.8 °C) 71 20 92/66 92 % 08/01/18 1700 98.2 °F (36.8 °C) 91 18 94/68 96 % 08/01/18 1320 98.2 °F (36.8 °C) 85 17 (!) 116/30 -  
08/01/18 1240 - 85 - 100/62 -  
08/01/18 1220 - 87 - 99/43 -  
08/01/18 1215 - 85 - (!) 83/41 -  
08/01/18 1140 98.7 °F (37.1 °C) 84 18 (!) 155/34 95 % 08/01/18 1120 - 83 - (!) 137/18 -  
08/01/18 1050 - 83 - (!) 154/20 - Physical Exam: ¨ GEN: NAD 
¨ HEENT: no scleral icterus,   moist oral mucosa, poor dentition ¨ CV: S1, S2 heard regularly ¨ Lungs: diminished lung sounds ¨ Abdomen: soft, non distended, slight facial grimace to palpation abdomen, no guarding ¨ Extremities: no edema, RLE stump site wt small superficial wound, no discharge or erythema noted ¨ : Penis ( L side with chronic wound, no erythema noted), unable to assess sacrum at this time ¨ Neuro: Alert to self, not much verbal 
¨ Skin: no rash on visualized areas  
 
 
  
 
Medications: 
 
Current Facility-Administered Medications:  
  guaiFENesin (ROBITUSSIN) 100 mg/5 mL oral liquid 600 mg, 600 mg, Per NG tube, Q12H, Anastacio Parra MD, 600 mg at 08/02/18 2968   albuterol-ipratropium (DUO-NEB) 2.5 MG-0.5 MG/3 ML, 3 mL, Nebulization, Q6H PRN, Young Anderson MD 
  carvedilol (COREG) tablet 12.5 mg, 12.5 mg, Per G Tube, BID WITH MEALS, Gabrielle Rogers MD, 12.5 mg at 08/02/18 5952   piperacillin-tazobactam (ZOSYN) 3.375 g in 0.9% sodium chloride (MBP/ADV) 100 mL, 3.375 g, IntraVENous, Q12H, Pam Mitchell DO, Last Rate: 25 mL/hr at 08/02/18 0339, 3.375 g at 08/02/18 0339 
  insulin glargine (LANTUS) injection 5 Units, 5 Units, SubCUTAneous, DAILY, Warden Slick MD, Stopped at 08/01/18 0900 
  nystatin (MYCOSTATIN) 100,000 unit/mL oral suspension 500,000 Units, 500,000 Units, Oral, QID, Warden Slick MD, 500,000 Units at 08/02/18 3601   heparin (porcine) injection 5,000 Units, 5,000 Units, SubCUTAneous, Q12H, Malka Rivero MD, Stopped at 08/02/18 0600 
  balsam peru-castor oil (VENELEX)  mg/gram ointment, , Topical, BID, Shainka Coleman MD 
  sodium hypochlorite (QUARTER STRENGTH DAKIN'S) 0.125% irrigation (bottle), , Topical, DAILY, Giovanni Sorensen MD 
  collagenase (SANTYL) 250 unit/gram ointment, , Topical, DAILY, Giovanni Sorensen MD 
  epoetin niya (EPOGEN;PROCRIT) injection 10,000 Units, 10,000 Units, SubCUTAneous, Q MON, WED & Sarai Sutherland, Ingrid Murcia MD, 10,000 Units at 07/30/18 1712   acetaminophen (TYLENOL) solution 650 mg, 650 mg, Per G Tube, Q3H PRN, Morales Fisher MD, 650 mg at 07/26/18 4053   aspirin chewable tablet 81 mg, 81 mg, Per G Tube, DAILY, Morales Fisher MD, 81 mg at 08/02/18 6418   escitalopram oxalate (LEXAPRO) 5 mg/5 mL oral solution soln 10 mg, 10 mg, Per G Tube, DAILY, Morales Fisher MD, 10 mg at 08/02/18 9987   insulin lispro (HUMALOG) injection, , SubCUTAneous, Q6H, Morales Fisher MD, Stopped at 07/30/18 1200 
  glucose chewable tablet 16 g, 4 Tab, Oral, PRN, Morales Fisher MD 
  dextrose (D50W) injection syrg 12.5-25 g, 12.5-25 g, IntraVENous, PRN, Morales Fisher MD, 25 g at 08/01/18 1354 
  glucagon (GLUCAGEN) injection 1 mg, 1 mg, IntraMUSCular, PRN, Morales Fisher MD 
  morphine 10 mg/5 mL oral solution 10 mg, 5 mL, Per G Tube, Q4H PRN, Morales Fisher MD, 10 mg at 07/30/18 0430 
  oxyCODONE-acetaminophen (PERCOCET) 5-325 mg per tablet 1 Tab, 1 Tab, Per G Tube, Q4H PRN, Marissa York MD, 1 Tab at 07/25/18 3526   sevelamer carbonate (RENVELA) oral powder 2,400 mg, 2.4 g, Per G Tube, TID WITH MEALS, Marissa York MD, Stopped at 07/31/18 0800   pravastatin (PRAVACHOL) tablet 10 mg, 10 mg, Per G Tube, QHS, Marissa York MD, 10 mg at 07/31/18 2115   sodium chloride (NS) flush 5-10 mL, 5-10 mL, IntraVENous, Q8H, Marissa York MD, 10 mL at 08/02/18 0522   sodium chloride (NS) flush 5-10 mL, 5-10 mL, IntraVENous, PRN, Marissa York MD, 10 mL at 07/17/18 2039 
  naloxone (NARCAN) injection 0.4 mg, 0.4 mg, IntraVENous, PRN, Marissa York MD 
  ondansetron [facility]) injection 4 mg, 4 mg, IntraVENous, Q4H PRN, Marissa York MD, 4 mg at 07/31/18 0014 Labs: 
Recent Results (from the past 24 hour(s)) GLUCOSE, POC Collection Time: 08/01/18 11:27 AM  
Result Value Ref Range Glucose (POC) 86 65 - 100 mg/dL Performed by KHADAR SAUCEDO (PCT) CON   
GLUCOSE, POC Collection Time: 08/01/18  1:40 PM  
Result Value Ref Range Glucose (POC) 75 65 - 100 mg/dL Performed by Mary Espinal GLUCOSE, POC Collection Time: 08/01/18  2:30 PM  
Result Value Ref Range Glucose (POC) 154 (H) 65 - 100 mg/dL Performed by Mary Espinal GLUCOSE, POC Collection Time: 08/01/18  6:04 PM  
Result Value Ref Range Glucose (POC) 108 (H) 65 - 100 mg/dL Performed by KHADAR SAUCEDO (PCT) CON   
GLUCOSE, POC Collection Time: 08/01/18 11:13 PM  
Result Value Ref Range Glucose (POC) 96 65 - 100 mg/dL Performed by Shyla Odom BLOOD GAS, ARTERIAL Collection Time: 08/02/18  1:31 AM  
Result Value Ref Range pH 7.45 7.35 - 7.45    
 PCO2 47 (H) 35.0 - 45.0 mmHg PO2 62 (L) 80 - 100 mmHg O2 SAT 92 92 - 97 % BICARBONATE 32 (H) 22 - 26 mmol/L  
 BASE EXCESS 6.4 mmol/L  
 O2 METHOD NRM    
 O2 FLOW RATE 15.00 L/min FIO2 100 % MODE OTHER    
 SPONTANEOUS RATE 21.0  Sample source ARTERIAL    
 SITE RIGHT BRACHIAL MANSOOR'S TEST N/A    
PHOSPHORUS Collection Time: 08/02/18  3:32 AM  
Result Value Ref Range Phosphorus 2.4 (L) 2.6 - 4.7 MG/DL  
CBC W/O DIFF Collection Time: 08/02/18  3:32 AM  
Result Value Ref Range WBC 20.9 (H) 4.1 - 11.1 K/uL  
 RBC 3.43 (L) 4.10 - 5.70 M/uL HGB 9.4 (L) 12.1 - 17.0 g/dL HCT 30.8 (L) 36.6 - 50.3 % MCV 89.8 80.0 - 99.0 FL  
 MCH 27.4 26.0 - 34.0 PG  
 MCHC 30.5 30.0 - 36.5 g/dL  
 RDW 18.6 (H) 11.5 - 14.5 % PLATELET 793 387 - 114 K/uL MPV 9.8 8.9 - 12.9 FL  
 NRBC 0.0 0  WBC ABSOLUTE NRBC 0.00 0.00 - 0.01 K/uL METABOLIC PANEL, BASIC Collection Time: 08/02/18  3:32 AM  
Result Value Ref Range Sodium 137 136 - 145 mmol/L Potassium 3.9 3.5 - 5.1 mmol/L Chloride 100 97 - 108 mmol/L  
 CO2 28 21 - 32 mmol/L Anion gap 9 5 - 15 mmol/L Glucose 93 65 - 100 mg/dL BUN 24 (H) 6 - 20 MG/DL Creatinine 2.92 (H) 0.70 - 1.30 MG/DL  
 BUN/Creatinine ratio 8 (L) 12 - 20 GFR est AA 26 (L) >60 ml/min/1.73m2 GFR est non-AA 22 (L) >60 ml/min/1.73m2 Calcium 9.5 8.5 - 10.1 MG/DL  
GLUCOSE, POC Collection Time: 08/02/18  5:20 AM  
Result Value Ref Range Glucose (POC) 101 (H) 65 - 100 mg/dL Performed by [facility]:  
 Blood 7/26 pending Blood 7/17 Component Results   
   Component Value Ref Range & Units Status  
   Special Requests: NO SPECIAL REQUESTS    Final  
   Culture result: NO GROWTH 6 DAYS    Final  
    
Result History   
  
 Pelvic abscess 7/19 Component Value Ref Range & Units Status   
  Special Requests: NO SPECIAL REQUESTS   Final  
  GRAM STAIN 3+ WBCS SEEN   Final  
  GRAM STAIN NO ORGANISMS SEEN   Final  
  Culture result: FEW PROTEUS MIRABILIS (A)   Final  
   
Culture & Susceptibility   
    Antibiotic   Organism Organism Organism  
    Proteus mirabilis    
  AMIKACIN ($)   <=16   ug/mL   S Final      
  AMPICILLIN ($)   <=8   ug/mL   S Final      
  AMPICILLIN/SULBACTAM ($)   <=8/4   ug/mL   S Final      
  AZTREONAM ($$$$)   <=4   ug/mL   S Final      
  CEFAZOLIN ($)   <=8   ug/mL   S Final      
  CEFEPIME ($$)   <=4   ug/mL   S Final      
  CEFOTAXIME   <=2   ug/mL   S Final      
  CEFTAZIDIME ($)   <=1   ug/mL   S Final      
  CEFTRIAXONE ($)   <=1   ug/mL   S Final      
  CEFUROXIME ($)   <=4   ug/mL   S Final      
  CIPROFLOXACIN ($)   <=1   ug/mL   S Final      
  GENTAMICIN ($)   <=4   ug/mL   S Final      
  LEVOFLOXACIN ($)   <=2   ug/mL   S Final      
  MEROPENEM ($$)   <=1   ug/mL   S Final      
  PIPERACILLIN/TAZOBAC ($)   <=16   ug/mL   S Final      
  TOBRAMYCIN ($)   <=4   ug/mL   S Final      
  TRIMETH/SULFA   <=2/38   ug/mL   S Final      
           
  
 
 
  
Imaging:  
  
CT 7/17 FINDINGS: 
LOWER CHEST: 
The visualized portions of the lung bases are clear.  There is mild atelectasis 
in the posterior lower lobes.   
ABDOMEN: 
The unenhanced images demonstrate visibility of the interventricular septum of 
the heart. There is extensive vascular calcification and a right iliac stent. There are clips in the gallbladder fossa. Liver: The liver is normal in size and contour with no focal abnormality. Gallbladder and bile ducts: There is no calcified gallstone or biliary duct 
dilatation. Spleen: No abnormality. Pancreas: No abnormality. Adrenal glands: No abnormality. Kidneys: There are small left renal cysts. There is hydronephrosis and 
hydroureter on the right down to the level of the pelvis. PELVIS: 
Reproductive organs: The prostate gland is absent. Bladder: No abnormality. BOWEL AND MESENTERY: There is a gastrostomy tube in the antrum of the stomach. The small bowel is not obstructed. There is gas and stool throughout the colon. There is no mesenteric mass or adenopathy.  The appendix is absent. PERITONEUM: Nicole Orf is no ascites or free intraperitoneal air.   
RETROPERITONEUM: The aorta is atherosclerotic without aneurysm. There is a right 
external iliac artery stent. There is an enhancing fluid collection surrounding 
the right external iliac artery stent extending into the right retroperitoneum. The component around the stent measures 4.6 x 2.7 x 6.3 cm and the component in 
the right retroperitoneum measures 3.6 x 3.1 x 4.6 cm. There is no 
retroperitoneal mass or adenopathy. BONES AND SOFT TISSUES: There is diffuse body edema. 
   
IMPRESSION IMPRESSION:  
1. Right common iliac artery stent with enhancing fluid collection surrounding 
the stent and extending into the retroperitoneum on the right. This is 
suspicious for infection. 2. Right hydronephrosis and hydroureter secondary to compression of the ureter 
by the collection around the stent. 3. Extensive atherosclerotic calcification of the aorta and mesenteric vessels. This is consistent with the patient's end-stage renal disease. 4. Status post cholecystectomy. 5. Gastrostomy tube. 6. Small left renal cysts. 7. Status post prostatectomy. 
   
CT 7/11 FINDINGS:  
LUNG BASES: 7 mm subpleural nodule at posterior basilar right lower lobe 
slightly smaller in the interval. 
INCIDENTALLY IMAGED HEART AND MEDIASTINUM: Unremarkable. LIVER: Mild intrahepatic and extrahepatic biliary duct distention again shown 
with common bile duct diameter measuring up to 1.5 cm. Findings not 
substantially changed in the interval. 
GALLBLADDER: Status post cholecystectomy. SPLEEN: No mass. PANCREAS: No mass or ductal dilatation. ADRENALS: Unremarkable. KIDNEYS: Bilateral renal atrophy again shown. Moderate right renal 
pelvocaliectasis and ureteral dilation again demonstrated. Finding extends to 
level of retroperitoneal hematoma surrounding right external iliac stent. STOMACH: Moderate gastric distention in the interval to the level of the second 
portion of the duodenum.  Percutaneous gastrostomy tube in the interval 
positioned anteriorly in the gastric body. SMALL BOWEL: No dilatation or wall thickening. COLON: No dilatation or wall thickening. APPENDIX: Not demonstrated. PERITONEUM: No free intraperitoneal air or fluid demonstrated. RETROPERITONEUM: As noted above, right external iliac stent again shown within a 
moderate-sized hematoma extending along the anterior margin of the right 
iliopsoas. Dimension is difficult to estimate due to the lack of oral contrast. 
It measures at least 7.6 x 3.5 cm transverse and 10.3 cm craniocaudal and size 
is similar to that shown previously. URINARY BLADDER: No mass or calculus. BONES: No destructive bone lesion. ADDITIONAL COMMENTS: N/A 
   
IMPRESSION IMPRESSION: 
   
1. Interval percutaneous gastrostomy tube placement. Interval moderate gastric 
distention. 2. Right iliac artery stenting and adjacent hematoma again shown, appearing 
stable. Moderate right renal pelvocaliectasis and ureterectasis again 
demonstrated. 
   
CT 7/2 FINDINGS: There is interval right common/external iliac artery stenting for 
exclusion of pseudoaneurysm with patent stent, and minimal hyperdensity in the 
pseudoaneurysm contents but no overt arterial extravasation. Pseudoaneurysm size 
is stable. There is no apparent soft tissue emphysema or abscess. 
   
Right hydroureteronephrosis remains. Kidneys are atrophic. There is trace 
ascites. There is no pneumoperitoneum. 
   
Abdominal aorta is without stenosis, aneurysm or dissection. Celiac artery, SMA, 
ARTIE and bilateral solitary renal arteries are patent. Right internal iliac 
artery is patent. Femoral arteries are patent. 
   
IMPRESSION IMPRESSION: 
1. Interval right iliac artery pseudoaneurysm stenting. 2. No overt arterial extravasation. 3. Stable pseudosac size. 4. No apparent soft tissue emphysema/abscess. 5. Stable right hydroureteronephrosis.   
    
  
  
CT 7/26 A/P  
FINDINGS: 
  
Redemonstrated is the infected hematoma in the right lower quadrant measuring 
approximately 7 x 2.5 cm. This appears slightly smaller than the prior 
examination. 
  
There is significant right hydroureteronephrosis as was seen before. The level 
of obstruction is likely at the site of the hematoma. 
  
There are no new fluid collections. 
  
Gallbladder is absent. Liver spleen and pancreas remain unremarkable. 
  
IMPRESSION IMPRESSION: 
1. There is been slight decrease in size of the infected hematoma in the right 
lower quadrant. 2. Persistent right hydroureteronephrosis. CT chest 7/30 FINDINGS: 
  
THYROID: Bilateral nodules measuring 7 mm on the right and 9 mm on the left. MEDIASTINUM: 1.8 cm subcarinal lymph node has increased in size, previously1.6 
cm. No mass or other enlarged lymphadenopathy with multiple shotty peritracheal 
lymph nodes noted at the level of the romie. Ekta Amol ABISAI: No mass or enlarged lymphadenopathy. THORACIC AORTA: Atherosclerotic calcification without aneurysm. MAIN PULMONARY ARTERY: Normal in caliber. TRACHEA/BRONCHI: Dependent debris within the right mainstem bronchus with near 
occlusion at the level of the origin of the bronchus intermedius and occlusion 
of right lower lobe branches. ESOPHAGUS: Fluid containing within minimally distended lumen. No abnormality 
otherwise. HEART: Normal in size without pericardial effusion. Dense coronary artery 
calcifications are noted. PLEURA: Small right pleural effusion. LUNGS: Consolidative infiltrate in the right lower lobe. Otherwise clear with INCIDENTALLY IMAGED UPPER ABDOMEN: No focal abnormality. BONES: No destructive bone lesion. 
  
IMPRESSION IMPRESSION: Debris filled right lower lobe branch airway is extending into the 
right mainstem bronchus with right lower lobe consolidative infiltrates 
suspicious for pneumonia with associated small parapneumonic effusion. Probable 
reactive mediastinal adenopathy with enlarged 1.8 cm subcarinal lymph node.  
Previously seen left infiltrate is cleared 
  
Assessment / Plan:  
  
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  
  
From chart review, he was admitted from 6/28-7/9/18 and had stenting and repair of aneurysm. Had leukocytosis and was being treated for aspiration pneumonia per chart. Per DC summary he was on Vancomycin, and flagyl at one point and then Zosyn for 10 days during last admit. 
  
This admission, he is admitted with fever (101.2 ) and leukocytosis (16) and found to have fluid collection around R iliac artery stent extending into retroperitoneum on right. CT read as \" The component around the stent measures 4.6 x 2.7 x 6.3 cm and the component in 
the right retroperitoneum measures 3.6 x 3.1 x 4.6 cm\"  Right hydronephrosis and hydroureter due to compression fo ureter seen as well. He had a CT guided aspiration done and cultures + for Proteus mirabilis. He had a CT on 7/11 prior to this admission that showed a \"moderate-sized hematoma extending along the anterior margin of the right iliopsoas\". \" It measures at least 7.6 x 3.5 cm transverse and 10.3 cm craniocaudal in size\".   
He was started on Cefepime and Levaquin this admission. Levaquin was stopped later  
  
  
1) Infected R iliac pseudoaneurysm and stent 
  
S/P repair with stenting on 6/29/18, S/P CT guided drainage of pelvic collection 7/19 wt cx + for Proteus mirabilis 
  
CT with  component around the stent measures 4.6 x 2.7 x 6.3 cm and the component in the right retroperitoneum measures 3.6 x 3.1 x 4.6 cm\" Repeat CT 7/26 with \"slight decrease in size of the infected hematoma in the right lower quadrant\" and \" Persistent right hydroureteronephrosis\". Measurements approximately 7 by 2.5 cm however Check blood cultures to ensure no seeding  
  
Poor surgical candidate given functional status and multiple co morbidities 
  
Successful treatment of infections is not usually possible without adequate source control. Additionally, in his situation, as there is fluid  around the stent, raising high concern for stent to be seeded/infected. 
  
Was On Cefazolin wt HD dosing , changed to Zosyn as now also has most likely aspiration pneumonia/pneumonitis Once completed Zosyn therapy for a week , if no other new changes, will go  back to renally dosed (HD dosing) cefazolin 2, 2, 3 gm F/U wt me after 2 weeks of DC Weekly CBC wt diff, CMP sent to me at [phone] If febrile > 100.4 , check blood cultures Discussed with Palliative attending, Dr Donnie Zamora yesterday and plans for family meeting today 2) Aspiration pneumonia/pneumonitis Changed antibiotics from cefazolin +flagyl to Zosyn Plan for a week of Zosyn Aspiration precautions  
 
  
  
3) Per urology notes, superficial dry gangrene of glans penis , also has R hydroureteronephrosis   
4) Sacral decubitus ulcers : examined today, wound care , stool contamination as well given incontinence, with high risk for infection   
4) MS: per primary team  
  
5) ESRD on HD 
  
6) S/P PEG  
  
7) DVT px  
 
Pam Mitchell DO  
10:37 AM

## 2018-08-02 NOTE — PROGRESS NOTES
Brief progress note: 
Family meeting with patient wife/primary mpoa and his three children , and two step children. patient is s/p rapid response for aspiration and hypoxia, on high flow , alert , oriented to person and place , recognize his family . he does not have insight into his condition , though able to understand CPR, voiced \" I want to be on machine \". Educated family on recurrent aspiration on low rate of  tube feeding on hold , unlikely he will be able to tolerate tube feeding , hypoxia, high risk of cardiac arrest and death. Wife Kiko Board want to respect his wishes on POST form , wants to continue with full code and full intervention . Emotional support offered. Full note to follow.

## 2018-08-02 NOTE — PROGRESS NOTES
Hospitalist Progress Note NAME: London Mora :  1950 MRN:  [id number] Assessment / Plan: 
Acute hypoxemic Respiratory Failure (18), back on BIPAP overnighj Due top to Aspiration PNA - confirmed on CT chest, Sepsis with Hypotension POA- hypotension had improved,  Afebrile but WBC trending up 20k today Keep holding tube feedings for now- resume today at trickle rate- pt is at high risk for further aspiration -- pt/family aware 
s/p Flagyl  X 1 day, changed to IV zosyn  per ID recc Cont Deep suctioning as able Added mucinex via NGT Q 12 
Pulmonary signed off  Palliative following- will arrange family meeting again today in evening Infected Right Iliac Hematoma POA-s/p drained - shows Proteus, Vascular and ID in the case, has a stent that ideally may need to come out due to infection. - not a surgical candidate as per Vascular S/p cefepime, new CT abdomen and pelvis shows decreasing size of hematoma, switched to Cefazolin - changed to  Zosyn  by ID as pt had aspiration insult  B/L Buttock Pressure injury ulcer/wound infection vs R iliac artery pseudoaneurysm/collection/stent infection, UTI, no urine culture had been sent, c/w wound care for decubitus ulcer. Encephalopathy POA  - off bipap today 
in the setting of sepsis POA 
-CT abdm/pelvis showing Right common iliac artery stent with enhancing fluid collection surrounding the stent and extending into the retroperitoneum on the right. suspicious for infection.  Also showing Right hydronephrosis and hydroureter secondary to compression of the ureter by the collection around the stent 
-On ,Vascular surgery saw patient and will proceed with aspiration of the fluid noted on CT surrounding the right common iliac artery stent since this could be an abscess. 
-Urology consulted for hydronephrosis right ->saw pt on :no intervention needed 
-On :CT guided aspiration of pelvic fluid collection - culture of fluid showing few -GNRs,Proteus 
-WBC stable at [facility] now -Body fluid Cx-Proteus sens to cefazolin- changed to Zosyn 7/31 Diabetes mellitus 2 on Insulin at home: BG increasing now that nutrition had improved thru PEG, c/w  low dose Lantus  HbA1C 4.9 Dysphagia due to esophageal dysmotility on MBS study last admission - s/p PEG for FS Q 6 hrs on tube feeding, keep NPO for now,  CT abdomen showing some paretic loops of small bowel, but on exam abdomen is benign Were holding tube feeding due to aspiration event on 7/30, will resume today at trickle rate & see if he tolerates it- high risk for re-aspiration noted Unspecified protein-calorie malnutrition POA in the setting of PEG dependence, sacral decubitus, ESRD -- onTF 
 
ESRD on HD MWF  Renal in the case. HD as per Renal 
Anemia of chronic disease POA S/p L arm AVFistula Hyperlipidemia Cont on statins Hypertension resume Coreg thru PEG, monitor. ? Multiple sclerosis POA ? Exacerbation causing dysphagia- cannot tell till MRI done 
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 No signs of failure at this time PAD s/p b/l leg stents S/p remote partial (Syme) amputation of RIGHT foot. -Nephrology consulted for HD MWF 
-Cont ASA 
-Vascular surgery consult as above for opinion on stent infection Code Status: Full Code as per Conversation with palliative Care, family meeting again today at 6pm planned Surrogate Decision Maker: wife Pierre Wilkins  [id number] DVT Prophylaxis: Sq heparin GI Prophylaxis: not indicated Baseline: Pt was recently DC to [facility] from [facility] after being treated for R Iliac ar pseudoaneurysm s/p Stent by Vasc Sx Dr Gerardo Weiner, Eso Dysmotility syndrome causing Dysphagia causing aspiration pneumonia s/p PEG tube Pt had denied any neuro workup - denied MRI as recommended by neurology to find the cause of his eso dysmotility- ?MS flare Prognosis is poor but family still wants everything done, he is Hospice candidate nevertheless but wife wants to honor patient wishes for now Subjective: Chief Complaint / Reason for Physician Visit: F/U Aspiration Pneumonia, resp failure, Confusion/agitation Overnight resp distress events noted-  Discussed with RN events overnight. Review of Systems: 
Symptom Y/N Comments  Symptom Y/N Comments Fever/Chills n   Chest Pain n   
Poor Appetite n   Edema Cough n   Abdominal Pain n   
Sputum    Joint Pain SOB/BANKS n   Pruritis/Rash Nausea/vomit n   Tolerating PT/OT Diarrhea    Tolerating Diet y Tube feedings on hold for now Constipation    Other Could NOT obtain due to:   
 
Objective: VITALS:  
Last 24hrs VS reviewed since prior progress note. Most recent are: 
Patient Vitals for the past 24 hrs: 
 Temp Pulse Resp BP SpO2  
08/02/18 1040 98.3 °F (36.8 °C) 95 18 (!) 128/21 99 % 08/02/18 0819 - 94 21 - 99 % 08/02/18 0803 98.8 °F (37.1 °C) 95 19 125/44 100 % 08/02/18 0800 - 95 20 - 100 % 08/02/18 0332 98.4 °F (36.9 °C) 97 20 150/61 99 % 08/02/18 0231 - - - - 100 % 08/02/18 0152 - - - - 98 % 08/02/18 0101 - - - - (!) 88 % 08/01/18 2327 98.7 °F (37.1 °C) 95 21 133/54 94 % 08/01/18 2105 100 °F (37.8 °C) 71 20 92/66 92 % 08/01/18 1700 98.2 °F (36.8 °C) 91 18 94/68 96 % 08/01/18 1320 98.2 °F (36.8 °C) 85 17 (!) 116/30 -  
08/01/18 1240 - 85 - 100/62 -  
08/01/18 1220 - 87 - 99/43 -  
08/01/18 1215 - 85 - (!) 83/41 - Intake/Output Summary (Last 24 hours) at 08/02/18 1140 Last data filed at 08/01/18 1320 Gross per 24 hour Intake                0 ml Output              605 ml Net             -605 ml PHYSICAL EXAM: 
General:                    lethargic,more responsive now, SOB , on Bipap overnight EENT:                       EOMI. Anicteric sclerae. MMM Resp:                        Coarse BS on the R lung field noted+, dull in bases CV:                            Regular  rhythm,  No edema GI:                             Soft, no  distended, no tender.  +Bowel sounds, PEG in place Neurologic:                Alert and oriented X 2, slow speech, also intermittently confused Psych:                       No  insight. Not anxious nor agitated Skin:                          No rashes. No jaundice. LUE fistula+ Reviewed most current lab test results and cultures  YES Reviewed most current radiology test results   YES Review and summation of old records today    NO Reviewed patient's current orders and MAR    YES 
PMH/SH reviewed - no change compared to H&P 
________________________________________________________________________ Care Plan discussed with: 
  Comments Patient x Family RN x Care Manager x Consultant  x Dharmesh Valladares, Palliative Multidiciplinary team rounds were held today with , nursing, pharmacist and clinical coordinator. Patient's plan of care was discussed; medications were reviewed and discharge planning was addressed. ________________________________________________________________________ Total NON critical care TIME:  16  Minutes Total CRITICAL CARE TIME Spent:   Minutes non procedure based Comments >50% of visit spent in counseling and coordination of care    
________________________________________________________________________ Bart Banks MD  
 
Procedures: see electronic medical records for all procedures/Xrays and details which were not copied into this note but were reviewed prior to creation of Plan. LABS: 
I reviewed today's most current labs and imaging studies. Pertinent labs include: 
Recent Labs 08/02/18 
 0288  08/01/18 
 0411  07/31/18 
 9897 WBC  20.9*  20.1*  19.2* HGB  9.4*  10.2*  9.0*  
HCT  30.8*  33.8*  29.8* PLT  313  308  287 Recent Labs 08/02/18 
 5523  08/01/18 
 0515  07/31/18 
 0411 NA  137  135*  135* K  3.9  4.3  4.0  
CL  100  98  98 CO2  28  31  33* GLU  93  81  104* BUN  24*  35*  26* CREA  2.92*  4.05*  3.16* CA  9.5  9.8  9.7 PHOS  2.4*   --    --   
ALB   --   1.1*  1.1* TBILI   --   0.6  0.4 SGOT   --   17  16 ALT   --   <6*  <6* Signed: Maxx Hilton MD

## 2018-08-02 NOTE — PROGRESS NOTES
Responding to RAT call secondary to staff reports recently restarting tube feeding via Peg tube, noting decrease in SAO2. Pt. With history of aspirate PNA and respiratory distress. Upon arrival, pt. Upright in bed awake and alert intermittent periods of gaze-like appearance. FSBS 109. Will not respond verbally to questions, but nods at times making direct eye contact. Respiratory at bedside attempting NT suction. Pt moving hands and head in resistance to suctioning. Staff reports upper airway adventitious sounds. Nurse reports no change in breath sounds or mental status. Dr. Mili Stephenson arrives jet nebulizer order. O2 increase to NRM and nasal cannula @ 4 lpm post jet neb. SAO2 @ 88%. Transition to high-flow O2 SAO2 96%. Moderate amount of thick white/tan color secretion obtain. Staff reports family meeting pending this pm.  See MD note and orders for plan of care 1806 Family and Dr. El Garzon  arrives. Dr. El Garzon updated of event;  family conference started.

## 2018-08-03 VITALS
DIASTOLIC BLOOD PRESSURE: 28 MMHG | TEMPERATURE: 98.4 F | BODY MASS INDEX: 22.28 KG/M2 | WEIGHT: 155.6 LBS | RESPIRATION RATE: 4 BRPM | OXYGEN SATURATION: 52 % | HEIGHT: 70 IN | SYSTOLIC BLOOD PRESSURE: 53 MMHG | HEART RATE: 58 BPM

## 2018-08-03 NOTE — PROGRESS NOTES
Hospitalist death note Called to pronounce patient. Unresponsive to verbal or painful stimuli No respiratory effort or breath sounds No pulse or heart sounds TOD: 22:38. Family at bedside and grieving appropriately.   
D/C Summary to be dictated by Attending MD. 
 
Jeny Donnelly MD

## 2018-08-03 NOTE — PROGRESS NOTES
CODE BLUE called 1937, New Dari Osorio MD in room at 1939. Pt admitted today for aspiration PNA and transferred to CCU at [facility]: Upon arrival to pt room, pt intubated and compressions in progress. 8.0 ETT 24 at the teeth, performed by Dr. Alberta Busby. 1940: 1 unit of epinephrine pushed and flushed 
1942: Compressions stopped. Pulse check shows a positive pulse. 1 unit of epinephrine pushed and flushed. 1947: 1 unit of Propofol pushed and flushed 1948: 10 mg Vecuronium pushed and flushed, Propofol drip started 
2000: Central line placed in the R IJ by Dr. Alberta Busby 
2002: Pt lost pulse. Compressions restarted. 2003: 1 unit of epinephrine pushed and flushed 
2004: 1 unit of epinephrine pushed and flushed. Compressions stopped. PC shows a faint pulse and then pulse lost. Compressions restarted. 2006: 1 unit of epinephrine pushed and flushed. 1 unit of calcium chloride pushed and flushed. Propofol drip turned off. 
2007: 1 unit of sodium bicarbonate pushed and flushed. 2008: Compressions stopped. PC shows that pt has a positive pulse. 2011: CXR performed by Yue Figueredo, RT(R) 
2015: Elmer Osorio MD spoke with family. This note is prepared by Patrick Cheadle. Zahida De Paz, acting as Scribe for [employer] Adan Osorio, [address] Adan Osorio MD: The scribe's documentation has been prepared under my direction and personally reviewed by me in its entirety. I confirm that the note above accurately reflects all work, treatment, procedures, and medical decision making performed by me.

## 2018-08-03 NOTE — CONSULTS
Palliative Medicine Consult Simon: [phone]) Patient Name: Radha Eaton YOB: 1950 Date of Initial Consult: 7/23/18 Reason for Consult: Care Decisions Requesting Provider: Amber Jackson MD 
Primary Care Physician: Amaya Martinez MD 
 
 SUMMARY:  
Radha Eaton is a 70 y.o. male with a past history of multiple sclerosis,ESRD on HD, anemia of chronic disease, cad , htn , gerd, peripheral vascular disease, s/p recent discharge[facility]/18 s/p repair / stent for infected right iliac artery pseudoanuresym, esophageal dysmotility, s/p peg,. 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 PALLIATIVE DIAGNOSES:  
1. Goals of care. 2. Aspiration pneumonia (7/30acute hypoxic resp failure off bipap, risk of recurrent aspiration ). 3. Hypoxia 4. Confusion 5. Severe Debility 6. Weight loss 7. Malnutrition 7        Sepsis (with infected R iliac  pseudo- aneurysm and stent).    
 
 PLAN:  
Case d/w bed side Rn , chart reviewed , patient with another episode of  aspiration pneumonia , tube feeding is on hold. 1. Patient is alert, oriented x 2 in resp distress distress , alert oriented . 2. Met with  his wife /primary MPOA , Yo Mike and several other children . 3. We discussed he may not be able to tolerate tube feeding any more , is at high risk of decompensation, cardiac arrest and iCU transfer. 4. We discussed he may not survive CPR, and it may be traumatic, suggested comfort care /hospice.  
5. Patient has  capacity to make simple decision, he  tells me he wants to receive CPR , if his heart stops. 
6. Wife tells me,  he has been consistent in his wishes , reflecting on POST FORM, she wants him to attempt CPR , if his heart stopped, and I raised my concern it may not take long for him to stop breathing and he may not make it . 7. Emotional support offered . 8. We will continue to support patient and family . Discuss with palliative care IDT and attending physician Dr Carlos Thurston . Previous conversation on 7/25 /18 : 
 
9. Me and his wife talked about  , how seriously ill he is , risk of recurrent sepsis, high risk of decompensation and cardiac arrest. 
10.  wife understand he may not make through this illness. 11.  we talked about CPR facts , and its non meaningful out come given he is debilitated with complex medical issues, advised to protect him from CPR. 15. His wife wants to continue with full code sattus and full restorative care , per his wishes reflected on POST FORM completed by patient earlier this month. 13.  Advance directives reviewed , in place. 14. Initial consult note routed to primary continuity provider 15. Communicated plan of care with: Palliative IDT 
 
 
 GOALS OF CARE / TREATMENT PREFERENCES:  
 
GOALS OF CARE: 
Patient/Health Care Proxy Stated Goals:  (full restorative  care per his Post form , signed by the patient .) TREATMENT PREFERENCES:  
Code Status: Prior Advance Care Planning: 
Advance Care Planning 7/17/2018 Patient's Healthcare Decision Maker is: Named in scanned ACP document Primary Decision Maker Name -  
Primary Decision Maker Phone Number -  
Primary Decision Maker Relationship to Patient - Secondary Decision Maker Name - Secondary Decision Maker Phone Number -  
Confirm Advance Directive Yes, on file Does the patient have other document types - Medical Interventions: Full interventions Other Instructions: Artificially Administered Nutrition:  (patient has a feeding tube .) Other: As far as possible, the palliative care team has discussed with patient / health care proxy about goals of care / treatment preferences for patient. HISTORY:  
 
History obtained from: chart  And wife. CHIEF COMPLAINT: admitted for above. HPI/SUBJECTIVE: The patient is:  
[] Verbal , very minimal participation because of lethargy , receiving dialysis in her room currently . \" I am sick \" 
 
7/25/18 : patient is not in any distress, denies pain , intermittently confused, unable to recall , the conversation he had shortly before my visit with Infectious disease. 8/1/18: 
 
Patient is alert, awake, oriented 2, some confusion. resp therapist came in room, patient desat at room air to 83%, placed on nasal cannula,. Patient refusing breathing treatment . Per bed side RN Dawn Escoto, patient is refusing nasal cannula. Chart reviewed, tube feedings are on hold. 8/2/18 : prior to visit patient had  aspiration while on trickle tube feeding , on high flow oxygen , in resp distress,  family several children and wife at bed side . Clinical Pain Assessment (nonverbal scale for severity on nonverbal patients):  
Clinical Pain Assessment Severity: 0 Location: right back Character: throbbing Duration: unable to tell me Frequency: comes and go Activity (Movement): Lying quietly, normal position Duration: for how long has pt been experiencing pain (e.g., 2 days, 1 month, years) Frequency: how often pain is an issue (e.g., several times per day, once every few days, constant) FUNCTIONAL ASSESSMENT:  
 
Palliative Performance Scale (PPS): PPS: 30 
 
 
 PSYCHOSOCIAL/SPIRITUAL SCREENING:  
 
Palliative IDT has assessed this patient for cultural preferences / practices and a referral made as appropriate to needs (Cultural Services, Patient Advocacy, Ethics, etc.) Advance Care Planning: 
Advance Care Planning 7/17/2018 Patient's Healthcare Decision Maker is: Named in scanned ACP document Primary Decision Maker Name -  
Primary Decision Maker Phone Number -  
Primary Decision Maker Relationship to Patient - Secondary Decision Maker Name - Secondary Decision Maker Phone Number -  
Confirm Advance Directive Yes, on file Does the patient have other document types - Any spiritual / Roman Catholic concerns: 
[] Yes /  [x] No 
 
Caregiver Burnout: 
[] Yes /  [x] No /  [] No Caregiver Present Anticipatory grief assessment:  
[x] Normal  / [] Maladaptive ESAS Anxiety: Anxiety: 3 ESAS Depression: Depression: 1 REVIEW OF SYSTEMS:  
 
Positive and pertinent negative findings in ROS are noted above in HPI. The following systems were [x] reviewed  limited because of intermittent/ [] unable to be reviewed as noted in HPI Other findings are noted below. Systems: constitutional, ears/nose/mouth/throat, respiratory, gastrointestinal, genitourinary, musculoskeletal, integumentary, neurologic, psychiatric, endocrine. Positive findings noted below. Modified ESAS Completed by: provider Fatigue: 7 Drowsiness: 1 Depression: 1 Pain: 0 Anxiety: 3 Nausea: 0 Anorexia: 5 Dyspnea: 7 Constipation: No  
  Stool Occurrence(s): 1 PHYSICAL EXAM:  
 
From RN flowsheet: 
Wt Readings from Last 3 Encounters:  
08/01/18 155 lb 9.6 oz (70.6 kg) 07/09/18 164 lb 3.9 oz (74.5 kg) 06/13/18 150 lb (68 kg) Blood pressure (!) 53/28, pulse (!) 58, temperature 98.4 °F (36.9 °C), resp. rate (!) 4, height 5' 10\" (1.778 m), weight 155 lb 9.6 oz (70.6 kg), SpO2 (!) 52 %. Pain Scale 1: Adult Nonverbal Pain Scale Pain Intensity 1: 0 Pain Location 1: Abdomen Pain Orientation 1: Other (comment) Pain Description 1: Aching Pain Intervention(s) 1: Medication (see MAR) Last bowel movement, if known:  
 
Constitutional: cachectic, ill appearing , alert, oriented x 2 intermittent confusion, on high flow, recognized his family , answer simple question , in moderate resp distress. Eyes: pupils equal, anicteric ENMT: dry lips. 
Cardiovascular: regular rhythm. Respiratory: breathing not labored, symmetric Gastrointestinal: soft non-tender, +bowel sounds Musculoskeletal: generalized wasting . Neurologic: alert, oriented x2, follow commands. Other: 
 
 
 HISTORY:  
 
Active Problems: 
  PAD (peripheral artery disease) ([address] (12/9/2010) Multiple sclerosis ([address] (1/4/2015) Diabetic peripheral neuropathy associated with type 2 diabetes mellitus ([address] (3/25/2016) Pseudoaneurysm of iliac artery ([address] (6/28/2018) Leukocytosis (7/17/2018) Fever (7/17/2018) ESRD (end stage renal disease) on dialysis ([address] (7/17/2018) Sepsis ([address] (7/17/2018) Past Medical History:  
Diagnosis Date  Anemia associated with chronic renal failure  Autoimmune disease (HCC)   
 hx ms  CAD (coronary artery disease)  Chronic kidney disease   
 catheter removed and no dialysis at this time  Chronic kidney disease   
 left arm fistula dialysis MWF  
 Diabetes ([address] type II  
 Elevated troponin 6/29/2018  GERD (gastroesophageal reflux disease)  Hypertension  Ill-defined condition   
 prostate cancer  Multiple sclerosis ([address] diagnosed '01  
 Other ill-defined conditions(799.89)   
 anemia  Other ill-defined conditions(799.89)   
 elevated cholesterol  Other ill-defined conditions(799.89)   
 hx septic right foot  Peripheral vascular disease ([address]  Secondary hyperparathyroidism of renal origin ([address]  Thyroid disease Past Surgical History:  
Procedure Laterality Date  COLONOSCOPY N/A 12/6/2016 COLON[facility]COPY performed by Wil [facility]COPY  COLONOSCOPY,DIAGNOSTIC  12/6/2016  CORONARY STENT SINGLE W/PTCA  2/17/2011  HX APPENDECTOMY  HX CATARACT REMOVAL  10/18/10  
 bilateral  
 HX CHOLECYSTECTOMY  HX GI    
 ileostomy due to infection  HX HEART CATHETERIZATION    
 HX HEENT    
 hx post photocoagulation eye 2009  HX OTHER SURGICAL    
 right partial foot amputation  HX OTHER SURGICAL    
 cardiac cath  HX OTHER SURGICAL    
 prostate removed  PLACE PERCUT GASTROSTOMY TUBE  7/6/2018  [place] COLONOSCOPY W/BIOPSY SINGLE/MULTIPLE  5/10/2010  UPPER GI ENDOSCOPY,BIOPSY  4/17/2018  UPPER GI ENDOSCOPY,REMV TUMOR,SNARE  6/13/2018  VASCULAR SURGERY PROCEDURE UNLIST    
 katelyn. leg bypasses Family History Problem Relation Age of Onset  Diabetes Mother History reviewed, no pertinent family history. Social History Substance Use Topics  Smoking status: Former Smoker Years: 1.00 Quit date: 4/12/2003  Smokeless tobacco: Never Used Comment: quit 5 years ago  Alcohol use No  
   Comment: Stopped drinking 10 years ago Allergies Allergen Reactions  Sensipar [Cinacalcet] Other (comments) Cinacalcet (Sensipar) has been added as an \"allergy\" / intolerance with a comment to assure providers at discharge and post discharge are aware of Dr. Eldridge recommendation to avoid Sensipar. Patient takes Mollie Ripa as an outpatient; Thus, Adkins Every has been discontinued per Nephrology (cannot give with recent Parsabiv b/c risk of low Calcium). No current facility-administered medications for this encounter. Current Outpatient Prescriptions Medication Sig  
 oxyCODONE-acetaminophen (PERCOCET) 5-325 mg per tablet 1 Tab by Per G Tube route every four (4) hours as needed for Pain.  epoetin niya (EPOGEN;PROCRIT) 10,000 unit/mL injection 10,000 Units by SubCUTAneous route Q TU, TH & SAT.  insulin lispro (HUMALOG) 100 unit/mL injection 2-4 Units by SubCUTAneous route Before breakfast, lunch, dinner and at bedtime.  lidocaine (LIDODERM) 5 % 2 Patches by TransDERmal route daily. Apply patch to the affected area for 12 hours a day and remove for 12 hours a day.   
 lisinopril (PRINIVIL, ZESTRIL) 20 mg tablet 20 mg by Per G Tube route daily.  escitalopram oxalate (LEXAPRO) 5 mg/5 mL oral solution 10 mg by Per G Tube route daily.  sevelamer carbonate (RENVELA) 2.4 gram pwpk oral powder 2.4 g by Per G Tube route three (3) times daily (with meals).  pravastatin (PRAVACHOL) 10 mg tablet 1 Tab by Per G Tube route nightly.  cloNIDine HCl (CATAPRES) 0.1 mg tablet 1 Tab by Per G Tube route three (3) times daily.  carvedilol (COREG) 25 mg tablet 2 Tabs by Per G Tube route two (2) times daily (with meals).  aspirin 81 mg chewable tablet 1 Tab by Per G Tube route daily.  amLODIPine (NORVASC) 10 mg tablet 1 Tab by Per G Tube route daily.  acetaminophen (TYLENOL) 160 mg/5 mL liquid 650 mg by Per G Tube route every three (3) hours as needed for Pain.  bisacodyl (DULCOLAX) 10 mg suppository Insert 10 mg into rectum daily as needed (\"Constipation\").  guaiFENesin-codeine (ROBITUSSIN AC) 100-10 mg/5 mL solution Take 2.5 mL by mouth every six (6) hours as needed for Cough.  morphine 10 mg/5 mL oral solution 5 mL by Per G Tube route every four (4) hours as needed for Pain. Pain scale 7-10  mupirocin (BACTROBAN) 2 % ointment Apply  to affected area every eight (8) hours as needed (\"Dry skin\").  ondansetron (ZOFRAN ODT) 4 mg disintegrating tablet Take 4 mg by mouth every six (6) hours as needed for Nausea.  sodium hypochlorite (DAKIN'S SOLUTION) external solution Apply  to affected area every eight (8) hours as needed (\"Wound care\"). LAB AND IMAGING FINDINGS:  
 
Lab Results Component Value Date/Time WBC 20.9 (H) 08/02/2018 03:32 AM  
 HGB 9.4 (L) 08/02/2018 03:32 AM  
 PLATELET 843 25/52/3434 03:32 AM  
 
Lab Results Component Value Date/Time  Sodium 137 08/02/2018 03:32 AM  
 Potassium 3.9 08/02/2018 03:32 AM  
 Chloride 100 08/02/2018 03:32 AM  
 CO2 28 08/02/2018 03:32 AM  
 BUN 24 (H) 08/02/2018 03:32 AM  
 Creatinine 2.92 (H) 08/02/2018 03:32 AM  
 Calcium 9.5 08/02/2018 03:32 AM  
 Magnesium 2.5 (H) 07/30/2018 04:22 AM  
 Phosphorus 2.4 (L) 08/02/2018 03:32 AM  
  
Lab Results Component Value Date/Time AST (SGOT) 17 08/01/2018 05:15 AM  
 Alk. phosphatase 185 (H) 08/01/2018 05:15 AM  
 Protein, total 8.1 08/01/2018 05:15 AM  
 Albumin 1.1 (L) 08/01/2018 05:15 AM  
 Globulin 7.0 (H) 08/01/2018 05:15 AM  
 
Lab Results Component Value Date/Time INR 1.2 (H) 07/05/2018 01:57 AM  
 Prothrombin time 12.4 (H) 07/05/2018 01:57 AM  
 aPTT 30.1 03/21/2016 02:01 PM  
  
Lab Results Component Value Date/Time Iron 37 12/10/2010 05:00 AM  
 TIBC 189 (L) 12/10/2010 05:00 AM  
 Iron % saturation 20 12/10/2010 05:00 AM  
 Ferritin 122 12/10/2010 05:00 AM  
  
Lab Results Component Value Date/Time pH 7.39 08/02/2018 08:45 PM  
 PCO2 42 08/02/2018 08:45 PM  
 PO2 60 (L) 08/02/2018 08:45 PM  
 
No components found for: [place] Lab Results Component Value Date/Time CK 90 02/01/2018 01:50 PM  
 CK - MB 3.7 (H) 02/01/2018 01:50 PM  
  
 
 
   
 
Total time:  
Counseling / coordination time, spent as noted above:  
> 50% counseling / coordination?:  
 
Prolonged service was provided for  []30 min   []75 min in face to face time in the presence of the patient, spent as noted above. Time Start:  
Time End:  
Note: this can only be billed with [id number] (initial) or [id number] (follow up). If multiple start / stop times, list each separately.

## 2018-08-03 NOTE — PROGRESS NOTES
Central Line Procedure Note Indication: Inadequate venous access Code blue pt intubated Patient positioned in Trendelenburg. 7-Step Sterility Protocol followed. (cap, mask sterile gown, sterile gloves, large sterile sheet, hand hygiene, 2% chlorhexidine for cutaneous antisepsis) 5 mL 1% Lidocaine placed at insertion site. Right internal jugular cannulated x 1 attempt(s) utilizing the Seldinger technique. Catheter secured & Biopatch applied. Sterile Tegaderm placed. CXR pending.    
Care turned over to covering Attending MD.

## 2018-08-03 NOTE — PROGRESS NOTES
Shift Summary Patient transferred from PCU at approx 1915. Patient received and settled in bed, noted patient with decreased responsiveness and oxygen saturation in the 80's. Patient was very unstable and MD was on the way up to Intubate. Intubation done with no improvement in oxygen saturation. Suctioned patient multiple times and received copious amounts of tan secretions. Patient coded 3 times after being intubated with PEA arrests, MD called family to advise of patient status and family came in. As wife was at patient bedside, she made a decision to compassionately withdraw care and MD paged to correct code status. Patient was extubated at approximately 2210 and  at 2238, auscultated and noted to be apneic and pulseless. Family surrounding bedside. In house hospitalist paged to come up to pronounce.

## 2018-08-03 NOTE — PROGRESS NOTES
Called due to abnormal cxr for central line placement and subsequent ABG from same is suspicious for arterial placement. I was informed by ICU RN that family had decided if pt codes again, to not resuscitate. The original line was placed immediately post his first code not too long after he was intubated and was difficult due to ESRD and severe PAD. I fear attempting another line placement now (he's coded 2-3 time this evening already) may precipitate another code and that should he stabilize overnight we could address new line access in the morning. I told the ICU RN that he could still continue to receive rescue/emergency meds/infusions through an artery vs a vein and that said meds would circulate in accordance with his mean arterial blood pressure. IF new access is critical before the morning, I would address it tonight but I feel putting him through another invasive procedure could hasten his final code.

## 2018-08-03 NOTE — PROGRESS NOTES
Hospitalist cross cover Called to the bedside to d/w family code status. Pt s/p 2 code blues today. Intubated. Prognosis remain very poor. Family wants to proceed with CMO/ DNR/ compassionate extubation. Wife and children were present at bedside.   
 
Aaron Fuller MD

## 2018-08-03 NOTE — PROGRESS NOTES
Responded to code blue of CCU. Family present in the waiting room. Met with family and offered pastoral support. Patient coded several times. Present with family as physician shared about patient's poor prognosis. Family decided for compassionate withdrawal. Patient  in the presence of family. Pastoral care provided. Provided contact numbers for nursing supervisor to family for  home information. Rev. Dali Knowles Paging Service: [phone])

## 2018-08-08 NOTE — DISCHARGE SUMMARY
Death / Discharge Summary        Death Diagnoses:      1> Acute Respiratory Failure with Hypoxia due to  2> Recurrent Aspiration Pneumonia  3> Severe dysphagia/esophageal motility syndrome due to underlying Multiple Sclerosis  4> Sepsis/ Infected R iliac pseudoaneurysm & stent  5> ESRD on HD      Pt was a 70 y.o.  male who presented from [facility] door to r/o sepsis/infection with complains of Fever with Abnormal WBC on blood work. Pt had been having Rehab at [facility] since last DC as above- was being NPO for severe aspiration/dysphagia due to esophageal dysmotility syndrome believed to be from [address]?- declined MRI last admission. Had been initially seen by Vascular Surgery for R iliac Artery pseudoaneurysm s/p stenting done- no infection was suspected at the time of DC & elevated WBC was thought to be from the Aspiration pneumonitis- was treated with IV ABx last admission. Pt was newly started on PEG tube feeding before DC to [facility]. Subsequently pt was seen in ER on 7/11 for diarrhea - was sent back to [facility] then, diarrhea was thought to be from tube feedings. Pt was found to have Chronic Buttock Decubitus ulcers, PEG tube, L arm AVF for HD = all unremarkable on exam but persistent leukocytosis of 16.1 with low grade fever of 100.7 in ER. We were asked to admit for work up and evaluation of the above problems.      Hospital course:    Pt was seen & evaluated by ID specialist, Vascular surgery - was treated with IV Abx for Infected R iliac pseudoaneurysm and stent, s/p CT guided drainage of pelvic collection 7/19, was deemed by Vascular Sx Poor surgical candidate given functional status and multiple co morbidities- was struggling to have tube feedings via his PEG tube due to severe esophageal dysmotility syndrome/dysphagia from MS (declined further workup since last admission with neurology)-- had superimposed Aspiration PNA as the second source of Sepsis--got into resp distress the night of demise- was coded x 2 - transferred to ICU & intubated- but pt proceeded with comfort measures only & decided on compassionate extubation -- pt passed away quickly at 22:38 hrs in 8/2.       Total time: 26 mins

## 2018-08-20 NOTE — ED NOTES
CODE BLUE CALLED 2039, Patti Hyatt MD at bedside 2041. Pt admitted today for aspiration PNA, trasnfered to CCU at 1910. Pt began tube feedings today, but nursing has been unable to stabilize his O2 sats as L lung is fully opaque on CXR. Currently on an epi drip (10 mcg/min) and Frank-Synephrine drip (150 mcg/min) 2041 -- ACLS protocol underway, 1 epi given, pt intubate prior to arrival 
2042 -- CPR held for pulse check. Pulses found femorally and pt in sinus tachycardia on monitor This note is prepared by Mary Hernandez acting as scribe for MD Patti Gutierrez MD : The scribe's documentation has been prepared under my direction and personally reviewed by me in its entirety. I confirm that the note above accurately reflects all work, treatment, procedures, and medical decision making performed by me. I will START or STAY ON the medications listed below when I get home from the hospital:    acetaminophen 325 mg oral tablet  -- 2 tab(s) by mouth every 6 hours, As needed, Mild Pain (1 - 3)  -- Indication: For pain    ibuprofen 200 mg oral tablet  -- 1 tab(s) by mouth every 6 hours, As needed, Moderate Pain (4 - 6)  -- Indication: For pain    sulfamethoxazole-trimethoprim 800 mg-160 mg oral tablet  -- 1 tab(s) by mouth 2 times a day  -- Indication: For prevention I will START or STAY ON the medications listed below when I get home from the hospital:    acetaminophen 325 mg oral tablet  -- 2 tab(s) by mouth every 6 hours, As needed, Mild Pain (1 - 3)  -- Indication: For pain    ibuprofen 200 mg oral tablet  -- 1 tab(s) by mouth every 6 hours, As needed, Moderate Pain (4 - 6)  -- Indication: For pain    Bactrim  mg-160 mg oral tablet  -- 1 tab(s) by mouth 2 times a day   -- Avoid prolonged or excessive exposure to direct and/or artificial sunlight while taking this medication.  Finish all this medication unless otherwise directed by prescriber.  Medication should be taken with plenty of water.    -- Indication: For prevention

## 2023-09-05 NOTE — CONSULTS
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 He was also getting HD at that time. 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 From chart review, he was admitted from 6/28-7/9/18 and had stenting and repair of aneurysm. He also had esophageal dysmotility and PEG being used for nutrition. Had diarrhea and felt to be due TF's per chart. Had leukocytosis and was being treated for aspiration pneumonia per chart. Per DC summary he was on Vancomycin, and flagyl at one point and then Zosyn for 10 days. Neurology was consulted for MS and their notes indicate, noncompliance on treatment and that he did not want MRI at that time. Per neurology notes, \"  His MS has not been active in the years of his MRI and is likely to be less active as he ages. This most likely is worsening of his preexisting deficits as \"secondary progressive\" MS. This admission, he is admitted with fever (101.2 ) and leukocytosis (16) and found to have fluid collection around R iliac artery stent extending into retroperitoneum on right. CT read as \" The component around the stent measures 4.6 x 2.7 x 6.3 cm and the component in  the right retroperitoneum measures 3.6 x 3.1 x 4.6 cm\"  Right hydronephrosis and hydroureter due to compression fo ureter seen as well. He had a CT guided aspiration done and cultures + for Proteus mirabilis. He had a CT on 7/11 prior to this admission that showed a \"moderate-sized hematoma extending along the anterior margin of the right iliopsoas\".  \" It measures at least 7.6 x 3.5 cm transverse and 10.3 cm craniocaudal in size\". He was discharged to [place] [id number] before readmission this time. He has a chronic decubitus ulcers per notes as well. He was started on Cefepime and Levaquin this admission.               Past Medical History:  Past Medical History:   Diagnosis Date    Anemia associated with chronic renal failure     Autoimmune disease (HCC)     hx ms    CAD (coronary artery disease)     Chronic kidney disease     catheter removed and no dialysis at this time    Chronic kidney disease     left arm fistula dialysis MWF    Diabetes ([facility]     type II    Elevated troponin 6/29/2018    GERD (gastroesophageal reflux disease)     Hypertension     Ill-defined condition     prostate cancer    Multiple sclerosis ([facility] [address] [facility]     diagnosed '01    Other ill-defined conditions(799.89)     anemia    Other ill-defined conditions(799.89)     elevated cholesterol    Other ill-defined conditions(799.89)     hx septic right foot    Peripheral vascular disease ([address]     Secondary hyperparathyroidism of renal origin ([facility] [address] [facility]     Thyroid disease          Surgical History:  Past Surgical History:   Procedure Laterality Date    COLONOSCOPY N/A 12/6/2016    COLONOSCOPY performed by Ron Dueñas MD at [facility] ENDOSCOPY    COLONOSCOPY,DIAGNOSTIC  12/6/2016         CORONARY STENT SINGLE W/PTCA  2/17/2011         HX APPENDECTOMY      HX CATARACT REMOVAL  10/18/10    bilateral    HX CHOLECYSTECTOMY      HX GI      ileostomy due to infection    HX HEART CATHETERIZATION      HX HEENT      hx post photocoagulation eye 2009    HX OTHER SURGICAL      right partial foot amputation    HX OTHER SURGICAL      cardiac cath    HX OTHER SURGICAL      prostate removed    PLACE PERCUT GASTROSTOMY TUBE  7/6/2018         AL COLONOSCOPY W/BIOPSY SINGLE/MULTIPLE  5/10/2010         UPPER GI ENDOSCOPY,BIOPSY  4/17/2018         UPPER GI ENDOSCOPY,MYA Das 6/13/2018         VASCULAR SURGERY PROCEDURE UNLIST      katelyn. leg bypasses         Family History:   Family History   Problem Relation Age of Onset    Diabetes Mother          Social History:     Unable to obtain   Allergies: Allergies   Allergen Reactions    Sensipar [Cinacalcet] Other (comments)     Cinacalcet (Sensipar) has been added as an \"allergy\" / intolerance with a comment to assure providers at discharge and post discharge are aware of Dr. Sherman Larkin recommendation to avoid Sensipar. Patient takes Tomeka Elisha as an outpatient; Thus, Bryan Zwingle has been discontinued per Nephrology (cannot give with recent Parsabiv b/c risk of low Calcium). Review of Systems:     Unable to obtain      Medications:  No current facility-administered medications on file prior to encounter. Current Outpatient Prescriptions on File Prior to Encounter   Medication Sig Dispense Refill    pravastatin (PRAVACHOL) 10 mg tablet 1 Tab by Per G Tube route nightly. 30 Tab 0    cloNIDine HCl (CATAPRES) 0.1 mg tablet 1 Tab by Per G Tube route three (3) times daily. 90 Tab 0    carvedilol (COREG) 25 mg tablet 2 Tabs by Per G Tube route two (2) times daily (with meals). 120 Tab 0    aspirin 81 mg chewable tablet 1 Tab by Per G Tube route daily. 30 Tab 0    amLODIPine (NORVASC) 10 mg tablet 1 Tab by Per G Tube route daily.  30 Tab 0           Physical Exam:    Vitals: Patient Vitals for the past 24 hrs:   Temp Pulse Resp BP SpO2   07/23/18 1812 99.5 °F (37.5 °C) 84 18 135/56 100 %   07/23/18 1757 99.1 °F (37.3 °C) 95 18 143/67 -   07/23/18 1730 99.7 °F (37.6 °C) 91 18 147/56 -   07/23/18 1700 - 95 18 141/58 -   07/23/18 1630 - 95 18 137/57 -   07/23/18 1600 - 87 18 143/57 -   07/23/18 1530 - 85 18 138/57 -   07/23/18 1520 98.5 °F (36.9 °C) (!) 107 18 144/66 -   07/23/18 1117 98.5 °F (36.9 °C) 84 18 146/73 98 %   07/23/18 0721 97.5 °F (36.4 °C) 80 18 142/63 97 %   07/23/18 0520 98.3 °F (36.8 °C) 84 20 133/59 99 %   07/22/18 9657 99.5 °F (37.5 °C) 83 18 134/58 96 %   07/22/18 2027 98.5 °F (36.9 °C) 80 16 123/57 95 %   ·   · GEN: NAD  · HEENT: Normocephalic, atraumatic, PERRL, no scleral icterus,  no cervical, no lymphadenopathy, no sinus tenderness, no thrush  · CV: S1, S2 heard regularly, no murmurs, thrills or rubs heard   · Lungs: Clear to auscultation bilaterally  · Abdomen: soft, non distended, non tender, no organomegaly appreciated, no CVA tenderness   · Genitourinary: no genital discharge, no shi  · Extremities: no edema  · Neuro: Alert, oriented to time, place and situation, moves all extremities to commands, verbal   · Skin: no rash  · Psych: good affect, good eye contact, non tearful   · Lines:       Labs:   Recent Results (from the past 24 hour(s))   GLUCOSE, POC    Collection Time: 07/22/18  9:55 PM   Result Value Ref Range    Glucose (POC) 109 (H) 65 - 100 mg/dL    Performed by Viridiana Laird (PCT)    GLUCOSE, POC    Collection Time: 07/23/18  5:24 AM   Result Value Ref Range    Glucose (POC) 113 (H) 65 - 100 mg/dL    Performed by Viridiana Laird (PCT)    GLUCOSE, POC    Collection Time: 07/23/18 12:13 PM   Result Value Ref Range    Glucose (POC) 176 (H) 65 - 100 mg/dL    Performed by Ash Banda (PCT)    CBC W/O DIFF    Collection Time: 07/23/18  3:20 PM   Result Value Ref Range    WBC 13.5 (H) 4.1 - 11.1 K/uL    RBC 2.47 (L) 4.10 - 5.70 M/uL    HGB 6.7 (L) 12.1 - 17.0 g/dL    HCT 22.2 (L) 36.6 - 50.3 %    MCV 89.9 80.0 - 99.0 FL    MCH 27.1 26.0 - 34.0 PG    MCHC 30.2 30.0 - 36.5 g/dL    RDW 18.1 (H) 11.5 - 14.5 %    PLATELET 811 144 - 665 K/uL    MPV 9.9 8.9 - 12.9 FL    NRBC 0.0 0  WBC    ABSOLUTE NRBC 0.00 0.00 - 4.88 K/uL   METABOLIC PANEL, BASIC    Collection Time: 07/23/18  3:20 PM   Result Value Ref Range    Sodium 134 (L) 136 - 145 mmol/L    Potassium 4.4 3.5 - 5.1 mmol/L    Chloride 96 (L) 97 - 108 mmol/L    CO2 33 (H) 21 - 32 mmol/L    Anion gap 5 5 - 15 mmol/L    Glucose 137 (H) 65 - 100 mg/dL    BUN 37 (H) 6 - 20 MG/DL    Creatinine 4.68 (H) 0.70 - 1.30 MG/DL    BUN/Creatinine ratio 8 (L) 12 - 20      GFR est AA 15 (L) >60 ml/min/1.73m2    GFR est non-AA 12 (L) >60 ml/min/1.73m2    Calcium 8.9 8.5 - 10.1 MG/DL   PHOSPHORUS    Collection Time: 07/23/18  3:20 PM   Result Value Ref Range    Phosphorus 2.5 (L) 2.6 - 4.7 MG/DL   TYPE + CROSSMATCH    Collection Time: 07/23/18  4:10 PM   Result Value Ref Range    Crossmatch Expiration 07/26/2018     ABO/Rh(D) B POSITIVE     Antibody screen NEG     Unit number [id number]     Blood component type  LR     Unit division 00     Status of unit ISSUED     Crossmatch result Compatible     Unit number [id number]     Blood component type  LR     Unit division 00     Status of unit ISSUED     Crossmatch result Compatible        Microbiology Data:      Blood 7/17  Component Results      Component Value Ref Range & Units Status     Special Requests: NO SPECIAL REQUESTS   Final     Culture result: NO GROWTH 6 DAYS   Final       Result History          Imaging:     CT 7/17  FINDINGS:  LOWER CHEST:  The visualized portions of the lung bases are clear. There is mild atelectasis  in the posterior lower lobes. ABDOMEN:  The unenhanced images demonstrate visibility of the interventricular septum of  the heart. There is extensive vascular calcification and a right iliac stent. There are clips in the gallbladder fossa. Liver: The liver is normal in size and contour with no focal abnormality. Gallbladder and bile ducts: There is no calcified gallstone or biliary duct  dilatation. Spleen: No abnormality. Pancreas: No abnormality. Adrenal glands: No abnormality. Kidneys: There are small left renal cysts. There is hydronephrosis and  hydroureter on the right down to the level of the pelvis. PELVIS:  Reproductive organs: The prostate gland is absent. Bladder: No abnormality. BOWEL AND MESENTERY: There is a gastrostomy tube in the antrum of the stomach.   The small bowel is not obstructed. There is gas and stool throughout the colon. There is no mesenteric mass or adenopathy. The appendix is absent. PERITONEUM:  There is no ascites or free intraperitoneal air. RETROPERITONEUM: The aorta is atherosclerotic without aneurysm. There is a right  external iliac artery stent. There is an enhancing fluid collection surrounding  the right external iliac artery stent extending into the right retroperitoneum. The component around the stent measures 4.6 x 2.7 x 6.3 cm and the component in  the right retroperitoneum measures 3.6 x 3.1 x 4.6 cm. There is no  retroperitoneal mass or adenopathy. BONES AND SOFT TISSUES: There is diffuse body edema.     IMPRESSION  IMPRESSION:   1. Right common iliac artery stent with enhancing fluid collection surrounding  the stent and extending into the retroperitoneum on the right. This is  suspicious for infection. 2. Right hydronephrosis and hydroureter secondary to compression of the ureter  by the collection around the stent. 3. Extensive atherosclerotic calcification of the aorta and mesenteric vessels. This is consistent with the patient's end-stage renal disease. 4. Status post cholecystectomy. 5. Gastrostomy tube. 6. Small left renal cysts. 7. Status post prostatectomy.     CT 7/11  FINDINGS:   LUNG BASES: 7 mm subpleural nodule at posterior basilar right lower lobe  slightly smaller in the interval.  INCIDENTALLY IMAGED HEART AND MEDIASTINUM: Unremarkable. LIVER: Mild intrahepatic and extrahepatic biliary duct distention again shown  with common bile duct diameter measuring up to 1.5 cm. Findings not  substantially changed in the interval.  GALLBLADDER: Status post cholecystectomy. SPLEEN: No mass. PANCREAS: No mass or ductal dilatation. ADRENALS: Unremarkable. KIDNEYS: Bilateral renal atrophy again shown. Moderate right renal  pelvocaliectasis and ureteral dilation again demonstrated.  Finding extends to  level of retroperitoneal hematoma surrounding right external iliac stent. STOMACH: Moderate gastric distention in the interval to the level of the second  portion of the duodenum. Percutaneous gastrostomy tube in the interval  positioned anteriorly in the gastric body. SMALL BOWEL: No dilatation or wall thickening. COLON: No dilatation or wall thickening. APPENDIX: Not demonstrated. PERITONEUM: No free intraperitoneal air or fluid demonstrated. RETROPERITONEUM: As noted above, right external iliac stent again shown within a  moderate-sized hematoma extending along the anterior margin of the right  iliopsoas. Dimension is difficult to estimate due to the lack of oral contrast.  It measures at least 7.6 x 3.5 cm transverse and 10.3 cm craniocaudal and size  is similar to that shown previously. URINARY BLADDER: No mass or calculus. BONES: No destructive bone lesion. ADDITIONAL COMMENTS: N/A     IMPRESSION  IMPRESSION:     1. Interval percutaneous gastrostomy tube placement. Interval moderate gastric  distention. 2. Right iliac artery stenting and adjacent hematoma again shown, appearing  stable. Moderate right renal pelvocaliectasis and ureterectasis again  demonstrated.     CT 7/2  FINDINGS: There is interval right common/external iliac artery stenting for  exclusion of pseudoaneurysm with patent stent, and minimal hyperdensity in the  pseudoaneurysm contents but no overt arterial extravasation. Pseudoaneurysm size  is stable. There is no apparent soft tissue emphysema or abscess.     Right hydroureteronephrosis remains. Kidneys are atrophic. There is trace  ascites. There is no pneumoperitoneum.     Abdominal aorta is without stenosis, aneurysm or dissection. Celiac artery, SMA,  ARTIE and bilateral solitary renal arteries are patent. Right internal iliac  artery is patent. Femoral arteries are patent.     IMPRESSION  IMPRESSION:  1. Interval right iliac artery pseudoaneurysm stenting. 2. No overt arterial extravasation.   3. Stable pseudosac size. 4. No apparent soft tissue emphysema/abscess. 5. Stable right hydroureteronephrosis.             Assessment / Plan:     Mr Elisabet Barney is a 70year old gentleman with hx of MS, PVD, ESRD on HD, DM , RLE syme amputation, S/P Right common iliac and external iliac artery stenting for Pseudoaneurysm (6/29/18) with infected R iliac  pseudo- aneurysm and stent. From chart review, he was admitted from 6/28-7/9/18 and had stenting and repair of aneurysm. Had leukocytosis and was being treated for aspiration pneumonia per chart. Per DC summary he was on Vancomycin, and flagyl at one point and then Zosyn for 10 days during last admit. This admission, he is admitted with fever (101.2 ) and leukocytosis (16) and found to have fluid collection around R iliac artery stent extending into retroperitoneum on right. CT read as \" The component around the stent measures 4.6 x 2.7 x 6.3 cm and the component in  the right retroperitoneum measures 3.6 x 3.1 x 4.6 cm\"  Right hydronephrosis and hydroureter due to compression fo ureter seen as well. He had a CT guided aspiration done and cultures + for Proteus mirabilis. He had a CT on 7/11 prior to this admission that showed a \"moderate-sized hematoma extending along the anterior margin of the right iliopsoas\". \" It measures at least 7.6 x 3.5 cm transverse and 10.3 cm craniocaudal in size\". He was started on Cefepime and Levaquin this admission. 1) Infected R iliac pseudoaneurysm and stent    S/P repair with stenting on 6/29/18, S/P CT guided drainage of pelvic collection 7/19 wt cx + for Proteus mirabilis    CT with  component around the stent measures 4.6 x 2.7 x 6.3 cm and the component in the right retroperitoneum measures 3.6 x 3.1 x 4.6 cm\"     Poor surgical candidate given functional status and multiple co morbidities    Successful treatment of infections is not usually possible without adequate source control.   Additionally, in his situation, as there is fluid  around the stent, raising high concern for stent to be seeded/infected. If his hematoma/fluid collections/stent cannot be surgically removed,   I doubt antibiotics alone will eradicate such an infection. Antibiotics may keep the infection at [id number] or suppressed. In situations when we cannot remove infected prosthesis/control source adequately, antibiotics are often used to achieve a \"functional\" cure if at all possible rather than a microbiological cure. In his situation, even this is hard to assess given his multiple co morbidities including MS, ESRD, chronic decubitus ulcers etc.     With such types of infections, there are no standard guidelines for duration of therapy. We usually treat with IV antibiotics up front for several weeks  followed by Po suppression possibly based on radiographic and clinical response. With this comes all the side effects and risk for CDI associated with antibiotics. Complicating factor additionally is that he has MS which can be exacerbated by infections and is on HD for ESRD making antibiotics delivery/access more challenging    For Proteus, I would ideally like Cefepime as prolonged use of lower generation Cephalosporins can create a Amp C inducible resistance. Cefazolin has NANNETTE </= 8 but this is FDA scale and not the standard CLSI breakoff points. Use of Cefazolin at 2, 2, and 3 gm post HD on HD days would avoid another access. However, he could develop resistance to this therapy while on it and also the cut off NANNETTE breakpoints are not standard CLSI range which makes me more uncertain about its use in this scenario. I think it is reasonable to use if our goal is to suppress the infection without having to introduce another line for antibiotics etc.  If plans are for aggressive antibiotic therapy, then I would prefer Cefepime.      Given these complicating factors and the complex situation Mr Brad Ken is in, we will need to discuss more about his goals of care and wishes for therapy.   He is at risk for recurrent infections, bacteremia etc.         DC Levaquin and continue Cefepime for now               2) Per urology notes, superficial dry gangrene of glans penis     3) Sacral decubitus ulcers     4) MS    5) ESRD on HD    6) S/P PEG     7) DVT px     Sanjeev Spikes, DO  9:51 PM unknown
